# Patient Record
Sex: FEMALE | Race: BLACK OR AFRICAN AMERICAN | Employment: OTHER | URBAN - METROPOLITAN AREA
[De-identification: names, ages, dates, MRNs, and addresses within clinical notes are randomized per-mention and may not be internally consistent; named-entity substitution may affect disease eponyms.]

---

## 2017-01-19 ENCOUNTER — OFFICE VISIT (OUTPATIENT)
Dept: INTERNAL MEDICINE CLINIC | Age: 82
End: 2017-01-19

## 2017-01-19 VITALS
OXYGEN SATURATION: 96 % | HEIGHT: 63 IN | SYSTOLIC BLOOD PRESSURE: 136 MMHG | HEART RATE: 78 BPM | DIASTOLIC BLOOD PRESSURE: 75 MMHG | RESPIRATION RATE: 18 BRPM | WEIGHT: 173.8 LBS | TEMPERATURE: 97.8 F | BODY MASS INDEX: 30.79 KG/M2

## 2017-01-19 DIAGNOSIS — R68.81 EARLY SATIETY: ICD-10-CM

## 2017-01-19 DIAGNOSIS — M17.0 PRIMARY OSTEOARTHRITIS OF BOTH KNEES: ICD-10-CM

## 2017-01-19 DIAGNOSIS — I10 ESSENTIAL HYPERTENSION: ICD-10-CM

## 2017-01-19 DIAGNOSIS — J45.20 MILD INTERMITTENT ASTHMA WITHOUT COMPLICATION: ICD-10-CM

## 2017-01-19 DIAGNOSIS — E11.9 TYPE 2 DIABETES MELLITUS WITHOUT COMPLICATION, WITHOUT LONG-TERM CURRENT USE OF INSULIN (HCC): Primary | ICD-10-CM

## 2017-01-19 RX ORDER — MOMETASONE FUROATE 50 UG/1
2 SPRAY, METERED NASAL DAILY
Qty: 3 CONTAINER | Refills: 11 | Status: SHIPPED | OUTPATIENT
Start: 2017-01-19 | End: 2018-10-26

## 2017-01-19 RX ORDER — GUAIFENESIN 200 MG/1
200 TABLET ORAL
Qty: 300 TAB | Refills: 11 | Status: SHIPPED | OUTPATIENT
Start: 2017-01-19 | End: 2018-10-26

## 2017-01-19 RX ORDER — TRIAMCINOLONE ACETONIDE 1 MG/G
CREAM TOPICAL 2 TIMES DAILY
Qty: 80 G | Refills: 11 | Status: SHIPPED | OUTPATIENT
Start: 2017-01-19 | End: 2018-01-25 | Stop reason: SDUPTHER

## 2017-01-19 NOTE — MR AVS SNAPSHOT
Visit Information Date & Time Provider Department Dept. Phone Encounter #  
 1/19/2017 11:00 AM Angle Prado [facility] [place] [facility] [id number] Follow-up Instructions Return in about 4 months (around 5/19/2017). Follow-up and Disposition History Your Appointments 5/17/2017 11:30 AM  
Any with Blane Bond MD  
[address] [place] [address] and Primary Care Allison Willett) Appt Note: 4 month visit  
 Rachel Almendarez 1 [facility]  
  
   
 Rachel Almaguer [id number] Upcoming Health Maintenance Date Due Pneumococcal 65+ High/Highest Risk (1 of 2 - PCV13) 5/23/2002 FOOT EXAM Q1 4/9/2016 DTaP/Tdap/Td series (1 - Tdap) 6/7/2016 INFLUENZA AGE 9 TO ADULT 8/1/2016 LIPID PANEL Q1 10/27/2016 MEDICARE YEARLY EXAM 3/3/2017 HEMOGLOBIN A1C Q6M 3/7/2017 MICROALBUMIN Q1 6/6/2017 EYE EXAM RETINAL OR DILATED Q1 12/15/2017 GLAUCOMA SCREENING Q2Y 12/15/2018 Allergies as of 1/19/2017  Review Complete On: 1/19/2017 By: Blane Bond MD  
  
 Severity Noted Reaction Type Reactions Codeine  10/20/2010    Other (comments) Upset stomach Current Immunizations  Reviewed on 6/6/2016 Name Date Td, Adsorbed PF 6/6/2016 Not reviewed this visit You Were Diagnosed With   
  
 Codes Comments Type 2 diabetes mellitus without complication, without long-term current use of insulin (HCC)    -  Primary ICD-10-CM: E11.9 ICD-9-CM: 250.00 Mild intermittent asthma without complication     IHJ-97-KW: J45.20 ICD-9-CM: 493.90 Essential hypertension     ICD-10-CM: I10 
ICD-9-CM: 401.9 Primary osteoarthritis of both knees     ICD-10-CM: M17.0 ICD-9-CM: 715.16 Early satiety     ICD-10-CM: R68.81 ICD-9-CM: 780.94 Vitals BP Pulse Temp Resp Height(growth percentile) Weight(growth percentile) 136/75 (BP 1 Location: Left arm, BP Patient Position: Sitting) 78 97.8 °F (36.6 °C) (Oral) 18 5' 3\" (1.6 m) 173 lb 12.8 oz (78.8 kg) SpO2 BMI OB Status Smoking Status 96% 30.79 kg/m2 Unknown Never Smoker BMI and BSA Data Body Mass Index Body Surface Area 30.79 kg/m 2 1.87 m 2 Preferred Pharmacy Pharmacy Name Phone [facility]  [place] [phone] Your Updated Medication List  
  
   
This list is accurate as of: 1/19/17 12:54 PM.  Always use your most recent med list.  
  
  
  
  
 Jessee New 250-50 mcg/dose diskus inhaler Generic drug:  fluticasone-salmeterol USE 1 INHALATION TWO TIMES A DAY  
  
 aspirin delayed-release 81 mg tablet Take 81 mg by mouth daily. BONIVA 150 mg tablet Generic drug:  ibandronate TAKE 1 TABLET ONCE A MONTH  
  
 FREESTYLE LITE STRIPS strip Generic drug:  glucose blood VI test strips USE TO TEST BLOOD SUGAR ONCE DAILY  
  
 guaiFENesin 200 mg tablet Commonly known as:  Elif [place] Take 1 Tab by mouth every four (4) hours as needed for Congestion. hydroCHLOROthiazide 12.5 mg capsule Commonly known as:  Bernaetta Lot TAKE 1 CAPSULE ONCE ON MONDAY, WEDNESDAY, AND FRIDAY influenza vaccine 2015-16 (36mos+)(PF) Syrg injection Commonly known as:  FLUZONE/FLUARIX QUAD  
0.5 mL by IntraMUSCular route PRIOR TO DISCHARGE for 1 dose. KLOR-CON M20 20 mEq tablet Generic drug:  potassium chloride TAKE 1 TABLET DAILY  
  
 lamoTRIgine 25 mg tablet Commonly known as: LaMICtal  
TAKE 1 TABLET TWICE A DAY  
  
 metoprolol succinate 50 mg XL tablet Commonly known as:  TOPROL-XL  
TAKE 1 TABLET DAILY MICARDIS 80 mg tablet Generic drug:  telmisartan TAKE 1 TABLET DAILY  
  
 mometasone 50 mcg/actuation nasal spray Commonly known as:  NASONEX  
2 Sprays by Both Nostrils route daily. NIFEdipine ER 60 mg ER tablet Commonly known as:  PROCARDIA XL  
TAKE 1 TABLET TWICE A DAY  
  
 oxybutynin chloride XL 10 mg CR tablet Commonly known as:  DITROPAN XL  
TAKE 1 TABLET DAILY pravastatin 20 mg tablet Commonly known as:  PRAVACHOL  
TAKE 1 TABLET ONCE EACH NIGHT  
  
 PROAIR HFA 90 mcg/actuation inhaler Generic drug:  albuterol INHALE 2 PUFFS EVERY 4 HOURS AS NEEDED  
  
 triamcinolone acetonide 0.1 % topical cream  
Commonly known as:  KENALOG Apply  to affected area two (2) times a day. VITAMIN C PO Take  by mouth. Prescriptions Sent to Pharmacy Refills  
 guaiFENesin (ORGANIDIN) 200 mg tablet 11 Sig: Take 1 Tab by mouth every four (4) hours as needed for Congestion. Class: Normal  
 Pharmacy: [address], [address] Ph #: [phone] Route: Oral  
 mometasone (NASONEX) 50 mcg/actuation nasal spray 11 Si Sprays by Both Nostrils route daily. Class: Normal  
 Pharmacy: [address], [address] Ph #: [phone] Route: Both Nostrils  
 triamcinolone acetonide (KENALOG) 0.1 % topical cream 11 Sig: Apply  to affected area two (2) times a day. Class: Normal  
 Pharmacy: [facility] [address] Ph #: [phone] Route: Topical  
  
We Performed the Following CBC WITH AUTOMATED DIFF [31622 CPT(R)] HEMOGLOBIN A1C WITH EAG [29848 CPT(R)]  DIABETES FOOT EXAM [7 Custom] LIPID PANEL [83119 CPT(R)] METABOLIC PANEL, COMPREHENSIVE [91429 CPT(R)] GA COLLECTION VENOUS BLOOD,VENIPUNCTURE [id number] CPT(R)] TSH 3RD GENERATION [16884 CPT(R)] URINALYSIS W/ RFLX MICROSCOPIC [37894 CPT(R)] Follow-up Instructions Return in about 4 months (around 2017). To-Do List   
 2017 Imaging:  [facility] Introducing [facility]! Arie Serrato introduces [employer] patient portal. Now you can access parts of your medical record, email your doctor's office, and request medication refills online. 1. In your internet browser, go to [email or web address] 2. Click on the First Time User? Click Here link in the Sign In box. You will see the New Member Sign Up page. 3. Enter your In[employer]ek Access Code exactly as it appears below. You will not need to use this code after youve completed the sign-up process. If you do not sign up before the expiration date, you must request a new code. · In[employer] Access Code: [email or web address] Expires: 4/19/2017 12:54 PM 
 
4. Enter the last four digits of your Social Security Number (xxxx) and Date of Birth (mm/dd/yyyy) as indicated and click Submit. You will be taken to the next sign-up page. 5. Create a InvoTek ID. This will be your InvoTek login ID and cannot be changed, so think of one that is secure and easy to remember. 6. Create a InvoTek password. You can change your password at any time. 7. Enter your Password Reset Question and Answer. This can be used at a later time if you forget your password. 8. Enter your e-mail address. You will receive e-mail notification when new information is available in [address] 9. Click Sign Up. You can now view and download portions of your medical record. 10. Click the Download Summary menu link to download a portable copy of your medical information. If you have questions, please visit the Frequently Asked Questions section of the In[employer] website. Remember, In[employer] is NOT to be used for urgent needs. For medical emergencies, dial 911. Now available from your iPhone and Android! Please provide this summary of care documentation to your next provider. Your primary care clinician is listed as August Beer. If you have any questions after today's visit, please call [phone].

## 2017-01-19 NOTE — PROGRESS NOTES
1. Have you been to the ER, urgent care clinic since your last visit? Hospitalized since your last visit? No    2. Have you seen or consulted any other health care providers outside of the [address] [place] [address] since your last visit? Include any pap smears or colon screening.  No

## 2017-01-19 NOTE — PROGRESS NOTES
SPORTS MEDICINE AND PRIMARY CARE  Yoshi Garcia MD, Hillary [address] [address]  Phone:  [phone]  Fax: [phone]      Chief Complaint   Patient presents with    Hypertension         SUBECTIVE:    Lucius Gold is a 80 y.o. female Patient returns today ambulatory, alert and appropriate and has the capacity to give an accurate history. She has a known history of primary hypertension, diabetes, asthma, degenerative joint disease. Patient comes in today concerned about congestion that she has had for a while. She has a cough particularly in the evening or at night but it is nonproductive. Her Nasonex is out. She has tried Sudafed and she has also tried Afrin but that was too strong she states. She is also using a jet neb machine. She is using a vaporizer with solution. She is also using her ProAir and Advair Diskus. Patient continues to complain of feeling bloated. When she looks at herself in the mirror she looks like she is swollen she states. Dr. Giles Renee performed a colonoscopy and upper endoscopy which were unremarkable. He did a CT scan of the abdomen and pelvis and they were unremarkable. He did not do a gastric emptying study. She wonders if she could have that. She continues to complain of a rash in her legs that started when she was out in the weeds. She has tried Neosporin, Bacitracin without success and some Hydrocortisone. The patient notes swelling of her feet and legs, particularly as the day progresses. Her podiatrist recommended support hose. Patient states she is monitoring her cyst on her left kidney. She does not notice it present. Dr. Giles Renee told her she did not have to worry about it which we concur.           Current Outpatient Prescriptions   Medication Sig Dispense Refill    lamoTRIgine (LAMICTAL) 25 mg tablet TAKE 1 TABLET TWICE A  Tab 3    MICARDIS 80 mg tablet TAKE 1 TABLET DAILY 90 Tab 49    metoprolol succinate (TOPROL-XL) 50 mg XL tablet TAKE 1 TABLET DAILY 90 Tab 2    hydrochlorothiazide (MICROZIDE) 12.5 mg capsule TAKE 1 CAPSULE ONCE ON MONDAY, WEDNESDAY, AND FRIDAY 36 Cap 1    influenza vaccine 2015-16, 36mos+,,PF, (FLUZONE/FLUARIX QUAD) syrg injection 0.5 mL by IntraMUSCular route PRIOR TO DISCHARGE for 1 dose. 0.5 mL 0    BONIVA 150 mg tablet TAKE 1 TABLET ONCE A MONTH 3 Tab 1    NIFEdipine ER (PROCARDIA XL) 60 mg ER tablet TAKE 1 TABLET TWICE A  Tab 3    oxybutynin chloride XL (DITROPAN XL) 10 mg CR tablet TAKE 1 TABLET DAILY 90 Tab 2    PROAIR HFA 90 mcg/actuation inhaler INHALE 2 PUFFS EVERY 4 HOURS AS NEEDED 1 Inhaler 11    pravastatin (PRAVACHOL) 20 mg tablet TAKE 1 TABLET ONCE EACH NIGHT 90 Tab 10    FREESTYLE LITE STRIPS strip USE TO TEST BLOOD SUGAR ONCE DAILY 100 Strip 3    KLOR-CON M20 20 mEq tablet TAKE 1 TABLET DAILY 90 Tab 3    ADVAIR DISKUS 250-50 mcg/dose diskus inhaler USE 1 INHALATION TWO TIMES A DAY 3 Inhaler 11    mometasone (NASONEX) 50 mcg/actuation nasal spray 2 Sprays by Both Nostrils route daily. 1 Container 11    aspirin delayed-release 81 mg tablet Take 81 mg by mouth daily.  ASCORBATE CALCIUM (VITAMIN C PO) Take  by mouth.  guaiFENesin (ORGANIDIN) 200 mg tablet Take 200 mg by mouth every four (4) hours as needed for Congestion.        Past Medical History   Diagnosis Date    Anemia     Arrhythmia     Asthma     Bereavement 2-2-16      die 82 copd,chf,pul htn pn after 48 yr marriage    BPV (benign positional vertigo)     Breast cancer, right breast ([address] 1994     right breast, lumpectomy and radiation    Diabetes ([address]     DJD (degenerative joint disease) of knee     Early satiety     Hereditary spherocytosis ([address]     HTN (hypertension)     Radiation therapy complication     S/P colonoscopy 4-9-14     md Brian - family hx    S/P colonoscopy 07/20/2016     rt - md brian diverticulosis    Septic arthritis ([address] 3/11    Streptococcal sepsis ([facility] 3/11     Past Surgical History   Procedure Laterality Date    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 No chest pain or shortness of breath. Otherwise thirteen point review of systems is unremarkable. Sometimes she gets out of breath if she moves too fast.          Social History     Social History    Marital status:      Spouse name: N/A    Number of children: N/A    Years of education: N/A     Social History Main Topics    Smoking status: Never Smoker    Smokeless tobacco: None    Alcohol use No    Drug use: None    Sexual activity: Not Asked     Other Topics Concern    None     Social History Narrative   r  Family History   Problem Relation Age of Onset    Heart Disease Mother     Diabetes Mother     Other Father      'sphero'-blood disorder    Cancer Sister      breast    Breast Cancer Sister     Cancer Sister      colon       OBJECTIVE:  Visit Vitals    /75 (BP 1 Location: Left arm, BP Patient Position: Sitting)    Pulse 78    Temp 97.8 °F (36.6 °C) (Oral)    Resp 18    Ht 5' 3\" (1.6 m)    Wt 173 lb 12.8 oz (78.8 kg)    SpO2 96%    BMI 30.79 kg/m2     ENT: perrla,  eom intact  NECK: supple. Thyroid normal  CHEST: clear to ascultation and percussion   HEART: regular rate and rhythm  ABD: soft, bowel sounds active  EXTREMITIES: no edema, pulse 1+ Examination of the feet reveals puffiness just above the ankle. However she has lichenification bilaterally. Sensation is intact to fine filament. Pulses are diminished but intact. No visits with results within 3 Month(s) from this visit.   Latest known visit with results is:    Office Visit on 09/07/2016   Component Date Value Ref Range Status    Glucose 09/07/2016 95  65 - 99 mg/dL Final    BUN 09/07/2016 18  8 - 27 mg/dL Final    Creatinine 09/07/2016 0.95  0.57 - 1.00 mg/dL Final    GFR est non-AA 09/07/2016 57* >59 mL/min/1.73 Final    GFR est AA 09/07/2016 66  >59 mL/min/1.73 Final    BUN/Creatinine ratio 09/07/2016 19  11 - 26 Final    Sodium 09/07/2016 143  134 - 144 mmol/L Final    Potassium 09/07/2016 4.0  3.5 - 5.2 mmol/L Final    Chloride 09/07/2016 104  97 - 108 mmol/L Final    CO2 09/07/2016 22  18 - 29 mmol/L Final    Calcium 09/07/2016 9.7  8.7 - 10.3 mg/dL Final    Hemoglobin A1c 09/07/2016 5.4  4.8 - 5.6 % Final    Comment:          Pre-diabetes: 5.7 - 6.4           Diabetes: >6.4           Glycemic control for adults with diabetes: <7.0      Estimated average glucose 09/07/2016 108  mg/dL Final          ASSESSMENT:  No diagnosis found. Blood pressure control is excellent at 136/75. Her BMI reflects a five pound increase since we last saw her and we certainly encourage her to be physically active for 30 minutes five days a week and adhere to a heart healthy weight reducing diet. For the sinus congestion we will renew the Flonase. I think that is appropriate for her using the inhalers as well as the jet nebs for the cough. I think there is a bronchospastic component that would be helped by both of those. For the bloating we will ask for a gastric emptying study and report to her the results. For the lichenification of her ankles we will give her a Triamcinolone cream.    The puffiness of the legs at the end of the day is related to chronic venous insufficiency and support stockings or elevation during the day would be helpful. PLAN:  . No orders of the defined types were placed in this encounter.       Follow-up Disposition: Not on File      ATTENTION:   This medical record was transcribed using an electronic medical records system. Although proofread, it may and can contain electronic and spelling errors. Other human spelling and other errors may be present. Corrections may be executed at a later time. Please feel free to contact us for any clarifications as needed.

## 2017-01-20 LAB
ALBUMIN SERPL-MCNC: 4.3 G/DL (ref 3.5–4.8)
ALBUMIN/GLOB SERPL: 1.1 {RATIO} (ref 1.1–2.5)
ALP SERPL-CCNC: 91 IU/L (ref 39–117)
ALT SERPL-CCNC: 16 IU/L (ref 0–32)
APPEARANCE UR: CLEAR
AST SERPL-CCNC: 21 IU/L (ref 0–40)
BACTERIA #/AREA URNS HPF: NORMAL /[HPF]
BASOPHILS # BLD AUTO: 0.1 X10E3/UL (ref 0–0.2)
BASOPHILS NFR BLD AUTO: 1 %
BILIRUB SERPL-MCNC: 0.5 MG/DL (ref 0–1.2)
BILIRUB UR QL STRIP: NEGATIVE
BUN SERPL-MCNC: 19 MG/DL (ref 8–27)
BUN/CREAT SERPL: 16 (ref 11–26)
CALCIUM SERPL-MCNC: 9.8 MG/DL (ref 8.7–10.3)
CASTS URNS QL MICRO: NORMAL /LPF
CHLORIDE SERPL-SCNC: 102 MMOL/L (ref 96–106)
CHOLEST SERPL-MCNC: 140 MG/DL (ref 100–199)
CO2 SERPL-SCNC: 21 MMOL/L (ref 18–29)
COLOR UR: YELLOW
CREAT SERPL-MCNC: 1.21 MG/DL (ref 0.57–1)
EOSINOPHIL # BLD AUTO: 0.6 X10E3/UL (ref 0–0.4)
EOSINOPHIL NFR BLD AUTO: 6 %
EPI CELLS #/AREA URNS HPF: NORMAL /HPF
ERYTHROCYTE [DISTWIDTH] IN BLOOD BY AUTOMATED COUNT: 13.3 % (ref 12.3–15.4)
EST. AVERAGE GLUCOSE BLD GHB EST-MCNC: 103 MG/DL
GLOBULIN SER CALC-MCNC: 3.8 G/DL (ref 1.5–4.5)
GLUCOSE SERPL-MCNC: 61 MG/DL (ref 65–99)
GLUCOSE UR QL: NEGATIVE
HBA1C MFR BLD: 5.2 % (ref 4.8–5.6)
HCT VFR BLD AUTO: 38.3 % (ref 34–46.6)
HDLC SERPL-MCNC: 83 MG/DL
HGB BLD-MCNC: 13.3 G/DL (ref 11.1–15.9)
HGB UR QL STRIP: NEGATIVE
IMM GRANULOCYTES # BLD: 0 X10E3/UL (ref 0–0.1)
IMM GRANULOCYTES NFR BLD: 0 %
KETONES UR QL STRIP: NEGATIVE
LDLC SERPL CALC-MCNC: 48 MG/DL (ref 0–99)
LEUKOCYTE ESTERASE UR QL STRIP: NEGATIVE
LYMPHOCYTES # BLD AUTO: 1.8 X10E3/UL (ref 0.7–3.1)
LYMPHOCYTES NFR BLD AUTO: 18 %
MCH RBC QN AUTO: 27.1 PG (ref 26.6–33)
MCHC RBC AUTO-ENTMCNC: 34.7 G/DL (ref 31.5–35.7)
MCV RBC AUTO: 78 FL (ref 79–97)
MICRO URNS: ABNORMAL
MONOCYTES # BLD AUTO: 1.2 X10E3/UL (ref 0.1–0.9)
MONOCYTES NFR BLD AUTO: 12 %
MUCOUS THREADS URNS QL MICRO: PRESENT
NEUTROPHILS # BLD AUTO: 6.1 X10E3/UL (ref 1.4–7)
NEUTROPHILS NFR BLD AUTO: 63 %
NITRITE UR QL STRIP: NEGATIVE
PH UR STRIP: 6 [PH] (ref 5–7.5)
PLATELET # BLD AUTO: 493 X10E3/UL (ref 150–379)
POTASSIUM SERPL-SCNC: 4 MMOL/L (ref 3.5–5.2)
PROT SERPL-MCNC: 8.1 G/DL (ref 6–8.5)
PROT UR QL STRIP: ABNORMAL
RBC # BLD AUTO: 4.91 X10E6/UL (ref 3.77–5.28)
RBC #/AREA URNS HPF: NORMAL /HPF
SODIUM SERPL-SCNC: 144 MMOL/L (ref 134–144)
SP GR UR: 1.01 (ref 1–1.03)
TRIGL SERPL-MCNC: 46 MG/DL (ref 0–149)
TSH SERPL DL<=0.005 MIU/L-ACNC: 1.82 UIU/ML (ref 0.45–4.5)
UROBILINOGEN UR STRIP-MCNC: 0.2 MG/DL (ref 0.2–1)
VLDLC SERPL CALC-MCNC: 9 MG/DL (ref 5–40)
WBC # BLD AUTO: 9.7 X10E3/UL (ref 3.4–10.8)
WBC #/AREA URNS HPF: NORMAL /HPF

## 2017-01-25 ENCOUNTER — HOSPITAL ENCOUNTER (OUTPATIENT)
Dept: NUCLEAR MEDICINE | Age: 82
Discharge: HOME | End: 2017-01-25
Attending: INTERNAL MEDICINE
Payer: MEDICARE

## 2017-01-25 DIAGNOSIS — R68.81 EARLY SATIETY: ICD-10-CM

## 2017-01-25 DIAGNOSIS — E11.9 TYPE 2 DIABETES MELLITUS WITHOUT COMPLICATION, WITHOUT LONG-TERM CURRENT USE OF INSULIN (HCC): ICD-10-CM

## 2017-01-25 PROCEDURE — 78264 GASTRIC EMPTYING IMG STUDY: CPT

## 2017-01-30 RX ORDER — ALBUTEROL SULFATE 90 UG/1
AEROSOL, METERED RESPIRATORY (INHALATION)
Qty: 3 INHALER | Refills: 3 | Status: SHIPPED | OUTPATIENT
Start: 2017-01-30 | End: 2017-02-06 | Stop reason: SDUPTHER

## 2017-02-06 RX ORDER — ALBUTEROL SULFATE 90 UG/1
AEROSOL, METERED RESPIRATORY (INHALATION)
Qty: 3 INHALER | Refills: 3 | Status: SHIPPED | OUTPATIENT
Start: 2017-02-06 | End: 2018-07-09 | Stop reason: SDUPTHER

## 2017-02-13 RX ORDER — OXYBUTYNIN CHLORIDE 10 MG/1
TABLET, EXTENDED RELEASE ORAL
Qty: 90 TAB | Refills: 3 | Status: SHIPPED | OUTPATIENT
Start: 2017-02-13 | End: 2017-02-17 | Stop reason: SDUPTHER

## 2017-02-13 RX ORDER — HYDROCHLOROTHIAZIDE 12.5 MG/1
12.5 TABLET ORAL DAILY
Qty: 90 TAB | Refills: 3 | Status: SHIPPED | OUTPATIENT
Start: 2017-02-13 | End: 2017-02-17 | Stop reason: SDUPTHER

## 2017-02-17 RX ORDER — ALBUTEROL SULFATE 90 UG/1
1 AEROSOL, METERED RESPIRATORY (INHALATION)
Qty: 3 INHALER | Refills: 3 | Status: SHIPPED | OUTPATIENT
Start: 2017-02-17 | End: 2018-08-17 | Stop reason: SDUPTHER

## 2017-02-17 RX ORDER — OXYBUTYNIN CHLORIDE 10 MG/1
TABLET, EXTENDED RELEASE ORAL
Qty: 90 TAB | Refills: 3 | Status: SHIPPED | OUTPATIENT
Start: 2017-02-17 | End: 2018-04-28 | Stop reason: SDUPTHER

## 2017-02-17 RX ORDER — HYDROCHLOROTHIAZIDE 12.5 MG/1
12.5 TABLET ORAL DAILY
Qty: 90 TAB | Refills: 3 | Status: SHIPPED | OUTPATIENT
Start: 2017-02-17 | End: 2017-05-12 | Stop reason: SDUPTHER

## 2017-02-17 RX ORDER — HYDROCHLOROTHIAZIDE 12.5 MG/1
12.5 TABLET ORAL DAILY
Qty: 90 TAB | Refills: 3 | Status: CANCELLED | OUTPATIENT
Start: 2017-02-17

## 2017-02-20 RX ORDER — MECLIZINE HYDROCHLORIDE 25 MG/1
25 TABLET ORAL
Qty: 30 TAB | Refills: 3 | Status: SHIPPED | OUTPATIENT
Start: 2017-02-20 | End: 2017-03-02

## 2017-02-23 RX ORDER — BLOOD-GLUCOSE METER
KIT MISCELLANEOUS
Qty: 100 STRIP | Refills: 2 | Status: SHIPPED | OUTPATIENT
Start: 2017-02-23 | End: 2020-01-20

## 2017-02-23 RX ORDER — POTASSIUM CHLORIDE 1500 MG/1
TABLET, EXTENDED RELEASE ORAL
Qty: 90 TAB | Refills: 2 | Status: SHIPPED | OUTPATIENT
Start: 2017-02-23 | End: 2017-12-14 | Stop reason: SDUPTHER

## 2017-05-12 RX ORDER — HYDROCHLOROTHIAZIDE 12.5 MG/1
12.5 TABLET ORAL DAILY
Qty: 90 TAB | Refills: 3 | Status: SHIPPED | OUTPATIENT
Start: 2017-05-12 | End: 2017-06-01 | Stop reason: SDUPTHER

## 2017-05-17 ENCOUNTER — OFFICE VISIT (OUTPATIENT)
Dept: INTERNAL MEDICINE CLINIC | Age: 82
End: 2017-05-17

## 2017-05-17 VITALS
SYSTOLIC BLOOD PRESSURE: 161 MMHG | WEIGHT: 170.6 LBS | BODY MASS INDEX: 30.23 KG/M2 | OXYGEN SATURATION: 96 % | DIASTOLIC BLOOD PRESSURE: 75 MMHG | HEART RATE: 75 BPM | TEMPERATURE: 97.8 F | RESPIRATION RATE: 18 BRPM | HEIGHT: 63 IN

## 2017-05-17 DIAGNOSIS — Z00.00 ROUTINE GENERAL MEDICAL EXAMINATION AT A HEALTH CARE FACILITY: Primary | ICD-10-CM

## 2017-05-17 DIAGNOSIS — I10 ESSENTIAL HYPERTENSION: ICD-10-CM

## 2017-05-17 DIAGNOSIS — M17.0 PRIMARY OSTEOARTHRITIS OF BOTH KNEES: ICD-10-CM

## 2017-05-17 DIAGNOSIS — C50.911 MALIGNANT NEOPLASM OF RIGHT FEMALE BREAST, UNSPECIFIED SITE OF BREAST: ICD-10-CM

## 2017-05-17 DIAGNOSIS — H81.10 BPV (BENIGN POSITIONAL VERTIGO), UNSPECIFIED LATERALITY: ICD-10-CM

## 2017-05-17 DIAGNOSIS — E11.9 TYPE 2 DIABETES MELLITUS WITHOUT COMPLICATION, WITHOUT LONG-TERM CURRENT USE OF INSULIN (HCC): ICD-10-CM

## 2017-05-17 DIAGNOSIS — D58.0 HEREDITARY SPHEROCYTOSIS (HCC): ICD-10-CM

## 2017-05-17 DIAGNOSIS — Z13.39 SCREENING FOR ALCOHOLISM: ICD-10-CM

## 2017-05-17 DIAGNOSIS — D64.9 ANEMIA, UNSPECIFIED TYPE: ICD-10-CM

## 2017-05-17 NOTE — MR AVS SNAPSHOT
Visit Information Date & Time Provider Department Dept. Phone Encounter #  
 5/17/2017 11:30 AM Deandre Welshmitchel Merazdaniel  Sports Medicine and Primary Care [phone] [id number] Follow-up Instructions Return in about 4 months (around 9/17/2017). Follow-up and Disposition History Upcoming Health Maintenance Date Due Pneumococcal 65+ High/Highest Risk (1 of 2 - PCV13) 5/23/2002 DTaP/Tdap/Td series (1 - Tdap) 6/7/2016 MEDICARE YEARLY EXAM 3/3/2017 MICROALBUMIN Q1 6/6/2017 HEMOGLOBIN A1C Q6M 7/19/2017 INFLUENZA AGE 9 TO ADULT 8/1/2017 EYE EXAM RETINAL OR DILATED Q1 12/15/2017 FOOT EXAM Q1 1/19/2018 LIPID PANEL Q1 1/19/2018 GLAUCOMA SCREENING Q2Y 12/15/2018 Allergies as of 5/17/2017  Review Complete On: 1/19/2017 By: Thais Paiz MD  
  
 Severity Noted Reaction Type Reactions Codeine  10/20/2010    Other (comments) Upset stomach Current Immunizations  Reviewed on 6/6/2016 Name Date Td, Adsorbed PF 6/6/2016 Not reviewed this visit You Were Diagnosed With   
  
 Codes Comments Routine general medical examination at a health care facility    -  Primary ICD-10-CM: Z00.00 ICD-9-CM: V70.0 Screening for alcoholism     ICD-10-CM: Z13.89 ICD-9-CM: V79.1 Hereditary spherocytosis ([facility]     ICD-10-CM: D58.0 ICD-9-CM: 282.0 Malignant neoplasm of right female breast, unspecified site of breast ([facility]     ICD-10-CM: C50.911 ICD-9-CM: 174.9 Type 2 diabetes mellitus without complication, without long-term current use of insulin (HCC)     ICD-10-CM: E11.9 ICD-9-CM: 250.00   
 BPV (benign positional vertigo), unspecified laterality     ICD-10-CM: H81.10 ICD-9-CM: 386.11 Primary osteoarthritis of both knees     ICD-10-CM: M17.0 ICD-9-CM: 715.16 Essential hypertension     ICD-10-CM: I10 
ICD-9-CM: 401.9 Anemia, unspecified type     ICD-10-CM: D64.9 ICD-9-CM: 222. 9 Vitals BP Pulse Temp Resp Height(growth percentile) Weight(growth percentile) 161/75 (BP 1 Location: Left arm, BP Patient Position: Sitting) 75 97.8 °F (36.6 °C) (Oral) 18 5' 3\" (1.6 m) 170 lb 9.6 oz (77.4 kg) SpO2 BMI OB Status Smoking Status 96% 30.22 kg/m2 Postmenopausal Never Smoker BMI and BSA Data Body Mass Index Body Surface Area  
 30.22 kg/m 2 1.85 m 2 Preferred Pharmacy Pharmacy Name Phone [facility] [address] [address] [place], [place] [address]Rosaura Second  Narcissa Hanapepe [phone] Your Updated Medication List  
  
   
This list is accurate as of: 5/17/17 12:26 PM.  Always use your most recent med list.  
  
  
  
  
 Leafy Weiss 250-50 mcg/dose diskus inhaler Generic drug:  fluticasone-salmeterol USE 1 INHALATION TWO TIMES A DAY  
  
 * albuterol 90 mcg/actuation inhaler Commonly known as:  PROAIR HFA INHALE 2 PUFFS EVERY 4 HOURS AS NEEDED  
  
 * albuterol 90 mcg/actuation inhaler Commonly known as:  PROVENTIL HFA, VENTOLIN HFA, PROAIR HFA Take 1 Puff by inhalation every four (4) hours as needed for Wheezing. aspirin delayed-release 81 mg tablet Take 81 mg by mouth daily. FREESTYLE LITE STRIPS strip Generic drug:  glucose blood VI test strips USE TO TEST BLOOD SUGAR ONCE DAILY  
  
 guaiFENesin 200 mg tablet Commonly known as:  Ara Scot Take 1 Tab by mouth every four (4) hours as needed for Congestion. hydroCHLOROthiazide 12.5 mg tablet Commonly known as:  HYDRODIURIL Take 1 Tab by mouth daily. ibandronate 150 mg tablet Commonly known as:  [place] [employer] TAKE 1 TABLET ONCE A MONTH  
  
 KLOR-CON M20 20 mEq tablet Generic drug:  potassium chloride TAKE 1 TABLET DAILY  
  
 lamoTRIgine 25 mg tablet Commonly known as: LaMICtal  
TAKE 1 TABLET TWICE A DAY  
  
 metoprolol succinate 50 mg XL tablet Commonly known as:  TOPROL-XL  
TAKE 1 TABLET DAILY MICARDIS 80 mg tablet Generic drug:  telmisartan TAKE 1 TABLET DAILY  
  
 mometasone 50 mcg/actuation nasal spray Commonly known as:  NASONEX  
2 Sprays by Both Nostrils route daily. NIFEdipine ER 60 mg ER tablet Commonly known as:  PROCARDIA XL  
TAKE 1 TABLET TWICE A DAY  
  
 oxybutynin chloride XL 10 mg CR tablet Commonly known as:  DITROPAN XL  
TAKE 1 TABLET DAILY pravastatin 20 mg tablet Commonly known as:  PRAVACHOL  
TAKE 1 TABLET ONCE EACH NIGHT  
  
 triamcinolone acetonide 0.1 % topical cream  
Commonly known as:  KENALOG Apply  to affected area two (2) times a day. VITAMIN C PO Take  by mouth. * Notice: This list has 2 medication(s) that are the same as other medications prescribed for you. Read the directions carefully, and ask your doctor or other care provider to review them with you. We Performed the Following HEMOGLOBIN A1C WITH EAG [44444 CPT(R)] [place] COLLECTION VENOUS BLOOD,VENIPUNCTURE [id number] CPT(R)] RENAL FUNCTION PANEL [37210 CPT(R)] Follow-up Instructions Return in about 4 months (around 9/17/2017). Introducing [facility]! Vidal Ferrell introduces [employer] patient portal. Now you can access parts of your medical record, email your doctor's office, and request medication refills online. 1. In your internet browser, go to [email or web address] 2. Click on the First Time User? Click Here link in the Sign In box. You will see the New Member Sign Up page. 3. Enter your [employer] Access Code exactly as it appears below. You will not need to use this code after youve completed the sign-up process. If you do not sign up before the expiration date, you must request a new code. · [employer] Access Code: [email or web address] Expires: 8/15/2017 11:01 AM 
 
4. Enter the last four digits of your Social Security Number (xxxx) and Date of Birth (mm/dd/yyyy) as indicated and click Submit.  You will be taken to the next sign-up page. 5. Create a Xanodyne ID. This will be your [employer] login ID and cannot be changed, so think of one that is secure and easy to remember. 6. Create a X[employer] password. You can change your password at any time. 7. Enter your Password Reset Question and Answer. This can be used at a later time if you forget your password. 8. Enter your e-mail address. You will receive e-mail notification when new information is available in [address] 9. Click Sign Up. You can now view and download portions of your medical record. 10. Click the Download Summary menu link to download a portable copy of your medical information. If you have questions, please visit the Frequently Asked Questions section of the [employer] website. Remember, [employer] is NOT to be used for urgent needs. For medical emergencies, dial 911. Now available from your iPhone and Android! Please provide this summary of care documentation to your next provider. Your primary care clinician is listed as Marguerite Odonnell. If you have any questions after today's visit, please call [phone].

## 2017-05-17 NOTE — PROGRESS NOTES
This is a Subsequent Medicare Annual Wellness Visit providing Personalized Prevention Plan Services (PPPS) (Performed 12 months after initial AWV and PPPS )    I have reviewed the patient's medical history in detail and updated the computerized patient record. HistoryPatient returns today ambulatory, alert and appropriate and has the capacity to give an accurate history. She has a scenario which is spiral.  She went to her podiatrist who told her she had ankle swelling. He told her to go see her primary care doctor. Primary care doctor suggested she wear support stockings. She went to the cardiologist who told her the medications were causing some swelling and he elected not to change the medication as he felt the benefit outweighed the risk of side effect. She comes in today with the package insert indicating Micardis can cause swelling. Patient is wondering if we recommend the vinegar diet to lose her stomach. She states she has had tests from head to toe from front to back including a colonoscopy and cannot lose her stomach. She wonders what the doctor says. The doctor said no. Patient is now trying to cut back on her food because she is feeling bloated but it does not seem to make a change in her weight. Patient is under some duress which may account for blood pressure elevation. Her 's aunt called her last night and stated that she was passing and at 6:30 this morning she passed. Patient saw her eye specialist.  Her right eye is 20/20 which had the cataract removed. The left eye has a cataract but it is not time to have it removed. She will see her ophthalmologist again in six months. Patient has two prescriptions, both the Hydrochlorothiazide. One is daily and one is Monday, Wednesday and Friday. She wonders what to do. She does note if she stays in bed all day and keeps her feet up she has no swelling in her feet.   It is only when she is on her feet for a period of time that she notes the swelling. Past Medical History:   Diagnosis Date    Anemia     Arrhythmia     Asthma     Bereavement 2-2-16     die 82 copd,chf,pul htn pn after 48 yr marriage    BPV (benign positional vertigo)     Breast cancer, right breast ([address] 1994    right breast, lumpectomy and radiation    Diabetes ([address]     DJD (degenerative joint disease) of knee     Early satiety     Hereditary spherocytosis ([address]     HTN (hypertension)     Radiation therapy complication     S/P colonoscopy 4-9-14    md Brian - family hx    S/P colonoscopy 07/20/2016    rt - md brian diverticulosis    Septic arthritis ([address] 3/11    Streptococcal sepsis ([address] 3/11    Venous insufficiency of both lower extremities       Past Surgical History:   Procedure Laterality Date    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 90 Tab 3    ibandronate (BONIVA) 150 mg tablet TAKE 1 TABLET ONCE A MONTH 3 Tab 11    FREESTYLE LITE STRIPS strip USE TO TEST BLOOD SUGAR ONCE DAILY 100 Strip 2    KLOR-CON M20 20 mEq tablet TAKE 1 TABLET DAILY 90 Tab 2    albuterol (PROVENTIL HFA, VENTOLIN HFA, PROAIR HFA) 90 mcg/actuation inhaler Take 1 Puff by inhalation every four (4) hours as needed for Wheezing. 3 Inhaler 3    oxybutynin chloride XL (DITROPAN XL) 10 mg CR tablet TAKE 1 TABLET DAILY 90 Tab 3    albuterol (PROAIR HFA) 90 mcg/actuation inhaler INHALE 2 PUFFS EVERY 4 HOURS AS NEEDED 3 Inhaler 3    guaiFENesin (ORGANIDIN) 200 mg tablet Take 1 Tab by mouth every four (4) hours as needed for Congestion.  300 Tab 11    mometasone (NASONEX) 50 mcg/actuation nasal spray 2 Sprays by Both Nostrils route daily. 3 Container 11    triamcinolone acetonide (KENALOG) 0.1 % topical cream Apply  to affected area two (2) times a day. 80 g 11    lamoTRIgine (LAMICTAL) 25 mg tablet TAKE 1 TABLET TWICE A  Tab 3    MICARDIS 80 mg tablet TAKE 1 TABLET DAILY 90 Tab 49    metoprolol succinate (TOPROL-XL) 50 mg XL tablet TAKE 1 TABLET DAILY 90 Tab 2    influenza vaccine 2015-16, 36mos+,,PF, (FLUZONE/FLUARIX QUAD) syrg injection 0.5 mL by IntraMUSCular route PRIOR TO DISCHARGE for 1 dose. 0.5 mL 0    NIFEdipine ER (PROCARDIA XL) 60 mg ER tablet TAKE 1 TABLET TWICE A  Tab 3    pravastatin (PRAVACHOL) 20 mg tablet TAKE 1 TABLET ONCE EACH NIGHT 90 Tab 10    ADVAIR DISKUS 250-50 mcg/dose diskus inhaler USE 1 INHALATION TWO TIMES A DAY 3 Inhaler 11    aspirin delayed-release 81 mg tablet Take 81 mg by mouth daily.  ASCORBATE CALCIUM (VITAMIN C PO) Take  by mouth.        Allergies   Allergen Reactions    Codeine Other (comments)     Upset stomach     Family History   Problem Relation Age of Onset    Heart Disease Mother     Diabetes Mother     Other Father      'sphero'-blood disorder    Cancer Sister      breast    Breast Cancer Sister     Cancer Sister      colon     Social History   Substance Use Topics    Smoking status: Never Smoker    Smokeless tobacco: Never Used    Alcohol use No     Patient Active Problem List   Diagnosis Code    Anemia NEC     Asthma J45.909    Cancer ([facility] C80.1    Diabetes ([facility] E11.9    Hereditary spherocytosis ([facility] D58.0    HTN (hypertension) I10    Breast cancer, right breast ([facility] C50.911    Anemia D64.9    BPV (benign positional vertigo) H81.10    DJD (degenerative joint disease) of knee M17.10    S/P colonoscopy Z98.890    Early satiety R68.81    Venous insufficiency of both lower extremities I87.2       Depression Risk Factor Screening:     PHQ over the last two weeks 5/17/2017   Little interest or pleasure in doing things Not at all   Feeling down, depressed or hopeless Not at all   Total Score PHQ 2 0     Alcohol Risk Factor Screening: On any occasion during the past 3 months, have you had more than 3 drinks containing alcohol? No    Do you average more than 7 drinks per week? No      Functional Ability and Level of Safety:     Hearing Loss   normal-to-mild    Activities of Daily Living   Self-care. Requires assistance with: no ADLs    Fall Risk     Fall Risk Assessment, last 12 mths 5/17/2017   Able to walk? Yes   Fall in past 12 months? No     Abuse Screen   Patient is not abused    Review of Systems   A comprehensive review of systems was negative except for that written in the HPI. Physical Examination     Evaluation of Cognitive Function:  Mood/affect:  neutral  Appearance: age appropriate  Family member/caregiver input:     Visit Vitals    /75 (BP 1 Location: Left arm, BP Patient Position: Sitting)    Pulse 75    Temp 97.8 °F (36.6 °C) (Oral)    Resp 18    Ht 5' 3\" (1.6 m)    Wt 170 lb 9.6 oz (77.4 kg)    SpO2 96%    BMI 30.22 kg/m2     General:  Alert, cooperative, no distress, appears stated age. Head:  Normocephalic, without obvious abnormality, atraumatic. Eyes:  Conjunctivae/corneas clear. PERRL, EOMs intact. Fundi benign. Ears:  Normal TMs and external ear canals both ears. Nose: Nares normal. Septum midline. Mucosa normal. No drainage or sinus tenderness. Throat: Lips, mucosa, and tongue normal. Teeth and gums normal.   Neck: Supple, symmetrical, trachea midline, no adenopathy, thyroid: no enlargement/tenderness/nodules, no carotid bruit and no JVD. Back:   Symmetric, no curvature. ROM normal. No CVA tenderness. Lungs:   Clear to auscultation bilaterally. Chest wall:  No tenderness or deformity. Heart:  Regular rate and rhythm, S1, S2 normal, no murmur, click, rub or gallop. Breast Exam:  Na. Abdomen:   Soft, non-tender. Bowel sounds normal. No masses,  No organomegaly.    Genitalia:  na   Rectal: Extremities: Extremities normal, atraumatic, no cyanosis or edema. Pulses: 2+ and symmetric all extremities. Skin: Skin color, texture, turgor normal. No rashes or lesions. Lymph nodes: Cervical, supraclavicular, and axillary nodes normal.   Neurologic: CNII-XII intact. Normal strength, sensation and reflexes throughout. Patient Care Team:  Mary Jo Almanza MD as PCP - General (Internal Medicine)    Advice/Referrals/Counseling   Education and counseling provided:  Are appropriate based on today's review and evaluation      Assessment/Plan       ICD-10-CM ICD-9-CM    1. Routine general medical examination at a health care facility Z00.00 V70.0    2. Screening for alcoholism Z13.89 V79.1    3. Hereditary spherocytosis (HCC) D58.0 282.0    4. Malignant neoplasm of right female breast, unspecified site of breast ([facility] C50.911 174.9    5. Type 2 diabetes mellitus without complication, without long-term current use of insulin (HCC) E11.9 250.00 HI COLLECTION VENOUS BLOOD,VENIPUNCTURE      HEMOGLOBIN A1C WITH EAG      RENAL FUNCTION PANEL   6. BPV (benign positional vertigo), unspecified laterality H81.10 386.11    7. Primary osteoarthritis of both knees M17.0 715.16    8. Essential hypertension I10 401.9    9. Anemia, unspecified type D64.9 285.9    . Chief Complaint   Patient presents with   [facility] Annual Wellness Visit     1. Have you been to the ER, urgent care clinic since your last visit? Hospitalized since your last visit? No    2. Have you seen or consulted any other health care providers outside of the [address] [place] [address] since your last visit? Include any pap smears or colon screening. No  Patient's medical status is stable. We advise her of the diagnosis of chronic venous insufficiency and appropriate treatment. We will check her blood sugar control with a hemoglobin A1C which has been in a normal range. Blood pressure is up today.   I think it is related to the stressors of the loss of a loved one. She checks her blood pressure at home and it has been normotensive. She will let us know if the blood pressure is greater than 130/80. We will check her renal status. Creatinine bumped slightly on the last visit. We will be sure that it has returned to normal today. We encourage activity for 30 minutes five days a week. She will return to the office .

## 2017-05-18 LAB
ALBUMIN SERPL-MCNC: 4.2 G/DL (ref 3.5–4.8)
BUN SERPL-MCNC: 16 MG/DL (ref 8–27)
BUN/CREAT SERPL: 16 (ref 12–28)
CALCIUM SERPL-MCNC: 9.8 MG/DL (ref 8.7–10.3)
CHLORIDE SERPL-SCNC: 103 MMOL/L (ref 96–106)
CO2 SERPL-SCNC: 22 MMOL/L (ref 18–29)
CREAT SERPL-MCNC: 0.99 MG/DL (ref 0.57–1)
EST. AVERAGE GLUCOSE BLD GHB EST-MCNC: 114 MG/DL
GLUCOSE SERPL-MCNC: 88 MG/DL (ref 65–99)
HBA1C MFR BLD: 5.6 % (ref 4.8–5.6)
PHOSPHATE SERPL-MCNC: 3.2 MG/DL (ref 2.5–4.5)
POTASSIUM SERPL-SCNC: 4.1 MMOL/L (ref 3.5–5.2)
SODIUM SERPL-SCNC: 142 MMOL/L (ref 134–144)

## 2017-06-01 RX ORDER — HYDROCHLOROTHIAZIDE 12.5 MG/1
12.5 TABLET ORAL DAILY
Qty: 90 TAB | Refills: 3 | Status: SHIPPED | OUTPATIENT
Start: 2017-06-01 | End: 2018-09-05 | Stop reason: SDUPTHER

## 2017-06-15 RX ORDER — PRAVASTATIN SODIUM 20 MG/1
TABLET ORAL
Qty: 90 TAB | Refills: 9 | Status: SHIPPED | OUTPATIENT
Start: 2017-06-15 | End: 2018-07-16 | Stop reason: SDUPTHER

## 2017-08-30 RX ORDER — NIFEDIPINE 60 MG/1
TABLET, EXTENDED RELEASE ORAL
Qty: 180 TAB | Refills: 3 | Status: SHIPPED | OUTPATIENT
Start: 2017-08-30 | End: 2018-08-17 | Stop reason: SDUPTHER

## 2017-09-13 ENCOUNTER — OFFICE VISIT (OUTPATIENT)
Dept: INTERNAL MEDICINE CLINIC | Age: 82
End: 2017-09-13

## 2017-09-13 VITALS
HEIGHT: 63 IN | OXYGEN SATURATION: 94 % | HEART RATE: 69 BPM | RESPIRATION RATE: 16 BRPM | TEMPERATURE: 98 F | BODY MASS INDEX: 30.37 KG/M2 | WEIGHT: 171.4 LBS | DIASTOLIC BLOOD PRESSURE: 78 MMHG | SYSTOLIC BLOOD PRESSURE: 153 MMHG

## 2017-09-13 DIAGNOSIS — J45.20 MILD INTERMITTENT ASTHMA WITHOUT COMPLICATION: ICD-10-CM

## 2017-09-13 DIAGNOSIS — I10 ESSENTIAL HYPERTENSION: ICD-10-CM

## 2017-09-13 DIAGNOSIS — D58.0 HEREDITARY SPHEROCYTOSIS (HCC): ICD-10-CM

## 2017-09-13 DIAGNOSIS — C50.911 MALIGNANT NEOPLASM OF RIGHT FEMALE BREAST, UNSPECIFIED SITE OF BREAST: ICD-10-CM

## 2017-09-13 DIAGNOSIS — E11.9 TYPE 2 DIABETES MELLITUS WITHOUT COMPLICATION, WITHOUT LONG-TERM CURRENT USE OF INSULIN (HCC): Primary | ICD-10-CM

## 2017-09-13 DIAGNOSIS — Z23 ENCOUNTER FOR IMMUNIZATION: ICD-10-CM

## 2017-09-13 RX ORDER — METOPROLOL TARTRATE 50 MG/1
50 TABLET ORAL DAILY
COMMUNITY
End: 2018-05-15 | Stop reason: SDUPTHER

## 2017-09-13 NOTE — PROGRESS NOTES
Chief Complaint   Patient presents with    Hypertension     routine 4 month follow up      1. Have you been to the ER, urgent care clinic since your last visit? Hospitalized since your last visit? No    2. Have you seen or consulted any other health care providers outside of the [facility] since your last visit? Include any pap smears or colon screening.  No

## 2017-09-13 NOTE — MR AVS SNAPSHOT
Visit Information Date & Time Provider Department Dept. Phone Encounter #  
 9/13/2017 10:45 AM Luke Mcdowell MD [facility] [phone] Follow-up Instructions Return in about 4 months (around 1/13/2018). Your Appointments 1/16/2018 11:00 AM  
Any with Luke Mcdowell MD  
[address] [place] [address] and Primary Care [address] [facility]) Appt Note: follow up 4mnths  
 [address] [phone]  
  
   
 [address] [id number] Upcoming Health Maintenance Date Due MICROALBUMIN Q1 6/6/2017 INFLUENZA AGE 9 TO ADULT 8/1/2017 Pneumococcal 65+ High/Highest Risk (2 of 2 - PPSV23) 11/8/2017 HEMOGLOBIN A1C Q6M 11/17/2017 FOOT EXAM Q1 1/19/2018 LIPID PANEL Q1 1/19/2018 EYE EXAM RETINAL OR DILATED Q1 5/11/2018 MEDICARE YEARLY EXAM 5/18/2018 GLAUCOMA SCREENING Q2Y 5/11/2019 DTaP/Tdap/Td series (2 - Td) 9/13/2027 Allergies as of 9/13/2017  Review Complete On: 9/13/2017 By: Luke Mcdowell MD  
  
 Severity Noted Reaction Type Reactions Codeine  10/20/2010    Other (comments) Upset stomach Current Immunizations  Reviewed on 6/6/2016 Name Date Influenza High Dose Vaccine PF 9/13/2017 Td, Adsorbed PF 6/6/2016 Not reviewed this visit You Were Diagnosed With   
  
 Codes Comments Type 2 diabetes mellitus without complication, without long-term current use of insulin (HCC)    -  Primary ICD-10-CM: E11.9 ICD-9-CM: 250.00 Encounter for immunization     ICD-10-CM: B44 ICD-9-CM: V03.89 Hereditary spherocytosis ([facility]     ICD-10-CM: D58.0 ICD-9-CM: 282.0 Malignant neoplasm of right female breast, unspecified site of breast ([facility]     ICD-10-CM: C50.911 ICD-9-CM: 174.9 Mild intermittent asthma without complication     BPI-70-IL: J45.20 ICD-9-CM: 493.90  Essential hypertension     ICD-10-CM: I10 
 ICD-9-CM: 401.9 Vitals BP Pulse Temp Resp Height(growth percentile) Weight(growth percentile) 153/78 (BP 1 Location: Left arm, BP Patient Position: Sitting) 69 98 °F (36.7 °C) (Oral) 16 5' 3\" (1.6 m) 171 lb 6.4 oz (77.7 kg) SpO2 BMI OB Status Smoking Status 94% 30.36 kg/m2 Postmenopausal Never Smoker BMI and BSA Data Body Mass Index Body Surface Area  
 30.36 kg/m 2 1.86 m 2 Preferred Pharmacy Pharmacy Name [address] [facility] [phone] Your Updated Medication List  
  
   
This list is accurate as of: 9/13/17  1:31 PM.  Always use your most recent med list.  
  
  
  
  
 Joce Bairon 250-50 mcg/dose diskus inhaler Generic drug:  fluticasone-salmeterol USE 1 INHALATION TWO TIMES A DAY  
  
 * albuterol 90 mcg/actuation inhaler Commonly known as:  PROAIR HFA INHALE 2 PUFFS EVERY 4 HOURS AS NEEDED  
  
 * albuterol 90 mcg/actuation inhaler Commonly known as:  PROVENTIL HFA, VENTOLIN HFA, PROAIR HFA Take 1 Puff by inhalation every four (4) hours as needed for Wheezing. aspirin delayed-release 81 mg tablet Take 81 mg by mouth daily. FREESTYLE LITE STRIPS strip Generic drug:  glucose blood VI test strips USE TO TEST BLOOD SUGAR ONCE DAILY  
  
 guaiFENesin 200 mg tablet Commonly known as:  Graceann Spikes Take 1 Tab by mouth every four (4) hours as needed for Congestion. hydroCHLOROthiazide 12.5 mg tablet Commonly known as:  HYDRODIURIL Take 1 Tab by mouth daily. ibandronate 150 mg tablet Commonly known as:  Sharl Goring TAKE 1 TABLET ONCE A MONTH  
  
 KLOR-CON M20 20 mEq tablet Generic drug:  potassium chloride TAKE 1 TABLET DAILY  
  
 lamoTRIgine 25 mg tablet Commonly known as: LaMICtal  
TAKE 1 TABLET TWICE A DAY  
  
 metoprolol tartrate 50 mg tablet Commonly known as:  LOPRESSOR Take 50 mg by mouth daily. MICARDIS 80 mg tablet Generic drug:  telmisartan TAKE 1 TABLET DAILY  
  
 mometasone 50 mcg/actuation nasal spray Commonly known as:  NASONEX  
2 Sprays by Both Nostrils route daily. NIFEdipine ER 60 mg ER tablet Commonly known as:  PROCARDIA XL  
TAKE 1 TABLET TWICE A DAY  
  
 oxybutynin chloride XL 10 mg CR tablet Commonly known as:  DITROPAN XL  
TAKE 1 TABLET DAILY pravastatin 20 mg tablet Commonly known as:  PRAVACHOL  
TAKE 1 TABLET ONCE EACH NIGHT  
  
 TOPROL XL 50 mg XL tablet Generic drug:  metoprolol succinate TAKE 1 TABLET DAILY  
  
 triamcinolone acetonide 0.1 % topical cream  
Commonly known as:  KENALOG Apply  to affected area two (2) times a day. VITAMIN C PO Take  by mouth. * Notice: This list has 2 medication(s) that are the same as other medications prescribed for you. Read the directions carefully, and ask your doctor or other care provider to review them with you. We Performed the Following CBC WITH AUTOMATED DIFF [21566 CPT(R)] HEMOGLOBIN A1C WITH EAG [67595 CPT(R)] INFLUENZA VIRUS VACCINE, HIGH DOSE SEASONAL, PRESERVATIVE FREE [54863 CPT(R)] [place] COLLECTION VENOUS BLOOD,VENIPUNCTURE [id number] CPT(R)] AZ IMMUNIZ ADMIN,1 SINGLE/COMB VAC/TOXOID [id number] CPT(R)] Follow-up Instructions Return in about 4 months (around 1/13/2018). Patient Instructions Vaccine Information Statement Influenza (Flu) Vaccine (Inactivated or Recombinant): What you need to know Many Vaccine Information Statements are available in [place] and other languages. See www.immunize.org/vis Hojas de Información Sobre Vacunas están disponibles en Español y en muchos otros idiomas. Visite www.immunize.org/vis 1. Why get vaccinated? Influenza (flu) is a contagious disease that spreads around the [place] every year, usually between October and May.   
 
Flu is caused by influenza viruses, and is spread mainly by coughing, sneezing, and close contact. Anyone can get flu. Flu strikes suddenly and can last several days. Symptoms vary by age, but can include: 
 fever/chills  sore throat  muscle aches  fatigue  cough  headache  runny or stuffy nose Flu can also lead to pneumonia and blood infections, and cause diarrhea and seizures in children. If you have a medical condition, such as heart or lung disease, flu can make it worse. Flu is more dangerous for some people. Infants and young children, people 72years of age and older, pregnant women, and people with certain health conditions or a weakened immune system are at greatest risk. Each year thousands of people in the [facility] die from flu, and many more are hospitalized. Flu vaccine can: 
 keep you from getting flu, 
 make flu less severe if you do get it, and 
 keep you from spreading flu to your family and other people. 2. Inactivated and recombinant flu vaccines A dose of flu vaccine is recommended every flu season. Children 6 months through 6years of age may need two doses during the same flu season. Everyone else needs only one dose each flu season. Some inactivated flu vaccines contain a very small amount of a mercury-based preservative called thimerosal. Studies have not shown thimerosal in vaccines to be harmful, but flu vaccines that do not contain thimerosal are available. There is no live flu virus in flu shots. They cannot cause the flu. There are many flu viruses, and they are always changing. Each year a new flu vaccine is made to protect against three or four viruses that are likely to cause disease in the upcoming flu season. But even when the vaccine doesnt exactly match these viruses, it may still provide some protection Flu vaccine cannot prevent: 
 flu that is caused by a virus not covered by the vaccine, or 
 illnesses that look like flu but are not. It takes about 2 weeks for protection to develop after vaccination, and protection lasts through the flu season. 3. Some people should not get this vaccine Tell the person who is giving you the vaccine:  If you have any severe, life-threatening allergies. If you ever had a life-threatening allergic reaction after a dose of flu vaccine, or have a severe allergy to any part of this vaccine, you may be advised not to get vaccinated. Most, but not all, types of flu vaccine contain a small amount of egg protein.  If you ever had Guillain-Barré Syndrome (also called GBS). Some people with a history of GBS should not get this vaccine. This should be discussed with your doctor.  If you are not feeling well. It is usually okay to get flu vaccine when you have a mild illness, but you might be asked to come back when you feel better. 4. Risks of a vaccine reaction With any medicine, including vaccines, there is a chance of reactions. These are usually mild and go away on their own, but serious reactions are also possible. Most people who get a flu shot do not have any problems with it. Minor problems following a flu shot include:  
 soreness, redness, or swelling where the shot was given  hoarseness  sore, red or itchy eyes  cough  fever  aches  headache  itching  fatigue If these problems occur, they usually begin soon after the shot and last 1 or 2 days. More serious problems following a flu shot can include the following:  There may be a small increased risk of Guillain-Barré Syndrome (GBS) after inactivated flu vaccine. This risk has been estimated at 1 or 2 additional cases per million people vaccinated. This is much lower than the risk of severe complications from flu, which can be prevented by flu vaccine.    
 
 Young children who get the flu shot along with pneumococcal vaccine (PCV13) and/or DTaP vaccine at the same time might be slightly more likely to have a seizure caused by fever. Ask your doctor for more information. Tell your doctor if a child who is getting flu vaccine has ever had a seizure. Problems that could happen after any injected vaccine:  People sometimes faint after a medical procedure, including vaccination. Sitting or lying down for about 15 minutes can help prevent fainting, and injuries caused by a fall. Tell your doctor if you feel dizzy, or have vision changes or ringing in the ears.  Some people get severe pain in the shoulder and have difficulty moving the arm where a shot was given. This happens very rarely.  Any medication can cause a severe allergic reaction. Such reactions from a vaccine are very rare, estimated at about 1 in a million doses, and would happen within a few minutes to a few hours after the vaccination. As with any medicine, there is a very remote chance of a vaccine causing a serious injury or death. The safety of vaccines is always being monitored. For more information, visit: www.cdc.gov/vaccinesafety/ 
 
 
The [facility] Vaccine Injury Compensation Program (VICP) is a federal program that was created to compensate people who may have been injured by certain vaccines. Persons who believe they may have been injured by a vaccine can learn about the program and about filing a claim by calling 6-567.308.3006 or visiting the [employer] website at www.[facility].gov/vaccinecompensation. There is a time limit to file a claim for compensation. 7. How can I learn more?  Ask your healthcare provider. He or she can give you the vaccine package insert or suggest other sources of information.  Call your local or state health department.  Contact the Centers for Disease Control and Prevention (CDC): 
- Call 5-152.132.2045 (1-800-CDC-INFO) or 
- Visit CDCs website at www.cdc.gov/flu Vaccine Information Statement Inactivated Influenza Vaccine 8/7/2015 
[address] [id number] [facility] and [place] Centers for Disease Control and Prevention Office Use Only Introducing [facility]! Dear Rafael Beavers: Thank you for requesting a [employer] account. Our records indicate that you already have an active [employer] account. You can access your account anytime at [email or web address] Did you know that you can access your hospital and ER discharge instructions at any time in [employer]? You can also review all of your test results from your hospital stay or ER visit. Additional Information If you have questions, please visit the Frequently Asked Questions section of the [employer] website at [email or web address] Remember, [employer] is NOT to be used for urgent needs. For medical emergencies, dial 911. Now available from your iPhone and Android! Please provide this summary of care documentation to your next provider. Your primary care clinician is listed as Mariah Whitney. If you have any questions after today's visit, please call [phone].

## 2017-09-13 NOTE — PATIENT INSTRUCTIONS
Vaccine Information Statement    Influenza (Flu) Vaccine (Inactivated or Recombinant): What you need to know    Many Vaccine Information Statements are available in [place]ian and other languages. See www.immunize.org/vis  Hojas de Información Sobre Vacunas están disponibles en Español y en muchos otros idiomas. Visite www.immunize.org/vis    1. Why get vaccinated? Influenza (flu) is a contagious disease that spreads around the [place] every year, usually between October and May. Flu is caused by influenza viruses, and is spread mainly by coughing, sneezing, and close contact. Anyone can get flu. Flu strikes suddenly and can last several days. Symptoms vary by age, but can include:   fever/chills   sore throat   muscle aches   fatigue   cough   headache    runny or stuffy nose    Flu can also lead to pneumonia and blood infections, and cause diarrhea and seizures in children. If you have a medical condition, such as heart or lung disease, flu can make it worse. Flu is more dangerous for some people. Infants and young children, people 72years of age and older, pregnant women, and people with certain health conditions or a weakened immune system are at greatest risk. Each year thousands of people in the [facility] die from flu, and many more are hospitalized. Flu vaccine can:   keep you from getting flu,   make flu less severe if you do get it, and   keep you from spreading flu to your family and other people. 2. Inactivated and recombinant flu vaccines    A dose of flu vaccine is recommended every flu season. Children 6 months through 6years of age may need two doses during the same flu season. Everyone else needs only one dose each flu season.        Some inactivated flu vaccines contain a very small amount of a mercury-based preservative called thimerosal. Studies have not shown thimerosal in vaccines to be harmful, but flu vaccines that do not contain thimerosal are available. There is no live flu virus in flu shots. They cannot cause the flu. There are many flu viruses, and they are always changing. Each year a new flu vaccine is made to protect against three or four viruses that are likely to cause disease in the upcoming flu season. But even when the vaccine doesnt exactly match these viruses, it may still provide some protection    Flu vaccine cannot prevent:   flu that is caused by a virus not covered by the vaccine, or   illnesses that look like flu but are not. It takes about 2 weeks for protection to develop after vaccination, and protection lasts through the flu season. 3. Some people should not get this vaccine    Tell the person who is giving you the vaccine:     If you have any severe, life-threatening allergies. If you ever had a life-threatening allergic reaction after a dose of flu vaccine, or have a severe allergy to any part of this vaccine, you may be advised not to get vaccinated. Most, but not all, types of flu vaccine contain a small amount of egg protein.  If you ever had Guillain-Barré Syndrome (also called GBS). Some people with a history of GBS should not get this vaccine. This should be discussed with your doctor.  If you are not feeling well. It is usually okay to get flu vaccine when you have a mild illness, but you might be asked to come back when you feel better. 4. Risks of a vaccine reaction    With any medicine, including vaccines, there is a chance of reactions. These are usually mild and go away on their own, but serious reactions are also possible. Most people who get a flu shot do not have any problems with it.      Minor problems following a flu shot include:    soreness, redness, or swelling where the shot was given     hoarseness   sore, red or itchy eyes   cough   fever   aches   headache   itching   fatigue  If these problems occur, they usually begin soon after the shot and last 1 or 2 days. More serious problems following a flu shot can include the following:     There may be a small increased risk of Guillain-Barré Syndrome (GBS) after inactivated flu vaccine. This risk has been estimated at 1 or 2 additional cases per million people vaccinated. This is much lower than the risk of severe complications from flu, which can be prevented by flu vaccine.  Young children who get the flu shot along with pneumococcal vaccine (PCV13) and/or DTaP vaccine at the same time might be slightly more likely to have a seizure caused by fever. Ask your doctor for more information. Tell your doctor if a child who is getting flu vaccine has ever had a seizure. Problems that could happen after any injected vaccine:      People sometimes faint after a medical procedure, including vaccination. Sitting or lying down for about 15 minutes can help prevent fainting, and injuries caused by a fall. Tell your doctor if you feel dizzy, or have vision changes or ringing in the ears.  Some people get severe pain in the shoulder and have difficulty moving the arm where a shot was given. This happens very rarely.  Any medication can cause a severe allergic reaction. Such reactions from a vaccine are very rare, estimated at about 1 in a million doses, and would happen within a few minutes to a few hours after the vaccination. As with any medicine, there is a very remote chance of a vaccine causing a serious injury or death. The safety of vaccines is always being monitored. For more information, visit: www.cdc.gov/vaccinesafety/    5. What if there is a serious reaction? What should I look for?  Look for anything that concerns you, such as signs of a severe allergic reaction, very high fever, or unusual behavior.     Signs of a severe allergic reaction can include hives, swelling of the face and throat, difficulty breathing, a fast heartbeat, dizziness, and weakness - usually within a few minutes to a few hours after the vaccination. What should I do?  If you think it is a severe allergic reaction or other emergency that cant wait, call 9-1-1 and get the person to the nearest hospital. Otherwise, call your doctor.  Reactions should be reported to the Vaccine Adverse Event Reporting System (VAERS). Your doctor should file this report, or you can do it yourself through  the VAERS web site at www.vaers. [facility].gov, or by calling 9-292.729.6459. VAERS does not give medical advice. 6. The National Vaccine Injury Compensation Program    The [facility] Vaccine Injury Compensation Program (VICP) is a federal program that was created to compensate people who may have been injured by certain vaccines. Persons who believe they may have been injured by a vaccine can learn about the program and about filing a claim by calling 7-991.960.4494 or visiting the [employer] website at www.[facility].gov/vaccinecompensation. There is a time limit to file a claim for compensation. 7. How can I learn more?  Ask your healthcare provider. He or she can give you the vaccine package insert or suggest other sources of information.  Call your local or state health department.  Contact the Centers for Disease Control and Prevention (CDC):  - Call 6-258.834.4965 (1-800-CDC-INFO) or  - Visit CDCs website at www.cdc.gov/flu    Vaccine Information Statement   Inactivated Influenza Vaccine   8/7/2015  42 MEAGHAN Ro [id number]-82    Department of Health and Human Services  Centers for Disease Control and Prevention    Office Use Only

## 2017-09-13 NOTE — PROGRESS NOTES
SPORTS MEDICINE AND PRIMARY CARE  Sakina Childers MD, [address] [address]  Phone:  [phone]  Fax: [phone]      Chief Complaint   Patient presents with    Hypertension     routine 4 month follow up          [facility]:    Rebecca Colmenares is a [de-identified] y.o. female Patient returns today alert, appropriate, ambulatory, and has the capacity to give an accurate history. She has a known history of anemia, asthma, right breast cancer, diabetes, DJD, hereditary spherocytosis, and primary hypertension. Patient comes in today with several concerns. She wonders about forgetfulness and gives us several examples. At times she feels a sting in her leg  and simultaneously has a swish in her brain. Patient had an episode of dizziness when she took a sinus medication with her evening meds that subsequently subsided. At that time she took some Meclizine with resolution. Several strange things have happened. For example, she  and stepped on a  and at the same time she had a sharp pain in her ribs on the right side. Same thing happened on Monday in [address] [place] [address]. at the train station. At that time she was sitting down. Patient states her stomach sticks out and she doesn't like it. She states that if she eats too much and gets full it gives her the sensation that she is out of breath. She also notes shortness of breath if she walks too fast and is using her inhaler more frequently than previously noted. Last Tuesday she had a chill, got a sore throat, took her sprays with subsequent resolution. Sometimes she has a dry cough, hasn't had one today however. Patient is seen for evaluation. Current Outpatient Prescriptions   Medication Sig Dispense Refill    metoprolol tartrate (LOPRESSOR) 50 mg tablet Take 50 mg by mouth daily.       NIFEdipine ER (PROCARDIA XL) 60 mg ER tablet TAKE 1 TABLET TWICE A  Tab 3    TOPROL XL 50 mg XL tablet TAKE 1 TABLET DAILY 90 Tab 3  pravastatin (PRAVACHOL) 20 mg tablet TAKE 1 TABLET ONCE EACH NIGHT 90 Tab 9    hydroCHLOROthiazide (HYDRODIURIL) 12.5 mg tablet Take 1 Tab by mouth daily. 90 Tab 3    ibandronate (BONIVA) 150 mg tablet TAKE 1 TABLET ONCE A MONTH 3 Tab 11    FREESTYLE LITE STRIPS strip USE TO TEST BLOOD SUGAR ONCE DAILY 100 Strip 2    KLOR-CON M20 20 mEq tablet TAKE 1 TABLET DAILY 90 Tab 2    albuterol (PROVENTIL HFA, VENTOLIN HFA, PROAIR HFA) 90 mcg/actuation inhaler Take 1 Puff by inhalation every four (4) hours as needed for Wheezing. 3 Inhaler 3    oxybutynin chloride XL (DITROPAN XL) 10 mg CR tablet TAKE 1 TABLET DAILY 90 Tab 3    albuterol (PROAIR HFA) 90 mcg/actuation inhaler INHALE 2 PUFFS EVERY 4 HOURS AS NEEDED 3 Inhaler 3    guaiFENesin (ORGANIDIN) 200 mg tablet Take 1 Tab by mouth every four (4) hours as needed for Congestion. 300 Tab 11    mometasone (NASONEX) 50 mcg/actuation nasal spray 2 Sprays by Both Nostrils route daily. 3 Container 11    triamcinolone acetonide (KENALOG) 0.1 % topical cream Apply  to affected area two (2) times a day. 80 g 11    lamoTRIgine (LAMICTAL) 25 mg tablet TAKE 1 TABLET TWICE A  Tab 3    MICARDIS 80 mg tablet TAKE 1 TABLET DAILY 90 Tab 49    ADVAIR DISKUS 250-50 mcg/dose diskus inhaler USE 1 INHALATION TWO TIMES A DAY 3 Inhaler 11    aspirin delayed-release 81 mg tablet Take 81 mg by mouth daily.  ASCORBATE CALCIUM (VITAMIN C PO) Take  by mouth.        Past Medical History:   Diagnosis Date    Anemia     Arrhythmia     Asthma     Bereavement 2-2-16     die 82 copd,chf,pul htn pn after 48 yr marriage    BPV (benign positional vertigo)     Breast cancer, right breast ([address] 1994    right breast, lumpectomy and radiation    Diabetes ([address]     DJD (degenerative joint disease) of knee     Early satiety     Hereditary spherocytosis ([address]     HTN (hypertension)     Radiation therapy complication     S/P colonoscopy 4-9-14    md Brian - family hx  S/P colonoscopy 2016    rt - md dejan diverticulosis    Septic arthritis ([facility] 3/11    Streptococcal sepsis ([facility] 3/11    Venous insufficiency of both lower extremities      Past Surgical History:   Procedure Laterality Date    ABDOMEN SURGERY PROC UNLISTED      splenectomy    BREAST SURGERY PROCEDURE UNLISTED      biopsy-cancer    HX BREAST BIOPSY Right     positive    HX BREAST LUMPECTOMY Right     HX CHOLECYSTECTOMY  1985     Allergies   Allergen Reactions    Codeine Other (comments)     Upset stomach       REVIEW OF SYSTEMS:   In general patient has been feeling pretty well, particularly since the loss of her . Social History     Social History    Marital status:      Spouse name: N/A    Number of children: N/A    Years of education: N/A     Social History Main Topics    Smoking status: Never Smoker    Smokeless tobacco: Never Used    Alcohol use No    Drug use: No    Sexual activity: Not Asked     Other Topics Concern    None     Social History Narrative    Medical History: Streptococcus pneumoniae septicemia 11Septic polyarthritis 11HSV2 herpes simplex    cxckcvdvqqyzzoqsyhm21/02/11Seizure d/o 11Osteoporosis 2011primary HTN 1990Bronchial asthma 1985Obesity 2002Carcinoma of    breast 04Congential spherocytosis    Gyn History: Last mammogram date 2013. OB History: Total pregnancies 2. Live births 2. Surgical History: normal stress myocardial perfusion scan, EF 68% 08splenectomy 1972cholecystectomy 1985colonoscopy 2008internal hemorrhoids diverticulosis lumpectomy radiation therapy 2004colonoscopy diverticulosis Gaetana Gottron M.D. 2014    Hospitalization/Major Diagnostic Procedure: streptococcus pneumoniae septicemia ronchial asthma     Family History:  Mother:  80 yrs, DM,  Marking:  80 yrs, heart disease, aortic stenosisSister(s): , colon CA, polypsBrother(s): , at birthUncle: alive, colon CA, heart disease2 sister(s) [employer] . 1 son(s) , 1 daughter(s) . Social History: Alcohol Use Patient does not use alcohol. Smoking Status Patient is a never smoker. Marital Status:  2016, 46 yrs copd,chf. Children: Her son grandchild's mother is temporarily incarcerated and she is caring for the 10 yo. Occupation/W ork: retired teacher. Education/School: has college diploma-[employer]. Gnosticism: [employer].   r  Family History   Problem Relation Age of Onset    Heart Disease Mother     Diabetes Mother     Other Father      'sphero'-blood disorder    Cancer Sister      breast    Breast Cancer Sister     Cancer Sister      colon       OBJECTIVE:  Visit Vitals    /78 (BP 1 Location: Left arm, BP Patient Position: Sitting)    Pulse 69    Temp 98 °F (36.7 °C) (Oral)    Resp 16    Ht 5' 3\" (1.6 m)    Wt 171 lb 6.4 oz (77.7 kg)    SpO2 94%    BMI 30.36 kg/m2     ENT: perrla,  eom intact  NECK: supple. Thyroid normal  CHEST: clear to ascultation and percussion   HEART: regular rate and rhythm  ABD: soft, bowel sounds active  EXTREMITIES: no edema, pulse 1+     No visits with results within 3 Month(s) from this visit.   Latest known visit with results is:    Office Visit on 2017   Component Date Value Ref Range Status    Hemoglobin A1c 2017 5.6  4.8 - 5.6 % Final    Comment:          Pre-diabetes: 5.7 - 6.4           Diabetes: >6.4           Glycemic control for adults with diabetes: <7.0      Estimated average glucose 2017 114  mg/dL Final    Glucose 2017 88  65 - 99 mg/dL Final    BUN 2017 16  8 - 27 mg/dL Final    Creatinine 2017 0.99  0.57 - 1.00 mg/dL Final    GFR est non-AA 2017 54* >59 mL/min/1.73 Final    GFR est AA 2017 63  >59 mL/min/1.73 Final    BUN/Creatinine ratio 2017 16  12 - 28 Final    Sodium 2017 142  134 - 144 mmol/L Final    Potassium 05/17/2017 4.1  3.5 - 5.2 mmol/L Final    Chloride 05/17/2017 103  96 - 106 mmol/L Final    CO2 05/17/2017 22  18 - 29 mmol/L Final    Calcium 05/17/2017 9.8  8.7 - 10.3 mg/dL Final    Phosphorus 05/17/2017 3.2  2.5 - 4.5 mg/dL Final    Albumin 05/17/2017 4.2  3.5 - 4.8 g/dL Final          ASSESSMENT:  1. Type 2 diabetes mellitus without complication, without long-term current use of insulin ([facility]    2. Encounter for immunization    3. Hereditary spherocytosis ([facility]    4. Malignant neoplasm of right female breast, unspecified site [facility]    5. Mild intermittent asthma without complication    6. Essential hypertension      Patient doesn't particularly want the zoster vaccine because she doesn't feel she's ever had chicken pox. Blood pressure control is adequate, although I suspect it drops to normal at home. Her BMI reflects a 1 pound weight gain since we last saw her. RBS is 107. Her BS control has been excellent without medication. We reviewed her medications with her. She'll return to the office in about four months, sooner if she has any problems. We encouraged physical activity for 30 minutes five days a week and a heart healthy diabetic diet. PLAN:  .  Orders Placed This Encounter    Influenza virus vaccine (FLUZONE HIGH-DOSE) 65 years and older    CBC WITH AUTOMATED DIFF    HEMOGLOBIN A1C WITH EAG    metoprolol tartrate (LOPRESSOR) 50 mg tablet       Follow-up Disposition:  Return in about 4 months (around 1/13/2018). ATTENTION:   This medical record was transcribed using an electronic medical records system. Although proofread, it may and can contain electronic and spelling errors. Other human spelling and other errors may be present. Corrections may be executed at a later time. Please feel free to contact us for any clarifications as needed.

## 2017-09-14 LAB
BASOPHILS # BLD AUTO: 0.1 X10E3/UL (ref 0–0.2)
BASOPHILS NFR BLD AUTO: 2 %
EOSINOPHIL # BLD AUTO: 0.6 X10E3/UL (ref 0–0.4)
EOSINOPHIL NFR BLD AUTO: 6 %
ERYTHROCYTE [DISTWIDTH] IN BLOOD BY AUTOMATED COUNT: 13.4 % (ref 12.3–15.4)
EST. AVERAGE GLUCOSE BLD GHB EST-MCNC: 100 MG/DL
HBA1C MFR BLD: 5.1 % (ref 4.8–5.6)
HCT VFR BLD AUTO: 38.6 % (ref 34–46.6)
HGB BLD-MCNC: 12.9 G/DL (ref 11.1–15.9)
IMM GRANULOCYTES # BLD: 0.1 X10E3/UL (ref 0–0.1)
IMM GRANULOCYTES NFR BLD: 1 %
LYMPHOCYTES # BLD AUTO: 1.8 X10E3/UL (ref 0.7–3.1)
LYMPHOCYTES NFR BLD AUTO: 19 %
MCH RBC QN AUTO: 26.9 PG (ref 26.6–33)
MCHC RBC AUTO-ENTMCNC: 33.4 G/DL (ref 31.5–35.7)
MCV RBC AUTO: 80 FL (ref 79–97)
MONOCYTES # BLD AUTO: 0.9 X10E3/UL (ref 0.1–0.9)
MONOCYTES NFR BLD AUTO: 9 %
NEUTROPHILS # BLD AUTO: 6.2 X10E3/UL (ref 1.4–7)
NEUTROPHILS NFR BLD AUTO: 63 %
PLATELET # BLD AUTO: 484 X10E3/UL (ref 150–379)
RBC # BLD AUTO: 4.8 X10E6/UL (ref 3.77–5.28)
WBC # BLD AUTO: 9.7 X10E3/UL (ref 3.4–10.8)

## 2017-11-24 RX ORDER — TELMISARTAN 80 MG/1
TABLET ORAL
Qty: 90 TAB | Refills: 49 | Status: SHIPPED | OUTPATIENT
Start: 2017-11-24 | End: 2018-10-26

## 2017-12-07 DIAGNOSIS — C50.911 MALIGNANT NEOPLASM OF RIGHT FEMALE BREAST, UNSPECIFIED ESTROGEN RECEPTOR STATUS, UNSPECIFIED SITE OF BREAST (HCC): Primary | ICD-10-CM

## 2017-12-14 RX ORDER — POTASSIUM CHLORIDE 1500 MG/1
TABLET, EXTENDED RELEASE ORAL
Qty: 90 TAB | Refills: 2 | Status: SHIPPED | OUTPATIENT
Start: 2017-12-14 | End: 2018-08-17 | Stop reason: SDUPTHER

## 2017-12-21 ENCOUNTER — HOSPITAL ENCOUNTER (OUTPATIENT)
Dept: MAMMOGRAPHY | Age: 82
Discharge: HOME | End: 2017-12-21
Attending: INTERNAL MEDICINE
Payer: MEDICARE

## 2017-12-21 ENCOUNTER — HOSPITAL ENCOUNTER (OUTPATIENT)
Dept: ULTRASOUND IMAGING | Age: 82
Discharge: HOME | End: 2017-12-21
Attending: INTERNAL MEDICINE
Payer: MEDICARE

## 2017-12-21 DIAGNOSIS — C50.911 MALIGNANT NEOPLASM OF RIGHT FEMALE BREAST, UNSPECIFIED ESTROGEN RECEPTOR STATUS, UNSPECIFIED SITE OF BREAST (HCC): ICD-10-CM

## 2017-12-21 DIAGNOSIS — N63.10 LUMP OF RIGHT BREAST: ICD-10-CM

## 2017-12-21 PROCEDURE — 76642 ULTRASOUND BREAST LIMITED: CPT

## 2017-12-21 PROCEDURE — 77066 DX MAMMO INCL CAD BI: CPT

## 2017-12-25 RX ORDER — LAMOTRIGINE 25 MG/1
TABLET ORAL
Qty: 180 TAB | Refills: 3 | Status: SHIPPED | OUTPATIENT
Start: 2017-12-25 | End: 2019-03-11 | Stop reason: SDUPTHER

## 2018-01-16 ENCOUNTER — OFFICE VISIT (OUTPATIENT)
Dept: INTERNAL MEDICINE CLINIC | Age: 83
End: 2018-01-16

## 2018-01-16 VITALS
HEIGHT: 63 IN | BODY MASS INDEX: 28.63 KG/M2 | RESPIRATION RATE: 16 BRPM | OXYGEN SATURATION: 97 % | WEIGHT: 161.6 LBS | HEART RATE: 70 BPM | TEMPERATURE: 98.3 F | DIASTOLIC BLOOD PRESSURE: 70 MMHG | SYSTOLIC BLOOD PRESSURE: 138 MMHG

## 2018-01-16 DIAGNOSIS — C50.911 MALIGNANT NEOPLASM OF RIGHT FEMALE BREAST, UNSPECIFIED ESTROGEN RECEPTOR STATUS, UNSPECIFIED SITE OF BREAST (HCC): ICD-10-CM

## 2018-01-16 DIAGNOSIS — I10 ESSENTIAL HYPERTENSION: ICD-10-CM

## 2018-01-16 DIAGNOSIS — I87.2 VENOUS INSUFFICIENCY OF BOTH LOWER EXTREMITIES: ICD-10-CM

## 2018-01-16 DIAGNOSIS — E11.21 TYPE 2 DIABETES MELLITUS WITH NEPHROPATHY (HCC): ICD-10-CM

## 2018-01-16 DIAGNOSIS — D58.0 HEREDITARY SPHEROCYTOSIS (HCC): ICD-10-CM

## 2018-01-16 DIAGNOSIS — E11.9 TYPE 2 DIABETES MELLITUS WITHOUT COMPLICATION, WITHOUT LONG-TERM CURRENT USE OF INSULIN (HCC): Primary | ICD-10-CM

## 2018-01-16 NOTE — PROGRESS NOTES
1. Have you been to the ER, urgent care clinic since your last visit? Hospitalized since your last visit? No    2. Have you seen or consulted any other health care providers outside of the [address] [place] [address] since your last visit? Include any pap smears or colon screening.  No    Patient has a note pad

## 2018-01-16 NOTE — ASSESSMENT & PLAN NOTE
This condition is managed by Specialist.  Key Oncology Meds     Patient is on no Oncologic meds. Key Pain Meds     The patient is on no pain meds. Lab Results   Component Value Date/Time    WBC 9.7 09/13/2017 01:05 PM    ABS.  NEUTROPHILS 6.2 09/13/2017 01:05 PM    HGB 12.9 09/13/2017 01:05 PM    HCT 38.6 09/13/2017 01:05 PM    PLATELET 400 80/88/6841 01:05 PM    Creatinine 0.99 05/17/2017 12:08 PM    BUN 16 05/17/2017 12:08 PM    ALT (SGPT) 16 01/19/2017 12:38 PM    AST (SGOT) 21 01/19/2017 12:38 PM    Albumin 4.2 05/17/2017 12:08 PM

## 2018-01-16 NOTE — ASSESSMENT & PLAN NOTE
Stable, based on history, physical exam and review of pertinent labs, studies and medications; meds reconciled; continue current treatment plan. Key Antihyperglycemic Medications     Patient is on no antihyperglycemic meds.         Other Key Diabetic Medications             lamoTRIgine (LAMICTAL) 25 mg tablet TAKE 1 TABLET TWICE A DAY    MICARDIS 80 mg tablet TAKE 1 TABLET DAILY    pravastatin (PRAVACHOL) 20 mg tablet TAKE 1 TABLET ONCE EACH NIGHT        Lab Results   Component Value Date/Time    Hemoglobin A1c 5.1 09/13/2017 01:05 PM    Glucose 88 05/17/2017 12:08 PM    Creatinine 0.99 05/17/2017 12:08 PM    Cholesterol, total 140 01/19/2017 12:38 PM    HDL Cholesterol 83 01/19/2017 12:38 PM    LDL, calculated 48 01/19/2017 12:38 PM    Triglyceride 46 01/19/2017 12:38 PM     Diabetic Foot and Eye Exam HM Status   Topic Date Due    Diabetic Foot Care  01/19/2018    Eye Exam  11/16/2018

## 2018-01-16 NOTE — PROGRESS NOTES
Diagnoses and all orders for this visit:    1. Type 2 diabetes mellitus without complication, without long-term current use of insulin (HCC)  -     MICROALBUMIN, UR, RAND W/ MICROALBUMIN/CREA RATIO  -     URINALYSIS W/ RFLX MICROSCOPIC  -     CBC WITH AUTOMATED DIFF  -     METABOLIC PANEL, COMPREHENSIVE  -     LIPID PANEL  -     TSH 3RD GENERATION  -     CO COLLECTION VENOUS BLOOD,VENIPUNCTURE  -     HEMOGLOBIN A1C WITH EAG    2. Malignant neoplasm of right female breast, unspecified estrogen receptor status, unspecified site of breast [facility])  Assessment & Plan: This condition is managed by Specialist.  Key Oncology Meds     Patient is on no Oncologic meds. Key Pain Meds     The patient is on no pain meds. Lab Results   Component Value Date/Time    WBC 9.7 09/13/2017 01:05 PM    ABS. NEUTROPHILS 6.2 09/13/2017 01:05 PM    HGB 12.9 09/13/2017 01:05 PM    HCT 38.6 09/13/2017 01:05 PM    PLATELET 741 22/85/7617 01:05 PM    Creatinine 0.99 05/17/2017 12:08 PM    BUN 16 05/17/2017 12:08 PM    ALT (SGPT) 16 01/19/2017 12:38 PM    AST (SGOT) 21 01/19/2017 12:38 PM    Albumin 4.2 05/17/2017 12:08 PM         3. Essential hypertension    4. Venous insufficiency of both lower extremities    5. Hereditary spherocytosis ([facility] [address]  Assessment & Plan:  Stable, based on history, physical exam and review of pertinent labs, studies and medications; meds reconciled; continue current treatment plan. Lab Results   Component Value Date/Time    WBC 9.7 09/13/2017 01:05 PM    HGB 12.9 09/13/2017 01:05 PM    HCT 38.6 09/13/2017 01:05 PM    PLATELET 070 54/74/0616 01:05 PM    Creatinine 0.99 05/17/2017 12:08 PM    BUN 16 05/17/2017 12:08 PM    Potassium 4.1 05/17/2017 12:08 PM         6. Type 2 diabetes mellitus with nephropathy (HCC)  Assessment & Plan:  Stable, based on history, physical exam and review of pertinent labs, studies and medications; meds reconciled; continue current treatment plan.   Key Antihyperglycemic Medications     Patient is on no antihyperglycemic meds. Other Key Diabetic Medications             lamoTRIgine (LAMICTAL) 25 mg tablet TAKE 1 TABLET TWICE A DAY    MICARDIS 80 mg tablet TAKE 1 TABLET DAILY    pravastatin (PRAVACHOL) 20 mg tablet TAKE 1 TABLET ONCE EACH NIGHT        Lab Results   Component Value Date/Time    Hemoglobin A1c 5.1 09/13/2017 01:05 PM    Glucose 88 05/17/2017 12:08 PM    Creatinine 0.99 05/17/2017 12:08 PM    Cholesterol, total 140 01/19/2017 12:38 PM    HDL Cholesterol 83 01/19/2017 12:38 PM    LDL, calculated 48 01/19/2017 12:38 PM    Triglyceride 46 01/19/2017 12:38 PM     Diabetic Foot and Eye Exam HM Status   Topic Date Due    Diabetic Foot Care  01/19/2018    Eye Exam  11/16/2018         SPORTS MEDICINE AND PRIMARY CARE  Sanjiv Abrams MD, [address] [address]  Phone:  [phone]  Fax: [phone]       Chief Complaint   Patient presents with    Hypertension     f/u   . SUBJECTIVE:    Jean Marie Mercado is a [de-identified] y.o. female Patient returns today with known history of hereditary spherocytosis, primary hypertension, right breast cancer, status post lumpectomy and radiation therapy, diabetes mellitus type 2 and chronic venous insufficiency of lower extremities. Patient returns today with several concerns. She wonders about the puffiness in her eyes. She's lost 10 pounds and attributes it to increased water intake, as well as fiber. She was previously suffering from shortness of breath, but seems to be better, although it is still present. She still hears a swish in her head and she has a pinprick on her body that seems to go up to the head. She wonders about the pneumococcal shot, she wonders about urine for albumin. She saw the podiatrist on January 15th and she saw Dr. Reno Ott, who told her everything was okay.            Current Outpatient Prescriptions   Medication Sig Dispense Refill    lamoTRIgine (LAMICTAL) 25 mg tablet TAKE 1 TABLET TWICE A  Tab 3    KLOR-CON M20 20 mEq tablet TAKE 1 TABLET DAILY 90 Tab 2    MICARDIS 80 mg tablet TAKE 1 TABLET DAILY 90 Tab 49    metoprolol tartrate (LOPRESSOR) 50 mg tablet Take 50 mg by mouth daily.  NIFEdipine ER (PROCARDIA XL) 60 mg ER tablet TAKE 1 TABLET TWICE A  Tab 3    TOPROL XL 50 mg XL tablet TAKE 1 TABLET DAILY 90 Tab 3    pravastatin (PRAVACHOL) 20 mg tablet TAKE 1 TABLET ONCE EACH NIGHT 90 Tab 9    hydroCHLOROthiazide (HYDRODIURIL) 12.5 mg tablet Take 1 Tab by mouth daily. 90 Tab 3    ibandronate (BONIVA) 150 mg tablet TAKE 1 TABLET ONCE A MONTH 3 Tab 11    FREESTYLE LITE STRIPS strip USE TO TEST BLOOD SUGAR ONCE DAILY 100 Strip 2    albuterol (PROVENTIL HFA, VENTOLIN HFA, PROAIR HFA) 90 mcg/actuation inhaler Take 1 Puff by inhalation every four (4) hours as needed for Wheezing. 3 Inhaler 3    oxybutynin chloride XL (DITROPAN XL) 10 mg CR tablet TAKE 1 TABLET DAILY 90 Tab 3    albuterol (PROAIR HFA) 90 mcg/actuation inhaler INHALE 2 PUFFS EVERY 4 HOURS AS NEEDED 3 Inhaler 3    guaiFENesin (ORGANIDIN) 200 mg tablet Take 1 Tab by mouth every four (4) hours as needed for Congestion. 300 Tab 11    mometasone (NASONEX) 50 mcg/actuation nasal spray 2 Sprays by Both Nostrils route daily. 3 Container 11    triamcinolone acetonide (KENALOG) 0.1 % topical cream Apply  to affected area two (2) times a day. 80 g 11    ADVAIR DISKUS 250-50 mcg/dose diskus inhaler USE 1 INHALATION TWO TIMES A DAY 3 Inhaler 11    aspirin delayed-release 81 mg tablet Take 81 mg by mouth daily.  ASCORBATE CALCIUM (VITAMIN C PO) Take  by mouth.        Past Medical History:   Diagnosis Date    Anemia     Arrhythmia     Asthma     Bereavement 2-2-16     die 82 copd,chf,pul htn pn after 48 yr marriage    BPV (benign positional vertigo)     Breast cancer, right breast ([address] 1994    right breast, lumpectomy and radiation    Breast lump 2017    right breast     Diabetes ([address]  DJD (degenerative joint disease) of knee     Early satiety     Hereditary spherocytosis ([facility] [address]     HTN (hypertension)     Radiation therapy complication 8637    right breast     S/P colonoscopy 4-9-14    md Brian - family hx    S/P colonoscopy 07/20/2016    rt - md brian diverticulosis    Septic arthritis ([facility] [address] 3/11    Streptococcal sepsis ([facility] [address] 3/11    Venous insufficiency of both lower extremities      Past Surgical History:   Procedure Laterality Date    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    BREAST SURGERY PROCEDURE UNLISTED  2004    biopsy-cancer    HX BREAST BIOPSY Right 1994    positive    HX BREAST LUMPECTOMY Right 1994    HX CHOLECYSTECTOMY  1985     Allergies   Allergen Reactions    Codeine Other (comments)     Upset stomach         REVIEW OF SYSTEMS:  General: negative for - chills or fever  ENT: negative for - headaches, nasal congestion or tinnitus  Respiratory: negative for - cough, hemoptysis, shortness of breath or wheezing  Cardiovascular : negative for - chest pain, edema, palpitations or shortness of breath  Gastrointestinal: negative for - abdominal pain, blood in stools, heartburn or nausea/vomiting  Genito-Urinary: no dysuria, trouble voiding, or hematuria  Musculoskeletal: negative for - gait disturbance, joint pain, joint stiffness or joint swelling  Neurological: no TIA or stroke symptoms  Hematologic: no bruises, no bleeding, no swollen glands  Integument: no lumps, mole changes, nail changes or rash  Endocrine: no malaise/lethargy or unexpected weight changes      Social History     Social History    Marital status:      Spouse name: N/A    Number of children: N/A    Years of education: N/A     Social History Main Topics    Smoking status: Never Smoker    Smokeless tobacco: Never Used    Alcohol use No    Drug use: No    Sexual activity: Not Asked     Other Topics Concern    None     Social History Narrative    Medical History: Streptococcus pneumoniae septicemia 11Septic polyarthritis 11HSV2 herpes simplex    vkfnkpudezojdvrchdg83/02/11Seizure d/o 11Osteoporosis 2011primary HTN 1990Bronchial asthma 1985Obesity 2002Carcinoma of    breast 04Congential spherocytosis    Gyn History: Last mammogram date 2013. OB History: Total pregnancies 2. Live births 2. Surgical History: normal stress myocardial perfusion scan, EF 68% 08splenectomy 1972cholecystectomy 1985colonoscopy -    internal hemorrhoids diverticulosis lumpectomy radiation therapy colonoscopy diverticulosis Aminah Lamb M.D. 2014    Hospitalization/Major Diagnostic Procedure: streptococcus pneumoniae septicemia ronchial asthma     Family History: Mother:  80 yrs, DM, Tresea Cast:  80 yrs, heart disease, aortic stenosisSister(s): , colon CA,    polypsBrother(s): , at birthUncle: alive, colon CA, heart disease2 sister(s) [employer] . 1 son(s) , 1 daughter(s) . Social History: Alcohol Use Patient does not use alcohol. Smoking Status Patient is a never smoker. Marital Status:  2016, 46 yrs copd,chf. Children: Her son grandchild's mother is temporarily incarcerated and she is caring for the 10 yo. Occupation/W ork: retired teacher. Education/School: has college diploma-[employer].  Mu-ism: [employer].     Family History   Problem Relation Age of Onset    Heart Disease Mother     Diabetes Mother     Other Father      'sphero'-blood disorder    Cancer Sister      breast    Breast Cancer Sister     Cancer Sister      colon       OBJECTIVE:    Visit Vitals    /70    Pulse 70    Temp 98.3 °F (36.8 °C) (Oral)    Resp 16    Ht 5' 3\" (1.6 m)    Wt 161 lb 9.6 oz (73.3 kg)    SpO2 97%    BMI 28.63 kg/m2     CONSTITUTIONAL: well , well nourished, appears age appropriate  EYES: perrla, eom intact  ENMT:moist mucous membranes, pharynx clear  NECK: supple. Thyroid normal  RESPIRATORY: Chest: clear bilaterally   CARDIOVASCULAR: Heart: regular rate and rhythm  GASTROINTESTINAL: Abdomen: soft, bowel sounds active  HEMATOLOGIC: no pathological lymph nodes palpated  MUSCULOSKELETAL: Extremities: no edema, pulse 1+   INTEGUMENT: No unusual rashes or suspicious skin lesions noted. Nails appear normal.  NEUROLOGIC: non-focal exam   MENTAL STATUS: alert and oriented, appropriate affect           ASSESSMENT:  1. Type 2 diabetes mellitus without complication, without long-term current use of insulin ([address]    2. Malignant neoplasm of right female breast, unspecified estrogen receptor status, unspecified site of breast ([facility] [address]    3. Essential hypertension    4. Venous insufficiency of both lower extremities    5. Hereditary spherocytosis ([facility] [address]    6. Type 2 diabetes mellitus with nephropathy ([facility] [address]      Patient's medical status is stable. We are very pleased with her change in the BMI as it represents a 10 pound weight loss and we congratulate her. We encourage her to continue her heart healthy diet with physical activity for 30 minutes five days a week. Her blood pressure control is adequate. Repeat blood pressure today is 138/70, which is approaching goal.    She noted anemia on a list, which was probably present before her splenectomy as that also resolved her jaundice. She is up to date with her mammograms. Today will check metabolic status and report to her the results in the mail. She'll be back to see us in about four months, sooner if she has any problems. PLAN:  .  Orders Placed This Encounter    MICROALBUMIN, UR, RAND W/ MICROALBUMIN/CREA RATIO    URINALYSIS W/ RFLX MICROSCOPIC    CBC WITH AUTOMATED DIFF    METABOLIC PANEL, COMPREHENSIVE    LIPID PANEL    TSH 3RD GENERATION    HEMOGLOBIN A1C WITH EAG       Follow-up Disposition:  Return in about 4 months (around 5/16/2018).       ATTENTION:   This medical record was transcribed using an electronic medical records system. Although proofread, it may and can contain electronic and spelling errors. Other human spelling and other errors may be present. Corrections may be executed at a later time. Please feel free to contact us for any clarifications as needed.

## 2018-01-16 NOTE — ASSESSMENT & PLAN NOTE
Stable, based on history, physical exam and review of pertinent labs, studies and medications; meds reconciled; continue current treatment plan.   Lab Results   Component Value Date/Time    WBC 9.7 09/13/2017 01:05 PM    HGB 12.9 09/13/2017 01:05 PM    HCT 38.6 09/13/2017 01:05 PM    PLATELET 609 62/55/7044 01:05 PM    Creatinine 0.99 05/17/2017 12:08 PM    BUN 16 05/17/2017 12:08 PM    Potassium 4.1 05/17/2017 12:08 PM

## 2018-01-16 NOTE — MR AVS SNAPSHOT
[address] [place] [address] [address] [id number] 
[phone] Patient: Donna Fagan MRN: [id number] Visit Information Date & Time Provider Department Dept. Phone Encounter #  
 1/16/2018 11:00 AM [facility] Sports Medicine [facility] Primary Care [phone] [id number] Follow-up Instructions Return in about 4 months (around 5/16/2018). Follow-up and Disposition History Upcoming Health Maintenance Date Due MICROALBUMIN Q1 6/6/2017 FOOT EXAM Q1 1/19/2018 LIPID PANEL Q1 1/19/2018 HEMOGLOBIN A1C Q6M 3/13/2018 MEDICARE YEARLY EXAM 5/18/2018 EYE EXAM RETINAL OR DILATED Q1 11/16/2018 GLAUCOMA SCREENING Q2Y 11/16/2019 COLONOSCOPY 4/9/2024 DTaP/Tdap/Td series (2 - Td) 9/13/2027 Allergies as of 1/16/2018  Review Complete On: 1/16/2018 By: Sudeep Pastor MD  
  
 Severity Noted Reaction Type Reactions Codeine  10/20/2010    Other (comments) Upset stomach Current Immunizations  Reviewed on 6/6/2016 Name Date Influenza High Dose Vaccine PF 9/13/2017 Td, Adsorbed PF 6/6/2016 Not reviewed this visit You Were Diagnosed With   
  
 Codes Comments Type 2 diabetes mellitus without complication, without long-term current use of insulin (HCC)    -  Primary ICD-10-CM: E11.9 ICD-9-CM: 250.00 Malignant neoplasm of right female breast, unspecified estrogen receptor status, unspecified site of breast ([facility]     ICD-10-CM: C50.911 ICD-9-CM: 174.9 Essential hypertension     ICD-10-CM: I10 
ICD-9-CM: 401.9 Venous insufficiency of both lower extremities     ICD-10-CM: I87.2 ICD-9-CM: 459.81 Hereditary spherocytosis ([facility]     ICD-10-CM: D58.0 ICD-9-CM: 282.0 Type 2 diabetes mellitus with nephropathy (HCC)     ICD-10-CM: E11.21 
ICD-9-CM: 250.40, 583.81 Vitals BP Pulse Temp Resp Height(growth percentile) Weight(growth percentile) 153/70 70 98.3 °F (36.8 °C) (Oral) 16 5' 3\" (1.6 m) 161 lb 9.6 oz (73.3 kg) SpO2 BMI OB Status Smoking Status 97% 28.63 kg/m2 Postmenopausal Never Smoker BMI and BSA Data Body Mass Index Body Surface Area  
 28.63 kg/m 2 1.81 m 2 Preferred Pharmacy Pharmacy Name Phone [address] [facility] [phone] Your Updated Medication List  
  
   
This list is accurate as of: 1/16/18  1:13 PM.  Always use your most recent med list.  
  
  
  
  
 Bary Constable 250-50 mcg/dose diskus inhaler Generic drug:  fluticasone-salmeterol USE 1 INHALATION TWO TIMES A DAY  
  
 * albuterol 90 mcg/actuation inhaler Commonly known as:  PROAIR HFA INHALE 2 PUFFS EVERY 4 HOURS AS NEEDED  
  
 * albuterol 90 mcg/actuation inhaler Commonly known as:  PROVENTIL HFA, VENTOLIN HFA, PROAIR HFA Take 1 Puff by inhalation every four (4) hours as needed for Wheezing. aspirin delayed-release 81 mg tablet Take 81 mg by mouth daily. FREESTYLE LITE STRIPS strip Generic drug:  glucose blood VI test strips USE TO TEST BLOOD SUGAR ONCE DAILY  
  
 guaiFENesin 200 mg tablet Commonly known as:  Ronnald Barajas Take 1 Tab by mouth every four (4) hours as needed for Congestion. hydroCHLOROthiazide 12.5 mg tablet Commonly known as:  HYDRODIURIL Take 1 Tab by mouth daily. ibandronate 150 mg tablet Commonly known as:  Bhupendra Otis TAKE 1 TABLET ONCE A MONTH  
  
 KLOR-CON M20 20 mEq tablet Generic drug:  potassium chloride TAKE 1 TABLET DAILY  
  
 lamoTRIgine 25 mg tablet Commonly known as: LaMICtal  
TAKE 1 TABLET TWICE A DAY  
  
 metoprolol tartrate 50 mg tablet Commonly known as:  LOPRESSOR Take 50 mg by mouth daily. MICARDIS 80 mg tablet Generic drug:  telmisartan TAKE 1 TABLET DAILY  
  
 mometasone 50 mcg/actuation nasal spray Commonly known as:  NASONEX  
2 Sprays by Both Nostrils route daily. NIFEdipine ER 60 mg ER tablet Commonly known as:  PROCARDIA XL  
TAKE 1 TABLET TWICE A DAY  
  
 oxybutynin chloride XL 10 mg CR tablet Commonly known as:  DITROPAN XL  
TAKE 1 TABLET DAILY pravastatin 20 mg tablet Commonly known as:  PRAVACHOL  
TAKE 1 TABLET ONCE EACH NIGHT  
  
 TOPROL XL 50 mg XL tablet Generic drug:  metoprolol succinate TAKE 1 TABLET DAILY  
  
 triamcinolone acetonide 0.1 % topical cream  
Commonly known as:  KENALOG Apply  to affected area two (2) times a day. VITAMIN C PO Take  by mouth. * Notice: This list has 2 medication(s) that are the same as other medications prescribed for you. Read the directions carefully, and ask your doctor or other care provider to review them with you. We Performed the Following CBC WITH AUTOMATED DIFF [29073 CPT(R)] HEMOGLOBIN A1C WITH EAG [55319 CPT(R)] LIPID PANEL [67400 CPT(R)] METABOLIC PANEL, COMPREHENSIVE [73296 CPT(R)] MICROALBUMIN, UR, RAND W/ MICROALBUMIN/CREA RATIO [id number] CPT(R)] SC COLLECTION VENOUS BLOOD,VENIPUNCTURE [id number] CPT(R)] TSH 3RD GENERATION [99753 CPT(R)] URINALYSIS W/ RFLX MICROSCOPIC [47308 CPT(R)] Follow-up Instructions Return in about 4 months (around 5/16/2018). Introducing [facility]! Dear Eduardo Flores: Thank you for requesting a [employer] account. Our records indicate that you already have an active [employer] account. You can access your account anytime at [email or web address] Did you know that you can access your hospital and ER discharge instructions at any time in [employer]? You can also review all of your test results from your hospital stay or ER visit. Additional Information If you have questions, please visit the Frequently Asked Questions section of the [employer] website at [email or web address] Remember, [employer] is NOT to be used for urgent needs. For medical emergencies, dial 911. Now available from your iPhone and Android! Please provide this summary of care documentation to your next provider. Your primary care clinician is listed as Kvng Ramirez. If you have any questions after today's visit, please call [phone].

## 2018-01-18 LAB
ALBUMIN SERPL-MCNC: 4.2 G/DL (ref 3.5–4.7)
ALBUMIN/CREAT UR: 593.4 MG/G CREAT (ref 0–30)
ALBUMIN/GLOB SERPL: 1.2 {RATIO} (ref 1.2–2.2)
ALP SERPL-CCNC: 89 IU/L (ref 39–117)
ALT SERPL-CCNC: 14 IU/L (ref 0–32)
APPEARANCE UR: CLEAR
AST SERPL-CCNC: 18 IU/L (ref 0–40)
BASOPHILS # BLD AUTO: 0.1 X10E3/UL (ref 0–0.2)
BASOPHILS NFR BLD AUTO: 1 %
BILIRUB SERPL-MCNC: 0.3 MG/DL (ref 0–1.2)
BILIRUB UR QL STRIP: NEGATIVE
BUN SERPL-MCNC: 14 MG/DL (ref 8–27)
BUN/CREAT SERPL: 13 (ref 12–28)
CALCIUM SERPL-MCNC: 9.9 MG/DL (ref 8.7–10.3)
CHLORIDE SERPL-SCNC: 102 MMOL/L (ref 96–106)
CHOLEST SERPL-MCNC: 146 MG/DL (ref 100–199)
CO2 SERPL-SCNC: 25 MMOL/L (ref 18–29)
COLOR UR: YELLOW
CREAT SERPL-MCNC: 1.05 MG/DL (ref 0.57–1)
CREAT UR-MCNC: 81.8 MG/DL
EOSINOPHIL # BLD AUTO: 0.4 X10E3/UL (ref 0–0.4)
EOSINOPHIL NFR BLD AUTO: 5 %
ERYTHROCYTE [DISTWIDTH] IN BLOOD BY AUTOMATED COUNT: 13.6 % (ref 12.3–15.4)
EST. AVERAGE GLUCOSE BLD GHB EST-MCNC: 97 MG/DL
GLOBULIN SER CALC-MCNC: 3.6 G/DL (ref 1.5–4.5)
GLUCOSE SERPL-MCNC: 98 MG/DL (ref 65–99)
GLUCOSE UR QL: NEGATIVE
HBA1C MFR BLD: 5 % (ref 4.8–5.6)
HCT VFR BLD AUTO: 39.1 % (ref 34–46.6)
HDLC SERPL-MCNC: 78 MG/DL
HGB BLD-MCNC: 13.2 G/DL (ref 11.1–15.9)
HGB UR QL STRIP: NEGATIVE
IMM GRANULOCYTES # BLD: 0 X10E3/UL (ref 0–0.1)
IMM GRANULOCYTES NFR BLD: 0 %
KETONES UR QL STRIP: NEGATIVE
LDLC SERPL CALC-MCNC: 57 MG/DL (ref 0–99)
LEUKOCYTE ESTERASE UR QL STRIP: NEGATIVE
LYMPHOCYTES # BLD AUTO: 1.6 X10E3/UL (ref 0.7–3.1)
LYMPHOCYTES NFR BLD AUTO: 20 %
MCH RBC QN AUTO: 26.9 PG (ref 26.6–33)
MCHC RBC AUTO-ENTMCNC: 33.8 G/DL (ref 31.5–35.7)
MCV RBC AUTO: 80 FL (ref 79–97)
MICRO URNS: NORMAL
MICROALBUMIN UR-MCNC: 485.4 UG/ML
MONOCYTES # BLD AUTO: 0.8 X10E3/UL (ref 0.1–0.9)
MONOCYTES NFR BLD AUTO: 9 %
NEUTROPHILS # BLD AUTO: 5.4 X10E3/UL (ref 1.4–7)
NEUTROPHILS NFR BLD AUTO: 65 %
NITRITE UR QL STRIP: NEGATIVE
PH UR STRIP: 5.5 [PH] (ref 5–7.5)
PLATELET # BLD AUTO: 448 X10E3/UL (ref 150–379)
POTASSIUM SERPL-SCNC: 3.9 MMOL/L (ref 3.5–5.2)
PROT SERPL-MCNC: 7.8 G/DL (ref 6–8.5)
PROT UR QL STRIP: NEGATIVE
RBC # BLD AUTO: 4.91 X10E6/UL (ref 3.77–5.28)
SODIUM SERPL-SCNC: 144 MMOL/L (ref 134–144)
SP GR UR: 1.02 (ref 1–1.03)
TRIGL SERPL-MCNC: 56 MG/DL (ref 0–149)
TSH SERPL DL<=0.005 MIU/L-ACNC: 1.63 UIU/ML (ref 0.45–4.5)
UROBILINOGEN UR STRIP-MCNC: 0.2 MG/DL (ref 0.2–1)
VLDLC SERPL CALC-MCNC: 11 MG/DL (ref 5–40)
WBC # BLD AUTO: 8.3 X10E3/UL (ref 3.4–10.8)

## 2018-01-25 RX ORDER — TRIAMCINOLONE ACETONIDE 1 MG/G
CREAM TOPICAL
Qty: 80 G | Refills: 0 | Status: SHIPPED | OUTPATIENT
Start: 2018-01-25 | End: 2020-05-04 | Stop reason: ALTCHOICE

## 2018-04-28 RX ORDER — OXYBUTYNIN CHLORIDE 10 MG/1
TABLET, EXTENDED RELEASE ORAL
Qty: 90 TAB | Refills: 3 | Status: SHIPPED | OUTPATIENT
Start: 2018-04-28 | End: 2018-10-26

## 2018-05-15 ENCOUNTER — OFFICE VISIT (OUTPATIENT)
Dept: INTERNAL MEDICINE CLINIC | Age: 83
End: 2018-05-15

## 2018-05-15 VITALS
RESPIRATION RATE: 16 BRPM | OXYGEN SATURATION: 95 % | HEART RATE: 69 BPM | TEMPERATURE: 97.8 F | DIASTOLIC BLOOD PRESSURE: 70 MMHG | HEIGHT: 63 IN | WEIGHT: 161 LBS | BODY MASS INDEX: 28.53 KG/M2 | SYSTOLIC BLOOD PRESSURE: 163 MMHG

## 2018-05-15 DIAGNOSIS — M17.0 PRIMARY OSTEOARTHRITIS OF BOTH KNEES: ICD-10-CM

## 2018-05-15 DIAGNOSIS — N28.1 RENAL CYST: ICD-10-CM

## 2018-05-15 DIAGNOSIS — Z71.89 ACP (ADVANCE CARE PLANNING): ICD-10-CM

## 2018-05-15 DIAGNOSIS — I87.2 VENOUS INSUFFICIENCY OF BOTH LOWER EXTREMITIES: ICD-10-CM

## 2018-05-15 DIAGNOSIS — I10 ESSENTIAL HYPERTENSION: ICD-10-CM

## 2018-05-15 DIAGNOSIS — E11.21 TYPE 2 DIABETES MELLITUS WITH NEPHROPATHY (HCC): Primary | ICD-10-CM

## 2018-05-15 DIAGNOSIS — D58.0 HEREDITARY SPHEROCYTOSIS (HCC): ICD-10-CM

## 2018-05-15 NOTE — PROGRESS NOTES
SPORTS MEDICINE AND PRIMARY CARE  Radha Nascimento MD, Morgan Baker[address] [address]  Phone:  [phone]  Fax: [phone]       Chief Complaint   Patient presents with    Diabetes     f/u   . SUBJECTIVE:    Dread Franco is a [de-identified] y.o. female The patient returns today with a known history of diet-controlled diabetes, hereditary spherocytosis status post splenectomy, asthma, right breast cancer status post lumpectomy and radiation therapy, renal cyst by CT scan August, 2016, and is seen for evaluation. She voices no new complaints. Her blood pressure fluctuates. She checks it and it is generally in an appropriate range. Current Outpatient Prescriptions   Medication Sig Dispense Refill    oxybutynin chloride XL (DITROPAN XL) 10 mg CR tablet TAKE 1 TABLET DAILY 90 Tab 3    triamcinolone acetonide (KENALOG) 0.1 % topical cream APPLY EXTERNALLY TO THE AFFECTED AREA TWICE DAILY 80 g 0    lamoTRIgine (LAMICTAL) 25 mg tablet TAKE 1 TABLET TWICE A  Tab 3    KLOR-CON M20 20 mEq tablet TAKE 1 TABLET DAILY 90 Tab 2    NIFEdipine ER (PROCARDIA XL) 60 mg ER tablet TAKE 1 TABLET TWICE A  Tab 3    TOPROL XL 50 mg XL tablet TAKE 1 TABLET DAILY 90 Tab 3    pravastatin (PRAVACHOL) 20 mg tablet TAKE 1 TABLET ONCE EACH NIGHT 90 Tab 9    hydroCHLOROthiazide (HYDRODIURIL) 12.5 mg tablet Take 1 Tab by mouth daily. 90 Tab 3    ibandronate (BONIVA) 150 mg tablet TAKE 1 TABLET ONCE A MONTH 3 Tab 11    FREESTYLE LITE STRIPS strip USE TO TEST BLOOD SUGAR ONCE DAILY 100 Strip 2    albuterol (PROVENTIL HFA, VENTOLIN HFA, PROAIR HFA) 90 mcg/actuation inhaler Take 1 Puff by inhalation every four (4) hours as needed for Wheezing. 3 Inhaler 3    albuterol (PROAIR HFA) 90 mcg/actuation inhaler INHALE 2 PUFFS EVERY 4 HOURS AS NEEDED 3 Inhaler 3    guaiFENesin (ORGANIDIN) 200 mg tablet Take 1 Tab by mouth every four (4) hours as needed for Congestion.  300 Tab 11    mometasone (NASONEX) 50 mcg/actuation nasal spray 2 Sprays by Both Nostrils route daily. 3 Container 11    ADVAIR DISKUS 250-50 mcg/dose diskus inhaler USE 1 INHALATION TWO TIMES A DAY 3 Inhaler 11    aspirin delayed-release 81 mg tablet Take 81 mg by mouth daily.  ASCORBATE CALCIUM (VITAMIN C PO) Take  by mouth.       MICARDIS 80 mg tablet TAKE 1 TABLET DAILY 80 Tab 52     Past Medical History:   Diagnosis Date    Anemia     Arrhythmia     Asthma     Bereavement 2-2-16     die 82 copd,chf,pul htn pn after 48 yr marriage    BPV (benign positional vertigo)     Breast cancer, right breast ([address] 1994    right breast, lumpectomy and radiation    Breast lump 2017    right breast     Diabetes ([address]     DJD (degenerative joint disease) of knee     Early satiety     Hereditary spherocytosis ([address]     HTN (hypertension)     Radiation therapy complication 6308    right breast     Renal cyst 08/2016    ct rt    S/P colonoscopy 4-9-14    md Brian - family hx    S/P colonoscopy 07/20/2016    rt - md brian diverticulosis    Septic arthritis ([address] 3/11    Streptococcal sepsis ([address] 3/11    Venous insufficiency of both lower extremities      Past Surgical History:   Procedure Laterality Date    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    BREAST SURGERY PROCEDURE UNLISTED  2004    biopsy-cancer    HX BREAST BIOPSY Right 1994    positive    HX BREAST LUMPECTOMY Right 1994    HX CHOLECYSTECTOMY  1985     Allergies   Allergen Reactions    Codeine Other (comments)     Upset stomach         REVIEW OF SYSTEMS:  General: negative for - chills or fever  ENT: negative for - headaches, nasal congestion or tinnitus  Respiratory: negative for - cough, hemoptysis, shortness of breath or wheezing  Cardiovascular : negative for - chest pain, edema, palpitations or shortness of breath  Gastrointestinal: negative for - abdominal pain, blood in stools, heartburn or nausea/vomiting  Genito-Urinary: no dysuria, trouble voiding, or hematuria  Musculoskeletal: negative for - gait disturbance, joint pain, joint stiffness or joint swelling  Neurological: no TIA or stroke symptoms  Hematologic: no bruises, no bleeding, no swollen glands  Integument: no lumps, mole changes, nail changes or rash  Endocrine: no malaise/lethargy or unexpected weight changes      Social History     Social History    Marital status:      Spouse name: N/A    Number of children: N/A    Years of education: N/A     Social History Main Topics    Smoking status: Never Smoker    Smokeless tobacco: Never Used    Alcohol use No    Drug use: No    Sexual activity: Not Currently     Other Topics Concern    None     Social History Narrative    Medical History: Streptococcus pneumoniae septicemia 11Septic polyarthritis 11HSV2 herpes simplex    oketgfqswidjqtqtkwx59/02/11Seizure d/o 11Osteoporosis 2011primary HTN 1990Bronchial asthma 1985Obesity 2002Carcinoma of    breast 04Congential spherocytosis    Gyn History: Last mammogram date 2013. OB History: Total pregnancies 2. Live births 2. Surgical History: normal stress myocardial perfusion scan, EF 68% 08splenectomy 1972cholecystectomy 1985colonoscopy 2008internal hemorrhoids diverticulosis lumpectomy radiation therapy colonoscopy diverticulosis Claudia Ramirez M.D. 2014    Hospitalization/Major Diagnostic Procedure: streptococcus pneumoniae septicemia ronchial asthma     Family History: Mother:  80 yrs, DM, Delynn Finical:  80 yrs, heart disease, aortic stenosisSister(s): , colon CA,    polypsBrother(s): , at birthUncle: alive, colon CA, heart disease2 sister(s) [employer] . 1 son(s) , 1 daughter(s) . Social History: Alcohol Use Patient does not use alcohol. Smoking Status Patient is a never smoker. Marital Status:  2016, 46 yrs copd,chf.     Children: Her son grandchild's mother is temporarily incarcerated and she is caring for the 10 yo. Occupation/W ork: retired teacher. Education/School: has college diploma-[employer]. [employer]ism: [employer].     Family History   Problem Relation Age of Onset    Heart Disease Mother     Diabetes Mother     Other Father      'sphero'-blood disorder    Cancer Sister      breast    Breast Cancer Sister     Cancer Sister      colon       OBJECTIVE:    Visit Vitals    /70    Pulse 69    Temp 97.8 °F (36.6 °C) (Oral)    Resp 16    Ht 5' 3\" (1.6 m)    Wt 161 lb (73 kg)    SpO2 95%    BMI 28.52 kg/m2     CONSTITUTIONAL: well , well nourished, appears age appropriate  EYES: perrla, eom intact  ENMT:moist mucous membranes, pharynx clear  NECK: supple. Thyroid normal  RESPIRATORY: Chest: clear bilaterally   CARDIOVASCULAR: Heart: regular rate and rhythm  GASTROINTESTINAL: Abdomen: soft, bowel sounds active  HEMATOLOGIC: no pathological lymph nodes palpated  MUSCULOSKELETAL: Extremities: no edema, pulse 1+   INTEGUMENT: No unusual rashes or suspicious skin lesions noted. Nails appear normal.  NEUROLOGIC: non-focal exam   MENTAL STATUS: alert and oriented, appropriate affect           ASSESSMENT:  1. Type 2 diabetes mellitus with nephropathy ([address]    2. Essential hypertension    3. Hereditary spherocytosis ([facility]    4. Primary osteoarthritis of both knees    5. Venous insufficiency of both lower extremities    6. ACP (advance care planning)    7. Renal cyst      The patient's medical status is stable. She has typical white coat hypertension and we encouraged her to continue to monitor her blood pressure. I would like to see it at less than 140/80. If it stays over 140/80, she will contact us and we will make adjustments on her medications. We will assess blood sugars with a hemoglobin A1c which heretofore been normal so she did not have diabetes.       She wonders about the renal cyst that Dr. Aston Lucas noted when he ordered a CT scan two years ago. We advised her that no further follow-up is needed particularly because it is a cyst on the kidneys. We encouraged physical activity 30 minutes for five days a week, heart healthy diabetic diet and return to the office in four months. PLAN:  .  Orders Placed This Encounter    HEMOGLOBIN A1C WITH EAG    CBC WITH AUTOMATED DIFF       Follow-up Disposition:  Return in about 4 months (around 9/15/2018). ATTENTION:   This medical record was transcribed using an electronic medical records system. Although proofread, it may and can contain electronic and spelling errors. Other human spelling and other errors may be present. Corrections may be executed at a later time. Please feel free to contact us for any clarifications as needed.

## 2018-05-15 NOTE — PROGRESS NOTES
1. Have you been to the ER, urgent care clinic since your last visit? Hospitalized since your last visit? No    2. Have you seen or consulted any other health care providers outside of the [address] [place] [address] since your last visit? Include any pap smears or colon screening.  No     Rash on legs

## 2018-05-15 NOTE — MR AVS SNAPSHOT
[facility] 
 
 
 [address] [id number] 
[phone] Patient: Dread Franco MRN: [id number] Visit Information Date & Time Provider Department Dept. Phone Encounter #  
 5/15/2018 10:30 AM MD Mohsen Hidalgo [facility] Sports Medicine and [facility] [id number] Follow-up Instructions Return in about 4 months (around 9/15/2018). Follow-up and Disposition History Your Appointments 9/17/2018 10:30 AM  
Any with Monet De Los Santos MD  
[address] [place] [address] and Primary Care [facility]) Appt Note: 4 Month Follow Up  
 [address] [facility]  
  
   
 [phone] [id number] Upcoming Health Maintenance Date Due  
 FOOT EXAM Q1 1/19/2018 MEDICARE YEARLY EXAM 5/18/2018 HEMOGLOBIN A1C Q6M 7/16/2018 Influenza Age 5 to Adult 8/1/2018 EYE EXAM RETINAL OR DILATED Q1 11/16/2018 MICROALBUMIN Q1 1/16/2019 LIPID PANEL Q1 1/16/2019 GLAUCOMA SCREENING Q2Y 11/16/2019 COLONOSCOPY 4/9/2024 DTaP/Tdap/Td series (2 - Td) 9/13/2027 Allergies as of 5/15/2018  Review Complete On: 5/15/2018 By: Monet De Los Santos MD  
  
 Severity Noted Reaction Type Reactions Codeine  10/20/2010    Other (comments) Upset stomach Current Immunizations  Reviewed on 6/6/2016 Name Date Influenza High Dose Vaccine PF 9/13/2017 Td, Adsorbed PF 6/6/2016 Not reviewed this visit You Were Diagnosed With   
  
 Codes Comments Type 2 diabetes mellitus with nephropathy ([facility]    -  Primary ICD-10-CM: E11.21 
ICD-9-CM: 250.40, 583.81 Essential hypertension     ICD-10-CM: I10 
ICD-9-CM: 401.9 Hereditary spherocytosis ([facility]     ICD-10-CM: D58.0 ICD-9-CM: 282.0 Primary osteoarthritis of both knees     ICD-10-CM: M17.0 ICD-9-CM: 715.16 Venous insufficiency of both lower extremities     ICD-10-CM: I87.2 ICD-9-CM: 459.81   
 ACP (advance care planning)     ICD-10-CM: Z71.89 ICD-9-CM: V65.49 Renal cyst     ICD-10-CM: N28.1 ICD-9-CM: 753.10 Vitals BP Pulse Temp Resp Height(growth percentile) Weight(growth percentile) 163/70 69 97.8 °F (36.6 °C) (Oral) 16 5' 3\" (1.6 m) 161 lb (73 kg) SpO2 BMI OB Status Smoking Status 95% 28.52 kg/m2 Postmenopausal Never Smoker Vitals History BMI and BSA Data Body Mass Index Body Surface Area 28.52 kg/m 2 1.8 m 2 Preferred Pharmacy Pharmacy Name Phone Bill Ewing, [facility] [phone] Your Updated Medication List  
  
   
This list is accurate as of 5/15/18 12:33 PM.  Always use your most recent med list.  
  
  
  
  
 Mora Deaner 250-50 mcg/dose diskus inhaler Generic drug:  fluticasone-salmeterol USE 1 INHALATION TWO TIMES A DAY  
  
 * albuterol 90 mcg/actuation inhaler Commonly known as:  PROAIR HFA INHALE 2 PUFFS EVERY 4 HOURS AS NEEDED  
  
 * albuterol 90 mcg/actuation inhaler Commonly known as:  PROVENTIL HFA, VENTOLIN HFA, PROAIR HFA Take 1 Puff by inhalation every four (4) hours as needed for Wheezing. aspirin delayed-release 81 mg tablet Take 81 mg by mouth daily. FREESTYLE LITE STRIPS strip Generic drug:  glucose blood VI test strips USE TO TEST BLOOD SUGAR ONCE DAILY  
  
 guaiFENesin 200 mg tablet Commonly known as:  Bey Bald Take 1 Tab by mouth every four (4) hours as needed for Congestion. hydroCHLOROthiazide 12.5 mg tablet Commonly known as:  HYDRODIURIL Take 1 Tab by mouth daily. ibandronate 150 mg tablet Commonly known as:  [place] Decent TAKE 1 TABLET ONCE A MONTH  
  
 KLOR-CON M20 20 mEq tablet Generic drug:  potassium chloride TAKE 1 TABLET DAILY  
  
 lamoTRIgine 25 mg tablet Commonly known as: LaMICtal  
TAKE 1 TABLET TWICE A DAY  
  
 MICARDIS 80 mg tablet Generic drug:  telmisartan TAKE 1 TABLET DAILY  
  
 mometasone 50 mcg/actuation nasal spray Commonly known as:  NASONEX  
2 Sprays by Both Nostrils route daily. NIFEdipine ER 60 mg ER tablet Commonly known as:  PROCARDIA XL  
TAKE 1 TABLET TWICE A DAY  
  
 oxybutynin chloride XL 10 mg CR tablet Commonly known as:  DITROPAN XL  
TAKE 1 TABLET DAILY pravastatin 20 mg tablet Commonly known as:  PRAVACHOL  
TAKE 1 TABLET ONCE EACH NIGHT  
  
 TOPROL XL 50 mg XL tablet Generic drug:  metoprolol succinate TAKE 1 TABLET DAILY  
  
 triamcinolone acetonide 0.1 % topical cream  
Commonly known as:  KENALOG  
APPLY EXTERNALLY TO THE AFFECTED AREA TWICE DAILY  
  
 VITAMIN C PO Take  by mouth. * Notice: This list has 2 medication(s) that are the same as other medications prescribed for you. Read the directions carefully, and ask your doctor or other care provider to review them with you. We Performed the Following CBC WITH AUTOMATED DIFF [92655 CPT(R)] COLLECTION VENOUS BLOOD,VENIPUNCTURE [id number] CPT(R)] FULL CODE [COD2 Custom] HEMOGLOBIN A1C WITH EAG [53188 CPT(R)]  DIABETES FOOT EXAM [HM7 Custom] Follow-up Instructions Return in about 4 months (around 9/15/2018). Introducing [facility]! Dear Hiral Booker: Thank you for requesting a rankur account. Our records indicate that you already have an active rankur account. You can access your account anytime at [email or web address] IQ Did you know that you can access your hospital and ER discharge instructions at any time in rankur? You can also review all of your test results from your hospital stay or ER visit. Additional Information If you have questions, please visit the Frequently Asked Questions section of the rankur website at [email or web address] Remember, rankur is NOT to be used for urgent needs. For medical emergencies, dial 911. Now available from your iPhone and Android! Please provide this summary of care documentation to your next provider. Your primary care clinician is listed as Rigoberto Lynne. If you have any questions after today's visit, please call [phone].

## 2018-05-16 LAB
BASOPHILS # BLD AUTO: 0.1 X10E3/UL (ref 0–0.2)
BASOPHILS NFR BLD AUTO: 2 %
EOSINOPHIL # BLD AUTO: 0.4 X10E3/UL (ref 0–0.4)
EOSINOPHIL NFR BLD AUTO: 5 %
ERYTHROCYTE [DISTWIDTH] IN BLOOD BY AUTOMATED COUNT: 13.8 % (ref 12.3–15.4)
EST. AVERAGE GLUCOSE BLD GHB EST-MCNC: 100 MG/DL
HBA1C MFR BLD: 5.1 % (ref 4.8–5.6)
HCT VFR BLD AUTO: 37.3 % (ref 34–46.6)
HGB BLD-MCNC: 12 G/DL (ref 11.1–15.9)
IMM GRANULOCYTES # BLD: 0 X10E3/UL (ref 0–0.1)
IMM GRANULOCYTES NFR BLD: 0 %
LYMPHOCYTES # BLD AUTO: 1.6 X10E3/UL (ref 0.7–3.1)
LYMPHOCYTES NFR BLD AUTO: 22 %
MCH RBC QN AUTO: 25.8 PG (ref 26.6–33)
MCHC RBC AUTO-ENTMCNC: 32.2 G/DL (ref 31.5–35.7)
MCV RBC AUTO: 80 FL (ref 79–97)
MONOCYTES # BLD AUTO: 0.7 X10E3/UL (ref 0.1–0.9)
MONOCYTES NFR BLD AUTO: 9 %
NEUTROPHILS # BLD AUTO: 4.4 X10E3/UL (ref 1.4–7)
NEUTROPHILS NFR BLD AUTO: 62 %
PLATELET # BLD AUTO: 456 X10E3/UL (ref 150–379)
RBC # BLD AUTO: 4.66 X10E6/UL (ref 3.77–5.28)
WBC # BLD AUTO: 7.1 X10E3/UL (ref 3.4–10.8)

## 2018-07-09 RX ORDER — ALBUTEROL SULFATE 90 UG/1
AEROSOL, METERED RESPIRATORY (INHALATION)
Qty: 3 INHALER | Refills: 3 | Status: SHIPPED | OUTPATIENT
Start: 2018-07-09 | End: 2018-10-26

## 2018-07-09 RX ORDER — IBANDRONATE SODIUM 150 MG/1
TABLET, FILM COATED ORAL
Qty: 3 TAB | Refills: 11 | Status: SHIPPED | OUTPATIENT
Start: 2018-07-09 | End: 2019-09-29 | Stop reason: SDUPTHER

## 2018-07-16 RX ORDER — PRAVASTATIN SODIUM 20 MG/1
TABLET ORAL
Qty: 90 TAB | Refills: 9 | Status: SHIPPED | OUTPATIENT
Start: 2018-07-16 | End: 2019-08-03 | Stop reason: SDUPTHER

## 2018-08-08 ENCOUNTER — HOSPITAL ENCOUNTER (OUTPATIENT)
Dept: ULTRASOUND IMAGING | Age: 83
Discharge: HOME | End: 2018-08-08
Attending: INTERNAL MEDICINE
Payer: MEDICARE

## 2018-08-08 DIAGNOSIS — K76.89 HEPATOPTOSIS: ICD-10-CM

## 2018-08-08 PROCEDURE — 76700 US EXAM ABDOM COMPLETE: CPT

## 2018-08-13 DIAGNOSIS — N28.1 RENAL CYST: Primary | ICD-10-CM

## 2018-08-17 RX ORDER — ALBUTEROL SULFATE 90 UG/1
1 AEROSOL, METERED RESPIRATORY (INHALATION)
Qty: 3 INHALER | Refills: 3 | Status: SHIPPED | OUTPATIENT
Start: 2018-08-17 | End: 2021-05-27 | Stop reason: SDUPTHER

## 2018-08-17 RX ORDER — NIFEDIPINE 60 MG/1
TABLET, EXTENDED RELEASE ORAL
Qty: 180 TAB | Refills: 3 | Status: SHIPPED | OUTPATIENT
Start: 2018-08-17 | End: 2019-08-12 | Stop reason: SDUPTHER

## 2018-08-17 RX ORDER — FLUTICASONE PROPIONATE AND SALMETEROL 250; 50 UG/1; UG/1
POWDER RESPIRATORY (INHALATION)
Qty: 3 INHALER | Refills: 3 | Status: SHIPPED | OUTPATIENT
Start: 2018-08-17 | End: 2020-03-09 | Stop reason: SDUPTHER

## 2018-08-17 RX ORDER — POTASSIUM CHLORIDE 20 MEQ/1
TABLET, EXTENDED RELEASE ORAL
Qty: 90 TAB | Refills: 3 | Status: SHIPPED | OUTPATIENT
Start: 2018-08-17 | End: 2019-08-06 | Stop reason: SDUPTHER

## 2018-08-21 ENCOUNTER — HOSPITAL ENCOUNTER (OUTPATIENT)
Dept: CT IMAGING | Age: 83
Discharge: HOME | End: 2018-08-21
Attending: INTERNAL MEDICINE
Payer: MEDICARE

## 2018-08-21 DIAGNOSIS — N28.1 RENAL CYST: ICD-10-CM

## 2018-08-21 LAB — CREAT BLD-MCNC: 1.7 MG/DL (ref 0.6–1.3)

## 2018-08-21 PROCEDURE — 82565 ASSAY OF CREATININE: CPT

## 2018-08-21 RX ORDER — SODIUM CHLORIDE 0.9 % (FLUSH) 0.9 %
5-10 SYRINGE (ML) INJECTION
Status: DISPENSED | OUTPATIENT
Start: 2018-08-21 | End: 2018-08-21

## 2018-08-29 DIAGNOSIS — F41.8 TEST ANXIETY: ICD-10-CM

## 2018-08-29 DIAGNOSIS — N28.1 RENAL CYST: ICD-10-CM

## 2018-08-29 DIAGNOSIS — Z98.890 S/P COLONOSCOPY: Primary | ICD-10-CM

## 2018-08-29 DIAGNOSIS — Z12.11 SCREEN FOR COLON CANCER: ICD-10-CM

## 2018-08-29 RX ORDER — LORAZEPAM 0.5 MG/1
TABLET ORAL
Qty: 5 TAB | Refills: 0 | OUTPATIENT
Start: 2018-08-29

## 2018-08-29 RX ORDER — LORAZEPAM 0.5 MG/1
TABLET ORAL
Qty: 5 TAB | Refills: 0 | Status: SHIPPED | OUTPATIENT
Start: 2018-08-29 | End: 2019-06-06 | Stop reason: ALTCHOICE

## 2018-09-01 RX ORDER — METOPROLOL SUCCINATE 50 MG/1
TABLET, EXTENDED RELEASE ORAL
Qty: 90 TAB | Refills: 3 | Status: SHIPPED | OUTPATIENT
Start: 2018-09-01 | End: 2019-08-12 | Stop reason: SDUPTHER

## 2018-09-05 ENCOUNTER — HOSPITAL ENCOUNTER (OUTPATIENT)
Dept: MRI IMAGING | Age: 83
Discharge: HOME | End: 2018-09-05
Attending: INTERNAL MEDICINE
Payer: MEDICARE

## 2018-09-05 DIAGNOSIS — N28.1 RENAL CYST: ICD-10-CM

## 2018-09-05 DIAGNOSIS — K83.8 INTRAHEPATIC BILE DUCT DILATION: Primary | ICD-10-CM

## 2018-09-05 PROCEDURE — A9575 INJ GADOTERATE MEGLUMI 0.1ML: HCPCS | Performed by: INTERNAL MEDICINE

## 2018-09-05 PROCEDURE — 74183 MRI ABD W/O CNTR FLWD CNTR: CPT

## 2018-09-05 PROCEDURE — 74011250636 HC RX REV CODE- 250/636: Performed by: INTERNAL MEDICINE

## 2018-09-05 RX ORDER — SODIUM CHLORIDE 0.9 % (FLUSH) 0.9 %
10 SYRINGE (ML) INJECTION
Status: DISPENSED | OUTPATIENT
Start: 2018-09-05 | End: 2018-09-05

## 2018-09-05 RX ORDER — HYDROCHLOROTHIAZIDE 12.5 MG/1
TABLET ORAL
Qty: 90 TAB | Refills: 3 | Status: SHIPPED | OUTPATIENT
Start: 2018-09-05 | End: 2018-10-26

## 2018-09-05 RX ORDER — GADOTERATE MEGLUMINE 376.9 MG/ML
20 INJECTION INTRAVENOUS
Status: COMPLETED | OUTPATIENT
Start: 2018-09-05 | End: 2018-09-05

## 2018-09-05 RX ADMIN — GADOTERATE MEGLUMINE 20 ML: 376.9 INJECTION INTRAVENOUS at 10:56

## 2018-09-17 ENCOUNTER — OFFICE VISIT (OUTPATIENT)
Dept: INTERNAL MEDICINE CLINIC | Age: 83
End: 2018-09-17

## 2018-09-17 VITALS
DIASTOLIC BLOOD PRESSURE: 68 MMHG | WEIGHT: 163 LBS | RESPIRATION RATE: 16 BRPM | HEART RATE: 60 BPM | OXYGEN SATURATION: 96 % | BODY MASS INDEX: 28.88 KG/M2 | HEIGHT: 63 IN | TEMPERATURE: 98 F | SYSTOLIC BLOOD PRESSURE: 149 MMHG

## 2018-09-17 DIAGNOSIS — E11.21 TYPE 2 DIABETES MELLITUS WITH NEPHROPATHY (HCC): ICD-10-CM

## 2018-09-17 DIAGNOSIS — I87.2 VENOUS INSUFFICIENCY OF BOTH LOWER EXTREMITIES: ICD-10-CM

## 2018-09-17 DIAGNOSIS — D58.0 HEREDITARY SPHEROCYTOSIS (HCC): ICD-10-CM

## 2018-09-17 DIAGNOSIS — Z00.00 MEDICARE ANNUAL WELLNESS VISIT, SUBSEQUENT: Primary | ICD-10-CM

## 2018-09-17 DIAGNOSIS — N28.1 RENAL CYST: ICD-10-CM

## 2018-09-17 DIAGNOSIS — Z13.39 SCREENING FOR ALCOHOLISM: ICD-10-CM

## 2018-09-17 DIAGNOSIS — K83.8 INTRAHEPATIC BILE DUCT DILATION: ICD-10-CM

## 2018-09-17 DIAGNOSIS — Z13.31 SCREENING FOR DEPRESSION: ICD-10-CM

## 2018-09-17 DIAGNOSIS — C50.911 MALIGNANT NEOPLASM OF RIGHT FEMALE BREAST, UNSPECIFIED ESTROGEN RECEPTOR STATUS, UNSPECIFIED SITE OF BREAST (HCC): ICD-10-CM

## 2018-09-17 NOTE — PATIENT INSTRUCTIONS
Medicare Wellness Visit, Female     The best way to live healthy is to have a lifestyle where you eat a well-balanced diet, exercise regularly, limit alcohol use, and quit all forms of tobacco/nicotine, if applicable. Regular preventive services are another way to keep healthy. Preventive services (vaccines, screening tests, monitoring & exams) can help personalize your care plan, which helps you manage your own care. Screening tests can find health problems at the earliest stages, when they are easiest to treat. [facility] follows the current, evidence-based guidelines published by the [facility] Sundeep Mignon (RUSTSTF) when recommending preventive services for our patients. Because we follow these guidelines, sometimes recommendations change over time as research supports it. (For example, mammograms used to be recommended annually. Even though Medicare will still pay for an annual mammogram, the newer guidelines recommend a mammogram every two years for women of average risk.)  Of course, you and your doctor may decide to screen more often for some diseases, based on your risk and your health status. Preventive services for you include:  - Medicare offers their members a free annual wellness visit, which is time for you and your primary care provider to discuss and plan for your preventive service needs. Take advantage of this benefit every year!  -All adults over the age of 72 should receive the recommended pneumonia vaccines. Current USPSTF guidelines recommend a series of two vaccines for the best pneumonia protection.   -All adults should have a flu vaccine yearly and a tetanus vaccine every 10 years. All adults age 61 and older should receive a shingles vaccine once in their lifetime.    -A bone mass density test is recommended when a woman turns 65 to screen for osteoporosis. This test is only recommended one time, as a screening.  Some providers will use this same test as a disease monitoring tool if you already have osteoporosis. -All adults age 38-68 who are overweight should have a diabetes screening test once every three years.   -Other screening tests and preventive services for persons with diabetes include: an eye exam to screen for diabetic retinopathy, a kidney function test, a foot exam, and stricter control over your cholesterol.   -Cardiovascular screening for adults with routine risk involves an electrocardiogram (ECG) at intervals determined by your doctor.   -Colorectal cancer screenings should be done for adults age 54-65 with no increased risk factors for colorectal cancer. There are a number of acceptable methods of screening for this type of cancer. Each test has its own benefits and drawbacks. Discuss with your doctor what is most appropriate for you during your annual wellness visit. The different tests include: colonoscopy (considered the best screening method), a fecal occult blood test, a fecal DNA test, and sigmoidoscopy. -Breast cancer screenings are recommended every other year for women of normal risk, age 54-69.  -Cervical cancer screenings for women over age 72 are only recommended with certain risk factors.   -All adults born between [facility] should be screened once for Hepatitis C. Here is a list of your current Health Maintenance items (your personalized list of preventive services) with a due date: There are no preventive care reminders to display for this patient.

## 2018-09-17 NOTE — PROGRESS NOTES
1. Have you been to the ER, urgent care clinic since your last visit? Hospitalized since your last visit? No    2. Have you seen or consulted any other health care providers outside of the [facility] since your last visit? Include any pap smears or colon screening. No     Wants to discuss a procedure that she is having done      TPatient does total self carehis is the Subsequent Medicare Annual Wellness Exam, performed 12 months or more after the Initial AWV or the last Subsequent AWV    I have reviewed the patient's medical history in detail and updated the computerized patient record.      History     Past Medical History:   Diagnosis Date    Anemia     Arrhythmia     Asthma     Bereavement 2-2-16     die 82 copd,chf,pul htn pn after 48 yr marriage    BPV (benign positional vertigo)     Breast cancer, right breast ([address] 1994    right breast, lumpectomy and radiation    Breast lump 2017    right breast     Diabetes ([address]     DJD (degenerative joint disease) of knee     Early satiety     Hereditary spherocytosis ([address]     HTN (hypertension)     Intrahepatic bile duct dilation 09/05/2018    mri    Radiation therapy complication 0869    right breast     Renal cyst 08/2016    ct rt    S/P colonoscopy 4-9-14    md Brian - family hx    S/P colonoscopy 07/20/2016    rt - md brian diverticulosis    Septic arthritis ([address] 3/11    Streptococcal sepsis ([address] 3/11    Venous insufficiency of both lower extremities       Past Surgical History:   Procedure Laterality Date    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    BREAST SURGERY PROCEDURE UNLISTED  2004    biopsy-cancer    HX BREAST BIOPSY Right 1994    positive    HX BREAST LUMPECTOMY Right 1994    HX CHOLECYSTECTOMY  1985     Current Outpatient Prescriptions   Medication Sig Dispense Refill    hydroCHLOROthiazide (HYDRODIURIL) 12.5 mg tablet TAKE 1 TABLET DAILY 90 Tab 3    TOPROL XL 50 mg XL tablet TAKE 1 TABLET DAILY 90 Tab 3    LORazepam (ATIVAN) 0.5 mg tablet 1 tab 1 hour before procedure, then 1 tab upon arrival for procedure, may repeat x 2 5 Tab 0    fluticasone-salmeterol (ADVAIR DISKUS) 250-50 mcg/dose diskus inhaler USE 1 INHALATION TWO TIMES A DAY 3 Inhaler 3    NIFEdipine ER (PROCARDIA XL) 60 mg ER tablet TAKE 1 TABLET TWICE A  Tab 3    potassium chloride (KLOR-CON M20) 20 mEq tablet TAKE 1 TABLET DAILY 90 Tab 3    albuterol (PROVENTIL HFA, VENTOLIN HFA, PROAIR HFA) 90 mcg/actuation inhaler Take 1 Puff by inhalation every four (4) hours as needed for Wheezing. 3 Inhaler 3    pravastatin (PRAVACHOL) 20 mg tablet TAKE 1 TABLET ONCE EACH NIGHT 90 Tab 9    ibandronate (BONIVA) 150 mg tablet TAKE 1 TABLET ONCE A MONTH 3 Tab 11    albuterol (PROAIR HFA) 90 mcg/actuation inhaler INHALE 2 PUFFS EVERY 4 HOURS AS NEEDED 3 Inhaler 3    oxybutynin chloride XL (DITROPAN XL) 10 mg CR tablet TAKE 1 TABLET DAILY 90 Tab 3    triamcinolone acetonide (KENALOG) 0.1 % topical cream APPLY EXTERNALLY TO THE AFFECTED AREA TWICE DAILY 80 g 0    lamoTRIgine (LAMICTAL) 25 mg tablet TAKE 1 TABLET TWICE A  Tab 3    MICARDIS 80 mg tablet TAKE 1 TABLET DAILY 90 Tab 49    FREESTYLE LITE STRIPS strip USE TO TEST BLOOD SUGAR ONCE DAILY 100 Strip 2    guaiFENesin (ORGANIDIN) 200 mg tablet Take 1 Tab by mouth every four (4) hours as needed for Congestion. 300 Tab 11    mometasone (NASONEX) 50 mcg/actuation nasal spray 2 Sprays by Both Nostrils route daily. 3 Container 11    aspirin delayed-release 81 mg tablet Take 81 mg by mouth daily.  ASCORBATE CALCIUM (VITAMIN C PO) Take  by mouth.        Allergies   Allergen Reactions    Codeine Other (comments)     Upset stomach     Family History   Problem Relation Age of Onset    Heart Disease Mother     Diabetes Mother     Other Father      'sphero'-blood disorder    Cancer Sister      breast    Breast Cancer Sister     Cancer Sister      colon     Social History Substance Use Topics    Smoking status: Never Smoker    Smokeless tobacco: Never Used    Alcohol use No     Patient Active Problem List   Diagnosis Code    Anemia NEC     Asthma J45.909    Hereditary spherocytosis ([facility] D58.0    HTN (hypertension) I10    Breast cancer, right breast ([facility] C50.911    Anemia D64.9    BPV (benign positional vertigo) H81.10    DJD (degenerative joint disease) of knee M17.10    S/P colonoscopy Z98.890    Early satiety R68.81    Venous insufficiency of both lower extremities I87.2    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.21    Renal cyst N28.1    Intrahepatic bile duct dilation K83.8       Depression Risk Factor Screening:     PHQ over the last two weeks 9/17/2018   Little interest or pleasure in doing things Not at all   Feeling down, depressed, irritable, or hopeless Not at all   Total Score PHQ 2 0     Alcohol Risk Factor Screening: You do not drink alcohol or very rarely. Functional Ability and Level of Safety:   Hearing Loss  Hearing is good. Activities of Daily Living  The home contains: no safety equipment. Patient does total self care    Fall Risk  Fall Risk Assessment, last 12 mths 9/17/2018   Able to walk? Yes   Fall in past 12 months? No       Abuse Screen  Patient is not abused    Cognitive Screening   Evaluation of Cognitive Function:  Has your family/caregiver stated any concerns about your memory: no  Normal    Patient Care Team   Patient Care Team:  Neil Luu MD as PCP - General (Internal Medicine)    Assessment/Plan   Education and counseling provided:  Are appropriate based on today's review and evaluation    Diagnoses and all orders for this visit:    1. Medicare annual wellness visit, subsequent    2. Screening for alcoholism  -     Annual  Alcohol Screen 15 min ()    3. Screening for depression  -     Depression Screen Annual    4.  Type 2 diabetes mellitus with nephropathy (HCC)  -     COLLECTION VENOUS BLOOD,VENIPUNCTURE  -     HEMOGLOBIN A1C WITH EAG  -     METABOLIC PANEL, BASIC    5. Malignant neoplasm of right female breast, unspecified estrogen receptor status, unspecified site of breast ([address]    6. Hereditary spherocytosis ([facility]    7. Intrahepatic bile duct dilation    8. Venous insufficiency of both lower extremities    9. Renal cyst        There are no preventive care reminders to display for this patient. [facility]  German Arriaga MD, [address] [address]  Phone:  [phone]  Fax: [phone]       Chief Complaint   Patient presents with   [facility] Wellness Visit   . SUBJECTIVE:    Adriana Baig is a 82 y.o. female Known history of primary hypertension, bronchial asthma, hereditary spherocytosis, right breast cancer, S/P lumpectomy, primary hypertension, and is seen for evaluation. Since we last saw her she saw Dr. Jan Van regarding her kidneys and requested ultrasound of kidneys on 08/08, which revealed a cystic lesion on the lower right kidney, 3.3 cm, containing thick septation with color flow, cystic neoplasm is not excluded, and normal sized liver image. Because of CKD an MRI was requested with and without contrast, and the MRI revealed no enhancing or solid renal masses identified and hemorrhagic cysts, the largest hemorrhagic cyst was in the right lower pole measuring 4.1 x 3.3 cm. Given the size, follow up in six months was recommended to ensure stability. There is moderate extrahepatic and mild intrahepatic biliary dilatation, more than would be expected from a cholecystectomy. No obstructing mass or stone was identified. This may represent benign stricture at the ampulla. ERCP was considered/recommended and apparently Dr. Jan Van had sent her to [facility] to have an evaluation by GI and ERCP completed.   Patient comes in today voicing no new complaints, but is concerned about her white count, which has been slightly elevated. Current Outpatient Prescriptions   Medication Sig Dispense Refill    hydroCHLOROthiazide (HYDRODIURIL) 12.5 mg tablet TAKE 1 TABLET DAILY 90 Tab 3    TOPROL XL 50 mg XL tablet TAKE 1 TABLET DAILY 90 Tab 3    LORazepam (ATIVAN) 0.5 mg tablet 1 tab 1 hour before procedure, then 1 tab upon arrival for procedure, may repeat x 2 5 Tab 0    fluticasone-salmeterol (ADVAIR DISKUS) 250-50 mcg/dose diskus inhaler USE 1 INHALATION TWO TIMES A DAY 3 Inhaler 3    NIFEdipine ER (PROCARDIA XL) 60 mg ER tablet TAKE 1 TABLET TWICE A  Tab 3    potassium chloride (KLOR-CON M20) 20 mEq tablet TAKE 1 TABLET DAILY 90 Tab 3    albuterol (PROVENTIL HFA, VENTOLIN HFA, PROAIR HFA) 90 mcg/actuation inhaler Take 1 Puff by inhalation every four (4) hours as needed for Wheezing. 3 Inhaler 3    pravastatin (PRAVACHOL) 20 mg tablet TAKE 1 TABLET ONCE EACH NIGHT 90 Tab 9    ibandronate (BONIVA) 150 mg tablet TAKE 1 TABLET ONCE A MONTH 3 Tab 11    albuterol (PROAIR HFA) 90 mcg/actuation inhaler INHALE 2 PUFFS EVERY 4 HOURS AS NEEDED 3 Inhaler 3    oxybutynin chloride XL (DITROPAN XL) 10 mg CR tablet TAKE 1 TABLET DAILY 90 Tab 3    triamcinolone acetonide (KENALOG) 0.1 % topical cream APPLY EXTERNALLY TO THE AFFECTED AREA TWICE DAILY 80 g 0    lamoTRIgine (LAMICTAL) 25 mg tablet TAKE 1 TABLET TWICE A  Tab 3    MICARDIS 80 mg tablet TAKE 1 TABLET DAILY 90 Tab 49    FREESTYLE LITE STRIPS strip USE TO TEST BLOOD SUGAR ONCE DAILY 100 Strip 2    guaiFENesin (ORGANIDIN) 200 mg tablet Take 1 Tab by mouth every four (4) hours as needed for Congestion. 300 Tab 11    mometasone (NASONEX) 50 mcg/actuation nasal spray 2 Sprays by Both Nostrils route daily. 3 Container 11    aspirin delayed-release 81 mg tablet Take 81 mg by mouth daily.  ASCORBATE CALCIUM (VITAMIN C PO) Take  by mouth.        Past Medical History:   Diagnosis Date    Anemia     Arrhythmia     Asthma     Bereavement 2-2-16     die 82 copd,chf,pul htn pn after 48 yr marriage    BPV (benign positional vertigo)     Breast cancer, right breast ([address] 1994    right breast, lumpectomy and radiation    Breast lump 2017    right breast     Diabetes ([address]     DJD (degenerative joint disease) of knee     Early satiety     Hereditary spherocytosis ([address]     HTN (hypertension)     Intrahepatic bile duct dilation 09/05/2018    mri    Radiation therapy complication 2704    right breast     Renal cyst 08/2016    ct rt    S/P colonoscopy 4-9-14    md Brina - family hx    S/P colonoscopy 07/20/2016    rt - md brian diverticulosis    Septic arthritis ([address] 3/11    Streptococcal sepsis ([facility] 3/11    Venous insufficiency of both lower extremities      Past Surgical History:   Procedure Laterality Date    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    BREAST SURGERY PROCEDURE UNLISTED  2004    biopsy-cancer    HX BREAST BIOPSY Right 1994    positive    HX BREAST LUMPECTOMY Right 1994    HX CHOLECYSTECTOMY  1985     Allergies   Allergen Reactions    Codeine Other (comments)     Upset stomach         REVIEW OF SYSTEMS:  General: negative for - chills or fever  ENT: negative for - headaches, nasal congestion or tinnitus  Respiratory: negative for - cough, hemoptysis, shortness of breath or wheezing  Cardiovascular : negative for - chest pain, edema, palpitations or shortness of breath  Gastrointestinal: negative for - abdominal pain, blood in stools, heartburn or nausea/vomiting  Genito-Urinary: no dysuria, trouble voiding, or hematuria  Musculoskeletal: negative for - gait disturbance, joint pain, joint stiffness or joint swelling  Neurological: no TIA or stroke symptoms  Hematologic: no bruises, no bleeding, no swollen glands  Integument: no lumps, mole changes, nail changes or rash  Endocrine: no malaise/lethargy or unexpected weight changes      Social History     Social History    Marital status:      Spouse name: N/A    Number of children: N/A    Years of education: N/A     Social History Main Topics    Smoking status: Never Smoker    Smokeless tobacco: Never Used    Alcohol use No    Drug use: No    Sexual activity: Not Currently     Other Topics Concern    None     Social History Narrative    Medical History: Streptococcus pneumoniae septicemia 11Septic polyarthritis 11HSV2 herpes simplex    [id number]/02/11Seizure d/o 11Osteoporosis 2011primary HTN 1990Bronchial asthma 1985Obesity 2002Carcinoma of    breast 04Congential spherocytosis    Gyn History: Last mammogram date 2013. OB History: Total pregnancies 2. Live births 2. Surgical History: normal stress myocardial perfusion scan, EF 68% 08splenectomy 1972cholecystectomy 1985colonoscopy -    internal hemorrhoids diverticulosis lumpectomy radiation therapy colonoscopy diverticulosis Arthuro Cristobal ORELLANA 2014    Hospitalization/Major Diagnostic Procedure: streptococcus pneumoniae septicemia ronchial asthma     Family History: Mother:  80 yrs, DM, Aloma Case:  80 yrs, heart disease, aortic stenosisSister(s): , colon CA,    polypsBrother(s): , at birthUncle: alive, colon CA, heart disease2 sister(s) [employer] . 1 son(s) , 1 daughter(s) . Social History: Alcohol Use Patient does not use alcohol. Smoking Status Patient is a never smoker. Marital Status:  2016, 46 yrs copd,chf. Children: Her son grandchild's mother is temporarily incarcerated and she is caring for the 10 yo. Occupation/W ork: retired teacher. Education/School: has college diploma-[employer].  Anabaptism: [place] [employer].     Family History   Problem Relation Age of Onset    Heart Disease Mother     Diabetes Mother     Other Father      'sphero'-blood disorder    Cancer Sister      breast    Breast Cancer Sister     Cancer Sister      colon       OBJECTIVE:    Visit Vitals  /68    Pulse 60    Temp 98 °F (36.7 °C) (Oral)    Resp 16    Ht 5' 3\" (1.6 m)    Wt 163 lb (73.9 kg)    SpO2 96%    BMI 28.87 kg/m2     CONSTITUTIONAL: well , well nourished, appears age appropriate  EYES: perrla, eom intact  ENMT:moist mucous membranes, pharynx clear  NECK: supple. Thyroid normal  RESPIRATORY: Chest: clear bilaterally   CARDIOVASCULAR: Heart: regular rate and rhythm  GASTROINTESTINAL: Abdomen: soft, bowel sounds active  HEMATOLOGIC: no pathological lymph nodes palpated  MUSCULOSKELETAL: Extremities: no edema, pulse 1+   INTEGUMENT: No unusual rashes or suspicious skin lesions noted. Nails appear normal.  NEUROLOGIC: non-focal exam   MENTAL STATUS: alert and oriented, appropriate affect           ASSESSMENT:  1. Medicare annual wellness visit, subsequent    2. Screening for alcoholism    3. Screening for depression    4. Type 2 diabetes mellitus with nephropathy ([facility]    5. Malignant neoplasm of right female breast, unspecified estrogen receptor status, unspecified site of breast ([facility]    6. Hereditary spherocytosis ([facility]    7. Intrahepatic bile duct dilation    8. Venous insufficiency of both lower extremities    9. Renal cyst      Patient's reassured that her white count is completely normal and has been normal for years. She has an appointment to see Dr. Jalyn Vo on the 20th of this month so he can complete an ERCP to exclude significant pathology causing the ductal dilatation. She does complain of abdominal bloating and she hopes that is not related. BP is a little higher than we'd like to see it. As opposed to increasing her antihypertensive, she will increase her activity and decrease salt intake. In two weeks she'll check BP and if greater than 130/80 she'll stop by for us to confirm, at which point we will adjust her antihypertensives. She's agreeable with the plan. She'll return to see us formally in about four months.       I have discussed the diagnosis with the patient and the intended plan as seen in the  orders above. The patient understands and agees with the plan. The patient has   received an after visit summary and questions were answered concerning  future plans  Patient labs and/or xrays were reviewed  Past records were reviewed. PLAN:  .  Orders Placed This Encounter    Depression Screen Annual    HEMOGLOBIN A1C WITH EAG    METABOLIC PANEL, BASIC       Follow-up Disposition:  Return in about 4 months (around 1/17/2019). ATTENTION:   This medical record was transcribed using an electronic medical records system. Although proofread, it may and can contain electronic and spelling errors. Other human spelling and other errors may be present. Corrections may be executed at a later time. Please feel free to contact us for any clarifications as needed.

## 2018-09-17 NOTE — MR AVS SNAPSHOT
[address] [place] [address] 
[phone] Patient: Timmy Rubio MRN: [id number] MEO:[id number] Visit Information Date & Time Provider Department Dept. Phone Encounter #  
 9/17/2018 10:30 AM Johanna Perrin MD [place] [facility] [id number] Follow-up Instructions Return in about 4 months (around 1/17/2019). Follow-up and Disposition History Upcoming Health Maintenance Date Due Influenza Age 5 to Adult 10/17/2018* HEMOGLOBIN A1C Q6M 11/15/2018 EYE EXAM RETINAL OR DILATED Q1 11/16/2018 MICROALBUMIN Q1 1/16/2019 LIPID PANEL Q1 1/16/2019 FOOT EXAM Q1 5/15/2019 GLAUCOMA SCREENING Q2Y 11/16/2019 COLONOSCOPY 4/9/2024 DTaP/Tdap/Td series (2 - Td) 9/13/2027 *Topic was postponed. The date shown is not the original due date. Allergies as of 9/17/2018  Review Complete On: 9/17/2018 By: Johanna Perrin MD  
  
 Severity Noted Reaction Type Reactions Codeine  10/20/2010    Other (comments) Upset stomach Current Immunizations  Reviewed on 6/6/2016 Name Date Influenza High Dose Vaccine PF 9/13/2017 Td, Adsorbed PF 6/6/2016 Not reviewed this visit You Were Diagnosed With   
  
 Codes Comments Medicare annual wellness visit, subsequent    -  Primary ICD-10-CM: Z00.00 ICD-9-CM: V70.0 Screening for alcoholism     ICD-10-CM: Z13.89 ICD-9-CM: V79.1 Screening for depression     ICD-10-CM: Z13.89 ICD-9-CM: V79.0 Type 2 diabetes mellitus with nephropathy (HCC)     ICD-10-CM: E11.21 
ICD-9-CM: 250.40, 583.81 Malignant neoplasm of right female breast, unspecified estrogen receptor status, unspecified site of breast ([facility]     ICD-10-CM: C50.911 ICD-9-CM: 174.9 Hereditary spherocytosis ([facility]     ICD-10-CM: D58.0 ICD-9-CM: 282.0 Intrahepatic bile duct dilation     ICD-10-CM: K83.8 ICD-9-CM: 576.8 Venous insufficiency of both lower extremities     ICD-10-CM: I87.2 ICD-9-CM: 459.81 Renal cyst     ICD-10-CM: N28.1 ICD-9-CM: 753.10 Vitals BP Pulse Temp Resp Height(growth percentile) Weight(growth percentile) 149/68 60 98 °F (36.7 °C) (Oral) 16 5' 3\" (1.6 m) 163 lb (73.9 kg) SpO2 BMI OB Status Smoking Status 96% 28.87 kg/m2 Postmenopausal Never Smoker Vitals History BMI and BSA Data Body Mass Index Body Surface Area  
 28.87 kg/m 2 1.81 m 2 Preferred Pharmacy Pharmacy Name Phone [facility] [phone] Your Updated Medication List  
  
   
This list is accurate as of 9/17/18 12:16 PM.  Always use your most recent med list.  
  
  
  
  
 * albuterol 90 mcg/actuation inhaler Commonly known as:  PROAIR HFA INHALE 2 PUFFS EVERY 4 HOURS AS NEEDED  
  
 * albuterol 90 mcg/actuation inhaler Commonly known as:  PROVENTIL HFA, VENTOLIN HFA, PROAIR HFA Take 1 Puff by inhalation every four (4) hours as needed for Wheezing. aspirin delayed-release 81 mg tablet Take 81 mg by mouth daily. fluticasone-salmeterol 250-50 mcg/dose diskus inhaler Commonly known as:  ADVAIR DISKUS  
USE 1 INHALATION TWO TIMES A DAY  
  
 FREESTYLE LITE STRIPS strip Generic drug:  glucose blood VI test strips USE TO TEST BLOOD SUGAR ONCE DAILY  
  
 guaiFENesin 200 mg tablet Commonly known as:  Navid Breath Take 1 Tab by mouth every four (4) hours as needed for Congestion. hydroCHLOROthiazide 12.5 mg tablet Commonly known as:  HYDRODIURIL  
TAKE 1 TABLET DAILY  
  
 ibandronate 150 mg tablet Commonly known as:  Lemon Salt TAKE 1 TABLET ONCE A MONTH  
  
 lamoTRIgine 25 mg tablet Commonly known as: LaMICtal  
TAKE 1 TABLET TWICE A DAY LORazepam 0.5 mg tablet Commonly known as:  ATIVAN  
1 tab 1 hour before procedure, then 1 tab upon arrival for procedure, may repeat x 2  
  
 MICARDIS 80 mg tablet Generic drug:  telmisartan TAKE 1 TABLET DAILY  
  
 mometasone 50 mcg/actuation nasal spray Commonly known as:  NASONEX  
2 Sprays by Both Nostrils route daily. NIFEdipine ER 60 mg ER tablet Commonly known as:  PROCARDIA XL  
TAKE 1 TABLET TWICE A DAY  
  
 oxybutynin chloride XL 10 mg CR tablet Commonly known as:  DITROPAN XL  
TAKE 1 TABLET DAILY potassium chloride 20 mEq tablet Commonly known as:  KLOR-CON M20  
TAKE 1 TABLET DAILY pravastatin 20 mg tablet Commonly known as:  PRAVACHOL  
TAKE 1 TABLET ONCE EACH NIGHT  
  
 TOPROL XL 50 mg XL tablet Generic drug:  metoprolol succinate TAKE 1 TABLET DAILY  
  
 triamcinolone acetonide 0.1 % topical cream  
Commonly known as:  KENALOG  
APPLY EXTERNALLY TO THE AFFECTED AREA TWICE DAILY  
  
 VITAMIN C PO Take  by mouth. * Notice: This list has 2 medication(s) that are the same as other medications prescribed for you. Read the directions carefully, and ask your doctor or other care provider to review them with you. We Performed the Following COLLECTION VENOUS BLOOD,VENIPUNCTURE [id number] CPT(R)] [facility] [[id number] [facility]] HEMOGLOBIN A1C WITH EAG [71209 CPT(R)] METABOLIC PANEL, BASIC [92999 CPT(R)] MO ANNUAL ALCOHOL SCREEN 15 MIN [id number] [facility]] Follow-up Instructions Return in about 4 months (around 1/17/2019). Patient Instructions Medicare Wellness Visit, Female The best way to live healthy is to have a lifestyle where you eat a well-balanced diet, exercise regularly, limit alcohol use, and quit all forms of tobacco/nicotine, if applicable. Regular preventive services are another way to keep healthy. Preventive services (vaccines, screening tests, monitoring & exams) can help personalize your care plan, which helps you manage your own care.  Screening tests can find health problems at the earliest stages, when they are easiest to treat. [facility] follows the current, evidence-based guidelines published by the [facility] Sundeep Crow (USPSTF) when recommending preventive services for our patients. Because we follow these guidelines, sometimes recommendations change over time as research supports it. (For example, mammograms used to be recommended annually. Even though Medicare will still pay for an annual mammogram, the newer guidelines recommend a mammogram every two years for women of average risk.) Of course, you and your doctor may decide to screen more often for some diseases, based on your risk and your health status. Preventive services for you include: - Medicare offers their members a free annual wellness visit, which is time for you and your primary care provider to discuss and plan for your preventive service needs. Take advantage of this benefit every year! 
-All adults over the age of 72 should receive the recommended pneumonia vaccines. Current USPSTF guidelines recommend a series of two vaccines for the best pneumonia protection.  
-All adults should have a flu vaccine yearly and a tetanus vaccine every 10 years. All adults age 61 and older should receive a shingles vaccine once in their lifetime.   
-A bone mass density test is recommended when a woman turns 65 to screen for osteoporosis. This test is only recommended one time, as a screening. Some providers will use this same test as a disease monitoring tool if you already have osteoporosis.  
-All adults age 38-68 who are overweight should have a diabetes screening test once every three years.  
-Other screening tests and preventive services for persons with diabetes include: an eye exam to screen for diabetic retinopathy, a kidney function test, a foot exam, and stricter control over your cholesterol.  
-Cardiovascular screening for adults with routine risk involves an electrocardiogram (ECG) at intervals determined by your doctor.  
-Colorectal cancer screenings should be done for adults age 54-65 with no increased risk factors for colorectal cancer. There are a number of acceptable methods of screening for this type of cancer. Each test has its own benefits and drawbacks. Discuss with your doctor what is most appropriate for you during your annual wellness visit. The different tests include: colonoscopy (considered the best screening method), a fecal occult blood test, a fecal DNA test, and sigmoidoscopy. -Breast cancer screenings are recommended every other year for women of normal risk, age 54-69. 
-Cervical cancer screenings for women over age 72 are only recommended with certain risk factors.  
-All adults born between [facility] should be screened once for Hepatitis C. Here is a list of your current Health Maintenance items (your personalized list of preventive services) with a due date: There are no preventive care reminders to display for this patient. Introducing [facility]! Dear Jj Whitney: Thank you for requesting a [facility] account. Our records indicate that you already have an active [facility] account. You can access your account anytime at [email or web address] Did you know that you can access your hospital and ER discharge instructions at any time in [facility]? You can also review all of your test results from your hospital stay or ER visit. Additional Information If you have questions, please visit the Frequently Asked Questions section of the [facility] website at [email or web address] Remember, [facility] is NOT to be used for urgent needs. For medical emergencies, dial 911. Now available from your iPhone and Android! Please provide this summary of care documentation to your next provider. Your primary care clinician is listed as Jalyn Longoria.  If you have any questions after today's visit, please call [phone].

## 2018-09-18 LAB
BUN SERPL-MCNC: 17 MG/DL (ref 8–27)
BUN/CREAT SERPL: 16 (ref 12–28)
CALCIUM SERPL-MCNC: 9.8 MG/DL (ref 8.7–10.3)
CHLORIDE SERPL-SCNC: 104 MMOL/L (ref 96–106)
CO2 SERPL-SCNC: 22 MMOL/L (ref 20–29)
CREAT SERPL-MCNC: 1.05 MG/DL (ref 0.57–1)
EST. AVERAGE GLUCOSE BLD GHB EST-MCNC: 94 MG/DL
GLUCOSE SERPL-MCNC: 88 MG/DL (ref 65–99)
HBA1C MFR BLD: 4.9 % (ref 4.8–5.6)
POTASSIUM SERPL-SCNC: 4.2 MMOL/L (ref 3.5–5.2)
SODIUM SERPL-SCNC: 143 MMOL/L (ref 134–144)

## 2018-10-26 RX ORDER — TELMISARTAN 80 MG/1
80 TABLET ORAL
COMMUNITY
End: 2019-03-06 | Stop reason: SDUPTHER

## 2018-10-26 RX ORDER — OXYBUTYNIN CHLORIDE 10 MG/1
10 TABLET, EXTENDED RELEASE ORAL
COMMUNITY
End: 2019-05-11 | Stop reason: SDUPTHER

## 2018-10-26 RX ORDER — ASPIRIN 325 MG
325 TABLET ORAL AS NEEDED
COMMUNITY
End: 2020-01-29

## 2018-10-26 RX ORDER — GUAIFENESIN 400 MG/1
200 TABLET ORAL
COMMUNITY
End: 2020-05-04 | Stop reason: ALTCHOICE

## 2018-10-26 RX ORDER — FLUTICASONE PROPIONATE 50 MCG
2 SPRAY, SUSPENSION (ML) NASAL 2 TIMES DAILY
COMMUNITY
End: 2022-09-12 | Stop reason: SDUPTHER

## 2018-10-26 RX ORDER — MECLIZINE HYDROCHLORIDE 25 MG/1
25 TABLET ORAL AS NEEDED
COMMUNITY
End: 2020-01-20

## 2018-10-26 RX ORDER — HYDROCHLOROTHIAZIDE 12.5 MG/1
12.5 TABLET ORAL
COMMUNITY
End: 2021-11-05 | Stop reason: SDUPTHER

## 2018-10-26 RX ORDER — HYDROCHLOROTHIAZIDE 25 MG/1
25 TABLET ORAL DAILY
COMMUNITY
End: 2018-10-26

## 2018-10-29 ENCOUNTER — ANESTHESIA (OUTPATIENT)
Dept: ENDOSCOPY | Age: 83
End: 2018-10-29
Payer: MEDICARE

## 2018-10-29 ENCOUNTER — APPOINTMENT (OUTPATIENT)
Dept: ULTRASOUND IMAGING | Age: 83
End: 2018-10-29
Attending: INTERNAL MEDICINE
Payer: MEDICARE

## 2018-10-29 ENCOUNTER — HOSPITAL ENCOUNTER (OUTPATIENT)
Age: 83
Setting detail: OUTPATIENT SURGERY
Discharge: HOME | End: 2018-10-29
Attending: INTERNAL MEDICINE | Admitting: INTERNAL MEDICINE
Payer: MEDICARE

## 2018-10-29 ENCOUNTER — ANESTHESIA EVENT (OUTPATIENT)
Dept: ENDOSCOPY | Age: 83
End: 2018-10-29
Payer: MEDICARE

## 2018-10-29 VITALS
WEIGHT: 163 LBS | RESPIRATION RATE: 28 BRPM | TEMPERATURE: 97.9 F | HEART RATE: 70 BPM | SYSTOLIC BLOOD PRESSURE: 140 MMHG | HEIGHT: 63 IN | DIASTOLIC BLOOD PRESSURE: 90 MMHG | BODY MASS INDEX: 28.88 KG/M2 | OXYGEN SATURATION: 95 %

## 2018-10-29 PROCEDURE — 76040000007: Performed by: INTERNAL MEDICINE

## 2018-10-29 PROCEDURE — 74011250636 HC RX REV CODE- 250/636

## 2018-10-29 PROCEDURE — 74011000250 HC RX REV CODE- 250

## 2018-10-29 PROCEDURE — 76060000032 HC ANESTHESIA 0.5 TO 1 HR: Performed by: INTERNAL MEDICINE

## 2018-10-29 PROCEDURE — 74011000258 HC RX REV CODE- 258

## 2018-10-29 PROCEDURE — 74011250636 HC RX REV CODE- 250/636: Performed by: INTERNAL MEDICINE

## 2018-10-29 RX ORDER — FENTANYL CITRATE 50 UG/ML
200 INJECTION, SOLUTION INTRAMUSCULAR; INTRAVENOUS
Status: DISCONTINUED | OUTPATIENT
Start: 2018-10-29 | End: 2018-10-29 | Stop reason: HOSPADM

## 2018-10-29 RX ORDER — SODIUM CHLORIDE 0.9 % (FLUSH) 0.9 %
5-10 SYRINGE (ML) INJECTION AS NEEDED
Status: DISCONTINUED | OUTPATIENT
Start: 2018-10-29 | End: 2018-10-29 | Stop reason: HOSPADM

## 2018-10-29 RX ORDER — SODIUM CHLORIDE 9 MG/ML
100 INJECTION, SOLUTION INTRAVENOUS CONTINUOUS
Status: DISCONTINUED | OUTPATIENT
Start: 2018-10-29 | End: 2018-10-29 | Stop reason: HOSPADM

## 2018-10-29 RX ORDER — GLYCOPYRROLATE 0.2 MG/ML
INJECTION INTRAMUSCULAR; INTRAVENOUS AS NEEDED
Status: DISCONTINUED | OUTPATIENT
Start: 2018-10-29 | End: 2018-10-29 | Stop reason: HOSPADM

## 2018-10-29 RX ORDER — NALOXONE HYDROCHLORIDE 0.4 MG/ML
0.4 INJECTION, SOLUTION INTRAMUSCULAR; INTRAVENOUS; SUBCUTANEOUS
Status: DISCONTINUED | OUTPATIENT
Start: 2018-10-29 | End: 2018-10-29 | Stop reason: HOSPADM

## 2018-10-29 RX ORDER — ATROPINE SULFATE 0.1 MG/ML
0.5 INJECTION INTRAVENOUS
Status: DISCONTINUED | OUTPATIENT
Start: 2018-10-29 | End: 2018-10-29 | Stop reason: HOSPADM

## 2018-10-29 RX ORDER — FLUMAZENIL 0.1 MG/ML
0.2 INJECTION INTRAVENOUS
Status: DISCONTINUED | OUTPATIENT
Start: 2018-10-29 | End: 2018-10-29 | Stop reason: HOSPADM

## 2018-10-29 RX ORDER — LIDOCAINE HYDROCHLORIDE 20 MG/ML
INJECTION, SOLUTION INFILTRATION; PERINEURAL AS NEEDED
Status: DISCONTINUED | OUTPATIENT
Start: 2018-10-29 | End: 2018-10-29 | Stop reason: HOSPADM

## 2018-10-29 RX ORDER — DEXTROMETHORPHAN/PSEUDOEPHED 2.5-7.5/.8
1.2 DROPS ORAL
Status: DISCONTINUED | OUTPATIENT
Start: 2018-10-29 | End: 2018-10-29 | Stop reason: HOSPADM

## 2018-10-29 RX ORDER — MIDAZOLAM HYDROCHLORIDE 1 MG/ML
.25-1 INJECTION, SOLUTION INTRAMUSCULAR; INTRAVENOUS
Status: DISCONTINUED | OUTPATIENT
Start: 2018-10-29 | End: 2018-10-29 | Stop reason: HOSPADM

## 2018-10-29 RX ORDER — EPINEPHRINE 0.1 MG/ML
1 INJECTION INTRACARDIAC; INTRAVENOUS
Status: DISCONTINUED | OUTPATIENT
Start: 2018-10-29 | End: 2018-10-29 | Stop reason: HOSPADM

## 2018-10-29 RX ORDER — PROPOFOL 10 MG/ML
INJECTION, EMULSION INTRAVENOUS AS NEEDED
Status: DISCONTINUED | OUTPATIENT
Start: 2018-10-29 | End: 2018-10-29 | Stop reason: HOSPADM

## 2018-10-29 RX ORDER — SODIUM CHLORIDE 9 MG/ML
INJECTION, SOLUTION INTRAVENOUS
Status: DISCONTINUED | OUTPATIENT
Start: 2018-10-29 | End: 2018-10-29 | Stop reason: HOSPADM

## 2018-10-29 RX ORDER — SODIUM CHLORIDE 0.9 % (FLUSH) 0.9 %
5-10 SYRINGE (ML) INJECTION EVERY 8 HOURS
Status: DISCONTINUED | OUTPATIENT
Start: 2018-10-29 | End: 2018-10-29 | Stop reason: HOSPADM

## 2018-10-29 RX ADMIN — PROPOFOL 20 MG: 10 INJECTION, EMULSION INTRAVENOUS at 10:16

## 2018-10-29 RX ADMIN — PROPOFOL 20 MG: 10 INJECTION, EMULSION INTRAVENOUS at 10:06

## 2018-10-29 RX ADMIN — PROPOFOL 20 MG: 10 INJECTION, EMULSION INTRAVENOUS at 10:12

## 2018-10-29 RX ADMIN — GLYCOPYRROLATE 0.2 MG: 0.2 INJECTION INTRAMUSCULAR; INTRAVENOUS at 09:39

## 2018-10-29 RX ADMIN — PROPOFOL 20 MG: 10 INJECTION, EMULSION INTRAVENOUS at 09:56

## 2018-10-29 RX ADMIN — PROPOFOL 40 MG: 10 INJECTION, EMULSION INTRAVENOUS at 10:02

## 2018-10-29 RX ADMIN — PROPOFOL 40 MG: 10 INJECTION, EMULSION INTRAVENOUS at 10:09

## 2018-10-29 RX ADMIN — LIDOCAINE HYDROCHLORIDE 40 MG: 20 INJECTION, SOLUTION INFILTRATION; PERINEURAL at 09:54

## 2018-10-29 RX ADMIN — PROPOFOL 20 MG: 10 INJECTION, EMULSION INTRAVENOUS at 09:59

## 2018-10-29 RX ADMIN — PROPOFOL 40 MG: 10 INJECTION, EMULSION INTRAVENOUS at 09:54

## 2018-10-29 RX ADMIN — SODIUM CHLORIDE: 9 INJECTION, SOLUTION INTRAVENOUS at 09:52

## 2018-10-29 NOTE — ROUTINE PROCESS
Lucio Graves  1935  [id number]    Situation:  Verbal report received from: Sharlene  Procedure: Procedure(s):  ENDOSCOPIC ULTRASOUND (EUS)  ESOPHAGOGASTRODUODENOSCOPY (EGD)    Background:    Preoperative diagnosis: BILE DUCT OBSTRUCTION  Postoperative diagnosis: 1. Dialated Bile Duct    :  Dr. Cecelia Agosto  Assistant(s): Endoscopy Technician-1: Nataliia Enamorado  Endoscopy RN-1: Aspen Myrick    Specimens: * No specimens in log *  H. Pylori  no    Assessment:  Intra-procedure medications   Anesthesia gave intra-procedure sedation and medications, see anesthesia flow sheet yes    Intravenous fluids: NS@ KVO     Vital signs stable yes    Abdominal assessment: round and soft  Yes     Recommendation:  Discharge patient per MD order .   Return to floor na  Family or Friend fa  Permission to share finding with family or friend yes

## 2018-10-29 NOTE — PERIOP NOTES

## 2018-10-29 NOTE — ANESTHESIA POSTPROCEDURE EVALUATION
Post-Anesthesia Evaluation and Assessment    Patient: Garcia Gonzalez MRN: [id number]  SSN: [id number]    YOB: 1935  Age: 82 y.o. Sex: female      I have evaluated the patient and they are stable and ready for discharge from the PACU. Cardiovascular Function/Vital Signs  Visit Vitals  /73   Pulse 72   Temp 36.6 °C (97.9 °F)   Resp 10   Ht 5' 3\" (1.6 m)   Wt 73.9 kg (163 lb)   SpO2 96%   Breastfeeding? No   BMI 28.87 kg/m²       Patient is status post MAC anesthesia for Procedure(s):  ENDOSCOPIC ULTRASOUND (EUS)  ESOPHAGOGASTRODUODENOSCOPY (EGD). Nausea/Vomiting: None    Postoperative hydration reviewed and adequate. Pain:  Pain Scale 1: Numeric (0 - 10) (10/29/18 1029)  Pain Intensity 1: 0 (10/29/18 1029)   Managed    Neurological Status: At baseline    Mental Status, Level of Consciousness: Alert and  oriented to person, place, and time    Pulmonary Status:   O2 Device: Nasal cannula (10/29/18 1019)   Adequate oxygenation and airway patent    Complications related to anesthesia: None    Post-anesthesia assessment completed. No concerns    Signed By: Deloris Mathur MD     October 29, 2018              Procedure(s):  ENDOSCOPIC ULTRASOUND (EUS)  ESOPHAGOGASTRODUODENOSCOPY (EGD). <BSHSIANPOST>    Visit Vitals  /73   Pulse 72   Temp 36.6 °C (97.9 °F)   Resp 10   Ht 5' 3\" (1.6 m)   Wt 73.9 kg (163 lb)   SpO2 96%   Breastfeeding?  No   BMI 28.87 kg/m²

## 2018-10-29 NOTE — ANESTHESIA PREPROCEDURE EVALUATION
Anesthetic History               Review of Systems / Medical History  Patient summary reviewed, nursing notes reviewed and pertinent labs reviewed    Pulmonary            Asthma        Neuro/Psych     seizures         Cardiovascular    Hypertension        Dysrhythmias : PVC      Exercise tolerance: >4 METS  Comments: Normal stress echo   GI/Hepatic/Renal                Endo/Other    Diabetes    Arthritis     Other Findings            Physical Exam    Airway  Mallampati: I  TM Distance: > 6 cm  Neck ROM: normal range of motion   Mouth opening: Normal     Cardiovascular    Rhythm: regular  Rate: normal         Dental  No notable dental hx       Pulmonary  Breath sounds clear to auscultation               Abdominal         Other Findings            Anesthetic Plan    ASA: 3  Anesthesia type: MAC          Induction: Intravenous  Anesthetic plan and risks discussed with: Patient

## 2018-10-29 NOTE — DISCHARGE INSTRUCTIONS
[address], [address] [place] [address]    Endoscopic ultrasound DISCHARGE INSTRUCTIONS    Garcia Gonzalez  [id number]  1935    Discomfort:  Sore throat- throat lozenges or warm salt water gargle  redness at IV site- apply warm compress to area; if redness or soreness persist- contact your physician  Gaseous discomfort- walking, belching will help relieve any discomfort  You may not operate a vehicle for 12 hours  You may not engage in an occupation involving machinery or appliances for rest of today  You may not drink alcoholic beverages for at least 12 hours  Avoid making any critical decisions for at least 24 hour  DIET  You may eat and drink now and after you leave. You may resume your regular diet - however -  remember your colon is empty and a heavy meal will produce gas. Avoid these foods:  vegetables, fried / greasy foods, carbonated drinks    ACTIVITY  You may resume your normal daily activities   Spend the remainder of the day resting -  avoid any strenuous activity. CALL M.D. ANY SIGN OF   Increasing pain, nausea, vomiting  Abdominal distension (swelling)  New increased bleeding (oral or rectal)  Fever (chills)  Pain in chest area  Bloody discharge from nose or mouth  Shortness of breath    Follow-up Instructions:   Call Dr. Davi Faulkner for any questions or problems and follow up with him as needed  Telephone # [phone]    ENDOSCOPY FINDINGS:   Your endoscopy ultrasound exam was normal.            Signed By: Davi Faulkner MD     10/29/2018  10:27 AM         [facility] Activation    Thank you for requesting access to [address] [place] [address]. Please follow the instructions below to securely access and download your online medical record. [employer] allows you to send messages to your doctor, view your test results, renew your prescriptions, schedule appointments, and more. How Do I Sign Up? 1. In your internet browser, go to [email or web address]  2.  Click on the First Time User? Click Here link in the Sign In box. You will be redirect to the New Member Sign Up page. 3. Enter your [facility] Access Code exactly as it appears below. You will not need to use this code after youve completed the sign-up process. If you do not sign up before the expiration date, you must request a new code. MyChart Access Code: Activation code not generated  Current [facility] Status: Active (This is the date your [employer] access code will )    4. Enter the last four digits of your Social Security Number (xxxx) and Date of Birth (mm/dd/yyyy) as indicated and click Submit. You will be taken to the next sign-up page. 5. Create a [employer] ID. This will be your [facility] login ID and cannot be changed, so think of one that is secure and easy to remember. 6. Create a [facility] password. You can change your password at any time. 7. Enter your Password Reset Question and Answer. This can be used at a later time if you forget your password. 8. Enter your e-mail address. You will receive e-mail notification when new information is available in [address] 9. Click Sign Up. You can now view and download portions of your medical record. 10. Click the Download Summary menu link to download a portable copy of your medical information. Additional Information    If you have questions, please visit the Frequently Asked Questions section of the [facility] website at [email or web address] ([email or web address] Remember, [facility] is NOT to be used for urgent needs. For medical emergencies, dial 911.

## 2018-10-29 NOTE — PROCEDURES
[address]  [address]       Endoscopic Ultrasound    NAME:  Rajinder Aguilar   :   1935   MRN:   [id number]       Procedure Type: Endoscopic Ultrasound    Indications: Abnormal MRIscan showing dilated CBD    Pre-operative Diagnosis: see indication above    Post-operative Diagnosis:  See findings below    : Americo Mccord MD    Referring Provider: --Erica Tran MD    Procedure Details:      Anethesia/Sedation:  MAC anesthesia Propofol      Procedure Details   After infom consent was obtained for the procedure, with all risks and benefits of procedure explained the patient was taken to the endoscopy suite and placed in the left lateral decubitus position. Following sequential administration of sedation as per above, the EGD then radial echoendoscope was inserted into the mouth and advanced under direct vision to third portion of the duodenum. A careful inspection was made as the gastroscope was withdrawn, including a retroflexed view of the proximal stomach; findings and interventions are described below. Findings:     Endoscopic:   Esophagus:normal   Stomach: normal    Duodenum/jejunum: normal, normal ampulla      Ultrasound:   Esophagus: normal findings   Stomach: normal findings   Pancreas:     Areas examined: the entire gland    Parenchyma: -normal    Pancreatic Duct: normal findings   Liver:     Parenchyma: normal    Gallbladder: surgically absent    Bile Duct: the entire biliary tree, dilated CBD up to 12 mm in size, no stones, no stricture, no mass seen               Lymph Node: no adenopathy         Specimen Removed:  None    Complications: None. EBL:  None.     Interventions: none    Recommendations:   NO need for further biliary imaging  F/u as needed with me    Signed By: Americo Mccord MD     10/29/2018  10:22 AM

## 2018-10-29 NOTE — H&P
González Guerra  2018 11:03 AM  Location: [facility]  Patient #: [id number]  : 1935  Undefined / Language: [place] / Race: Undefined  Female      History of Present Illness (Shana Murcia MD; 2018 5:53 PM)  The patient is a 82year old female who presents with a complaint of Abnormal x-ray finding. The patient presents consultation at the request of Dr. Reyna Murphy). There has been no associated abdominal discomfort, abdominal pain, weight loss, gastrointestinal bleeding, nausea, vomiting or diarrhea. Previous diagnostic tests have included CT scan, LFT's and MRI of abdomen. Note for \"Abnormal x-ray finding\": SHE HAS H/O SPHEROCYTOSIS, breast cancer in remission, splenectomy and cholecystectomy, found to have dilated CBC on routine imaging, CBD= 18 mm on MRCP, up to date on colonoscopy by Dr. Kristin Metz. she denies any GI complaints      Problem List/Past Medical (Ameya Reynolds; 2018 11:03 AM)  Diabetes Mellitus, Type II    Essential Hypertension    Renal cyst (753.10  Q61.00)    Osteoarthritis    Venous insufficiency of leg (459.81  I87.2)   both  Hypercholesterolemia    Asthma    Degenerative Joint Disease      Past Surgical History (Ameya Reynolds; 2018 11:03 AM)  Cholecystectomy    Breast Biopsy   Right. Lumpectomy   Right. breast and radiation  Splenectomy      Allergies (Ameya Reynolds; 2018 11:03 AM)  Codeine/Codeine Derivatives      Medication History (Ameya Bynum; 2018 11:03 AM)  Oxybutynin Chloride ER  (10MG Tablet ER 24HR, Oral daily) Active. Triamcinolone Acetonide  (0.1% Cream, External two times daily) Active. LamoTRIgine  (25MG Tablet, Oral two times daily) Active. Klor-Con M20  [facility] Tablet ER, Oral daily) Active. NIFEdipine ER  (60MG Tablet ER 24HR, Oral two times daily) Active. Toprol XL  (50MG Tablet ER 24HR, Oral daily) Active. Pravastatin Sodium  (20MG Tablet, Oral daily) Active.   HydroCHLOROthiazide  (12.5MG Capsule, Oral daily) Active. Ibandronate Sodium  (150MG Tablet, Oral monthly) Active. Proventil HFA  (108 (90 Base)MCG/ACT Aerosol Soln, Inhalation every four hours, as needed) Active. ProAir HFA  (108 (90 Base)MCG/ACT Aerosol Soln, Inhalation every four hours, as needed) Active. GuaiFENesin  (200MG Tablet, Oral every four hours, as needed) Active. Nasonex  (50MCG/ACT Suspension, Nasal two times daily) Active. Advair Diskus  (100-50MCG/DOSE Aero Pow Br Act, Inhalation two times daily) Active. Aspirin  (81MG Tablet DR, Oral daily) Active. Micardis  (80MG Tablet, Oral daily) Active. Vitamin C  (500MG Tablet Chewable, Oral daily) Active. FreeStyle Lite Test  (In Vitro daily) Active. Medications Reconciled     Family History (Ameya Pearson; 9/20/2018 11:03 AM)  Heart Disease   Mother. Diabetes Mellitus   Mother. Breast Cancer   Sister. Colon Cancer   Sister. Social History (Ameya Pearson; 9/20/2018 11:03 AM)  Alcohol Use   Has never drank. Employment status   Retired. Marital status   . Tobacco Use   Never smoker. Diagnostic Studies History (Ameya Pearson; 9/20/2018 11:03 AM)  Colonoscopy  [06/20/2016]:    Health Maintenance History (Ameya Pearson; 9/20/2018 11:03 AM)  Flu Vaccine   unknown  Pneumovax   unknown        Review of Systems (Ameya Pearson; 9/20/2018 11:06 AM)  General Not Present- Chronic Fatigue, Poor Appetite, Weight Gain and Weight Loss. Skin Not Present- Itching, Rash and Skin Color Changes. HEENT Not Present- Hearing Loss and Vertigo. Respiratory Present- Asthma. Not Present- Difficulty Breathing and TB exposure. Cardiovascular Present- Hypertension. Not Present- Chest Pain, Use of Antibiotics before Dental Procedures and Use of Blood Thinners. Gastrointestinal Present- See HPI. Musculoskeletal Present- Degenerative joint disease. Not Present- Arthritis, Hip Replacement Surgery and Knee Replacement Surgery.   Neurological Not Present- Weakness. Psychiatric Not Present- Depression. Endocrine Not Present- Diabetes and Thyroid Problems. Hematology Not Present- Anemia. Vitals (Ameya Ramirez; 9/20/2018 11:09 AM)  9/20/2018 11:04 AM  Weight: 159 lb   Height: 63 in   Body Surface Area: 1.75 m²   Body Mass Index: 28.17 kg/m²    Pulse: 80 (Regular)    Resp.: 20 (Unlabored)     BP: 136/84 (Sitting, Left Arm, Standard)              Physical Exam Jayme Calderón MD; 9/20/2018 5:53 PM)  General  Mental Status - Alert. General Appearance - Cooperative, Pleasant, Not in acute distress. Orientation - Oriented X3. Build & Nutrition - Well nourished and Well developed. Integumentary  General Characteristics  Overall examination of the patient's skin reveals - no rashes, no bruises and no spider angiomas. Color - normal coloration of skin. Head and Neck  Neck  Global Assessment - full range of motion and supple, no bruit auscultated on the right, no bruit auscultated on the left, non-tender, no lymphadenopathy. Thyroid  Gland Characteristics - normal size and consistency. Eye  Eyeball - Left - No Exophthalmos. Eyeball - Right - No Exophthalmos. Sclera/Conjunctiva - Left - No Jaundice. Sclera/Conjunctiva - Right - No Jaundice. Chest and Lung Exam  Chest and lung exam reveals  - quiet, even and easy respiratory effort with no use of accessory muscles. Auscultation  Breath sounds - Normal. Adventitious sounds - No Adventitious sounds. Cardiovascular  Auscultation  Rhythm - Regular, No Tachycardia, No Bradycardia . Heart Sounds - Normal heart sounds , S1 WNL and S2 WNL, No S3, No Summation Gallop. Murmurs & Other Heart Sounds - Auscultation of the heart reveals - No Murmurs. Abdomen  Palpation/Percussion  Tenderness - Non-Tender. Rebound tenderness - No rebound. Rigidity (guarding) - No Rigidity. Dullness to percussion - No abnormal dullness to percussion. Liver - No hepatosplenomegaly. Abdominal Mass Palpable - No masses. Other Characteristics - No Ascites. Auscultation  Auscultation of the abdomen reveals - Bowel sounds normal, No Abdominal bruits and No Succussion splash. Rectal - Did not examine. Peripheral Vascular  Upper Extremity  Inspection - Left - Normal - No Clubbing, No Cyanosis, No Edema, Pulses Intact. Right - Normal - No Clubbing, No Cyanosis, No Edema, Pulses Intact. Palpation - Edema - Left - No edema. Right - No edema. Lower Extremity  Inspection - Left - Inspection Normal. Right - Inspection Normal. Palpation - Edema - Left - No edema. Right - No edema. Neurologic  Neurologic evaluation reveals  - Cranial nerves grossly intact, no focal neurologic deficits. Motor  Involuntary Movements - Asterixis - not present. Musculoskeletal  Global Assessment  Gait and Station - normal gait and station. Assessment & Plan Inderjit Menendez MD; 9/20/2018 5:54 PM)  Abnormal finding of biliary tract (793.3  R93.2)  Impression: SHE HAS H/O SPHEROCYTOSIS, breast cancer in remission, splenectomy and cholecystectomy, found to have dilated CBC on routine imaging, CBD= 18 mm on MRCP, up to date on colonoscopy by Dr. Flaquito Moura. she denies any GI complaints  EUS to r/o any ampullary mass, CBD stones, CBD stricture, or any pancreatic neoplasm, pt is agreeable  Current Plans  EUS (Endoscopic Ultrasound) (98810)  Pt Education - How to access health information online: discussed with patient and provided information. Date of Surgery Update:  Cally Baker was seen and examined. History and physical has been reviewed. The patient has been examined.  There have been no significant clinical changes since the completion of the originally dated History and Physical.    Signed By: Fauzia Rodgers MD     October 29, 2018 9:48 AM

## 2019-01-10 ENCOUNTER — HOSPITAL ENCOUNTER (OUTPATIENT)
Dept: MAMMOGRAPHY | Age: 84
Discharge: HOME | End: 2019-01-10
Attending: INTERNAL MEDICINE
Payer: MEDICARE

## 2019-01-10 DIAGNOSIS — Z12.39 SCREENING BREAST EXAMINATION: ICD-10-CM

## 2019-01-10 PROCEDURE — 77067 SCR MAMMO BI INCL CAD: CPT

## 2019-02-06 ENCOUNTER — OFFICE VISIT (OUTPATIENT)
Dept: INTERNAL MEDICINE CLINIC | Age: 84
End: 2019-02-06

## 2019-02-06 VITALS
DIASTOLIC BLOOD PRESSURE: 64 MMHG | HEIGHT: 63 IN | SYSTOLIC BLOOD PRESSURE: 141 MMHG | TEMPERATURE: 98 F | WEIGHT: 166.2 LBS | BODY MASS INDEX: 29.45 KG/M2 | RESPIRATION RATE: 18 BRPM | HEART RATE: 72 BPM

## 2019-02-06 DIAGNOSIS — M17.0 PRIMARY OSTEOARTHRITIS OF BOTH KNEES: ICD-10-CM

## 2019-02-06 DIAGNOSIS — E11.21 TYPE 2 DIABETES MELLITUS WITH NEPHROPATHY (HCC): Primary | ICD-10-CM

## 2019-02-06 DIAGNOSIS — I87.2 VENOUS INSUFFICIENCY OF BOTH LOWER EXTREMITIES: ICD-10-CM

## 2019-02-06 DIAGNOSIS — I10 ESSENTIAL HYPERTENSION: ICD-10-CM

## 2019-02-06 DIAGNOSIS — D58.0 HEREDITARY SPHEROCYTOSIS (HCC): ICD-10-CM

## 2019-02-06 RX ORDER — CLOTRIMAZOLE AND BETAMETHASONE DIPROPIONATE 10; .64 MG/G; MG/G
CREAM TOPICAL 2 TIMES DAILY
Qty: 45 G | Refills: 11 | Status: SHIPPED | OUTPATIENT
Start: 2019-02-06 | End: 2019-03-05 | Stop reason: SDUPTHER

## 2019-02-06 NOTE — PROGRESS NOTES
1. Have you been to the ER, urgent care clinic since your last visit? Hospitalized since your last visit? Yes When: 10-9-18 Reason for visit: finger slammed in door    2. Have you seen or consulted any other health care providers outside of the [address] [place] [address] since your last visit? Include any pap smears or colon screening.  Yes Where: [facility]

## 2019-02-06 NOTE — PROGRESS NOTES
[facility]  Daniella Aquino MD, [address] [address]  Phone:  [phone]  Fax: [phone]       Chief Complaint   Patient presents with    Hypertension   . SUBJECTIVE:    Viktoriya Aparicio is a 82 y.o. female Patient returns today with history of asthma, right breast cancer, chronic kidney disease, diabetes, hereditary spherocytosis, and since we last saw her she was seen by Dr. Lux العلي for an abnormal MRI that revealed a violated common bile duct. Dr. Lux العلي on 10/29/18 performed an endoscopic ultrasound, which was normal.  The entire biliary tree dilated _____ up to 12 mm in size with no stones, no stricture, no masses were seen, no adenopathy. He recommended no further biliary imaging and follow up as needed to see him again. She decided to then go home and slam her finger in a car door and got four stitches on October 9th. It is completely healed now. She had lost her nail, but that is coming back also. Patient comes in for evaluation. Current Outpatient Medications   Medication Sig Dispense Refill    aspirin (ASPIRIN) 325 mg tablet Take 325 mg by mouth as needed for Pain.  hydroCHLOROthiazide (HYDRODIURIL) 12.5 mg tablet Take 12.5 mg by mouth every Monday, Wednesday, Friday.  meclizine (ANTIVERT) 25 mg tablet Take 25 mg by mouth as needed.  guaiFENesin (MUCUS RELIEF) 400 mg tablet Take 200 mg by mouth every four (4) hours as needed for Congestion.  fluticasone (FLONASE) 50 mcg/actuation nasal spray 2 Sprays by Both Nostrils route two (2) times a day.  oxybutynin chloride XL (DITROPAN XL) 10 mg CR tablet Take 10 mg by mouth nightly.  calcium carbonate (TUMS PO) Take 500 mg by mouth as needed.  telmisartan (MICARDIS) 80 mg tablet Take 80 mg by mouth nightly.       TOPROL XL 50 mg XL tablet TAKE 1 TABLET DAILY 90 Tab 3    LORazepam (ATIVAN) 0.5 mg tablet 1 tab 1 hour before procedure, then 1 tab upon arrival for procedure, may repeat x 2 5 Tab 0    fluticasone-salmeterol (ADVAIR DISKUS) 250-50 mcg/dose diskus inhaler USE 1 INHALATION TWO TIMES A DAY 3 Inhaler 3    NIFEdipine ER (PROCARDIA XL) 60 mg ER tablet TAKE 1 TABLET TWICE A  Tab 3    potassium chloride (KLOR-CON M20) 20 mEq tablet TAKE 1 TABLET DAILY 90 Tab 3    albuterol (PROVENTIL HFA, VENTOLIN HFA, PROAIR HFA) 90 mcg/actuation inhaler Take 1 Puff by inhalation every four (4) hours as needed for Wheezing. 3 Inhaler 3    pravastatin (PRAVACHOL) 20 mg tablet TAKE 1 TABLET ONCE EACH NIGHT 90 Tab 9    ibandronate (BONIVA) 150 mg tablet TAKE 1 TABLET ONCE A MONTH 3 Tab 11    triamcinolone acetonide (KENALOG) 0.1 % topical cream APPLY EXTERNALLY TO THE AFFECTED AREA TWICE DAILY 80 g 0    lamoTRIgine (LAMICTAL) 25 mg tablet TAKE 1 TABLET TWICE A  Tab 3    FREESTYLE LITE STRIPS strip USE TO TEST BLOOD SUGAR ONCE DAILY 100 Strip 2    aspirin delayed-release 81 mg tablet Take 81 mg by mouth daily.  ASCORBATE CALCIUM (VITAMIN C PO) Take 500 mg by mouth daily.        Past Medical History:   Diagnosis Date    Anemia     Arrhythmia     irregular per pt d/t blood disorder    Asthma     Bereavement 2-2-16     die 82 copd,chf,pul htn pn after 48 yr marriage    BPV (benign positional vertigo)     Breast cancer, right breast ([address] 1994    right breast, lumpectomy and radiation    Breast lump 2017    right breast     Chronic kidney disease     kidney cyst - watching    Diabetes ([address]     controlled with diet    DJD (degenerative joint disease) of knee     Early satiety     Hereditary spherocytosis ([address]     HTN (hypertension)     Ill-defined condition     sickle cell trait    Intrahepatic bile duct dilation 09/05/2018    mri    Radiation therapy complication 7779    right breast     Renal cyst 08/2016    ct rt    S/P colonoscopy 4-9-14    md Brian - family hx    S/P colonoscopy 07/20/2016    rt - md brian diverticulosis    [facility]ures [facility]     Septic arthritis ([facility] 3/11    Strept[facility] sepsis [facility] 03/2011    Venous insufficiency of both lower extremities      Past Surgical History:   Procedure Laterality Date    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 Weak.  Other Plant, Animal, Environmental Other (comments)     Dust and mold allergy.          REVIEW OF SYSTEMS:  General: negative for - chills or fever  ENT: negative for - headaches, nasal congestion or tinnitus  Respiratory: negative for - cough, hemoptysis, shortness of breath or wheezing  Cardiovascular : negative for - chest pain, edema, palpitations or shortness of breath  Gastrointestinal: negative for - abdominal pain, blood in stools, heartburn or nausea/vomiting  Genito-Urinary: no dysuria, trouble voiding, or hematuria  Musculoskeletal: negative for - gait disturbance, joint pain, joint stiffness or joint swelling  Neurological: no TIA or stroke symptoms  Hematologic: no bruises, no bleeding, no swollen glands  Integument: no lumps, mole changes, nail changes or rash  Endocrine: no malaise/lethargy or unexpected weight changes      Social History     Socioeconomic History    Marital status:      Spouse name: Not on file    Number of children: Not on file    Years of education: Not on file    Highest education level: Not on file   Tobacco Use    Smoking status: Never Smoker    Smokeless tobacco: Never Used   Substance and Sexual Activity    Alcohol use: No    Drug use: No    Sexual activity: Not Currently   Social History Narrative    Medical History: Streptococcus pneumoniae septicemia 02/11/11Septic polyarthritis 02/11/11HSV2 herpes simplex    wxghjqsyywaycyiytss27/02/11Seizure d/o 02/02/11Osteoporosis primary HTN 1990Bronchial asthma 1985Obesity 2002Carcinoma of    breast 04Congential spherocytosis    Gyn History: Last mammogram date 2013. OB History: Total pregnancies 2. Live births 2. Surgical History: normal stress myocardial perfusion scan, EF 68% 08splenectomy 1972cholecystectomy 1985colonoscopy 2008internal hemorrhoids diverticulosis lumpectomy radiation therapy colonoscopy diverticulosis Marisabel Kaye M.D. 2014    Hospitalization/Major Diagnostic Procedure: streptococcus pneumoniae septicemia ronchial asthma     Family History: Mother:  80 yrs, DM, Caroline Poster:  80 yrs, heart disease, aortic stenosisSister(s): , colon CA,    polypsBrother(s): , at birthUncle: alive, colon CA, heart disease2 sister(s) [employer] . 1 son(s) , 1 daughter(s) . Social History: Alcohol Use Patient does not use alcohol. Smoking Status Patient is a never smoker. Marital Status:  2016, 46 yrs copd,chf. Children: Her son grandchild's mother is temporarily incarcerated and she is caring for the 10 yo. Occupation/W ork: retired teacher. Education/School: has college diploma-[employer]. Latter-day: [place] [employer].     Family History   Problem Relation Age of Onset    Diabetes Mother     Other Father         'sphero'-blood disorder    Cancer Sister         breast    Breast Cancer Sister 71    Other Sister         spleen removed    Colon Cancer Sister     Other Other         spleen removed    Gall Bladder Disease Other        OBJECTIVE:    Visit Vitals  /64   Pulse 72   Temp 98 °F (36.7 °C) (Oral)   Resp 18   Ht 5' 3\" (1.6 m)   Wt 166 lb 3.2 oz (75.4 kg)   BMI 29.44 kg/m²     CONSTITUTIONAL: well , well nourished, appears age appropriate  EYES: perrla, eom intact  ENMT:moist mucous membranes, pharynx clear  NECK: supple.  Thyroid normal  RESPIRATORY: Chest: clear bilaterally   CARDIOVASCULAR: Heart: regular rate and rhythm  GASTROINTESTINAL: Abdomen: soft, bowel sounds active  HEMATOLOGIC: no pathological lymph nodes palpated  MUSCULOSKELETAL: Extremities: no edema, pulse 1+   INTEGUMENT: No unusual rashes or suspicious skin lesions noted. Nails appear normal.  NEUROLOGIC: non-focal exam   MENTAL STATUS: alert and oriented, appropriate affect           ASSESSMENT:  1. Type 2 diabetes mellitus with nephropathy (HCC)    2. Venous insufficiency of both lower extremities    3. Primary osteoarthritis of both knees    4. Essential hypertension    5. Hereditary spherocytosis ([address]      Patient is relieved with the finding of the unremarkable EUS. The traumatized finger has healed up nicely. Will check blood sugar control, which is usually excellent, as well as her renal function. Her blood pressure is at the upper limits of normal, but acceptable. She will be back to see us in four to six months, sooner if she needs to. I have discussed the diagnosis with the patient and the intended plan as seen in the  orders above. The patient understands and agees with the plan. The patient has   received an after visit summary and questions were answered concerning  future plans  Patient labs and/or xrays were reviewed  Past records were reviewed. PLAN:  .  Orders Placed This Encounter    LIPID PANEL    MICROALBUMIN, UR, RAND W/ MICROALB/CREAT RATIO    RENAL FUNCTION PANEL    HEMOGLOBIN A1C WITH EAG       Follow-up Disposition:  Return in about 4 months (around 6/6/2019). ATTENTION:   This medical record was transcribed using an electronic medical records system. Although proofread, it may and can contain electronic and spelling errors. Other human spelling and other errors may be present. Corrections may be executed at a later time. Please feel free to contact us for any clarifications as needed.

## 2019-02-07 LAB
ALBUMIN SERPL-MCNC: 4.5 G/DL (ref 3.5–4.7)
ALBUMIN/CREAT UR: 360.8 MG/G CREAT (ref 0–30)
BUN SERPL-MCNC: 21 MG/DL (ref 8–27)
BUN/CREAT SERPL: 16 (ref 12–28)
CALCIUM SERPL-MCNC: 9.5 MG/DL (ref 8.7–10.3)
CHLORIDE SERPL-SCNC: 107 MMOL/L (ref 96–106)
CHOLEST SERPL-MCNC: 150 MG/DL (ref 100–199)
CO2 SERPL-SCNC: 22 MMOL/L (ref 20–29)
CREAT SERPL-MCNC: 1.3 MG/DL (ref 0.57–1)
CREAT UR-MCNC: 79.1 MG/DL
EST. AVERAGE GLUCOSE BLD GHB EST-MCNC: 100 MG/DL
GLUCOSE SERPL-MCNC: 90 MG/DL (ref 65–99)
HBA1C MFR BLD: 5.1 % (ref 4.8–5.6)
HDLC SERPL-MCNC: 82 MG/DL
LDLC SERPL CALC-MCNC: 53 MG/DL (ref 0–99)
MICROALBUMIN UR-MCNC: 285.4 UG/ML
PHOSPHATE SERPL-MCNC: 3.3 MG/DL (ref 2.5–4.5)
POTASSIUM SERPL-SCNC: 3.9 MMOL/L (ref 3.5–5.2)
SODIUM SERPL-SCNC: 142 MMOL/L (ref 134–144)
TRIGL SERPL-MCNC: 77 MG/DL (ref 0–149)
VLDLC SERPL CALC-MCNC: 15 MG/DL (ref 5–40)

## 2019-03-05 RX ORDER — CLOTRIMAZOLE AND BETAMETHASONE DIPROPIONATE 10; .64 MG/G; MG/G
CREAM TOPICAL 2 TIMES DAILY
Qty: 45 G | Refills: 3 | Status: SHIPPED | OUTPATIENT
Start: 2019-03-05 | End: 2020-03-11

## 2019-03-06 RX ORDER — TELMISARTAN 80 MG/1
TABLET ORAL
Qty: 90 TAB | Refills: 49 | Status: SHIPPED | OUTPATIENT
Start: 2019-03-06 | End: 2020-04-08

## 2019-03-11 RX ORDER — LAMOTRIGINE 25 MG/1
TABLET ORAL
Qty: 180 TAB | Refills: 3 | Status: SHIPPED | OUTPATIENT
Start: 2019-03-11 | End: 2020-02-18

## 2019-05-11 RX ORDER — OXYBUTYNIN CHLORIDE 10 MG/1
TABLET, EXTENDED RELEASE ORAL
Qty: 90 TAB | Refills: 3 | Status: SHIPPED | OUTPATIENT
Start: 2019-05-11 | End: 2020-05-05

## 2019-06-06 ENCOUNTER — OFFICE VISIT (OUTPATIENT)
Dept: INTERNAL MEDICINE CLINIC | Age: 84
End: 2019-06-06

## 2019-06-06 VITALS
DIASTOLIC BLOOD PRESSURE: 71 MMHG | RESPIRATION RATE: 16 BRPM | HEIGHT: 63 IN | BODY MASS INDEX: 28.76 KG/M2 | SYSTOLIC BLOOD PRESSURE: 146 MMHG | OXYGEN SATURATION: 96 % | HEART RATE: 63 BPM | WEIGHT: 162.3 LBS | TEMPERATURE: 97.3 F

## 2019-06-06 DIAGNOSIS — D64.9 ANEMIA, UNSPECIFIED TYPE: ICD-10-CM

## 2019-06-06 DIAGNOSIS — E11.21 TYPE 2 DIABETES MELLITUS WITH NEPHROPATHY (HCC): Primary | ICD-10-CM

## 2019-06-06 DIAGNOSIS — C50.911 MALIGNANT NEOPLASM OF RIGHT FEMALE BREAST, UNSPECIFIED ESTROGEN RECEPTOR STATUS, UNSPECIFIED SITE OF BREAST (HCC): ICD-10-CM

## 2019-06-06 DIAGNOSIS — M17.0 PRIMARY OSTEOARTHRITIS OF BOTH KNEES: ICD-10-CM

## 2019-06-06 DIAGNOSIS — I10 ESSENTIAL HYPERTENSION: ICD-10-CM

## 2019-06-06 DIAGNOSIS — D58.0 HEREDITARY SPHEROCYTOSIS (HCC): ICD-10-CM

## 2019-06-06 NOTE — PROGRESS NOTES
SPORTS MEDICINE AND PRIMARY CARE  Cadence Lynne MD, [address] [address]  Phone:  [phone]  Fax: [phone]       Chief Complaint   Patient presents with    Hypertension     f/u   . SUBJECTIVE:    Negrito Chew is a 82 y.o. female Patient returns today with known history of primary hypertension, DM type 2 with nephropathy, hereditary spherocytosis, DJD knees, right breast cancer, anemia, and is seen for evaluation. Since we last saw her Dr. Vinicius Almendarez performed an EGD on 10/29/18 that was unremarkable with no further need for imaging by ultrasound. Patient is seen for evaluation. _______________ were performed because of dilatation on MRI of the _______________. No further intervention is needed therefore. Patient returns today saying she had an insect bite about two weeks ago. It is still a little red and has left a knot. She got a good report from Dr. Grayson Marie. He said she was doing well for her age and would see her back in a year. Patient _____________ BP went up, but actually felt it was from the fact that she felt she was doing too much for her daughter. She reminds me that her daughter lost her job in [place], house, and came to live about a year ago with her mom. Now she has another job and does not think she is going to be staying that much longer. Since we last saw her she did a survey with [facility], doctor called her and sent her a test, she sent it in and doctor called her back and said she was slow risk for cancers. Patient states she is feeling pretty good and will not take any more surveys. Patient is also taking meds for allergies. She is a little raspy today and is seen for evaluation.              Current Outpatient Medications   Medication Sig Dispense Refill    oxybutynin chloride XL (DITROPAN XL) 10 mg CR tablet TAKE 1 TABLET DAILY 90 Tab 3    lamoTRIgine (LAMICTAL) 25 mg tablet TAKE 1 TABLET TWICE A  Tab 3    MICARDIS 80 mg tablet TAKE 1 TABLET DAILY 90 Tab 49    clotrimazole-betamethasone (LOTRISONE) topical cream Apply  to affected area two (2) times a day. 45 g 3    aspirin (ASPIRIN) 325 mg tablet Take 325 mg by mouth as needed for Pain.  hydroCHLOROthiazide (HYDRODIURIL) 12.5 mg tablet Take 12.5 mg by mouth every Monday, Wednesday, Friday.  meclizine (ANTIVERT) 25 mg tablet Take 25 mg by mouth as needed.  guaiFENesin (MUCUS RELIEF) 400 mg tablet Take 200 mg by mouth every four (4) hours as needed for Congestion.  fluticasone (FLONASE) 50 mcg/actuation nasal spray 2 Sprays by Both Nostrils route two (2) times a day.  calcium carbonate (TUMS PO) Take 500 mg by mouth as needed.  TOPROL XL 50 mg XL tablet TAKE 1 TABLET DAILY 90 Tab 3    fluticasone-salmeterol (ADVAIR DISKUS) 250-50 mcg/dose diskus inhaler USE 1 INHALATION TWO TIMES A DAY 3 Inhaler 3    NIFEdipine ER (PROCARDIA XL) 60 mg ER tablet TAKE 1 TABLET TWICE A  Tab 3    potassium chloride (KLOR-CON M20) 20 mEq tablet TAKE 1 TABLET DAILY 90 Tab 3    albuterol (PROVENTIL HFA, VENTOLIN HFA, PROAIR HFA) 90 mcg/actuation inhaler Take 1 Puff by inhalation every four (4) hours as needed for Wheezing. 3 Inhaler 3    pravastatin (PRAVACHOL) 20 mg tablet TAKE 1 TABLET ONCE EACH NIGHT 90 Tab 9    ibandronate (BONIVA) 150 mg tablet TAKE 1 TABLET ONCE A MONTH 3 Tab 11    triamcinolone acetonide (KENALOG) 0.1 % topical cream APPLY EXTERNALLY TO THE AFFECTED AREA TWICE DAILY 80 g 0    FREESTYLE LITE STRIPS strip USE TO TEST BLOOD SUGAR ONCE DAILY 100 Strip 2    aspirin delayed-release 81 mg tablet Take 81 mg by mouth daily.  ASCORBATE CALCIUM (VITAMIN C PO) Take 500 mg by mouth daily.        Past Medical History:   Diagnosis Date    Anemia     Arrhythmia     irregular per pt d/t blood disorder    Asthma     Bereavement 2-2-16     die 82 copd,chf,pul htn pn after 48 yr marriage    BPV (benign positional vertigo)     Breast cancer, right breast ([facility] 1994    right breast, lumpectomy and radiation    Breast lump 2017    right breast     Chronic kidney disease     kidney cyst - watching    Diabetes ([address]     controlled with diet    DJD (degenerative joint disease) of knee     Early satiety     Hereditary spherocytosis (HCC)     HTN (hypertension)     Ill-defined condition     sickle cell trait    Intrahepatic bile duct dilation 09/05/2018    mri    Radiation therapy complication 4495    right breast     Renal cyst 08/2016    ct rt    S/P colonoscopy 4-9-14    md Brian - family hx    S/P colonoscopy 07/20/2016    rt - md brian diverticulosis    Seizures ([facility]     Septic arthritis ([address] 3/11    Streptococcal sepsis ([facility] [address] [facility] 03/2011    Venous insufficiency of both lower extremities      Past Surgical History:   Procedure Laterality Date    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 Weak.  Other Plant, Animal, Environmental Other (comments)     Dust and mold allergy.          REVIEW OF SYSTEMS:  General: negative for - chills or fever  ENT: negative for - headaches, nasal congestion or tinnitus  Respiratory: negative for - cough, hemoptysis, shortness of breath or wheezing  Cardiovascular : negative for - chest pain, edema, palpitations or shortness of breath  Gastrointestinal: negative for - abdominal pain, blood in stools, heartburn or nausea/vomiting  Genito-Urinary: no dysuria, trouble voiding, or hematuria  Musculoskeletal: negative for - gait disturbance, joint pain, joint stiffness or joint swelling  Neurological: no TIA or stroke symptoms  Hematologic: no bruises, no bleeding, no swollen glands  Integument: no lumps, mole changes, nail changes or rash  Endocrine: no malaise/lethargy or unexpected weight changes      Social History     Socioeconomic History    Marital status:      Spouse name: Not on file    Number of children: Not on file    Years of education: Not on file    Highest education level: Not on file   Tobacco Use    Smoking status: Never Smoker    Smokeless tobacco: Never Used   Substance and Sexual Activity    Alcohol use: No    Drug use: No    Sexual activity: Not Currently   Social History Narrative    Medical History: Streptococcus pneumoniae septicemia 11Septic polyarthritis 11HSV2 herpes simplex    [id number]/11Seizure d/o 11Osteoporosis 2011primary HTN 1990Bronchial asthma 1985Obesity 2002Carcinoma of    breast 04Congential spherocytosis    Gyn History: Last mammogram date 2013. OB History: Total pregnancies 2. Live births 2. Surgical History: normal stress myocardial perfusion scan, EF 68% 08splenectomy 1972cholecystectomy 1985colonoscopy 2008internal hemorrhoids diverticulosis lumpectomy radiation therapy colonoscopy diverticulosis Zach Sheikh M.D. 2014    Hospitalization/Major Diagnostic Procedure: streptococcus pneumoniae septicemia ronchial asthma     Family History: Mother:  80 yrs, DM, Lauraine Husk:  80 yrs, heart disease, aortic stenosisSister(s): , colon CA,    polypsBrother(s): , at birthUncle: alive, colon CA, heart disease2 sister(s) [employer] . 1 son(s) , 1 daughter(s) . Social History: Alcohol Use Patient does not use alcohol. Smoking Status Patient is a never smoker. Marital Status:  2016, 46 yrs copd,chf. Children: Her son grandchild's mother is temporarily incarcerated and she is caring for the 10 yo. Occupation/W ork: retired teacher. Education/School: has college diploma-[employer].  Mu-ism: [employer].     Family History   Problem Relation Age of Onset    Diabetes Mother    Lisy. Other Father 'sphero'-blood disorder    Cancer Sister         breast    Breast Cancer Sister 71    Other Sister         spleen removed    Colon Cancer Sister     Other Other         spleen removed    Gall Bladder Disease Other        OBJECTIVE:    Visit Vitals  /71   Pulse 63   Temp 97.3 °F (36.3 °C) (Oral)   Resp 16   Ht 5' 3\" (1.6 m)   Wt 162 lb 4.8 oz (73.6 kg)   SpO2 96%   BMI 28.75 kg/m²     CONSTITUTIONAL: well , well nourished, appears age appropriate  EYES: perrla, eom intact  ENMT:moist mucous membranes, pharynx clear  NECK: supple. Thyroid normal  RESPIRATORY: Chest: clear bilaterally   CARDIOVASCULAR: Heart: regular rate and rhythm  GASTROINTESTINAL: Abdomen: soft, bowel sounds active  HEMATOLOGIC: no pathological lymph nodes palpated  MUSCULOSKELETAL: Extremities: no edema, pulse 1+ Patient's foot exam reveals no lesions, pulses are intact, sensation is intact, pulses are diminished. INTEGUMENT: No unusual rashes or suspicious skin lesions noted. Nails appear normal.  NEUROLOGIC: non-focal exam   MENTAL STATUS: alert and oriented, appropriate affect           ASSESSMENT:  1. Type 2 diabetes mellitus with nephropathy ([address]    2. Essential hypertension    3. Hereditary spherocytosis ([address]    4. Primary osteoarthritis of both knees    5. Malignant neoplasm of right female breast, unspecified estrogen receptor status, unspecified site of breast ([address]    6. Anemia, unspecified type      Patient's medical status at 82years old is stable. BP is a little higher than we would like to see it today. BP at home is 135 or less, therefore no adjustments will be made today in BP medication. I think the BP today represents a component of white coat HTN. Blood sugar control is completely controlled without medication with diet alone. She is approaching ideal body weight and no adjustment there. We encouraged physical activity 30 minutes five days a week, continue a heart healthy diet.   She will be back to see us in about four months, sooner if she has any problems. She is advised she can walk in to see us any time should she be unable to get an appointment. We also will send her the results of her lab studies in the mail. I have discussed the diagnosis with the patient and the intended plan as seen in the  orders above. The patient understands and agees with the plan. The patient has   received an after visit summary and questions were answered concerning  future plans  Patient labs and/or xrays were reviewed  Past records were reviewed. PLAN:  .  Orders Placed This Encounter    URINALYSIS W/ RFLX MICROSCOPIC    CBC WITH AUTOMATED DIFF    METABOLIC PANEL, COMPREHENSIVE    TSH 3RD GENERATION    HEMOGLOBIN A1C WITH EAG       Follow-up and Dispositions    · Return in about 4 months (around 10/6/2019). ATTENTION:   This medical record was transcribed using an electronic medical records system. Although proofread, it may and can contain electronic and spelling errors. Other human spelling and other errors may be present. Corrections may be executed at a later time. Please feel free to contact us for any clarifications as needed.

## 2019-06-06 NOTE — PROGRESS NOTES
1. Have you been to the ER, urgent care clinic since your last visit? Hospitalized since your last visit? No    2. Have you seen or consulted any other health care providers outside of the [address] [place] [address] since your last visit? Include any pap smears or colon screening.  No     Patient has a note for the doctor

## 2019-06-07 LAB
ALBUMIN SERPL-MCNC: 4.4 G/DL (ref 3.5–4.7)
ALBUMIN/GLOB SERPL: 1.2 {RATIO} (ref 1.2–2.2)
ALP SERPL-CCNC: 88 IU/L (ref 39–117)
ALT SERPL-CCNC: 11 IU/L (ref 0–32)
APPEARANCE UR: CLEAR
AST SERPL-CCNC: 16 IU/L (ref 0–40)
BACTERIA #/AREA URNS HPF: NORMAL /[HPF]
BASOPHILS # BLD AUTO: 0.1 X10E3/UL (ref 0–0.2)
BASOPHILS NFR BLD AUTO: 2 %
BILIRUB SERPL-MCNC: 0.4 MG/DL (ref 0–1.2)
BILIRUB UR QL STRIP: NEGATIVE
BUN SERPL-MCNC: 21 MG/DL (ref 8–27)
BUN/CREAT SERPL: 18 (ref 12–28)
CALCIUM SERPL-MCNC: 9.6 MG/DL (ref 8.7–10.3)
CASTS URNS QL MICRO: NORMAL /LPF
CHLORIDE SERPL-SCNC: 103 MMOL/L (ref 96–106)
CO2 SERPL-SCNC: 21 MMOL/L (ref 20–29)
COLOR UR: YELLOW
CREAT SERPL-MCNC: 1.2 MG/DL (ref 0.57–1)
EOSINOPHIL # BLD AUTO: 0.7 X10E3/UL (ref 0–0.4)
EOSINOPHIL NFR BLD AUTO: 9 %
EPI CELLS #/AREA URNS HPF: NORMAL /HPF (ref 0–10)
ERYTHROCYTE [DISTWIDTH] IN BLOOD BY AUTOMATED COUNT: 13.2 % (ref 12.3–15.4)
EST. AVERAGE GLUCOSE BLD GHB EST-MCNC: 100 MG/DL
GLOBULIN SER CALC-MCNC: 3.6 G/DL (ref 1.5–4.5)
GLUCOSE SERPL-MCNC: 85 MG/DL (ref 65–99)
GLUCOSE UR QL: NEGATIVE
HBA1C MFR BLD: 5.1 % (ref 4.8–5.6)
HCT VFR BLD AUTO: 40 % (ref 34–46.6)
HGB BLD-MCNC: 13.5 G/DL (ref 11.1–15.9)
HGB UR QL STRIP: NEGATIVE
IMM GRANULOCYTES # BLD AUTO: 0 X10E3/UL (ref 0–0.1)
IMM GRANULOCYTES NFR BLD AUTO: 0 %
KETONES UR QL STRIP: NEGATIVE
LEUKOCYTE ESTERASE UR QL STRIP: NEGATIVE
LYMPHOCYTES # BLD AUTO: 1.7 X10E3/UL (ref 0.7–3.1)
LYMPHOCYTES NFR BLD AUTO: 22 %
MCH RBC QN AUTO: 27.3 PG (ref 26.6–33)
MCHC RBC AUTO-ENTMCNC: 33.8 G/DL (ref 31.5–35.7)
MCV RBC AUTO: 81 FL (ref 79–97)
MICRO URNS: ABNORMAL
MONOCYTES # BLD AUTO: 0.6 X10E3/UL (ref 0.1–0.9)
MONOCYTES NFR BLD AUTO: 7 %
MUCOUS THREADS URNS QL MICRO: PRESENT
NEUTROPHILS # BLD AUTO: 4.7 X10E3/UL (ref 1.4–7)
NEUTROPHILS NFR BLD AUTO: 60 %
NITRITE UR QL STRIP: NEGATIVE
PH UR STRIP: 6 [PH] (ref 5–7.5)
PLATELET # BLD AUTO: 455 X10E3/UL (ref 150–450)
POTASSIUM SERPL-SCNC: 4.3 MMOL/L (ref 3.5–5.2)
PROT SERPL-MCNC: 8 G/DL (ref 6–8.5)
PROT UR QL STRIP: ABNORMAL
RBC # BLD AUTO: 4.94 X10E6/UL (ref 3.77–5.28)
RBC #/AREA URNS HPF: NORMAL /HPF (ref 0–2)
SODIUM SERPL-SCNC: 141 MMOL/L (ref 134–144)
SP GR UR: 1.01 (ref 1–1.03)
TSH SERPL DL<=0.005 MIU/L-ACNC: 1.6 UIU/ML (ref 0.45–4.5)
UROBILINOGEN UR STRIP-MCNC: 0.2 MG/DL (ref 0.2–1)
WBC # BLD AUTO: 7.8 X10E3/UL (ref 3.4–10.8)
WBC #/AREA URNS HPF: NORMAL /HPF (ref 0–5)

## 2019-07-18 NOTE — ACP (ADVANCE CARE PLANNING)

## 2019-08-03 RX ORDER — PRAVASTATIN SODIUM 20 MG/1
TABLET ORAL
Qty: 90 TAB | Refills: 9 | Status: SHIPPED | OUTPATIENT
Start: 2019-08-03 | End: 2020-11-30

## 2019-08-06 RX ORDER — POTASSIUM CHLORIDE 20 MEQ/1
TABLET, EXTENDED RELEASE ORAL
Qty: 90 TAB | Refills: 3 | Status: SHIPPED | OUTPATIENT
Start: 2019-08-06 | End: 2019-11-05 | Stop reason: SDUPTHER

## 2019-08-12 RX ORDER — NIFEDIPINE 60 MG/1
TABLET, EXTENDED RELEASE ORAL
Qty: 180 TAB | Refills: 3 | Status: SHIPPED | OUTPATIENT
Start: 2019-08-12 | End: 2020-10-03

## 2019-08-12 RX ORDER — METOPROLOL SUCCINATE 50 MG/1
TABLET, EXTENDED RELEASE ORAL
Qty: 90 TAB | Refills: 3 | Status: SHIPPED | OUTPATIENT
Start: 2019-08-12 | End: 2020-10-03

## 2019-09-29 RX ORDER — IBANDRONATE SODIUM 150 MG/1
TABLET, FILM COATED ORAL
Qty: 3 TAB | Refills: 4 | Status: SHIPPED | OUTPATIENT
Start: 2019-09-29 | End: 2021-01-04

## 2019-10-10 ENCOUNTER — OFFICE VISIT (OUTPATIENT)
Dept: INTERNAL MEDICINE CLINIC | Age: 84
End: 2019-10-10

## 2019-10-10 VITALS
BODY MASS INDEX: 28.63 KG/M2 | WEIGHT: 161.6 LBS | OXYGEN SATURATION: 97 % | SYSTOLIC BLOOD PRESSURE: 131 MMHG | HEIGHT: 63 IN | HEART RATE: 72 BPM | RESPIRATION RATE: 18 BRPM | DIASTOLIC BLOOD PRESSURE: 68 MMHG | TEMPERATURE: 98.1 F

## 2019-10-10 DIAGNOSIS — Z13.31 SCREENING FOR DEPRESSION: ICD-10-CM

## 2019-10-10 DIAGNOSIS — H91.92 HEARING DEFICIT, LEFT: ICD-10-CM

## 2019-10-10 DIAGNOSIS — I87.2 VENOUS INSUFFICIENCY OF BOTH LOWER EXTREMITIES: ICD-10-CM

## 2019-10-10 DIAGNOSIS — Z00.00 MEDICARE ANNUAL WELLNESS VISIT, SUBSEQUENT: Primary | ICD-10-CM

## 2019-10-10 DIAGNOSIS — I10 ESSENTIAL HYPERTENSION: ICD-10-CM

## 2019-10-10 DIAGNOSIS — D58.0 HEREDITARY SPHEROCYTOSIS (HCC): ICD-10-CM

## 2019-10-10 DIAGNOSIS — E11.21 TYPE 2 DIABETES MELLITUS WITH NEPHROPATHY (HCC): ICD-10-CM

## 2019-10-10 DIAGNOSIS — Z13.39 SCREENING FOR ALCOHOLISM: ICD-10-CM

## 2019-10-10 DIAGNOSIS — M17.0 PRIMARY OSTEOARTHRITIS OF BOTH KNEES: ICD-10-CM

## 2019-10-10 DIAGNOSIS — C50.911 MALIGNANT NEOPLASM OF RIGHT FEMALE BREAST, UNSPECIFIED ESTROGEN RECEPTOR STATUS, UNSPECIFIED SITE OF BREAST (HCC): ICD-10-CM

## 2019-10-10 NOTE — PROGRESS NOTES
SPORTS MEDICINE AND PRIMARY CARE  Aditya May MD, [address] [address]  Phone:  [phone]  Fax: [phone]      Chief Complaint   Patient presents with    Annual Wellness Visit         SUBECTIVE:    John Smyth is a 82 y.o. female Patient returns today with known history of type 2 diabetes with nephropathy, venous insufficiency, primary hypertension, hereditary spherocytosis, DJD of knee, right breast cancer, and is seen for evaluation. Patient returns today doing well. She had a flu shot. She has had her second shingles shot. She saw her podiatrist on September 10th and had ingrown toenails repaired. Her BP reading is 125-129 before medications. She has been selected to participate in [facility] trials and has an interview tomorrow. This has something to do with [facility] and Nutritional Examination Survey by the [facility], Mile Novoa is the health representative. She is also concerned about red hands. Patient is seen for evaluation. Current Outpatient Medications   Medication Sig Dispense Refill    ibandronate (BONIVA) 150 mg tablet TAKE 1 TABLET ONCE A MONTH 3 Tab 4    TOPROL XL 50 mg XL tablet TAKE 1 TABLET DAILY 90 Tab 3    NIFEdipine ER (PROCARDIA XL) 60 mg ER tablet TAKE 1 TABLET TWICE A  Tab 3    potassium chloride (KLOR-CON M20) 20 mEq tablet TAKE 1 TABLET DAILY 90 Tab 3    pravastatin (PRAVACHOL) 20 mg tablet TAKE 1 TABLET ONCE EACH NIGHT 90 Tab 9    oxybutynin chloride XL (DITROPAN XL) 10 mg CR tablet TAKE 1 TABLET DAILY 90 Tab 3    lamoTRIgine (LAMICTAL) 25 mg tablet TAKE 1 TABLET TWICE A  Tab 3    MICARDIS 80 mg tablet TAKE 1 TABLET DAILY 90 Tab 49    clotrimazole-betamethasone (LOTRISONE) topical cream Apply  to affected area two (2) times a day. 45 g 3    aspirin (ASPIRIN) 325 mg tablet Take 325 mg by mouth as needed for Pain.       hydroCHLOROthiazide (HYDRODIURIL) 12.5 mg tablet Take 12.5 mg by mouth every Monday, Wednesday, Friday.  meclizine (ANTIVERT) 25 mg tablet Take 25 mg by mouth as needed.  guaiFENesin (MUCUS RELIEF) 400 mg tablet Take 200 mg by mouth every four (4) hours as needed for Congestion.  fluticasone (FLONASE) 50 mcg/actuation nasal spray 2 Sprays by Both Nostrils route two (2) times a day.  calcium carbonate (TUMS PO) Take 500 mg by mouth as needed.  fluticasone-salmeterol (ADVAIR DISKUS) 250-50 mcg/dose diskus inhaler USE 1 INHALATION TWO TIMES A DAY 3 Inhaler 3    albuterol (PROVENTIL HFA, VENTOLIN HFA, PROAIR HFA) 90 mcg/actuation inhaler Take 1 Puff by inhalation every four (4) hours as needed for Wheezing. 3 Inhaler 3    triamcinolone acetonide (KENALOG) 0.1 % topical cream APPLY EXTERNALLY TO THE AFFECTED AREA TWICE DAILY 80 g 0    FREESTYLE LITE STRIPS strip USE TO TEST BLOOD SUGAR ONCE DAILY 100 Strip 2    aspirin delayed-release 81 mg tablet Take 81 mg by mouth daily.  ASCORBATE CALCIUM (VITAMIN C PO) Take 500 mg by mouth daily.        Past Medical History:   Diagnosis Date    Anemia     Arrhythmia     irregular per pt d/t blood disorder    Asthma     Bereavement 2-2-16     die 82 copd,chf,pul htn pn after 48 yr marriage    BPV (benign positional vertigo)     Breast cancer, right breast ([address] 1994    right breast, lumpectomy and radiation    Breast lump 2017    right breast     Chronic kidney disease     kidney cyst - watching    Diabetes ([address]     controlled with diet    DJD (degenerative joint disease) of knee     Early satiety     Hearing deficit, left     Hereditary spherocytosis (HCC)     HTN (hypertension)     Ill-defined condition     sickle cell trait    Intrahepatic bile duct dilation 09/05/2018    mri    Radiation therapy complication 3754    right breast     Renal cyst 08/2016    ct rt    S/P colonoscopy 4-9-14    md Brian - family hx    S/P colonoscopy 07/20/2016    rt - md brian diverticulosis    Seizures ([address]     Septic arthritis ([facility] 3/11    Streptococcal sepsis ([facility] 03/2011    Venous insufficiency of both lower extremities      Past Surgical History:   Procedure Laterality Date    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 Weak.  Other Plant, Animal, Environmental Other (comments)     Dust and mold allergy. REVIEW OF SYSTEMS:   She is concerned about her memory. Sometimes in talking she has word finding difficulties. Other memory issues are not of great concern. She was doing scriptures for memory practice. She is going to do that again. [email or web address]  Family History   Problem Relation Age of Onset    Diabetes Mother     Other Father         'sphero'-blood disorder    Cancer Sister         breast    Breast Cancer Sister 71    Other Sister         spleen removed    Colon Cancer Sister     Other Other         spleen removed    Gall Bladder Disease Other        OBJECTIVE:  Visit Vitals  /68   Pulse 72   Temp 98.1 °F (36.7 °C) (Oral)   Resp 18   Ht 5' 3\" (1.6 m)   Wt 161 lb 9.6 oz (73.3 kg)   SpO2 97%   BMI 28.63 kg/m²     ENT: perrla,  eom intact  NECK: supple. Thyroid normal  CHEST: clear to ascultation and percussion   HEART: regular rate and rhythm  ABD: soft, bowel sounds active  EXTREMITIES: no edema, pulse 1+     No visits with results within 3 Month(s) from this visit.    Latest known visit with results is:   Office Visit on 06/06/2019   Component Date Value Ref Range Status    Specific Gravity 06/06/2019 1.013  1.005 - 1.030 Final    pH (UA) 06/06/2019 6.0  5.0 - 7.5 Final    Color 06/06/2019 Yellow  Yellow Final    Appearance 06/06/2019 Clear  Clear Final    Leukocyte Esterase 06/06/2019 Negative  Negative Final    Protein 06/06/2019 2+* Negative/Trace Final  Glucose 06/06/2019 Negative  Negative Final    Ketone 06/06/2019 Negative  Negative Final    Blood 06/06/2019 Negative  Negative Final    Bilirubin 06/06/2019 Negative  Negative Final    Urobilinogen 06/06/2019 0.2  0.2 - 1.0 mg/dL Final    Nitrites 06/06/2019 Negative  Negative Final    Microscopic Examination 06/06/2019 See additional order   Final    Microscopic was indicated and was performed.  WBC 06/06/2019 7.8  3.4 - 10.8 x10E3/uL Final    RBC 06/06/2019 4.94  3.77 - 5.28 x10E6/uL Final    HGB 06/06/2019 13.5  11.1 - 15.9 g/dL Final    HCT 06/06/2019 40.0  34.0 - 46.6 % Final    MCV 06/06/2019 81  79 - 97 fL Final    MCH 06/06/2019 27.3  26.6 - 33.0 pg Final    MCHC 06/06/2019 33.8  31.5 - 35.7 g/dL Final    RDW 06/06/2019 13.2  12.3 - 15.4 % Final    PLATELET 73/09/3488 788* 150 - 450 x10E3/uL Final    NEUTROPHILS 06/06/2019 60  Not Estab. % Final    Lymphocytes 06/06/2019 22  Not Estab. % Final    MONOCYTES 06/06/2019 7  Not Estab. % Final    EOSINOPHILS 06/06/2019 9  Not Estab. % Final    BASOPHILS 06/06/2019 2  Not Estab. % Final    ABS. NEUTROPHILS 06/06/2019 4.7  1.4 - 7.0 x10E3/uL Final    Abs Lymphocytes 06/06/2019 1.7  0.7 - 3.1 x10E3/uL Final    ABS. MONOCYTES 06/06/2019 0.6  0.1 - 0.9 x10E3/uL Final    ABS. EOSINOPHILS 06/06/2019 0.7* 0.0 - 0.4 x10E3/uL Final    ABS. BASOPHILS 06/06/2019 0.1  0.0 - 0.2 x10E3/uL Final    IMMATURE GRANULOCYTES 06/06/2019 0  Not Estab. % Final    ABS. IMM.  GRANS. 06/06/2019 0.0  0.0 - 0.1 x10E3/uL Final    Glucose 06/06/2019 85  65 - 99 mg/dL Final    BUN 06/06/2019 21  8 - 27 mg/dL Final    Creatinine 06/06/2019 1.20* 0.57 - 1.00 mg/dL Final    GFR est non-AA 06/06/2019 42* >59 mL/min/1.73 Final    GFR est AA 06/06/2019 49* >59 mL/min/1.73 Final    BUN/Creatinine ratio 06/06/2019 18  12 - 28 Final    Sodium 06/06/2019 141  134 - 144 mmol/L Final    Potassium 06/06/2019 4.3  3.5 - 5.2 mmol/L Final    Chloride 06/06/2019 103 96 - 106 mmol/L Final    CO2 06/06/2019 21  20 - 29 mmol/L Final    Calcium 06/06/2019 9.6  8.7 - 10.3 mg/dL Final    Protein, total 06/06/2019 8.0  6.0 - 8.5 g/dL Final    Albumin 06/06/2019 4.4  3.5 - 4.7 g/dL Final    GLOBULIN, TOTAL 06/06/2019 3.6  1.5 - 4.5 g/dL Final    A-G Ratio 06/06/2019 1.2  1.2 - 2.2 Final    Bilirubin, total 06/06/2019 0.4  0.0 - 1.2 mg/dL Final    Alk. phosphatase 06/06/2019 88  39 - 117 IU/L Final    AST (SGOT) 06/06/2019 16  0 - 40 IU/L Final    ALT (SGPT) 06/06/2019 11  0 - 32 IU/L Final    TSH 06/06/2019 1.600  0.450 - 4.500 uIU/mL Final    Hemoglobin A1c 06/06/2019 5.1  4.8 - 5.6 % Final    Comment:          Prediabetes: 5.7 - 6.4           Diabetes: >6.4           Glycemic control for adults with diabetes: <7.0      Estimated average glucose 06/06/2019 100  mg/dL Final    WBC 06/06/2019 0-5  0 - 5 /hpf Final    RBC 06/06/2019 None seen  0 - 2 /hpf Final    Epithelial cells 06/06/2019 None seen  0 - 10 /hpf Final    Casts 06/06/2019 None seen  None seen /lpf Final    Mucus 06/06/2019 Present  Not Estab. Final    Bacteria 06/06/2019 None seen  None seen/Few Final          ASSESSMENT:  1. Medicare annual wellness visit, subsequent    2. Screening for alcoholism    3. Screening for depression    4. Type 2 diabetes mellitus with nephropathy ([address]    5. Venous insufficiency of both lower extremities    6. Essential hypertension    7. Hereditary spherocytosis ([address]    8. Primary osteoarthritis of both knees    9. Malignant neoplasm of right female breast, unspecified estrogen receptor status, unspecified site of breast ([address]    10. Hearing deficit, left      Patient's medical status is stable. The diagnosis of diabetes was made years ago and has not been confirmed since then. However we check the hemoglobin A1c periodically, as well as renal function. She is concerned about her liver because she states \"it has to work for the gallbladder and the spleen and itself\". Venous insufficiency is non problematic. BP control is at goal.    Hearing deficit is noted on the left, will refer to ENT. She will be back to see us in six months. I have discussed the diagnosis with the patient and the intended plan as seen in the  orders above. The patient understands and agees with the plan. The patient has   received an after visit summary and questions were answered concerning  future plans  Patient labs and/or xrays were reviewed  Past records were reviewed. PLAN:  .  Orders Placed This Encounter    Depression Screen Annual    METABOLIC PANEL, COMPREHENSIVE    HEMOGLOBIN A1C WITH EAG    REFERRAL TO ENT-OTOLARYNGOLOGY       Follow-up and Dispositions    · Return in about 6 months (around 4/10/2020). ATTENTION:   This medical record was transcribed using an electronic medical records system. Although proofread, it may and can contain electronic and spelling errors. Other human spelling and other errors may be present. Corrections may be executed at a later time. Please feel free to contact us for any clarifications as needed.

## 2019-10-10 NOTE — PATIENT INSTRUCTIONS
Medicare Wellness Visit, Female     The best way to live healthy is to have a lifestyle where you eat a well-balanced diet, exercise regularly, limit alcohol use, and quit all forms of tobacco/nicotine, if applicable. Regular preventive services are another way to keep healthy. Preventive services (vaccines, screening tests, monitoring & exams) can help personalize your care plan, which helps you manage your own care. Screening tests can find health problems at the earliest stages, when they are easiest to treat. [facility] follows the current, evidence-based guidelines published by the [facility] ([facility]) when recommending preventive services for our patients. Because we follow these guidelines, sometimes recommendations change over time as research supports it. (For example, mammograms used to be recommended annually. Even though Medicare will still pay for an annual mammogram, the newer guidelines recommend a mammogram every two years for women of average risk.)  Of course, you and your doctor may decide to screen more often for some diseases, based on your risk and your health status. Preventive services for you include:  - Medicare offers their members a free annual wellness visit, which is time for you and your primary care provider to discuss and plan for your preventive service needs. Take advantage of this benefit every year!  -All adults over the age of 72 should receive the recommended pneumonia vaccines. Current USPSTF guidelines recommend a series of two vaccines for the best pneumonia protection.   -All adults should have a flu vaccine yearly and a tetanus vaccine every 10 years. All adults age 61 and older should receive a shingles vaccine once in their lifetime.    -A bone mass density test is recommended when a woman turns 65 to screen for osteoporosis. This test is only recommended one time, as a screening.  Some providers will use this same test as a disease monitoring tool if you already have osteoporosis. -All adults age 38-68 who are overweight should have a diabetes screening test once every three years.   -Other screening tests and preventive services for persons with diabetes include: an eye exam to screen for diabetic retinopathy, a kidney function test, a foot exam, and stricter control over your cholesterol.   -Cardiovascular screening for adults with routine risk involves an electrocardiogram (ECG) at intervals determined by your doctor.   -Colorectal cancer screenings should be done for adults age 54-65 with no increased risk factors for colorectal cancer. There are a number of acceptable methods of screening for this type of cancer. Each test has its own benefits and drawbacks. Discuss with your doctor what is most appropriate for you during your annual wellness visit. The different tests include: colonoscopy (considered the best screening method), a fecal occult blood test, a fecal DNA test, and sigmoidoscopy. -Breast cancer screenings are recommended every other year for women of normal risk, age 54-69.  -Cervical cancer screenings for women over age 72 are only recommended with certain risk factors.   -All adults born between [facility] should be screened once for Hepatitis C.      Here is a list of your current Health Maintenance items (your personalized list of preventive services) with a due date:  Health Maintenance Due   Topic Date Due    Annual Well Visit  09/18/2019    Glaucoma Screening   11/16/2019

## 2019-10-10 NOTE — PROGRESS NOTES
1. Have you been to the ER, urgent care clinic since your last visit? Hospitalized since your last visit? No    2. Have you seen or consulted any other health care providers outside of the [address] [place] [address] since your last visit? Include any pap smears or colon screening. No  This is the Subsequent Medicare Annual Wellness Exam, performed 12 months or more after the Initial AWV or the last Subsequent AWV    I have reviewed the patient's medical history in detail and updated the computerized patient record.      History     Past Medical History:   Diagnosis Date    Anemia     Arrhythmia     irregular per pt d/t blood disorder    Asthma     Bereavement 2-2-16     die 82 copd,chf,pul htn pn after 48 yr marriage    BPV (benign positional vertigo)     Breast cancer, right breast ([address] 1994    right breast, lumpectomy and radiation    Breast lump 2017    right breast     Chronic kidney disease     kidney cyst - watching    Diabetes ([address]     controlled with diet    DJD (degenerative joint disease) of knee     Early satiety     Hereditary spherocytosis ([address]     HTN (hypertension)     Ill-defined condition     sickle cell trait    Intrahepatic bile duct dilation 09/05/2018    mri    Radiation therapy complication 1634    right breast     Renal cyst 08/2016    ct rt    S/P colonoscopy 4-9-14    md Brian - family hx    S/P colonoscopy 07/20/2016    rt - md brian diverticulosis    Se[facility]ures [facility]     Septic arthritis ([address] 3/11    Streptococcal sepsis ([facility] 03/2011    Venous insufficiency of both lower extremities       Past Surgical History:   Procedure Laterality Date    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    HX BREAST BIOPSY Right 1994    positive    HX BREAST LUMPECTOMY Right 1994    HX CHOLECYSTECTOMY  1985     Current Outpatient Medications   Medication Sig Dispense Refill    ibandronate (BONIVA) 150 mg tablet TAKE 1 TABLET ONCE A MONTH 3 Tab 4    TOPROL XL 50 mg XL tablet TAKE 1 TABLET DAILY 90 Tab 3    NIFEdipine ER (PROCARDIA XL) 60 mg ER tablet TAKE 1 TABLET TWICE A  Tab 3    potassium chloride (KLOR-CON M20) 20 mEq tablet TAKE 1 TABLET DAILY 90 Tab 3    pravastatin (PRAVACHOL) 20 mg tablet TAKE 1 TABLET ONCE EACH NIGHT 90 Tab 9    oxybutynin chloride XL (DITROPAN XL) 10 mg CR tablet TAKE 1 TABLET DAILY 90 Tab 3    lamoTRIgine (LAMICTAL) 25 mg tablet TAKE 1 TABLET TWICE A  Tab 3    MICARDIS 80 mg tablet TAKE 1 TABLET DAILY 90 Tab 49    clotrimazole-betamethasone (LOTRISONE) topical cream Apply  to affected area two (2) times a day. 45 g 3    aspirin (ASPIRIN) 325 mg tablet Take 325 mg by mouth as needed for Pain.  hydroCHLOROthiazide (HYDRODIURIL) 12.5 mg tablet Take 12.5 mg by mouth every Monday, Wednesday, Friday.  meclizine (ANTIVERT) 25 mg tablet Take 25 mg by mouth as needed.  guaiFENesin (MUCUS RELIEF) 400 mg tablet Take 200 mg by mouth every four (4) hours as needed for Congestion.  fluticasone (FLONASE) 50 mcg/actuation nasal spray 2 Sprays by Both Nostrils route two (2) times a day.  calcium carbonate (TUMS PO) Take 500 mg by mouth as needed.  fluticasone-salmeterol (ADVAIR DISKUS) 250-50 mcg/dose diskus inhaler USE 1 INHALATION TWO TIMES A DAY 3 Inhaler 3    albuterol (PROVENTIL HFA, VENTOLIN HFA, PROAIR HFA) 90 mcg/actuation inhaler Take 1 Puff by inhalation every four (4) hours as needed for Wheezing. 3 Inhaler 3    triamcinolone acetonide (KENALOG) 0.1 % topical cream APPLY EXTERNALLY TO THE AFFECTED AREA TWICE DAILY 80 g 0    FREESTYLE LITE STRIPS strip USE TO TEST BLOOD SUGAR ONCE DAILY 100 Strip 2    aspirin delayed-release 81 mg tablet Take 81 mg by mouth daily.  ASCORBATE CALCIUM (VITAMIN C PO) Take 500 mg by mouth daily. Allergies   Allergen Reactions    Codeine Nausea and Vomiting and Other (comments)     Upset stomach. Weak.     Other Plant, Animal, Environmental Other (comments)     Dust and mold allergy. Family History   Problem Relation Age of Onset    Diabetes Mother     Other Father         'sphero'-blood disorder    Cancer Sister         breast    Breast Cancer Sister 71    Other Sister         spleen removed    Colon Cancer Sister     Other Other         spleen removed    Gall Bladder Disease Other      Social History     Tobacco Use    Smoking status: Never Smoker    Smokeless tobacco: Never Used   Substance Use Topics    Alcohol use: No     Patient Active Problem List   Diagnosis Code    Anemia NEC     Asthma J45.909    Hereditary spherocytosis ([facility] D58.0    HTN (hypertension) I10    Breast cancer, right breast ([facility] C50.911    Anemia D64.9    BPV (benign positional vertigo) H81.10    DJD (degenerative joint disease) of knee M17.10    S/P colonoscopy Z98.890    Early satiety R68.81    Venous insufficiency of both lower extremities I87.2    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.21    Renal cyst N28.1    Intrahepatic bile duct dilation K83.8       Depression Risk Factor Screening:     3 most recent PHQ Screens 10/10/2019   Little interest or pleasure in doing things Not at all   Feeling down, depressed, irritable, or hopeless Not at all   Total Score PHQ 2 0     Alcohol Risk Factor Screening: You do not drink alcohol or very rarely. Functional Ability and Level of Safety:   Hearing Loss  Hearing is good. Activities of Daily Living  The home contains: no safety equipment. Patient does total self care    Fall Risk  Fall Risk Assessment, last 12 mths 10/10/2019   Able to walk? Yes   Fall in past 12 months?  No       Abuse Screen  Patient is not abused    Cognitive Screening   Evaluation of Cognitive Function:  Has your family/caregiver stated any concerns about your memory: no  Normal    Patient Care Team   Patient Care Team:  Laura Prince MD as PCP - General (Internal Medicine)    Assessment/Plan   Education and counseling provided:  Are appropriate based on today's review and evaluation        Health Maintenance Due   Topic Date Due    MEDICARE YEARLY EXAM  09/18/2019    GLAUCOMA SCREENING Q2Y  11/16/2019

## 2019-10-11 LAB
ALBUMIN SERPL-MCNC: 4.4 G/DL (ref 3.5–4.7)
ALBUMIN/GLOB SERPL: 1.3 {RATIO} (ref 1.2–2.2)
ALP SERPL-CCNC: 85 IU/L (ref 39–117)
ALT SERPL-CCNC: 15 IU/L (ref 0–32)
AST SERPL-CCNC: 19 IU/L (ref 0–40)
BILIRUB SERPL-MCNC: 0.3 MG/DL (ref 0–1.2)
BUN SERPL-MCNC: 22 MG/DL (ref 8–27)
BUN/CREAT SERPL: 17 (ref 12–28)
CALCIUM SERPL-MCNC: 9.4 MG/DL (ref 8.7–10.3)
CHLORIDE SERPL-SCNC: 107 MMOL/L (ref 96–106)
CO2 SERPL-SCNC: 21 MMOL/L (ref 20–29)
CREAT SERPL-MCNC: 1.33 MG/DL (ref 0.57–1)
EST. AVERAGE GLUCOSE BLD GHB EST-MCNC: 100 MG/DL
GLOBULIN SER CALC-MCNC: 3.3 G/DL (ref 1.5–4.5)
GLUCOSE SERPL-MCNC: 71 MG/DL (ref 65–99)
HBA1C MFR BLD: 5.1 % (ref 4.8–5.6)
POTASSIUM SERPL-SCNC: 4.2 MMOL/L (ref 3.5–5.2)
PROT SERPL-MCNC: 7.7 G/DL (ref 6–8.5)
SODIUM SERPL-SCNC: 147 MMOL/L (ref 134–144)

## 2019-11-05 RX ORDER — POTASSIUM CHLORIDE 20 MEQ/1
TABLET, EXTENDED RELEASE ORAL
Qty: 90 TAB | Refills: 3 | Status: SHIPPED | OUTPATIENT
Start: 2019-11-05 | End: 2020-12-03

## 2020-01-14 ENCOUNTER — HOSPITAL ENCOUNTER (OUTPATIENT)
Dept: MAMMOGRAPHY | Age: 85
Discharge: HOME | End: 2020-01-14
Attending: INTERNAL MEDICINE

## 2020-01-14 DIAGNOSIS — Z12.31 VISIT FOR SCREENING MAMMOGRAM: ICD-10-CM

## 2020-01-14 DIAGNOSIS — N63.0 BREAST LUMP: Primary | ICD-10-CM

## 2020-01-14 DIAGNOSIS — N64.89 OTHER SPECIFIED DISORDERS OF BREAST: ICD-10-CM

## 2020-01-20 ENCOUNTER — HOSPITAL ENCOUNTER (INPATIENT)
Age: 85
LOS: 9 days | Discharge: HOME HEALTH | DRG: 194 | End: 2020-01-29
Attending: EMERGENCY MEDICINE | Admitting: HOSPITALIST
Payer: MEDICARE

## 2020-01-20 ENCOUNTER — APPOINTMENT (OUTPATIENT)
Dept: GENERAL RADIOLOGY | Age: 85
DRG: 194 | End: 2020-01-20
Attending: EMERGENCY MEDICINE
Payer: MEDICARE

## 2020-01-20 DIAGNOSIS — J45.21 MILD INTERMITTENT ASTHMA WITH ACUTE EXACERBATION: ICD-10-CM

## 2020-01-20 DIAGNOSIS — R09.02 HYPOXIA: ICD-10-CM

## 2020-01-20 DIAGNOSIS — R78.81 BACTEREMIA: ICD-10-CM

## 2020-01-20 DIAGNOSIS — J10.1 INFLUENZA A: Primary | ICD-10-CM

## 2020-01-20 PROBLEM — J11.1 INFLUENZA: Status: ACTIVE | Noted: 2020-01-20

## 2020-01-20 LAB
ALBUMIN SERPL-MCNC: 3.5 G/DL (ref 3.5–5)
ALBUMIN/GLOB SERPL: 0.7 {RATIO} (ref 1.1–2.2)
ALP SERPL-CCNC: 88 U/L (ref 45–117)
ALT SERPL-CCNC: 33 U/L (ref 12–78)
ANION GAP SERPL CALC-SCNC: 6 MMOL/L (ref 5–15)
APPEARANCE UR: CLEAR
AST SERPL-CCNC: 55 U/L (ref 15–37)
ATRIAL RATE: 103 BPM
BACTERIA URNS QL MICRO: NEGATIVE /HPF
BASOPHILS # BLD: 0.2 K/UL (ref 0–0.1)
BASOPHILS NFR BLD: 1 % (ref 0–1)
BILIRUB SERPL-MCNC: 0.4 MG/DL (ref 0.2–1)
BILIRUB UR QL: NEGATIVE
BUN SERPL-MCNC: 29 MG/DL (ref 6–20)
BUN/CREAT SERPL: 16 (ref 12–20)
CALCIUM SERPL-MCNC: 8.6 MG/DL (ref 8.5–10.1)
CALCULATED P AXIS, ECG09: 73 DEGREES
CALCULATED R AXIS, ECG10: -18 DEGREES
CALCULATED T AXIS, ECG11: 100 DEGREES
CHLORIDE SERPL-SCNC: 107 MMOL/L (ref 97–108)
CO2 SERPL-SCNC: 26 MMOL/L (ref 21–32)
COLOR UR: ABNORMAL
CREAT SERPL-MCNC: 1.8 MG/DL (ref 0.55–1.02)
DIAGNOSIS, 93000: NORMAL
DIFFERENTIAL METHOD BLD: ABNORMAL
EOSINOPHIL # BLD: 0.2 K/UL (ref 0–0.4)
EOSINOPHIL NFR BLD: 1 % (ref 0–7)
EPITH CASTS URNS QL MICRO: ABNORMAL /LPF
ERYTHROCYTE [DISTWIDTH] IN BLOOD BY AUTOMATED COUNT: 13.2 % (ref 11.5–14.5)
FLUAV AG NPH QL IA: POSITIVE
FLUBV AG NOSE QL IA: NEGATIVE
GLOBULIN SER CALC-MCNC: 4.9 G/DL (ref 2–4)
GLUCOSE SERPL-MCNC: 114 MG/DL (ref 65–100)
GLUCOSE UR STRIP.AUTO-MCNC: NEGATIVE MG/DL
HCT VFR BLD AUTO: 37.5 % (ref 35–47)
HGB BLD-MCNC: 12.6 G/DL (ref 11.5–16)
HGB UR QL STRIP: ABNORMAL
HYALINE CASTS URNS QL MICRO: ABNORMAL /LPF (ref 0–5)
IMM GRANULOCYTES # BLD AUTO: 0.1 K/UL (ref 0–0.04)
IMM GRANULOCYTES NFR BLD AUTO: 1 % (ref 0–0.5)
KETONES UR QL STRIP.AUTO: NEGATIVE MG/DL
LACTATE SERPL-SCNC: 2.1 MMOL/L (ref 0.4–2)
LEUKOCYTE ESTERASE UR QL STRIP.AUTO: NEGATIVE
LYMPHOCYTES # BLD: 2.6 K/UL (ref 0.8–3.5)
LYMPHOCYTES NFR BLD: 12 % (ref 12–49)
MCH RBC QN AUTO: 27.1 PG (ref 26–34)
MCHC RBC AUTO-ENTMCNC: 33.6 G/DL (ref 30–36.5)
MCV RBC AUTO: 80.6 FL (ref 80–99)
MONOCYTES # BLD: 1.5 K/UL (ref 0–1)
MONOCYTES NFR BLD: 7 % (ref 5–13)
NEUTS SEG # BLD: 16.4 K/UL (ref 1.8–8)
NEUTS SEG NFR BLD: 78 % (ref 32–75)
NITRITE UR QL STRIP.AUTO: NEGATIVE
NRBC # BLD: 0 K/UL (ref 0–0.01)
NRBC BLD-RTO: 0 PER 100 WBC
P-R INTERVAL, ECG05: 158 MS
PH UR STRIP: 6.5 [PH] (ref 5–8)
PLATELET # BLD AUTO: 339 K/UL (ref 150–400)
PMV BLD AUTO: 9.8 FL (ref 8.9–12.9)
POTASSIUM SERPL-SCNC: 3.9 MMOL/L (ref 3.5–5.1)
PROT SERPL-MCNC: 8.4 G/DL (ref 6.4–8.2)
PROT UR STRIP-MCNC: 100 MG/DL
Q-T INTERVAL, ECG07: 312 MS
QRS DURATION, ECG06: 80 MS
QTC CALCULATION (BEZET), ECG08: 408 MS
RBC # BLD AUTO: 4.65 M/UL (ref 3.8–5.2)
RBC #/AREA URNS HPF: ABNORMAL /HPF (ref 0–5)
SODIUM SERPL-SCNC: 139 MMOL/L (ref 136–145)
SP GR UR REFRACTOMETRY: 1.01 (ref 1–1.03)
TROPONIN I SERPL-MCNC: 0.26 NG/ML
TROPONIN I SERPL-MCNC: 0.34 NG/ML
UA: UC IF INDICATED,UAUC: ABNORMAL
UROBILINOGEN UR QL STRIP.AUTO: 0.2 EU/DL (ref 0.2–1)
VENTRICULAR RATE, ECG03: 103 BPM
WBC # BLD AUTO: 21.1 K/UL (ref 3.6–11)
WBC URNS QL MICRO: ABNORMAL /HPF (ref 0–4)

## 2020-01-20 PROCEDURE — 83605 ASSAY OF LACTIC ACID: CPT

## 2020-01-20 PROCEDURE — 87186 SC STD MICRODIL/AGAR DIL: CPT

## 2020-01-20 PROCEDURE — 87040 BLOOD CULTURE FOR BACTERIA: CPT

## 2020-01-20 PROCEDURE — 94640 AIRWAY INHALATION TREATMENT: CPT

## 2020-01-20 PROCEDURE — 65660000001 HC RM ICU INTERMED STEPDOWN

## 2020-01-20 PROCEDURE — 74011000250 HC RX REV CODE- 250: Performed by: EMERGENCY MEDICINE

## 2020-01-20 PROCEDURE — 74011250637 HC RX REV CODE- 250/637: Performed by: EMERGENCY MEDICINE

## 2020-01-20 PROCEDURE — 74011250637 HC RX REV CODE- 250/637: Performed by: HOSPITALIST

## 2020-01-20 PROCEDURE — 99285 EMERGENCY DEPT VISIT HI MDM: CPT

## 2020-01-20 PROCEDURE — 80053 COMPREHEN METABOLIC PANEL: CPT

## 2020-01-20 PROCEDURE — 36415 COLL VENOUS BLD VENIPUNCTURE: CPT

## 2020-01-20 PROCEDURE — 74011000250 HC RX REV CODE- 250: Performed by: HOSPITALIST

## 2020-01-20 PROCEDURE — 74011250636 HC RX REV CODE- 250/636: Performed by: HOSPITALIST

## 2020-01-20 PROCEDURE — 84484 ASSAY OF TROPONIN QUANT: CPT

## 2020-01-20 PROCEDURE — 74011250636 HC RX REV CODE- 250/636: Performed by: EMERGENCY MEDICINE

## 2020-01-20 PROCEDURE — 71045 X-RAY EXAM CHEST 1 VIEW: CPT

## 2020-01-20 PROCEDURE — 81001 URINALYSIS AUTO W/SCOPE: CPT

## 2020-01-20 PROCEDURE — 87070 CULTURE OTHR SPECIMN AEROBIC: CPT

## 2020-01-20 PROCEDURE — 87804 INFLUENZA ASSAY W/OPTIC: CPT

## 2020-01-20 PROCEDURE — 85025 COMPLETE CBC W/AUTO DIFF WBC: CPT

## 2020-01-20 PROCEDURE — 93005 ELECTROCARDIOGRAM TRACING: CPT

## 2020-01-20 RX ORDER — LAMOTRIGINE 25 MG/1
25 TABLET ORAL 2 TIMES DAILY
Status: DISCONTINUED | OUTPATIENT
Start: 2020-01-20 | End: 2020-01-29 | Stop reason: HOSPADM

## 2020-01-20 RX ORDER — OSELTAMIVIR PHOSPHATE 30 MG/1
30 CAPSULE ORAL
Status: DISCONTINUED | OUTPATIENT
Start: 2020-01-20 | End: 2020-01-20

## 2020-01-20 RX ORDER — OSELTAMIVIR PHOSPHATE 30 MG/1
30 CAPSULE ORAL DAILY
Status: DISCONTINUED | OUTPATIENT
Start: 2020-01-20 | End: 2020-01-20

## 2020-01-20 RX ORDER — OSELTAMIVIR PHOSPHATE 75 MG/1
75 CAPSULE ORAL 2 TIMES DAILY
Status: DISCONTINUED | OUTPATIENT
Start: 2020-01-20 | End: 2020-01-20 | Stop reason: DRUGHIGH

## 2020-01-20 RX ORDER — LOSARTAN POTASSIUM 100 MG/1
100 TABLET ORAL DAILY
Status: DISCONTINUED | OUTPATIENT
Start: 2020-01-20 | End: 2020-01-29 | Stop reason: HOSPADM

## 2020-01-20 RX ORDER — PRAVASTATIN SODIUM 10 MG/1
20 TABLET ORAL
Status: DISCONTINUED | OUTPATIENT
Start: 2020-01-20 | End: 2020-01-29 | Stop reason: HOSPADM

## 2020-01-20 RX ORDER — ASPIRIN 325 MG
325 TABLET ORAL ONCE
Status: COMPLETED | OUTPATIENT
Start: 2020-01-20 | End: 2020-01-20

## 2020-01-20 RX ORDER — GUAIFENESIN 100 MG/5ML
200 SOLUTION ORAL
Status: DISCONTINUED | OUTPATIENT
Start: 2020-01-20 | End: 2020-01-22

## 2020-01-20 RX ORDER — POTASSIUM CHLORIDE 20 MEQ/1
20 TABLET, EXTENDED RELEASE ORAL DAILY
Status: DISCONTINUED | OUTPATIENT
Start: 2020-01-20 | End: 2020-01-21

## 2020-01-20 RX ORDER — FLUTICASONE PROPIONATE 50 MCG
2 SPRAY, SUSPENSION (ML) NASAL 2 TIMES DAILY
Status: DISCONTINUED | OUTPATIENT
Start: 2020-01-20 | End: 2020-01-29 | Stop reason: HOSPADM

## 2020-01-20 RX ORDER — OXYBUTYNIN CHLORIDE 5 MG/1
10 TABLET, EXTENDED RELEASE ORAL DAILY
Status: DISCONTINUED | OUTPATIENT
Start: 2020-01-20 | End: 2020-01-29 | Stop reason: HOSPADM

## 2020-01-20 RX ORDER — OSELTAMIVIR PHOSPHATE 75 MG/1
75 CAPSULE ORAL DAILY
Status: DISCONTINUED | OUTPATIENT
Start: 2020-01-21 | End: 2020-01-20 | Stop reason: DRUGHIGH

## 2020-01-20 RX ORDER — TELMISARTAN 40 MG/1
80 TABLET ORAL DAILY
Status: DISCONTINUED | OUTPATIENT
Start: 2020-01-20 | End: 2020-01-20 | Stop reason: CLARIF

## 2020-01-20 RX ORDER — OSELTAMIVIR PHOSPHATE 75 MG/1
75 CAPSULE ORAL DAILY
Status: DISCONTINUED | OUTPATIENT
Start: 2020-01-20 | End: 2020-01-20

## 2020-01-20 RX ORDER — METOPROLOL SUCCINATE 50 MG/1
50 TABLET, EXTENDED RELEASE ORAL DAILY
Status: DISCONTINUED | OUTPATIENT
Start: 2020-01-20 | End: 2020-01-26

## 2020-01-20 RX ORDER — OSELTAMIVIR PHOSPHATE 30 MG/1
30 CAPSULE ORAL DAILY
Status: COMPLETED | OUTPATIENT
Start: 2020-01-21 | End: 2020-01-24

## 2020-01-20 RX ORDER — ENOXAPARIN SODIUM 100 MG/ML
30 INJECTION SUBCUTANEOUS EVERY 24 HOURS
Status: DISCONTINUED | OUTPATIENT
Start: 2020-01-20 | End: 2020-01-20

## 2020-01-20 RX ORDER — HEPARIN SODIUM 5000 [USP'U]/ML
5000 INJECTION, SOLUTION INTRAVENOUS; SUBCUTANEOUS EVERY 8 HOURS
Status: DISCONTINUED | OUTPATIENT
Start: 2020-01-21 | End: 2020-01-29 | Stop reason: HOSPADM

## 2020-01-20 RX ORDER — HYDROCHLOROTHIAZIDE 25 MG/1
12.5 TABLET ORAL
Status: DISCONTINUED | OUTPATIENT
Start: 2020-01-20 | End: 2020-01-21

## 2020-01-20 RX ORDER — ACETAMINOPHEN 325 MG/1
650 TABLET ORAL
Status: DISCONTINUED | OUTPATIENT
Start: 2020-01-20 | End: 2020-01-29 | Stop reason: HOSPADM

## 2020-01-20 RX ORDER — NIFEDIPINE 30 MG/1
60 TABLET, EXTENDED RELEASE ORAL DAILY
Status: DISCONTINUED | OUTPATIENT
Start: 2020-01-20 | End: 2020-01-29 | Stop reason: HOSPADM

## 2020-01-20 RX ORDER — SODIUM CHLORIDE 0.9 % (FLUSH) 0.9 %
5-40 SYRINGE (ML) INJECTION AS NEEDED
Status: DISCONTINUED | OUTPATIENT
Start: 2020-01-20 | End: 2020-01-29 | Stop reason: HOSPADM

## 2020-01-20 RX ORDER — SODIUM CHLORIDE 0.9 % (FLUSH) 0.9 %
5-40 SYRINGE (ML) INJECTION EVERY 8 HOURS
Status: DISCONTINUED | OUTPATIENT
Start: 2020-01-20 | End: 2020-01-20 | Stop reason: SDUPTHER

## 2020-01-20 RX ORDER — SODIUM CHLORIDE 0.9 % (FLUSH) 0.9 %
5-40 SYRINGE (ML) INJECTION EVERY 8 HOURS
Status: DISCONTINUED | OUTPATIENT
Start: 2020-01-20 | End: 2020-01-29 | Stop reason: HOSPADM

## 2020-01-20 RX ORDER — ASPIRIN 325 MG
325 TABLET ORAL DAILY
Status: DISCONTINUED | OUTPATIENT
Start: 2020-01-21 | End: 2020-01-29 | Stop reason: HOSPADM

## 2020-01-20 RX ORDER — ASPIRIN 325 MG
325 TABLET ORAL DAILY
Status: DISCONTINUED | OUTPATIENT
Start: 2020-01-20 | End: 2020-01-20

## 2020-01-20 RX ORDER — SODIUM CHLORIDE 0.9 % (FLUSH) 0.9 %
5-40 SYRINGE (ML) INJECTION AS NEEDED
Status: DISCONTINUED | OUTPATIENT
Start: 2020-01-20 | End: 2020-01-20 | Stop reason: SDUPTHER

## 2020-01-20 RX ORDER — IPRATROPIUM BROMIDE AND ALBUTEROL SULFATE 2.5; .5 MG/3ML; MG/3ML
3 SOLUTION RESPIRATORY (INHALATION)
Status: COMPLETED | OUTPATIENT
Start: 2020-01-20 | End: 2020-01-20

## 2020-01-20 RX ORDER — IPRATROPIUM BROMIDE AND ALBUTEROL SULFATE 2.5; .5 MG/3ML; MG/3ML
3 SOLUTION RESPIRATORY (INHALATION)
Status: DISCONTINUED | OUTPATIENT
Start: 2020-01-20 | End: 2020-01-24

## 2020-01-20 RX ADMIN — IPRATROPIUM BROMIDE AND ALBUTEROL SULFATE 3 ML: .5; 3 SOLUTION RESPIRATORY (INHALATION) at 13:56

## 2020-01-20 RX ADMIN — Medication 10 ML: at 07:18

## 2020-01-20 RX ADMIN — OXYBUTYNIN CHLORIDE 10 MG: 5 TABLET, EXTENDED RELEASE ORAL at 11:25

## 2020-01-20 RX ADMIN — METOPROLOL SUCCINATE 50 MG: 50 TABLET, EXTENDED RELEASE ORAL at 10:13

## 2020-01-20 RX ADMIN — OSELTAMIVIR PHOSPHATE 30 MG: 30 CAPSULE ORAL at 08:57

## 2020-01-20 RX ADMIN — HYDROCHLOROTHIAZIDE 12.5 MG: 25 TABLET ORAL at 21:08

## 2020-01-20 RX ADMIN — METHYLPREDNISOLONE SODIUM SUCCINATE 40 MG: 40 INJECTION, POWDER, FOR SOLUTION INTRAMUSCULAR; INTRAVENOUS at 18:14

## 2020-01-20 RX ADMIN — LAMOTRIGINE 25 MG: 25 TABLET ORAL at 11:24

## 2020-01-20 RX ADMIN — ENOXAPARIN SODIUM 30 MG: 30 INJECTION SUBCUTANEOUS at 11:19

## 2020-01-20 RX ADMIN — AZITHROMYCIN MONOHYDRATE 500 MG: 500 INJECTION, POWDER, LYOPHILIZED, FOR SOLUTION INTRAVENOUS at 08:57

## 2020-01-20 RX ADMIN — NIFEDIPINE 60 MG: 30 TABLET, FILM COATED, EXTENDED RELEASE ORAL at 11:24

## 2020-01-20 RX ADMIN — LAMOTRIGINE 25 MG: 25 TABLET ORAL at 18:16

## 2020-01-20 RX ADMIN — FLUTICASONE PROPIONATE 2 SPRAY: 50 SPRAY, METERED NASAL at 11:26

## 2020-01-20 RX ADMIN — LOSARTAN POTASSIUM 100 MG: 100 TABLET, FILM COATED ORAL at 11:20

## 2020-01-20 RX ADMIN — IPRATROPIUM BROMIDE AND ALBUTEROL SULFATE 3 ML: .5; 3 SOLUTION RESPIRATORY (INHALATION) at 07:35

## 2020-01-20 RX ADMIN — Medication 10 ML: at 21:11

## 2020-01-20 RX ADMIN — POTASSIUM CHLORIDE 20 MEQ: 20 TABLET, EXTENDED RELEASE ORAL at 11:19

## 2020-01-20 RX ADMIN — Medication 10 ML: at 18:16

## 2020-01-20 RX ADMIN — METHYLPREDNISOLONE SODIUM SUCCINATE 40 MG: 40 INJECTION, POWDER, FOR SOLUTION INTRAMUSCULAR; INTRAVENOUS at 10:13

## 2020-01-20 RX ADMIN — IPRATROPIUM BROMIDE AND ALBUTEROL SULFATE 3 ML: .5; 3 SOLUTION RESPIRATORY (INHALATION) at 19:16

## 2020-01-20 RX ADMIN — PRAVASTATIN SODIUM 20 MG: 10 TABLET ORAL at 21:08

## 2020-01-20 RX ADMIN — Medication 10 ML: at 09:23

## 2020-01-20 RX ADMIN — ASPIRIN 325 MG: 325 TABLET, FILM COATED ORAL at 08:57

## 2020-01-20 NOTE — ED NOTES
TRANSFER - OUT REPORT:    Verbal report given to Shaw Patel RN(name) on [employer]  being transferred to [facility](unit) for routine progression of care       Report consisted of patients Situation, Background, Assessment and   Recommendations(SBAR). Information from the following report(s) SBAR, Kardex and ED Summary was reviewed with the receiving nurse. Lines:   Peripheral IV 01/20/20 Right Antecubital (Active)   Site Assessment Clean, dry, & intact 1/20/2020  8:06 AM   Phlebitis Assessment 0 1/20/2020  8:06 AM   Infiltration Assessment 0 1/20/2020  8:06 AM   Dressing Status Clean, dry, & intact 1/20/2020  8:06 AM   Hub Color/Line Status Green; Purple 1/20/2020  8:06 AM   Action Taken Blood drawn 1/20/2020  8:06 AM   Alcohol Cap Used Yes 1/20/2020  8:06 AM        Opportunity for questions and clarification was provided.       Patient transported with:   [email or web address]

## 2020-01-20 NOTE — ED TRIAGE NOTES
Pt arrives to the ed via ems for c/o sob. Per pt she had cold like symptoms starting on Friday. Pt states today she woke up with sob. Pt has a history of asthma. Per ems they adminstered  one duo-neb treatment and 10mg dex. On arrival to ed pt is 89% on room air. This rn placed pt on 2l o2 via nasal cannula.

## 2020-01-20 NOTE — PROGRESS NOTES
[facility] was called for a consult for elevated troponin.   Ms. Toy Councilman is seen by Dr. Lavonne Hayes, with [facility]

## 2020-01-20 NOTE — PROGRESS NOTES
TRANSFER - IN REPORT:    Verbal report received from estephanie(name) on Eliud Raza  being received from ED (unit) for routine progression of care      Report consisted of patients Situation, Background, Assessment and   Recommendations(SBAR). Information from the following report(s) SBAR, Kardex, ED Summary, Procedure Summary, Intake/Output, MAR and Recent Results was reviewed with the receiving nurse. Opportunity for questions and clarification was provided. Assessment completed upon patients arrival to unit and care assumed.

## 2020-01-20 NOTE — PROGRESS NOTES
Oseltamivir Renal Dosing    Estimated Creatinine Clearance: 23 mL/min (A) (based on SCr of 1.8 mg/dL (H)).   Estimated Creatinine Clearance (using IBW):19.1 mL/min    Influenza B Antigen NEGATIVE       Microbiology Results (Current encounter)   Date/Time Order Name Specimen Source Lab Status   1/20/20 0923 CULTURE, RESPIRATORY/SPUTUM/BRONCH W GRAM STAIN Sputum --   1/20/20 0906 CULTURE, BLOOD, PAIRED Blood In process   1/20/20 8031 INFLUENZA A & B AG (RAPID TEST) Nasal washing Final result         Indication:  Influ A positive    Dosing: Adults with renal impairment (Severe, CrCl greater than 10 up to 30 mL/min) for influenza treatment: 30 mg orally once daily for 5 days     Plan:  Adjust Oseltamivir treatment dose to 30mg Daily x 5 days due to patient's renal function    Thank you,  Jon Longo, [facility]

## 2020-01-20 NOTE — ED PROVIDER NOTES
EMERGENCY DEPARTMENT HISTORY AND PHYSICAL EXAM      Date: 1/20/2020  Patient Name: Kristel Avilez    History of Presenting Illness     Chief Complaint   Patient presents with    Respiratory Distress       History Provided By: Patient    HPI: Kristel Avilez, 82 y.o. female with PMHx significant for diabetes, CKD, hypertension, asthma, who presents with a chief complaint of shortness of breath. Patient states she developed some cold symptoms about 3 days ago, however this morning was progressively more short of breath. She states she used her home inhaler with only mild relief of her symptoms. She received a DuoNeb and 10 mg of Decadron from EMS. No fevers. No chest pain, abdominal pain, nausea, vomiting. PCP: Shaw Edwards MD    There are no other complaints, changes, or physical findings at this time.     Current Facility-Administered Medications   Medication Dose Route Frequency Provider Last Rate Last Dose    sodium chloride (NS) flush 5-40 mL  5-40 mL IntraVENous Q8H Mir Swenson MD        sodium chloride (NS) flush 5-40 mL  5-40 mL IntraVENous PRN Naveen Montgomery MD        oseltamivir (TAMIFLU) capsule 75 mg  75 mg Oral DAILY Mir Swenson MD        azithromycin (ZITHROMAX) 500 mg in 0.9% sodium chloride 250 mL IVPB  500 mg IntraVENous NOW Mir Swenson MD        acetaminophen (TYLENOL) tablet 650 mg  650 mg Oral Q6H PRN Mir Swenson MD         Current Outpatient Medications   Medication Sig Dispense Refill    potassium chloride (KLOR-CON M20) 20 mEq tablet TAKE 1 TABLET DAILY 90 Tab 3    ibandronate (BONIVA) 150 mg tablet TAKE 1 TABLET ONCE A MONTH 3 Tab 4    TOPROL XL 50 mg XL tablet TAKE 1 TABLET DAILY 90 Tab 3    NIFEdipine ER (PROCARDIA XL) 60 mg ER tablet TAKE 1 TABLET TWICE A  Tab 3    pravastatin (PRAVACHOL) 20 mg tablet TAKE 1 TABLET ONCE EACH NIGHT 90 Tab 9    oxybutynin chloride XL (DITROPAN XL) 10 mg CR tablet TAKE 1 TABLET DAILY 90 Tab 3    lamoTRIgine (LAMICTAL) 25 mg tablet TAKE 1 TABLET TWICE A  Tab 3    MICARDIS 80 mg tablet TAKE 1 TABLET DAILY 90 Tab 49    clotrimazole-betamethasone (LOTRISONE) topical cream Apply  to affected area two (2) times a day. 45 g 3    aspirin (ASPIRIN) 325 mg tablet Take 325 mg by mouth as needed for Pain.  hydroCHLOROthiazide (HYDRODIURIL) 12.5 mg tablet Take 12.5 mg by mouth every Monday, Wednesday, Friday.  meclizine (ANTIVERT) 25 mg tablet Take 25 mg by mouth as needed.  guaiFENesin (MUCUS RELIEF) 400 mg tablet Take 200 mg by mouth every four (4) hours as needed for Congestion.  fluticasone (FLONASE) 50 mcg/actuation nasal spray 2 Sprays by Both Nostrils route two (2) times a day.  calcium carbonate (TUMS PO) Take 500 mg by mouth as needed.  fluticasone-salmeterol (ADVAIR DISKUS) 250-50 mcg/dose diskus inhaler USE 1 INHALATION TWO TIMES A DAY 3 Inhaler 3    albuterol (PROVENTIL HFA, VENTOLIN HFA, PROAIR HFA) 90 mcg/actuation inhaler Take 1 Puff by inhalation every four (4) hours as needed for Wheezing. 3 Inhaler 3    triamcinolone acetonide (KENALOG) 0.1 % topical cream APPLY EXTERNALLY TO THE AFFECTED AREA TWICE DAILY 80 g 0    FREESTYLE LITE STRIPS strip USE TO TEST BLOOD SUGAR ONCE DAILY 100 Strip 2    aspirin delayed-release 81 mg tablet Take 81 mg by mouth daily.  ASCORBATE CALCIUM (VITAMIN C PO) Take 500 mg by mouth daily.        Past History     Past Medical History:  Past Medical History:   Diagnosis Date    Anemia     Arrhythmia     irregular per pt d/t blood disorder    Asthma     Bereavement 2-2-16     die 82 copd,chf,pul htn pn after 48 yr marriage    BPV (benign positional vertigo)     Breast cancer, right breast ([address] 1994    right breast, lumpectomy and radiation    Breast lump 2017    right breast     Chronic kidney disease     kidney cyst - watching    Diabetes ([facility] [address]     controlled with diet    DJD (degenerative joint disease) of knee     Early satiety     Hearing deficit, left     Hereditary spherocytosis (HCC)     HTN (hypertension)     Ill-defined condition     sickle cell trait    Intrahepatic bile duct dilation 09/05/2018    mri    Radiation therapy complication 0657    right breast     Renal cyst 08/2016    ct rt    S/P colonoscopy 4-9-14    md Brian - family hx    S/P colonoscopy 07/20/2016    rt - md brian diverticulosis    Se[facility] ([facility] [address]     Septic arthritis ([address] 3/11    Streptococcal sepsis ([address] 03/2011    Venous insufficiency of both lower extremities      Past Surgical History:  Past Surgical History:   Procedure Laterality Date    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    HX BREAST BIOPSY Right 1994    positive    HX BREAST LUMPECTOMY Right 1994    HX CHOLECYSTECTOMY  1985     Family History:  Family History   Problem Relation Age of Onset    Diabetes Mother     Other Father         'sphero'-blood disorder    Cancer Sister         breast    Breast Cancer Sister 71    Other Sister         spleen removed    Colon Cancer Sister     Other Other         spleen removed    Gall Bladder Disease Other      Social History:  Social History     Tobacco Use    Smoking status: Never Smoker    Smokeless tobacco: Never Used   Substance Use Topics    Alcohol use: No    Drug use: No     Allergies: Allergies   Allergen Reactions    Codeine Nausea and Vomiting and Other (comments)     Upset stomach. Weak.  Other Plant, Animal, Environmental Other (comments)     Dust and mold allergy. Review of Systems   Review of Systems   Constitutional: Negative for chills and fever. HENT: Positive for rhinorrhea. Negative for congestion and sore throat. Respiratory: Positive for shortness of breath. Negative for cough. Cardiovascular: Negative for chest pain. Gastrointestinal: Negative for abdominal pain, nausea and vomiting.    Genitourinary: Negative for dysuria and urgency. Skin: Negative for rash. Neurological: Negative for dizziness, light-headedness and headaches. All other systems reviewed and are negative. Physical Exam   Physical Exam  Vitals signs and nursing note reviewed. Constitutional:       General: She is not in acute distress. Appearance: She is well-developed. HENT:      Head: Normocephalic and atraumatic. Eyes:      Conjunctiva/sclera: Conjunctivae normal.      Pupils: Pupils are equal, round, and reactive to light. Neck:      Musculoskeletal: Normal range of motion. Cardiovascular:      Rate and Rhythm: Regular rhythm. Tachycardia present. Pulmonary:      Effort: Tachypnea present. Breath sounds: No stridor. Decreased breath sounds present. Comments: Diffusely decreased breath sounds  Increased WOB, patient able to speak in full sentences  Abdominal:      General: There is no distension. Palpations: Abdomen is soft. Tenderness: There is no tenderness. Musculoskeletal: Normal range of motion. Skin:     General: Skin is warm and dry. Neurological:      Mental Status: She is alert and oriented to person, place, and time.        Diagnostic Study Results   Labs -     Recent Results (from the past 12 hour(s))   EKG, 12 LEAD, INITIAL    Collection Time: 01/20/20  7:25 AM   Result Value Ref Range    Ventricular Rate 103 BPM    Atrial Rate 103 BPM    P-R Interval 158 ms    QRS Duration 80 ms    Q-T Interval 312 ms    QTC Calculation (Bezet) 408 ms    Calculated P Axis 73 degrees    Calculated R Axis -18 degrees    Calculated T Axis 100 degrees    Diagnosis       Sinus tachycardia with fusion complexes  Nonspecific T wave abnormality  No previous ECGs available     CBC WITH AUTOMATED DIFF    Collection Time: 01/20/20  7:42 AM   Result Value Ref Range    WBC 21.1 (H) 3.6 - 11.0 K/uL    RBC 4.65 3.80 - 5.20 M/uL    HGB 12.6 11.5 - 16.0 g/dL    HCT 37.5 35.0 - 47.0 %    MCV 80.6 80.0 - 99.0 FL    MCH 27.1 26.0 - 34.0 PG MCHC 33.6 30.0 - 36.5 g/dL    RDW 13.2 11.5 - 14.5 %    PLATELET 096 857 - 413 K/uL    MPV 9.8 8.9 - 12.9 FL    NRBC 0.0 0  WBC    ABSOLUTE NRBC 0.00 0.00 - 0.01 K/uL    NEUTROPHILS 78 (H) 32 - 75 %    LYMPHOCYTES 12 12 - 49 %    MONOCYTES 7 5 - 13 %    EOSINOPHILS 1 0 - 7 %    BASOPHILS 1 0 - 1 %    IMMATURE GRANULOCYTES 1 (H) 0.0 - 0.5 %    ABS. NEUTROPHILS 16.4 (H) 1.8 - 8.0 K/UL    ABS. LYMPHOCYTES 2.6 0.8 - 3.5 K/UL    ABS. MONOCYTES 1.5 (H) 0.0 - 1.0 K/UL    ABS. EOSINOPHILS 0.2 0.0 - 0.4 K/UL    ABS. BASOPHILS 0.2 (H) 0.0 - 0.1 K/UL    ABS. IMM. GRANS. 0.1 (H) 0.00 - 0.04 K/UL    DF AUTOMATED     INFLUENZA A & B AG (RAPID TEST)    Collection Time: 01/20/20  7:42 AM   Result Value Ref Range    Influenza A Antigen POSITIVE (A) NEG      Influenza B Antigen NEGATIVE  NEG         Radiologic Studies -   XR CHEST PORT   Final Result   IMPRESSION:   1. No acute process        Xr Chest Port    Result Date: 1/20/2020  IMPRESSION: 1. No acute process    Medical Decision Making   I am the first provider for this patient. I reviewed the vital signs, available nursing notes, past medical history, past surgical history, family history and social history. Vital Signs-Reviewed the patient's vital signs. Patient Vitals for the past 12 hrs:   Temp Pulse Resp BP SpO2   01/20/20 0735 -- -- -- -- 95 %   01/20/20 0712 98.7 °F (37.1 °C) (!) 119 22 166/58 (!) 89 %       Pulse Oximetry Analysis - 89% on RA    Cardiac Monitor:   Rate: 119 bpm  Rhythm: Sinus Tachycardia      ED EKG interpretation:  Rhythm: sinus tachycardia; and regular . Rate (approx.): 103; Axis: normal; P wave: normal; QRS interval: normal ; ST/T wave: non-specific changes; Other findings: borderline ekg. This EKG was interpreted by PRIMITIVO Dunne MD,ED Provider.     Records Reviewed: Nursing Notes and Old Medical Records    Provider Notes (Medical Decision Making):   Ddx: asthma exacerbation, PNA, CHF, influenza, URI    On exam, patient is tachycardic, able to speak in sentences but does have increased work of breathing with diffusely diminished lung sounds. Plan for basic labs, CXR, flu swab, nebs    ED Course:   Initial assessment performed. The patients presenting problems have been discussed, and they are in agreement with the care plan formulated and outlined with them. I have encouraged them to ask questions as they arise throughout their visit. Flu A positive  Lung sounds better on exam, however given hypoxia/flu, will admit to hospitalist  Trop elevated, suspect likely related to underlying illness, pt with no CP. Will give ASA. ED Course as of Jan 20 0830 Mon Jan 20, 2020   0137 Spoke with Dr. Den Mcneil, hospitalist, requests we give tamiflu and azithro, will see patient for admission    [SARAH]      ED Course User Index  Samara Nicole MD       Critical Care:  none    Disposition:    Admission Note:  Patient is being admitted to the hospital by Dr. Den Mcneil, Service: Hospitalist.  The results of their tests and reasons for their admission have been discussed with them and available family. They convey agreement and understanding for the need to be admitted and for their admission diagnosis. Diagnosis     Clinical Impression:   1. Influenza A    2. Mild intermittent asthma with acute exacerbation    3. Hypoxia        This note will not be viewable in [address] Please note that this dictation was completed with eMazeMe, the computer voice recognition software. Quite often unanticipated grammatical, syntax, homophones, and other interpretive errors are inadvertently transcribed by the computer software. Please disregard these errors.   Please excuse any errors that have escaped final proofreading

## 2020-01-20 NOTE — PROGRESS NOTES
Primary Nurse Ginny Puente RN and STEPHANIE acuna performed a dual skin assessment on this patient No impairment noted    Feet red/blanching    Pelon score is 19

## 2020-01-20 NOTE — PHYSICIAN ADVISORY
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 The patient tells me that she usually takes albuterol nebulizer twice daily. This morning, she was more short of breath. She denies having any fever or chills. The patient states she lives alone and nobody else is sick. She has received flu vaccine this year. In the emergency room, the patient was found to be wheezing and her influenza A test was positive. The patient was given 3 breathing treatments and steroid. The patient denies having any chest pain, any nausea, vomiting. STATUS DETERMINATION  On the basis of clinical data, available documentation, we believe that the current status of this patient as INPATIENT is Appropriate     On the basis of above clinical data, this patient's care in acute hospital care setting is expected to exceed two midnights.     Additional comments     Insurance  Payor: Gopal Ivy / Plan: [address] / Product Type: Medicare /    Insurance Information                [employer] Phone:     Subscriber: Kate Herrera Subscriber#: [id number]    Group#:  Precert#:         [employer] Phone:     Subscriber: Fidelia Monge Subscriber#: [id number]    Group#:  Precert#:                  Shavon Keene MD, [place] Physician Avis Boyle, [address]

## 2020-01-20 NOTE — PROGRESS NOTES
Pharmacy Clarification of the Prior to Admission Medication Regimen Retrospective to the Admission Medication Reconciliation    The patient was interviewed regarding clarification of the prior to admission medication regimen. Patient's grandchildren were present in room and obtained permission from patient to discuss drug regimen with visitor(s) present. Patient was questioned regarding use of any other inhalers, topical products, over the counter medications, herbal medications, vitamin products or ophthalmic/nasal/otic medication use. Information Obtained From: RX Query, Patient    Recommendations/Findings: The following amendments were made to the [facility]:     1) Additions: None    2) Removals:   Vitamin C  Meclizine    3) Changes: None    4) Pertinent Pharmacy Findings:  Updated patients preferred outpatient [facility] to[facility] [address] [facility] [address] [facility] [address]  [address]      PTA medication list was corrected to the following:     Prior to Admission Medications   Prescriptions Last Dose Informant Taking? MICARDIS 80 mg tablet 1/19/2020 at Unknown time Self Yes   Sig: TAKE 1 TABLET DAILY   NIFEdipine ER (PROCARDIA XL) 60 mg ER tablet 1/19/2020 at Unknown time Self Yes   Sig: TAKE 1 TABLET TWICE A DAY   TOPROL XL 50 mg XL tablet 1/19/2020 at Unknown time Self Yes   Sig: TAKE 1 TABLET DAILY   albuterol (PROVENTIL HFA, VENTOLIN HFA, PROAIR HFA) 90 mcg/actuation inhaler 1/20/2020 at Unknown time Self Yes   Sig: Take 1 Puff by inhalation every four (4) hours as needed for Wheezing. aspirin (ASPIRIN) 325 mg tablet 12/20/2019 at Unknown time Self Yes   Sig: Take 325 mg by mouth as needed for Pain. aspirin delayed-release 81 mg tablet 1/20/2020 at Unknown time Self Yes   Sig: Take 81 mg by mouth daily. calcium carbonate (TUMS PO) 1/13/2020 at Unknown time Self Yes   Sig: Take 500 mg by mouth as needed. clotrimazole-betamethasone (LOTRISONE) topical cream 2020 at Unknown time Self Yes   Sig: Apply  to affected area two (2) times a day. fluticasone (FLONASE) 50 mcg/actuation nasal spray 2020 at Unknown time Self Yes   Si Sprays by Both Nostrils route two (2) times a day. fluticasone-salmeterol (ADVAIR DISKUS) 250-50 mcg/dose diskus inhaler 2020 at Unknown time Self Yes   Sig: USE 1 INHALATION TWO TIMES A DAY   guaiFENesin (MUCUS RELIEF) 400 mg tablet 2020 at Unknown time Self Yes   Sig: Take 200 mg by mouth every four (4) hours as needed for Congestion. hydroCHLOROthiazide (HYDRODIURIL) 12.5 mg tablet 2020 at Unknown time Self Yes   Sig: Take 12.5 mg by mouth every Monday, Wednesday, Friday.    ibandronate (BONIVA) 150 mg tablet 2019 at Unknown time Self Yes   Sig: TAKE 1 TABLET ONCE A MONTH   lamoTRIgine (LAMICTAL) 25 mg tablet 2020 at Unknown time Self Yes   Sig: TAKE 1 TABLET TWICE A DAY   oxybutynin chloride XL (DITROPAN XL) 10 mg CR tablet 2020 at Unknown time Self Yes   Sig: TAKE 1 TABLET DAILY   potassium chloride (KLOR-CON M20) 20 mEq tablet 2020 at Unknown time Self Yes   Sig: TAKE 1 TABLET DAILY   pravastatin (PRAVACHOL) 20 mg tablet 2020 at Unknown time Self Yes   Sig: TAKE 1 TABLET ONCE EACH NIGHT   triamcinolone acetonide (KENALOG) 0.1 % topical cream 2020 at Unknown time Self Yes   Sig: APPLY EXTERNALLY TO THE AFFECTED AREA TWICE DAILY      Facility-Administered Medications: None          Thank you,  Alanis Nguyen  Medication History Pharmacy Technician

## 2020-01-20 NOTE — PROGRESS NOTES
Pharmacy - Enoxaparin (Lovenox®) Monitoring      Indication: VTE ppx     Current Dose: Enoxaparin 30 mg subcutaneously every 24 hours    Creatinine Clearance (mL/min): 19 mL/min    Current Weight: 76.2 Kg    Labs:  Recent Labs     01/20/20  0742   CREA 1.80*   HGB 12.6        Wt Readings from Last 1 Encounters:   01/20/20 76.2 kg (168 lb)     Ht Readings from Last 1 Encounters:   01/20/20 157.5 cm (62\")       Impression/Plan:   Will change to heparin 5000 units every 8 hours for CrCl < 20 mL/min per renal dosing protocol. Thanks,  Gregorio Hudson, PHARMD      http://[facility]/[place]/[facility]/Pharmacy/Clinical%20Companion/Lovenox%20Dose%20Adjustment%20protocol. pdf

## 2020-01-20 NOTE — PROGRESS NOTES
Bedside and Verbal shift change report GIVEN TO STEPHANIE jo. Report included the following information SBAR, Kardex, ED Summary, Procedure Summary, Intake/Output, MAR and Recent Results. Uneventful shift.

## 2020-01-20 NOTE — H&P
[address]  HISTORY AND PHYSICAL    Name:  Sarah Treviño  MR#:  [id number]  :  1935  ACCOUNT #:  [de-identified]  ADMIT DATE:  2020    PRIMARY CARE PHYSICIAN:      Dr. Tonia De Los Santos. PRESENTING COMPLAINT:      Cough, wheezing, shortness of breath. 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 The patient tells me that she usually takes albuterol nebulizer twice daily. This morning, she was more short of breath. She denies having any fever or chills. The patient states she lives alone and nobody else is sick. She has received flu vaccine this year. In the emergency room, the patient was found to be wheezing and her influenza A test was positive. The patient was given 3 breathing treatments and steroid. The patient denies having any chest pain, any nausea, vomiting. PAST MEDICAL HISTORY:      1. Hereditary spherocytosis. 2.  History of breast cancer. 3.  History of chronic kidney disease. 4.  History of diabetes. 5. DJD. 6.  History of renal cyst.  7.  History of seizures. PAST SURGICAL HISTORY:      Significant for splenectomy, breast lumpectomy and cholecystectomy. ALLERGIES:  INCLUDE CODEINE. CURRENT MEDICATIONS PRIOR TO ADMISSION:  Include albuterol, aspirin, Flonase, Advair, guaifenesin, HCTZ and Lamictal along with Micardis and nifedipine. SOCIOECONOMIC HISTORY:      The patient does not smoke or drink. She lives alone. She is single. REVIEW OF SYSTEMS:  Negative except as mentioned in history of presenting illness. All systems were reviewed. No other positive finding was noticed.     PHYSICAL EXAMINATION:  GENERAL:  The patient is an 82-year-old female not in any acute distress. VITAL SIGNS:  Reveal a temperature of 98.7, blood pressure 156/58, pulse is 108, and respiratory rate is 20 with saturation 95% on 2 L. HEENT:  Pupils are equally reactive to light and accommodation. NECK:  Supple. There is no lymphadenopathy or JVD. CHEST:  Examination with bilateral wheezing with good air entry. CARDIOVASCULAR SYSTEM:  S1 and S2, regular, slightly tachycardic. No murmur. No S3.  ABDOMEN:  Examination reveals no tenderness, no guarding, and no rigidity. Bowel sounds are active. EXTREMITIES:  No pedal edema. CNS:  Examination reveals that the patient is alert and oriented. Has normal strength and normal reflexes. Plantars are downgoing. Cranial nerves are normal.  PSYCHIATRY:  Examination is unremarkable. LABORATORY DATA:  Labs reveal a white count of 21, hemoglobin of 12.6, hematocrit 37.5, MCV is 80.6, platelet count is 779,138. Chemistry:  Sodium 139, potassium is 2.9, chloride is 107, bicarb is 26, gap of 6, glucose 114, BUN is 29, creatinine is 1.8, bilirubin 0.4, protein is 8.4, albumin is 3.5, globulin is 4.9, AST 55, ALT 33, alkaline phosphatase 88, troponin-I is 0.34. Influenza A is positive. Chest x-ray shows no acute process. EKG shows sinus tach with some fusion complex with nonspecific T-wave abnormality. No acute ST-T segment changes are seen. ASSESSMENT AND PLAN:      The patient is an 21-year-old female who has previous history significant for asthma status post splenectomy due to hereditary spherocytosis, seizure disorder, hypertension, hyperlipidemia is admitted with;    1. Asthma exacerbation due to influenza. The patient will be started on DuoNebs. 2.  Add Tamiflu. 3.  Since the patient has leukocytosis, we will also add Zithromax. Chest x-ray does not show any acute infiltrate. 4.  Slightly elevated troponin. The patient does not have any chest pain. EKG does not show any acute changes.   We will follow and ask Cardiology to follow up. The patient has received an aspirin in ER. 5.  History of hypertension and hyperlipidemia. Continue current regimen. 6.  History of chronic kidney disease. We will monitor. 7.  Deep venous thrombosis prophylaxis.       MD QUINTIN Dunn/[email or web address]_T/[email or web address]  D:  01/20/2020 8:47  T:  01/20/2020 12:24  JOB #:  [id number]

## 2020-01-21 ENCOUNTER — APPOINTMENT (OUTPATIENT)
Dept: CT IMAGING | Age: 85
DRG: 194 | End: 2020-01-21
Attending: INTERNAL MEDICINE
Payer: MEDICARE

## 2020-01-21 LAB
ANION GAP SERPL CALC-SCNC: 4 MMOL/L (ref 5–15)
BUN SERPL-MCNC: 28 MG/DL (ref 6–20)
BUN/CREAT SERPL: 18 (ref 12–20)
CALCIUM SERPL-MCNC: 8.5 MG/DL (ref 8.5–10.1)
CHLORIDE SERPL-SCNC: 108 MMOL/L (ref 97–108)
CO2 SERPL-SCNC: 26 MMOL/L (ref 21–32)
CREAT SERPL-MCNC: 1.57 MG/DL (ref 0.55–1.02)
ERYTHROCYTE [DISTWIDTH] IN BLOOD BY AUTOMATED COUNT: 13.3 % (ref 11.5–14.5)
GLUCOSE SERPL-MCNC: 156 MG/DL (ref 65–100)
HCT VFR BLD AUTO: 32.4 % (ref 35–47)
HGB BLD-MCNC: 10.8 G/DL (ref 11.5–16)
MCH RBC QN AUTO: 26.7 PG (ref 26–34)
MCHC RBC AUTO-ENTMCNC: 33.3 G/DL (ref 30–36.5)
MCV RBC AUTO: 80.2 FL (ref 80–99)
NRBC # BLD: 0 K/UL (ref 0–0.01)
NRBC BLD-RTO: 0 PER 100 WBC
PLATELET # BLD AUTO: 311 K/UL (ref 150–400)
PMV BLD AUTO: 10 FL (ref 8.9–12.9)
POTASSIUM SERPL-SCNC: 4 MMOL/L (ref 3.5–5.1)
RBC # BLD AUTO: 4.04 M/UL (ref 3.8–5.2)
SODIUM SERPL-SCNC: 138 MMOL/L (ref 136–145)
TROPONIN I SERPL-MCNC: 0.31 NG/ML
WBC # BLD AUTO: 24.8 K/UL (ref 3.6–11)

## 2020-01-21 PROCEDURE — 94640 AIRWAY INHALATION TREATMENT: CPT

## 2020-01-21 PROCEDURE — 74011250636 HC RX REV CODE- 250/636: Performed by: INTERNAL MEDICINE

## 2020-01-21 PROCEDURE — 85027 COMPLETE CBC AUTOMATED: CPT

## 2020-01-21 PROCEDURE — 84484 ASSAY OF TROPONIN QUANT: CPT

## 2020-01-21 PROCEDURE — 65660000001 HC RM ICU INTERMED STEPDOWN

## 2020-01-21 PROCEDURE — 74011250637 HC RX REV CODE- 250/637: Performed by: HOSPITALIST

## 2020-01-21 PROCEDURE — 71250 CT THORAX DX C-: CPT

## 2020-01-21 PROCEDURE — 77010033678 HC OXYGEN DAILY

## 2020-01-21 PROCEDURE — 94760 N-INVAS EAR/PLS OXIMETRY 1: CPT

## 2020-01-21 PROCEDURE — 74011000250 HC RX REV CODE- 250: Performed by: HOSPITALIST

## 2020-01-21 PROCEDURE — 74011250636 HC RX REV CODE- 250/636: Performed by: HOSPITALIST

## 2020-01-21 PROCEDURE — 80048 BASIC METABOLIC PNL TOTAL CA: CPT

## 2020-01-21 PROCEDURE — 36415 COLL VENOUS BLD VENIPUNCTURE: CPT

## 2020-01-21 RX ADMIN — GUAIFENESIN 200 MG: 200 SOLUTION ORAL at 00:25

## 2020-01-21 RX ADMIN — GUAIFENESIN 200 MG: 200 SOLUTION ORAL at 18:16

## 2020-01-21 RX ADMIN — LOSARTAN POTASSIUM 100 MG: 100 TABLET, FILM COATED ORAL at 08:55

## 2020-01-21 RX ADMIN — IPRATROPIUM BROMIDE AND ALBUTEROL SULFATE 3 ML: .5; 3 SOLUTION RESPIRATORY (INHALATION) at 02:39

## 2020-01-21 RX ADMIN — HEPARIN SODIUM 5000 UNITS: 5000 INJECTION INTRAVENOUS; SUBCUTANEOUS at 12:00

## 2020-01-21 RX ADMIN — FLUTICASONE PROPIONATE 2 SPRAY: 50 SPRAY, METERED NASAL at 18:17

## 2020-01-21 RX ADMIN — FLUTICASONE PROPIONATE 2 SPRAY: 50 SPRAY, METERED NASAL at 09:33

## 2020-01-21 RX ADMIN — METOPROLOL SUCCINATE 50 MG: 50 TABLET, EXTENDED RELEASE ORAL at 08:55

## 2020-01-21 RX ADMIN — METHYLPREDNISOLONE SODIUM SUCCINATE 40 MG: 40 INJECTION, POWDER, FOR SOLUTION INTRAMUSCULAR; INTRAVENOUS at 02:33

## 2020-01-21 RX ADMIN — Medication 10 ML: at 21:40

## 2020-01-21 RX ADMIN — Medication 10 ML: at 00:25

## 2020-01-21 RX ADMIN — PRAVASTATIN SODIUM 20 MG: 10 TABLET ORAL at 21:37

## 2020-01-21 RX ADMIN — NIFEDIPINE 60 MG: 30 TABLET, FILM COATED, EXTENDED RELEASE ORAL at 08:55

## 2020-01-21 RX ADMIN — METHYLPREDNISOLONE SODIUM SUCCINATE 40 MG: 40 INJECTION, POWDER, FOR SOLUTION INTRAMUSCULAR; INTRAVENOUS at 12:00

## 2020-01-21 RX ADMIN — AZITHROMYCIN MONOHYDRATE 500 MG: 500 INJECTION, POWDER, LYOPHILIZED, FOR SOLUTION INTRAVENOUS at 08:54

## 2020-01-21 RX ADMIN — IPRATROPIUM BROMIDE AND ALBUTEROL SULFATE 3 ML: .5; 3 SOLUTION RESPIRATORY (INHALATION) at 13:50

## 2020-01-21 RX ADMIN — IPRATROPIUM BROMIDE AND ALBUTEROL SULFATE 3 ML: .5; 3 SOLUTION RESPIRATORY (INHALATION) at 19:23

## 2020-01-21 RX ADMIN — OSELTAMIVIR PHOSPHATE 30 MG: 30 CAPSULE ORAL at 08:55

## 2020-01-21 RX ADMIN — VANCOMYCIN HYDROCHLORIDE 1500 MG: 10 INJECTION, POWDER, LYOPHILIZED, FOR SOLUTION INTRAVENOUS at 17:13

## 2020-01-21 RX ADMIN — HEPARIN SODIUM 5000 UNITS: 5000 INJECTION INTRAVENOUS; SUBCUTANEOUS at 21:36

## 2020-01-21 RX ADMIN — ASPIRIN 325 MG: 325 TABLET, FILM COATED ORAL at 08:55

## 2020-01-21 RX ADMIN — OXYBUTYNIN CHLORIDE 10 MG: 5 TABLET, EXTENDED RELEASE ORAL at 08:55

## 2020-01-21 RX ADMIN — IPRATROPIUM BROMIDE AND ALBUTEROL SULFATE 3 ML: .5; 3 SOLUTION RESPIRATORY (INHALATION) at 07:57

## 2020-01-21 RX ADMIN — METHYLPREDNISOLONE SODIUM SUCCINATE 40 MG: 40 INJECTION, POWDER, FOR SOLUTION INTRAMUSCULAR; INTRAVENOUS at 17:14

## 2020-01-21 RX ADMIN — LAMOTRIGINE 25 MG: 25 TABLET ORAL at 18:18

## 2020-01-21 RX ADMIN — Medication 10 ML: at 13:38

## 2020-01-21 RX ADMIN — LAMOTRIGINE 25 MG: 25 TABLET ORAL at 09:33

## 2020-01-21 NOTE — PROGRESS NOTES
Reason for Admission:   Influenza               RRAT Score:   30 High risk               Resources/supports as identified by patient/family:  Pt has supportive family to include her children                 Top Challenges facing patient (as identified by patient/family and CM): Finances/Medication cost?    No issues with finance/medication cost. Pt has [facility] Medicare A/B and                 Transportation? Pt does drive              Support system or lack thereof? Pt has a good support system to include her children. Living arrangements? Pt resides alone in an one level home with basement with one KIMANI. Self-care/ADLs/Cognition? Pt is independent with ADL's and IADL's. Current Advanced Directive/Advance Care Plan:  Pt is FULL code status. Pt does not have an ACP on file. CM addressed wit pt about AMD. Pt stated that she would like to address AMD.                           Plan for utilizing home health:    Pt has not had home health in the past. Pt is receptive to home health if needed at d/c. Transition of Care Plan:       Home  Follow-up Appointments  2nd IM Letter    CM met with pt at bedside to discuss d/c plan. Pt is alert and oriented. M verified pt's demographics, insurance and PCP. Pt is a 81 y/o [place] American female admitted to [id number] [facility] on 1/20/2020 for influenza. Pt's PCP is Dr. Effie Zelaya. Pt sees PCP every 3-4 months. Pt uses [address] for Rx. Pt resides alone in an one level home, with basement, with one KIMANI. Pt is independent with ADL's and IADL's. Pt does drive. Pt has a cane and walker. No HH, SNF or acute inpatient rehab in the past. Pt is FULL code status. Pt does not have an ACP on file. Pt's son Sandip Huffman, daughter Livia Braxton or grandson Terra Connelly will transport at d/c.      Care Management Interventions  PCP Verified by CM: Yes(Pt's PCP is  Romel Parents. Pt sees PCP every 3-4 months.)  Palliative Care Criteria Met (RRAT>21 & CHF Dx)?: Yes  Palliative Consult Recommended?: No  Mode of Transport at Discharge: Other (see comment)(Pt's son To Tate, daughter Livia Barba or grandson Vero Mora will transport at d/c. )  Transition of Care Consult (CM Consult): Discharge Planning  Discharge Durable Medical Equipment: No(Pt has a cane and walker.)  Physical Therapy Consult: No  Occupational Therapy Consult: No  Speech Therapy Consult: No  Current Support Network: Own Home, Lives Alone( Pt resides alone in an one level home with basement with one KIMANI.)  Confirm Follow Up Transport: Self(Pt does drive)  Discharge Location  Discharge Placement: (Home with Follow-up Appointments)    CM will continue to follow patient for discharge planning needs and arrange for services as deemed necessary.     Dutch Landers [address] [place] [address]  [phone]

## 2020-01-21 NOTE — PROGRESS NOTES
Problem: Risk for Spread of Infection  Goal: Prevent transmission of infectious organism to others  Description  Prevent the transmission of infectious organisms to other patients, staff members, and visitors. Outcome: Progressing Towards Goal     Problem: Falls - Risk of  Goal: *Absence of Falls  Description  Document Kennedy Glover Fall Risk and appropriate interventions in the flowsheet.   Outcome: Progressing Towards Goal  Note: Fall Risk Interventions:            Medication Interventions: Bed/chair exit alarm, Patient to call before getting OOB, Teach patient to arise slowly         History of Falls Interventions: Bed/chair exit alarm

## 2020-01-21 NOTE — PROGRESS NOTES
Bedside shift change report GIVEN TO Cindy Muñiz RN. Report included the following information SBAR and Kardex. SIGNIFICANT CHANGES DURING SHIFT:  None. CONCERNS TO ADDRESS WITH MD:  None. [id number] NURSING NOTE   Admission Date 1/20/2020   Admission Diagnosis Influenza [J11.1]   Consults IP CONSULT TO HOSPITALIST  IP CONSULT TO CARDIOLOGY      Cardiac Monitoring [x] Yes [] No      Purposeful Hourly Rounding [x] Yes    Danitza Score Total Score: 1   Danitza score 3 or > [x] Bed Alarm [] Avasys [] 1:1 sitter [] Patient refused (Signed refusal form in chart)   Pelon Score Pelon Score: 19   Pelon score 14 or < [] PMT consult [] Wound Care consult    []  Specialty bed  [] Nutrition consult      Influenza Vaccine Received Flu Vaccine for Current Season (usually Sept-March): Yes           Oxygen needs? [] Room air Oxygen @  []1L    [x]2L    []3L   []4L    []5L   []6L via  NC   Chronic home O2 use?  [] Yes [x] No  Perform O2 challenge test and document in progress note using smartphrase (.Homeoxygen)      Last bowel movement        Urinary Catheter             LDAs               Peripheral IV 01/20/20 Right Antecubital (Active)   Site Assessment Clean, dry, & intact 1/21/2020  3:00 AM   Phlebitis Assessment 0 1/21/2020  3:00 AM   Infiltration Assessment 0 1/21/2020  3:00 AM   Dressing Status Clean, dry, & intact 1/21/2020  3:00 AM   Dressing Type Transparent;Tape 1/21/2020  3:00 AM   Hub Color/Line Status Flushed;Blue 1/21/2020  3:00 AM   Action Taken Blood drawn 1/20/2020  8:06 AM   Alcohol Cap Used Yes 1/20/2020  8:06 AM       Peripheral IV 01/20/20 Left Antecubital (Active)   Site Assessment Clean, dry, & intact 1/21/2020  3:00 AM   Phlebitis Assessment 0 1/21/2020  3:00 AM   Infiltration Assessment 0 1/21/2020  3:00 AM   Dressing Status Clean, dry, & intact 1/21/2020  3:00 AM   Dressing Type Transparent;Tape 1/21/2020  3:00 AM   Hub Color/Line Status Flushed;Blue 1/21/2020  3:00 AM Readmission Risk Assessment Tool Score High Risk            30       Total Score        3 Has Seen PCP in Last 6 Months (Yes=3, No=0)    5 Pt. Coverage (Medicare=5 , Medicaid, or Self-Pay=4)    22 Charlson Comorbidity Score (Age + Comorbid Conditions)        Criteria that do not apply:    . Living with Significant Other. Assisted Living. LTAC. SNF.  or   Rehab    Patient Length of Stay (>5 days = 3)    IP Visits Last 12 Months (1-3=4, 4=9, >4=11)       Expected Length of Stay - - -   Actual Length of Stay 1

## 2020-01-21 NOTE — PROGRESS NOTES
General Daily Progress Note    Admit Date: 1/20/2020    Subjective:     Patient complains of coughing. Current Facility-Administered Medications   Medication Dose Route Frequency    sodium chloride (NS) flush 5-40 mL  5-40 mL IntraVENous PRN    acetaminophen (TYLENOL) tablet 650 mg  650 mg Oral Q6H PRN    sodium chloride (NS) flush 5-40 mL  5-40 mL IntraVENous Q8H    fluticasone propionate (FLONASE) 50 mcg/actuation nasal spray 2 Spray  2 Spray Both Nostrils BID    guaiFENesin (ROBITUSSIN) 100 mg/5 mL oral liquid 200 mg  200 mg Oral Q4H PRN    lamoTRIgine (LaMICtal) tablet 25 mg  25 mg Oral BID    NIFEdipine ER (PROCARDIA XL) tablet 60 mg  60 mg Oral DAILY    oxybutynin chloride XL (DITROPAN XL) tablet 10 mg  10 mg Oral DAILY    pravastatin (PRAVACHOL) tablet 20 mg  20 mg Oral QHS    metoprolol succinate (TOPROL-XL) XL tablet 50 mg  50 mg Oral DAILY    albuterol-ipratropium (DUO-NEB) 2.5 MG-0.5 MG/3 ML  3 mL Nebulization Q6H RT    azithromycin (ZITHROMAX) 500 mg in 0.9% sodium chloride (MBP/ADV) 250 mL  500 mg IntraVENous Q24H    methylPREDNISolone (PF) (SOLU-MEDROL) injection 40 mg  40 mg IntraVENous Q8H    aspirin tablet 325 mg  325 mg Oral DAILY    losartan (COZAAR) tablet 100 mg  100 mg Oral DAILY    oseltamivir (TAMIFLU) capsule 30 mg  30 mg Oral DAILY    heparin (porcine) injection 5,000 Units  5,000 Units SubCUTAneous Q8H        Review of Systems  A comprehensive review of systems was negative.     Objective:     Patient Vitals for the past 24 hrs:   BP Temp Pulse Resp SpO2 Height Weight   01/21/20 0757 -- -- -- -- 98 % -- --   01/21/20 0733 125/81 98.3 °F (36.8 °C) 76 18 97 % -- --   01/21/20 0335 162/61 98.7 °F (37.1 °C) 86 18 94 % -- --   01/21/20 0239 -- -- -- -- 98 % -- --   01/20/20 2212 121/51 99.2 °F (37.3 °C) 87 20 98 % -- --   01/20/20 1916 -- -- -- -- 96 % -- --   01/20/20 1833 142/53 98.5 °F (36.9 °C) 88 20 96 % -- --   01/20/20 1511 -- 99 °F (37.2 °C) -- -- -- -- --   01/20/20 1453 148/65 100.1 °F (37.8 °C) 96 20 99 % -- --   01/20/20 1356 -- -- -- -- 100 % -- --   01/20/20 1200 144/59 -- 95 24 96 % -- --   01/20/20 1145 158/65 -- 99 19 97 % -- --   01/20/20 1130 153/78 -- 98 13 96 % -- --   01/20/20 1124 148/73 -- 96 -- -- -- --   01/20/20 1120 148/73 -- 97 -- -- -- --   01/20/20 1115 148/73 -- (!) 104 21 98 % -- --   01/20/20 1100 150/80 -- (!) 105 23 96 % -- --   01/20/20 1056 -- -- -- -- -- 5' 2\" (1.575 m) 168 lb (76.2 kg)   01/20/20 1045 147/77 -- (!) 103 21 98 % -- --   01/20/20 1030 144/58 -- (!) 106 20 97 % -- --   01/20/20 1015 149/56 -- (!) 108 27 98 % -- --   01/20/20 1013 149/56 -- (!) 107 -- -- -- --   01/20/20 1000 (!) 124/99 -- (!) 103 29 98 % -- --   01/20/20 0945 150/66 -- (!) 105 27 97 % -- --   01/20/20 0930 151/63 -- (!) 107 19 98 % -- --   01/20/20 0915 151/66 -- (!) 108 30 96 % -- --   01/20/20 0900 167/71 -- (!) 111 29 96 % -- --     No intake/output data recorded. 01/19 1901 - 01/21 0700  In: 200 [P.O.:200]  Out: -     Physical Exam:   Visit Vitals  /81   Pulse 76   Temp 98.3 °F (36.8 °C)   Resp 18   Ht 5' 2\" (1.575 m)   Wt 168 lb (76.2 kg)   SpO2 98%   BMI 30.73 kg/m²     General appearance: alert, cooperative, no distress, appears stated age  Neck: supple, symmetrical, trachea midline, no adenopathy, thyroid: not enlarged, symmetric, no tenderness/mass/nodules, no carotid bruit and no JVD  Lungs: rhonchi R base, L base  Heart: regular rate and rhythm, S1, S2 normal, no murmur, click, rub or gallop  Abdomen: soft, non-tender.  Bowel sounds normal. No masses,  no organomegaly  Extremities: extremities normal, atraumatic, no cyanosis or edema        Data Review   Recent Results (from the past 24 hour(s))   URINALYSIS W/ REFLEX CULTURE    Collection Time: 01/20/20  9:06 AM   Result Value Ref Range    Color YELLOW/STRAW      Appearance CLEAR CLEAR      Specific gravity 1.009 1.003 - 1.030      pH (UA) 6.5 5.0 - 8.0      Protein 100 (A) NEG mg/dL    Glucose NEGATIVE  NEG mg/dL    Ketone NEGATIVE  NEG mg/dL    Bilirubin NEGATIVE  NEG      Blood SMALL (A) NEG      Urobilinogen 0.2 0.2 - 1.0 EU/dL    Nitrites NEGATIVE  NEG      Leukocyte Esterase NEGATIVE  NEG      WBC 0-4 0 - 4 /hpf    RBC 0-5 0 - 5 /hpf    Epithelial cells FEW FEW /lpf    Bacteria NEGATIVE  NEG /hpf    UA:UC IF INDICATED CULTURE NOT INDICATED BY UA RESULT CNI      Hyaline cast 0-2 0 - 5 /lpf   CULTURE, BLOOD, PAIRED    Collection Time: 01/20/20  9:06 AM   Result Value Ref Range    Special Requests: NO SPECIAL REQUESTS      Culture result:        ONE OF TWO BOTTLES HAS BEEN FLAGGED POSITIVE BY INSTRUMENT. BOTTLE HAS BEEN SENT TO [facility] TO ASSESS FOR POSSIBLE GROWTH.     Culture result: REMAINING BOTTLE(S) HAS/HAVE NO GROWTH SO FAR     TROPONIN I    Collection Time: 01/20/20  4:03 PM   Result Value Ref Range    Troponin-I, Qt. 0.26 (H) <0.05 ng/mL   TROPONIN I    Collection Time: 01/21/20 12:07 AM   Result Value Ref Range    Troponin-I, Qt. 0.31 (H) <2.29 ng/mL   METABOLIC PANEL, BASIC    Collection Time: 01/21/20 12:07 AM   Result Value Ref Range    Sodium 138 136 - 145 mmol/L    Potassium 4.0 3.5 - 5.1 mmol/L    Chloride 108 97 - 108 mmol/L    CO2 26 21 - 32 mmol/L    Anion gap 4 (L) 5 - 15 mmol/L    Glucose 156 (H) 65 - 100 mg/dL    BUN 28 (H) 6 - 20 MG/DL    Creatinine 1.57 (H) 0.55 - 1.02 MG/DL    BUN/Creatinine ratio 18 12 - 20      GFR est AA 38 (L) >60 ml/min/1.73m2    GFR est non-AA 32 (L) >60 ml/min/1.73m2    Calcium 8.5 8.5 - 10.1 MG/DL   CBC W/O DIFF    Collection Time: 01/21/20 12:07 AM   Result Value Ref Range    WBC 24.8 (H) 3.6 - 11.0 K/uL    RBC 4.04 3.80 - 5.20 M/uL    HGB 10.8 (L) 11.5 - 16.0 g/dL    HCT 32.4 (L) 35.0 - 47.0 %    MCV 80.2 80.0 - 99.0 FL    MCH 26.7 26.0 - 34.0 PG    MCHC 33.3 30.0 - 36.5 g/dL    RDW 13.3 11.5 - 14.5 %    PLATELET 249 035 - 949 K/uL    MPV 10.0 8.9 - 12.9 FL    NRBC 0.0 0  WBC    ABSOLUTE NRBC 0.00 0.00 - 0.01 K/uL           Assessment: Active Problems:    Influenza (1/20/2020)        Plan:     1. Patient has influenza. 2.  Bronchospasm has flared now being aggressively treated. Chest x-ray remains negative however white count is elevated which started before steroids were used. Will further clarify.

## 2020-01-21 NOTE — PROGRESS NOTES
ADULT PROTOCOL: JET AEROSOL ASSESSMENT    Patient  Robert Velasco     82 y.o.   female     1/21/2020  5:01 AM    Breath Sounds Pre Procedure: Right Breath Sounds: Wheezing                               Left Breath Sounds: Wheezing    Breath Sounds Post Procedure: Right Breath Sounds: Wheezing                                 Left Breath Sounds: Wheezing    Breathing pattern: Pre procedure Breathing Pattern: Regular          Post procedure Breathing Pattern: Regular    Heart Rate: Pre procedure Pulse: 92           Post procedure Pulse: 90    Resp Rate: Pre procedure Respirations: 16           Post procedure Respirations: 16    Oxygen: O2 Device: Nasal cannula   Flow rate (L/min) 2      Changed: NO    SpO2: Pre procedure SpO2: 98 %   with oxygen              Post procedure SpO2: 96 %  with oxygen    Nebulizer Therapy: Current medications Aerosolized Medications: DuoNeb      Changed: NO    Problem List:   Patient Active Problem List   Diagnosis Code    Anemia NEC     Asthma J45.909    Hereditary spherocytosis (HCC) D58.0    HTN (hypertension) I10    Breast cancer, right breast ([address] C50.911    Anemia D64.9    BPV (benign positional vertigo) H81.10    DJD (degenerative joint disease) of knee M17.10    S/P colonoscopy Z98.890    Early satiety R68.81    Venous insufficiency of both lower extremities I87.2    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.21    Renal cyst N28.1    Intrahepatic bile duct dilation K83.8    Hearing deficit, left H91.92    Influenza J11.1       Respiratory Therapist: Jorge Forde

## 2020-01-21 NOTE — PROGRESS NOTES
Progress Note      1/21/2020 10:00 AM  NAME: Basilio Jennings   MRN:  [id number]   Admit Diagnosis: Influenza [J11.1]     Assessment:     Acute respiratory infection with Influenze A . Positive testing   Incidental elevation of Troponin. CKD stage 3   Hypertension. Normal nuclear stress test 2017  No known heart diseae. Diabetes     1/21 no new cardiac issues.        Cardiologist : Dr Hodan Pineda. Plan:     Continue current regimen       []        High complexity decision making was performed    Subjective:     Basilio Jennings denies chest pain, dyspnea. Discussed with RN events overnight. 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  Most recent are:    Visit Vitals  /81 (BP Patient Position: At rest)   Pulse 76   Temp 98.3 °F (36.8 °C)   Resp 18   Ht 5' 2\" (1.575 m)   Wt 76.2 kg (168 lb)   SpO2 98%   BMI 30.73 kg/m²       Intake/Output Summary (Last 24 hours) at 1/21/2020 1000  Last data filed at 1/21/2020 0300  Gross per 24 hour   Intake 200 ml   Output --   Net 200 ml        General Appearance: Well developed, well nourished, alert & oriented x 3,    no acute distress. Ears/Nose/Mouth/Throat: Hearing grossly normal.  Neck: Supple. Chest: Lungs clear to auscultation bilaterally. Cardiovascular: Regular rate and rhythm, S1S2 normal, no murmur. Abdomen: Soft, non-tender, bowel sounds are active. Extremities: No edema bilaterally. Skin: Warm and dry. PMH/SH reviewed - no change compared to H&P    Data Review    Telemetry: normal sinus rhythm     Lab Data Personally Reviewed:    Recent Labs     01/21/20  0007 01/20/20  0742   WBC 24.8* 21.1*   HGB 10.8* 12.6   HCT 32.4* 37.5    339   LABRCNT(INR:3,PTP:3,APTT:3,)  Recent Labs     01/21/20  0007 01/20/20  0742    139   K 4.0 3.9    107   CO2 26 26   BUN 28* 29*   CREA 1.57* 1.80*   * 114*   CA 8.5 8.6   LABRCNT(CPK:3,CpKMB:3,ckndx:3,troiq:3)  Lab Results   Component Value Date/Time    Cholesterol, total 150 02/06/2019 01:45 PM    HDL Cholesterol 82 02/06/2019 01:45 PM    LDL, calculated 53 02/06/2019 01:45 PM    Triglyceride 77 02/06/2019 01:45 PM   LABRCNT(sgot:3,gpt:3,ap:3,tbiL:3,TP:3,ALB:3,GLOB:3,ggt:3,aml:3,amyp:3,lpse:3,hlpse:3)No results for input(s): PH, PCO2, PO2 in the last 72 hours. Lab Results   Component Value Date/Time    Cholesterol, total 150 02/06/2019 01:45 PM    HDL Cholesterol 82 02/06/2019 01:45 PM    LDL, calculated 53 02/06/2019 01:45 PM    Triglyceride 77 02/06/2019 01:45 PM   MEDKiah Pérez MD  No results for input(s): PH, PCO2, PO2 in the last 72 hours.     Medications Personally Reviewed:    Current Facility-Administered Medications   Medication Dose Route Frequency    sodium chloride (NS) flush 5-40 mL  5-40 mL IntraVENous PRN    acetaminophen (TYLENOL) tablet 650 mg  650 mg Oral Q6H PRN    sodium chloride (NS) flush 5-40 mL  5-40 mL IntraVENous Q8H    fluticasone propionate (FLONASE) 50 mcg/actuation nasal spray 2 Spray  2 Spray Both Nostrils BID    guaiFENesin (ROBITUSSIN) 100 mg/5 mL oral liquid 200 mg  200 mg Oral Q4H PRN    lamoTRIgine (LaMICtal) tablet 25 mg  25 mg Oral BID    NIFEdipine ER (PROCARDIA XL) tablet 60 mg  60 mg Oral DAILY    oxybutynin chloride XL (DITROPAN XL) tablet 10 mg  10 mg Oral DAILY    pravastatin (PRAVACHOL) tablet 20 mg  20 mg Oral QHS    metoprolol succinate (TOPROL-XL) XL tablet 50 mg  50 mg Oral DAILY    albuterol-ipratropium (DUO-NEB) 2.5 MG-0.5 MG/3 ML  3 mL Nebulization Q6H RT    azithromycin (ZITHROMAX) 500 mg in 0.9% sodium chloride (MBP/ADV) 250 mL  500 mg IntraVENous Q24H    methylPREDNISolone (PF) (SOLU-MEDROL) injection 40 mg  40 mg IntraVENous Q8H    aspirin tablet 325 mg  325 mg Oral DAILY    losartan (COZAAR) tablet 100 mg  100 mg Oral DAILY    oseltamivir (TAMIFLU) capsule 30 mg  30 mg Oral DAILY    heparin (porcine) injection 5,000 Units  5,000 Units SubCUTAneous Joselyn Wisdom MD

## 2020-01-21 NOTE — PROGRESS NOTES
Pharmacy Automatic Renal Dosing Protocol - Antimicrobials    Indication for Antimicrobials: Bacteremia;  CAP & Flu A    Current Regimen of Each Antimicrobial:  Vancomycin  (Start Date ; Day # 1)  Azithromycin 500mg IV q24h x5 days; start ; Day     Previous Antimicrobial Therapy:  Oseltamivir     Goal Level: VANCOMYCIN TROUGH GOAL RANGE    Vancomycin Trough: 15 - 20 mcg/mL  (AUC: 400 - 600 mg/hr/Liter/day)     Date Dose & Interval Measured (mcg/mL) Extrapolated (mcg/mL)                       Date & time of next level:  (not ordered yet)    Significant Cultures:   : Blood = GPC chains /2 (pending)  : Respiratory = (pending)    Radiology / Imaging results: (X-ray, CT scan or MRI):   : CT Chest  1. Minimal centrilobular nodular opacities are seen in the right middle lobe  compatible with infection which may be typical or atypical.  3. Bilateral atelectasis. Paralysis, amputations, malnutrition: none noted    Labs:  Recent Labs     20  0007 20  0742   CREA 1.57* 1.80*   BUN 28* 29*   WBC 24.8* 21.1*     Temp (24hrs), Av.8 °F (37.1 °C), Min:98.1 °F (36.7 °C), Max:100.1 °F (37.8 °C)    Creatinine Clearance (mL/min) or Dialysis: 22 ml/min    Impression/Plan:   Blood w/ GPC chains 1/2 (pending)  Vancomycin 1.5gm IV x1, then 500mg IV q16h for Trough Range 15-20 & -166  Vancomycin Trough on  (not ordered yet)  Continue azithromycin 500mg IV q24h  Oseltamivir complete  Daily BMP  Antimicrobial stop date Azithromycin = 5 days; Vancomycin = pending     Pharmacy will follow daily and adjust medications as appropriate for renal function and/or serum levels. Thank you,  Shabnam Marlow [facility]    Recommended duration of therapy  [email or web address]. docx    Renal Dosing  [email or web address]/Renal%[id number]. pdf

## 2020-01-21 NOTE — PROGRESS NOTES
0700: Bedside shift change report given to ELLYN Mejias RN (oncoming nurse) by [employer], RN (offgoing nurse). Report included the following information SBAR and Kardex. 1400: RN notified Dr. Dru Thompson about [facility] result, orders put in for vancomycin and pharmacy to dose.    1900:   Bedside shift change report GIVEN TO STEPHANIE Maravilla. Report included the following information SBAR and Kardex. SIGNIFICANT CHANGES DURING SHIFT:  Pt had a good day, sat in chair most of afternoon, weaned off O2      CONCERNS TO ADDRESS WITH MD:  D/c planning? [id number] NURSING NOTE   Admission Date 1/20/2020   Admission Diagnosis Influenza [J11.1]   Consults IP CONSULT TO HOSPITALIST  IP CONSULT TO CARDIOLOGY      Cardiac Monitoring [x] Yes [] No      Purposeful Hourly Rounding [x] Yes    Danitza Score Total Score: 2   Danitza score 3 or > [x] Bed Alarm [] Avasys [] 1:1 sitter [] Patient refused (Signed refusal form in chart)   Pelon Score Pelon Score: 19   Pelon score 14 or < [] PMT consult [] Wound Care consult    []  Specialty bed  [] Nutrition consult      Influenza Vaccine Received Flu Vaccine for Current Season (usually Sept-March): Yes           Oxygen needs? [x] Room air Oxygen @  []1L    []2L    []3L   []4L    []5L   []6L via  NC   Chronic home O2 use?  [] Yes [] No  Perform O2 challenge test and document in progress note using smartphrase (.Homeoxygen)      Last bowel movement        Urinary Catheter             LDAs               Peripheral IV 01/20/20 Right Antecubital (Active)   Site Assessment Clean, dry, & intact 1/21/2020  3:12 PM   Phlebitis Assessment 0 1/21/2020  3:12 PM   Infiltration Assessment 0 1/21/2020  3:12 PM   Dressing Status Clean, dry, & intact 1/21/2020  3:12 PM   Dressing Type Transparent;Tape 1/21/2020  3:12 PM   Hub Color/Line Status Blue 1/21/2020  3:12 PM   Action Taken Blood drawn 1/20/2020  8:06 AM   Alcohol Cap Used Yes 1/20/2020  8:06 AM       Peripheral IV 01/21/20 Left;Posterior Forearm (Active) Site Assessment Clean, dry, & intact 1/21/2020  6:28 PM   Phlebitis Assessment 0 1/21/2020  6:28 PM   Infiltration Assessment 0 1/21/2020  6:28 PM   Dressing Status Clean, dry, & intact 1/21/2020  6:28 PM   Dressing Type Transparent;Tape 1/21/2020  6:28 PM   Hub Color/Line Status Blue 1/21/2020  6:28 PM                         Readmission Risk Assessment Tool Score High Risk            30       Total Score        3 Has Seen PCP in Last 6 Months (Yes=3, No=0)    5 Pt. Coverage (Medicare=5 , Medicaid, or Self-Pay=4)    22 Charlson Comorbidity Score (Age + Comorbid Conditions)        Criteria that do not apply:    . Living with Significant Other. Assisted Living. LTAC. SNF.  or   Rehab    Patient Length of Stay (>5 days = 3)    IP Visits Last 12 Months (1-3=4, 4=9, >4=11)       Expected Length of Stay - - -   Actual Length of Stay 1

## 2020-01-21 NOTE — CONSULTS
Consult/Admission    NAME: Tadeo Gonzalez   :  1935   MRN:  [id number]     Date/Time:  2020 8:29 PM    Patient PCP: Nicole Durand MD  ________________________________________________________________________     Assessment:     Acute respiratory infection with Influenze A . Positive testing   Incidental elevation of Troponin. CKD stage 3   Hypertension. Normal nuclear stress test   No known heart diseae. Diabetes     Cardiologist : Dr Mariely Graves. Plan:     Continue with treatment of Pulmonary illness. No cardiac testing or intervention is needed at this time. [x]           High complexity decision making was performed        Subjective:   CHIEF COMPLAINT:  SOB     HISTORY OF PRESENT ILLNESS:     Edgard Cowart is a 82 y.o.  female who is admitted with acute respiratory illness due to Influenza A . Positive testing . No cardiac issues. incidental elevation of Troponin noted.         Prior cardiac history is notable for:  None       Past Medical History:   Diagnosis Date    Anemia     Arrhythmia     irregular per pt d/t blood disorder    Asthma     Bereavement 18     die 82 copd,chf,pul htn pn after 48 yr marriage    BPV (benign positional vertigo)     Breast cancer, [facility] [facility]     right breast, lumpectomy and radiation    Breast lump     right breast     Chronic kidney disease     kidney cyst - watching    Diabetes ([facility] [address]     controlled with diet    DJD (degenerative joint disease) of knee     Early satiety     Hearing deficit, left     Hereditary spherocytosis (HCC)     HTN (hypertension)     Ill-defined condition     sickle cell trait    Intrahepatic bile duct dilation 2018    mri    Radiation therapy complication 1168    right breast     Renal cyst 2016    ct rt    S/P colonoscopy 14    md Brian - family hx    S/P colonoscopy 2016    rt - md brian diverticulosis    Seizures ([facility] [address]     Septic arthritis ([facility] [address] 3/11    Streptococcal sepsis ([facility] [address] 03/2011    Venous insufficiency of both lower extremities       Past Surgical History:   Procedure Laterality Date    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 Weak.  Other Plant, Animal, Environmental Other (comments)     Dust and mold allergy.       Meds:  See below  Social History     Tobacco Use    Smoking status: Never Smoker    Smokeless tobacco: Never Used   Substance Use Topics    Alcohol use: No      Family History   Problem Relation Age of Onset    Diabetes Mother     Other Father         'sphero'-blood disorder    Cancer Sister         breast    Breast Cancer Sister 71    Other Sister         spleen removed    Colon Cancer Sister     Other Other         spleen removed    Gall Bladder Disease Other        REVIEW OF SYSTEMS:     []            Unable to obtain  ROS due to ---   [x]            Total of 12 systems reviewed as follows:    Constitutional: negative fever, negative chills, negative weight loss  Eyes:   negative visual changes  ENT:   negative sore throat, tongue or lip swelling  Respiratory:  negative cough, negative dyspnea  Cards:  negative for chest pain, palpitations, lower extremity edema  GI:   negative for nausea, vomiting, diarrhea, and abdominal pain  Genitourinary: negative for frequency, dysuria  Integument:  negative for rash   Hematologic:  negative for easy bruising and gum/nose bleeding  Musculoskel: negative for myalgias,  back pain  Neurological:  negative for headaches, dizziness, vertigo, weakness  Behavl/Psych: negative for feelings of anxiety, depression     Pertinent Positives include :    Objective:      Physical Exam:    Last 24hrs VS reviewed since prior progress note. Most recent are:    Visit Vitals  /53 (BP 1 Location: Right arm, BP Patient Position: Supine)   Pulse 88   Temp 98.5 °F (36.9 °C)   Resp 20   Ht 5' 2\" (1.575 m)   Wt 76.2 kg (168 lb)   SpO2 96%   BMI 30.73 kg/m²     No intake or output data in the 24 hours ending 01/20/20 2029     General Appearance: Well developed, well nourished, alert & oriented x 3,    no acute distress. Ears/Nose/Mouth/Throat: Pupils equal and round, Hearing grossly normal.  Neck: Supple. JVP within normal limits. Carotids good upstrokes, with no bruit. Chest: Lungs clear to auscultation bilaterally. Cardiovascular: Regular rate and rhythm, S1S2 normal, no murmur, rubs, gallops. Abdomen: Soft, non-tender, bowel sounds are active. No organomegaly. Extremities: No edema bilaterally. Femoral pulses +2, Distal Pulses +1. Skin: Warm and dry. Neuro: CN II-XII grossly intact, Strength and sensation grossly intact. Data:      Prior to Admission medications    Medication Sig Start Date End Date Taking? Authorizing Provider   potassium chloride (KLOR-CON M20) 20 mEq tablet TAKE 1 TABLET DAILY 11/5/19  Yes Abdullahi Whitney MD   ibandronate (BONIVA) 150 mg tablet TAKE 1 TABLET ONCE A MONTH 9/29/19  Yes Abdullahi Whitney MD   TOPROL XL 50 mg XL tablet TAKE 1 TABLET DAILY 8/12/19  Yes Abdullahi Whitney MD   NIFEdipine ER (PROCARDIA XL) 60 mg ER tablet TAKE 1 TABLET TWICE A DAY 8/12/19  Yes Abdullahi Whitney MD   pravastatin (PRAVACHOL) 20 mg tablet TAKE 1 TABLET ONCE EACH NIGHT 8/3/19  Yes Abdullahi Whitney MD   oxybutynin chloride XL (DITROPAN XL) 10 mg CR tablet TAKE 1 TABLET DAILY 5/11/19  Yes Abdullahi Whitney MD   lamoTRIgine (LAMICTAL) 25 mg tablet TAKE 1 TABLET TWICE A DAY 3/11/19  Yes Abdullahi Whitney MD   MICARDIS 80 mg tablet TAKE 1 TABLET DAILY 3/6/19  Yes Abdullahi Whitney MD   clotrimazole-betamethasone (LOTRISONE) topical cream Apply  to affected area two (2) times a day. 3/5/19  Yes Khai Escobedo MD   aspirin (ASPIRIN) 325 mg tablet Take 325 mg by mouth as needed for Pain. Yes Provider, Historical   hydroCHLOROthiazide (HYDRODIURIL) 12.5 mg tablet Take 12.5 mg by mouth every Monday, Wednesday, Friday. Yes Provider, Historical   guaiFENesin (MUCUS RELIEF) 400 mg tablet Take 200 mg by mouth every four (4) hours as needed for Congestion. Yes Provider, Historical   fluticasone (FLONASE) 50 mcg/actuation nasal spray 2 Sprays by Both Nostrils route two (2) times a day. Yes Provider, Historical   calcium carbonate (TUMS PO) Take 500 mg by mouth as needed. Yes Provider, Historical   fluticasone-salmeterol (ADVAIR DISKUS) 250-50 mcg/dose diskus inhaler USE 1 INHALATION TWO TIMES A DAY 8/17/18  Yes Khai Escobedo MD   albuterol (PROVENTIL HFA, VENTOLIN HFA, PROAIR HFA) 90 mcg/actuation inhaler Take 1 Puff by inhalation every four (4) hours as needed for Wheezing. 8/17/18  Yes Khai Escobedo MD   triamcinolone acetonide (KENALOG) 0.1 % topical cream APPLY EXTERNALLY TO THE AFFECTED AREA TWICE DAILY 1/25/18  Yes Khai Escobedo MD   aspirin delayed-release 81 mg tablet Take 81 mg by mouth daily.    Yes Provider, Historical       Recent Results (from the past 24 hour(s))   EKG, 12 LEAD, INITIAL    Collection Time: 01/20/20  7:25 AM   Result Value Ref Range    Ventricular Rate 103 BPM    Atrial Rate 103 BPM    P-R Interval 158 ms    QRS Duration 80 ms    Q-T Interval 312 ms    QTC Calculation (Bezet) 408 ms    Calculated P Axis 73 degrees    Calculated R Axis -18 degrees    Calculated T Axis 100 degrees    Diagnosis       Sinus tachycardia with fusion complexes  Nonspecific T wave abnormality  No previous ECGs available  Confirmed by Carla Fisher ([id number]) on 1/20/2020 11:31:05 AM     CBC WITH AUTOMATED DIFF    Collection Time: 01/20/20  7:42 AM   Result Value Ref Range    WBC 21.1 (H) 3.6 - 11.0 K/uL    RBC 4.65 3.80 - 5.20 M/uL    HGB 12.6 11.5 - 16.0 g/dL    HCT 37.5 35.0 - 47.0 %    MCV 80.6 80.0 - 99.0 FL    MCH 27.1 26.0 - 34.0 PG    MCHC 33.6 30.0 - 36.5 g/dL    RDW 13.2 11.5 - 14.5 %    PLATELET 601 570 - 425 K/uL    MPV 9.8 8.9 - 12.9 FL    NRBC 0.0 0  WBC    ABSOLUTE NRBC 0.00 0.00 - 0.01 K/uL    NEUTROPHILS 78 (H) 32 - 75 %    LYMPHOCYTES 12 12 - 49 %    MONOCYTES 7 5 - 13 %    EOSINOPHILS 1 0 - 7 %    BASOPHILS 1 0 - 1 %    IMMATURE GRANULOCYTES 1 (H) 0.0 - 0.5 %    ABS. NEUTROPHILS 16.4 (H) 1.8 - 8.0 K/UL    ABS. LYMPHOCYTES 2.6 0.8 - 3.5 K/UL    ABS. MONOCYTES 1.5 (H) 0.0 - 1.0 K/UL    ABS. EOSINOPHILS 0.2 0.0 - 0.4 K/UL    ABS. BASOPHILS 0.2 (H) 0.0 - 0.1 K/UL    ABS. IMM. GRANS. 0.1 (H) 0.00 - 0.04 K/UL    DF AUTOMATED     METABOLIC PANEL, COMPREHENSIVE    Collection Time: 01/20/20  7:42 AM   Result Value Ref Range    Sodium 139 136 - 145 mmol/L    Potassium 3.9 3.5 - 5.1 mmol/L    Chloride 107 97 - 108 mmol/L    CO2 26 21 - 32 mmol/L    Anion gap 6 5 - 15 mmol/L    Glucose 114 (H) 65 - 100 mg/dL    BUN 29 (H) 6 - 20 MG/DL    Creatinine 1.80 (H) 0.55 - 1.02 MG/DL    BUN/Creatinine ratio 16 12 - 20      GFR est AA 33 (L) >60 ml/min/1.73m2    GFR est non-AA 27 (L) >60 ml/min/1.73m2    Calcium 8.6 8.5 - 10.1 MG/DL    Bilirubin, total 0.4 0.2 - 1.0 MG/DL    ALT (SGPT) 33 12 - 78 U/L    AST (SGOT) 55 (H) 15 - 37 U/L    Alk.  phosphatase 88 45 - 117 U/L    Protein, total 8.4 (H) 6.4 - 8.2 g/dL    Albumin 3.5 3.5 - 5.0 g/dL    Globulin 4.9 (H) 2.0 - 4.0 g/dL    A-G Ratio 0.7 (L) 1.1 - 2.2     TROPONIN I    Collection Time: 01/20/20  7:42 AM   Result Value Ref Range    Troponin-I, Qt. 0.34 (H) <0.05 ng/mL   LACTIC ACID    Collection Time: 01/20/20  7:42 AM   Result Value Ref Range    Lactic acid 2.1 (HH) 0.4 - 2.0 MMOL/L   INFLUENZA A & B AG (RAPID TEST)    Collection Time: 01/20/20  7:42 AM   Result Value Ref Range    Influenza A Antigen POSITIVE (A) NEG      Influenza B Antigen NEGATIVE  NEG     URINALYSIS W/ REFLEX CULTURE    Collection Time: 01/20/20  9:06 AM   Result Value Ref Range    Color YELLOW/STRAW      Appearance CLEAR CLEAR      Specific gravity 1.009 1.003 - 1.030      pH (UA) 6.5 5.0 - 8.0      Protein 100 (A) NEG mg/dL    Glucose NEGATIVE  NEG mg/dL    Ketone NEGATIVE  NEG mg/dL    Bilirubin NEGATIVE  NEG      Blood SMALL (A) NEG      Urobilinogen 0.2 0.2 - 1.0 EU/dL    Nitrites NEGATIVE  NEG      Leukocyte Esterase NEGATIVE  NEG      WBC 0-4 0 - 4 /hpf    RBC 0-5 0 - 5 /hpf    Epithelial cells FEW FEW /lpf    Bacteria NEGATIVE  NEG /hpf    UA:UC IF INDICATED CULTURE NOT INDICATED BY UA RESULT CNI      Hyaline cast 0-2 0 - 5 /lpf   TROPONIN I    Collection Time: 01/20/20  4:03 PM   Result Value Ref Range    Troponin-I, Qt. 0.26 (H) <0.05 ng/mL

## 2020-01-21 NOTE — PROGRESS NOTES
Problem: Risk for Spread of Infection  Goal: Prevent transmission of infectious organism to others  Description  Prevent the transmission of infectious organisms to other patients, staff members, and visitors. Outcome: Progressing Towards Goal     Problem: Patient Education:  Go to Education Activity  Goal: Patient/Family Education  Outcome: Progressing Towards Goal     Problem: Patient Education:  Go to Education Activity  Goal: Patient/Family Education  Outcome: Progressing Towards Goal     Problem: Falls - Risk of  Goal: *Absence of Falls  Description  Document Madi Gonzalez Fall Risk and appropriate interventions in the flowsheet.   Outcome: Progressing Towards Goal  Note: Fall Risk Interventions:            Medication Interventions: Bed/chair exit alarm    Elimination Interventions: Call light in reach, Bed/chair exit alarm    History of Falls Interventions: Bed/chair exit alarm         Problem: Patient Education: Go to Patient Education Activity  Goal: Patient/Family Education  Outcome: Progressing Towards Goal

## 2020-01-22 LAB
ANION GAP SERPL CALC-SCNC: 7 MMOL/L (ref 5–15)
BASOPHILS # BLD: 0 K/UL (ref 0–0.1)
BASOPHILS NFR BLD: 0 % (ref 0–1)
BLASTS NFR BLD MANUAL: 0 %
BUN SERPL-MCNC: 30 MG/DL (ref 6–20)
BUN/CREAT SERPL: 20 (ref 12–20)
CALCIUM SERPL-MCNC: 8.2 MG/DL (ref 8.5–10.1)
CHLORIDE SERPL-SCNC: 108 MMOL/L (ref 97–108)
CO2 SERPL-SCNC: 24 MMOL/L (ref 21–32)
CREAT SERPL-MCNC: 1.47 MG/DL (ref 0.55–1.02)
DIFFERENTIAL METHOD BLD: ABNORMAL
EOSINOPHIL # BLD: 0 K/UL (ref 0–0.4)
EOSINOPHIL NFR BLD: 0 % (ref 0–7)
ERYTHROCYTE [DISTWIDTH] IN BLOOD BY AUTOMATED COUNT: 13.2 % (ref 11.5–14.5)
GLUCOSE SERPL-MCNC: 128 MG/DL (ref 65–100)
HCT VFR BLD AUTO: 33.6 % (ref 35–47)
HGB BLD-MCNC: 10.9 G/DL (ref 11.5–16)
IMM GRANULOCYTES # BLD AUTO: 0 K/UL
IMM GRANULOCYTES NFR BLD AUTO: 0 %
LYMPHOCYTES # BLD: 0.9 K/UL (ref 0.8–3.5)
LYMPHOCYTES NFR BLD: 4 % (ref 12–49)
MCH RBC QN AUTO: 26.8 PG (ref 26–34)
MCHC RBC AUTO-ENTMCNC: 32.4 G/DL (ref 30–36.5)
MCV RBC AUTO: 82.6 FL (ref 80–99)
METAMYELOCYTES NFR BLD MANUAL: 0 %
MONOCYTES # BLD: 2 K/UL (ref 0–1)
MONOCYTES NFR BLD: 9 % (ref 5–13)
MYELOCYTES NFR BLD MANUAL: 0 %
NEUTS BAND NFR BLD MANUAL: 6 % (ref 0–6)
NEUTS SEG # BLD: 18.8 K/UL (ref 1.8–8)
NEUTS SEG NFR BLD: 81 % (ref 32–75)
NRBC # BLD: 0 K/UL (ref 0–0.01)
NRBC BLD-RTO: 0 PER 100 WBC
OTHER CELLS NFR BLD MANUAL: 0 %
PLATELET # BLD AUTO: 351 K/UL (ref 150–400)
PMV BLD AUTO: 10.2 FL (ref 8.9–12.9)
POTASSIUM SERPL-SCNC: 3.8 MMOL/L (ref 3.5–5.1)
PROMYELOCYTES NFR BLD MANUAL: 0 %
RBC # BLD AUTO: 4.07 M/UL (ref 3.8–5.2)
RBC MORPH BLD: ABNORMAL
SODIUM SERPL-SCNC: 139 MMOL/L (ref 136–145)
WBC # BLD AUTO: 21.7 K/UL (ref 3.6–11)

## 2020-01-22 PROCEDURE — 85027 COMPLETE CBC AUTOMATED: CPT

## 2020-01-22 PROCEDURE — 65660000001 HC RM ICU INTERMED STEPDOWN

## 2020-01-22 PROCEDURE — 74011000250 HC RX REV CODE- 250: Performed by: HOSPITALIST

## 2020-01-22 PROCEDURE — 74011250636 HC RX REV CODE- 250/636: Performed by: HOSPITALIST

## 2020-01-22 PROCEDURE — 74011250636 HC RX REV CODE- 250/636: Performed by: INTERNAL MEDICINE

## 2020-01-22 PROCEDURE — 74011250637 HC RX REV CODE- 250/637: Performed by: HOSPITALIST

## 2020-01-22 PROCEDURE — 74011000258 HC RX REV CODE- 258: Performed by: INTERNAL MEDICINE

## 2020-01-22 PROCEDURE — 36415 COLL VENOUS BLD VENIPUNCTURE: CPT

## 2020-01-22 PROCEDURE — 94760 N-INVAS EAR/PLS OXIMETRY 1: CPT

## 2020-01-22 PROCEDURE — 74011250637 HC RX REV CODE- 250/637: Performed by: INTERNAL MEDICINE

## 2020-01-22 PROCEDURE — 77010033678 HC OXYGEN DAILY

## 2020-01-22 PROCEDURE — 77030027138 HC INCENT SPIROMETER -A

## 2020-01-22 PROCEDURE — 94640 AIRWAY INHALATION TREATMENT: CPT

## 2020-01-22 PROCEDURE — 80048 BASIC METABOLIC PNL TOTAL CA: CPT

## 2020-01-22 RX ORDER — AZITHROMYCIN 250 MG/1
500 TABLET, FILM COATED ORAL DAILY
Status: COMPLETED | OUTPATIENT
Start: 2020-01-22 | End: 2020-01-26

## 2020-01-22 RX ORDER — GUAIFENESIN/DEXTROMETHORPHAN 100-10MG/5
5 SYRUP ORAL
Status: DISCONTINUED | OUTPATIENT
Start: 2020-01-22 | End: 2020-01-29 | Stop reason: HOSPADM

## 2020-01-22 RX ADMIN — HEPARIN SODIUM 5000 UNITS: 5000 INJECTION INTRAVENOUS; SUBCUTANEOUS at 21:18

## 2020-01-22 RX ADMIN — NIFEDIPINE 60 MG: 30 TABLET, FILM COATED, EXTENDED RELEASE ORAL at 10:22

## 2020-01-22 RX ADMIN — VANCOMYCIN HYDROCHLORIDE 500 MG: 500 INJECTION, POWDER, LYOPHILIZED, FOR SOLUTION INTRAVENOUS at 11:41

## 2020-01-22 RX ADMIN — FLUTICASONE PROPIONATE 2 SPRAY: 50 SPRAY, METERED NASAL at 18:45

## 2020-01-22 RX ADMIN — METHYLPREDNISOLONE SODIUM SUCCINATE 40 MG: 40 INJECTION, POWDER, FOR SOLUTION INTRAMUSCULAR; INTRAVENOUS at 10:21

## 2020-01-22 RX ADMIN — OSELTAMIVIR PHOSPHATE 30 MG: 30 CAPSULE ORAL at 10:22

## 2020-01-22 RX ADMIN — IPRATROPIUM BROMIDE AND ALBUTEROL SULFATE 3 ML: .5; 3 SOLUTION RESPIRATORY (INHALATION) at 07:59

## 2020-01-22 RX ADMIN — IPRATROPIUM BROMIDE AND ALBUTEROL SULFATE 3 ML: .5; 3 SOLUTION RESPIRATORY (INHALATION) at 01:18

## 2020-01-22 RX ADMIN — Medication 10 ML: at 21:19

## 2020-01-22 RX ADMIN — IPRATROPIUM BROMIDE AND ALBUTEROL SULFATE 3 ML: .5; 3 SOLUTION RESPIRATORY (INHALATION) at 19:45

## 2020-01-22 RX ADMIN — METHYLPREDNISOLONE SODIUM SUCCINATE 40 MG: 40 INJECTION, POWDER, FOR SOLUTION INTRAMUSCULAR; INTRAVENOUS at 02:32

## 2020-01-22 RX ADMIN — LAMOTRIGINE 25 MG: 25 TABLET ORAL at 10:21

## 2020-01-22 RX ADMIN — FLUTICASONE PROPIONATE 2 SPRAY: 50 SPRAY, METERED NASAL at 10:24

## 2020-01-22 RX ADMIN — Medication 10 ML: at 18:46

## 2020-01-22 RX ADMIN — Medication 10 ML: at 02:34

## 2020-01-22 RX ADMIN — AZITHROMYCIN 500 MG: 250 TABLET, FILM COATED ORAL at 11:41

## 2020-01-22 RX ADMIN — HEPARIN SODIUM 5000 UNITS: 5000 INJECTION INTRAVENOUS; SUBCUTANEOUS at 04:55

## 2020-01-22 RX ADMIN — METHYLPREDNISOLONE SODIUM SUCCINATE 40 MG: 40 INJECTION, POWDER, FOR SOLUTION INTRAMUSCULAR; INTRAVENOUS at 18:45

## 2020-01-22 RX ADMIN — HEPARIN SODIUM 5000 UNITS: 5000 INJECTION INTRAVENOUS; SUBCUTANEOUS at 11:41

## 2020-01-22 RX ADMIN — IPRATROPIUM BROMIDE AND ALBUTEROL SULFATE 3 ML: .5; 3 SOLUTION RESPIRATORY (INHALATION) at 14:07

## 2020-01-22 RX ADMIN — GUAIFENESIN AND DEXTROMETHORPHAN 5 ML: 100; 10 SYRUP ORAL at 18:46

## 2020-01-22 RX ADMIN — PRAVASTATIN SODIUM 20 MG: 10 TABLET ORAL at 21:18

## 2020-01-22 RX ADMIN — GUAIFENESIN 200 MG: 200 SOLUTION ORAL at 02:32

## 2020-01-22 RX ADMIN — METOPROLOL SUCCINATE 50 MG: 50 TABLET, EXTENDED RELEASE ORAL at 10:21

## 2020-01-22 RX ADMIN — LAMOTRIGINE 25 MG: 25 TABLET ORAL at 18:53

## 2020-01-22 RX ADMIN — LOSARTAN POTASSIUM 100 MG: 100 TABLET, FILM COATED ORAL at 10:21

## 2020-01-22 NOTE — PROGRESS NOTES
Report received from Myrna , [address] [place] [address]. SBAR were discussed.     Lorena Contreras RN

## 2020-01-22 NOTE — PROGRESS NOTES
Bedside shift change report GIVEN TO STEPHANIE Gonzales. Report included the following information SBAR and Kardex. SIGNIFICANT CHANGES DURING SHIFT:  None. CONCERNS TO ADDRESS WITH MD:  None. [id number] NURSING NOTE   Admission Date 1/20/2020   Admission Diagnosis Influenza [J11.1]   Consults IP CONSULT TO HOSPITALIST  IP CONSULT TO CARDIOLOGY      Cardiac Monitoring [] Yes [] No      Purposeful Hourly Rounding [x] Yes    Danitza Score Total Score: 2   Danitza score 3 or > [x] Bed Alarm [] Avasys [] 1:1 sitter [] Patient refused (Signed refusal form in chart)   Pelon Score Pelon Score: 19   Pelon score 14 or < [] PMT consult [] Wound Care consult    []  Specialty bed  [] Nutrition consult      Influenza Vaccine Received Flu Vaccine for Current Season (usually Sept-March): Yes           Oxygen needs? [x] Room air Oxygen @  []1L    []2L    []3L   []4L    []5L   []6L via  NC   Chronic home O2 use?  [] Yes [x] No  Perform O2 challenge test and document in progress note using smartphrase (.Homeoxygen)      Last bowel movement        Urinary Catheter             LDAs               Peripheral IV 01/20/20 Right Antecubital (Active)   Site Assessment Clean, dry, & intact 1/22/2020  2:56 AM   Phlebitis Assessment 0 1/22/2020  2:56 AM   Infiltration Assessment 0 1/22/2020  2:56 AM   Dressing Status Clean, dry, & intact 1/22/2020  2:56 AM   Dressing Type Transparent;Tape 1/22/2020  2:56 AM   Hub Color/Line Status Flushed;Blue 1/22/2020  2:56 AM   Action Taken Blood drawn 1/20/2020  8:06 AM   Alcohol Cap Used Yes 1/20/2020  8:06 AM       Peripheral IV 01/21/20 Left;Posterior Forearm (Active)   Site Assessment Clean, dry, & intact 1/22/2020  2:56 AM   Phlebitis Assessment 0 1/22/2020  2:56 AM   Infiltration Assessment 0 1/22/2020  2:56 AM   Dressing Status Clean, dry, & intact 1/22/2020  2:56 AM   Dressing Type Transparent;Tape 1/22/2020  2:56 AM   Hub Color/Line Status Flushed;Blue 1/22/2020  2:56 AM Readmission Risk Assessment Tool Score High Risk            30       Total Score        3 Has Seen PCP in Last 6 Months (Yes=3, No=0)    5 Pt. Coverage (Medicare=5 , Medicaid, or Self-Pay=4)    22 Charlson Comorbidity Score (Age + Comorbid Conditions)        Criteria that do not apply:    . Living with Significant Other. Assisted Living. LTAC. SNF.  or   Rehab    Patient Length of Stay (>5 days = 3)    IP Visits Last 12 Months (1-3=4, 4=9, >4=11)       Expected Length of Stay - - -   Actual Length of Stay 2

## 2020-01-22 NOTE — CDMP QUERY
Dr Elijah Shirley:  
 
Pt admitted with influenza A & asthma exacerbation  and is noted to have hypoxia w/ tachypnea on presentation . Please clarify in the progress notes & DC summary if you are treating any of the following: ? Acute respiratory failure POA 
o With hypoxia 
o With hypercapnia ? Chronic respiratory failure 
o With hypoxia 
o With hypercapnia 
? Other, please specify ? Clinically unable to determine The medical record reflects the following: 
   Risk Factors: 84yoF w/ asthma exacerbation, + rhinorrhea Clinical Indicators: ED presentation w/ tachypnea RR 22- 29, 89% O2 sats on RA,  95% O2 sats on 2L NC, tachycardia  119. Dyspnea on exertion & @ rest.  
Noted  In ED  \"able to speak in sentences but does have increased work of breathing with diffusely diminished lung sounds. \" Treatment: IV solumedrol, duonebs, supplemental O2 & weaned to RA, inhalers, IV abx. .Thank you,  
Rubina Casarez, [facility], [facility], [address]  
[phone]

## 2020-01-22 NOTE — CDMP QUERY
Dr Darrel Perea: 
 
 Patient admitted with Influenza A, noted to have persistent leukocytosis & + blood cx. If possible, please document in progress notes and d/c summary if you are evaluating and/or treating any of the following: 
 
? Sepsis POA (WBC, tachypnea, tachycardia) ? No Sepsis ? SIRS d/t noninfectious process w/ organ failure   
? Other, please specify ? Unable to Determine The medical record reflects the following: 
  Risk Factors: advanced age, asthma exacerbation, DM, CKD Clinical Indicators: Leukocytosis WBC 21.1 neUT 78 -- 24.8 NO DIFF - 21.7 Neut 81 lymph 4 Bands 6% LAC ACID 2.1 Tachycardia 100s, tachypnea RR 22-29, hypoxia, + blood cx Alpha strep 1/2 bottles Treatment: IV Zithromax, IV Vanc, duoneb, blood cx, Thank you,  
Stevenson Lawrence, [facility], [address] [place] [address], [address]  
[phone]

## 2020-01-22 NOTE — PROGRESS NOTES
ADULT PROTOCOL: JET AEROSOL  REASSESSMENT    Patient  Francisco J Celis     82 y.o.   female     1/21/2020  10:14 PM    Breath Sounds Pre Procedure: Right Breath Sounds: Scattered wheezing                               Left Breath Sounds: Diminished    Breath Sounds Post Procedure: Right Breath Sounds: Scattered wheezing                                 Left Breath Sounds: Diminished    Breathing pattern: Pre procedure Breathing Pattern: Regular          Post procedure Breathing Pattern: Regular    Heart Rate: Pre procedure Pulse: 77           Post procedure Pulse: 79    Resp Rate: Pre procedure Respirations: 16           Post procedure Respirations: 18    Oxygen: O2 Device: Nasal cannula   Flow rate (L/min) 2     Changed: NO    SpO2: Pre procedure SpO2: 95 %   with oxygen              Post procedure SpO2: 95 %  with oxygen    Nebulizer Therapy: Current medications Aerosolized Medications: DuoNeb      Changed: NO    Smoking History: never    Problem List:   Patient Active Problem List   Diagnosis Code    Anemia NEC     Asthma J45.909    Hereditary spherocytosis (HCC) D58.0    HTN (hypertension) I10    Breast cancer, right breast ([address] C50.911    Anemia D64.9    BPV (benign positional vertigo) H81.10    DJD (degenerative joint disease) of knee M17.10    S/P colonoscopy Z98.890    Early satiety R68.81    Venous insufficiency of both lower extremities I87.2    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

## 2020-01-22 NOTE — PROGRESS NOTES
Problem: Risk for Spread of Infection  Goal: Prevent transmission of infectious organism to others  Description  Prevent the transmission of infectious organisms to other patients, staff members, and visitors. Outcome: Progressing Towards Goal     Problem: Patient Education:  Go to Education Activity  Goal: Patient/Family Education  Outcome: Progressing Towards Goal     Problem: Falls - Risk of  Goal: *Absence of Falls  Description  Document Don Mik Fall Risk and appropriate interventions in the flowsheet. Outcome: Progressing Towards Goal  Note: Fall Risk Interventions:            Medication Interventions: Bed/chair exit alarm, Patient to call before getting OOB, Teach patient to arise slowly    Elimination Interventions: Bed/chair exit alarm, Call light in reach    History of Falls Interventions: Bed/chair exit alarm         Problem: Pressure Injury - Risk of  Goal: *Prevention of pressure injury  Description  Document Pelon Scale and appropriate interventions in the flowsheet.   Outcome: Progressing Towards Goal  Note: Pressure Injury Interventions:  Sensory Interventions: Assess changes in LOC    Moisture Interventions: Absorbent underpads, Apply protective barrier, creams and emollients    Activity Interventions: Increase time out of bed, PT/OT evaluation    Mobility Interventions: Assess need for specialty bed, Chair cushion, PT/OT evaluation    Nutrition Interventions: Document food/fluid/supplement intake, Offer support with meals,snacks and hydration    Friction and Shear Interventions: Apply protective barrier, creams and emollients, Lift sheet

## 2020-01-22 NOTE — PROGRESS NOTES
Progress Note      1/22/2020 10:00 AM  NAME: Melissa Lyle   MRN:  [id number]   Admit Diagnosis: Influenza [J11.1]     Assessment:     Acute respiratory infection with Influenze A . Positive testing   Incidental elevation of Troponin. CKD stage 3   Hypertension. Normal nuclear stress test 2017  No known heart diseae. Diabetes     1/21 no new cardiac issues. 1/22 Cardiac status stable.        Cardiologist : Dr Jaime Scruggs. Plan:     Continue current regimen       []        High complexity decision making was performed    Subjective:     Melissa Lyle denies chest pain, dyspnea. Discussed with RN events overnight. 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  Most recent are:    Visit Vitals  /62 (BP 1 Location: Left arm, BP Patient Position: Sitting)   Pulse 87   Temp 98 °F (36.7 °C)   Resp 22   Ht 5' 2\" (1.575 m)   Wt 76.2 kg (168 lb)   SpO2 96%   BMI 30.73 kg/m²       Intake/Output Summary (Last 24 hours) at 1/22/2020 1438  Last data filed at 1/22/2020 0256  Gross per 24 hour   Intake 510 ml   Output --   Net 510 ml        General Appearance: Well developed, well nourished, alert & oriented x 3,    no acute distress. Ears/Nose/Mouth/Throat: Hearing grossly normal.  Neck: Supple. Chest: Lungs clear to auscultation bilaterally. Cardiovascular: Regular rate and rhythm, S1S2 normal, no murmur. Abdomen: Soft, non-tender, bowel sounds are active. Extremities: No edema bilaterally. Skin: Warm and dry. PMH/SH reviewed - no change compared to H&P    Data Review    Telemetry: normal sinus rhythm     Lab Data Personally Reviewed:    Recent Labs     01/22/20  0240 01/21/20  0007   WBC 21.7* 24.8*   HGB 10.9* 10.8*   HCT 33.6* 32.4*    311   LABRCNT(INR:3,PTP:3,APTT:3,)  Recent Labs     01/22/20  0240 01/21/20  0007 01/20/20  0742    138 139   K 3.8 4.0 3.9    108 107   CO2 24 26 26   BUN 30* 28* 29*   CREA 1.47* 1.57* 1.80*   * 156* 114*   CA 8.2* 8.5 8.6   LABRCNT(CPK:3,CpKMB:3,ckndx:3,troiq:3)  Lab Results   Component Value Date/Time    Cholesterol, total 150 02/06/2019 01:45 PM    HDL Cholesterol 82 02/06/2019 01:45 PM    LDL, calculated 53 02/06/2019 01:45 PM    Triglyceride 77 02/06/2019 01:45 PM   LABRCNT(sgot:3,gpt:3,ap:3,tbiL:3,TP:3,ALB:3,GLOB:3,ggt:3,aml:3,amyp:3,lpse:3,hlpse:3)No results for input(s): PH, PCO2, PO2 in the last 72 hours. Lab Results   Component Value Date/Time    Cholesterol, total 150 02/06/2019 01:45 PM    HDL Cholesterol 82 02/06/2019 01:45 PM    LDL, calculated 53 02/06/2019 01:45 PM    Triglyceride 77 02/06/2019 01:45 PM   MEDTABLECampos Guzman MD  No results for input(s): PH, PCO2, PO2 in the last 72 hours.     Medications Personally Reviewed:    Current Facility-Administered Medications   Medication Dose Route Frequency    azithromycin (ZITHROMAX) tablet 500 mg  500 mg Oral DAILY    guaiFENesin-dextromethorphan (ROBITUSSIN DM) 100-10 mg/5 mL syrup 5 mL  5 mL Oral Q6H PRN    vancomycin (VANCOCIN) 500 mg in 0.9% sodium chloride (MBP/ADV) 100 mL  500 mg IntraVENous Q16H    sodium chloride (NS) flush 5-40 mL  5-40 mL IntraVENous PRN    acetaminophen (TYLENOL) tablet 650 mg  650 mg Oral Q6H PRN    sodium chloride (NS) flush 5-40 mL  5-40 mL IntraVENous Q8H    fluticasone propionate (FLONASE) 50 mcg/actuation nasal spray 2 Spray  2 Spray Both Nostrils BID    lamoTRIgine (LaMICtal) tablet 25 mg  25 mg Oral BID    NIFEdipine ER (PROCARDIA XL) tablet 60 mg  60 mg Oral DAILY    oxybutynin chloride XL (DITROPAN XL) tablet 10 mg  10 mg Oral DAILY    pravastatin (PRAVACHOL) tablet 20 mg  20 mg Oral QHS    metoprolol succinate (TOPROL-XL) XL tablet 50 mg  50 mg Oral DAILY    albuterol-ipratropium (DUO-NEB) 2.5 MG-0.5 MG/3 ML  3 mL Nebulization Q6H RT    methylPREDNISolone (PF) (SOLU-MEDROL) injection 40 mg  40 mg IntraVENous Q8H    aspirin tablet 325 mg  325 mg Oral DAILY    losartan (COZAAR) tablet 100 mg  100 mg Oral DAILY    oseltamivir (TAMIFLU) capsule 30 mg  30 mg Oral DAILY    heparin (porcine) injection 5,000 Units  5,000 Units SubCUTAneous Jeremie Garvey MD

## 2020-01-23 ENCOUNTER — APPOINTMENT (OUTPATIENT)
Dept: NON INVASIVE DIAGNOSTICS | Age: 85
DRG: 194 | End: 2020-01-23
Attending: INTERNAL MEDICINE
Payer: MEDICARE

## 2020-01-23 ENCOUNTER — PATIENT OUTREACH (OUTPATIENT)
Dept: CASE MANAGEMENT | Age: 85
End: 2020-01-23

## 2020-01-23 LAB
ANION GAP SERPL CALC-SCNC: 7 MMOL/L (ref 5–15)
BACTERIA SPEC CULT: ABNORMAL
BACTERIA SPEC CULT: ABNORMAL
BASOPHILS # BLD: 0 K/UL (ref 0–0.1)
BASOPHILS NFR BLD: 0 % (ref 0–1)
BLASTS NFR BLD MANUAL: 0 %
BUN SERPL-MCNC: 33 MG/DL (ref 6–20)
BUN/CREAT SERPL: 20 (ref 12–20)
CALCIUM SERPL-MCNC: 8.3 MG/DL (ref 8.5–10.1)
CHLORIDE SERPL-SCNC: 108 MMOL/L (ref 97–108)
CO2 SERPL-SCNC: 24 MMOL/L (ref 21–32)
CREAT SERPL-MCNC: 1.62 MG/DL (ref 0.55–1.02)
DATE LAST DOSE: ABNORMAL
DIFFERENTIAL METHOD BLD: ABNORMAL
ECHO LA VOL BP: 52.24 ML (ref 22–52)
ECHO LA VOL/BSA BIPLANE: 29.43 ML/M2 (ref 16–28)
ECHO LV E' LATERAL VELOCITY: 6.97 CM/S
ECHO LV E' SEPTAL VELOCITY: 6.87 CM/S
ECHO RA AREA 4C: 14.47 CM2
EOSINOPHIL # BLD: 0 K/UL (ref 0–0.4)
EOSINOPHIL NFR BLD: 0 % (ref 0–7)
ERYTHROCYTE [DISTWIDTH] IN BLOOD BY AUTOMATED COUNT: 12.9 % (ref 11.5–14.5)
GLUCOSE SERPL-MCNC: 158 MG/DL (ref 65–100)
GRAM STN SPEC: ABNORMAL
HCT VFR BLD AUTO: 33.2 % (ref 35–47)
HGB BLD-MCNC: 11.1 G/DL (ref 11.5–16)
IMM GRANULOCYTES # BLD AUTO: 0 K/UL
IMM GRANULOCYTES NFR BLD AUTO: 0 %
LYMPHOCYTES # BLD: 0.3 K/UL (ref 0.8–3.5)
LYMPHOCYTES NFR BLD: 2 % (ref 12–49)
MCH RBC QN AUTO: 27.1 PG (ref 26–34)
MCHC RBC AUTO-ENTMCNC: 33.4 G/DL (ref 30–36.5)
MCV RBC AUTO: 81.2 FL (ref 80–99)
METAMYELOCYTES NFR BLD MANUAL: 0 %
MONOCYTES # BLD: 0.3 K/UL (ref 0–1)
MONOCYTES NFR BLD: 2 % (ref 5–13)
MYELOCYTES NFR BLD MANUAL: 0 %
NEUTS BAND NFR BLD MANUAL: 1 % (ref 0–6)
NEUTS SEG # BLD: 16.8 K/UL (ref 1.8–8)
NEUTS SEG NFR BLD: 95 % (ref 32–75)
NRBC # BLD: 0.02 K/UL (ref 0–0.01)
NRBC BLD-RTO: 0.1 PER 100 WBC
OTHER CELLS NFR BLD MANUAL: 0 %
PLATELET # BLD AUTO: 351 K/UL (ref 150–400)
PMV BLD AUTO: 9.8 FL (ref 8.9–12.9)
POTASSIUM SERPL-SCNC: 4 MMOL/L (ref 3.5–5.1)
PROMYELOCYTES NFR BLD MANUAL: 0 %
RBC # BLD AUTO: 4.09 M/UL (ref 3.8–5.2)
RBC MORPH BLD: ABNORMAL
RBC MORPH BLD: ABNORMAL
REPORTED DOSE,DOSE: ABNORMAL UNITS
REPORTED DOSE/TIME,TMG: ABNORMAL
SERVICE CMNT-IMP: ABNORMAL
SODIUM SERPL-SCNC: 139 MMOL/L (ref 136–145)
VANCOMYCIN TROUGH SERPL-MCNC: 14.6 UG/ML (ref 5–10)
WBC # BLD AUTO: 17.4 K/UL (ref 3.6–11)

## 2020-01-23 PROCEDURE — 80048 BASIC METABOLIC PNL TOTAL CA: CPT

## 2020-01-23 PROCEDURE — 65660000001 HC RM ICU INTERMED STEPDOWN

## 2020-01-23 PROCEDURE — 87040 BLOOD CULTURE FOR BACTERIA: CPT

## 2020-01-23 PROCEDURE — 74011250637 HC RX REV CODE- 250/637: Performed by: INTERNAL MEDICINE

## 2020-01-23 PROCEDURE — 74011250636 HC RX REV CODE- 250/636: Performed by: HOSPITALIST

## 2020-01-23 PROCEDURE — 74011250637 HC RX REV CODE- 250/637: Performed by: HOSPITALIST

## 2020-01-23 PROCEDURE — 93306 TTE W/DOPPLER COMPLETE: CPT

## 2020-01-23 PROCEDURE — 36415 COLL VENOUS BLD VENIPUNCTURE: CPT

## 2020-01-23 PROCEDURE — 94760 N-INVAS EAR/PLS OXIMETRY 1: CPT

## 2020-01-23 PROCEDURE — 74011250636 HC RX REV CODE- 250/636: Performed by: INTERNAL MEDICINE

## 2020-01-23 PROCEDURE — 74011000258 HC RX REV CODE- 258: Performed by: INTERNAL MEDICINE

## 2020-01-23 PROCEDURE — 97161 PT EVAL LOW COMPLEX 20 MIN: CPT | Performed by: PHYSICAL THERAPIST

## 2020-01-23 PROCEDURE — 94640 AIRWAY INHALATION TREATMENT: CPT

## 2020-01-23 PROCEDURE — 85027 COMPLETE CBC AUTOMATED: CPT

## 2020-01-23 PROCEDURE — 74011000250 HC RX REV CODE- 250: Performed by: HOSPITALIST

## 2020-01-23 PROCEDURE — 80202 ASSAY OF VANCOMYCIN: CPT

## 2020-01-23 RX ADMIN — AZITHROMYCIN 500 MG: 250 TABLET, FILM COATED ORAL at 08:40

## 2020-01-23 RX ADMIN — PRAVASTATIN SODIUM 20 MG: 10 TABLET ORAL at 21:08

## 2020-01-23 RX ADMIN — NIFEDIPINE 60 MG: 30 TABLET, FILM COATED, EXTENDED RELEASE ORAL at 08:40

## 2020-01-23 RX ADMIN — METHYLPREDNISOLONE SODIUM SUCCINATE 40 MG: 40 INJECTION, POWDER, FOR SOLUTION INTRAMUSCULAR; INTRAVENOUS at 17:33

## 2020-01-23 RX ADMIN — LOSARTAN POTASSIUM 100 MG: 100 TABLET, FILM COATED ORAL at 08:40

## 2020-01-23 RX ADMIN — METHYLPREDNISOLONE SODIUM SUCCINATE 40 MG: 40 INJECTION, POWDER, FOR SOLUTION INTRAMUSCULAR; INTRAVENOUS at 01:28

## 2020-01-23 RX ADMIN — HEPARIN SODIUM 5000 UNITS: 5000 INJECTION INTRAVENOUS; SUBCUTANEOUS at 21:11

## 2020-01-23 RX ADMIN — IPRATROPIUM BROMIDE AND ALBUTEROL SULFATE 3 ML: .5; 3 SOLUTION RESPIRATORY (INHALATION) at 08:39

## 2020-01-23 RX ADMIN — VANCOMYCIN HYDROCHLORIDE 500 MG: 500 INJECTION, POWDER, LYOPHILIZED, FOR SOLUTION INTRAVENOUS at 15:44

## 2020-01-23 RX ADMIN — OSELTAMIVIR PHOSPHATE 30 MG: 30 CAPSULE ORAL at 08:41

## 2020-01-23 RX ADMIN — Medication 10 ML: at 17:34

## 2020-01-23 RX ADMIN — IPRATROPIUM BROMIDE AND ALBUTEROL SULFATE 3 ML: .5; 3 SOLUTION RESPIRATORY (INHALATION) at 20:07

## 2020-01-23 RX ADMIN — Medication 10 ML: at 01:31

## 2020-01-23 RX ADMIN — METOPROLOL SUCCINATE 50 MG: 50 TABLET, EXTENDED RELEASE ORAL at 08:40

## 2020-01-23 RX ADMIN — Medication 10 ML: at 21:11

## 2020-01-23 RX ADMIN — LAMOTRIGINE 25 MG: 25 TABLET ORAL at 18:42

## 2020-01-23 RX ADMIN — ASPIRIN 325 MG: 325 TABLET, FILM COATED ORAL at 08:40

## 2020-01-23 RX ADMIN — HEPARIN SODIUM 5000 UNITS: 5000 INJECTION INTRAVENOUS; SUBCUTANEOUS at 11:39

## 2020-01-23 RX ADMIN — VANCOMYCIN HYDROCHLORIDE 500 MG: 500 INJECTION, POWDER, LYOPHILIZED, FOR SOLUTION INTRAVENOUS at 01:28

## 2020-01-23 RX ADMIN — IPRATROPIUM BROMIDE AND ALBUTEROL SULFATE 3 ML: .5; 3 SOLUTION RESPIRATORY (INHALATION) at 01:12

## 2020-01-23 RX ADMIN — METHYLPREDNISOLONE SODIUM SUCCINATE 40 MG: 40 INJECTION, POWDER, FOR SOLUTION INTRAMUSCULAR; INTRAVENOUS at 10:21

## 2020-01-23 RX ADMIN — OXYBUTYNIN CHLORIDE 10 MG: 5 TABLET, EXTENDED RELEASE ORAL at 21:08

## 2020-01-23 RX ADMIN — HEPARIN SODIUM 5000 UNITS: 5000 INJECTION INTRAVENOUS; SUBCUTANEOUS at 05:04

## 2020-01-23 RX ADMIN — IPRATROPIUM BROMIDE AND ALBUTEROL SULFATE 3 ML: .5; 3 SOLUTION RESPIRATORY (INHALATION) at 13:56

## 2020-01-23 RX ADMIN — FLUTICASONE PROPIONATE 2 SPRAY: 50 SPRAY, METERED NASAL at 08:41

## 2020-01-23 RX ADMIN — CEFTRIAXONE SODIUM 2 G: 2 INJECTION, POWDER, FOR SOLUTION INTRAMUSCULAR; INTRAVENOUS at 14:43

## 2020-01-23 RX ADMIN — FLUTICASONE PROPIONATE 2 SPRAY: 50 SPRAY, METERED NASAL at 18:42

## 2020-01-23 RX ADMIN — LAMOTRIGINE 25 MG: 25 TABLET ORAL at 08:41

## 2020-01-23 NOTE — PROGRESS NOTES
Bedside shift change report GIVEN TO Sara Heredia RN. Report included the following information SBAR and Kardex. SIGNIFICANT CHANGES DURING SHIFT:  None. CONCERNS TO ADDRESS WITH MD:  None. [facility] NURSING NOTE   Admission Date 1/20/2020   Admission Diagnosis Influenza [J11.1]   Consults IP CONSULT TO HOSPITALIST  IP CONSULT TO CARDIOLOGY      Cardiac Monitoring [x] Yes [] No      Purposeful Hourly Rounding [x] Yes    Danitza Score Total Score: 2   Danitza score 3 or > [x] Bed Alarm [] Avasys [] 1:1 sitter [] Patient refused (Signed refusal form in chart)   Pelon Score Pelon Score: 18   Pelon score 14 or < [] PMT consult [] Wound Care consult    []  Specialty bed  [] Nutrition consult      Influenza Vaccine Received Flu Vaccine for Current Season (usually Sept-March): Yes           Oxygen needs? [x] Room air Oxygen @  []1L    []2L    []3L   []4L    []5L   []6L via  NC   Chronic home O2 use? [] Yes [x] No  Perform O2 challenge test and document in progress note using smartphrase (.Homeoxygen)      Last bowel movement        Urinary Catheter             LDAs               Peripheral IV 01/20/20 Right Antecubital (Active)   Site Assessment Clean, dry, & intact 1/23/2020  3:00 AM   Phlebitis Assessment 0 1/23/2020  3:00 AM   Infiltration Assessment 0 1/23/2020  3:00 AM   Dressing Status Clean, dry, & intact 1/23/2020  3:00 AM   Dressing Type Transparent;Tape 1/23/2020  3:00 AM   Hub Color/Line Status Flushed;Blue 1/23/2020  3:00 AM   Action Taken Blood drawn 1/20/2020  8:06 AM   Alcohol Cap Used Yes 1/20/2020  8:06 AM                         Readmission Risk Assessment Tool Score High Risk            30       Total Score        3 Has Seen PCP in Last 6 Months (Yes=3, No=0)    5 Pt. Coverage (Medicare=5 , Medicaid, or Self-Pay=4)    22 Charlson Comorbidity Score (Age + Comorbid Conditions)        Criteria that do not apply:    . Living with Significant Other. Assisted Living. LTAC. SNF.  or   Rehab Patient Length of Stay (>5 days = 3)    IP Visits Last 12 Months (1-3=4, 4=9, >4=11)       Expected Length of Stay 3d 7h   Actual Length of Stay 3

## 2020-01-23 NOTE — PROGRESS NOTES
Pharmacy Automatic Renal Dosing Protocol - Antimicrobials    Indication for Antimicrobials: Bacteremia;  CAP & Flu A    Current Regimen of Each Antimicrobial:  Vancomycin 500 mg IV every 16 hours (Start Date ; Day # 3)  Azithromycin 500mg PO every 24 hours (Start Date ; Day 3/5)   Oseltamivir 30 mg PO daily (Start Date ; Day 3/5)     Previous Antimicrobial Therapy:  None    Goal Level: VANCOMYCIN TROUGH GOAL RANGE  Vancomycin Trough: 15 - 20 mcg/mL  (AUC: 400 - 600 mg/hr/Liter/day)     Date Dose & Interval Measured (mcg/mL) Extrapolated (mcg/mL)    500 mg Q16H 14.6 11.34                 Date & time of next level:  @ 1600    Significant Cultures:    Blood: Alpha strep in 2 bottles- pending   Respiratory: pending    Radiology / Imaging results: (X-ray, CT scan or MRI):   : CT Chest: Minimal centrilobular nodular opacities are seen in the right middle lobe compatible with infection which may be typical or atypical. Bilateral atelectasis. Paralysis, amputations, malnutrition: none noted    Labs:  Recent Labs     20  0320 20  0240 20  0007   CREA 1.62* 1.47* 1.57*   BUN 33* 30* 28*   WBC 17.4* 21.7* 24.8*     Temp (24hrs), Av °F (36.7 °C), Min:97.6 °F (36.4 °C), Max:98.5 °F (36.9 °C)    Creatinine Clearance (mL/min) or Dialysis: 21 mL/min    Impression/Plan:   Vancomycin trough 11.3 mcg/ml on 500 mg Q16, dose adjusted to 750 mg Q16H with anticipated troough of 17 mcg/ml. Antimicrobial stop date oseltamivir and azithromycin 5 days; Vancomycin pending     Pharmacy will follow daily and adjust medications as appropriate for renal function and/or serum levels. Thank you,  Julius Krueger, [facility]    Recommended duration of therapy  [email or web address] docx    Renal Dosing  [email or web address]. pdf

## 2020-01-23 NOTE — PROGRESS NOTES
Bedside and Verbal shift change report GIVEN TO STEPHANIE munoz. Report included the following information SBAR, Kardex, ED Summary, Procedure Summary, Intake/Output, MAR and Recent Results. Uneventful shift.

## 2020-01-23 NOTE — PROGRESS NOTES
ADULT PROTOCOL: JET AEROSOL  REASSESSMENT    Patient  Yoselyn Britton     82 y.o.   female     1/22/2020  10:22 PM    Breath Sounds Pre Procedure: Right Breath Sounds: Upper, Scattered wheezing                               Left Breath Sounds: Upper, Scattered wheezing    Breath Sounds Post Procedure: Right Breath Sounds: Upper, Scattered wheezing                                 Left Breath Sounds: Upper, Scattered wheezing    Breathing pattern: Pre procedure Breathing Pattern: Regular          Post procedure Breathing Pattern: Regular    Heart Rate: Pre procedure Pulse: 73           Post procedure Pulse: 79    Resp Rate: Pre procedure Respirations: 16           Post procedure Respirations: 16      Oxygen: O2 Device: Room air        Changed: NO    SpO2: Pre procedure SpO2: 97 %   without oxygen              Post procedure SpO2: 97 %  without oxygen    Nebulizer Therapy: Current medications Aerosolized Medications: DuoNeb      Changed: NO    Smoking History: never    Problem List:   Patient Active Problem List   Diagnosis Code    Anemia NEC     Asthma J45.909    Hereditary spherocytosis (HCC) D58.0    HTN (hypertension) I10    Breast cancer, right breast ([address] C50.911    Anemia D64.9    BPV (benign positional vertigo) H81.10    DJD (degenerative joint disease) of knee M17.10    S/P colonoscopy Z98.890    Early satiety R68.81    Venous insufficiency of both lower extremities I87.2    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.21    Renal cyst N28.1    Intrahepatic bile duct dilation K83.8    Hearing deficit, left H91.92    Influenza J11.1       Respiratory Therapist: Tal Suarez, RT

## 2020-01-23 NOTE — PROGRESS NOTES
General Daily Progress Note    Admit Date: 1/20/2020    Subjective:     Patient continues to complain of coughing. Current Facility-Administered Medications   Medication Dose Route Frequency    azithromycin (ZITHROMAX) tablet 500 mg  500 mg Oral DAILY    guaiFENesin-dextromethorphan (ROBITUSSIN DM) 100-10 mg/5 mL syrup 5 mL  5 mL Oral Q6H PRN    vancomycin (VANCOCIN) 500 mg in 0.9% sodium chloride (MBP/ADV) 100 mL  500 mg IntraVENous Q16H    sodium chloride (NS) flush 5-40 mL  5-40 mL IntraVENous PRN    acetaminophen (TYLENOL) tablet 650 mg  650 mg Oral Q6H PRN    sodium chloride (NS) flush 5-40 mL  5-40 mL IntraVENous Q8H    fluticasone propionate (FLONASE) 50 mcg/actuation nasal spray 2 Spray  2 Spray Both Nostrils BID    lamoTRIgine (LaMICtal) tablet 25 mg  25 mg Oral BID    NIFEdipine ER (PROCARDIA XL) tablet 60 mg  60 mg Oral DAILY    oxybutynin chloride XL (DITROPAN XL) tablet 10 mg  10 mg Oral DAILY    pravastatin (PRAVACHOL) tablet 20 mg  20 mg Oral QHS    metoprolol succinate (TOPROL-XL) XL tablet 50 mg  50 mg Oral DAILY    albuterol-ipratropium (DUO-NEB) 2.5 MG-0.5 MG/3 ML  3 mL Nebulization Q6H RT    methylPREDNISolone (PF) (SOLU-MEDROL) injection 40 mg  40 mg IntraVENous Q8H    aspirin tablet 325 mg  325 mg Oral DAILY    losartan (COZAAR) tablet 100 mg  100 mg Oral DAILY    oseltamivir (TAMIFLU) capsule 30 mg  30 mg Oral DAILY    heparin (porcine) injection 5,000 Units  5,000 Units SubCUTAneous Q8H        Review of Systems  A comprehensive review of systems was negative.     Objective:     Patient Vitals for the past 24 hrs:   BP Temp Pulse Resp SpO2   01/22/20 1936 149/69 97.9 °F (36.6 °C) 75 20 95 %   01/22/20 1535 118/43 97.9 °F (36.6 °C) 73 22 92 %   01/22/20 1407 -- -- -- -- 96 %   01/22/20 1214 151/62 98 °F (36.7 °C) 87 22 95 %   01/22/20 0817 136/59 97.7 °F (36.5 °C) 75 -- 93 %   01/22/20 0759 -- -- -- -- 95 %   01/22/20 0227 146/66 98.2 °F (36.8 °C) 88 22 94 %   01/21/20 9376 129/59 98.1 °F (36.7 °C) 83 18 93 %     No intake/output data recorded. 01/21 0701 - 01/22 1900  In: 1150 [P.O.:1150]  Out: -     Physical Exam:   Visit Vitals  /69 (BP 1 Location: Left arm, BP Patient Position: At rest)   Pulse 75   Temp 97.9 °F (36.6 °C)   Resp 20   Ht 5' 2\" (1.575 m)   Wt 168 lb (76.2 kg)   SpO2 95%   BMI 30.73 kg/m²     General appearance: alert, cooperative, no distress, appears stated age  Neck: supple, symmetrical, trachea midline, no adenopathy, thyroid: not enlarged, symmetric, no tenderness/mass/nodules, no carotid bruit and no JVD  Lungs: rhonchi R base, L base  Heart: regular rate and rhythm, S1, S2 normal, no murmur, click, rub or gallop  Abdomen: soft, non-tender. Bowel sounds normal. No masses,  no organomegaly  Extremities: extremities normal, atraumatic, no cyanosis or edema        Data Review   Recent Results (from the past 24 hour(s))   CBC WITH MANUAL DIFF    Collection Time: 01/22/20  2:40 AM   Result Value Ref Range    WBC 21.7 (H) 3.6 - 11.0 K/uL    RBC 4.07 3.80 - 5.20 M/uL    HGB 10.9 (L) 11.5 - 16.0 g/dL    HCT 33.6 (L) 35.0 - 47.0 %    MCV 82.6 80.0 - 99.0 FL    MCH 26.8 26.0 - 34.0 PG    MCHC 32.4 30.0 - 36.5 g/dL    RDW 13.2 11.5 - 14.5 %    PLATELET 866 667 - 611 K/uL    MPV 10.2 8.9 - 12.9 FL    NRBC 0.0 0  WBC    ABSOLUTE NRBC 0.00 0.00 - 0.01 K/uL    NEUTROPHILS 81 (H) 32 - 75 %    BAND NEUTROPHILS 6 0 - 6 %    LYMPHOCYTES 4 (L) 12 - 49 %    MONOCYTES 9 5 - 13 %    EOSINOPHILS 0 0 - 7 %    BASOPHILS 0 0 - 1 %    METAMYELOCYTES 0 0 %    MYELOCYTES 0 0 %    PROMYELOCYTES 0 0 %    BLASTS 0 0 %    OTHER CELL 0 0      IMMATURE GRANULOCYTES 0 %    ABS. NEUTROPHILS 18.8 (H) 1.8 - 8.0 K/UL    ABS. LYMPHOCYTES 0.9 0.8 - 3.5 K/UL    ABS. MONOCYTES 2.0 (H) 0.0 - 1.0 K/UL    ABS. EOSINOPHILS 0.0 0.0 - 0.4 K/UL    ABS. BASOPHILS 0.0 0.0 - 0.1 K/UL    ABS. IMM.  GRANS. 0.0 K/UL    DF MANUAL      RBC COMMENTS NORMOCYTIC, NORMOCHROMIC     METABOLIC PANEL, BASIC Collection Time: 01/22/20  2:40 AM   Result Value Ref Range    Sodium 139 136 - 145 mmol/L    Potassium 3.8 3.5 - 5.1 mmol/L    Chloride 108 97 - 108 mmol/L    CO2 24 21 - 32 mmol/L    Anion gap 7 5 - 15 mmol/L    Glucose 128 (H) 65 - 100 mg/dL    BUN 30 (H) 6 - 20 MG/DL    Creatinine 1.47 (H) 0.55 - 1.02 MG/DL    BUN/Creatinine ratio 20 12 - 20      GFR est AA 41 (L) >60 ml/min/1.73m2    GFR est non-AA 34 (L) >60 ml/min/1.73m2    Calcium 8.2 (L) 8.5 - 10.1 MG/DL           Assessment:     Active Problems:    Influenza (1/20/2020)        Plan:     1. Influenza improving slowly. No significant toxicity. 2.  Continue aggressive treatment of bronchospasm. 3.  Await ID of organism from positive blood culture. Antibiotics continued.

## 2020-01-23 NOTE — PROGRESS NOTES
Problem: Mobility Impaired (Adult and Pediatric)  Goal: *Acute Goals and Plan of Care (Insert Text)  Description  FUNCTIONAL STATUS PRIOR TO ADMISSION: Patient was independent and active without use of DME.    HOME SUPPORT PRIOR TO ADMISSION: The patient lived alone with no local support. Physical Therapy Goals  Initiated 1/23/2020   1. Patient will transfer from bed to chair and chair to bed with independence using the least restrictive device within 7 day(s). 2.  Patient will perform sit to stand with independence within 7 day(s). 3.  Patient will ambulate with supervision/set-up for 250 feet with the least restrictive device within 7 day(s). 4.  Patient will ascend/descend 2 stairs with no handrail(s) with supervision/set-up within 7 day(s). Note:   PHYSICAL THERAPY EVALUATION  Patient: Jam Chadwick (25 y.o. female)  Date: 1/23/2020  Primary Diagnosis: Influenza [J11.1]        Precautions: droplet         ASSESSMENT  Based on the objective data described below, the patient presents with decreased activity tolerance and slightly decreased functional mobility skills. Up in chair upon arrival.  Patient able to stand with supervision only then ambulate in room without assistive device,  o2 sats 95% after ambulating, HR 90. Patient remained up in chair at end of session. Will see for one more visit for further ambulation and stair practice but do not anticipate the need for further PT at discharge. Prior to admission patient was independent with all mobility and lives alone. Current Level of Function Impacting Discharge (mobility/balance): supervision    Functional Outcome Measure: The patient scored 90/100 on the Barthel Index outcome measure which is indicative of 10% decline in mobility. Other factors to consider for discharge: lives alone     Patient will benefit from skilled therapy intervention to address the above noted impairments.        PLAN :  Recommendations and Planned Interventions: transfer training and therapeutic activities      Frequency/Duration: Patient will be followed by physical therapy:  3 times a week to address goals. Recommendation for discharge: (in order for the patient to meet his/her long term goals)  No skilled physical therapy/ follow up rehabilitation needs identified at this time. This discharge recommendation:  Has not yet been discussed the attending provider and/or case management    IF patient discharges home will need the following DME: none         SUBJECTIVE:   Patient stated Watch what I can do.     OBJECTIVE DATA SUMMARY:   HISTORY:    Past Medical History:   Diagnosis Date    Anemia     Arrhythmia     irregular per pt d/t blood disorder    Asthma     Bereavement 2-2-16     die 82 copd,chf,pul htn pn after 48 yr marriage    BPV (benign positional vertigo)     Breast cancer, right breast ([address] 1994    right breast, lumpectomy and radiation    Breast lump 2017    right breast     Chronic kidney disease     kidney cyst - watching    Diabetes ([address]     controlled with diet    DJD (degenerative joint disease) of knee     Early satiety     Hearing deficit, left     Hereditary spherocytosis (HCC)     HTN (hypertension)     Ill-defined condition     sickle cell trait    Intrahepatic bile duct dilation 09/05/2018    mri    Radiation therapy complication 6432    right breast     Renal cyst 08/2016    ct rt    S/P colonoscopy 4-9-14    md Brian - family hx    S/P colonoscopy 07/20/2016    rt - md brian diverticulosis    Seizures ([address]     Septic arthritis ([address] 3/11    Streptococcal sepsis ([address] 03/2011    Venous insufficiency of both lower extremities      Past Surgical History:   Procedure Laterality Date    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    HX BREAST BIOPSY Right 1994    positive    HX BREAST LUMPECTOMY Right 1994    HX CHOLECYSTECTOMY  1985       Personal factors and/or comorbidities impacting plan of care: asthma    Home Situation  Home Environment: Private residence  # Steps to Enter: 2  Rails to Enter: No  One/Two Story Residence: (one story with basement)  Living Alone: Yes  Support Systems: Child(teresa), Family member(s)  Patient Expects to be Discharged to[de-identified] Private residence  Current DME Used/Available at Home: [employer], straight, Walker, rolling    EXAMINATION/PRESENTATION/DECISION MAKING:   Critical Behavior:  Neurologic State: Alert  Orientation Level: Oriented X4     Safety/Judgement: Awareness of environment, Insight into deficits  Hearing: Auditory  Auditory Impairment: None  Skin:  intact  Edema: none noted  Range Of Motion:  AROM: Within functional limits           PROM: Within functional limits           Strength:    Strength: Within functional limits                    Tone & Sensation:   Tone: Normal              Sensation: Intact               Coordination:  Coordination: Within functional limits  Vision:      Functional Mobility:  Bed Mobility:              Transfers:  Sit to Stand: Supervision  Stand to Sit: Supervision        Bed to Chair: Supervision              Balance:   Sitting: Intact  Standing: Intact  Ambulation/Gait Training:  Distance (ft): 48 Feet (ft)  Assistive Device: Gait belt  Ambulation - Level of Assistance: Stand-by assistance           Right Side Weight Bearing: Full  Left Side Weight Bearing: Full        Speed/Tila: Pace decreased (<100 feet/min)                                 Functional Measure:  Barthel Index:    Bathin  Bladder: 10  Bowels: 10  Groomin  Dressing: 10  Feeding: 10  Mobility: 15  Stairs: 0  Toilet Use: 10  Transfer (Bed to Chair and Back): 15  Total: 90/100       The Barthel ADL Index: Guidelines  1. The index should be used as a record of what a patient does, not as a record of what a patient could do. 2. The main aim is to establish degree of independence from any help, physical or verbal, however minor and for whatever reason.   3. The need for supervision renders the patient not independent. 4. A patient's performance should be established using the best available evidence. Asking the patient, friends/relatives and nurses are the usual sources, but direct observation and common sense are also important. However direct testing is not needed. 5. Usually the patient's performance over the preceding 24-48 hours is important, but occasionally longer periods will be relevant. 6. Middle categories imply that the patient supplies over 50 per cent of the effort. 7. Use of aids to be independent is allowed. Jessica Branham., Barthel, D.W. (7796). Functional evaluation: the Barthel Index. [facility] (14)2. Landy Kuhn gretel NAMRATA Bowers, David Cruz., Warren William., [place], [address] Measuring the change indisability after inpatient rehabilitation; comparison of the responsiveness of the Barthel Index and Functional [place] Measure. Journal of Neurology, Neurosurgery, and Psychiatry, 66(4), 340-426. Lesley Concepcion, N.J.A, ESVIN More, & Jose Ramon Enriquez M.A. (2004.) Assessment of post-stroke quality of life in cost-effectiveness studies: The usefulness of the Barthel Index and the EuroQoL-5D. Quality of Life Research, 15, 162-17            Physical Therapy Evaluation Charge Determination   History Examination Presentation Decision-Making   LOW Complexity : Zero comorbidities / personal factors that will impact the outcome / POC LOW Complexity : 1-2 Standardized tests and measures addressing body structure, function, activity limitation and / or participation in recreation  LOW Complexity : Stable, uncomplicated  LOW Complexity : FOTO score of       Based on the above components, the patient evaluation is determined to be of the following complexity level: LOW     Pain Ratin/10    Activity Tolerance:   Fair  Please refer to the flowsheet for vital signs taken during this treatment.     After treatment patient left in no apparent distress: Sitting in chair, Call bell within reach, and Bed / chair alarm activated    COMMUNICATION/EDUCATION:   The patients plan of care was discussed with: Registered Nurse. Fall prevention education was provided and the patient/caregiver indicated understanding. and Patient/family agree to work toward stated goals and plan of care. Thank you for this referral.  Leanora Bosworth, PT   Time Calculation: 13 mins           Problem: Mobility Impaired (Adult and Pediatric)  Goal: *Acute Goals and Plan of Care (Insert Text)  Description  FUNCTIONAL STATUS PRIOR TO ADMISSION: Patient was independent and active without use of DME.    HOME SUPPORT PRIOR TO ADMISSION: The patient lived alone with no local support. Physical Therapy Goals  Initiated 1/23/2020   1. Patient will transfer from bed to chair and chair to bed with independence using the least restrictive device within 7 day(s). 2.  Patient will perform sit to stand with independence within 7 day(s). 3.  Patient will ambulate with supervision/set-up for 250 feet with the least restrictive device within 7 day(s). 4.  Patient will ascend/descend 2 stairs with no handrail(s) with supervision/set-up within 7 day(s). Note:   PHYSICAL THERAPY EVALUATION  Patient: Laura Romero (70 y.o. female)  Date: 1/23/2020  Primary Diagnosis: Influenza [J11.1]        Precautions: droplet         ASSESSMENT  Based on the objective data described below, the patient presents with decreased activity tolerance and slightly decreased functional mobility skills. Up in chair upon arrival.  Patient able to stand with supervision only then ambulate in room without assistive device,  o2 sats 95% after ambulating, HR 90. Patient remained up in chair at end of session. Will see for one more visit for further ambulation and stair practice but do not anticipate the need for further PT at discharge.   Prior to admission patient was independent with all mobility and lives alone.    Current Level of Function Impacting Discharge (mobility/balance): supervision    Functional Outcome Measure: The patient scored 90/100 on the Barthel Index outcome measure which is indicative of 10% decline in mobility. Other factors to consider for discharge: lives alone     Patient will benefit from skilled therapy intervention to address the above noted impairments. PLAN :  Recommendations and Planned Interventions: transfer training and therapeutic activities      Frequency/Duration: Patient will be followed by physical therapy:  3 times a week to address goals. Recommendation for discharge: (in order for the patient to meet his/her long term goals)  No skilled physical therapy/ follow up rehabilitation needs identified at this time. This discharge recommendation:  Has not yet been discussed the attending provider and/or case management    IF patient discharges home will need the following DME: none         SUBJECTIVE:   Patient stated Watch what I can do.     OBJECTIVE DATA SUMMARY:   HISTORY:    Past Medical History:   Diagnosis Date    Anemia     Arrhythmia     irregular per pt d/t blood disorder    Asthma     Bereavement 2-2-16     die 82 copd,chf,pul htn pn after 48 yr marriage    BPV (benign positional vertigo)     Breast cancer, right breast ([address] 1994    right breast, lumpectomy and radiation    Breast lump 2017    right breast     Chronic kidney disease     kidney cyst - watching    Diabetes ([address]     controlled with diet    DJD (degenerative joint disease) of knee     Early satiety     Hearing deficit, left     Hereditary spherocytosis (HCC)     HTN (hypertension)     Ill-defined condition     sickle cell trait    Intrahepatic bile duct dilation 09/05/2018    mri    Radiation therapy complication 5575    right breast     Renal cyst 08/2016    ct rt    S/P colonoscopy 4-9-14    md Dejan - family hx    S/P colonoscopy 07/20/2016    rt - md dejan diverticulosis Seizures ([facility]     Septic arthritis ([facility] 3/11    Streptococcal sepsis ([facility] 2011    Venous insufficiency of both lower extremities      Past Surgical History:   Procedure Laterality Date    ABDOMEN SURGERY PROC UNLISTED      splenectomy    HX BREAST BIOPSY Right     positive    HX BREAST LUMPECTOMY Right     HX CHOLECYSTECTOMY  1985       Personal factors and/or comorbidities impacting plan of care: asthma    Home Situation  Home Environment: Private residence  # Steps to Enter: 2  Rails to Enter: No  One/Two Story Residence: (one story with basement)  Living Alone: Yes  Support Systems: Child(teresa), Family member(s)  Patient Expects to be Discharged to[de-identified] Private residence  Current DME Used/Available at Home: [address], [address], straight, Walker, rolling    EXAMINATION/PRESENTATION/DECISION MAKING:   Critical Behavior:  Neurologic State: Alert  Orientation Level: Oriented X4     Safety/Judgement: Awareness of environment, Insight into deficits  Hearing: Auditory  Auditory Impairment: None  Skin:  intact  Edema: none noted  Range Of Motion:  AROM: Within functional limits           PROM: Within functional limits           Strength:    Strength:  Within functional limits                    Tone & Sensation:   Tone: Normal              Sensation: Intact               Coordination:  Coordination: Within functional limits  Vision:      Functional Mobility:  Bed Mobility:              Transfers:  Sit to Stand: Supervision  Stand to Sit: Supervision        Bed to Chair: Supervision              Balance:   Sitting: Intact  Standing: Intact  Ambulation/Gait Training:  Distance (ft): 48 Feet (ft)  Assistive Device: Gait belt  Ambulation - Level of Assistance: Stand-by assistance           Right Side Weight Bearing: Full  Left Side Weight Bearing: Full        Speed/Tila: Pace decreased (<100 feet/min)                                 Functional Measure:  Barthel Index:    Bathin  Bladder: 10  Bowels: 10  Grooming: 5  Dressing: 10  Feeding: 10  Mobility: 15  Stairs: 0  Toilet Use: 10  Transfer (Bed to Chair and Back): 15  Total: 90/100       The Barthel ADL Index: Guidelines  1. The index should be used as a record of what a patient does, not as a record of what a patient could do. 2. The main aim is to establish degree of independence from any help, physical or verbal, however minor and for whatever reason. 3. The need for supervision renders the patient not independent. 4. A patient's performance should be established using the best available evidence. Asking the patient, friends/relatives and nurses are the usual sources, but direct observation and common sense are also important. However direct testing is not needed. 5. Usually the patient's performance over the preceding 24-48 hours is important, but occasionally longer periods will be relevant. 6. Middle categories imply that the patient supplies over 50 per cent of the effort. 7. Use of aids to be independent is allowed. Maite Borja., Barthel, DSarahW. (1533). Functional evaluation: the Barthel Index. [facility] (14)2. Ashley Flores gretel NAMRATA Bowers, Rufina ., [facility] [place], [address] [place] [address] (1999). Measuring the change indisability after inpatient rehabilitation; comparison of the responsiveness of the Barthel Index and Functional [place] Measure. Journal of Neurology, Neurosurgery, and Psychiatry, 66(4), 306-893. Matthew Song, N.J.A, ESVIN More, & Erica Zarco, MSarahA. (2004.) Assessment of post-stroke quality of life in cost-effectiveness studies: The usefulness of the Barthel Index and the EuroQoL-5D.  Quality of Life Research, 15, 563-83            Physical Therapy Evaluation Charge Determination   History Examination Presentation Decision-Making   LOW Complexity : Zero comorbidities / personal factors that will impact the outcome / POC LOW Complexity : 1-2 Standardized tests and measures addressing body structure, function, activity limitation and / or participation in recreation  LOW Complexity : Stable, uncomplicated  LOW Complexity : FOTO score of       Based on the above components, the patient evaluation is determined to be of the following complexity level: LOW     Pain Ratin/10    Activity Tolerance:   Fair  Please refer to the flowsheet for vital signs taken during this treatment. After treatment patient left in no apparent distress:   Sitting in chair, Call bell within reach, and Bed / chair alarm activated    COMMUNICATION/EDUCATION:   The patients plan of care was discussed with: Registered Nurse. Fall prevention education was provided and the patient/caregiver indicated understanding. and Patient/family agree to work toward stated goals and plan of care.     Thank you for this referral.  Torey Sanchez, PT   Time Calculation: 13 mins

## 2020-01-23 NOTE — PROGRESS NOTES
Bedside and Verbal shift change report given to [employer] (oncoming nurse) by Evon Altamirano (offgoing nurse). Report included the following information SBAR, Kardex, MAR and Recent Results.

## 2020-01-23 NOTE — PROGRESS NOTES
Transitional Care Team: Initial HUG Note    Date of Assessment: 01/23/20  Time of Assessment:  1:38 PM    Jeremiah Solis is a 82 y.o. female inpatient at  [facility]. Assessment & Plan   Pt admitted with flu symptoms despite having had the flu vaccination for this season. This has triggered an Asthma exacerbation. She continues on ABX and aerosols. OOB with steady gait using room air. Anticipates discharge to home, may benefit with home health visit. Primary Diagnosis: Pneumonia versus flu  Advance Directive:  none. See ACP note fromme. Current Code Status:  full    Referral to Hospice/ Palliative Care Appropriate: no.  Awareness of Medical Conditions: (Trajectory of illness and pts expectations). Anticipate return to baseline good quality of life    Discharge Needs: (to include safety issues) none  Barriers Identified: none    Patient is willing to go to SNF/Inpatient Rehab if recommended: does not seem will be needed    Medication Review:  Await AVS.    Can patient afford medications:  Current meds. Patient is Compliant with Medication regimen:yes    Who manages medications at home: self  Best Patient Contact Number:  [phone]    HUG (Healthy Understanding of Goals) program introduced to patient/family. The Transitional Care Team bridges the gaps in care and education surrounding discharge from the acute care facility. The objective is to empower the patient and family in taking a proactive role in preventing readmission within the first thirty days after discharge. The team is also involved in the efforts to reduce readmission to the acute care setting after stabilization and discharge from the acute care environment either to skilled nursing facilities or community. G RN/NP will return with [facility] Calendar/ follow up appointments/ Ambulatory Nurse Navigator name and contact information when the patient is ready for discharge.     Future Appointments   Date Time Provider Elizabeth Tran   1/24/2020  9:00 AM [facility]   4/16/2020  9:00 AM Claudia [place] MD [facility] MAIN RE[facility]       Non-[facility] follow up appointment(s): none yet    [facility]: call information given to on discharge calendar    Patient education focused on readmission zones as described as: The Red Zone: High risk for readmission, days 1-21  The Yellow Zone: Moderate  risk for readmission, days 22-29   The Green Zone: Lower risk for readmission, days                30 and after    The [facility] Team will attempt to follow the patient from a distance while inpatient as well as be available for further transition/disposition needs. The [facility] team will continue to offer support during the 30- 90 day discharge from acute care setting. Will notify Ambulatory {Blank Single Select Template:[id number][de-identified] \"MIN   RN.     Past Medical History:   Diagnosis Date    Anemia     Arrhythmia     irregular per pt d/t blood disorder    Asthma     Bereavement 2-2-16     die 82 copd,chf,pul htn pn after 48 yr marriage    BPV (benign positional vertigo)     Breast cancer, right breast ([address] 1994    right breast, lumpectomy and radiation    Breast lump 2017    right breast     Chronic kidney disease     kidney cyst - watching    Diabetes ([address]     controlled with diet    DJD (degenerative joint disease) of knee     Early satiety     Hearing deficit, left     Hereditary spherocytosis (HCC)     HTN (hypertension)     Ill-defined condition     sickle cell trait    Intrahepatic bile duct dilation 09/05/2018    mri    Radiation therapy complication 4679    right breast     Renal cyst 08/2016    ct rt    S/P colonoscopy 4-9-14    md Brian - family hx    S/P colonoscopy 07/20/2016    rt - md brian diverticulosis    Seizures ([address]     Septic arthritis ([address] 3/11    Streptococcal sepsis ([address] 03/2011    Venous insufficiency of both lower extremities

## 2020-01-23 NOTE — PROGRESS NOTES
Problem: Risk for Spread of Infection  Goal: Prevent transmission of infectious organism to others  Description  Prevent the transmission of infectious organisms to other patients, staff members, and visitors. Outcome: Progressing Towards Goal     Problem: Falls - Risk of  Goal: *Absence of Falls  Description  Document Hiromarilyn MontagueYvrose Fall Risk and appropriate interventions in the flowsheet. Outcome: Progressing Towards Goal  Note: Fall Risk Interventions:  Mobility Interventions: Bed/chair exit alarm, Patient to call before getting OOB         Medication Interventions: Bed/chair exit alarm, Patient to call before getting OOB, Teach patient to arise slowly    Elimination Interventions: Call light in reach, Bed/chair exit alarm    History of Falls Interventions: Bed/chair exit alarm         Problem: Pressure Injury - Risk of  Goal: *Prevention of pressure injury  Description  Document Pelon Scale and appropriate interventions in the flowsheet.   Outcome: Progressing Towards Goal  Note: Pressure Injury Interventions:  Sensory Interventions: Assess changes in LOC    Moisture Interventions: Absorbent underpads    Activity Interventions: PT/OT evaluation, Chair cushion    Mobility Interventions: PT/OT evaluation, Chair cushion    Nutrition Interventions: Document food/fluid/supplement intake, Offer support with meals,snacks and hydration    Friction and Shear Interventions: Apply protective barrier, creams and emollients, Lift sheet                Problem: Breathing Pattern - Ineffective  Goal: *Absence of hypoxia  Outcome: Progressing Towards Goal

## 2020-01-23 NOTE — PROGRESS NOTES
General Daily Progress Note    Admit Date: 1/20/2020    Subjective:     Patient has no complaint . Current Facility-Administered Medications   Medication Dose Route Frequency    azithromycin (ZITHROMAX) tablet 500 mg  500 mg Oral DAILY    guaiFENesin-dextromethorphan (ROBITUSSIN DM) 100-10 mg/5 mL syrup 5 mL  5 mL Oral Q6H PRN    vancomycin (VANCOCIN) 500 mg in 0.9% sodium chloride (MBP/ADV) 100 mL  500 mg IntraVENous Q16H    sodium chloride (NS) flush 5-40 mL  5-40 mL IntraVENous PRN    acetaminophen (TYLENOL) tablet 650 mg  650 mg Oral Q6H PRN    sodium chloride (NS) flush 5-40 mL  5-40 mL IntraVENous Q8H    fluticasone propionate (FLONASE) 50 mcg/actuation nasal spray 2 Spray  2 Spray Both Nostrils BID    lamoTRIgine (LaMICtal) tablet 25 mg  25 mg Oral BID    NIFEdipine ER (PROCARDIA XL) tablet 60 mg  60 mg Oral DAILY    oxybutynin chloride XL (DITROPAN XL) tablet 10 mg  10 mg Oral DAILY    pravastatin (PRAVACHOL) tablet 20 mg  20 mg Oral QHS    metoprolol succinate (TOPROL-XL) XL tablet 50 mg  50 mg Oral DAILY    albuterol-ipratropium (DUO-NEB) 2.5 MG-0.5 MG/3 ML  3 mL Nebulization Q6H RT    methylPREDNISolone (PF) (SOLU-MEDROL) injection 40 mg  40 mg IntraVENous Q8H    aspirin tablet 325 mg  325 mg Oral DAILY    losartan (COZAAR) tablet 100 mg  100 mg Oral DAILY    oseltamivir (TAMIFLU) capsule 30 mg  30 mg Oral DAILY    heparin (porcine) injection 5,000 Units  5,000 Units SubCUTAneous Q8H        Review of Systems  A comprehensive review of systems was negative.     Objective:     Patient Vitals for the past 24 hrs:   BP Temp Pulse Resp SpO2   01/23/20 0840 -- -- -- -- 96 %   01/23/20 0732 140/61 97.9 °F (36.6 °C) 79 20 97 %   01/23/20 0318 127/56 98 °F (36.7 °C) 72 18 94 %   01/22/20 2306 139/66 98.5 °F (36.9 °C) 77 18 95 %   01/22/20 1936 149/69 97.9 °F (36.6 °C) 75 20 95 %   01/22/20 1535 118/43 97.9 °F (36.6 °C) 73 22 92 %   01/22/20 1407 -- -- -- -- 96 %   01/22/20 1214 151/62 98 °F (36.7 °C) 87 22 95 %     01/23 0701 - 01/23 1900  In: 240 [P.O.:240]  Out: 1000 [Urine:1000]  01/21 1901 - 01/23 0700  In: 56 [P.O.:890]  Out: 400 [Urine:400]    Physical Exam:   Visit Vitals  /61   Pulse 79   Temp 97.9 °F (36.6 °C)   Resp 20   Ht 5' 2\" (1.575 m)   Wt 168 lb (76.2 kg)   SpO2 96%   BMI 30.73 kg/m²     General appearance: alert, cooperative, no distress, appears stated age  Neck: supple, symmetrical, trachea midline, no adenopathy, thyroid: not enlarged, symmetric, no tenderness/mass/nodules, no carotid bruit and no JVD  Lungs: rhonchi R base, L base  Heart: regular rate and rhythm, S1, S2 normal, no murmur, click, rub or gallop  Abdomen: soft, non-tender.  Bowel sounds normal. No masses,  no organomegaly  Extremities: extremities normal, atraumatic, no cyanosis or edema        Data Review   Recent Results (from the past 24 hour(s))   METABOLIC PANEL, BASIC    Collection Time: 01/23/20  3:20 AM   Result Value Ref Range    Sodium 139 136 - 145 mmol/L    Potassium 4.0 3.5 - 5.1 mmol/L    Chloride 108 97 - 108 mmol/L    CO2 24 21 - 32 mmol/L    Anion gap 7 5 - 15 mmol/L    Glucose 158 (H) 65 - 100 mg/dL    BUN 33 (H) 6 - 20 MG/DL    Creatinine 1.62 (H) 0.55 - 1.02 MG/DL    BUN/Creatinine ratio 20 12 - 20      GFR est AA 37 (L) >60 ml/min/1.73m2    GFR est non-AA 30 (L) >60 ml/min/1.73m2    Calcium 8.3 (L) 8.5 - 10.1 MG/DL   CBC WITH MANUAL DIFF    Collection Time: 01/23/20  3:20 AM   Result Value Ref Range    WBC 17.4 (H) 3.6 - 11.0 K/uL    RBC 4.09 3.80 - 5.20 M/uL    HGB 11.1 (L) 11.5 - 16.0 g/dL    HCT 33.2 (L) 35.0 - 47.0 %    MCV 81.2 80.0 - 99.0 FL    MCH 27.1 26.0 - 34.0 PG    MCHC 33.4 30.0 - 36.5 g/dL    RDW 12.9 11.5 - 14.5 %    PLATELET 275 858 - 652 K/uL    MPV 9.8 8.9 - 12.9 FL    NRBC 0.1 (H) 0  WBC    ABSOLUTE NRBC 0.02 (H) 0.00 - 0.01 K/uL    NEUTROPHILS 95 (H) 32 - 75 %    BAND NEUTROPHILS 1 0 - 6 %    LYMPHOCYTES 2 (L) 12 - 49 %    MONOCYTES 2 (L) 5 - 13 %    EOSINOPHILS 0 0 - 7 %    BASOPHILS 0 0 - 1 %    METAMYELOCYTES 0 0 %    MYELOCYTES 0 0 %    PROMYELOCYTES 0 0 %    BLASTS 0 0 %    OTHER CELL 0 0      IMMATURE GRANULOCYTES 0 %    ABS. NEUTROPHILS 16.8 (H) 1.8 - 8.0 K/UL    ABS. LYMPHOCYTES 0.3 (L) 0.8 - 3.5 K/UL    ABS. MONOCYTES 0.3 0.0 - 1.0 K/UL    ABS. EOSINOPHILS 0.0 0.0 - 0.4 K/UL    ABS. BASOPHILS 0.0 0.0 - 0.1 K/UL    ABS. IMM. GRANS. 0.0 K/UL    DF MANUAL      RBC COMMENTS MICROCYTOSIS  1+        RBC COMMENTS FILOMENA CELLS  1+               Assessment:     Active Problems:    Influenza (1/20/2020)        Plan:     1. Positive blood culture is alpha strep. 1 of 4 bottles positive. I am not sure if this is a pathogen or a contaminant therefore will await assistance from infectious disease. 2.  Influenza improving. No toxicity. 3.  Bronchospasm is gradually improving also. 4.  Will mobilize.

## 2020-01-23 NOTE — PROGRESS NOTES
Bedside shift change report GIVEN TO STEPHANIE santos. Report included the following information SBAR. SIGNIFICANT CHANGES DURING SHIFT:  Patient up with minimal assistance. She get somewhat sob with exertion and is congested tolerated diet. She is constipated and asked for prune juice. None on the unit      CONCERNS TO ADDRESS WITH MD:  On droplet precautions. She did receive cough medicine prn. [facility] NURSING NOTE   Admission Date 1/20/2020   Admission Diagnosis Influenza [J11.1]   Consults IP CONSULT TO HOSPITALIST  IP CONSULT TO CARDIOLOGY      Cardiac Monitoring [] Yes [] No      Purposeful Hourly Rounding [] Yes    Danitza Score Total Score: 2   Danitza score 3 or > [] Bed Alarm [] Avasys [] 1:1 sitter [] Patient refused (Signed refusal form in chart)   Pelon Score Pelon Score: 18   Pelon score 14 or < [] PMT consult [] Wound Care consult    []  Specialty bed  [] Nutrition consult      Influenza Vaccine Received Flu Vaccine for Current Season (usually Sept-March): Yes           Oxygen needs? [] Room air Oxygen @  []1L    []2L    []3L   []4L    []5L   []6L via  NC   Chronic home O2 use?  [] Yes [] No  Perform O2 challenge test and document in progress note using smartphrase (.Homeoxygen)      Last bowel movement        Urinary Catheter             LDAs               Peripheral IV 01/20/20 Right Antecubital (Active)   Site Assessment Clean, dry, & intact 1/22/2020  2:56 AM   Phlebitis Assessment 0 1/22/2020  2:56 AM   Infiltration Assessment 0 1/22/2020  2:56 AM   Dressing Status Clean, dry, & intact 1/22/2020  2:56 AM   Dressing Type Transparent;Tape 1/22/2020  2:56 AM   Hub Color/Line Status Flushed;Blue 1/22/2020  2:56 AM   Action Taken Blood drawn 1/20/2020  8:06 AM   Alcohol Cap Used Yes 1/20/2020  8:06 AM       Peripheral IV 01/21/20 Left;Posterior Forearm (Active)   Site Assessment Clean, dry, & intact 1/22/2020  2:56 AM   Phlebitis Assessment 0 1/22/2020  2:56 AM   Infiltration Assessment 0 1/22/2020  2:56 AM   Dressing Status Clean, dry, & intact 1/22/2020  2:56 AM   Dressing Type Transparent;Tape 1/22/2020  2:56 AM   Hub Color/Line Status Flushed;Blue 1/22/2020  2:56 AM                         Readmission Risk Assessment Tool Score High Risk            30       Total Score        3 Has Seen PCP in Last 6 Months (Yes=3, No=0)    5 Pt. Coverage (Medicare=5 , Medicaid, or Self-Pay=4)    22 Charlson Comorbidity Score (Age + Comorbid Conditions)        Criteria that do not apply:    . Living with Significant Other. Assisted Living. LTAC. SNF.  or   Rehab    Patient Length of Stay (>5 days = 3)    IP Visits Last 12 Months (1-3=4, 4=9, >4=11)       Expected Length of Stay 3d 7h   Actual Length of Stay 2

## 2020-01-23 NOTE — CONSULTS
nfectious Disease Consult  Sandoval Lozano MD FACP    Date of Consultation:  January 24, 2020    Referring Physician:  Dr Anyi Ko:     Patient is a 82 y.o. female who is being seen for - Is current positive blood culture contaminant or pathogen? IMPRESSION:       - Alpha Strep bacteremia  BC - 1/20 - + for Alpha Strep ( not Strep pneumoniae) 1/2  Repeat BC - not done  Difficult to comment if it is real or  Contaminant as only 1 set of BC done. Alpha Strep is found in humans in oral & upper respiratory tract  But pt has carious dentition x 4 , she presented with SOB & respiratory symptoms  - Acute Influenza A viral illness  - Acute exacerbation of asthma  - Possible early pneumonia  - CXR - 1/20 -no infiltrate  - CT scan-1/21- 1. Minimal centrilobular nodular opacities are seen in the right middle lobe  compatible with infection which may be typical or atypical.  3. Bilateral atelectasis. -Diabetes Type 2 A1c 5.1  - CKD3 Cr 1.64         PLAN:        - BCx 2   - Continue Vancomycin/ Azithromycin , add Ceftriaxone for CAP, also Alpha Strep .   I- ID,sensitivities on Alpha Strep requested , may DC Vancomycin if PCN sensitive   Duration of treatment - 14 days  - ECHO cardiogram - evaluate for vegetation  -Continue Tamiflu  - Repeat CXR in am  - Pt advised to see Dental   - Plan of care D/w pt & sister at bedside       Patient Active Problem List   Diagnosis Code    Anemia NEC     Asthma J45.909    Hereditary spherocytosis ([address] D58.0    HTN (hypertension) I10    Breast cancer, right breast ([address] C50.911    Anemia D64.9    BPV (benign positional vertigo) H81.10    DJD (degenerative joint disease) of knee M17.10    S/P colonoscopy Z98.890    Early satiety R68.81    Venous insufficiency of both lower extremities I87.2    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.21    Renal cyst N28.1    Intrahepatic bile duct dilation K83.8    Hearing deficit, left H91.92    Influenza J11.1 Past Medical History:   Diagnosis Date    Anemia     Arrhythmia     irregular per pt d/t blood disorder    Asthma     Bereavement 2-2-16     die 82 copd,chf,pul htn pn after 48 yr marriage    BPV (benign positional vertigo)     Breast cancer, right breast ([address] 1994    right breast, lumpectomy and radiation    Breast lump 2017    right breast     Chronic kidney disease     kidney cyst - watching    Diabetes ([address]     controlled with diet    DJD (degenerative joint disease) of knee     Early satiety     Hearing deficit, left     Hereditary spherocytosis (HCC)     HTN (hypertension)     Ill-defined condition     sickle cell trait    Intrahepatic bile duct dilation 09/05/2018    mri    Radiation therapy complication 0190    right breast     Renal cyst 08/2016    ct rt    S/P colonoscopy 4-9-14    md Brian - family hx    S/P colonoscopy 07/20/2016    rt - md brian diverticulosis    Seizures [facility]     Septic arthritis ([facility] 3/11    Streptococcal sepsis ([facility] 03/2011    Venous insufficiency of both lower extremities       Family History   Problem Relation Age of Onset    Diabetes Mother     Other Father         'sphero'-blood disorder    Cancer Sister         breast    Breast Cancer Sister 71    Other Sister         spleen removed    Colon Cancer Sister     Other Other         spleen removed    Gall Bladder Disease Other       Social History     Tobacco Use    Smoking status: Never Smoker    Smokeless tobacco: Never Used   Substance Use Topics    Alcohol use: No     Past Surgical History:   Procedure Laterality Date    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  Authorizing Provider   potassium chloride (KLOR-CON M20) 20 mEq tablet TAKE 1 TABLET DAILY 11/5/19  Yes Nicci Martin Trever Field MD   ibandronate (BONIVA) 150 mg tablet TAKE 1 TABLET ONCE A MONTH 9/29/19  Yes Chau Hinojosa MD   TOPROL XL 50 mg XL tablet TAKE 1 TABLET DAILY 8/12/19  Yes Chau Hinojosa MD   NIFEdipine ER (PROCARDIA XL) 60 mg ER tablet TAKE 1 TABLET TWICE A DAY 8/12/19  Yes Chau Hinojosa MD   pravastatin (PRAVACHOL) 20 mg tablet TAKE 1 TABLET ONCE EACH NIGHT 8/3/19  Yes Chau Hinojosa MD   oxybutynin chloride XL (DITROPAN XL) 10 mg CR tablet TAKE 1 TABLET DAILY 5/11/19  Yes Chau Hinojosa MD   lamoTRIgine (LAMICTAL) 25 mg tablet TAKE 1 TABLET TWICE A DAY 3/11/19  Yes Chau Hinojosa MD   MICARDIS 80 mg tablet TAKE 1 TABLET DAILY 3/6/19  Yes Chau Hinojosa MD   clotrimazole-betamethasone (LOTRISONE) topical cream Apply  to affected area two (2) times a day. 3/5/19  Yes Chau Hinojosa MD   aspirin (ASPIRIN) 325 mg tablet Take 325 mg by mouth as needed for Pain. Yes Provider, Historical   hydroCHLOROthiazide (HYDRODIURIL) 12.5 mg tablet Take 12.5 mg by mouth every Monday, Wednesday, Friday. Yes Provider, Historical   guaiFENesin (MUCUS RELIEF) 400 mg tablet Take 200 mg by mouth every four (4) hours as needed for Congestion. Yes Provider, Historical   fluticasone (FLONASE) 50 mcg/actuation nasal spray 2 Sprays by Both Nostrils route two (2) times a day. Yes Provider, Historical   calcium carbonate (TUMS PO) Take 500 mg by mouth as needed. Yes Provider, Historical   fluticasone-salmeterol (ADVAIR DISKUS) 250-50 mcg/dose diskus inhaler USE 1 INHALATION TWO TIMES A DAY 8/17/18  Yes Chau Hinojosa MD   albuterol (PROVENTIL HFA, VENTOLIN HFA, PROAIR HFA) 90 mcg/actuation inhaler Take 1 Puff by inhalation every four (4) hours as needed for Wheezing.  8/17/18  Yes Chau Hinojosa MD   triamcinolone acetonide (KENALOG) 0.1 % topical cream APPLY EXTERNALLY TO THE AFFECTED AREA TWICE DAILY 1/25/18  Yes Chau Hinojosa MD   aspirin delayed-release 81 mg tablet Take 81 mg by mouth daily. Yes Provider, Historical     Allergies   Allergen Reactions    Codeine Nausea and Vomiting and Other (comments)     Upset stomach. Weak.  Other Plant, Animal, Environmental Other (comments)     Dust and mold allergy. Review of Systems:  A comprehensive review of systems was negative except for that written in the History of Present Illness. 10 point review of systems obtained . All other systems negative    Objective:   Blood pressure 159/70, pulse 92, temperature 98.2 °F (36.8 °C), resp. rate 18, height 5' 2\" (1.575 m), weight 168 lb (76.2 kg), SpO2 100 %.   Temp (24hrs), Av.9 °F (36.6 °C), Min:97.6 °F (36.4 °C), Max:98.2 °F (36.8 °C)    Current Facility-Administered Medications   Medication Dose Route Frequency    methylPREDNISolone (PF) (SOLU-MEDROL) injection 40 mg  40 mg IntraVENous Q12H    cefTRIAXone (ROCEPHIN) 2 g in 0.9% sodium chloride (MBP/ADV) 50 mL  2 g IntraVENous Q24H    vancomycin (VANCOCIN) 750 mg in 0.9% sodium chloride (MBP/ADV) 250 mL  750 mg IntraVENous Q16H    azithromycin (ZITHROMAX) tablet 500 mg  500 mg Oral DAILY    guaiFENesin-dextromethorphan (ROBITUSSIN DM) 100-10 mg/5 mL syrup 5 mL  5 mL Oral Q6H PRN    sodium chloride (NS) flush 5-40 mL  5-40 mL IntraVENous PRN    acetaminophen (TYLENOL) tablet 650 mg  650 mg Oral Q6H PRN    sodium chloride (NS) flush 5-40 mL  5-40 mL IntraVENous Q8H    fluticasone propionate (FLONASE) 50 mcg/actuation nasal spray 2 Spray  2 Spray Both Nostrils BID    lamoTRIgine (LaMICtal) tablet 25 mg  25 mg Oral BID    NIFEdipine ER (PROCARDIA XL) tablet 60 mg  60 mg Oral DAILY    oxybutynin chloride XL (DITROPAN XL) tablet 10 mg  10 mg Oral DAILY    pravastatin (PRAVACHOL) tablet 20 mg  20 mg Oral QHS    metoprolol succinate (TOPROL-XL) XL tablet 50 mg  50 mg Oral DAILY    albuterol-ipratropium (DUO-NEB) 2.5 MG-0.5 MG/3 ML  3 mL Nebulization Q6H RT    aspirin tablet 325 mg  325 mg Oral DAILY    losartan (COZAAR) tablet 100 mg  100 mg Oral DAILY    oseltamivir (TAMIFLU) capsule 30 mg  30 mg Oral DAILY    heparin (porcine) injection 5,000 Units  5,000 Units SubCUTAneous Q8H        Exam:    General:  Alert, cooperative, well noursished, well developed, appears stated age   Eyes:  Sclera anicteric. Pupils equally round and reactive to light. Mouth/Throat: Mucous membranes normal, oral pharynx clear   Neck: Supple   Lungs:   Clear to auscultation bilaterally, good effort   CV:  Regular rate and rhythm,no murmur, click, rub or gallop   Abdomen:   Soft, non-tender. bowel sounds normal. non-distended   Extremities: No cyanosis or edema   Skin: Skin color, texture, turgor normal. no acute rash or lesions   Lymph nodes: Cervical and supraclavicular normal   Musculoskeletal: No swelling or deformity   Lines/Devices:  Intact, no erythema, drainage or tenderness   Psych: Alert and oriented, normal mood affect given the setting       Data Review:     Lab Results   Component Value Date/Time    Culture result: NO GROWTH AFTER 15 HOURS 01/23/2020 02:28 PM    Culture result: HEAVY NORMAL RESPIRATORY VIKA 01/20/2020 10:44 PM    Culture result: FEW YEAST, (APPARENT CANDIDA ALBICANS) (A) 01/20/2020 10:44 PM    Culture result: (A) 01/20/2020 09:06 AM     ALPHA STREPTOCOCCUS, NOT S. PNEUMONIAE GROWING IN 1 OF 2 BOTTLES DRAWN (SITE = R HAND)    Culture result: REMAINING BOTTLE(S) HAS/HAVE NO GROWTH SO FAR 01/20/2020 09:06 AM    Culture result:  [facility] HAS REQUESTED SENSITIVITIES 1/24/20 01/20/2020 09:06 AM          XR Results (most recent):  Results from [place] encounter on 01/20/20   XR CHEST PORT    Narrative EXAM: XR CHEST PORT    INDICATION: Shortness of breath, respiratory distress, history of asthma  COMPARISON: None. FINDINGS: A portable AP radiograph of the chest was obtained at 0725 hours. The  patient is on a cardiac monitor.  The heart size normal. Lungs well aerated and  clear. Surgical clips project over the right side of the chest medially in the  suprahilar region. Impression IMPRESSION:  1. No acute process             ICD-10-CM ICD-9-CM    1. Influenza A J10.1 487.1    2. Mild intermittent asthma with acute exacerbation J45.21 493.92    3. Hypoxia R09.02 799.02            Antibiotic History  Add Ceftriaxone  Vancomycin 500 mg IV every 16 hours (Start Date 1/21; Day # 3)  Azithromycin 500mg PO every 24 hours (Start Date 1/21; Day 3/5)   Oseltamivir 30 mg PO daily (Start Date 1/21; Day 3/5)        I have discussed the diagnosis with the patient and the intended plan as seen in the above orders. I have discussed medication side effects and warnings with the patient as well.     Reviewed test results at length with patient    Signed By: Chance Montenegro MD FACP

## 2020-01-24 ENCOUNTER — APPOINTMENT (OUTPATIENT)
Dept: GENERAL RADIOLOGY | Age: 85
DRG: 194 | End: 2020-01-24
Attending: INTERNAL MEDICINE
Payer: MEDICARE

## 2020-01-24 LAB
ANION GAP SERPL CALC-SCNC: 7 MMOL/L (ref 5–15)
BASOPHILS # BLD: 0 K/UL (ref 0–0.1)
BASOPHILS NFR BLD: 0 % (ref 0–1)
BLASTS NFR BLD MANUAL: 0 %
BUN SERPL-MCNC: 32 MG/DL (ref 6–20)
BUN/CREAT SERPL: 20 (ref 12–20)
CALCIUM SERPL-MCNC: 7.9 MG/DL (ref 8.5–10.1)
CHLORIDE SERPL-SCNC: 108 MMOL/L (ref 97–108)
CO2 SERPL-SCNC: 24 MMOL/L (ref 21–32)
CREAT SERPL-MCNC: 1.64 MG/DL (ref 0.55–1.02)
DIFFERENTIAL METHOD BLD: ABNORMAL
EOSINOPHIL # BLD: 0 K/UL (ref 0–0.4)
EOSINOPHIL NFR BLD: 0 % (ref 0–7)
ERYTHROCYTE [DISTWIDTH] IN BLOOD BY AUTOMATED COUNT: 12.7 % (ref 11.5–14.5)
GLUCOSE SERPL-MCNC: 197 MG/DL (ref 65–100)
HCT VFR BLD AUTO: 32.1 % (ref 35–47)
HGB BLD-MCNC: 10.6 G/DL (ref 11.5–16)
IMM GRANULOCYTES # BLD AUTO: 0 K/UL
IMM GRANULOCYTES NFR BLD AUTO: 0 %
LYMPHOCYTES # BLD: 0.8 K/UL (ref 0.8–3.5)
LYMPHOCYTES NFR BLD: 5 % (ref 12–49)
MCH RBC QN AUTO: 26.5 PG (ref 26–34)
MCHC RBC AUTO-ENTMCNC: 33 G/DL (ref 30–36.5)
MCV RBC AUTO: 80.3 FL (ref 80–99)
METAMYELOCYTES NFR BLD MANUAL: 0 %
MONOCYTES # BLD: 0.5 K/UL (ref 0–1)
MONOCYTES NFR BLD: 3 % (ref 5–13)
MYELOCYTES NFR BLD MANUAL: 0 %
NEUTS BAND NFR BLD MANUAL: 4 % (ref 0–6)
NEUTS SEG # BLD: 15.1 K/UL (ref 1.8–8)
NEUTS SEG NFR BLD: 88 % (ref 32–75)
NRBC # BLD: 0.03 K/UL (ref 0–0.01)
NRBC BLD-RTO: 0.2 PER 100 WBC
OTHER CELLS NFR BLD MANUAL: 0 %
PLATELET # BLD AUTO: 371 K/UL (ref 150–400)
PLATELET COMMENTS,PCOM: ABNORMAL
PMV BLD AUTO: 9.8 FL (ref 8.9–12.9)
POTASSIUM SERPL-SCNC: 4 MMOL/L (ref 3.5–5.1)
PROMYELOCYTES NFR BLD MANUAL: 0 %
RBC # BLD AUTO: 4 M/UL (ref 3.8–5.2)
RBC MORPH BLD: ABNORMAL
SODIUM SERPL-SCNC: 139 MMOL/L (ref 136–145)
WBC # BLD AUTO: 16.4 K/UL (ref 3.6–11)

## 2020-01-24 PROCEDURE — 74011250636 HC RX REV CODE- 250/636: Performed by: INTERNAL MEDICINE

## 2020-01-24 PROCEDURE — 36415 COLL VENOUS BLD VENIPUNCTURE: CPT

## 2020-01-24 PROCEDURE — 74011250637 HC RX REV CODE- 250/637: Performed by: EMERGENCY MEDICINE

## 2020-01-24 PROCEDURE — 74011000258 HC RX REV CODE- 258: Performed by: INTERNAL MEDICINE

## 2020-01-24 PROCEDURE — 74011250636 HC RX REV CODE- 250/636: Performed by: HOSPITALIST

## 2020-01-24 PROCEDURE — 65660000001 HC RM ICU INTERMED STEPDOWN

## 2020-01-24 PROCEDURE — 74011000250 HC RX REV CODE- 250: Performed by: HOSPITALIST

## 2020-01-24 PROCEDURE — 74011250637 HC RX REV CODE- 250/637: Performed by: HOSPITALIST

## 2020-01-24 PROCEDURE — 94640 AIRWAY INHALATION TREATMENT: CPT

## 2020-01-24 PROCEDURE — 74011250637 HC RX REV CODE- 250/637: Performed by: INTERNAL MEDICINE

## 2020-01-24 PROCEDURE — 85027 COMPLETE CBC AUTOMATED: CPT

## 2020-01-24 PROCEDURE — 74011000250 HC RX REV CODE- 250: Performed by: INTERNAL MEDICINE

## 2020-01-24 PROCEDURE — 71045 X-RAY EXAM CHEST 1 VIEW: CPT

## 2020-01-24 PROCEDURE — 80048 BASIC METABOLIC PNL TOTAL CA: CPT

## 2020-01-24 PROCEDURE — 94760 N-INVAS EAR/PLS OXIMETRY 1: CPT

## 2020-01-24 RX ORDER — IPRATROPIUM BROMIDE AND ALBUTEROL SULFATE 2.5; .5 MG/3ML; MG/3ML
3 SOLUTION RESPIRATORY (INHALATION)
Status: DISCONTINUED | OUTPATIENT
Start: 2020-01-24 | End: 2020-01-29 | Stop reason: HOSPADM

## 2020-01-24 RX ADMIN — ACETAMINOPHEN 650 MG: 325 TABLET ORAL at 20:05

## 2020-01-24 RX ADMIN — CEFTRIAXONE SODIUM 2 G: 2 INJECTION, POWDER, FOR SOLUTION INTRAMUSCULAR; INTRAVENOUS at 13:57

## 2020-01-24 RX ADMIN — NIFEDIPINE 60 MG: 30 TABLET, FILM COATED, EXTENDED RELEASE ORAL at 10:11

## 2020-01-24 RX ADMIN — OSELTAMIVIR PHOSPHATE 30 MG: 30 CAPSULE ORAL at 10:13

## 2020-01-24 RX ADMIN — METOPROLOL SUCCINATE 50 MG: 50 TABLET, EXTENDED RELEASE ORAL at 10:12

## 2020-01-24 RX ADMIN — HEPARIN SODIUM 5000 UNITS: 5000 INJECTION INTRAVENOUS; SUBCUTANEOUS at 11:58

## 2020-01-24 RX ADMIN — HEPARIN SODIUM 5000 UNITS: 5000 INJECTION INTRAVENOUS; SUBCUTANEOUS at 20:06

## 2020-01-24 RX ADMIN — IPRATROPIUM BROMIDE AND ALBUTEROL SULFATE 3 ML: .5; 3 SOLUTION RESPIRATORY (INHALATION) at 08:00

## 2020-01-24 RX ADMIN — FLUTICASONE PROPIONATE 2 SPRAY: 50 SPRAY, METERED NASAL at 17:35

## 2020-01-24 RX ADMIN — LOSARTAN POTASSIUM 100 MG: 100 TABLET, FILM COATED ORAL at 10:08

## 2020-01-24 RX ADMIN — METHYLPREDNISOLONE SODIUM SUCCINATE 40 MG: 40 INJECTION, POWDER, FOR SOLUTION INTRAMUSCULAR; INTRAVENOUS at 23:40

## 2020-01-24 RX ADMIN — VANCOMYCIN HYDROCHLORIDE 750 MG: 750 INJECTION, POWDER, LYOPHILIZED, FOR SOLUTION INTRAVENOUS at 20:06

## 2020-01-24 RX ADMIN — Medication 10 ML: at 13:57

## 2020-01-24 RX ADMIN — ASPIRIN 325 MG: 325 TABLET, FILM COATED ORAL at 10:05

## 2020-01-24 RX ADMIN — Medication 10 ML: at 06:00

## 2020-01-24 RX ADMIN — VANCOMYCIN HYDROCHLORIDE 750 MG: 750 INJECTION, POWDER, LYOPHILIZED, FOR SOLUTION INTRAVENOUS at 03:19

## 2020-01-24 RX ADMIN — AZITHROMYCIN 500 MG: 250 TABLET, FILM COATED ORAL at 10:09

## 2020-01-24 RX ADMIN — METHYLPREDNISOLONE SODIUM SUCCINATE 40 MG: 40 INJECTION, POWDER, FOR SOLUTION INTRAMUSCULAR; INTRAVENOUS at 03:19

## 2020-01-24 RX ADMIN — OXYBUTYNIN CHLORIDE 10 MG: 5 TABLET, EXTENDED RELEASE ORAL at 23:40

## 2020-01-24 RX ADMIN — IPRATROPIUM BROMIDE AND ALBUTEROL SULFATE 3 ML: .5; 3 SOLUTION RESPIRATORY (INHALATION) at 14:10

## 2020-01-24 RX ADMIN — LAMOTRIGINE 25 MG: 25 TABLET ORAL at 10:13

## 2020-01-24 RX ADMIN — Medication 10 ML: at 23:41

## 2020-01-24 RX ADMIN — HEPARIN SODIUM 5000 UNITS: 5000 INJECTION INTRAVENOUS; SUBCUTANEOUS at 03:19

## 2020-01-24 RX ADMIN — FLUTICASONE PROPIONATE 2 SPRAY: 50 SPRAY, METERED NASAL at 10:14

## 2020-01-24 RX ADMIN — IPRATROPIUM BROMIDE AND ALBUTEROL SULFATE 3 ML: .5; 3 SOLUTION RESPIRATORY (INHALATION) at 20:39

## 2020-01-24 RX ADMIN — LAMOTRIGINE 25 MG: 25 TABLET ORAL at 17:35

## 2020-01-24 RX ADMIN — IPRATROPIUM BROMIDE AND ALBUTEROL SULFATE 3 ML: .5; 3 SOLUTION RESPIRATORY (INHALATION) at 01:48

## 2020-01-24 RX ADMIN — PRAVASTATIN SODIUM 20 MG: 10 TABLET ORAL at 23:41

## 2020-01-24 NOTE — PROGRESS NOTES
Problem: Risk for Spread of Infection  Goal: Prevent transmission of infectious organism to others  Description  Prevent the transmission of infectious organisms to other patients, staff members, and visitors. Outcome: Progressing Towards Goal     Problem: Falls - Risk of  Goal: *Absence of Falls  Description  Document Jostin Reas Fall Risk and appropriate interventions in the flowsheet. Outcome: Progressing Towards Goal  Note: Fall Risk Interventions:  Mobility Interventions: Bed/chair exit alarm         Medication Interventions: Bed/chair exit alarm    Elimination Interventions: Bed/chair exit alarm    History of Falls Interventions: Bed/chair exit alarm         Problem: Patient Education: Go to Patient Education Activity  Goal: Patient/Family Education  Outcome: Progressing Towards Goal     Problem: Pressure Injury - Risk of  Goal: *Prevention of pressure injury  Description  Document Pelon Scale and appropriate interventions in the flowsheet.   Outcome: Progressing Towards Goal  Note: Pressure Injury Interventions:  Sensory Interventions: Assess changes in LOC, Maintain/enhance activity level    Moisture Interventions: Absorbent underpads    Activity Interventions: Increase time out of bed    Mobility Interventions: Assess need for specialty bed    Nutrition Interventions: Document food/fluid/supplement intake    Friction and Shear Interventions: Lift sheet                Problem: Patient Education: Go to Patient Education Activity  Goal: Patient/Family Education  Outcome: Progressing Towards Goal     Problem: Breathing Pattern - Ineffective  Goal: *Absence of hypoxia  Outcome: Progressing Towards Goal

## 2020-01-24 NOTE — PROGRESS NOTES
Problem: Falls - Risk of  Goal: *Absence of Falls  Description  Document Tia Manus Fall Risk and appropriate interventions in the flowsheet.   Outcome: Progressing Towards Goal  Note: Fall Risk Interventions:  Mobility Interventions: Bed/chair exit alarm, Patient to call before getting OOB         Medication Interventions: Bed/chair exit alarm, Patient to call before getting OOB    Elimination Interventions: Bed/chair exit alarm, Call light in reach    History of Falls Interventions: Bed/chair exit alarm, Door open when patient unattended         Problem: Patient Education: Go to Patient Education Activity  Goal: Patient/Family Education  Outcome: Progressing Towards Goal

## 2020-01-24 NOTE — PROGRESS NOTES
Bedside shift change report GIVEN TO Rhonda Covington RN. Report included the following information SBAR, Kardex and MAR. SIGNIFICANT CHANGES DURING SHIFT:        CONCERNS TO ADDRESS WITH MD:            [facility] NURSING NOTE   Admission Date 1/20/2020   Admission Diagnosis Influenza [J11.1]   Consults IP CONSULT TO HOSPITALIST  IP CONSULT TO CARDIOLOGY  IP CONSULT TO INFECTIOUS DISEASES      Cardiac Monitoring [x] Yes [] No      Purposeful Hourly Rounding [x] Yes    Danitza Score Total Score: 2   Danitza score 3 or > [] Bed Alarm [] Avasys [] 1:1 sitter [] Patient refused (Signed refusal form in chart)   Pelon Score Pelon Score: 18   Pelon score 14 or < [] PMT consult [] Wound Care consult    []  Specialty bed  [] Nutrition consult      Influenza Vaccine Received Flu Vaccine for Current Season (usually Sept-March): Yes           Oxygen needs? [x] Room air Oxygen @  []1L    []2L    []3L   []4L    []5L   []6L via  NC   Chronic home O2 use? [] Yes [] No  Perform O2 challenge test and document in progress note using [employer] (.Homeoxygen)      Last bowel movement        Urinary Catheter             LDAs               Peripheral IV 01/24/20 Anterior; Left Hand (Active)   Site Assessment Clean, dry, & intact 1/24/2020  4:58 AM   Phlebitis Assessment 0 1/24/2020  4:58 AM   Infiltration Assessment 0 1/24/2020  4:58 AM   Dressing Status Clean, dry, & intact 1/24/2020  4:58 AM   Dressing Type Transparent 1/24/2020  4:58 AM   Hub Color/Line Status Blue 1/24/2020  4:58 AM                         Readmission Risk Assessment Tool Score High Risk            30       Total Score        3 Has Seen PCP in Last 6 Months (Yes=3, No=0)    5 Pt. Coverage (Medicare=5 , Medicaid, or Self-Pay=4)    22 Charlson Comorbidity Score (Age + Comorbid Conditions)        Criteria that do not apply:    . Living with Significant Other. Assisted Living. LTAC. SNF.  or   Rehab    Patient Length of Stay (>5 days = 3)    IP Visits Last 12 Months (1-3=4, 4=9, >4=11)       Expected Length of Stay 3d 7h   Actual Length of Stay 4

## 2020-01-24 NOTE — PROGRESS NOTES
0700: Bedside shift change report given to Katelyn (oncoming nurse) by Cathy Jarquin (offgoing nurse). Report included the following information SBAR, Kardex, Intake/Output, MAR, Recent Results and Cardiac Rhythm NSR.     1900: Bedside shift change report given to Morris Patel (oncoming nurse) by Katelyn (offgoing nurse). Report included the following information SBAR, Kardex, Intake/Output, MAR, Recent Results and Cardiac Rhythm NSR.

## 2020-01-24 NOTE — PROGRESS NOTES
ADULT PROTOCOL: JET AEROSOL  REASSESSMENT    Patient  Melissa Lyle     82 y.o.   female     1/24/2020  12:13 AM    Breath Sounds Pre Procedure: Right Breath Sounds: Expiratory wheezing                               Left Breath Sounds: Expiratory wheezing    Breath Sounds Post Procedure: Right Breath Sounds: Diminished                                 Left Breath Sounds: Diminished    Breathing pattern: Pre procedure Breathing Pattern: Regular          Post procedure Breathing Pattern: Regular    Heart Rate: Pre procedure Pulse: 85           Post procedure Pulse: 79    Resp Rate: Pre procedure Respirations: 18           Post procedure Respirations: 20    Cough: Pre procedure Cough: Non-productive               Post procedure Cough: Non-productive  Oxygen: O2 Device: Room air       SpO2: Pre procedure SpO2: 98 %            Post procedure SpO2: 98 %      Nebulizer Therapy: Current medications Aerosolized Medications: DuoNeb      Problem List:   Patient Active Problem List   Diagnosis Code    Anemia NEC     Asthma J45.909    Hereditary spherocytosis (HCC) D58.0    HTN (hypertension) I10    Breast cancer, right breast ([address] C50.911    Anemia D64.9    BPV (benign positional vertigo) H81.10    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

## 2020-01-24 NOTE — PROGRESS NOTES
General Daily Progress Note    Admit Date: 1/20/2020    Subjective:     Patient complains of hoarseness. Current Facility-Administered Medications   Medication Dose Route Frequency    methylPREDNISolone (PF) (SOLU-MEDROL) injection 40 mg  40 mg IntraVENous Q12H    cefTRIAXone (ROCEPHIN) 2 g in 0.9% sodium chloride (MBP/ADV) 50 mL  2 g IntraVENous Q24H    vancomycin (VANCOCIN) 750 mg in 0.9% sodium chloride (MBP/ADV) 250 mL  750 mg IntraVENous Q16H    azithromycin (ZITHROMAX) tablet 500 mg  500 mg Oral DAILY    guaiFENesin-dextromethorphan (ROBITUSSIN DM) 100-10 mg/5 mL syrup 5 mL  5 mL Oral Q6H PRN    sodium chloride (NS) flush 5-40 mL  5-40 mL IntraVENous PRN    acetaminophen (TYLENOL) tablet 650 mg  650 mg Oral Q6H PRN    sodium chloride (NS) flush 5-40 mL  5-40 mL IntraVENous Q8H    fluticasone propionate (FLONASE) 50 mcg/actuation nasal spray 2 Spray  2 Spray Both Nostrils BID    lamoTRIgine (LaMICtal) tablet 25 mg  25 mg Oral BID    NIFEdipine ER (PROCARDIA XL) tablet 60 mg  60 mg Oral DAILY    oxybutynin chloride XL (DITROPAN XL) tablet 10 mg  10 mg Oral DAILY    pravastatin (PRAVACHOL) tablet 20 mg  20 mg Oral QHS    metoprolol succinate (TOPROL-XL) XL tablet 50 mg  50 mg Oral DAILY    albuterol-ipratropium (DUO-NEB) 2.5 MG-0.5 MG/3 ML  3 mL Nebulization Q6H RT    aspirin tablet 325 mg  325 mg Oral DAILY    losartan (COZAAR) tablet 100 mg  100 mg Oral DAILY    oseltamivir (TAMIFLU) capsule 30 mg  30 mg Oral DAILY    heparin (porcine) injection 5,000 Units  5,000 Units SubCUTAneous Q8H        Review of Systems  A comprehensive review of systems was negative.     Objective:     Patient Vitals for the past 24 hrs:   BP Temp Pulse Resp SpO2 Height Weight   01/24/20 0759 159/70 98.2 °F (36.8 °C) 92 18 100 % -- --   01/24/20 0550 148/64 97.9 °F (36.6 °C) 73 18 98 % -- --   01/24/20 0148 -- -- -- -- 98 % -- --   01/24/20 0040 121/62 98.1 °F (36.7 °C) 70 20 96 % -- --   01/23/20 2013 129/55 98 °F (36.7 °C) 73 18 94 % -- --   01/23/20 2009 -- -- -- -- 98 % -- --   01/23/20 1629 139/66 -- -- -- -- 5' 2\" (1.575 m) 168 lb (76.2 kg)   01/23/20 1540 136/66 97.6 °F (36.4 °C) 70 20 96 % -- --   01/23/20 1108 144/78 97.8 °F (36.6 °C) 77 20 97 % -- --     01/24 0701 - 01/24 1900  In: -   Out: 400 [Urine:400]  01/22 1901 - 01/24 0700  In: 80 [P.O.:580]  Out: 3300 [Urine:3300]    Physical Exam:   Visit Vitals  /70 (BP 1 Location: Right arm, BP Patient Position: Sitting)   Pulse 92   Temp 98.2 °F (36.8 °C)   Resp 18   Ht 5' 2\" (1.575 m)   Wt 168 lb (76.2 kg)   SpO2 100%   BMI 30.73 kg/m²     General appearance: alert, cooperative, no distress, appears stated age  Neck: supple, symmetrical, trachea midline, no adenopathy, thyroid: not enlarged, symmetric, no tenderness/mass/nodules, no carotid bruit and no JVD  Lungs: rhonchi R base, L base  Heart: regular rate and rhythm, S1, S2 normal, no murmur, click, rub or gallop  Abdomen: soft, non-tender.  Bowel sounds normal. No masses,  no organomegaly  Extremities: extremities normal, atraumatic, no cyanosis or edema        Data Review   Recent Results (from the past 24 hour(s))   CULTURE, BLOOD, PAIRED    Collection Time: 01/23/20  2:28 PM   Result Value Ref Range    Special Requests: NO SPECIAL REQUESTS      Culture result: NO GROWTH AFTER 15 HOURS     VANCOMYCIN, TROUGH    Collection Time: 01/23/20  2:29 PM   Result Value Ref Range    Vancomycin,trough 14.6 (H) 5.0 - 10.0 ug/mL    Reported dose date: NOT PROVIDED      Reported dose time: NOT PROVIDED      Reported dose: NOT PROVIDED UNITS   ECHO ADULT COMPLETE    Collection Time: 01/23/20  4:55 PM   Result Value Ref Range    LA Volume 52.24 22 - 52 mL    Right Atrial Area 4C 14.47 cm2    LV E' Lateral Velocity 6.97 cm/s    LV E' Septal Velocity 6.87 cm/s    LA Vol Index 29.43 16 - 28 ml/m2   METABOLIC PANEL, BASIC    Collection Time: 01/24/20 12:42 AM   Result Value Ref Range    Sodium 139 136 - 145 mmol/L    Potassium 4.0 3.5 - 5.1 mmol/L    Chloride 108 97 - 108 mmol/L    CO2 24 21 - 32 mmol/L    Anion gap 7 5 - 15 mmol/L    Glucose 197 (H) 65 - 100 mg/dL    BUN 32 (H) 6 - 20 MG/DL    Creatinine 1.64 (H) 0.55 - 1.02 MG/DL    BUN/Creatinine ratio 20 12 - 20      GFR est AA 36 (L) >60 ml/min/1.73m2    GFR est non-AA 30 (L) >60 ml/min/1.73m2    Calcium 7.9 (L) 8.5 - 10.1 MG/DL   CBC WITH MANUAL DIFF    Collection Time: 01/24/20 12:42 AM   Result Value Ref Range    WBC 16.4 (H) 3.6 - 11.0 K/uL    RBC 4.00 3.80 - 5.20 M/uL    HGB 10.6 (L) 11.5 - 16.0 g/dL    HCT 32.1 (L) 35.0 - 47.0 %    MCV 80.3 80.0 - 99.0 FL    MCH 26.5 26.0 - 34.0 PG    MCHC 33.0 30.0 - 36.5 g/dL    RDW 12.7 11.5 - 14.5 %    PLATELET 516 426 - 018 K/uL    MPV 9.8 8.9 - 12.9 FL    NRBC 0.2 (H) 0  WBC    ABSOLUTE NRBC 0.03 (H) 0.00 - 0.01 K/uL    NEUTROPHILS 88 (H) 32 - 75 %    BAND NEUTROPHILS 4 0 - 6 %    LYMPHOCYTES 5 (L) 12 - 49 %    MONOCYTES 3 (L) 5 - 13 %    EOSINOPHILS 0 0 - 7 %    BASOPHILS 0 0 - 1 %    METAMYELOCYTES 0 0 %    MYELOCYTES 0 0 %    PROMYELOCYTES 0 0 %    BLASTS 0 0 %    OTHER CELL 0 0      IMMATURE GRANULOCYTES 0 %    ABS. NEUTROPHILS 15.1 (H) 1.8 - 8.0 K/UL    ABS. LYMPHOCYTES 0.8 0.8 - 3.5 K/UL    ABS. MONOCYTES 0.5 0.0 - 1.0 K/UL    ABS. EOSINOPHILS 0.0 0.0 - 0.4 K/UL    ABS. BASOPHILS 0.0 0.0 - 0.1 K/UL    ABS. IMM. GRANS. 0.0 K/UL    DF MANUAL      PLATELET COMMENTS Large Platelets      RBC COMMENTS ANISOCYTOSIS  1+        RBC COMMENTS MACROCYTOSIS  1+        RBC COMMENTS MICROCYTOSIS  1+               Assessment:     Active Problems:    Influenza (1/20/2020)        Plan:     1. Note made of comments of infectious disease regarding alpha strep positive blood culture. Echocardiogram was negative. Await recommendations for duration of treatment. Assume to be pathogen. 2.  Bronchospasm continues to improve and will therefore reduce steroids. 3.  Appears to be doing quite well.

## 2020-01-24 NOTE — PROGRESS NOTES
ADULT PROTOCOL: JET AEROSOL  REASSESSMENT    Patient  Yoselny Hansen     82 y.o.   female     1/24/2020  1:32 PM    Breath Sounds Pre Procedure: Right Breath Sounds: Diminished, Expiratory wheezing                               Left Breath Sounds: Diminished, Expiratory wheezing    Breath Sounds Post Procedure: Right Breath Sounds: Diminished, Wheezing                                 Left Breath Sounds: Diminished, Wheezing    Breathing pattern: Pre procedure Breathing Pattern: Regular          Post procedure Breathing Pattern: Regular    Heart Rate: Pre procedure Pulse: 93           Post procedure Pulse: 93    Resp Rate: Pre procedure Respirations: 18           Post procedure Respirations: 18     Oxygen: O2 Device: Room air      SpO2: Pre procedure SpO2: 100 %                Post procedure SpO2: 100 %      Nebulizer Therapy: Current medications Aerosolized Medications: DuoNeb       Problem List:   Patient Active Problem List   Diagnosis Code    Anemia NEC     Asthma J45.909    Hereditary spherocytosis (HCC) D58.0    HTN (hypertension) I10    Breast cancer, right breast ([address] C50.911    Anemia D64.9    BPV (benign positional vertigo) H81.10    DJD (degenerative joint disease) of knee M17.10    S/P colonoscopy Z98.890    Early satiety R68.81    Venous insufficiency of both lower extremities I87.2    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.21    Renal cyst N28.1    Intrahepatic bile duct dilation K83.8    Hearing deficit, left H91.92    Influenza J11.1       Respiratory Therapist: Joleen Matthews

## 2020-01-24 NOTE — PROGRESS NOTES
MIN: Home with Follow-up Appointments     4:05pm- CM met with pt about bedside to discuss d/c plan. No new needs at this time. Medicare pt has received, reviewed, and signed 2nd  letter informing them of their right to appeal the discharge.  will continue to follow pt for d/c planning needs.      Mahesh Jaime [facility]  [phone]

## 2020-01-25 PROBLEM — J18.9 PNEUMONIA: Status: ACTIVE | Noted: 2020-01-25

## 2020-01-25 PROBLEM — R78.81 STREPTOCOCCAL BACTEREMIA: Status: ACTIVE | Noted: 2020-01-25

## 2020-01-25 PROBLEM — N18.9 CHRONIC KIDNEY DISEASE: Status: ACTIVE | Noted: 2019-02-06

## 2020-01-25 PROBLEM — B95.5 STREPTOCOCCAL BACTEREMIA: Status: ACTIVE | Noted: 2020-01-25

## 2020-01-25 LAB
ANION GAP SERPL CALC-SCNC: 6 MMOL/L (ref 5–15)
BASOPHILS # BLD: 0 K/UL (ref 0–0.1)
BASOPHILS NFR BLD: 0 % (ref 0–1)
BLASTS NFR BLD MANUAL: 0 %
BUN SERPL-MCNC: 31 MG/DL (ref 6–20)
BUN/CREAT SERPL: 19 (ref 12–20)
CALCIUM SERPL-MCNC: 8.1 MG/DL (ref 8.5–10.1)
CHLORIDE SERPL-SCNC: 110 MMOL/L (ref 97–108)
CO2 SERPL-SCNC: 24 MMOL/L (ref 21–32)
CREAT SERPL-MCNC: 1.64 MG/DL (ref 0.55–1.02)
DIFFERENTIAL METHOD BLD: ABNORMAL
EOSINOPHIL # BLD: 0 K/UL (ref 0–0.4)
EOSINOPHIL NFR BLD: 0 % (ref 0–7)
ERYTHROCYTE [DISTWIDTH] IN BLOOD BY AUTOMATED COUNT: 12.8 % (ref 11.5–14.5)
GLUCOSE SERPL-MCNC: 146 MG/DL (ref 65–100)
HCT VFR BLD AUTO: 36.3 % (ref 35–47)
HGB BLD-MCNC: 11.7 G/DL (ref 11.5–16)
IMM GRANULOCYTES # BLD AUTO: 0 K/UL
IMM GRANULOCYTES NFR BLD AUTO: 0 %
LYMPHOCYTES # BLD: 1.1 K/UL (ref 0.8–3.5)
LYMPHOCYTES NFR BLD: 5 % (ref 12–49)
MCH RBC QN AUTO: 26.5 PG (ref 26–34)
MCHC RBC AUTO-ENTMCNC: 32.2 G/DL (ref 30–36.5)
MCV RBC AUTO: 82.1 FL (ref 80–99)
METAMYELOCYTES NFR BLD MANUAL: 2 %
MONOCYTES # BLD: 1.4 K/UL (ref 0–1)
MONOCYTES NFR BLD: 6 % (ref 5–13)
MYELOCYTES NFR BLD MANUAL: 4 %
NEUTS BAND NFR BLD MANUAL: 1 % (ref 0–6)
NEUTS SEG # BLD: 18.9 K/UL (ref 1.8–8)
NEUTS SEG NFR BLD: 82 % (ref 32–75)
NRBC # BLD: 0.06 K/UL (ref 0–0.01)
NRBC BLD-RTO: 0.3 PER 100 WBC
OTHER CELLS NFR BLD MANUAL: 0 %
PLATELET # BLD AUTO: 457 K/UL (ref 150–400)
PMV BLD AUTO: 9.7 FL (ref 8.9–12.9)
POTASSIUM SERPL-SCNC: 4.3 MMOL/L (ref 3.5–5.1)
PROMYELOCYTES NFR BLD MANUAL: 0 %
RBC # BLD AUTO: 4.42 M/UL (ref 3.8–5.2)
RBC MORPH BLD: ABNORMAL
SODIUM SERPL-SCNC: 140 MMOL/L (ref 136–145)
WBC # BLD AUTO: 22.8 K/UL (ref 3.6–11)

## 2020-01-25 PROCEDURE — 94761 N-INVAS EAR/PLS OXIMETRY MLT: CPT

## 2020-01-25 PROCEDURE — 80048 BASIC METABOLIC PNL TOTAL CA: CPT

## 2020-01-25 PROCEDURE — 85027 COMPLETE CBC AUTOMATED: CPT

## 2020-01-25 PROCEDURE — 74011000258 HC RX REV CODE- 258: Performed by: INTERNAL MEDICINE

## 2020-01-25 PROCEDURE — 74011000250 HC RX REV CODE- 250: Performed by: INTERNAL MEDICINE

## 2020-01-25 PROCEDURE — 74011250637 HC RX REV CODE- 250/637: Performed by: EMERGENCY MEDICINE

## 2020-01-25 PROCEDURE — 74011250636 HC RX REV CODE- 250/636: Performed by: INTERNAL MEDICINE

## 2020-01-25 PROCEDURE — 94640 AIRWAY INHALATION TREATMENT: CPT

## 2020-01-25 PROCEDURE — 74011250637 HC RX REV CODE- 250/637: Performed by: HOSPITALIST

## 2020-01-25 PROCEDURE — 65660000001 HC RM ICU INTERMED STEPDOWN

## 2020-01-25 PROCEDURE — 74011250637 HC RX REV CODE- 250/637: Performed by: INTERNAL MEDICINE

## 2020-01-25 PROCEDURE — 94760 N-INVAS EAR/PLS OXIMETRY 1: CPT

## 2020-01-25 PROCEDURE — 36415 COLL VENOUS BLD VENIPUNCTURE: CPT

## 2020-01-25 RX ADMIN — LAMOTRIGINE 25 MG: 25 TABLET ORAL at 09:13

## 2020-01-25 RX ADMIN — CEFTRIAXONE SODIUM 2 G: 2 INJECTION, POWDER, FOR SOLUTION INTRAMUSCULAR; INTRAVENOUS at 17:29

## 2020-01-25 RX ADMIN — Medication 10 ML: at 05:34

## 2020-01-25 RX ADMIN — LOSARTAN POTASSIUM 100 MG: 100 TABLET, FILM COATED ORAL at 09:11

## 2020-01-25 RX ADMIN — ACETAMINOPHEN 650 MG: 325 TABLET ORAL at 23:50

## 2020-01-25 RX ADMIN — LAMOTRIGINE 25 MG: 25 TABLET ORAL at 18:34

## 2020-01-25 RX ADMIN — METHYLPREDNISOLONE SODIUM SUCCINATE 40 MG: 40 INJECTION, POWDER, FOR SOLUTION INTRAMUSCULAR; INTRAVENOUS at 09:11

## 2020-01-25 RX ADMIN — FLUTICASONE PROPIONATE 2 SPRAY: 50 SPRAY, METERED NASAL at 18:34

## 2020-01-25 RX ADMIN — HEPARIN SODIUM 5000 UNITS: 5000 INJECTION INTRAVENOUS; SUBCUTANEOUS at 21:00

## 2020-01-25 RX ADMIN — METOPROLOL SUCCINATE 50 MG: 50 TABLET, EXTENDED RELEASE ORAL at 09:11

## 2020-01-25 RX ADMIN — FLUTICASONE PROPIONATE 2 SPRAY: 50 SPRAY, METERED NASAL at 09:13

## 2020-01-25 RX ADMIN — IPRATROPIUM BROMIDE AND ALBUTEROL SULFATE 3 ML: .5; 3 SOLUTION RESPIRATORY (INHALATION) at 08:32

## 2020-01-25 RX ADMIN — AZITHROMYCIN 500 MG: 250 TABLET, FILM COATED ORAL at 09:11

## 2020-01-25 RX ADMIN — HEPARIN SODIUM 5000 UNITS: 5000 INJECTION INTRAVENOUS; SUBCUTANEOUS at 04:54

## 2020-01-25 RX ADMIN — VANCOMYCIN HYDROCHLORIDE 750 MG: 750 INJECTION, POWDER, LYOPHILIZED, FOR SOLUTION INTRAVENOUS at 13:19

## 2020-01-25 RX ADMIN — Medication 10 ML: at 21:01

## 2020-01-25 RX ADMIN — NIFEDIPINE 60 MG: 30 TABLET, FILM COATED, EXTENDED RELEASE ORAL at 09:11

## 2020-01-25 RX ADMIN — OXYBUTYNIN CHLORIDE 10 MG: 5 TABLET, EXTENDED RELEASE ORAL at 21:01

## 2020-01-25 RX ADMIN — ASPIRIN 325 MG: 325 TABLET, FILM COATED ORAL at 09:11

## 2020-01-25 RX ADMIN — PRAVASTATIN SODIUM 20 MG: 10 TABLET ORAL at 21:01

## 2020-01-25 RX ADMIN — IPRATROPIUM BROMIDE AND ALBUTEROL SULFATE 3 ML: .5; 3 SOLUTION RESPIRATORY (INHALATION) at 14:10

## 2020-01-25 RX ADMIN — HEPARIN SODIUM 5000 UNITS: 5000 INJECTION INTRAVENOUS; SUBCUTANEOUS at 13:20

## 2020-01-25 RX ADMIN — IPRATROPIUM BROMIDE AND ALBUTEROL SULFATE 3 ML: .5; 3 SOLUTION RESPIRATORY (INHALATION) at 20:29

## 2020-01-25 RX ADMIN — GUAIFENESIN AND DEXTROMETHORPHAN 5 ML: 100; 10 SYRUP ORAL at 23:52

## 2020-01-25 RX ADMIN — Medication 10 ML: at 14:14

## 2020-01-25 NOTE — PROGRESS NOTES
Hospital Progress Note    NAME:  Jere Patel   :   1935   MRN:  [id number]     Date/Time:  2020 11:07 AM    Plan:   1. Iv ab /id  2. Continue jet neb -   3. Us kidneys  Risk of Deterioration: Low  []           Moderate  [x]           High  []                 Assessment:   Principal Problem:    Influenza (2020)     Completed tamiflu    Active Problems:    Pneumonia (2020)    Iv ab      Streptococcal bacteremia (2020)     Id following      Asthma ()      Hereditary spherocytosis (HCC) ()      HTN (hypertension) ()      Type 2 diabetes mellitus with nephropathy ([facility] [address] (2018)      Renal cyst (2016)      Overview: ct rt      Chronic kidney disease (2019)      Overview: kidney cyst - watching         Admitting notes: 82-year-old female who has previous history significant for 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 The patient tells me that she usually takes albuterol nebulizer twice daily. This morning, she was more short of breath. She denies having any fever or chills. The patient states she lives alone and nobody else is sick. She has received flu vaccine this year. In the emergency room, the patient was found to be wheezing and her influenza A test was positive. The patient was given 3 breathing treatments and steroid.   The patient denies having any chest pain      Subjective:     Feeling better  11 Point Review of Systems:   Negative except no cp/sob    []            Unable to obtain ROS due to:       []            mental status change []            sedated []            intubated     Social History     Tobacco Use    Smoking status: Never Smoker    Smokeless tobacco: Never Used   Substance Use Topics    Alcohol use: No     Medications reviewed:  Current Facility-Administered Medications   Medication Dose Route Frequency    albuterol-ipratropium (DUO-NEB) 2.5 MG-0.5 MG/3 ML  3 mL Nebulization TID RT    cefTRIAXone (ROCEPHIN) 2 g in 0.9% sodium chloride (MBP/ADV) 50 mL  2 g IntraVENous Q24H    vancomycin (VANCOCIN) 750 mg in 0.9% sodium chloride (MBP/ADV) 250 mL  750 mg IntraVENous Q16H    azithromycin (ZITHROMAX) tablet 500 mg  500 mg Oral DAILY    guaiFENesin-dextromethorphan (ROBITUSSIN DM) 100-10 mg/5 mL syrup 5 mL  5 mL Oral Q6H PRN    sodium chloride (NS) flush 5-40 mL  5-40 mL IntraVENous PRN    acetaminophen (TYLENOL) tablet 650 mg  650 mg Oral Q6H PRN    sodium chloride (NS) flush 5-40 mL  5-40 mL IntraVENous Q8H    fluticasone propionate (FLONASE) 50 mcg/actuation nasal spray 2 Spray  2 Spray Both Nostrils BID    lamoTRIgine (LaMICtal) tablet 25 mg  25 mg Oral BID    NIFEdipine ER (PROCARDIA XL) tablet 60 mg  60 mg Oral DAILY    oxybutynin chloride XL (DITROPAN XL) tablet 10 mg  10 mg Oral DAILY    pravastatin (PRAVACHOL) tablet 20 mg  20 mg Oral QHS    metoprolol succinate (TOPROL-XL) XL tablet 50 mg  50 mg Oral DAILY    aspirin tablet 325 mg  325 mg Oral DAILY    losartan (COZAAR) tablet 100 mg  100 mg Oral DAILY    heparin (porcine) injection 5,000 Units  5,000 Units SubCUTAneous Q8H        Objective:   Vitals:  Visit Vitals  /45 (BP 1 Location: Left arm, BP Patient Position: At rest;Head of bed elevated (Comment degrees)) Comment (BP Patient Position): 45 degrees   Pulse 66   Temp 97.9 °F (36.6 °C)   Resp 16   Ht 5' 2\" (1.575 m)   Wt 168 lb (76.2 kg)   SpO2 99%   BMI 30.73 kg/m²     Temp (24hrs), Av °F (36.7 °C), Min:97.4 °F (36.3 °C), Max:98.4 °F (36.9 °C)    O2 Flow Rate (L/min): 2 l/min O2 Device: Room air    Last 24hr Input/Output:    Intake/Output Summary (Last 24 hours) at 2020 1107  Last data filed at 2020 1006  Gross per 24 hour   Intake 1420 ml   Output 2500 ml   Net -1080 ml        PHYSICAL EXAM:  General:    Alert, cooperative, no distress, appears stated age. Head:   Normocephalic, without obvious abnormality, atraumatic. Eyes:   Conjunctivae/corneas clear. PERRLA  Lungs:   Decreased bs with exp wheezes  Heart:   Regular rate and rhythm,  no murmur, rub or gallop. Abdomen:   Soft, non-tender. Not distended. Bowel sounds normal. No masses        Lab Data Reviewed:    Recent Labs     01/25/20 0205 01/24/20 0042 01/23/20  0320   WBC 22.8* 16.4* 17.4*   HGB 11.7 10.6* 11.1*   HCT 36.3 32.1* 33.2*   * 371 351     Recent Labs     01/25/20 0205 01/24/20 0042 01/23/20  0320    139 139   K 4.3 4.0 4.0   * 108 108   CO2 24 24 24   * 197* 158*   BUN 31* 32* 33*   CREA 1.64* 1.64* 1.62*   CA 8.1* 7.9* 8.3*     No results found for: GLUCPOC  No results for input(s): PH, PCO2, PO2, HCO3, FIO2 in the last 72 hours. No results for input(s): INR, INREXT in the last 72 hours.   ___________________________________________________  ___________________________________________________    Attending Physician: Angella Kan MD

## 2020-01-25 NOTE — PROGRESS NOTES
ADULT PROTOCOL: JET AEROSOL ASSESSMENT    Patient  Lynette Bain     82 y.o.   female     1/25/2020  2:08 AM    Breath Sounds Pre Procedure: Right Breath Sounds: Diminished                               Left Breath Sounds: Diminished    Breath Sounds Post Procedure: Right Breath Sounds: Diminished, Wheezing                                 Left Breath Sounds: Diminished, Wheezing    Breathing pattern: Pre procedure Breathing Pattern: Regular          Post procedure Breathing Pattern: Regular    Heart Rate: Pre procedure Pulse: 90           Post procedure Pulse: 90    Resp Rate: Pre procedure Respirations: 20           Post procedure Respirations: 18    Oxygen: O2 Device: Room air      Changed: NO    SpO2: Pre procedure SpO2: 96 %   with oxygen              Post procedure SpO2: 96 %  with oxygen    Nebulizer Therapy: Current medications Aerosolized Medications: DuoNeb TID      Changed: YES      Problem List:   Patient Active Problem List   Diagnosis Code    Anemia NEC     Asthma J45.909    Hereditary spherocytosis (HCC) D58.0    HTN (hypertension) I10    Breast cancer, right breast ([address] C50.911    Anemia D64.9    BPV (benign positional vertigo) H81.10    DJD (degenerative joint disease) of knee M17.10    S/P colonoscopy Z98.890    Early satiety R68.81    Venous insufficiency of both lower extremities I87.2    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.21    Renal cyst N28.1    Intrahepatic bile duct dilation K83.8    Hearing deficit, left H91.92    Influenza J11.1       Respiratory Therapist: Ayesha Mercado

## 2020-01-25 NOTE — PROGRESS NOTES
Bedside shift change report GIVEN TO Abbey Romero RN. Report included the following information SBAR and Kardex. SIGNIFICANT CHANGES DURING SHIFT:        CONCERNS TO ADDRESS WITH MD:            [facility] NURSING NOTE   Admission Date 1/20/2020   Admission Diagnosis Influenza [J11.1]   Consults IP CONSULT TO HOSPITALIST  IP CONSULT TO CARDIOLOGY  IP CONSULT TO INFECTIOUS DISEASES      Cardiac Monitoring [x] Yes [] No      Purposeful Hourly Rounding [x] Yes    Danitza Score Total Score: 2   Danitza score 3 or > [x] Bed Alarm [] Avasys [] 1:1 sitter [] Patient refused (Signed refusal form in chart)   Pelon Score Pelon Score: 19   Pelon score 14 or < [] PMT consult [] Wound Care consult    []  Specialty bed  [] Nutrition consult      Influenza Vaccine Received Flu Vaccine for Current Season (usually Sept-March): Yes           Oxygen needs? [x] Room air Oxygen @  []1L    []2L    []3L   []4L    []5L   []6L via  NC   Chronic home O2 use? [] Yes [x] No  Perform O2 challenge test and document in progress note using [employer])      Last bowel movement Last Bowel Movement Date: 01/23/20      Urinary Catheter             LDAs               Peripheral IV 01/24/20 Anterior; Left Hand (Active)   Site Assessment Clean, dry, & intact 1/25/2020  2:18 PM   Phlebitis Assessment 0 1/25/2020  2:18 PM   Infiltration Assessment 0 1/25/2020  2:18 PM   Dressing Status Clean, dry, & intact 1/25/2020  2:18 PM   Dressing Type Transparent;Tape 1/25/2020  2:18 PM   Hub Color/Line Status Flushed 1/25/2020  2:18 PM                         Readmission Risk Assessment Tool Score High Risk            39       Total Score        3 Has Seen PCP in Last 6 Months (Yes=3, No=0)    3 Patient Length of Stay (>5 days = 3)    5 Pt. Coverage (Medicare=5 , Medicaid, or Self-Pay=4)    28 Charlson Comorbidity Score (Age + Comorbid Conditions)        Criteria that do not apply:    . Living with Significant Other. Assisted Living. LTAC. SNF.  or Rehab    IP Visits Last 12 Months (1-3=4, 4=9, >4=11)       Expected Length of Stay 3d 7h   Actual Length of Stay 5

## 2020-01-25 NOTE — PROGRESS NOTES
Infectious Disease Progress          IMPRESSION:       - Alpha Strep bacteremia  BC - 1/20 - + for Alpha Strep ( not Strep pneumoniae) 1/2  Repeat BC - 1/23-no growth  Difficult to comment if it is real or contaminant as only 1 set of BC done. Alpha Strep is found in humans in oral & upper respiratory tract  But pt has carious dentition x 4 , she presented with SOB & respiratory symptoms  - Acute Influenza A viral illness  - Acute exacerbation of asthma  - Acute bronchitis  - CXR - 1/20 -no infiltrate  - CT scan-1/21- 1. Minimal centrilobular nodular opacities are seen in the right middle lobe  compatible with infection which may be typical or atypical.  3. Bilateral atelectasis. -Diabetes Type 2 A1c 5.1  - CKD3 Cr 1.64         PLAN:        - Await final on repeat BCx 2   - Continue Vancomycin/ Azithromycin , add Ceftriaxone for CAP, also covers  Alpha Strep . I- ID,sensitivities on Alpha Strep requested , may DC Vancomycin if Strep are  PCN sensitive. D/w [facility] today, ID ,sensitivites to be done    Duration of treatment - 14 days from negative culture  - ECHO cardiogram -  Negative for  vegetation  -Continue Tamiflu  - Repeat CXR - no infiltrate  - Pt advised to see Dental   - Plan of care D/w pt . Subjective:     Pt seen. Siting up in bed . Denies new complaints .    Cough still + , better    Patient Active Problem List   Diagnosis Code    Anemia NEC     Asthma J45.909    Hereditary spherocytosis ([facility] [address] D58.0    HTN (hypertension) I10    Breast cancer, right breast ([address] C50.911    Anemia D64.9    BPV (benign positional vertigo) H81.10    DJD (degenerative joint disease) of knee M17.10    S/P colonoscopy Z98.890    Early satiety R68.81    Venous insufficiency of both lower extremities I87.2    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.21    Renal cyst N28.1    Intrahepatic bile duct dilation K83.8    Hearing deficit, left H91.92    Influenza J11.1     Past Medical History:   Diagnosis Date    Anemia     Arrhythmia     irregular per pt d/t blood disorder    Asthma     Bereavement 2-2-16     die 82 copd,chf,pul htn pn after 48 yr marriage    BPV (benign positional vertigo)     Breast cancer, right breast ([address] 1994    right breast, lumpectomy and radiation    Breast lump 2017    right breast     Chronic kidney disease     kidney cyst - watching    Diabetes ([address]     controlled with diet    DJD (degenerative joint disease) of knee     Early satiety     Hearing deficit, left     Hereditary spherocytosis (HCC)     HTN (hypertension)     Ill-defined condition     sickle cell trait    Intrahepatic bile duct dilation 09/05/2018    mri    Radiation therapy complication 1497    right breast     Renal cyst 08/2016    ct rt    S/P colonoscopy 4-9-14    md Brian - family hx    S/P colonoscopy 07/20/2016    rt - md brian diverticulosis    Seizures ([facility] [address]     Septic arthritis ([facility] [address] 3/11    Streptococcal sepsis ([facility] [address] 03/2011    Venous insufficiency of both lower extremities       Family History   Problem Relation Age of Onset    Diabetes Mother     Other Father         'sphero'-blood disorder    Cancer Sister         breast    Breast Cancer Sister 71    Other Sister         spleen removed    Colon Cancer Sister     Other Other         spleen removed    Gall Bladder Disease Other       Social History     Tobacco Use    Smoking status: Never Smoker    Smokeless tobacco: Never Used   Substance Use Topics    Alcohol use: No     Past Surgical History:   Procedure Laterality Date    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  Authorizing Provider   potassium chloride (KLOR-CON M20) 20 mEq tablet TAKE 1 TABLET DAILY 11/5/19  Yes Ana Guzmán MD ibandronate (BONIVA) 150 mg tablet TAKE 1 TABLET ONCE A MONTH 9/29/19  Yes Rhonda Rooney MD   TOPROL XL 50 mg XL tablet TAKE 1 TABLET DAILY 8/12/19  Yes Rhonda Rooney MD   NIFEdipine ER (PROCARDIA XL) 60 mg ER tablet TAKE 1 TABLET TWICE A DAY 8/12/19  Yes Rhonda Rooney MD   pravastatin (PRAVACHOL) 20 mg tablet TAKE 1 TABLET ONCE EACH NIGHT 8/3/19  Yes Rhonda Rooney MD   oxybutynin chloride XL (DITROPAN XL) 10 mg CR tablet TAKE 1 TABLET DAILY 5/11/19  Yes Rhonda Rooney MD   lamoTRIgine (LAMICTAL) 25 mg tablet TAKE 1 TABLET TWICE A DAY 3/11/19  Yes Rhonda Rooney MD   MICARDIS 80 mg tablet TAKE 1 TABLET DAILY 3/6/19  Yes Rhonda Rooney MD   clotrimazole-betamethasone (LOTRISONE) topical cream Apply  to affected area two (2) times a day. 3/5/19  Yes Rhonda oRoney MD   aspirin (ASPIRIN) 325 mg tablet Take 325 mg by mouth as needed for Pain. Yes Provider, Historical   hydroCHLOROthiazide (HYDRODIURIL) 12.5 mg tablet Take 12.5 mg by mouth every Monday, Wednesday, Friday. Yes Provider, Historical   guaiFENesin (MUCUS RELIEF) 400 mg tablet Take 200 mg by mouth every four (4) hours as needed for Congestion. Yes Provider, Historical   fluticasone (FLONASE) 50 mcg/actuation nasal spray 2 Sprays by Both Nostrils route two (2) times a day. Yes Provider, Historical   calcium carbonate (TUMS PO) Take 500 mg by mouth as needed. Yes Provider, Historical   fluticasone-salmeterol (ADVAIR DISKUS) 250-50 mcg/dose diskus inhaler USE 1 INHALATION TWO TIMES A DAY 8/17/18  Yes Rhonda Rooney MD   albuterol (PROVENTIL HFA, VENTOLIN HFA, PROAIR HFA) 90 mcg/actuation inhaler Take 1 Puff by inhalation every four (4) hours as needed for Wheezing.  8/17/18  Yes Rhonda Rooney MD   triamcinolone acetonide (KENALOG) 0.1 % topical cream APPLY EXTERNALLY TO THE AFFECTED AREA TWICE DAILY 1/25/18  Yes Rhonda Rooney MD   aspirin delayed-release 81 mg tablet Take 81 mg by mouth daily. Yes Provider, Historical     Allergies   Allergen Reactions    Codeine Nausea and Vomiting and Other (comments)     Upset stomach. Weak.  Other Plant, Animal, Environmental Other (comments)     Dust and mold allergy. Review of Systems:  A comprehensive review of systems was negative except for that written in the History of Present Illness. 10 point review of systems obtained . All other systems negative    Objective:   Blood pressure 132/63, pulse 82, temperature 98.4 °F (36.9 °C), resp. rate 18, height 5' 2\" (1.575 m), weight 168 lb (76.2 kg), SpO2 96 %.   Temp (24hrs), Av.3 °F (36.8 °C), Min:97.9 °F (36.6 °C), Max:98.5 °F (36.9 °C)    Current Facility-Administered Medications   Medication Dose Route Frequency    methylPREDNISolone (PF) (SOLU-MEDROL) injection 40 mg  40 mg IntraVENous Q12H    albuterol-ipratropium (DUO-NEB) 2.5 MG-0.5 MG/3 ML  3 mL Nebulization TID RT    cefTRIAXone (ROCEPHIN) 2 g in 0.9% sodium chloride (MBP/ADV) 50 mL  2 g IntraVENous Q24H    vancomycin (VANCOCIN) 750 mg in 0.9% sodium chloride (MBP/ADV) 250 mL  750 mg IntraVENous Q16H    azithromycin (ZITHROMAX) tablet 500 mg  500 mg Oral DAILY    guaiFENesin-dextromethorphan (ROBITUSSIN DM) 100-10 mg/5 mL syrup 5 mL  5 mL Oral Q6H PRN    sodium chloride (NS) flush 5-40 mL  5-40 mL IntraVENous PRN    acetaminophen (TYLENOL) tablet 650 mg  650 mg Oral Q6H PRN    sodium chloride (NS) flush 5-40 mL  5-40 mL IntraVENous Q8H    fluticasone propionate (FLONASE) 50 mcg/actuation nasal spray 2 Spray  2 Spray Both Nostrils BID    lamoTRIgine (LaMICtal) tablet 25 mg  25 mg Oral BID    NIFEdipine ER (PROCARDIA XL) tablet 60 mg  60 mg Oral DAILY    oxybutynin chloride XL (DITROPAN XL) tablet 10 mg  10 mg Oral DAILY    pravastatin (PRAVACHOL) tablet 20 mg  20 mg Oral QHS    metoprolol succinate (TOPROL-XL) XL tablet 50 mg  50 mg Oral DAILY    aspirin tablet 325 mg  325 mg Oral DAILY    losartan (COZAAR) tablet 100 mg  100 mg Oral DAILY    heparin (porcine) injection 5,000 Units  5,000 Units SubCUTAneous Q8H        Exam:    General:  Alert, cooperative, well noursished, well developed, appears stated age   Eyes:  Sclera anicteric. Pupils equally round and reactive to light. Mouth/Throat: Mucous membranes normal, oral pharynx clear   Neck: Supple   Lungs:   Clear to auscultation bilaterally, good effort   CV:  Regular rate and rhythm,no murmur, click, rub or gallop   Abdomen:   Soft, non-tender. bowel sounds normal. non-distended   Extremities: No cyanosis or edema   Skin: Skin color, texture, turgor normal. no acute rash or lesions   Lymph nodes: Cervical and supraclavicular normal   Musculoskeletal: No swelling or deformity   Lines/Devices:  Intact, no erythema, drainage or tenderness   Psych: Alert and oriented, normal mood affect given the setting       Data Review:     Lab Results   Component Value Date/Time    Culture result: NO GROWTH AFTER 15 HOURS 01/23/2020 02:28 PM    Culture result: HEAVY NORMAL RESPIRATORY VIKA 01/20/2020 10:44 PM    Culture result: FEW YEAST, (APPARENT CANDIDA ALBICANS) (A) 01/20/2020 10:44 PM    Culture result: (A) 01/20/2020 09:06 AM     ALPHA STREPTOCOCCUS, NOT S. PNEUMONIAE GROWING IN 1 OF 2 BOTTLES DRAWN (SITE = R HAND)    Culture result: REMAINING BOTTLE(S) HAS/HAVE NO GROWTH SO FAR 01/20/2020 09:06 AM    Culture result:  [facility] HAS REQUESTED SENSITIVITIES 1/24/20 01/20/2020 09:06 AM          XR Results (most recent):  Results from [place] encounter on 01/20/20   XR CHEST PORT    Narrative EXAM: XR CHEST PORT    INDICATION: evaluate for pneumonia, wheezing . cough +    COMPARISON: 1/20/2020    FINDINGS: A portable AP radiograph of the chest was obtained at 1835 hours. The  patient is on a cardiac monitor. The lungs are hyperinflated but clear.  The  cardiac and mediastinal contours and pulmonary vascularity are normal.  The  bones and soft tissues are grossly within normal limits. Impression IMPRESSION: No acute process seen             ICD-10-CM ICD-9-CM    1. Influenza A J10.1 487.1    2. Mild intermittent asthma with acute exacerbation J45.21 493.92    3. Hypoxia R09.02 799.02            Antibiotic History  Add Ceftriaxone  Vancomycin 500 mg IV every 16 hours (Start Date 1/21; Day # 3)  Azithromycin 500mg PO every 24 hours (Start Date 1/21; Day 3/5)   Oseltamivir 30 mg PO daily (Start Date 1/21; Day 3/5)        I have discussed the diagnosis with the patient and the intended plan as seen in the above orders. I have discussed medication side effects and warnings with the patient as well.     Reviewed test results at length with patient    Signed By: Steve Barrientos MD FACP

## 2020-01-25 NOTE — PROGRESS NOTES
Problem: Falls - Risk of  Goal: *Absence of Falls  Description  Document Efren Fan Fall Risk and appropriate interventions in the flowsheet.   Note: Fall Risk Interventions:  Mobility Interventions: Bed/chair exit alarm         Medication Interventions: Bed/chair exit alarm    Elimination Interventions: Bed/chair exit alarm    History of Falls Interventions: Bed/chair exit alarm

## 2020-01-26 LAB
ANION GAP SERPL CALC-SCNC: 5 MMOL/L (ref 5–15)
BACTERIA SPEC CULT: ABNORMAL
BASOPHILS # BLD: 0 K/UL (ref 0–0.1)
BASOPHILS NFR BLD: 0 % (ref 0–1)
BLASTS NFR BLD MANUAL: 0 %
BUN SERPL-MCNC: 30 MG/DL (ref 6–20)
BUN/CREAT SERPL: 19 (ref 12–20)
CALCIUM SERPL-MCNC: 8 MG/DL (ref 8.5–10.1)
CHLORIDE SERPL-SCNC: 110 MMOL/L (ref 97–108)
CO2 SERPL-SCNC: 24 MMOL/L (ref 21–32)
CREAT SERPL-MCNC: 1.57 MG/DL (ref 0.55–1.02)
DATE LAST DOSE: ABNORMAL
DIFFERENTIAL METHOD BLD: ABNORMAL
EOSINOPHIL # BLD: 0.5 K/UL (ref 0–0.4)
EOSINOPHIL NFR BLD: 2 % (ref 0–7)
ERYTHROCYTE [DISTWIDTH] IN BLOOD BY AUTOMATED COUNT: 13 % (ref 11.5–14.5)
GLUCOSE SERPL-MCNC: 99 MG/DL (ref 65–100)
HCT VFR BLD AUTO: 32.7 % (ref 35–47)
HGB BLD-MCNC: 10.8 G/DL (ref 11.5–16)
IMM GRANULOCYTES # BLD AUTO: 0 K/UL
IMM GRANULOCYTES NFR BLD AUTO: 0 %
LYMPHOCYTES # BLD: 4.2 K/UL (ref 0.8–3.5)
LYMPHOCYTES NFR BLD: 17 % (ref 12–49)
MCH RBC QN AUTO: 27.1 PG (ref 26–34)
MCHC RBC AUTO-ENTMCNC: 33 G/DL (ref 30–36.5)
MCV RBC AUTO: 82 FL (ref 80–99)
METAMYELOCYTES NFR BLD MANUAL: 2 %
MONOCYTES # BLD: 1.5 K/UL (ref 0–1)
MONOCYTES NFR BLD: 6 % (ref 5–13)
MYELOCYTES NFR BLD MANUAL: 3 %
NEUTS BAND NFR BLD MANUAL: 1 % (ref 0–6)
NEUTS SEG # BLD: 17.2 K/UL (ref 1.8–8)
NEUTS SEG NFR BLD: 69 % (ref 32–75)
NRBC # BLD: 0.06 K/UL (ref 0–0.01)
NRBC BLD-RTO: 0.2 PER 100 WBC
OTHER CELLS NFR BLD MANUAL: 0 %
PLATELET # BLD AUTO: 475 K/UL (ref 150–400)
PMV BLD AUTO: 9.6 FL (ref 8.9–12.9)
POTASSIUM SERPL-SCNC: 4 MMOL/L (ref 3.5–5.1)
PROMYELOCYTES NFR BLD MANUAL: 0 %
RBC # BLD AUTO: 3.99 M/UL (ref 3.8–5.2)
RBC MORPH BLD: ABNORMAL
REPORTED DOSE,DOSE: ABNORMAL UNITS
REPORTED DOSE/TIME,TMG: ABNORMAL
SERVICE CMNT-IMP: ABNORMAL
SODIUM SERPL-SCNC: 139 MMOL/L (ref 136–145)
VANCOMYCIN TROUGH SERPL-MCNC: 20.7 UG/ML (ref 5–10)
WBC # BLD AUTO: 24.5 K/UL (ref 3.6–11)

## 2020-01-26 PROCEDURE — 74011250636 HC RX REV CODE- 250/636: Performed by: INTERNAL MEDICINE

## 2020-01-26 PROCEDURE — 74011000258 HC RX REV CODE- 258: Performed by: INTERNAL MEDICINE

## 2020-01-26 PROCEDURE — 74011250637 HC RX REV CODE- 250/637: Performed by: INTERNAL MEDICINE

## 2020-01-26 PROCEDURE — 80202 ASSAY OF VANCOMYCIN: CPT

## 2020-01-26 PROCEDURE — 36415 COLL VENOUS BLD VENIPUNCTURE: CPT

## 2020-01-26 PROCEDURE — 94761 N-INVAS EAR/PLS OXIMETRY MLT: CPT

## 2020-01-26 PROCEDURE — 80048 BASIC METABOLIC PNL TOTAL CA: CPT

## 2020-01-26 PROCEDURE — 74011250637 HC RX REV CODE- 250/637: Performed by: HOSPITALIST

## 2020-01-26 PROCEDURE — 74011000250 HC RX REV CODE- 250: Performed by: INTERNAL MEDICINE

## 2020-01-26 PROCEDURE — 65660000001 HC RM ICU INTERMED STEPDOWN

## 2020-01-26 PROCEDURE — 94640 AIRWAY INHALATION TREATMENT: CPT

## 2020-01-26 PROCEDURE — 85027 COMPLETE CBC AUTOMATED: CPT

## 2020-01-26 PROCEDURE — 94760 N-INVAS EAR/PLS OXIMETRY 1: CPT

## 2020-01-26 RX ORDER — BUDESONIDE 0.5 MG/2ML
1000 INHALANT ORAL
Status: DISCONTINUED | OUTPATIENT
Start: 2020-01-26 | End: 2020-01-29

## 2020-01-26 RX ORDER — ARFORMOTEROL TARTRATE 15 UG/2ML
15 SOLUTION RESPIRATORY (INHALATION)
Status: DISCONTINUED | OUTPATIENT
Start: 2020-01-26 | End: 2020-01-29

## 2020-01-26 RX ORDER — METOPROLOL SUCCINATE 50 MG/1
100 TABLET, EXTENDED RELEASE ORAL DAILY
Status: DISCONTINUED | OUTPATIENT
Start: 2020-01-26 | End: 2020-01-29 | Stop reason: HOSPADM

## 2020-01-26 RX ADMIN — AZITHROMYCIN 500 MG: 250 TABLET, FILM COATED ORAL at 08:34

## 2020-01-26 RX ADMIN — NIFEDIPINE 60 MG: 30 TABLET, FILM COATED, EXTENDED RELEASE ORAL at 08:34

## 2020-01-26 RX ADMIN — BUDESONIDE 1000 MCG: 0.5 INHALANT RESPIRATORY (INHALATION) at 20:04

## 2020-01-26 RX ADMIN — OXYBUTYNIN CHLORIDE 10 MG: 5 TABLET, EXTENDED RELEASE ORAL at 21:06

## 2020-01-26 RX ADMIN — LOSARTAN POTASSIUM 100 MG: 100 TABLET, FILM COATED ORAL at 08:34

## 2020-01-26 RX ADMIN — IPRATROPIUM BROMIDE AND ALBUTEROL SULFATE 3 ML: .5; 3 SOLUTION RESPIRATORY (INHALATION) at 20:04

## 2020-01-26 RX ADMIN — Medication 10 ML: at 21:07

## 2020-01-26 RX ADMIN — ASPIRIN 325 MG: 325 TABLET, FILM COATED ORAL at 08:34

## 2020-01-26 RX ADMIN — FLUTICASONE PROPIONATE 2 SPRAY: 50 SPRAY, METERED NASAL at 08:35

## 2020-01-26 RX ADMIN — HEPARIN SODIUM 5000 UNITS: 5000 INJECTION INTRAVENOUS; SUBCUTANEOUS at 12:13

## 2020-01-26 RX ADMIN — BUDESONIDE 1000 MCG: 0.5 INHALANT RESPIRATORY (INHALATION) at 08:12

## 2020-01-26 RX ADMIN — VANCOMYCIN HYDROCHLORIDE 750 MG: 750 INJECTION, POWDER, LYOPHILIZED, FOR SOLUTION INTRAVENOUS at 03:52

## 2020-01-26 RX ADMIN — METOPROLOL SUCCINATE 100 MG: 50 TABLET, EXTENDED RELEASE ORAL at 08:34

## 2020-01-26 RX ADMIN — ARFORMOTEROL TARTRATE 15 MCG: 15 SOLUTION RESPIRATORY (INHALATION) at 20:04

## 2020-01-26 RX ADMIN — Medication 10 ML: at 05:08

## 2020-01-26 RX ADMIN — Medication 10 ML: at 14:00

## 2020-01-26 RX ADMIN — VANCOMYCIN HYDROCHLORIDE 750 MG: 750 INJECTION, POWDER, LYOPHILIZED, FOR SOLUTION INTRAVENOUS at 21:06

## 2020-01-26 RX ADMIN — ARFORMOTEROL TARTRATE 15 MCG: 15 SOLUTION RESPIRATORY (INHALATION) at 08:12

## 2020-01-26 RX ADMIN — CEFTRIAXONE SODIUM 2 G: 2 INJECTION, POWDER, FOR SOLUTION INTRAMUSCULAR; INTRAVENOUS at 17:11

## 2020-01-26 RX ADMIN — LAMOTRIGINE 25 MG: 25 TABLET ORAL at 17:11

## 2020-01-26 RX ADMIN — LAMOTRIGINE 25 MG: 25 TABLET ORAL at 08:34

## 2020-01-26 RX ADMIN — FLUTICASONE PROPIONATE 2 SPRAY: 50 SPRAY, METERED NASAL at 17:13

## 2020-01-26 RX ADMIN — HEPARIN SODIUM 5000 UNITS: 5000 INJECTION INTRAVENOUS; SUBCUTANEOUS at 03:52

## 2020-01-26 RX ADMIN — HEPARIN SODIUM 5000 UNITS: 5000 INJECTION INTRAVENOUS; SUBCUTANEOUS at 21:06

## 2020-01-26 RX ADMIN — PRAVASTATIN SODIUM 20 MG: 10 TABLET ORAL at 21:06

## 2020-01-26 NOTE — PROGRESS NOTES
Pharmacy Automatic Renal Dosing Protocol - Antimicrobials    Indication for Antimicrobials: Bacteremia;  CAP & Flu A    Current Regimen of Each Antimicrobial:  Vancomycin 750 mg IV every 16 hours (Start Date ; Day # 6)  Ceftriaxone 2 g IV every 24 hours (Start Date ; Day 4/7)    Previous Antimicrobial Therapy:  Oseltamivir 30 mg PO daily (Start Date ; Day 55)   Azithromycin 500mg PO every 24 hours (Start Date ; Day 5)     Goal Level: VANCOMYCIN TROUGH GOAL RANGE  Vancomycin Trough: 15 - 20 mcg/mL  (AUC: 400 - 600 mg/hr/Liter/day)     Date Dose & Interval Measured (mcg/mL) Extrapolated (mcg/mL)    500 mg Q16H 14.6 11.34    750 mg q16h 20.7 20.6           Date & time of next level:     Significant Cultures:    Blood: Alpha strep (not strep pneumo) in 1/2 bottles- final (Intermediate to PCN, ceftriaxone, sensitive to vanc)   Respiratory: Heavy normal brandi, few yeast- final   Blood: NGTD, pending    Radiology / Imaging results: (X-ray, CT scan or MRI):   : CT Chest: Minimal centrilobular nodular opacities are seen in the right middle lobe compatible with infection which may be typical or atypical. Bilateral atelectasis. Paralysis, amputations, malnutrition: none noted    Labs:  Recent Labs     20  0352 20  0205 20  0042   CREA 1.57* 1.64* 1.64*   BUN 30* 31* 32*   WBC 24.5* 22.8* 16.4*     Temp (24hrs), Av °F (36.7 °C), Min:97.4 °F (36.3 °C), Max:98.2 °F (36.8 °C)    Creatinine Clearance (mL/min) or Dialysis: 21.9 mL/min (IBW)     Impression/Plan:   Scr stable; WBC rising, afebrile  Strep in blood is sensitive to vancomycin  Vancomycin trough slightly high today, will change vancomycin dose to 750 mg IV q18h  Will continue current ceftriaxone dose  as appropriate for indication and renal function.     Antimicrobial stop date ceftriaxone 7 days, Vancomycin pending     Pharmacy will follow daily and adjust medications as appropriate for renal function and/or serum levels.     Thank you,  RAMEZ Rangel

## 2020-01-26 NOTE — PROGRESS NOTES
Bedside shift change report GIVEN TO [facility] BURKE POLO RN. Report included the following information SBAR and Kardex. SIGNIFICANT CHANGES DURING SHIFT:        CONCERNS TO ADDRESS WITH MD:            [facility] NURSING NOTE   Admission Date 1/20/2020   Admission Diagnosis Influenza [J11.1]   Consults IP CONSULT TO HOSPITALIST  IP CONSULT TO CARDIOLOGY  IP CONSULT TO INFECTIOUS DISEASES      Cardiac Monitoring [x] Yes [] No      Purposeful Hourly Rounding [x] Yes    Danitza Score Total Score: 2   Danitaz score 3 or > [x] Bed Alarm [] Avasys [] 1:1 sitter [] Patient refused (Signed refusal form in chart)   Pelon Score Pelon Score: 19   Pelon score 14 or < [] PMT consult [] Wound Care consult    []  Specialty bed  [] Nutrition consult      Influenza Vaccine Received Flu Vaccine for Current Season (usually Sept-March): Yes           Oxygen needs? [x] Room air Oxygen @  []1L    []2L    []3L   []4L    []5L   []6L via  NC   Chronic home O2 use? [] Yes [x] No  Perform O2 challenge test and document in progress note using [employer])      Last bowel movement Last Bowel Movement Date: 01/23/20      Urinary Catheter             LDAs               Peripheral IV 01/24/20 Anterior; Left Hand (Active)   Site Assessment Clean, dry, & intact 1/26/2020  7:38 AM   Phlebitis Assessment 0 1/26/2020  7:38 AM   Infiltration Assessment 0 1/26/2020  7:38 AM   Dressing Status Clean, dry, & intact 1/26/2020  7:38 AM   Dressing Type Transparent;Tape 1/26/2020  7:38 AM   Hub Color/Line Status Flushed; Infusing 1/26/2020  7:38 AM                         Readmission Risk Assessment Tool Score High Risk            39       Total Score        3 Has Seen PCP in Last 6 Months (Yes=3, No=0)    3 Patient Length of Stay (>5 days = 3)    5 Pt. Coverage (Medicare=5 , Medicaid, or Self-Pay=4)    28 Charlson Comorbidity Score (Age + Comorbid Conditions)        Criteria that do not apply:    . Living with Significant Other. Assisted Living. LTAC. SNF. or   Rehab    IP Visits Last 12 Months (1-3=4, 4=9, >4=11)       Expected Length of Stay 3d 7h   Actual Length of Stay 6

## 2020-01-26 NOTE — PROGRESS NOTES
Hospital Progress Note    NAME:  Ruth Maxwell   :   1935   MRN:  [id number]     Date/Time:  2020 11:07 AM    Plan:   1. Iv ab /id  2. Continue jet neb -   3. Us kidneys  Risk of Deterioration: Low  []           Moderate  [x]           High  []                 Assessment:   Principal Problem:    Influenza (2020)     Completed tamiflu    Active Problems:    Pneumonia (2020)    Iv ab      Streptococcal bacteremia (2020)     Id following     Increasing leukocytosis       Asthma ()      Hereditary spherocytosis (HCC) ()      HTN (hypertension) ()      Type 2 diabetes mellitus with nephropathy ([facility] [address] (2018)      Renal cyst (2016)      Overview: ct rt      Chronic kidney disease (2019)      Overview: kidney cyst - watching         Admitting notes: 82-year-old female who has previous history significant for 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 The patient tells me that she usually takes albuterol nebulizer twice daily. This morning, she was more short of breath. She denies having any fever or chills. The patient states she lives alone and nobody else is sick. She has received flu vaccine this year. In the emergency room, the patient was found to be wheezing and her influenza A test was positive. The patient was given 3 breathing treatments and steroid.   The patient denies having any chest pain      Subjective:     Feeling better  11 Point Review of Systems:   Negative except no cp/sob    []            Unable to obtain ROS due to:       []            mental status change []            sedated []            intubated     Social History     Tobacco Use    Smoking status: Never Smoker    Smokeless tobacco: Never Used   Substance Use Topics    Alcohol use: No     Medications reviewed:  Current Facility-Administered Medications   Medication Dose Route Frequency    metoprolol succinate (TOPROL-XL) XL tablet 100 mg  100 mg Oral DAILY    albuterol-ipratropium (DUO-NEB) 2.5 MG-0.5 MG/3 ML  3 mL Nebulization TID RT    cefTRIAXone (ROCEPHIN) 2 g in 0.9% sodium chloride (MBP/ADV) 50 mL  2 g IntraVENous Q24H    vancomycin (VANCOCIN) 750 mg in 0.9% sodium chloride (MBP/ADV) 250 mL  750 mg IntraVENous Q16H    azithromycin (ZITHROMAX) tablet 500 mg  500 mg Oral DAILY    guaiFENesin-dextromethorphan (ROBITUSSIN DM) 100-10 mg/5 mL syrup 5 mL  5 mL Oral Q6H PRN    sodium chloride (NS) flush 5-40 mL  5-40 mL IntraVENous PRN    acetaminophen (TYLENOL) tablet 650 mg  650 mg Oral Q6H PRN    sodium chloride (NS) flush 5-40 mL  5-40 mL IntraVENous Q8H    fluticasone propionate (FLONASE) 50 mcg/actuation nasal spray 2 Spray  2 Spray Both Nostrils BID    lamoTRIgine (LaMICtal) tablet 25 mg  25 mg Oral BID    NIFEdipine ER (PROCARDIA XL) tablet 60 mg  60 mg Oral DAILY    oxybutynin chloride XL (DITROPAN XL) tablet 10 mg  10 mg Oral DAILY    pravastatin (PRAVACHOL) tablet 20 mg  20 mg Oral QHS    aspirin tablet 325 mg  325 mg Oral DAILY    losartan (COZAAR) tablet 100 mg  100 mg Oral DAILY    heparin (porcine) injection 5,000 Units  5,000 Units SubCUTAneous Q8H        Objective:   Vitals:  Visit Vitals  /61   Pulse 73   Temp 98.1 °F (36.7 °C)   Resp 18   Ht 5' 2\" (1.575 m)   Wt 168 lb (76.2 kg)   SpO2 97%   BMI 30.73 kg/m²     Temp (24hrs), Av.9 °F (36.6 °C), Min:97.4 °F (36.3 °C), Max:98.1 °F (36.7 °C)    O2 Flow Rate (L/min): 2 l/min O2 Device: Room air    Last 24hr Input/Output:    Intake/Output Summary (Last 24 hours) at 2020 0724  Last data filed at 2020 5870  Gross per 24 hour   Intake 400 ml   Output 2100 ml   Net -1700 ml        PHYSICAL EXAM:  General:    Alert, cooperative, no distress, appears stated age.      Head:   Normocephalic, without obvious abnormality, atraumatic. Eyes:   Conjunctivae/corneas clear. PERRLA  Lungs:   Decreased bs with exp wheezes  Heart:   Regular rate and rhythm,  no murmur, rub or gallop. Abdomen:   Soft, non-tender. Not distended. Bowel sounds normal. No masses        Lab Data Reviewed:    Recent Labs     01/26/20 0352 01/25/20 0205 01/24/20 0042   WBC 24.5* 22.8* 16.4*   HGB 10.8* 11.7 10.6*   HCT 32.7* 36.3 32.1*   * 457* 371     Recent Labs     01/26/20 0352 01/25/20 0205 01/24/20 0042    140 139   K 4.0 4.3 4.0   * 110* 108   CO2 24 24 24   GLU 99 146* 197*   BUN 30* 31* 32*   CREA 1.57* 1.64* 1.64*   CA 8.0* 8.1* 7.9*     No results found for: GLUCPOC  No results for input(s): PH, PCO2, PO2, HCO3, FIO2 in the last 72 hours. No results for input(s): INR, INREXT, INREXT in the last 72 hours.   ___________________________________________________  ___________________________________________________    Attending Physician: Angella Kan MD

## 2020-01-26 NOTE — PROGRESS NOTES
ADULT PROTOCOL: JET AEROSOL  REASSESSMENT    Patient  Sherman Patel     82 y.o.   female     1/26/2020  2:46 AM    Breath Sounds Pre Procedure: Right Breath Sounds: Diminished                               Left Breath Sounds: Diminished    Breath Sounds Post Procedure: Right Breath Sounds: Diminished                                 Left Breath Sounds: Diminished    Breathing pattern: Pre procedure Breathing Pattern: Regular          Post procedure Breathing Pattern: Regular    Heart Rate: Pre procedure Pulse: 75           Post procedure Pulse: 78    Resp Rate: Pre procedure Respirations: 21           Post procedure Respirations: 20    Peak Flow: Pre bronchodilator             Post bronchodilator       FVC/FEV1:      Incentive Spirometry:             Cough: Pre procedure Cough: Non-productive               Post procedure Cough: Non-productive    Suctioned: NO    Sputum: Pre procedure                   Post procedure      Oxygen: O2 Device: Room air        Changed: NO    SpO2: Pre procedure SpO2: 96 %   without oxygen              Post procedure SpO2: 96 %  without oxygen    Nebulizer Therapy: Current medications Aerosolized Medications: DuoNeb      Changed: NO    Smoking History: former smoker    Problem List:   Patient Active Problem List   Diagnosis Code    Anemia NEC     Asthma J45.909    Hereditary spherocytosis ([facility] [address] D58.0    HTN (hypertension) I10    Breast cancer, right breast ([facility] [address] C50.911    Anemia D64.9    BPV (benign positional vertigo) H81.10    DJD (degenerative joint disease) of knee M17.10    S/P colonoscopy Z98.890    Early satiety R68.81    Venous insufficiency of both lower extremities I87.2    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.21    Renal cyst N28.1    Intrahepatic bile duct dilation K83.8    Hearing deficit, left H91.92    Influenza J11.1    Chronic kidney disease N18.9    Pneumonia J18.9    Streptococcal bacteremia R78.81, B95.5       Respiratory Therapist: Felecia Gaston, RT

## 2020-01-26 NOTE — PROGRESS NOTES
Problem: Falls - Risk of  Goal: *Absence of Falls  Description  Document Suraj Miles Fall Risk and appropriate interventions in the flowsheet.   Outcome: Progressing Towards Goal  Note: Fall Risk Interventions:  Mobility Interventions: Bed/chair exit alarm         Medication Interventions: Bed/chair exit alarm    Elimination Interventions: Bed/chair exit alarm    History of Falls Interventions: Bed/chair exit alarm

## 2020-01-27 ENCOUNTER — HOSPITAL ENCOUNTER (OUTPATIENT)
Dept: ULTRASOUND IMAGING | Age: 85
Discharge: HOME | DRG: 194 | End: 2020-01-27
Attending: INTERNAL MEDICINE
Payer: MEDICARE

## 2020-01-27 LAB
ANION GAP SERPL CALC-SCNC: 4 MMOL/L (ref 5–15)
BASOPHILS # BLD: 0 K/UL (ref 0–0.1)
BASOPHILS NFR BLD: 0 % (ref 0–1)
BLASTS NFR BLD MANUAL: 0 %
BUN SERPL-MCNC: 30 MG/DL (ref 6–20)
BUN/CREAT SERPL: 20 (ref 12–20)
CALCIUM SERPL-MCNC: 8.1 MG/DL (ref 8.5–10.1)
CHLORIDE SERPL-SCNC: 112 MMOL/L (ref 97–108)
CO2 SERPL-SCNC: 24 MMOL/L (ref 21–32)
CREAT SERPL-MCNC: 1.47 MG/DL (ref 0.55–1.02)
DIFFERENTIAL METHOD BLD: ABNORMAL
EOSINOPHIL # BLD: 0.4 K/UL (ref 0–0.4)
EOSINOPHIL NFR BLD: 2 % (ref 0–7)
ERYTHROCYTE [DISTWIDTH] IN BLOOD BY AUTOMATED COUNT: 13.6 % (ref 11.5–14.5)
GLUCOSE SERPL-MCNC: 96 MG/DL (ref 65–100)
HCT VFR BLD AUTO: 33.5 % (ref 35–47)
HGB BLD-MCNC: 11 G/DL (ref 11.5–16)
IMM GRANULOCYTES # BLD AUTO: 0 K/UL
IMM GRANULOCYTES NFR BLD AUTO: 0 %
LYMPHOCYTES # BLD: 5 K/UL (ref 0.8–3.5)
LYMPHOCYTES NFR BLD: 26 % (ref 12–49)
MCH RBC QN AUTO: 26.6 PG (ref 26–34)
MCHC RBC AUTO-ENTMCNC: 32.8 G/DL (ref 30–36.5)
MCV RBC AUTO: 81.1 FL (ref 80–99)
METAMYELOCYTES NFR BLD MANUAL: 0 %
MONOCYTES # BLD: 0.8 K/UL (ref 0–1)
MONOCYTES NFR BLD: 4 % (ref 5–13)
MYELOCYTES NFR BLD MANUAL: 2 %
NEUTS BAND NFR BLD MANUAL: 2 % (ref 0–6)
NEUTS SEG # BLD: 12.8 K/UL (ref 1.8–8)
NEUTS SEG NFR BLD: 64 % (ref 32–75)
NRBC # BLD: 0.07 K/UL (ref 0–0.01)
NRBC BLD-RTO: 0.4 PER 100 WBC
OTHER CELLS NFR BLD MANUAL: 0 %
PLATELET # BLD AUTO: 492 K/UL (ref 150–400)
PMV BLD AUTO: 9.7 FL (ref 8.9–12.9)
POTASSIUM SERPL-SCNC: 4.3 MMOL/L (ref 3.5–5.1)
PROMYELOCYTES NFR BLD MANUAL: 0 %
RBC # BLD AUTO: 4.13 M/UL (ref 3.8–5.2)
RBC MORPH BLD: ABNORMAL
SODIUM SERPL-SCNC: 140 MMOL/L (ref 136–145)
WBC # BLD AUTO: 19.4 K/UL (ref 3.6–11)

## 2020-01-27 PROCEDURE — 74011250636 HC RX REV CODE- 250/636: Performed by: INTERNAL MEDICINE

## 2020-01-27 PROCEDURE — 85027 COMPLETE CBC AUTOMATED: CPT

## 2020-01-27 PROCEDURE — 80048 BASIC METABOLIC PNL TOTAL CA: CPT

## 2020-01-27 PROCEDURE — 74011000250 HC RX REV CODE- 250: Performed by: INTERNAL MEDICINE

## 2020-01-27 PROCEDURE — 65660000001 HC RM ICU INTERMED STEPDOWN

## 2020-01-27 PROCEDURE — 94640 AIRWAY INHALATION TREATMENT: CPT

## 2020-01-27 PROCEDURE — 74011250637 HC RX REV CODE- 250/637: Performed by: HOSPITALIST

## 2020-01-27 PROCEDURE — 74011250637 HC RX REV CODE- 250/637: Performed by: INTERNAL MEDICINE

## 2020-01-27 PROCEDURE — 36415 COLL VENOUS BLD VENIPUNCTURE: CPT

## 2020-01-27 PROCEDURE — 94760 N-INVAS EAR/PLS OXIMETRY 1: CPT

## 2020-01-27 PROCEDURE — 74011000258 HC RX REV CODE- 258: Performed by: INTERNAL MEDICINE

## 2020-01-27 PROCEDURE — 76770 US EXAM ABDO BACK WALL COMP: CPT

## 2020-01-27 PROCEDURE — 94761 N-INVAS EAR/PLS OXIMETRY MLT: CPT

## 2020-01-27 RX ADMIN — Medication 10 ML: at 21:02

## 2020-01-27 RX ADMIN — PRAVASTATIN SODIUM 20 MG: 10 TABLET ORAL at 21:01

## 2020-01-27 RX ADMIN — CEFTRIAXONE SODIUM 2 G: 2 INJECTION, POWDER, FOR SOLUTION INTRAMUSCULAR; INTRAVENOUS at 20:14

## 2020-01-27 RX ADMIN — IPRATROPIUM BROMIDE AND ALBUTEROL SULFATE 3 ML: .5; 3 SOLUTION RESPIRATORY (INHALATION) at 19:54

## 2020-01-27 RX ADMIN — HEPARIN SODIUM 5000 UNITS: 5000 INJECTION INTRAVENOUS; SUBCUTANEOUS at 05:07

## 2020-01-27 RX ADMIN — BUDESONIDE 1000 MCG: 0.5 INHALANT RESPIRATORY (INHALATION) at 19:54

## 2020-01-27 RX ADMIN — BUDESONIDE 1000 MCG: 0.5 INHALANT RESPIRATORY (INHALATION) at 07:28

## 2020-01-27 RX ADMIN — ARFORMOTEROL TARTRATE 15 MCG: 15 SOLUTION RESPIRATORY (INHALATION) at 19:54

## 2020-01-27 RX ADMIN — ARFORMOTEROL TARTRATE 15 MCG: 15 SOLUTION RESPIRATORY (INHALATION) at 07:28

## 2020-01-27 RX ADMIN — HEPARIN SODIUM 5000 UNITS: 5000 INJECTION INTRAVENOUS; SUBCUTANEOUS at 20:58

## 2020-01-27 RX ADMIN — FLUTICASONE PROPIONATE 2 SPRAY: 50 SPRAY, METERED NASAL at 09:16

## 2020-01-27 RX ADMIN — NIFEDIPINE 60 MG: 30 TABLET, FILM COATED, EXTENDED RELEASE ORAL at 09:14

## 2020-01-27 RX ADMIN — OXYBUTYNIN CHLORIDE 10 MG: 5 TABLET, EXTENDED RELEASE ORAL at 21:00

## 2020-01-27 RX ADMIN — LOSARTAN POTASSIUM 100 MG: 100 TABLET, FILM COATED ORAL at 09:14

## 2020-01-27 RX ADMIN — VANCOMYCIN HYDROCHLORIDE 750 MG: 750 INJECTION, POWDER, LYOPHILIZED, FOR SOLUTION INTRAVENOUS at 16:53

## 2020-01-27 RX ADMIN — FLUTICASONE PROPIONATE 2 SPRAY: 50 SPRAY, METERED NASAL at 19:01

## 2020-01-27 RX ADMIN — LAMOTRIGINE 25 MG: 25 TABLET ORAL at 09:14

## 2020-01-27 RX ADMIN — Medication 1 CAPSULE: at 10:50

## 2020-01-27 RX ADMIN — HEPARIN SODIUM 5000 UNITS: 5000 INJECTION INTRAVENOUS; SUBCUTANEOUS at 13:40

## 2020-01-27 RX ADMIN — IPRATROPIUM BROMIDE AND ALBUTEROL SULFATE 3 ML: .5; 3 SOLUTION RESPIRATORY (INHALATION) at 14:51

## 2020-01-27 RX ADMIN — ASPIRIN 325 MG: 325 TABLET, FILM COATED ORAL at 10:50

## 2020-01-27 RX ADMIN — METOPROLOL SUCCINATE 100 MG: 50 TABLET, EXTENDED RELEASE ORAL at 09:14

## 2020-01-27 RX ADMIN — Medication 10 ML: at 13:40

## 2020-01-27 RX ADMIN — LAMOTRIGINE 25 MG: 25 TABLET ORAL at 19:35

## 2020-01-27 RX ADMIN — Medication 10 ML: at 05:08

## 2020-01-27 NOTE — PROGRESS NOTES
Problem: Risk for Spread of Infection  Goal: Prevent transmission of infectious organism to others  Description  Prevent the transmission of infectious organisms to other patients, staff members, and visitors. Outcome: Progressing Towards Goal     Problem: Patient Education:  Go to Education Activity  Goal: Patient/Family Education  Outcome: Progressing Towards Goal     Problem: Patient Education:  Go to Education Activity  Goal: Patient/Family Education  Outcome: Progressing Towards Goal     Problem: Falls - Risk of  Goal: *Absence of Falls  Description  Document Negritofloromi Mathieu Fall Risk and appropriate interventions in the flowsheet. Outcome: Progressing Towards Goal  Note: Fall Risk Interventions:  Mobility Interventions: Patient to call before getting OOB         Medication Interventions: Patient to call before getting OOB    Elimination Interventions: Bed/chair exit alarm, Patient to call for help with toileting needs, Stay With Me (per policy), Toilet paper/wipes in reach, Toileting schedule/hourly rounds    History of Falls Interventions: Bed/chair exit alarm         Problem: Patient Education: Go to Patient Education Activity  Goal: Patient/Family Education  Outcome: Progressing Towards Goal     Problem: Pressure Injury - Risk of  Goal: *Prevention of pressure injury  Description  Document Pelon Scale and appropriate interventions in the flowsheet.   Outcome: Progressing Towards Goal  Note: Pressure Injury Interventions:  Sensory Interventions: Assess changes in LOC, Discuss PT/OT consult with provider, Keep linens dry and wrinkle-free    Moisture Interventions: Absorbent underpads    Activity Interventions: Increase time out of bed, PT/OT evaluation    Mobility Interventions: Chair cushion, PT/OT evaluation    Nutrition Interventions: Document food/fluid/supplement intake    Friction and Shear Interventions: Lift sheet                Problem: Patient Education: Go to Patient Education Activity  Goal: Patient/Family Education  Outcome: Progressing Towards Goal     Problem: Breathing Pattern - Ineffective  Goal: *Absence of hypoxia  Outcome: Progressing Towards Goal  Goal: *Use of effective breathing techniques  Outcome: Progressing Towards Goal     Problem: Patient Education: Go to Patient Education Activity  Goal: Patient/Family Education  Outcome: Progressing Towards Goal

## 2020-01-27 NOTE — PROGRESS NOTES
ADULT PROTOCOL: JET AEROSOL ASSESSMENT    Patient  Lucius Gold     82 y.o.   female     1/27/2020  3:15 AM    Breath Sounds Pre Procedure: Right Breath Sounds: Diminished                               Left Breath Sounds: Diminished    Breath Sounds Post Procedure: Right Breath Sounds: Diminished                                 Left Breath Sounds: Diminished    Breathing pattern: Pre procedure Breathing Pattern: Dyspnea with exertion          Post procedure Breathing Pattern: Regular    Heart Rate: Pre procedure Pulse: 74           Post procedure Pulse: 77    Resp Rate: Pre procedure Respirations: 21           Post procedure Respirations: 20    Peak Flow: Pre bronchodilator             Post bronchodilator       FVC/FEV1:      Incentive Spirometry:             Cough: Pre procedure Cough: Non-productive               Post procedure Cough: Non-productive    Suctioned: NO    Sputum: Pre procedure                   Post procedure      Oxygen: O2 Device: Room air        Changed: NO    SpO2: Pre procedure SpO2: 97 %   without oxygen              Post procedure SpO2: 97 %  without oxygen    Nebulizer Therapy: Current medications Aerosolized Medications: Brovana, DuoNeb, Pulmicort      Changed: NO    Smoking History: former smoker    Problem List:   Patient Active Problem List   Diagnosis Code    Anemia NEC     Asthma J45.909    Hereditary spherocytosis (HCC) D58.0    HTN (hypertension) I10    Breast cancer, right breast ([facility] [address] C50.911    Anemia D64.9    BPV (benign positional vertigo) H81.10    DJD (degenerative joint disease) of knee M17.10    S/P colonoscopy Z98.890    Early satiety R68.81    Venous insufficiency of both lower extremities I87.2    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.21    Renal cyst N28.1    Intrahepatic bile duct dilation K83.8    Hearing deficit, left H91.92    Influenza J11.1    Chronic kidney disease N18.9    Pneumonia J18.9    Streptococcal bacteremia R78.81, B95.5       Respiratory Therapist: Muriel Cárdenas, RT

## 2020-01-27 NOTE — PROGRESS NOTES
MIN: Home with Follow-up Appointments    3:00pm- [employer] accepted referral. AVS updated. 2:15pm- CM met with pt at bedside to discuss with pt about discharge plan. CM informed pt that she will need IV infusion at d/c and that CM sent referral to [facility] for teaching and to provide antibiotics. CM discussed with pt about HH to provide nursing. The Plan for Transition of Care is related to the following treatment goals: Home Health    The Patient was provided with a choice of provider and agrees   with the discharge plan. [x] Yes [] No    Freedom of choice list was provided with basic dialogue that supports the patient's individualized plan of care/goals, treatment preferences and shares the quality data associated with the providers. [x] Yes [] No    Pt selected [facility]. [facility] document signed pt and placed in pt's bedside chart. Referral sent to [facility] via [facility].     2:00pm- Kisha Gonzalez from [address] [place] [address] ([phone]) called CM via phone. Kisha Gonzalez called to inform CM that they received referral and that they are running pt's insurance benefits. Kisha Gonzalez informed CM to call when pt is ready for teaching. 12:30pm- CM sent referral to [facility] via [employer] for IV infusion. 9:50am- CM met with pt at bedside to discuss d/c plan. No new needs at this time. CM will continue to follow pt for d/c planning needs.      Dutch Mendez [address]  [phone]

## 2020-01-27 NOTE — PROGRESS NOTES
General Daily Progress Note    Admit Date: 1/20/2020    Subjective:     Patient has no complaint . Current Facility-Administered Medications   Medication Dose Route Frequency    metoprolol succinate (TOPROL-XL) XL tablet 100 mg  100 mg Oral DAILY    arformoteroL (BROVANA) neb solution 15 mcg  15 mcg Nebulization BID RT    budesonide (PULMICORT) 500 mcg/2 ml nebulizer suspension  1,000 mcg Nebulization BID RT    vancomycin (VANCOCIN) 750 mg in 0.9% sodium chloride (MBP/ADV) 250 mL  750 mg IntraVENous Q18H    albuterol-ipratropium (DUO-NEB) 2.5 MG-0.5 MG/3 ML  3 mL Nebulization TID RT    cefTRIAXone (ROCEPHIN) 2 g in 0.9% sodium chloride (MBP/ADV) 50 mL  2 g IntraVENous Q24H    guaiFENesin-dextromethorphan (ROBITUSSIN DM) 100-10 mg/5 mL syrup 5 mL  5 mL Oral Q6H PRN    sodium chloride (NS) flush 5-40 mL  5-40 mL IntraVENous PRN    acetaminophen (TYLENOL) tablet 650 mg  650 mg Oral Q6H PRN    sodium chloride (NS) flush 5-40 mL  5-40 mL IntraVENous Q8H    fluticasone propionate (FLONASE) 50 mcg/actuation nasal spray 2 Spray  2 Spray Both Nostrils BID    lamoTRIgine (LaMICtal) tablet 25 mg  25 mg Oral BID    NIFEdipine ER (PROCARDIA XL) tablet 60 mg  60 mg Oral DAILY    oxybutynin chloride XL (DITROPAN XL) tablet 10 mg  10 mg Oral DAILY    pravastatin (PRAVACHOL) tablet 20 mg  20 mg Oral QHS    aspirin tablet 325 mg  325 mg Oral DAILY    losartan (COZAAR) tablet 100 mg  100 mg Oral DAILY    heparin (porcine) injection 5,000 Units  5,000 Units SubCUTAneous Q8H        Review of Systems  A comprehensive review of systems was negative.     Objective:     Patient Vitals for the past 24 hrs:   BP Temp Pulse Resp SpO2   01/27/20 0755 126/57 97.6 °F (36.4 °C) 72 -- 95 %   01/27/20 0729 -- -- -- -- 96 %   01/27/20 0334 119/58 98 °F (36.7 °C) 70 18 96 %   01/26/20 2322 142/71 98.7 °F (37.1 °C) 72 18 96 %   01/26/20 2004 -- -- -- -- 97 %   01/26/20 1926 -- -- -- -- 97 %   01/26/20 1850 146/62 98.2 °F (36.8 °C) 71 18 98 %   01/26/20 1449 140/60 97.8 °F (36.6 °C) 70 18 97 %   01/26/20 1114 143/63 98.1 °F (36.7 °C) 71 18 97 %     No intake/output data recorded. 01/25 1901 - 01/27 0700  In: 1780 [P.O.:1780]  Out: 4500 [Urine:4500]    Physical Exam:   Visit Vitals  /57 (BP 1 Location: Left arm, BP Patient Position: At rest)   Pulse 72   Temp 97.6 °F (36.4 °C)   Resp 18   Ht 5' 2\" (1.575 m)   Wt 168 lb (76.2 kg)   SpO2 95%   BMI 30.73 kg/m²     General appearance: alert, cooperative, no distress, appears stated age  Neck: supple, symmetrical, trachea midline, no adenopathy, thyroid: not enlarged, symmetric, no tenderness/mass/nodules, no carotid bruit and no JVD  Lungs: clear to auscultation bilaterally  Heart: regular rate and rhythm, S1, S2 normal, no murmur, click, rub or gallop  Abdomen: soft, non-tender.  Bowel sounds normal. No masses,  no organomegaly  Extremities: extremities normal, atraumatic, no cyanosis or edema        Data Review   Recent Results (from the past 24 hour(s))   METABOLIC PANEL, BASIC    Collection Time: 01/27/20  2:05 AM   Result Value Ref Range    Sodium 140 136 - 145 mmol/L    Potassium 4.3 3.5 - 5.1 mmol/L    Chloride 112 (H) 97 - 108 mmol/L    CO2 24 21 - 32 mmol/L    Anion gap 4 (L) 5 - 15 mmol/L    Glucose 96 65 - 100 mg/dL    BUN 30 (H) 6 - 20 MG/DL    Creatinine 1.47 (H) 0.55 - 1.02 MG/DL    BUN/Creatinine ratio 20 12 - 20      GFR est AA 41 (L) >60 ml/min/1.73m2    GFR est non-AA 34 (L) >60 ml/min/1.73m2    Calcium 8.1 (L) 8.5 - 10.1 MG/DL   CBC WITH MANUAL DIFF    Collection Time: 01/27/20  2:05 AM   Result Value Ref Range    WBC 19.4 (H) 3.6 - 11.0 K/uL    RBC 4.13 3.80 - 5.20 M/uL    HGB 11.0 (L) 11.5 - 16.0 g/dL    HCT 33.5 (L) 35.0 - 47.0 %    MCV 81.1 80.0 - 99.0 FL    MCH 26.6 26.0 - 34.0 PG    MCHC 32.8 30.0 - 36.5 g/dL    RDW 13.6 11.5 - 14.5 %    PLATELET 468 (H) 570 - 400 K/uL    MPV 9.7 8.9 - 12.9 FL    NRBC 0.4 (H) 0  WBC    ABSOLUTE NRBC 0.07 (H) 0.00 - 0.01 K/uL NEUTROPHILS 64 32 - 75 %    BAND NEUTROPHILS 2 0 - 6 %    LYMPHOCYTES 26 12 - 49 %    MONOCYTES 4 (L) 5 - 13 %    EOSINOPHILS 2 0 - 7 %    BASOPHILS 0 0 - 1 %    METAMYELOCYTES 0 0 %    MYELOCYTES 2 (H) 0 %    PROMYELOCYTES 0 0 %    BLASTS 0 0 %    OTHER CELL 0 0      IMMATURE GRANULOCYTES 0 %    ABS. NEUTROPHILS 12.8 (H) 1.8 - 8.0 K/UL    ABS. LYMPHOCYTES 5.0 (H) 0.8 - 3.5 K/UL    ABS. MONOCYTES 0.8 0.0 - 1.0 K/UL    ABS. EOSINOPHILS 0.4 0.0 - 0.4 K/UL    ABS. BASOPHILS 0.0 0.0 - 0.1 K/UL    ABS. IMM. GRANS. 0.0 K/UL    DF MANUAL      RBC COMMENTS NORMOCYTIC, NORMOCHROMIC             Assessment:     Principal Problem:    Influenza (1/20/2020)    Active Problems:    Pneumonia (1/25/2020)      Streptococcal bacteremia (1/25/2020)      Asthma ()      Hereditary spherocytosis (HCC) ()      HTN (hypertension) ()      Type 2 diabetes mellitus with nephropathy ([facility] [address] (1/16/2018)      Renal cyst (8/1/2016)      Overview: ct rt      Chronic kidney disease (2/6/2019)      Overview: kidney cyst - watching        Plan:     1. Pulmonary aspect continues to improve. 2.  Await antibiotic of choice for continued treatment of alpha strep. Assuming this will be parenteral  will be consulted for outpatient coverage. 3.  Hydration adequate.

## 2020-01-27 NOTE — PROGRESS NOTES
Initial Nutrition Assessment:    INTERVENTIONS/RECOMMENDATIONS:   · Continue current diet     ASSESSMENT:   Chart reviewed, medically noted for influenza, PNA, and PMH shown below. Assessing pt due to LOS. Pt reports good appetite and consuming the majority of meals. Weight history shows no significant weigh loss PTA. Past Medical History:   Diagnosis Date    Anemia     Arrhythmia     irregular per pt d/t blood disorder    Asthma     Bereavement 2-2-16     die 82 copd,chf,pul htn pn after 48 yr marriage    BPV (benign positional vertigo)     Breast cancer, right breast ([address] 1994    right breast, lumpectomy and radiation    Breast lump 2017    right breast     Chronic kidney disease 02/06/2019    kidney cyst - watching    Diabetes ([address]     controlled with diet    DJD (degenerative joint disease) of knee     Early satiety     Hearing deficit, left     Hereditary spherocytosis (HCC)     HTN (hypertension)     Ill-defined condition     sickle cell trait    Intrahepatic bile duct dilation 09/05/2018    mri    Radiation therapy complication 9875    right breast     Renal cyst 08/2016    ct rt - MRI 9/5/18 a hemorrhagic cyst in the right lower pole a layering hemorrhagic component.  There are no solid renal masses     S/P colonoscopy 4-9-14    md Brian - family hx    S/P colonoscopy 07/20/2016    rt - md brian diverticulosis    Seizures ([address]     Septic arthritis ([address] 3/11    Streptococcal sepsis ([address] 03/2011    Venous insufficiency of both lower extremities        Diet Order: Regular  % Eaten:    Patient Vitals for the past 72 hrs:   % Diet Eaten   01/24/20 1819 100 %   01/24/20 1407 100 %     Pertinent Medications: [x]Reviewed: lactobac,   Pertinent Labs: [x]Reviewed:   Food Allergies: [x]NKFA  []Other   Last BM: 1/23  Edema:        []RUE   []LUE   []RLE   []LLE      Pressure Injury:      [] Stage I   [] Stage II   [] Stage III   [] Stage IV      Wt Readings from Last 30 Encounters:   01/23/20 76.2 kg (168 lb)   10/10/19 73.3 kg (161 lb 9.6 oz)   06/06/19 73.6 kg (162 lb 4.8 oz)   02/06/19 75.4 kg (166 lb 3.2 oz)   10/29/18 73.9 kg (163 lb)   09/17/18 73.9 kg (163 lb)   05/15/18 73 kg (161 lb)   01/16/18 73.3 kg (161 lb 9.6 oz)   09/13/17 77.7 kg (171 lb 6.4 oz)   05/17/17 77.4 kg (170 lb 9.6 oz)   01/19/17 78.8 kg (173 lb 12.8 oz)   09/07/16 76.3 kg (168 lb 3.2 oz)   06/06/16 76.2 kg (167 lb 14.4 oz)   03/02/16 76.2 kg (168 lb)   10/27/15 73.5 kg (162 lb)   07/23/15 76.2 kg (168 lb)   04/09/15 75.8 kg (167 lb)   02/23/15 73 kg (161 lb)   10/27/14 69.9 kg (154 lb)       Anthropometrics:   Height: 5' 2\" (157.5 cm) Weight: 76.2 kg (168 lb)   IBW (%IBW):   ( ) UBW (%UBW):   (  %)   Last Weight Metrics:  Weight Loss Metrics 1/23/2020 10/10/2019 6/6/2019 2/6/2019 10/29/2018 9/17/2018 5/15/2018   Today's Wt 168 lb 161 lb 9.6 oz 162 lb 4.8 oz 166 lb 3.2 oz 163 lb 163 lb 161 lb   BMI 30.73 kg/m2 28.63 kg/m2 28.75 kg/m2 29.44 kg/m2 28.87 kg/m2 28.87 kg/m2 28.52 kg/m2       BMI: Body mass index is 30.73 kg/m². This BMI is indicative of:   []Underweight    []Normal    []Overweight    [x] Obesity   [] Extreme Obesity (BMI>40)     Estimated Nutrition Needs (Based on):   1400 Kcals/day(BMR: 1175 x 1.2) , 75 g(0.8 g/kg) Protein  Carbohydrate:  At Least 130 g/day  Fluids: 1400 mL/day (1ml/kcal) or per primary team    NUTRITION DIAGNOSES:   Problem:  No nutritional diagnosis at this time      Etiology: related to       Signs/Symptoms: as evidenced by        NUTRITION INTERVENTIONS:  Meals/Snacks: General/healthful diet                  GOAL:   consume >75% of meals in 5-7 days    LEARNING NEEDS (Diet, Food/Nutrient-Drug Interaction):    [x] None Identified   [] Identified and Education Provided/Documented   [] Identified and Pt declined/was not appropriate     Cultureal, Sikhism, OR Ethnic Dietary Needs:    [x] None Identified   [] Identified and Addressed     [x] Interdisciplinary Care Plan Reviewed/Documented    [x] Discharge Planning: General healthy diet       MONITORING /EVALUATION:      Food/Nutrient Intake Outcomes:  Total energy intake  Physical Signs/Symptoms Outcomes: Weight/weight change, Electrolyte and renal profile, GI    NUTRITION RISK:    [] High              [] Moderate           []  Low  [x]  Minimal/Uncompromised    PT SEEN FOR:    []  MD Consult: []Calorie Count      []Diabetic Diet Education        []Diet Education     []Electrolyte Management     []General Nutrition Management and Supplements     []Management of Tube Feeding     []TPN Recommendations    []  RN Referral:  []MST score >=2     []Enteral/Parenteral Nutrition PTA     []Pregnant: Gestational DM or Multigestation     []Pressure Ulcer/Wound Care needs        []  Low BMI  [x]  LOS Referral       Indio Villarreal RDN  Pager [phone]  Weekend Pager [phone]

## 2020-01-27 NOTE — PROGRESS NOTES
Bedside shift change report GIVEN TO Petra Whitehead RN. Report included the following information SBAR. SIGNIFICANT CHANGES DURING SHIFT:      1750  IV infiltrated, Vanco stopped    1850  New IV established by PICC team, Vanco restarted. Pharmacy notified, told to hang Estella when Vanco completed. CONCERNS TO ADDRESS WITH MD:            [facility] NURSING NOTE   Admission Date 1/20/2020   Admission Diagnosis Influenza [J11.1]   Consults IP CONSULT TO HOSPITALIST  IP CONSULT TO CARDIOLOGY  IP CONSULT TO INFECTIOUS DISEASES      Cardiac Monitoring [x] Yes [] No      Purposeful Hourly Rounding [x] Yes    Danitza Score Total Score: 2   Danitza score 3 or > [x] Bed Alarm [] Avasys [] 1:1 sitter [] Patient refused (Signed refusal form in chart)   Pelon Score Pelon Score: 19   Pelon score 14 or < [] PMT consult [] Wound Care consult    []  Specialty bed  [] Nutrition consult      Influenza Vaccine Received Flu Vaccine for Current Season (usually Sept-March): Yes           Oxygen needs? [x] Room air Oxygen @  []1L    []2L    []3L   []4L    []5L   []6L via  NC   Chronic home O2 use? [] Yes [] No  Perform O2 challenge test and document in progress note using [employer])      Last bowel movement Last Bowel Movement Date: 01/23/20      Urinary Catheter             LDAs               Peripheral IV 01/24/20 Anterior; Left Hand (Active)   Site Assessment Clean, dry, & intact 1/27/2020  2:00 PM   Phlebitis Assessment 0 1/27/2020  2:00 PM   Infiltration Assessment 0 1/27/2020  2:00 PM   Dressing Status Clean, dry, & intact 1/27/2020  2:00 PM   Dressing Type Transparent 1/27/2020  2:00 PM   Hub Color/Line Status Blue;Flushed 1/27/2020  2:00 PM                         Readmission Risk Assessment Tool Score High Risk            39       Total Score        3 Has Seen PCP in Last 6 Months (Yes=3, No=0)    3 Patient Length of Stay (>5 days = 3)    5 Pt.  Coverage (Medicare=5 , Medicaid, or Self-Pay=4)    28 Charlson Comorbidity Score (Age + Comorbid Conditions)        Criteria that do not apply:    . Living with Significant Other. Assisted Living. LTAC. SNF.  or   Rehab    IP Visits Last 12 Months (1-3=4, 4=9, >4=11)       Expected Length of Stay 3d 7h   Actual Length of Stay 7

## 2020-01-27 NOTE — PROGRESS NOTES
Infectious Disease Progress          IMPRESSION:       - Alpha Strep bacteremia  BC - 1/20 - + for Alpha Strep ( not Strep pneumoniae) 1/2  Repeat BC - 1/23-no growth  ECHO cardiogram -  Negative for  vegetation  Difficult to comment if it is real or contaminant as only 1 set of BC done. Alpha Strep is found in humans in oral & upper respiratory tract  But pt has carious dentition x 4 , she presented with SOB & respiratory symptoms  - Acute Influenza A viral illness  - Acute exacerbation of asthma  - Acute bronchitis  - CXR - 1/20 -no infiltrate, repeat - 1/24- no infiltrate  - CT scan-1/21- 1. Minimal centrilobular nodular opacities are seen in the right middle lobe  compatible with infection which may be typical or atypical.  3. Bilateral atelectasis. -Diabetes Type 2 A1c 5.1  - CKD3 Cr 1.64         PLAN:          - DC on  Vancomycin 1g IV q24  end date 2/5/20. Remove line at end of therapy     Intermediate sensitivity to PCN, Ceftriaxone  - [facility] 21 to adjust dose for Vanc trough of 15  -Weekly CBC, CMP , Vanc trough fax results to [phone], call with abnormal results at [phone], call with antibiotic related adverse events. Encourage probiotic, yogurt intake. - Out pt F/u with Dr Jose J Bucio, ID F/u not  Required. Call if fever occurs   -Complete  Tamiflu regimen  - Pt advised to see Dental   - Plan of care D/w pt . Subjective:     Pt seen. Siting up in bed . Denies new complaints .    Cough still + , better    Patient Active Problem List   Diagnosis Code    Anemia NEC     Asthma J45.909    Hereditary spherocytosis ([address] D58.0    HTN (hypertension) I10    Breast cancer, right breast ([address] C50.911    Anemia D64.9    BPV (benign positional vertigo) H81.10    DJD (degenerative joint disease) of knee M17.10    S/P colonoscopy Z98.890    Early satiety R68.81    Venous insufficiency of both lower extremities I87.2    Type 2 diabetes mellitus with nephropathy ([address] E11.21    Renal cyst N28.1    Intrahepatic bile duct dilation K83.8    Hearing deficit, left H91.92    Influenza J11.1    Chronic kidney disease N18.9    Pneumonia J18.9    Streptococcal bacteremia R78.81, B95.5     Past Medical History:   Diagnosis Date    Anemia     Arrhythmia     irregular per pt d/t blood disorder    Asthma     Bereavement 2-2-16     die 82 copd,chf,pul htn pn after 48 yr marriage    BPV (benign positional vertigo)     Breast cancer, right breast ([address] 1994    right breast, lumpectomy and radiation    Breast lump 2017    right breast     Chronic kidney disease 02/06/2019    kidney cyst - watching    Diabetes ([address]     controlled with diet    DJD (degenerative joint disease) of knee     Early satiety     Hearing deficit, left     Hereditary spherocytosis (HCC)     HTN (hypertension)     Ill-defined condition     sickle cell trait    Intrahepatic bile duct dilation 09/05/2018    mri    Radiation therapy complication 7369    right breast     Renal cyst 08/2016    ct rt - MRI 9/5/18 a hemorrhagic cyst in the right lower pole a layering hemorrhagic component.  There are no solid renal masses     S/P colonoscopy 4-9-14    md Brian - family hx    S/P colonoscopy 07/20/2016    rt - md brian diverticulosis    Seizures ([address]     Septic arthritis ([address] 3/11    Streptococcal sepsis ([address] 03/2011    Venous insufficiency of both lower extremities       Family History   Problem Relation Age of Onset    Diabetes Mother     Other Father         'sphero'-blood disorder    Cancer Sister         breast    Breast Cancer Sister 71    Other Sister         spleen removed    Colon Cancer Sister     Other Other         spleen removed    Gall Bladder Disease Other       Social History     Tobacco Use    Smoking status: Never Smoker    Smokeless tobacco: Never Used   Substance Use Topics    Alcohol use: No     Past Surgical History:   Procedure Laterality Date   [facility] splenectomy    HX BREAST BIOPSY Right 1994    positive    HX BREAST LUMPECTOMY Right 1994    HX CHOLECYSTECTOMY  1985      Prior to Admission medications    Medication Sig Start Date End Date Taking? Authorizing Provider   potassium chloride (KLOR-CON M20) 20 mEq tablet TAKE 1 TABLET DAILY 11/5/19  Yes George Mo MD   ibandronate (BONIVA) 150 mg tablet TAKE 1 TABLET ONCE A MONTH 9/29/19  Yes George Mo MD   TOPROL XL 50 mg XL tablet TAKE 1 TABLET DAILY 8/12/19  Yes George Mo MD   NIFEdipine ER (PROCARDIA XL) 60 mg ER tablet TAKE 1 TABLET TWICE A DAY 8/12/19  Yes George Mo MD   pravastatin (PRAVACHOL) 20 mg tablet TAKE 1 TABLET ONCE EACH NIGHT 8/3/19  Yes George Mo MD   oxybutynin chloride XL (DITROPAN XL) 10 mg CR tablet TAKE 1 TABLET DAILY 5/11/19  Yes George Mo MD   lamoTRIgine (LAMICTAL) 25 mg tablet TAKE 1 TABLET TWICE A DAY 3/11/19  Yes George Mo MD   MICARDIS 80 mg tablet TAKE 1 TABLET DAILY 3/6/19  Yes George Mo MD   clotrimazole-betamethasone (LOTRISONE) topical cream Apply  to affected area two (2) times a day. 3/5/19  Yes George Mo MD   aspirin (ASPIRIN) 325 mg tablet Take 325 mg by mouth as needed for Pain. Yes Provider, Historical   hydroCHLOROthiazide (HYDRODIURIL) 12.5 mg tablet Take 12.5 mg by mouth every Monday, Wednesday, Friday. Yes Provider, Historical   guaiFENesin (MUCUS RELIEF) 400 mg tablet Take 200 mg by mouth every four (4) hours as needed for Congestion. Yes Provider, Historical   fluticasone (FLONASE) 50 mcg/actuation nasal spray 2 Sprays by Both Nostrils route two (2) times a day. Yes Provider, Historical   calcium carbonate (TUMS PO) Take 500 mg by mouth as needed.    Yes Provider, Historical   fluticasone-salmeterol (ADVAIR DISKUS) 250-50 mcg/dose diskus inhaler USE 1 INHALATION TWO TIMES A DAY 8/17/18  Yes George Mo MD   albuterol (PROVENTIL HFA, VENTOLIN HFA, PROAIR HFA) 90 mcg/actuation inhaler Take 1 Puff by inhalation every four (4) hours as needed for Wheezing. 18  Yes Melyssa Yun MD   triamcinolone acetonide (KENALOG) 0.1 % topical cream APPLY EXTERNALLY TO THE AFFECTED AREA TWICE DAILY 18  Yes Melyssa Yun MD   aspirin delayed-release 81 mg tablet Take 81 mg by mouth daily. Yes Provider, Historical     Allergies   Allergen Reactions    Codeine Nausea and Vomiting and Other (comments)     Upset stomach. Weak.  Other Plant, Animal, Environmental Other (comments)     Dust and mold allergy. Review of Systems:  A comprehensive review of systems was negative except for that written in the History of Present Illness. 10 point review of systems obtained . All other systems negative    Objective:   Blood pressure 139/56, pulse 64, temperature 98 °F (36.7 °C), resp. rate 18, height 5' 2\" (1.575 m), weight 168 lb (76.2 kg), SpO2 96 %.   Temp (24hrs), Av.1 °F (36.7 °C), Min:97.6 °F (36.4 °C), Max:98.7 °F (37.1 °C)    Current Facility-Administered Medications   Medication Dose Route Frequency    lactobac ac& pc-s.therm-b.anim (VIKA Q/RISAQUAD)  1 Cap Oral DAILY    metoprolol succinate (TOPROL-XL) XL tablet 100 mg  100 mg Oral DAILY    arformoteroL (BROVANA) neb solution 15 mcg  15 mcg Nebulization BID RT    budesonide (PULMICORT) 500 mcg/2 ml nebulizer suspension  1,000 mcg Nebulization BID RT    vancomycin (VANCOCIN) 750 mg in 0.9% sodium chloride (MBP/ADV) 250 mL  750 mg IntraVENous Q18H    albuterol-ipratropium (DUO-NEB) 2.5 MG-0.5 MG/3 ML  3 mL Nebulization TID RT    cefTRIAXone (ROCEPHIN) 2 g in 0.9% sodium chloride (MBP/ADV) 50 mL  2 g IntraVENous Q24H    guaiFENesin-dextromethorphan (ROBITUSSIN DM) 100-10 mg/5 mL syrup 5 mL  5 mL Oral Q6H PRN    sodium chloride (NS) flush 5-40 mL  5-40 mL IntraVENous PRN    acetaminophen (TYLENOL) tablet 650 mg  650 mg Oral Q6H PRN    sodium chloride (NS) flush 5-40 mL  5-40 mL IntraVENous Q8H    fluticasone propionate (FLONASE) 50 mcg/actuation nasal spray 2 Spray  2 Spray Both Nostrils BID    lamoTRIgine (LaMICtal) tablet 25 mg  25 mg Oral BID    NIFEdipine ER (PROCARDIA XL) tablet 60 mg  60 mg Oral DAILY    oxybutynin chloride XL (DITROPAN XL) tablet 10 mg  10 mg Oral DAILY    pravastatin (PRAVACHOL) tablet 20 mg  20 mg Oral QHS    aspirin tablet 325 mg  325 mg Oral DAILY    losartan (COZAAR) tablet 100 mg  100 mg Oral DAILY    heparin (porcine) injection 5,000 Units  5,000 Units SubCUTAneous Q8H        Exam:    General:  Alert, cooperative,    Eyes:  Sclera anicteric. Pupils equally round and reactive to light. Mouth/Throat: Mucous membranes normal, oral pharynx clear   Neck: Supple   Lungs:   Clear to auscultation bilaterally, good effort   CV:  Regular rate and rhythm,no murmur, click, rub or gallop   Abdomen:   Soft, non-tender. bowel sounds normal. non-distended   Extremities: No cyanosis or edema   Skin: Skin color, texture, turgor normal. no acute rash or lesions   Lymph nodes: Cervical and supraclavicular normal   Musculoskeletal: No swelling or deformity   Lines/Devices:  Intact, no erythema, drainage or tenderness   Psych: Alert and oriented, normal mood affect given the setting       Data Review:     Lab Results   Component Value Date/Time    Culture result: NO GROWTH 4 DAYS 01/23/2020 02:28 PM    Culture result: HEAVY NORMAL RESPIRATORY VIKA 01/20/2020 10:44 PM    Culture result: FEW YEAST, (APPARENT CANDIDA ALBICANS) (A) 01/20/2020 10:44 PM    Culture result: (A) 01/20/2020 09:06 AM     ALPHA STREPTOCOCCUS, NOT S.  PNEUMONIAE GROWING IN 1 OF 2 BOTTLES DRAWN (SITE = R HAND) (SENSITIVITIES PERFORMED BY E-TEST)    Culture result: REMAINING BOTTLE(S) HAS/HAVE NO GROWTH IN 5 DAYS 01/20/2020 09:06 AM    Culture result: DR BLUE [facility] HAS REQUESTED SENSITIVITIES 1/24/20 01/20/2020 09:06 AM          XR Results (most recent):  Results from [facility] encounter on 01/20/20   XR CHEST PORT    Narrative EXAM: XR CHEST PORT    INDICATION: evaluate for pneumonia, wheezing . cough +    COMPARISON: 1/20/2020    FINDINGS: A portable AP radiograph of the chest was obtained at 1835 hours. The  patient is on a cardiac monitor. The lungs are hyperinflated but clear. The  cardiac and mediastinal contours and pulmonary vascularity are normal.  The  bones and soft tissues are grossly within normal limits. Impression IMPRESSION: No acute process seen             ICD-10-CM ICD-9-CM    1. Influenza A J10.1 487.1    2. Mild intermittent asthma with acute exacerbation J45.21 493.92    3. Hypoxia R09.02 799.02            Antibiotic History  Add Ceftriaxone  Vancomycin 500 mg IV every 16 hours (Start Date 1/21; Day # 3)  Azithromycin 500mg PO every 24 hours (Start Date 1/21; Day 3/5)   Oseltamivir 30 mg PO daily (Start Date 1/21; Day 3/5)        I have discussed the diagnosis with the patient and the intended plan as seen in the above orders. I have discussed medication side effects and warnings with the patient as well.     Reviewed test results at length with patient    Signed By: Melia Torres MD FACP

## 2020-01-28 ENCOUNTER — HOME HEALTH ADMISSION (OUTPATIENT)
Dept: HOME HEALTH SERVICES | Facility: HOME HEALTH | Age: 85
End: 2020-01-28
Payer: MEDICARE

## 2020-01-28 LAB
ANION GAP SERPL CALC-SCNC: 5 MMOL/L (ref 5–15)
BACTERIA SPEC CULT: NORMAL
BASOPHILS # BLD: 0 K/UL (ref 0–0.1)
BASOPHILS NFR BLD: 0 % (ref 0–1)
BLASTS NFR BLD MANUAL: 0 %
BUN SERPL-MCNC: 24 MG/DL (ref 6–20)
BUN/CREAT SERPL: 18 (ref 12–20)
CALCIUM SERPL-MCNC: 8.7 MG/DL (ref 8.5–10.1)
CHLORIDE SERPL-SCNC: 111 MMOL/L (ref 97–108)
CO2 SERPL-SCNC: 24 MMOL/L (ref 21–32)
CREAT SERPL-MCNC: 1.31 MG/DL (ref 0.55–1.02)
DIFFERENTIAL METHOD BLD: ABNORMAL
EOSINOPHIL # BLD: 0.9 K/UL (ref 0–0.4)
EOSINOPHIL NFR BLD: 6 % (ref 0–7)
ERYTHROCYTE [DISTWIDTH] IN BLOOD BY AUTOMATED COUNT: 13.5 % (ref 11.5–14.5)
GLUCOSE SERPL-MCNC: 106 MG/DL (ref 65–100)
HCT VFR BLD AUTO: 33.6 % (ref 35–47)
HGB BLD-MCNC: 10.8 G/DL (ref 11.5–16)
IMM GRANULOCYTES # BLD AUTO: 0 K/UL
IMM GRANULOCYTES NFR BLD AUTO: 0 %
LYMPHOCYTES # BLD: 2.2 K/UL (ref 0.8–3.5)
LYMPHOCYTES NFR BLD: 14 % (ref 12–49)
MCH RBC QN AUTO: 26.6 PG (ref 26–34)
MCHC RBC AUTO-ENTMCNC: 32.1 G/DL (ref 30–36.5)
MCV RBC AUTO: 82.8 FL (ref 80–99)
METAMYELOCYTES NFR BLD MANUAL: 2 %
MONOCYTES # BLD: 2 K/UL (ref 0–1)
MONOCYTES NFR BLD: 13 % (ref 5–13)
MYELOCYTES NFR BLD MANUAL: 2 %
NEUTS BAND NFR BLD MANUAL: 2 % (ref 0–6)
NEUTS SEG # BLD: 9.7 K/UL (ref 1.8–8)
NEUTS SEG NFR BLD: 61 % (ref 32–75)
NRBC # BLD: 0.05 K/UL (ref 0–0.01)
NRBC BLD-RTO: 0.3 PER 100 WBC
OTHER CELLS NFR BLD MANUAL: 0 %
PLATELET # BLD AUTO: 523 K/UL (ref 150–400)
PMV BLD AUTO: 9.4 FL (ref 8.9–12.9)
POTASSIUM SERPL-SCNC: 4.2 MMOL/L (ref 3.5–5.1)
PROMYELOCYTES NFR BLD MANUAL: 0 %
RBC # BLD AUTO: 4.06 M/UL (ref 3.8–5.2)
RBC MORPH BLD: ABNORMAL
SERVICE CMNT-IMP: NORMAL
SODIUM SERPL-SCNC: 140 MMOL/L (ref 136–145)
WBC # BLD AUTO: 15.4 K/UL (ref 3.6–11)

## 2020-01-28 PROCEDURE — 80048 BASIC METABOLIC PNL TOTAL CA: CPT

## 2020-01-28 PROCEDURE — 36415 COLL VENOUS BLD VENIPUNCTURE: CPT

## 2020-01-28 PROCEDURE — 76937 US GUIDE VASCULAR ACCESS: CPT

## 2020-01-28 PROCEDURE — 65660000001 HC RM ICU INTERMED STEPDOWN

## 2020-01-28 PROCEDURE — 94640 AIRWAY INHALATION TREATMENT: CPT

## 2020-01-28 PROCEDURE — 85027 COMPLETE CBC AUTOMATED: CPT

## 2020-01-28 PROCEDURE — 74011250637 HC RX REV CODE- 250/637: Performed by: INTERNAL MEDICINE

## 2020-01-28 PROCEDURE — C1751 CATH, INF, PER/CENT/MIDLINE: HCPCS

## 2020-01-28 PROCEDURE — 97116 GAIT TRAINING THERAPY: CPT

## 2020-01-28 PROCEDURE — 74011000250 HC RX REV CODE- 250: Performed by: INTERNAL MEDICINE

## 2020-01-28 PROCEDURE — 74011250637 HC RX REV CODE- 250/637: Performed by: HOSPITALIST

## 2020-01-28 PROCEDURE — 77030018786 HC NDL GD F/USND BARD -B

## 2020-01-28 PROCEDURE — 74011250636 HC RX REV CODE- 250/636: Performed by: INTERNAL MEDICINE

## 2020-01-28 PROCEDURE — 02HV33Z INSERTION OF INFUSION DEVICE INTO SUPERIOR VENA CAVA, PERCUTANEOUS APPROACH: ICD-10-PCS | Performed by: INTERNAL MEDICINE

## 2020-01-28 PROCEDURE — 36573 INSJ PICC RS&I 5 YR+: CPT | Performed by: INTERNAL MEDICINE

## 2020-01-28 RX ORDER — BACITRACIN 500 UNIT/G
1 PACKET (EA) TOPICAL AS NEEDED
Status: DISCONTINUED | OUTPATIENT
Start: 2020-01-28 | End: 2020-01-29 | Stop reason: HOSPADM

## 2020-01-28 RX ORDER — HEPARIN 100 UNIT/ML
300 SYRINGE INTRAVENOUS AS NEEDED
Status: DISCONTINUED | OUTPATIENT
Start: 2020-01-28 | End: 2020-01-29 | Stop reason: HOSPADM

## 2020-01-28 RX ADMIN — BUDESONIDE 1000 MCG: 0.5 INHALANT RESPIRATORY (INHALATION) at 07:51

## 2020-01-28 RX ADMIN — LAMOTRIGINE 25 MG: 25 TABLET ORAL at 17:29

## 2020-01-28 RX ADMIN — LAMOTRIGINE 25 MG: 25 TABLET ORAL at 09:15

## 2020-01-28 RX ADMIN — FLUTICASONE PROPIONATE 2 SPRAY: 50 SPRAY, METERED NASAL at 09:17

## 2020-01-28 RX ADMIN — NIFEDIPINE 60 MG: 30 TABLET, FILM COATED, EXTENDED RELEASE ORAL at 09:15

## 2020-01-28 RX ADMIN — HEPARIN SODIUM 5000 UNITS: 5000 INJECTION INTRAVENOUS; SUBCUTANEOUS at 04:34

## 2020-01-28 RX ADMIN — LOSARTAN POTASSIUM 100 MG: 100 TABLET, FILM COATED ORAL at 09:15

## 2020-01-28 RX ADMIN — Medication 10 ML: at 21:18

## 2020-01-28 RX ADMIN — IPRATROPIUM BROMIDE AND ALBUTEROL SULFATE 3 ML: .5; 3 SOLUTION RESPIRATORY (INHALATION) at 07:51

## 2020-01-28 RX ADMIN — IPRATROPIUM BROMIDE AND ALBUTEROL SULFATE 3 ML: .5; 3 SOLUTION RESPIRATORY (INHALATION) at 13:18

## 2020-01-28 RX ADMIN — BUDESONIDE 1000 MCG: 0.5 INHALANT RESPIRATORY (INHALATION) at 20:54

## 2020-01-28 RX ADMIN — Medication 1 CAPSULE: at 09:15

## 2020-01-28 RX ADMIN — METOPROLOL SUCCINATE 100 MG: 50 TABLET, EXTENDED RELEASE ORAL at 09:15

## 2020-01-28 RX ADMIN — HEPARIN SODIUM 5000 UNITS: 5000 INJECTION INTRAVENOUS; SUBCUTANEOUS at 11:43

## 2020-01-28 RX ADMIN — VANCOMYCIN HYDROCHLORIDE 750 MG: 750 INJECTION, POWDER, LYOPHILIZED, FOR SOLUTION INTRAVENOUS at 09:16

## 2020-01-28 RX ADMIN — ARFORMOTEROL TARTRATE 15 MCG: 15 SOLUTION RESPIRATORY (INHALATION) at 07:51

## 2020-01-28 RX ADMIN — Medication 10 ML: at 14:13

## 2020-01-28 RX ADMIN — FLUTICASONE PROPIONATE 2 SPRAY: 50 SPRAY, METERED NASAL at 17:29

## 2020-01-28 RX ADMIN — OXYBUTYNIN CHLORIDE 10 MG: 5 TABLET, EXTENDED RELEASE ORAL at 21:17

## 2020-01-28 RX ADMIN — PRAVASTATIN SODIUM 20 MG: 10 TABLET ORAL at 21:26

## 2020-01-28 RX ADMIN — HEPARIN SODIUM 5000 UNITS: 5000 INJECTION INTRAVENOUS; SUBCUTANEOUS at 21:17

## 2020-01-28 RX ADMIN — IPRATROPIUM BROMIDE AND ALBUTEROL SULFATE 3 ML: .5; 3 SOLUTION RESPIRATORY (INHALATION) at 20:54

## 2020-01-28 RX ADMIN — ASPIRIN 325 MG: 325 TABLET, FILM COATED ORAL at 09:15

## 2020-01-28 RX ADMIN — Medication 10 ML: at 04:35

## 2020-01-28 NOTE — PROGRESS NOTES
PICC Education: Explained reason and rationale for PICC placement along with providing education in order to make an informed consent including nature, risks, benefits, potential complications, care and maintenance of PICC line. The opportunity for questions or concerns was given. A 'Patient PICC Handbook was also provided. Patient gave written consent for PICC procedure to be done at the bedside. Patient verbalizes understanding at this time. Procedure:  Time out completed. Pre procedure assessment done. Maximum sterile barrier precautions observed throughout procedure. Lidocaine 1% 3ml sc given prior to cannulation  Cannulated left brachial vein using ultrasound guidance and modified seldinger technique. Inserted single lumen 4 fr PICC in  left arm using, Xanodyne Tip Location System and 3CG   Patient has sinus rhythm. Tip Positioning System indicating tall P wave and no negative deflection before P wave which would indicate the PICC tip is properly placed in the distal SVC or the CAJ. PICC tip was confirmed by 2 PICC nurses and 3CG printout was placed on patients chart. Blood return verified and flushed with 20ml NS in each port. Sterile dressing applied with Biopatch, Stat loc, occlusive dressing per protocol. Curios caps applied to each port. Reason for access (long-term IV antibiotics). Complications related to insertion (none). Patient tolerated procedure well with minimal blood loss. PICC procedure performed by: STEPHANIE No-BC / Vascular Access Nurse  Assisted by: MARY ANN Cope, RN [facility] / Vascular Access Nurse  Left arm circumference: 32 cm. Catheter internal length:  41 cm  Catheter external length: 0 cm  PICC catheter occupies 8% of vein  Brand of catheter BARD SOLO POWER PICC,  Lot #[id number]   REF# [id number]    EXP 2020-12-31. Primary nurse Corina Gutiérrez RN, notified PICC line may be used and to hang new infusion tubing prior to use.       STEPHANIE No-BC Vascular Access Team

## 2020-01-28 NOTE — PROGRESS NOTES
Problem: Risk for Spread of Infection  Goal: Prevent transmission of infectious organism to others  Description  Prevent the transmission of infectious organisms to other patients, staff members, and visitors. Outcome: Progressing Towards Goal     Problem: Patient Education:  Go to Education Activity  Goal: Patient/Family Education  Outcome: Progressing Towards Goal     Problem: Falls - Risk of  Goal: *Absence of Falls  Description  Document Calin Carrillo Fall Risk and appropriate interventions in the flowsheet. Outcome: Progressing Towards Goal  Note: Fall Risk Interventions:  Mobility Interventions: Bed/chair exit alarm, Patient to call before getting OOB         Medication Interventions: Bed/chair exit alarm, Patient to call before getting OOB, Teach patient to arise slowly    Elimination Interventions: Bed/chair exit alarm, Call light in reach    History of Falls Interventions: Bed/chair exit alarm         Problem: Pressure Injury - Risk of  Goal: *Prevention of pressure injury  Description  Document Pelon Scale and appropriate interventions in the flowsheet.   Outcome: Progressing Towards Goal  Note: Pressure Injury Interventions:  Sensory Interventions: Assess changes in LOC    Moisture Interventions: Absorbent underpads, Apply protective barrier, creams and emollients    Activity Interventions: Increase time out of bed, Chair cushion    Mobility Interventions: Float heels, PT/OT evaluation    Nutrition Interventions: Document food/fluid/supplement intake, Offer support with meals,snacks and hydration    Friction and Shear Interventions: Apply protective barrier, creams and emollients, Feet elevated on foot rest, Lift sheet                Problem: Breathing Pattern - Ineffective  Goal: *Absence of hypoxia  Outcome: Progressing Towards Goal  Goal: *Use of effective breathing techniques  Outcome: Progressing Towards Goal

## 2020-01-28 NOTE — PROGRESS NOTES
MIN: Home with [address] Infusion and Follow-up Appointments. 11:30am- CM met with pt at bedside to discuss d/c plan. CM informed pt that [employer] accepted referral for [facility]. CM informed pt that teaching will be conducted for the IV antibiotics when her PICC line is placed. CM informed pt about her co-pay of antibiotics($70.68) and that [employer] are able to set up a payment plan for her. 10:00am-Ria from [employer] ([phone]) called CM via phone. CM informed Hermelindo Galicia that CM will keep her posted as to when PICC line is placed. Hermelindo Galicia stated that if CM sent order for the antibiotic, they can determine the co-pay. CM sent [employer] the order via [employer]. CM will continue to follow patient for discharge planning needs and arrange for services as deemed necessary.     Dutch Gibbs [address] [place] [address]  [phone]

## 2020-01-28 NOTE — PROGRESS NOTES
0700: Bedside shift change report given to ELLYN Mejias RN (oncoming nurse) by STEPHANIE Arredondo (offgoing nurse). Report included the following information SBAR and Kardex. 1900:   Bedside shift change report GIVEN TO Alfredo Caldera RN. Report included the following information SBAR and Kardex. SIGNIFICANT CHANGES DURING SHIFT:  Pt had a PICC line placed, pt worked with PT, pt feeling ok and in good spirits. CONCERNS TO ADDRESS WITH MD:  D/c planning? [facility] NURSING NOTE   Admission Date 1/20/2020   Admission Diagnosis Influenza [J11.1]   Consults IP CONSULT TO HOSPITALIST  IP CONSULT TO CARDIOLOGY  IP CONSULT TO INFECTIOUS DISEASES      Cardiac Monitoring [x] Yes [] No      Purposeful Hourly Rounding [x] Yes    Danitza Score Total Score: 2   Danitza score 3 or > [x] Bed Alarm [] Avasys [] 1:1 sitter [] Patient refused (Signed refusal form in chart)   Pelon Score Pelon Score: 19   Pelon score 14 or < [] PMT consult [] Wound Care consult    []  Specialty bed  [] Nutrition consult      Influenza Vaccine Received Flu Vaccine for Current Season (usually Sept-March): Yes           Oxygen needs? [x] Room air Oxygen @  []1L    []2L    []3L   []4L    []5L   []6L via  NC   Chronic home O2 use?  [] Yes [] No  Perform O2 challenge test and document in progress note using smartphrase (.[employer])      Last bowel movement Last Bowel Movement Date: 01/23/20      Urinary Catheter             LDAs         PICC Single Lumen 66/70/99 Left;Basilic (Active)   Central Line Being Utilized No 1/28/2020  4:56 PM   Criteria for Appropriate Use Long term IV/antibiotic administration 1/28/2020  4:56 PM   Site Assessment Dry;Clean 1/28/2020  4:56 PM   Phlebitis Assessment 0 1/28/2020  4:56 PM   Infiltration Assessment 0 1/28/2020  4:56 PM   Arm Circumference (cm) 32 cm 1/28/2020  4:56 PM   Date of Last Dressing Change 01/28/20 1/28/2020  4:56 PM   Dressing Status New;Clean, dry, & intact 1/28/2020  4:56 PM   External Catheter Length (cm) 0 centimeters 1/28/2020  4:56 PM   Dressing Type Disk with Chlorhexadine gluconate (CHG); Stabilization/securement device;Transparent 1/28/2020  4:56 PM   Hub Color/Line Status Purple;Flushed;Patent;Capped 1/28/2020  4:56 PM   Positive Blood Return (Site #1) Yes 1/28/2020  4:56 PM   Alcohol Cap Used Yes 1/28/2020  4:56 PM          Peripheral IV 01/24/20 Anterior; Left Hand (Active)   Site Assessment Clean, dry, & intact 1/28/2020  2:28 PM   Phlebitis Assessment 0 1/28/2020  2:28 PM   Infiltration Assessment 0 1/28/2020  2:28 PM   Dressing Status Clean, dry, & intact 1/28/2020  2:28 PM   Dressing Type Transparent;Tape 1/28/2020  2:28 PM   Hub Color/Line Status Blue 1/28/2020  2:28 PM                         Readmission Risk Assessment Tool Score High Risk            39       Total Score        3 Has Seen PCP in Last 6 Months (Yes=3, No=0)    3 Patient Length of Stay (>5 days = 3)    5 Pt. Coverage (Medicare=5 , Medicaid, or Self-Pay=4)    28 Charlson Comorbidity Score (Age + Comorbid Conditions)        Criteria that do not apply:    . Living with Significant Other. Assisted Living. LTAC. SNF.  or   Rehab    IP Visits Last 12 Months (1-3=4, 4=9, >4=11)       Expected Length of Stay 3d 7h   Actual Length of Stay 8

## 2020-01-28 NOTE — PROGRESS NOTES
Problem: Mobility Impaired (Adult and Pediatric)  Goal: *Acute Goals and Plan of Care (Insert Text)  Description  FUNCTIONAL STATUS PRIOR TO ADMISSION: Patient was independent and active without use of DME.    HOME SUPPORT PRIOR TO ADMISSION: The patient lived alone with no local support. Physical Therapy Goals goals met 1/28/2020   Initiated 1/23/2020   1. Patient will transfer from bed to chair and chair to bed with independence using the least restrictive device within 7 day(s). 2.  Patient will perform sit to stand with independence within 7 day(s). 3.  Patient will ambulate with supervision/set-up for 250 feet with the least restrictive device within 7 day(s). 4.  Patient will ascend/descend 2 stairs with no handrail(s) with supervision/set-up within 7 day(s). Outcome: Resolved/Met   PHYSICAL THERAPY TREATMENT/DISCHARGE  Patient: Jeremiah Solis (21 y.o. female)  Date: 1/28/2020  Diagnosis: Influenza [J11.1]   Influenza       Precautions: Contact(droplet )  Chart, physical therapy assessment, plan of care and goals were reviewed. ASSESSMENT  Patient continues with skilled PT services and has progressed towards goals. Patient has been up ad daiana in the room and demonstrates safe and steady gait. She demonstrates good gait speed and has no mobility concerns once she goes home. She reports having increased support set up with her DIL. VSS on RA. Other factors to consider for discharge: lives alone         PLAN :  Patient will be discharged from acute skilled physical therapy at this time.     Rationale for discharge:  Goals achieved    Recommendation for discharge: (in order for the patient to meet his/her long term goals)  Physical therapy at least 2 days/week in the home     This discharge recommendation:  Has not yet been discussed the attending provider and/or case management    IF patient discharges home will need the following DME: patient owns DME required for discharge       SUBJECTIVE: Patient stated I keep moving in the room because I know I will have to do it when I get home.     OBJECTIVE DATA SUMMARY:   Critical Behavior:  Neurologic State: Alert  Orientation Level: Oriented X4     Safety/Judgement: Awareness of environment, Insight into deficits  Functional Mobility Training:  Bed Mobility:                    Transfers:  Sit to Stand: Independent  Stand to Sit: Independent        Bed to Chair: Independent                    Balance:  Sitting: Intact  Standing: Intact  Ambulation/Gait Training:  Distance (ft): 95 Feet (ft)  Assistive Device: Gait belt  Ambulation - Level of Assistance: Independent                       Speed/Tila: Pace decreased (<100 feet/min)       Activity Tolerance:   Good and SpO2 stable on RA  Please refer to the flowsheet for vital signs taken during this treatment.     After treatment patient left in no apparent distress:   Sitting in chair and Call bell within reach    COMMUNICATION/COLLABORATION:   The patients plan of care was discussed with: Registered Nurse and     Yudith Aguirre, PT, DPT   Time Calculation: 13 mins

## 2020-01-28 NOTE — PROGRESS NOTES
ADULT PROTOCOL: JET AEROSOL  REASSESSMENT    Patient  Jeremiah Solis     82 y.o.   female     1/28/2020  2:28 AM    Breath Sounds Pre Procedure: Right Breath Sounds: Diminished                               Left Breath Sounds: Diminished    Breath Sounds Post Procedure: Right Breath Sounds: Diminished                                 Left Breath Sounds: Diminished    Breathing pattern: Pre procedure Breathing Pattern: Regular          Post procedure Breathing Pattern: Regular    Heart Rate: Pre procedure Pulse: 71           Post procedure Pulse: 72    Resp Rate: Pre procedure Respirations: 20           Post procedure Respirations: 20    Peak Flow: Pre bronchodilator             Post bronchodilator       FVC/FEV1:      Incentive Spirometry:             Cough: Pre procedure Cough: Non-productive               Post procedure Cough: Non-productive    Suctioned: NO    Sputum: Pre procedure                   Post procedure      Oxygen: O2 Device: Room air      Changed: NO    SpO2: Pre procedure SpO2: 95 %   without oxygen              Post procedure SpO2: 95 %  without oxygen    Nebulizer Therapy: Current medications Aerosolized Medications: DuoNeb, Brovana, Pulmicort      Changed: NO    Smoking History:     Problem List:   Patient Active Problem List   Diagnosis Code    Anemia NEC     Asthma J45.909    Hereditary spherocytosis ([facility] [address] D58.0    HTN (hypertension) I10    Breast cancer, right breast ([facility] [address] C50.911    Anemia D64.9    BPV (benign positional vertigo) H81.10    DJD (degenerative joint disease) of knee M17.10    S/P colonoscopy Z98.890    Early satiety R68.81    Venous insufficiency of both lower extremities I87.2    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.21    Renal cyst N28.1    Intrahepatic bile duct dilation K83.8    Hearing deficit, left H91.92    Influenza J11.1    Chronic kidney disease N18.9    Pneumonia J18.9    Streptococcal bacteremia R78.81, B95.5       Respiratory Therapist: Xiomara Honeycutt, RT

## 2020-01-28 NOTE — PROGRESS NOTES
General Daily Progress Note    Admit Date: 1/20/2020    Subjective:     Patient has no complaint. Current Facility-Administered Medications   Medication Dose Route Frequency    lactobac ac& pc-s.therm-b.anim (VIKA Q/RISAQUAD)  1 Cap Oral DAILY    metoprolol succinate (TOPROL-XL) XL tablet 100 mg  100 mg Oral DAILY    arformoteroL (BROVANA) neb solution 15 mcg  15 mcg Nebulization BID RT    budesonide (PULMICORT) 500 mcg/2 ml nebulizer suspension  1,000 mcg Nebulization BID RT    vancomycin (VANCOCIN) 750 mg in 0.9% sodium chloride (MBP/ADV) 250 mL  750 mg IntraVENous Q18H    albuterol-ipratropium (DUO-NEB) 2.5 MG-0.5 MG/3 ML  3 mL Nebulization TID RT    guaiFENesin-dextromethorphan (ROBITUSSIN DM) 100-10 mg/5 mL syrup 5 mL  5 mL Oral Q6H PRN    sodium chloride (NS) flush 5-40 mL  5-40 mL IntraVENous PRN    acetaminophen (TYLENOL) tablet 650 mg  650 mg Oral Q6H PRN    sodium chloride (NS) flush 5-40 mL  5-40 mL IntraVENous Q8H    fluticasone propionate (FLONASE) 50 mcg/actuation nasal spray 2 Spray  2 Spray Both Nostrils BID    lamoTRIgine (LaMICtal) tablet 25 mg  25 mg Oral BID    NIFEdipine ER (PROCARDIA XL) tablet 60 mg  60 mg Oral DAILY    oxybutynin chloride XL (DITROPAN XL) tablet 10 mg  10 mg Oral DAILY    pravastatin (PRAVACHOL) tablet 20 mg  20 mg Oral QHS    aspirin tablet 325 mg  325 mg Oral DAILY    losartan (COZAAR) tablet 100 mg  100 mg Oral DAILY    heparin (porcine) injection 5,000 Units  5,000 Units SubCUTAneous Q8H        Review of Systems  A comprehensive review of systems was negative.     Objective:     Patient Vitals for the past 24 hrs:   BP Temp Pulse Resp SpO2 Weight   01/28/20 0815 133/61 97.3 °F (36.3 °C) 69 18 97 % --   01/28/20 0751 -- -- -- -- 96 % --   01/28/20 0449 -- -- -- -- -- 166 lb 7.2 oz (75.5 kg)   01/28/20 0323 111/53 97.6 °F (36.4 °C) 75 18 96 % --   01/27/20 2320 120/63 98.3 °F (36.8 °C) 68 18 93 % --   01/27/20 1954 -- -- -- -- 95 % --   01/27/20 1846 139/72 98.4 °F (36.9 °C) 76 -- 95 % --   01/27/20 1512 128/54 98.6 °F (37 °C) 71 -- 95 % --   01/27/20 1452 -- -- -- -- 95 % --   01/27/20 1115 139/56 98 °F (36.7 °C) 64 -- 96 % --     01/28 0701 - 01/28 1900  In: -   Out: 600 [Urine:600]  01/26 1901 - 01/28 0700  In: 2060 [P.O.:2060]  Out: 4500 [Urine:4500]    Physical Exam:   Visit Vitals  /61 (BP 1 Location: Left arm, BP Patient Position: At rest)   Pulse 69   Temp 97.3 °F (36.3 °C)   Resp 18   Ht 5' 2\" (1.575 m)   Wt 166 lb 7.2 oz (75.5 kg)   SpO2 97%   BMI 30.44 kg/m²     General appearance: alert, cooperative, no distress, appears stated age  Neck: supple, symmetrical, trachea midline, no adenopathy, thyroid: not enlarged, symmetric, no tenderness/mass/nodules, no carotid bruit and no JVD  Lungs: clear to auscultation bilaterally  Heart: regular rate and rhythm, S1, S2 normal, no murmur, click, rub or gallop  Abdomen: soft, non-tender.  Bowel sounds normal. No masses,  no organomegaly  Extremities: extremities normal, atraumatic, no cyanosis or edema        Data Review   Recent Results (from the past 24 hour(s))   METABOLIC PANEL, BASIC    Collection Time: 01/28/20  1:19 AM   Result Value Ref Range    Sodium 140 136 - 145 mmol/L    Potassium 4.2 3.5 - 5.1 mmol/L    Chloride 111 (H) 97 - 108 mmol/L    CO2 24 21 - 32 mmol/L    Anion gap 5 5 - 15 mmol/L    Glucose 106 (H) 65 - 100 mg/dL    BUN 24 (H) 6 - 20 MG/DL    Creatinine 1.31 (H) 0.55 - 1.02 MG/DL    BUN/Creatinine ratio 18 12 - 20      GFR est AA 47 (L) >60 ml/min/1.73m2    GFR est non-AA 39 (L) >60 ml/min/1.73m2    Calcium 8.7 8.5 - 10.1 MG/DL   CBC WITH MANUAL DIFF    Collection Time: 01/28/20  1:19 AM   Result Value Ref Range    WBC 15.4 (H) 3.6 - 11.0 K/uL    RBC 4.06 3.80 - 5.20 M/uL    HGB 10.8 (L) 11.5 - 16.0 g/dL    HCT 33.6 (L) 35.0 - 47.0 %    MCV 82.8 80.0 - 99.0 FL    MCH 26.6 26.0 - 34.0 PG    MCHC 32.1 30.0 - 36.5 g/dL    RDW 13.5 11.5 - 14.5 %    PLATELET 716 (H) 490 - 400 K/uL    MPV 9.4 8.9 - 12.9 FL    NRBC 0.3 (H) 0  WBC    ABSOLUTE NRBC 0.05 (H) 0.00 - 0.01 K/uL    NEUTROPHILS 61 32 - 75 %    BAND NEUTROPHILS 2 0 - 6 %    LYMPHOCYTES 14 12 - 49 %    MONOCYTES 13 5 - 13 %    EOSINOPHILS 6 0 - 7 %    BASOPHILS 0 0 - 1 %    METAMYELOCYTES 2 (H) 0 %    MYELOCYTES 2 (H) 0 %    PROMYELOCYTES 0 0 %    BLASTS 0 0 %    OTHER CELL 0 0      IMMATURE GRANULOCYTES 0 %    ABS. NEUTROPHILS 9.7 (H) 1.8 - 8.0 K/UL    ABS. LYMPHOCYTES 2.2 0.8 - 3.5 K/UL    ABS. MONOCYTES 2.0 (H) 0.0 - 1.0 K/UL    ABS. EOSINOPHILS 0.9 (H) 0.0 - 0.4 K/UL    ABS. BASOPHILS 0.0 0.0 - 0.1 K/UL    ABS. IMM. GRANS. 0.0 K/UL    DF MANUAL      RBC COMMENTS NORMOCYTIC, NORMOCHROMIC             Assessment:     Principal Problem:    Influenza (1/20/2020)    Active Problems:    Pneumonia (1/25/2020)      Streptococcal bacteremia (1/25/2020)      Asthma ()      Hereditary spherocytosis (HCC) ()      HTN (hypertension) ()      Type 2 diabetes mellitus with nephropathy ([facility] [address] (1/16/2018)      Renal cyst (8/1/2016)      Overview: ct rt      Chronic kidney disease (2/6/2019)      Overview: kidney cyst - watching        Plan:     1. Patient will now take vancomycin 1 g daily. This is the antibiotic of choice from infectious disease. Currently all other antibiotics discontinued. Awaiting confirmation that parenteral antibiotic covered by her insurance company. If all goes well discharge tomorrow. 2.  Respiratory status has improved significantly and patient is now back to baseline. 3.  No sequelae from influenza.

## 2020-01-28 NOTE — PROGRESS NOTES
Problem: Risk for Spread of Infection  Goal: Prevent transmission of infectious organism to others  Description  Prevent the transmission of infectious organisms to other patients, staff members, and visitors. Outcome: Progressing Towards Goal     Problem: Patient Education:  Go to Education Activity  Goal: Patient/Family Education  Outcome: Progressing Towards Goal     Problem: Patient Education:  Go to Education Activity  Goal: Patient/Family Education  Outcome: Progressing Towards Goal     Problem: Falls - Risk of  Goal: *Absence of Falls  Description  Document Bishop Diaz Fall Risk and appropriate interventions in the flowsheet. Outcome: Progressing Towards Goal  Note: Fall Risk Interventions:  Mobility Interventions: Bed/chair exit alarm         Medication Interventions: Bed/chair exit alarm    Elimination Interventions: Bed/chair exit alarm, Call light in reach    History of Falls Interventions: Bed/chair exit alarm         Problem: Patient Education: Go to Patient Education Activity  Goal: Patient/Family Education  Outcome: Progressing Towards Goal     Problem: Pressure Injury - Risk of  Goal: *Prevention of pressure injury  Description  Document Pelon Scale and appropriate interventions in the flowsheet.   Outcome: Progressing Towards Goal  Note: Pressure Injury Interventions:  Sensory Interventions: Assess changes in LOC    Moisture Interventions: Absorbent underpads    Activity Interventions: Increase time out of bed    Mobility Interventions: Chair cushion    Nutrition Interventions: Document food/fluid/supplement intake    Friction and Shear Interventions: Minimize layers                Problem: Patient Education: Go to Patient Education Activity  Goal: Patient/Family Education  Outcome: Progressing Towards Goal     Problem: Breathing Pattern - Ineffective  Goal: *Absence of hypoxia  Outcome: Progressing Towards Goal  Goal: *Use of effective breathing techniques  Outcome: Progressing Towards Goal Problem: Patient Education: Go to Patient Education Activity  Goal: Patient/Family Education  Outcome: Progressing Towards Goal

## 2020-01-29 ENCOUNTER — PATIENT OUTREACH (OUTPATIENT)
Dept: CASE MANAGEMENT | Age: 85
End: 2020-01-29

## 2020-01-29 VITALS
DIASTOLIC BLOOD PRESSURE: 71 MMHG | SYSTOLIC BLOOD PRESSURE: 166 MMHG | WEIGHT: 166.45 LBS | HEIGHT: 62 IN | HEART RATE: 78 BPM | RESPIRATION RATE: 18 BRPM | BODY MASS INDEX: 30.63 KG/M2 | TEMPERATURE: 98.2 F | OXYGEN SATURATION: 97 %

## 2020-01-29 LAB
ANION GAP SERPL CALC-SCNC: 5 MMOL/L (ref 5–15)
BUN SERPL-MCNC: 19 MG/DL (ref 6–20)
BUN/CREAT SERPL: 15 (ref 12–20)
CALCIUM SERPL-MCNC: 8.5 MG/DL (ref 8.5–10.1)
CHLORIDE SERPL-SCNC: 111 MMOL/L (ref 97–108)
CO2 SERPL-SCNC: 26 MMOL/L (ref 21–32)
CREAT SERPL-MCNC: 1.26 MG/DL (ref 0.55–1.02)
DATE LAST DOSE: ABNORMAL
GLUCOSE SERPL-MCNC: 106 MG/DL (ref 65–100)
POTASSIUM SERPL-SCNC: 4.3 MMOL/L (ref 3.5–5.1)
REPORTED DOSE,DOSE: ABNORMAL UNITS
REPORTED DOSE/TIME,TMG: ABNORMAL
SODIUM SERPL-SCNC: 142 MMOL/L (ref 136–145)
VANCOMYCIN TROUGH SERPL-MCNC: 18.7 UG/ML (ref 5–10)

## 2020-01-29 PROCEDURE — 74011250636 HC RX REV CODE- 250/636: Performed by: INTERNAL MEDICINE

## 2020-01-29 PROCEDURE — 36415 COLL VENOUS BLD VENIPUNCTURE: CPT

## 2020-01-29 PROCEDURE — 74011250637 HC RX REV CODE- 250/637: Performed by: INTERNAL MEDICINE

## 2020-01-29 PROCEDURE — 74011250637 HC RX REV CODE- 250/637: Performed by: HOSPITALIST

## 2020-01-29 PROCEDURE — 80202 ASSAY OF VANCOMYCIN: CPT

## 2020-01-29 PROCEDURE — 74011000250 HC RX REV CODE- 250: Performed by: INTERNAL MEDICINE

## 2020-01-29 PROCEDURE — 94640 AIRWAY INHALATION TREATMENT: CPT

## 2020-01-29 PROCEDURE — 80048 BASIC METABOLIC PNL TOTAL CA: CPT

## 2020-01-29 PROCEDURE — 94760 N-INVAS EAR/PLS OXIMETRY 1: CPT

## 2020-01-29 PROCEDURE — 77010033678 HC OXYGEN DAILY

## 2020-01-29 RX ORDER — VANCOMYCIN HYDROCHLORIDE 10 G/100ML
1000 INJECTION, POWDER, LYOPHILIZED, FOR SOLUTION INTRAVENOUS DAILY
Qty: 1 VIAL | Refills: 0 | Status: SHIPPED
Start: 2020-01-29 | End: 2020-02-06

## 2020-01-29 RX ORDER — ARFORMOTEROL TARTRATE 15 UG/2ML
15 SOLUTION RESPIRATORY (INHALATION) EVERY 12 HOURS
Qty: 60 VIAL | Refills: 11 | Status: SHIPPED | OUTPATIENT
Start: 2020-01-29 | End: 2020-03-09 | Stop reason: SDUPTHER

## 2020-01-29 RX ORDER — ALBUTEROL SULFATE 0.83 MG/ML
2.5 SOLUTION RESPIRATORY (INHALATION)
Qty: 100 NEBULE | Refills: 11 | Status: SHIPPED | OUTPATIENT
Start: 2020-01-29 | End: 2020-03-09 | Stop reason: SDUPTHER

## 2020-01-29 RX ORDER — BUDESONIDE 0.5 MG/2ML
500 INHALANT ORAL 2 TIMES DAILY
Qty: 60 EACH | Refills: 11 | Status: SHIPPED | OUTPATIENT
Start: 2020-01-29 | End: 2020-03-09 | Stop reason: SDUPTHER

## 2020-01-29 RX ADMIN — FLUTICASONE PROPIONATE 2 SPRAY: 50 SPRAY, METERED NASAL at 08:50

## 2020-01-29 RX ADMIN — NIFEDIPINE 60 MG: 30 TABLET, FILM COATED, EXTENDED RELEASE ORAL at 08:49

## 2020-01-29 RX ADMIN — ASPIRIN 325 MG: 325 TABLET, FILM COATED ORAL at 08:49

## 2020-01-29 RX ADMIN — HEPARIN SODIUM 5000 UNITS: 5000 INJECTION INTRAVENOUS; SUBCUTANEOUS at 04:44

## 2020-01-29 RX ADMIN — IPRATROPIUM BROMIDE AND ALBUTEROL SULFATE 3 ML: .5; 3 SOLUTION RESPIRATORY (INHALATION) at 07:23

## 2020-01-29 RX ADMIN — METOPROLOL SUCCINATE 100 MG: 50 TABLET, EXTENDED RELEASE ORAL at 08:49

## 2020-01-29 RX ADMIN — LAMOTRIGINE 25 MG: 25 TABLET ORAL at 08:50

## 2020-01-29 RX ADMIN — Medication 1 CAPSULE: at 08:49

## 2020-01-29 RX ADMIN — Medication 10 ML: at 03:09

## 2020-01-29 RX ADMIN — LOSARTAN POTASSIUM 100 MG: 100 TABLET, FILM COATED ORAL at 08:49

## 2020-01-29 RX ADMIN — VANCOMYCIN HYDROCHLORIDE 750 MG: 750 INJECTION, POWDER, LYOPHILIZED, FOR SOLUTION INTRAVENOUS at 03:01

## 2020-01-29 RX ADMIN — BUDESONIDE 1000 MCG: 0.5 INHALANT RESPIRATORY (INHALATION) at 07:23

## 2020-01-29 NOTE — PROGRESS NOTES
ADULT PROTOCOL: JET AEROSOL  REASSESSMENT    Patient  Wilaim Jacobson     82 y.o.   female     1/28/2020  10:02 PM    Breath Sounds Pre Procedure: Right Breath Sounds: Diminished                               Left Breath Sounds: Diminished    Breath Sounds Post Procedure: Right Breath Sounds: Diminished                                 Left Breath Sounds: Diminished    Breathing pattern: Pre procedure Breathing Pattern: Regular          Post procedure Breathing Pattern: Regular    Heart Rate: Pre procedure Pulse: 81           Post procedure Pulse: 78    Resp Rate: Pre procedure Respirations: 18           Post procedure Respirations: 18      Cough: Pre procedure Cough: Non-productive               Post procedure Cough: Non-productive    Oxygen: O2 Device: Room air        Changed: NO    SpO2: Pre procedure SpO2: 96 %   without oxygen              Post procedure SpO2: 97 %  without oxygen    Nebulizer Therapy: Current medications Aerosolized Medications: DuoNeb, Pulmicort      Changed: NO    Smoking History: never    Problem List:   Patient Active Problem List   Diagnosis Code    Anemia NEC     Asthma J45.909    Hereditary spherocytosis (HCC) D58.0    HTN (hypertension) I10    Breast cancer, right breast ([address] C50.911    Anemia D64.9    BPV (benign positional vertigo) H81.10    DJD (degenerative joint disease) of knee M17.10    S/P colonoscopy Z98.890    Early satiety R68.81    Venous insufficiency of both lower extremities I87.2    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.21    Renal cyst N28.1    Intrahepatic bile duct dilation K83.8    Hearing deficit, left H91.92    Influenza J11.1    Chronic kidney disease N18.9    Pneumonia J18.9    Streptococcal bacteremia R78.81, B95.5       Respiratory Therapist: Radha Suarez, RT

## 2020-01-29 NOTE — DISCHARGE INSTRUCTIONS
General Discharge Instructions    Patient ID:  Robert Velasco  [id number]  82 y.o.  1935    Patient Instructions   - DC on  Vancomycin 1g IV q24  end date 2/5/20. Remove line at end of therapy     Intermediate sensitivity to PCN, Ceftriaxone  - H[address] 21 to adjust dose for Vanc trough of 15  -Weekly CBC, CMP , Vanc trough fax results to [phone], call with abnormal results at [phone], call with antibiotic related adverse events. Encourage probiotic, yogurt intake. - Out pt F/u with Dr Cecelia Villegas, ID F/u not  Required. Call if fever occurs   -Complete  Tamiflu regimen  - Pt advised to see Dental   - Plan of care D/w pt .                The following personal items were collected during your admission and were returned to you. Take Home Medications             What to do at Home    Recommended diet: Diabetic Diet    Recommended activity: Activity as tolerated    Follow-up with  in 3 days        Information obtained by :  I understand that if any problems occur once I am at home I am to contact my physician. I understand and acknowledge receipt of the instructions indicated above.                                                                                                                                            Physician's or R.N.'s Signature                                                                  Date/Time                                                                                                                                              Patient or Representative Signature                                                          Date/Time

## 2020-01-29 NOTE — ROUTINE PROCESS
The following appointments have been successfully scheduled:    Date/time Monday, February 03, 2020 01:15 PM  Patient  Eual Mantle 1935 (54TR F) #[id number]  Department [facility]-MAIN OFFICE-PCP  Appointment type Transitional Care  Provider [facility]

## 2020-01-29 NOTE — PROGRESS NOTES
Pharmacy Automatic Renal Dosing Protocol - Antimicrobials    Indication for Antimicrobials: Bacteremia    Current Regimen of Each Antimicrobial:  Vancomycin 750 mg IV every 18 hours (Start Date ; Day # 9)    Previous Antimicrobial Therapy:  Ceftriaxone 2 g IV every 24 hours (Start Date ; Day )  Oseltamivir 30 mg PO daily (Start Date ; Day )   Azithromycin 500mg PO every 24 hours (Start Date ; Day )     Goal Level: VANCOMYCIN TROUGH GOAL RANGE  Vancomycin Trough: 15 - 20 mcg/mL  (AUC: 400 - 600 mg/hr/Liter/day)     Date Dose & Interval Measured (mcg/mL) Extrapolated (mcg/mL)    500 mg Q16H 14.6 11.34    750 mg q16h 20.7 20.6    @ 0256 750 mg every 18 hours 18.7 18.5     Date & time of next level:  @ 0400    Significant Cultures:    Blood: Alpha strep (not strep pneumo) in 1/2 bottles- final (Intermediate to PCN, ceftriaxone, sensitive to vanc)   Respiratory: Heavy normal brandi, few yeast- final   Blood: NGTD x 4 days- pending    Radiology / Imaging results: (X-ray, CT scan or MRI):   : CT Chest: Minimal centrilobular nodular opacities are seen in the right middle lobe compatible with infection which may be typical or atypical. Bilateral atelectasis. Paralysis, amputations, malnutrition: none noted    Labs:  Recent Labs     20  0256 20  0119 20  0205   CREA 1.26* 1.31* 1.47*   BUN 19 24* 30*   WBC  --  15.4* 19.4*     Temp (24hrs), Av.8 °F (36.6 °C), Min:97.3 °F (36.3 °C), Max:98.3 °F (36.8 °C)    Creatinine Clearance (mL/min) or Dialysis: 27 mL/min     Impression/Plan:   Vancomycin trough resulted as therapeutic at 18.5 mcg/mL so will continue current regimen. Antimicrobial stop date pending     Pharmacy will follow daily and adjust medications as appropriate for renal function and/or serum levels.     Thank you,  Wandy Thakkar, PHARMD

## 2020-01-29 NOTE — PROGRESS NOTES
Problem: Risk for Spread of Infection  Goal: Prevent transmission of infectious organism to others  Description  Prevent the transmission of infectious organisms to other patients, staff members, and visitors. Outcome: Progressing Towards Goal     Problem: Patient Education:  Go to Education Activity  Goal: Patient/Family Education  Outcome: Progressing Towards Goal     Problem: Falls - Risk of  Goal: *Absence of Falls  Description  Document Gabriel Barnhart Fall Risk and appropriate interventions in the flowsheet. Outcome: Progressing Towards Goal  Note: Fall Risk Interventions:  Mobility Interventions: Patient to call before getting OOB         Medication Interventions: Patient to call before getting OOB, Teach patient to arise slowly    Elimination Interventions: Call light in reach, Patient to call for help with toileting needs    History of Falls Interventions: Bed/chair exit alarm         Problem: Pressure Injury - Risk of  Goal: *Prevention of pressure injury  Description  Document Pelon Scale and appropriate interventions in the flowsheet.   Outcome: Progressing Towards Goal  Note: Pressure Injury Interventions:  Sensory Interventions: Assess changes in LOC, Keep linens dry and wrinkle-free, Minimize linen layers, Maintain/enhance activity level    Moisture Interventions: Absorbent underpads, Maintain skin hydration (lotion/cream), Minimize layers    Activity Interventions: Increase time out of bed    Mobility Interventions: HOB 30 degrees or less    Nutrition Interventions: Document food/fluid/supplement intake    Friction and Shear Interventions: Minimize layers

## 2020-01-29 NOTE — PROGRESS NOTES
0700: Bedside shift change report given to ELLYN Mejias RN (oncoming nurse) by Dayana Martins RN (offgoing nurse). Report included the following information SBAR and Kardex. 1200: Rn at bedside for d/c instructions, no questions were asked.

## 2020-01-29 NOTE — PROGRESS NOTES
Transitional Care Team: [facility] Discharge Note    Date of Assessment: 01/29/20  Time of Assessment:  11:04 AM      aBsilio Jennings is a 82 y.o. female inpatient at [facility] with pneumonia on  1/20. Assessment & Plan   Pt A+O sitting in bedside chair on room air. Denies dyspnea, but still with occasional harsh cough. Pt discharging to home , has accepted [facility] services including IV antibiotics. Current Code Status:  full    Medication Reconciliation: await AVS     Can patient afford medications:  yes    Who manages medications at home self  Emergency Contact  Connor Reyes - [phone]    Chart reviewed by me and HUG (Healthy Understanding of Goals) program introduced to patient/family. The Transitional Care Team bridges the gaps in care and education surrounding discharge from the acute care facility. The objective is to empower the patient and family in taking a proactive role in the task of preventing readmission within the first thirty days after discharge from the acute care setting, The team is also involved in the efforts to reduce readmission to the acute care setting after stabilization and discharge from the acute care environment either to skilled nursing facilities or community. Discharge With The Following Arrangements in place:    Future Appointments   Date Time Provider Elizabeth Tran   2/3/2020  1:15 PM Evelio Arnold MD [address] [place] [address]   4/16/2020  9:00 AM Collins Townsend  [facility] call information given to pt    Patient / Family was instructed on specific signs/symptoms to look for with regards to worsening of their medical conditions. Learning was assessed using teach back. The patient / family have added upcoming appointment dates to their [facility] Calendar prior to discharge. Patient education focused on readmission zones as described as:     The Red Zone: High risk for readmission, days 1-21   The Yellow Zone: Moderate  risk for readmission, days 22-29   The Green Zone: Lower risk for readmission, days 30 and after    The [facility] Team will follow patient from a distance while inpatient as well as be available for further transition disposition as needed. The MIN TEAM will continue to offer support during the 30- 90 day discharge from acute care setting. Notified Ambulatory {Blank Single Select Template:[id number][de-identified] ,MIN RN.       Signed By: Sarah Beth De Jesus RN     January 29, 2020

## 2020-01-29 NOTE — PROGRESS NOTES
1900:Bedside shift change report given to Elmer Patrick (oncoming nurse) by Marea Halsted (offgoing nurse). Report included the following information SBAR and Kardex. 0700:  Bedside shift change report GIVEN TO Marea Halsted , RN. Report included the following information SBAR and Kardex. SIGNIFICANT CHANGES DURING SHIFT:        CONCERNS TO ADDRESS WITH MD:            [facility] NURSING NOTE   Admission Date 1/20/2020   Admission Diagnosis Influenza [J11.1]   Consults IP CONSULT TO HOSPITALIST  IP CONSULT TO CARDIOLOGY  IP CONSULT TO INFECTIOUS DISEASES      Cardiac Monitoring [x] Yes [] No      Purposeful Hourly Rounding [x] Yes    Danitza Score Total Score: 2   Danitza score 3 or > [] Bed Alarm [] Avasys [] 1:1 sitter [] Patient refused (Signed refusal form in chart)   Pelon Score Pelon Score: 19   Pelon score 14 or < [] PMT consult [] Wound Care consult    []  Specialty bed  [] Nutrition consult      Influenza Vaccine Received Flu Vaccine for Current Season (usually Sept-March): Yes           Oxygen needs? [x] Room air Oxygen @  []1L    []2L    []3L   []4L    []5L   []6L via  NC   Chronic home O2 use?  [] Yes [] No  Perform O2 challenge test and document in progress note using smartphrase (.Homeoxygen)      Last bowel movement Last Bowel Movement Date: 01/23/20      Urinary Catheter             LDAs         PICC Single Lumen 57/49/31 Left;Basilic (Active)   Central Line Being Utilized No 1/28/2020  7:19 PM   Criteria for Appropriate Use Long term IV/antibiotic administration 1/28/2020  7:19 PM   Site Assessment Clean, dry, & intact 1/28/2020  7:19 PM   Phlebitis Assessment 0 1/28/2020  7:19 PM   Infiltration Assessment 0 1/28/2020  7:19 PM   Arm Circumference (cm) 32 cm 1/28/2020  4:56 PM   Date of Last Dressing Change 01/28/20 1/28/2020  7:19 PM   Dressing Status Clean, dry, & intact 1/28/2020  7:19 PM   External Catheter Length (cm) 0 centimeters 1/28/2020  4:56 PM   Dressing Type Disk with Chlorhexadine gluconate (CHG);Tape;Transparent 1/28/2020  7:19 PM   Hub Color/Line Status Purple;Flushed;Capped 1/28/2020  7:19 PM   Positive Blood Return (Site #1) Yes 1/28/2020  7:19 PM   Alcohol Cap Used Yes 1/28/2020  7:19 PM          Peripheral IV 01/24/20 Anterior; Left Hand (Active)   Site Assessment Clean, dry, & intact 1/28/2020  7:19 PM   Phlebitis Assessment 0 1/28/2020  7:19 PM   Infiltration Assessment 0 1/28/2020  7:19 PM   Dressing Status Clean, dry, & intact 1/28/2020  7:19 PM   Dressing Type Tape;Transparent 1/28/2020  7:19 PM   Hub Color/Line Status Blue;Flushed;Capped 1/28/2020  7:19 PM                         Readmission Risk Assessment Tool Score High Risk            39       Total Score        3 Has Seen PCP in Last 6 Months (Yes=3, No=0)    3 Patient Length of Stay (>5 days = 3)    5 Pt. Coverage (Medicare=5 , Medicaid, or Self-Pay=4)    28 Charlson Comorbidity Score (Age + Comorbid Conditions)        Criteria that do not apply:    . Living with Significant Other. Assisted Living. LTAC. SNF.  or   Rehab    IP Visits Last 12 Months (1-3=4, 4=9, >4=11)       Expected Length of Stay 3d 7h   Actual Length of Stay 8

## 2020-01-29 NOTE — PROGRESS NOTES
MIN: Home with [address] Infusion and Follow-up Appointments. 10:45am-No further CM needs identified. CM notified pt's nurse of d/c.    10:15am- CM called Gautam Rodriguez with [facility] via phone. Gautam Rodriguez stated that she will call pt to arrange teaching for home IV antibiotics. 10:00am- CM met with pt at bedside to discuss d/c plan. CM provided pt with nebulizer provided by [facility]. Pt signed documentation for nebulizer. Pt stated that her son will transport at d/c. Medicare pt has received, reviewed, and signed 2nd IM letter informing them of their right to appeal the discharge. Signed copy has been placed on pt bedside chart. AVS updated. 9:30am- Gautam Rodriguez from [employer] [facility] called CM via phone. Gautam Rodriguez stated that she will be on her way to come do the teaching for the IV antibiotics. 9:06am- CM called Gautam Rodriguez from [employer] ([phone]) via phone. No answer. CM left a voicemail message. 8:45am- CM sent referral to [facility] for nebulizer. Care Management Interventions  PCP Verified by CM: Yes  Palliative Care Criteria Met (RRAT>21 & CHF Dx)?: No  Palliative Consult Recommended?: No  Mode of Transport at Discharge:  Other (see comment)(Pt's son will transport at d/c. )  Transition of Care Consult (CM Consult): Home Health([facility])  [address]: Yes  Discharge Durable Medical Equipment: Yes(Nebulizer)  Physical Therapy Consult: No  Occupational Therapy Consult: No  Speech Therapy Consult: No  Current Support Network: Own Home, Lives Alone  Confirm Follow Up Transport: Self  The Patient and/or Patient Representative was Provided with a Choice of Provider and Agrees with the Discharge Plan?: Yes  Name of the Patient Representative Who was Provided with a Choice of Provider and Agrees with the Discharge Plan: Self  [facility] of [facility] List was Provided with Basic Dialogue that Supports the Patient's Individualized Plan of Care/Goals, Treatment Preferences and Shares the Quality Data Associated with the Providers?: Yes   Resource Information Provided?: No  Discharge Location  Discharge Placement: Home with home health(Home with [facility] and IV infusion. )    Aubry Cockayne, [place] [address]  [phone]

## 2020-01-30 ENCOUNTER — PATIENT OUTREACH (OUTPATIENT)
Dept: FAMILY MEDICINE CLINIC | Age: 85
End: 2020-01-30

## 2020-01-30 ENCOUNTER — HOME CARE VISIT (OUTPATIENT)
Dept: SCHEDULING | Facility: HOME HEALTH | Age: 85
End: 2020-01-30
Payer: MEDICARE

## 2020-01-30 VITALS
HEART RATE: 81 BPM | TEMPERATURE: 98.4 F | OXYGEN SATURATION: 96 % | DIASTOLIC BLOOD PRESSURE: 60 MMHG | RESPIRATION RATE: 20 BRPM | SYSTOLIC BLOOD PRESSURE: 136 MMHG

## 2020-01-30 PROCEDURE — G0299 HHS/HOSPICE OF RN EA 15 MIN: HCPCS

## 2020-01-30 PROCEDURE — 3331090002 HH PPS REVENUE DEBIT

## 2020-01-30 PROCEDURE — 3331090001 HH PPS REVENUE CREDIT

## 2020-01-30 PROCEDURE — 400013 HH SOC

## 2020-01-30 NOTE — DISCHARGE SUMMARY
[address] SUMMARY    Name:  Yanick Erickson  MR#:  [id number]  :  1935  ACCOUNT #:  [de-identified]  ADMIT DATE:  2020  DISCHARGE DATE:  2020      HISTORY OF PRESENT ILLNESS:  The patient is an 51-year-old lady who was presented to the emergency room complaining of shortness of breath and a persistent hacking cough. This was accompanied by mild shortness of breath. She was evaluated and treated aggressively in the emergency room, but was subsequently admitted because of recalcitrant bronchospasm and the fact that she was influenza A positive. Interestingly, she did indeed receive a flu vaccine in the fall of this year. Past medical history, social history, review of systems, family history, physical examination is as in the admitting H and P.    LABORATORY VALUES:  Initial hemoglobin was 12.6 with white count 21.1, platelet count 010,716 with the following differential; 78 segs, 12 lymphocytes, 7 monocytes, 1 eosinophils, 1 basophil, and 1 immature granulocytes. White count reached a high of 24.8 on 2020 and on 2020, 15.4 with a concomitant hemoglobin of 10.8. Abnormalities on the comprehensive profile on admission, the BUN and creatinine 29 and 1.80, AST of 55 and her troponin level that was slightly elevated at 0.34, repeat 0.26 and thereafter 0.31. At the time of discharge, BUN and creatinine were 19 and 1.26. On 2020, blood cultures were done and one of four bottles was positive for alpha strep. RADIOGRAPHS:  Her initial chest x-ray was negative. On 2020, chest x-ray was negative. CT scan done of the chest revealed minimal central lobular nodular densities in the right middle lobe compatible with a possible infection. There was bilateral atelectasis. Renal ultrasound reveals a right renal cyst only. HOSPITAL COURSE:  The patient was admitted and placed on parenteral antibiotics along with bronchodilators.   Her wheezing was moderate to severe. Systemic steroids were added. With this, her wheezing gradually improved to the point where over the last several days prior to discharge, she had improved significantly. Systemic steroids were discontinued and she was maintained on aerosolized preparations. The infective component above and beyond possible pneumonitis based on the CT scan was her influenza A. This was however quite attenuated presumably related to the previous immunization that she received. Renal status was stable. Interestingly, her white count was quite high when she was admitted and it gradually decreased although never completely normalizing. One of the critical features was the development of 1 positive blood culture of 4. This with alpha strep. Because of this, consultation with Infectious Disease was obtained with specifically, Dr. Cameron Lockett. A workup was done with no suggestive evidence of seeding and she was felt that although this probably was a contaminant, she was relegated to systemic therapy, specifically parenteral therapy for at least 2 weeks after the last negative blood culture was obtained, which was on 01/23/2020. She will therefore obtain vancomycin 1 g IV q. 24 hours until 02/05/2020. Upon discharge, the patient is back to baseline. FINAL DIAGNOSES:  1. Probable pneumonia. 2.  Asthmatic flare. 3.  Influenza A.  4.  Positive blood culture with alpha strep. 5.  Diabetes mellitus. 6.  Primary hypertension. 7.  Mild renal insufficiency. DISPOSITION:  1. The patient will be discharged home ambulatory on an ADA diet. 2.  The patient remains a full code. 3.  She will be given vancomycin 1 g IV q. 24 hours until 02/05/2020. Peak and trough vanc levels as well as the CBC and CMP would be obtained periodically.   4.  Discharge medications include the following:  Albuterol MDI 2 puffs q. 4 hours p.r.n., Advair 50/250 mcg 1 puff b.i.d., hydrochlorothiazide 12.5 mg daily, Boniva 150 mg monthly, Micardis 80 mg daily, Kcl 20 mEq daily, fluticasone nasal spray 2 sprays in each nostril daily, Lamictal 25 mg daily, nifedipine extended release 60 mg daily, oxybutynin 10 mg daily, pravastatin 20 mg at bedtime, Toprol XL 50 mg daily, vancomycin 1 g IV for the next 8 days, Joanna-Q daily for the next 2 weeks, budesonide inhalation 500 mcg q. 12 hours, Brovana q. 12 hours, and albuterol q. 4 hours via nebulizer p.r.n.  5.  The patient will return to see her primary care physician in the next 4 days. ADDENDUM:  The patient will have weekly CBC, CMP, and a vancomycin peak and trough level.       MD LINDA Call/[email or web address]  D:  01/29/2020 9:33  T:  01/30/2020 5:24  JOB #:  [id number]

## 2020-01-30 NOTE — PROGRESS NOTES
Hospital Discharge Follow-Up      Date/Time:  2020 12:43 PM  Kishor Page4 Ambulatory Care Coordination Team  Phone [phone]    Patient was admitted to [facility] on  and discharged on  for PNA/flu/Hypoxia/asthma. The physician discharge summary was available at the time of outreach. Patient was contacted within 1 business days of discharge. Top Challenges reviewed with the provider   [facility] - PNA,FLU A, streptoccus bacteremia. ID following-discharged home on IV Vancomycin. Needs labs weekly: cbc,cmp and vancomycin peak and trough  Advance Care Planning:   Does patient have an Advance Directive:  patient declined education        Method of communication with provider :phone    Was this a readmission? no   Patient stated reason for the readmission:     Care Transition Nurse (CTN) contacted the patient by telephone to perform post hospital discharge assessment. Verified name and  with patient as identifiers. Provided introduction to self, and explanation of the CTN role. States she is doing fine, Her IV was supposed to start at 9am today, but [facility] nurse not there to start. CTN contacted [facility] and LM for them to have [facility] nurse see her as soon as possible. 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 Lives alone but son and grandson check on her frequently. Denies any sob today, much less coughing noted. Eating and drinking fluids. Patient received hospital discharge instructions. CTN reviewed discharge instructions and red flags with patient who verbalized understanding. Patient given an opportunity to ask questions and does not have any further questions or concerns at this time. The patient agrees to contact the PCP office for questions related to their healthcare. CTN provided contact information for future reference.     Disease Specific:   PNA    Patients top risk factors for readmission: Home Health orders at discharge: SN/PT  [address]: [facility]  Date of initial visit: 1/30/20    Durable Medical Equipment ordered at discharge: IV antibiotic and supplies  [address]: [facility] for Maddison Moctezuma [id number] received: 1/29/20    Medication(s):   New Medications at Discharge: Brovana neb solution, Pulmicort, Joanna Q, Vancomycin IV q 24 hours. Changed Medications at Discharge: Proventil HFA, Proventil Ventolin solution for neb, and ASA 81mg  Discontinued Medications at Discharge: none    Medication reconciliation was performed with patient, who verbalizes understanding of administration of home medications. There were no barriers to obtaining medications identified at this time. Pharmacy did not have the Pulmicort in stock, expected today. Referral to Pharm D needed: no     Current Outpatient Medications   Medication Sig    L. acidoph & paracasei- S therm- Bifido (JOANNA-Q/RISAQUAD) 8 billion cell cap cap Take 1 Cap by mouth daily.  vancomycin (VANCOCIN) 10 gram solr 1,000 mg by IntraVENous route daily for 8 doses.  arformoteroL (BROVANA) 15 mcg/2 mL nebu neb solution 2 mL by Nebulization route every twelve (12) hours.  budesonide (PULMICORT) 0.5 mg/2 mL nbsp 2 mL by Nebulization route two (2) times a day.  albuterol (PROVENTIL VENTOLIN) 2.5 mg /3 mL (0.083 %) nebu 3 mL by Nebulization route every four (4) hours as needed for Wheezing.     potassium chloride (KLOR-CON M20) 20 mEq tablet TAKE 1 TABLET DAILY    ibandronate (BONIVA) 150 mg tablet TAKE 1 TABLET ONCE A MONTH    TOPROL XL 50 mg XL tablet TAKE 1 TABLET DAILY    NIFEdipine ER (PROCARDIA XL) 60 mg ER tablet TAKE 1 TABLET TWICE A DAY    pravastatin (PRAVACHOL) 20 mg tablet TAKE 1 TABLET ONCE EACH NIGHT    oxybutynin chloride XL (DITROPAN XL) 10 mg CR tablet TAKE 1 TABLET DAILY    lamoTRIgine (LAMICTAL) 25 mg tablet TAKE 1 TABLET TWICE A DAY    MICARDIS 80 mg tablet TAKE 1 TABLET DAILY    clotrimazole-betamethasone (LOTRISONE) topical cream Apply  to affected area two (2) times a day.  hydroCHLOROthiazide (HYDRODIURIL) 12.5 mg tablet Take 12.5 mg by mouth every Monday, Wednesday, Friday.  guaiFENesin (MUCUS RELIEF) 400 mg tablet Take 200 mg by mouth every four (4) hours as needed for Congestion.  fluticasone (FLONASE) 50 mcg/actuation nasal spray 2 Sprays by Both Nostrils route two (2) times a day.  calcium carbonate (TUMS PO) Take 500 mg by mouth as needed.  fluticasone-salmeterol (ADVAIR DISKUS) 250-50 mcg/dose diskus inhaler USE 1 INHALATION TWO TIMES A DAY    albuterol (PROVENTIL HFA, VENTOLIN HFA, PROAIR HFA) 90 mcg/actuation inhaler Take 1 Puff by inhalation every four (4) hours as needed for Wheezing.  triamcinolone acetonide (KENALOG) 0.1 % topical cream APPLY EXTERNALLY TO THE AFFECTED AREA TWICE DAILY    aspirin delayed-release 81 mg tablet Take 81 mg by mouth daily. No current facility-administered medications for this visit. There are no discontinued medications. [facility] follow up appointment(s):   Future Appointments   Date Time Provider Elizabeth Chelsea   2/3/2020  1:15 PM Chery Ljoa MD [facility]   4/16/2020  9:00 AM Megan Townsend MD [facility]      Non-[facility] follow up appointment(s):   [facility]:  information provided as a resource       Goals      Prevent complications post hospitalization. 1/30/20 [facility] 1/20-1/29 PNA/Flu A/Hypoxia  · Reviewed discharge instructions with patient  · Reviewed meds with patient- education using teachback on new meds  · Reviewed red flags: fever,nausea,vomiting,sob,chest pain. · MIN with pcp, 2/3 at 1:15pm   · Given info on [facility] as a resource. · Given CTN contact info if any questions/concerns.   · CTN to check back in 7-10 days for update,sooner [email or web address]

## 2020-01-31 ENCOUNTER — HOME CARE VISIT (OUTPATIENT)
Dept: SCHEDULING | Facility: HOME HEALTH | Age: 85
End: 2020-01-31
Payer: MEDICARE

## 2020-01-31 PROCEDURE — 3331090001 HH PPS REVENUE CREDIT

## 2020-01-31 PROCEDURE — G0299 HHS/HOSPICE OF RN EA 15 MIN: HCPCS

## 2020-01-31 PROCEDURE — 3331090002 HH PPS REVENUE DEBIT

## 2020-01-31 RX ADMIN — VANCOMYCIN HYDROCHLORIDE 1000 MG: 10 INJECTION, POWDER, LYOPHILIZED, FOR SOLUTION INTRAVENOUS at 09:37

## 2020-02-01 VITALS
DIASTOLIC BLOOD PRESSURE: 58 MMHG | TEMPERATURE: 98.2 F | HEART RATE: 76 BPM | OXYGEN SATURATION: 95 % | RESPIRATION RATE: 20 BRPM | SYSTOLIC BLOOD PRESSURE: 130 MMHG

## 2020-02-01 PROCEDURE — 3331090001 HH PPS REVENUE CREDIT

## 2020-02-01 PROCEDURE — 3331090002 HH PPS REVENUE DEBIT

## 2020-02-02 PROCEDURE — 3331090002 HH PPS REVENUE DEBIT

## 2020-02-02 PROCEDURE — 3331090001 HH PPS REVENUE CREDIT

## 2020-02-03 ENCOUNTER — OFFICE VISIT (OUTPATIENT)
Dept: INTERNAL MEDICINE CLINIC | Age: 85
End: 2020-02-03

## 2020-02-03 ENCOUNTER — HOME CARE VISIT (OUTPATIENT)
Dept: SCHEDULING | Facility: HOME HEALTH | Age: 85
End: 2020-02-03
Payer: MEDICARE

## 2020-02-03 VITALS
HEART RATE: 70 BPM | DIASTOLIC BLOOD PRESSURE: 62 MMHG | BODY MASS INDEX: 29.45 KG/M2 | WEIGHT: 161 LBS | OXYGEN SATURATION: 96 % | TEMPERATURE: 98.6 F | SYSTOLIC BLOOD PRESSURE: 140 MMHG | RESPIRATION RATE: 18 BRPM

## 2020-02-03 VITALS
SYSTOLIC BLOOD PRESSURE: 144 MMHG | RESPIRATION RATE: 18 BRPM | TEMPERATURE: 98.3 F | DIASTOLIC BLOOD PRESSURE: 79 MMHG | WEIGHT: 163.8 LBS | BODY MASS INDEX: 30.14 KG/M2 | HEIGHT: 62 IN | HEART RATE: 74 BPM | OXYGEN SATURATION: 96 %

## 2020-02-03 DIAGNOSIS — J45.909 UNCOMPLICATED ASTHMA, UNSPECIFIED ASTHMA SEVERITY, UNSPECIFIED WHETHER PERSISTENT: ICD-10-CM

## 2020-02-03 DIAGNOSIS — D58.0 HEREDITARY SPHEROCYTOSIS (HCC): ICD-10-CM

## 2020-02-03 DIAGNOSIS — I10 ESSENTIAL HYPERTENSION: Primary | ICD-10-CM

## 2020-02-03 DIAGNOSIS — M17.0 PRIMARY OSTEOARTHRITIS OF BOTH KNEES: ICD-10-CM

## 2020-02-03 DIAGNOSIS — H91.92 HEARING DEFICIT, LEFT: ICD-10-CM

## 2020-02-03 DIAGNOSIS — C50.911 MALIGNANT NEOPLASM OF RIGHT FEMALE BREAST, UNSPECIFIED ESTROGEN RECEPTOR STATUS, UNSPECIFIED SITE OF BREAST (HCC): ICD-10-CM

## 2020-02-03 DIAGNOSIS — E11.21 TYPE 2 DIABETES MELLITUS WITH NEPHROPATHY (HCC): ICD-10-CM

## 2020-02-03 DIAGNOSIS — D64.9 ANEMIA, UNSPECIFIED TYPE: ICD-10-CM

## 2020-02-03 DIAGNOSIS — N18.30 STAGE 3 CHRONIC KIDNEY DISEASE (HCC): ICD-10-CM

## 2020-02-03 PROCEDURE — G0299 HHS/HOSPICE OF RN EA 15 MIN: HCPCS

## 2020-02-03 PROCEDURE — 3331090001 HH PPS REVENUE CREDIT

## 2020-02-03 PROCEDURE — 3331090002 HH PPS REVENUE DEBIT

## 2020-02-03 RX ORDER — POLYETHYLENE GLYCOL 3350 17 G/17G
17 POWDER, FOR SOLUTION ORAL 2 TIMES DAILY
Qty: 60 PACKET | Refills: 11 | Status: SHIPPED | OUTPATIENT
Start: 2020-02-03 | End: 2021-02-17

## 2020-02-03 NOTE — PROGRESS NOTES
SPORTS MEDICINE AND PRIMARY CARE  German Arriaga MD, [address] [place] [address],[address] [address]  Phone:  [phone]  Fax: [phone]       Chief Complaint   Patient presents with   [facility] Of [facility]   . SUBJECTIVE:    Adriana Baig is a 82 y.o. female (poor audio quality)    Patient returns today for a transition of care services following admission to [facility] on 01/20 and discharged on 01/29 by Colby Trevizo. Her diagnosis was probable pneumonia, asthmatic flare, influenza A, positive blood culture with alpha strep, diabetes, hypertension, mild renal failure. She developed one positive _______________ cultures for alpha strep. She was seen in consultation by Fernando Krishnan MD, infectious disease, with a workup that revealed no evidence of seeding. She felt well and was felt probably to be a contaminant. She was _____________ for at least two weeks after her last negative culture was obtained. The last negative culture was on 01/23/20, so therefore began Vancomycin 1 gram q.24 hours until 02/05/20. She has a PICC line in and has two more days of IV antibiotics. At that time will ask that her nurse remove the PICC line or she will have to go to the ER to have the PICC line removed. Patient herself denies new complaints and is seen for evaluation. The nurse navigator performed outreach to the patient on 01/30/20 to complete medication reconciliation and telephonic assessment of her condition. Current Outpatient Medications   Medication Sig Dispense Refill    sodium chloride (MONOJECT PREFILL SALINE) 5-10 mL by IntraVENous route as needed for Pain (IV aministration ).  heparin, porcine, in ns 10 unit/mL kit 3 mL by IntraVENous route as needed for Other (IV abx administration).  L. acidoph & paracasei- S therm- Bifido (VIKA-Q/RISAQUAD) 8 billion cell cap cap Take 1 Cap by mouth daily.  20 Cap 0    vancomycin (VANCOCIN) 10 gram solr 1,000 mg by IntraVENous route daily for 8 doses. 1 Vial 0    arformoteroL (BROVANA) 15 mcg/2 mL nebu neb solution 2 mL by Nebulization route every twelve (12) hours. 60 Vial 11    budesonide (PULMICORT) 0.5 mg/2 mL nbsp 2 mL by Nebulization route two (2) times a day. 60 Each 11    albuterol (PROVENTIL VENTOLIN) 2.5 mg /3 mL (0.083 %) nebu 3 mL by Nebulization route every four (4) hours as needed for Wheezing. 100 Nebule 11    potassium chloride (KLOR-CON M20) 20 mEq tablet TAKE 1 TABLET DAILY 90 Tab 3    ibandronate (BONIVA) 150 mg tablet TAKE 1 TABLET ONCE A MONTH 3 Tab 4    TOPROL XL 50 mg XL tablet TAKE 1 TABLET DAILY 90 Tab 3    NIFEdipine ER (PROCARDIA XL) 60 mg ER tablet TAKE 1 TABLET TWICE A  Tab 3    pravastatin (PRAVACHOL) 20 mg tablet TAKE 1 TABLET ONCE EACH NIGHT 90 Tab 9    oxybutynin chloride XL (DITROPAN XL) 10 mg CR tablet TAKE 1 TABLET DAILY 90 Tab 3    lamoTRIgine (LAMICTAL) 25 mg tablet TAKE 1 TABLET TWICE A  Tab 3    MICARDIS 80 mg tablet TAKE 1 TABLET DAILY 90 Tab 49    clotrimazole-betamethasone (LOTRISONE) topical cream Apply  to affected area two (2) times a day. 45 g 3    hydroCHLOROthiazide (HYDRODIURIL) 12.5 mg tablet Take 12.5 mg by mouth every Monday, Wednesday, Friday.  guaiFENesin (MUCUS RELIEF) 400 mg tablet Take 200 mg by mouth every four (4) hours as needed for Congestion.  fluticasone (FLONASE) 50 mcg/actuation nasal spray 2 Sprays by Both Nostrils route two (2) times a day.  calcium carbonate (TUMS PO) Take 500 mg by mouth as needed.  fluticasone-salmeterol (ADVAIR DISKUS) 250-50 mcg/dose diskus inhaler USE 1 INHALATION TWO TIMES A DAY 3 Inhaler 3    albuterol (PROVENTIL HFA, VENTOLIN HFA, PROAIR HFA) 90 mcg/actuation inhaler Take 1 Puff by inhalation every four (4) hours as needed for Wheezing.  3 Inhaler 3    triamcinolone acetonide (KENALOG) 0.1 % topical cream APPLY EXTERNALLY TO THE AFFECTED AREA TWICE DAILY 80 g 0    aspirin delayed-release 81 mg tablet Take 81 mg by mouth daily. Past Medical History:   Diagnosis Date    Anemia     Arrhythmia     irregular per pt d/t blood disorder    Asthma     Bereavement 2-2-16     die 82 copd,chf,pul htn pn after 48 yr marriage    BPV (benign positional vertigo)     Breast cancer, right breast ([address] 1994    right breast, lumpectomy and radiation    Breast lump 2017    right breast     Chronic kidney disease 02/06/2019    kidney cyst - watching    Diabetes ([address]     controlled with diet    DJD (degenerative joint disease) of knee     Early satiety     Hearing deficit, left     Hereditary spherocytosis (HCC)     HTN (hypertension)     Ill-defined condition     sickle cell trait    Intrahepatic bile duct dilation 09/05/2018    mri    Radiation therapy complication 3215    right breast     Renal cyst 08/2016    ct rt - MRI 9/5/18 a hemorrhagic cyst in the right lower pole a layering hemorrhagic component. There are no solid renal masses     S/P colonoscopy 4-9-14    md Brian - family hx    S/P colonoscopy 07/20/2016    rt - md brian diverticulosis    Seizures ([address]     Septic arthritis ([address] 3/11    Streptococcal sepsis ([address] 03/2011    Venous insufficiency of both lower extremities      Past Surgical History:   Procedure Laterality Date    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 Weak.  Other Plant, Animal, Environmental Other (comments)     Dust and mold allergy.          REVIEW OF SYSTEMS:  General: negative for - chills or fever  ENT: negative for - headaches, nasal congestion or tinnitus  Respiratory: negative for - cough, hemoptysis, shortness of breath or wheezing  Cardiovascular : negative for - chest pain, edema, palpitations or shortness of breath  Gastrointestinal: negative for - abdominal pain, blood in stools, heartburn or nausea/vomiting  Genito-Urinary: no dysuria, trouble voiding, or hematuria  Musculoskeletal: negative for - gait disturbance, joint pain, joint stiffness or joint swelling  Neurological: no TIA or stroke symptoms  Hematologic: no bruises, no bleeding, no swollen glands  Integument: no lumps, mole changes, nail changes or rash  Endocrine: no malaise/lethargy or unexpected weight changes      Social History     Socioeconomic History    Marital status:      Spouse name: Not on file    Number of children: Not on file    Years of education: Not on file    Highest education level: Not on file   Tobacco Use    Smoking status: Never Smoker    Smokeless tobacco: Never Used   Substance and Sexual Activity    Alcohol use: No    Drug use: No    Sexual activity: Not Currently   Social History Narrative    Medical History: Streptococcus pneumoniae septicemia 11Septic polyarthritis 11HSV2 herpes simplex    muchokrymiuryrjmfhv46/02/11Seizure d/o 11Osteoporosis 2011primary HTN 1990Bronchial asthma 1985Obesity 2002Carcinoma of    breast 04Congential spherocytosis    Gyn History: Last mammogram date 2013. OB History: Total pregnancies 2. Live births 2. Surgical History: normal stress myocardial perfusion scan, EF 68% 08splenectomy 1972cholecystectomy 1985colonoscopy -    internal hemorrhoids diverticulosis lumpectomy radiation therapy 2004colonoscopy diverticulosis Maci Betancur M.D. 2014    Hospitalization/Major Diagnostic Procedure: streptococcus pneumoniae septicemia ronchial asthma     Family History: Mother:  80 yrs, DM, Kar Odonnell:  80 yrs, heart disease, aortic stenosisSister(s): , colon CA,    polypsBrother(s): , at birthUncle: alive, colon CA, heart disease2 sister(s) [employer] . 1 son(s) , 1 daughter(s) . Social History: Alcohol Use Patient does not use alcohol. Smoking Status Patient is a never smoker. Marital Status:  2/2016, 46 yrs copd,chf. Children: Her son grandchild's mother is temporarily incarcerated and she is caring for the 10 yo. Occupation/W ork: retired teacher. Education/School: has college diploma-[employer]. [facility].     Family History   Problem Relation Age of Onset    Diabetes Mother     Other Father         'sphero'-blood disorder    Cancer Sister         breast    Breast Cancer Sister 71    Other Sister         spleen removed    Colon Cancer Sister     Other Other         spleen removed    Gall Bladder Disease Other        OBJECTIVE:    Visit Vitals  /79   Pulse 74   Temp 98.3 °F (36.8 °C) (Oral)   Resp 18   Ht 5' 2\" (1.575 m)   Wt 163 lb 12.8 oz (74.3 kg)   SpO2 96%   BMI 29.96 kg/m²     CONSTITUTIONAL: well , well nourished, appears age appropriate  EYES: perrla, eom intact  ENMT:moist mucous membranes, pharynx clear  NECK: supple. Thyroid normal  RESPIRATORY: Chest: clear bilaterally   CARDIOVASCULAR: Heart: regular rate and rhythm  GASTROINTESTINAL: Abdomen: soft, bowel sounds active  HEMATOLOGIC: no pathological lymph nodes palpated  MUSCULOSKELETAL: Extremities: no edema, pulse 1+   INTEGUMENT: No unusual rashes or suspicious skin lesions noted. Nails appear normal.  NEUROLOGIC: non-focal exam   MENTAL STATUS: alert and oriented, appropriate affect           ASSESSMENT:  1. Essential hypertension    2. Hereditary spherocytosis (HCC)    3. Hearing deficit, left    4. Primary osteoarthritis of both knees    5. Type 2 diabetes mellitus with nephropathy (HCC)    6. Stage 3 chronic kidney disease ([address]    7. Malignant neoplasm of right female breast, unspecified estrogen receptor status, unspecified site of breast ([address]    8. Uncomplicated asthma, unspecified asthma severity, unspecified whether persistent    9.  Anemia, unspecified type      Patient is making progress post hospital stay. Blood pressure is up slightly. I suspect it will drop down to normal when she finishes the antibiotic course. She has a left hearing deficit, which has changed little. Blood sugar control is adequate. Will be checked on the next visit, as well as her CKD. They are drawing blood when they come out to check on her Vancomycin trough and peak. Bronchial asthma is quiescent. She will complete her course on the 5th. We strongly encourage her to get her teeth care completed. She will be back to see us in three to four months, sooner if needed. I have discussed the diagnosis with the patient and the intended plan as seen in the  orders above. The patient understands and agees with the plan. The patient has   received an after visit summary and questions were answered concerning  future plans  Patient labs and/or xrays were reviewed  Past records were reviewed. PLAN:  . No orders of the defined types were placed in this encounter. Follow-up and Dispositions    · Return in about 4 months (around 6/3/2020). ATTENTION:   This medical record was transcribed using an electronic medical records system. Although proofread, it may and can contain electronic and spelling errors. Other human spelling and other errors may be present. Corrections may be executed at a later time. Please feel free to contact us for any clarifications as needed.

## 2020-02-03 NOTE — PROGRESS NOTES
1. Have you been to the ER, urgent care clinic since your last visit? Hospitalized since your last visit? Yes When: 1- Where: [facility] Reason for visit: flu    2. Have you seen or consulted any other health care providers outside of the [address] [place] [address] since your last visit? Include any pap smears or colon screening.  No      MIN  constipation

## 2020-02-03 NOTE — ASSESSMENT & PLAN NOTE
This condition is managed by Specialist.  Key Oncology Meds     Patient is on no Oncologic meds. Key Pain Meds     The patient is on no pain meds. Lab Results   Component Value Date/Time    WBC 15.4 01/28/2020 01:19 AM    ABS.  NEUTROPHILS 9.7 01/28/2020 01:19 AM    HGB 10.8 01/28/2020 01:19 AM    HCT 33.6 01/28/2020 01:19 AM    PLATELET 181 11/96/6342 01:19 AM    Creatinine 1.26 01/29/2020 02:56 AM    Creatinine (POC) 1.7 08/21/2018 11:58 AM    BUN 19 01/29/2020 02:56 AM    ALT (SGPT) 33 01/20/2020 07:42 AM    AST (SGOT) 55 01/20/2020 07:42 AM    Albumin 3.5 01/20/2020 07:42 AM

## 2020-02-03 NOTE — ASSESSMENT & PLAN NOTE
Stable, based on history, physical exam and review of pertinent labs, studies and medications; meds reconciled; continue current treatment plan.   Lab Results   Component Value Date/Time    WBC 15.4 01/28/2020 01:19 AM    HGB 10.8 01/28/2020 01:19 AM    HCT 33.6 01/28/2020 01:19 AM    PLATELET 315 78/39/7106 01:19 AM    Creatinine 1.26 01/29/2020 02:56 AM    Creatinine (POC) 1.7 08/21/2018 11:58 AM    BUN 19 01/29/2020 02:56 AM    Potassium 4.3 01/29/2020 02:56 AM

## 2020-02-03 NOTE — ASSESSMENT & PLAN NOTE
Stable, based on history, physical exam and review of pertinent labs, studies and medications; meds reconciled; continue current treatment plan. Key Antihyperglycemic Medications     Patient is on no antihyperglycemic meds.         Other Key Diabetic Medications             pravastatin (PRAVACHOL) 20 mg tablet (Taking) TAKE 1 TABLET ONCE EACH NIGHT    lamoTRIgine (LAMICTAL) 25 mg tablet (Taking) TAKE 1 TABLET TWICE A DAY    MICARDIS 80 mg tablet (Taking) TAKE 1 TABLET DAILY        Lab Results   Component Value Date/Time    Hemoglobin A1c 5.1 10/10/2019 12:29 PM    Glucose 106 01/29/2020 02:56 AM    Creatinine 1.26 01/29/2020 02:56 AM    Creatinine (POC) 1.7 08/21/2018 11:58 AM    Microalb/Creat ratio (ug/mg creat.) 360.8 02/06/2019 01:45 PM    Cholesterol, total 150 02/06/2019 01:45 PM    HDL Cholesterol 82 02/06/2019 01:45 PM    LDL, calculated 53 02/06/2019 01:45 PM    Triglyceride 77 02/06/2019 01:45 PM     Diabetic Foot and Eye Exam HM Status   Topic Date Due    Eye Exam  11/16/2019    Diabetic Foot Care  06/06/2020

## 2020-02-04 PROCEDURE — 3331090001 HH PPS REVENUE CREDIT

## 2020-02-04 PROCEDURE — 3331090002 HH PPS REVENUE DEBIT

## 2020-02-05 LAB
ALBUMIN SERPL-MCNC: 3.8 G/DL (ref 3.6–4.6)
ALBUMIN/GLOB SERPL: 1.3 {RATIO} (ref 1.2–2.2)
ALP SERPL-CCNC: 67 IU/L (ref 39–117)
ALT SERPL-CCNC: 35 IU/L (ref 0–32)
AST SERPL-CCNC: 21 IU/L (ref 0–40)
BASOPHILS # BLD AUTO: 0.1 X10E3/UL (ref 0–0.2)
BASOPHILS NFR BLD AUTO: 1 %
BILIRUB SERPL-MCNC: 0.4 MG/DL (ref 0–1.2)
BUN SERPL-MCNC: 17 MG/DL (ref 8–27)
BUN/CREAT SERPL: 13 (ref 12–28)
CALCIUM SERPL-MCNC: 8.9 MG/DL (ref 8.7–10.3)
CHLORIDE SERPL-SCNC: 106 MMOL/L (ref 96–106)
CO2 SERPL-SCNC: 21 MMOL/L (ref 20–29)
CREAT SERPL-MCNC: 1.28 MG/DL (ref 0.57–1)
EOSINOPHIL # BLD AUTO: 0.4 X10E3/UL (ref 0–0.4)
EOSINOPHIL NFR BLD AUTO: 3 %
ERYTHROCYTE [DISTWIDTH] IN BLOOD BY AUTOMATED COUNT: 13.9 % (ref 11.7–15.4)
GLOBULIN SER CALC-MCNC: 2.9 G/DL (ref 1.5–4.5)
GLUCOSE SERPL-MCNC: 75 MG/DL (ref 65–99)
HCT VFR BLD AUTO: 33.1 % (ref 34–46.6)
HGB BLD-MCNC: 10.9 G/DL (ref 11.1–15.9)
IMM GRANULOCYTES # BLD AUTO: 0.1 X10E3/UL (ref 0–0.1)
IMM GRANULOCYTES NFR BLD AUTO: 1 %
LYMPHOCYTES # BLD AUTO: 1.7 X10E3/UL (ref 0.7–3.1)
LYMPHOCYTES NFR BLD AUTO: 14 %
MCH RBC QN AUTO: 27.2 PG (ref 26.6–33)
MCHC RBC AUTO-ENTMCNC: 32.9 G/DL (ref 31.5–35.7)
MCV RBC AUTO: 83 FL (ref 79–97)
MONOCYTES # BLD AUTO: 1.2 X10E3/UL (ref 0.1–0.9)
MONOCYTES NFR BLD AUTO: 10 %
NEUTROPHILS # BLD AUTO: 8.7 X10E3/UL (ref 1.4–7)
NEUTROPHILS NFR BLD AUTO: 71 %
PLATELET # BLD AUTO: 499 X10E3/UL (ref 150–450)
POTASSIUM SERPL-SCNC: 4.6 MMOL/L (ref 3.5–5.2)
PROT SERPL-MCNC: 6.7 G/DL (ref 6–8.5)
RBC # BLD AUTO: 4.01 X10E6/UL (ref 3.77–5.28)
SODIUM SERPL-SCNC: 141 MMOL/L (ref 134–144)
VANCOMYCIN TROUGH SERPL-MCNC: 13.7 UG/ML (ref 10–15)
WBC # BLD AUTO: 12.2 X10E3/UL (ref 3.4–10.8)

## 2020-02-05 PROCEDURE — 3331090001 HH PPS REVENUE CREDIT

## 2020-02-05 PROCEDURE — 3331090002 HH PPS REVENUE DEBIT

## 2020-02-06 ENCOUNTER — TELEPHONE (OUTPATIENT)
Dept: FAMILY MEDICINE CLINIC | Age: 85
End: 2020-02-06

## 2020-02-06 ENCOUNTER — HOME CARE VISIT (OUTPATIENT)
Dept: SCHEDULING | Facility: HOME HEALTH | Age: 85
End: 2020-02-06
Payer: MEDICARE

## 2020-02-06 PROCEDURE — 3331090002 HH PPS REVENUE DEBIT

## 2020-02-06 PROCEDURE — 3331090001 HH PPS REVENUE CREDIT

## 2020-02-06 PROCEDURE — G0299 HHS/HOSPICE OF RN EA 15 MIN: HCPCS

## 2020-02-06 NOTE — TELEPHONE ENCOUNTER
Pls call Rhea with [facility]     Checking to see if [facility] should be pulled today when she goes out       Best number to reach her is [phone]

## 2020-02-06 NOTE — TELEPHONE ENCOUNTER
This nurse confirmed with Dr. Eli Aguilar to remove the picc line. Per Dr. Vickie Bishop notes the picc line was to be removed on 2/5/2020. This was confirmed with Rhea of [facility] @ [phone].   picc line to be removed today

## 2020-02-07 VITALS
TEMPERATURE: 98.6 F | OXYGEN SATURATION: 96 % | RESPIRATION RATE: 18 BRPM | BODY MASS INDEX: 29.81 KG/M2 | WEIGHT: 163 LBS | DIASTOLIC BLOOD PRESSURE: 70 MMHG | SYSTOLIC BLOOD PRESSURE: 120 MMHG | HEART RATE: 73 BPM

## 2020-02-07 PROCEDURE — 3331090002 HH PPS REVENUE DEBIT

## 2020-02-07 PROCEDURE — 3331090001 HH PPS REVENUE CREDIT

## 2020-02-08 PROCEDURE — 3331090001 HH PPS REVENUE CREDIT

## 2020-02-08 PROCEDURE — 3331090002 HH PPS REVENUE DEBIT

## 2020-02-09 PROCEDURE — 3331090002 HH PPS REVENUE DEBIT

## 2020-02-09 PROCEDURE — 3331090001 HH PPS REVENUE CREDIT

## 2020-02-10 ENCOUNTER — HOME CARE VISIT (OUTPATIENT)
Dept: HOME HEALTH SERVICES | Facility: HOME HEALTH | Age: 85
End: 2020-02-10
Payer: MEDICARE

## 2020-02-18 RX ORDER — LAMOTRIGINE 25 MG/1
TABLET ORAL
Qty: 180 TAB | Refills: 4 | Status: SHIPPED | OUTPATIENT
Start: 2020-02-18 | End: 2021-02-24

## 2020-03-09 RX ORDER — ALBUTEROL SULFATE 0.83 MG/ML
2.5 SOLUTION RESPIRATORY (INHALATION)
Qty: 300 NEBULE | Refills: 3 | Status: SHIPPED | OUTPATIENT
Start: 2020-03-09 | End: 2020-11-13 | Stop reason: SDUPTHER

## 2020-03-09 RX ORDER — BUDESONIDE 0.5 MG/2ML
500 INHALANT ORAL 2 TIMES DAILY
Qty: 180 EACH | Refills: 3 | Status: SHIPPED | OUTPATIENT
Start: 2020-03-09 | End: 2021-05-27 | Stop reason: SDUPTHER

## 2020-03-09 RX ORDER — ARFORMOTEROL TARTRATE 15 UG/2ML
15 SOLUTION RESPIRATORY (INHALATION) EVERY 12 HOURS
Qty: 180 VIAL | Refills: 3 | Status: SHIPPED | OUTPATIENT
Start: 2020-03-09 | End: 2021-05-27 | Stop reason: SDUPTHER

## 2020-03-11 RX ORDER — CLOTRIMAZOLE AND BETAMETHASONE DIPROPIONATE 10; .64 MG/G; MG/G
CREAM TOPICAL 2 TIMES DAILY
Qty: 45 G | Refills: 14 | Status: SHIPPED | OUTPATIENT
Start: 2020-03-11 | End: 2020-08-20 | Stop reason: SDUPTHER

## 2020-03-19 ENCOUNTER — HOSPITAL ENCOUNTER (OUTPATIENT)
Dept: MAMMOGRAPHY | Age: 85
Discharge: HOME | End: 2020-03-19
Attending: INTERNAL MEDICINE
Payer: MEDICARE

## 2020-03-19 DIAGNOSIS — N63.0 BREAST LUMP: ICD-10-CM

## 2020-03-19 DIAGNOSIS — N64.89 OTHER SPECIFIED DISORDERS OF BREAST: ICD-10-CM

## 2020-03-19 PROCEDURE — 77062 BREAST TOMOSYNTHESIS BI: CPT

## 2020-03-19 PROCEDURE — 76882 US LMTD JT/FCL EVL NVASC XTR: CPT

## 2020-04-08 RX ORDER — TELMISARTAN 80 MG/1
TABLET ORAL
Qty: 90 TAB | Refills: 3 | Status: SHIPPED | OUTPATIENT
Start: 2020-04-08 | End: 2020-12-01 | Stop reason: SDUPTHER

## 2020-05-04 ENCOUNTER — VIRTUAL VISIT (OUTPATIENT)
Dept: INTERNAL MEDICINE CLINIC | Age: 85
End: 2020-05-04

## 2020-05-04 DIAGNOSIS — J45.909 UNCOMPLICATED ASTHMA, UNSPECIFIED ASTHMA SEVERITY, UNSPECIFIED WHETHER PERSISTENT: ICD-10-CM

## 2020-05-04 DIAGNOSIS — M17.0 PRIMARY OSTEOARTHRITIS OF BOTH KNEES: ICD-10-CM

## 2020-05-04 DIAGNOSIS — I10 ESSENTIAL HYPERTENSION: ICD-10-CM

## 2020-05-04 DIAGNOSIS — D58.0 HEREDITARY SPHEROCYTOSIS (HCC): ICD-10-CM

## 2020-05-04 DIAGNOSIS — C50.911 MALIGNANT NEOPLASM OF RIGHT FEMALE BREAST, UNSPECIFIED ESTROGEN RECEPTOR STATUS, UNSPECIFIED SITE OF BREAST (HCC): ICD-10-CM

## 2020-05-04 DIAGNOSIS — E11.21 TYPE 2 DIABETES MELLITUS WITH NEPHROPATHY (HCC): Primary | ICD-10-CM

## 2020-05-04 DIAGNOSIS — N18.30 STAGE 3 CHRONIC KIDNEY DISEASE (HCC): ICD-10-CM

## 2020-05-04 NOTE — PROGRESS NOTES
1. Have you been to the ER, urgent care clinic since your last visit? Hospitalized since your last visit? No    2. Have you seen or consulted any other health care providers outside of the [address] [place] [address] since your last visit? Include any pap smears or colon screening.  No

## 2020-05-04 NOTE — PROGRESS NOTES
Johnathan Carter is a 82 y.o. female who was seen by synchronous (real-time) audio-video technology on 5/4/2020. Consent: Johnathan Carter, who was seen by synchronous (real-time) audio-video technology, and/or her healthcare decision maker, is aware that this patient-initiated, Telehealth encounter on 5/4/2020 is a billable service, with coverage as determined by her insurance carrier. She is aware that she may receive a bill and has provided verbal consent to proceed: Yes. Assessment & Plan:   Diagnoses and all orders for this visit:    1. Type 2 diabetes mellitus with nephropathy (HCC) blood sugar control with diet alone. 2. Hereditary spherocytosis ([facility] [address] patient is status post splenectomy and therefore this is no longer a concern. 3. Malignant neoplasm of right female breast, unspecified estrogen receptor status, unspecified site of breast [facility]) patient had a lumpectomy and radiation treatment followed by oncology. She is been in remission to our knowledge since 89    4. Essential hypertension blood pressure control with a goal on nifedipine, hydrochlorothiazide, metoprolol, and Micardis. No adjustment is needed. 5. Stage 3 chronic kidney disease ([facility] [address] her chronic kidney disease is stable with a creatinine of 1.28 resulting in a GFR of 45 on February 3, 2020. When we see her again we will progress the chronic kidney disease with a renal panel. 6. Uncomplicated asthma, unspecified asthma severity, unspecified whether persistent she is presently on Brovana, Pulmicort, and albuterol are via jet nebs for which she uses on a regular basis and apparently controls her asthma. 7. Primary osteoarthritis of both knees she does complain of arthritis currently. We will see her again in about 3 months. We encourage her to follow appropriate precautions for the coronavirus.           Subjective:   Johnathan Carter is a 82 y.o. female who was seen for Hypertension  Patient is seen today for virtual, video, telehealth visit. She has a known history of spherocytosis, diabetes mellitus type 2, primary hypertension, chronic kidney disease stage III, asthma, degenerative joint disease of the knee. Since we last saw her she called the rescue squad told him that she did not want to go the hospital they checked her over for asthma took a treatment and there presents with resolution of the episode of shortness she was to take her medications through the nebulizer on a regular basis at that time she is now taking them regularly. Other new complaints  Patient seen for evaluation. Prior to Admission medications    Medication Sig Start Date End Date Taking? Authorizing Provider   Micardis 80 mg tablet TAKE 1 TABLET DAILY 4/8/20  Yes Edu Angeles MD   clotrimazole-betamethasone (LOTRISONE) topical cream Apply  to affected area two (2) times a day. apply thin layer topically 2 times daily 3/11/20  Yes Edu Angeles MD   arformoteroL (BROVANA) 15 mcg/2 mL nebu neb solution 2 mL by Nebulization route every twelve (12) hours. 3/9/20  Yes Edu Angeles MD   budesonide (PULMICORT) 0.5 mg/2 mL nbsp 2 mL by Nebulization route two (2) times a day. 3/9/20  Yes Edu Angeles MD   albuterol (PROVENTIL VENTOLIN) 2.5 mg /3 mL (0.083 %) nebu 3 mL by Nebulization route every four (4) hours as needed for Wheezing. 3/9/20  Yes Edu Angeles MD   lamoTRIgine (LAMICTAL) 25 mg tablet TAKE 1 TABLET TWICE A DAY 2/18/20  Yes Edu Angeles MD   polyethylene glycol (MIRALAX) 17 gram packet Take 1 Packet by mouth two (2) times a day. 2/3/20  Yes Edu Angeles MD   potassium chloride (KLOR-CON M20) 20 mEq tablet TAKE 1 TABLET DAILY  Patient taking differently: Take  by mouth.  TAKE 1 TABLET DAILY 11/5/19  Yes Edu Angeles MD   ibandronate (BONIVA) 150 mg tablet TAKE 1 TABLET ONCE A MONTH  Patient taking differently: TAKE 1 TABLET orally ONCE A MONTH 9/29/19  Yes Teresa Lentz Hudson Lowery MD   TOPROL XL 50 mg XL tablet TAKE 1 TABLET DAILY  Patient taking differently: TAKE 1 TABLET DAILY BY MOUTH 8/12/19  Yes Yadira Prasad MD   NIFEdipine ER (PROCARDIA XL) 60 mg ER tablet TAKE 1 TABLET TWICE A DAY  Patient taking differently: TAKE 1 TABLET TWICE A DAY BY MOUTH 8/12/19  Yes Yadira Prasad MD   pravastatin (PRAVACHOL) 20 mg tablet TAKE 1 TABLET ONCE EACH NIGHT  Patient taking differently: TAKE 1 TABLET ONCE EACH NIGHT BY MOUTH 8/3/19  Yes Yadira Prasad MD   oxybutynin chloride XL (DITROPAN XL) 10 mg CR tablet TAKE 1 TABLET DAILY  Patient taking differently: TAKE 1 TABLET DAILY BY MOUTH 5/11/19  Yes Yadira Prasad MD   hydroCHLOROthiazide (HYDRODIURIL) 12.5 mg tablet Take 12.5 mg by mouth every Monday, Wednesday, Friday. Yes Provider, Historical   fluticasone (FLONASE) 50 mcg/actuation nasal spray 2 Sprays by Both Nostrils route two (2) times a day. by inhalation    Yes Provider, Historical   calcium carbonate (TUMS PO) Take 500 mg by mouth as needed. Yes Provider, Historical   albuterol (PROVENTIL HFA, VENTOLIN HFA, PROAIR HFA) 90 mcg/actuation inhaler Take 1 Puff by inhalation every four (4) hours as needed for Wheezing. 8/17/18  Yes Yadira Prasad MD   aspirin delayed-release 81 mg tablet Take 81 mg by mouth daily. Yes Provider, Historical     Allergies   Allergen Reactions    Codeine Nausea and Vomiting and Other (comments)     Upset stomach. Weak.  Other Plant, Animal, Environmental Other (comments)     Dust and mold allergy.        REVIEW OF SYSTEMS as noted below except that noted in subjective:  General: negative for - chills or fever  ENT: negative for - headaches, nasal congestion or tinnitus  Respiratory: negative for - cough, hemoptysis, shortness of breath or wheezing  Cardiovascular : negative for - chest pain, edema, palpitations or shortness of breath  Gastrointestinal: negative for - abdominal pain, blood in stools, heartburn or nausea/vomiting  Genito-Urinary: no dysuria, trouble voiding, or hematuria  Musculoskeletal: negative for - gait disturbance, joint pain, joint stiffness or joint swelling  Neurological: no TIA or stroke symptoms  Hematologic: no bruises, no bleeding, no swollen glands  Integument: no lumps, mole changes, nail changes or rash  Endocrine:no malaise/lethargy or unexpected weight changes      Patient Active Problem List   Diagnosis Code    Anemia NEC     Asthma J45.909    Hereditary spherocytosis ([facility] [address] D58.0    HTN (hypertension) I10    Breast cancer, right breast ([facility] [address] C50.911    Anemia D64.9    BPV (benign positional vertigo) H81.10    DJD (degenerative joint disease) of knee M17.10    S/P colonoscopy Z98.890    Early satiety R68.81    Venous insufficiency of both lower extremities I87.2    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.21    Renal cyst N28.1    Intrahepatic bile duct dilation K83.8    Hearing deficit, left H91.92    Influenza J11.1    Chronic kidney disease N18.9    Pneumonia J18.9    Streptococcal bacteremia R78.81, B95.5     Patient Active Problem List    Diagnosis Date Noted    Pneumonia 01/25/2020     Priority: 3 - Three    Streptococcal bacteremia 01/25/2020     Priority: 3 - Three    Influenza 01/20/2020    Hearing deficit, left     Chronic kidney disease 02/06/2019    Intrahepatic bile duct dilation 09/05/2018    Type 2 diabetes mellitus with nephropathy ([facility] [address] 01/16/2018    Venous insufficiency of both lower extremities     Renal cyst 08/01/2016    Early satiety     BPV (benign positional vertigo)     DJD (degenerative joint disease) of knee     Anemia     Hereditary spherocytosis (HCC)     HTN (hypertension)     Breast cancer, right breast (HCC)     S/P colonoscopy 04/09/2014    Anemia NEC     Asthma      Current Outpatient Medications   Medication Sig Dispense Refill    Micardis 80 mg tablet TAKE 1 TABLET DAILY 90 Tab 3    clotrimazole-betamethasone (LOTRISONE) topical cream Apply  to affected area two (2) times a day. apply thin layer topically 2 times daily 45 g 14    arformoteroL (BROVANA) 15 mcg/2 mL nebu neb solution 2 mL by Nebulization route every twelve (12) hours. 180 Vial 3    budesonide (PULMICORT) 0.5 mg/2 mL nbsp 2 mL by Nebulization route two (2) times a day. 180 Each 3    albuterol (PROVENTIL VENTOLIN) 2.5 mg /3 mL (0.083 %) nebu 3 mL by Nebulization route every four (4) hours as needed for Wheezing. 300 Nebule 3    lamoTRIgine (LAMICTAL) 25 mg tablet TAKE 1 TABLET TWICE A  Tab 4    polyethylene glycol (MIRALAX) 17 gram packet Take 1 Packet by mouth two (2) times a day. 60 Packet 11    potassium chloride (KLOR-CON M20) 20 mEq tablet TAKE 1 TABLET DAILY (Patient taking differently: Take  by mouth. TAKE 1 TABLET DAILY) 90 Tab 3    ibandronate (BONIVA) 150 mg tablet TAKE 1 TABLET ONCE A MONTH (Patient taking differently: TAKE 1 TABLET orally ONCE A MONTH) 3 Tab 4    TOPROL XL 50 mg XL tablet TAKE 1 TABLET DAILY (Patient taking differently: TAKE 1 TABLET DAILY BY MOUTH) 90 Tab 3    NIFEdipine ER (PROCARDIA XL) 60 mg ER tablet TAKE 1 TABLET TWICE A DAY (Patient taking differently: TAKE 1 TABLET TWICE A DAY BY MOUTH) 180 Tab 3    pravastatin (PRAVACHOL) 20 mg tablet TAKE 1 TABLET ONCE EACH NIGHT (Patient taking differently: TAKE 1 TABLET ONCE EACH NIGHT BY MOUTH) 90 Tab 9    oxybutynin chloride XL (DITROPAN XL) 10 mg CR tablet TAKE 1 TABLET DAILY (Patient taking differently: TAKE 1 TABLET DAILY BY MOUTH) 90 Tab 3    hydroCHLOROthiazide (HYDRODIURIL) 12.5 mg tablet Take 12.5 mg by mouth every Monday, Wednesday, Friday.  fluticasone (FLONASE) 50 mcg/actuation nasal spray 2 Sprays by Both Nostrils route two (2) times a day. by inhalation       calcium carbonate (TUMS PO) Take 500 mg by mouth as needed.       albuterol (PROVENTIL HFA, VENTOLIN HFA, PROAIR HFA) 90 mcg/actuation inhaler Take 1 Puff by inhalation every four (4) hours as needed for Wheezing. 3 Inhaler 3    aspirin delayed-release 81 mg tablet Take 81 mg by mouth daily. Allergies   Allergen Reactions    Codeine Nausea and Vomiting and Other (comments)     Upset stomach. Weak.  Other Plant, Animal, Environmental Other (comments)     Dust and mold allergy. Past Medical History:   Diagnosis Date    Anemia     Arrhythmia     irregular per pt d/t blood disorder    Asthma     Bereavement 2-2-16     die 82 copd,chf,pul htn pn after 48 yr marriage    BPV (benign positional vertigo)     Breast cancer, right breast ([address] 1994    right breast, lumpectomy and radiation    Breast lump 2017    right breast     Chronic kidney disease 02/06/2019    kidney cyst - watching    Diabetes ([address]     controlled with diet    DJD (degenerative joint disease) of knee     Early satiety     Hearing deficit, left     Hereditary spherocytosis (HCC)     HTN (hypertension)     Ill-defined condition     sickle cell trait    Intrahepatic bile duct dilation 09/05/2018    mri    Radiation therapy complication 3390    right breast     Renal cyst 08/2016    ct rt - MRI 9/5/18 a hemorrhagic cyst in the right lower pole a layering hemorrhagic component.  There are no solid renal masses     S/P colonoscopy 4-9-14    md Brian - family hx    S/P colonoscopy 07/20/2016    rt - md brian diverticulosis    Seizures ([address]     Septic arthritis ([address] 3/11    Streptococcal sepsis ([address] 03/2011    Venous insufficiency of both lower extremities      Past Surgical History:   Procedure Laterality Date    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    HX BREAST BIOPSY Right 1994    positive    HX BREAST LUMPECTOMY Right 1994    HX CHOLECYSTECTOMY  1985     Family History   Problem Relation Age of Onset    Diabetes Mother     Other Father         'sphero'-blood disorder    Cancer Sister         breast    Breast Cancer Sister 71    Other Sister         spleen removed    Colon Cancer Sister     Other Other         spleen removed    Gall Bladder Disease Other      Social History     Tobacco Use    Smoking status: Never Smoker    Smokeless tobacco: Never Used   Substance Use Topics    Alcohol use: No       ROS    Objective:   Vital Signs: (As obtained by patient/caregiver at home)  There were no vitals taken for this visit. [INSTRUCTIONS:  \"[x]\" Indicates a positive item  \"[]\" Indicates a negative item  -- DELETE ALL ITEMS NOT EXAMINED]    Constitutional: [x] Appears well-developed and well-nourished [x] No apparent distress      [] Abnormal -     Mental status: [x] Alert and awake  [x] Oriented to person/place/time [x] Able to follow commands    [] Abnormal -     Eyes:   EOM    [x]  Normal    [] Abnormal -   Sclera  [x]  Normal    [] Abnormal -          Discharge [x]  None visible   [] Abnormal -     HENT: [x] Normocephalic, atraumatic  [] Abnormal -   [x] Mouth/Throat: Mucous membranes are moist    External Ears [x] Normal  [] Abnormal -    Neck: [x] No visualized mass [] Abnormal -     Pulmonary/Chest: [x] Respiratory effort normal   [x] No visualized signs of difficulty breathing or respiratory distress        [] Abnormal -      Musculoskeletal:   [x] Normal gait with no signs of ataxia         [x] Normal range of motion of neck        [] Abnormal -     Neurological:        [x] No Facial Asymmetry (Cranial nerve 7 motor function) (limited exam due to video visit)          [x] No gaze palsy        [] Abnormal -          Skin:        [x] No significant exanthematous lesions or discoloration noted on facial skin         [] Abnormal -            Psychiatric:       [x] Normal Affect [] Abnormal -        [x] No Hallucinations    Other pertinent observable physical exam findings:-        We discussed the expected course, resolution and complications of the diagnosis(es) in detail.   Medication risks, benefits, costs, interactions, and alternatives were discussed as indicated. I advised her to contact the office if her condition worsens, changes or fails to improve as anticipated. She expressed understanding with the diagnosis(es) and plan. Jag Vargas is a 82 y.o. female who was evaluated by a video visit encounter for concerns as above. Patient identification was verified prior to start of the visit. A caregiver was present when appropriate. Due to this being a TeleHealth encounter (During [facility] emergency), evaluation of the following organ systems was limited: Vitals/Constitutional/EENT/Resp/CV/GI//MS/Neuro/Skin/Heme-Lymph-Imm. Pursuant to the emergency declaration under the [facility], [facility] waiver authority and the [employer] and [employer] Act, this Virtual  Visit was conducted, with patient's (and/or legal guardian's) consent, to reduce the patient's risk of exposure to COVID-19 and provide necessary medical care. Services were provided through a video synchronous discussion virtually to substitute for in-person clinic visit. Patient and provider were located at their individual homes. Lisbet Jefferson MD    Please note that portions of this dictation may have been recorded with voice recognition software. Some unanticipated grammatical, syntax, homophones, and other interpretive errors are inadvertently transcribed by the computer software. An attempt at proof reading has been made to minimize errors and omissions. Please disregard these errors. Thank you.

## 2020-05-05 RX ORDER — OXYBUTYNIN CHLORIDE 10 MG/1
TABLET, EXTENDED RELEASE ORAL
Qty: 90 TAB | Refills: 3 | Status: SHIPPED | OUTPATIENT
Start: 2020-05-05 | End: 2021-06-30

## 2020-08-20 ENCOUNTER — OFFICE VISIT (OUTPATIENT)
Dept: INTERNAL MEDICINE CLINIC | Age: 85
End: 2020-08-20
Payer: MEDICARE

## 2020-08-20 VITALS
HEIGHT: 62 IN | DIASTOLIC BLOOD PRESSURE: 64 MMHG | BODY MASS INDEX: 30 KG/M2 | SYSTOLIC BLOOD PRESSURE: 153 MMHG | RESPIRATION RATE: 20 BRPM | OXYGEN SATURATION: 96 % | TEMPERATURE: 98.2 F | WEIGHT: 163 LBS | HEART RATE: 70 BPM

## 2020-08-20 DIAGNOSIS — E11.21 TYPE 2 DIABETES MELLITUS WITH NEPHROPATHY (HCC): ICD-10-CM

## 2020-08-20 DIAGNOSIS — N18.30 STAGE 3 CHRONIC KIDNEY DISEASE (HCC): ICD-10-CM

## 2020-08-20 DIAGNOSIS — N28.1 RENAL CYST: ICD-10-CM

## 2020-08-20 DIAGNOSIS — J45.909 UNCOMPLICATED ASTHMA, UNSPECIFIED ASTHMA SEVERITY, UNSPECIFIED WHETHER PERSISTENT: ICD-10-CM

## 2020-08-20 DIAGNOSIS — I10 ESSENTIAL HYPERTENSION: Primary | ICD-10-CM

## 2020-08-20 DIAGNOSIS — I87.2 VENOUS INSUFFICIENCY OF BOTH LOWER EXTREMITIES: ICD-10-CM

## 2020-08-20 DIAGNOSIS — H81.10 BENIGN PAROXYSMAL POSITIONAL VERTIGO, UNSPECIFIED LATERALITY: ICD-10-CM

## 2020-08-20 DIAGNOSIS — B35.3 TINEA PEDIS OF BOTH FEET: ICD-10-CM

## 2020-08-20 DIAGNOSIS — M17.0 PRIMARY OSTEOARTHRITIS OF BOTH KNEES: ICD-10-CM

## 2020-08-20 DIAGNOSIS — D58.0 HEREDITARY SPHEROCYTOSIS (HCC): ICD-10-CM

## 2020-08-20 PROCEDURE — 90000 NO LOS: CPT | Performed by: INTERNAL MEDICINE

## 2020-08-20 PROCEDURE — 1090F PRES/ABSN URINE INCON ASSESS: CPT | Performed by: INTERNAL MEDICINE

## 2020-08-20 PROCEDURE — 99214 OFFICE O/P EST MOD 30 MIN: CPT | Performed by: INTERNAL MEDICINE

## 2020-08-20 PROCEDURE — 36415 COLL VENOUS BLD VENIPUNCTURE: CPT | Performed by: INTERNAL MEDICINE

## 2020-08-20 PROCEDURE — 1101F PT FALLS ASSESS-DOCD LE1/YR: CPT | Performed by: INTERNAL MEDICINE

## 2020-08-20 PROCEDURE — G8419 CALC BMI OUT NRM PARAM NOF/U: HCPCS | Performed by: INTERNAL MEDICINE

## 2020-08-20 PROCEDURE — G8754 DIAS BP LESS 90: HCPCS | Performed by: INTERNAL MEDICINE

## 2020-08-20 PROCEDURE — G8510 SCR DEP NEG, NO PLAN REQD: HCPCS | Performed by: INTERNAL MEDICINE

## 2020-08-20 PROCEDURE — G8399 PT W/DXA RESULTS DOCUMENT: HCPCS | Performed by: INTERNAL MEDICINE

## 2020-08-20 PROCEDURE — G8427 DOCREV CUR MEDS BY ELIG CLIN: HCPCS | Performed by: INTERNAL MEDICINE

## 2020-08-20 PROCEDURE — G8536 NO DOC ELDER MAL SCRN: HCPCS | Performed by: INTERNAL MEDICINE

## 2020-08-20 PROCEDURE — G8753 SYS BP > OR = 140: HCPCS | Performed by: INTERNAL MEDICINE

## 2020-08-20 RX ORDER — CLOTRIMAZOLE AND BETAMETHASONE DIPROPIONATE 10; .64 MG/G; MG/G
CREAM TOPICAL 2 TIMES DAILY
Qty: 45 G | Refills: 14 | Status: SHIPPED | OUTPATIENT
Start: 2020-08-20 | End: 2021-08-25 | Stop reason: SDUPTHER

## 2020-08-20 NOTE — PATIENT INSTRUCTIONS
Benign Paroxysmal Positional Vertigo (BPPV): Care Instructions  Your Care Instructions     Benign paroxysmal positional vertigo, also called BPPV, is an inner ear problem. It causes a spinning or whirling sensation when you move your head. This sensation is called vertigo. The vertigo usually lasts for less than a minute. People often have vertigo spells for a few days or weeks. Then the vertigo goes away. But it may come back again. The vertigo may be mild, or it may be bad enough to cause unsteadiness, nausea, and vomiting. When you move, your inner ear sends messages to the brain. This helps you keep your balance. Vertigo can happen when debris builds up in the inner ear. The buildup can cause the inner ear to send the wrong message to the brain. Your doctor may move you in different positions to help your vertigo get better faster. This is called the Epley maneuver. Your doctor may also prescribe medicines or exercises to help with your symptoms. Follow-up care is a key part of your treatment and safety. Be sure to make and go to all appointments, and call your doctor if you are having problems. It's also a good idea to know your test results and keep a list of the medicines you take. How can you care for yourself at home? · If your doctor suggests that you do Pisano-Daroff exercises:  ? Sit on the edge of a bed or sofa. Quickly lie down on the side that causes the worst vertigo. Lie on your side with your ear down. ? Stay in this position for at least 30 seconds or until the vertigo goes away. ? Sit up. If this causes vertigo, wait for it to stop. ? Repeat the procedure on the other side. ? Repeat this 10 times. Do these exercises 2 times a day until the vertigo is gone. When should you call for help? [id number] anytime you think you may need emergency care. For example, call if:  · You have symptoms of a stroke.  These may include:  ? Sudden numbness, tingling, weakness, or loss of movement in your face, arm, or leg, especially on only one side of your body. ? Sudden vision changes. ? Sudden trouble speaking. ? Sudden confusion or trouble understanding simple statements. ? Sudden problems with walking or balance. ? A sudden, severe headache that is different from past headaches. Call your doctor now or seek immediate medical care if:  · You have new or worse nausea and vomiting. · You have new symptoms such as hearing loss or roaring in your ears. Watch closely for changes in your health, and be sure to contact your doctor if:  · You are not getting better as expected. · Your vertigo gets worse. Where can you learn more? Go to http://federico-rafael.info/  Enter P372 in the search box to learn more about \"Benign Paroxysmal Positional Vertigo (BPPV): Care Instructions. \"  Current as of: July 29, 2019               Content Version: 12.5  © 0307-3004 [employer]. Care instructions adapted under license by [employer] (which disclaims liability or warranty for this information). If you have questions about a medical condition or this instruction, always ask your healthcare professional. [place] [address] any warranty or liability for your use of this information. Epley Maneuver for Vertigo: Exercises  Your Care Instructions  The Epley Maneuver is a series of movements your doctor may use to treat your vertigo. Here are the steps for the exercises. Your doctor or physical therapist will guide you through the movements. A single 10- to 15-minute session often is all that's needed. Crystal debris (canaliths) cause the vertigo. When your head is moved into different positions, the debris moves freely. This may cause your symptoms to stop. How to do the exercises  Step 1   1. You will sit on the doctor's exam table. Your legs will be out in front of you.  The doctor or physical therapist will turn your head so that it is detention between looking straight ahead and looking to the side that causes the worst vertigo. 2. Without changing your head position, he or she will guide you back quickly. Your shoulders will be on the table. Your head will hang over the edge of the table. At this point, the side of your head that is causing the worst vertigo will face the floor. You'll stay in this position for 30 seconds or until your symptoms stop. Step 2   1. Then, the doctor or physical therapist will turn your head to the other side. You don't need to lift your head. The other side of your head will face the floor. You will stay in this position for 30 seconds or until your symptoms stop. Step 3   1. The doctor or physical therapist will help you roll your body in the same direction that your head is facing. You will lie on your side. (For example, if you are looking to your right, you will roll onto your right side.) The side that causes the worst symptoms should be facing up. You'll stay in this position for another 30 seconds or until your symptoms stop. Step 4   1. The doctor or physical therapist will then help you to sit back up. Your legs will hang off the table on the same side that you were facing. Follow-up care is a key part of your treatment and safety. Be sure to make and go to all appointments, and call your doctor if you are having problems. It's also a good idea to know your test results and keep a list of the medicines you take. Where can you learn more? Go to http://federico-rafael.info/  Enter A855 in the search box to learn more about \"Epley Maneuver for Vertigo: Exercises. \"  Current as of: July 29, 2019               Content Version: 12.5  © 9187-1648 [employer]. Care instructions adapted under license by [employer] (which disclaims liability or warranty for this information).  If you have questions about a medical condition or this instruction, always ask your healthcare professional. [address] any warranty or liability for your use of this information. Cawthorne Exercises for Vertigo: Care Instructions  Your Care Instructions  Simple exercises can help you regain your balance when you have vertigo. If you have Ménière's disease, benign paroxysmal positional vertigo (BPPV), or another inner ear problem, you may have vertigo off and on. Do these exercises first thing in the morning and before you go to bed. You might get dizzy when you first start them. If this happens, try to do them for at least 5 minutes. Do a group of exercises at a time, starting at the top of the list. It may take several weeks before you can do all the exercises without feeling dizzy. Follow-up care is a key part of your treatment and safety. Be sure to make and go to all appointments, and call your doctor if you are having problems. It's also a good idea to know your test results and keep a list of the medicines you take. How can you care for yourself at home? Exercise 1  While sitting on the side of the bed and holding your head still:  · Look up as far as you can. · Look down as far as you can. · Look from side to side as far as you can. · Stretch your arm straight out in front of you. Focus on your index finger. Continue to focus on your finger while you bring it to your nose. Exercise 2  While sitting on the side of the bed:  · Bring your head as far back as you can. · Bring your head forward to touch your chin to your chest.  · Turn your head from side to side. · Do these exercises first with your eyes open. Then try with your eyes closed. Exercise 3  While sitting on the side of the bed:  · Shrug your shoulders straight upward, then relax them. · Bend over and try to touch the ground with your fingers. Then go back to a sitting position. · Toss a small ball from one hand to the other. Throw the ball higher than your eyes so you have to look up.   Exercise 4  While standing (with someone close by if you feel uncomfortable):  · Repeat Exercise 1.  · Repeat Exercise 2.  · Pass a ball between your legs and above your head. · Sit down and then stand up. Repeat. Turn around in a [place] a different way each time you stand. · With someone close by to help you, try the above exercises with your eyes closed. Exercise 5  In a room that is cleared of obstacles:  · Walk to a corner of the room, turn to your right, and walk back to the starting point. Now, repeat and turn left. · Walk up and down a slope. Now try stairs. · While holding on to someone's arm, try these exercises with your eyes closed. When should you call for help? Watch closely for changes in your health, and be sure to contact your doctor if:  · You do not get better as expected. Where can you learn more? Go to http://www.gray.com/  Enter A743 in the search box to learn more about \"Cawthorne Exercises for Vertigo: Care Instructions. \"  Current as of: July 29, 2019               Content Version: 12.5  © 5555-3337 [employer]. Care instructions adapted under license by [employer] (which disclaims liability or warranty for this information). If you have questions about a medical condition or this instruction, always ask your healthcare professional. [address] any warranty or liability for your use of this information. Athlete's Foot: Care Instructions  Your Care Instructions     Athlete's foot is an itchy rash on the foot caused by an infection with a fungus. You can get it by going barefoot in wet public areas, such as swimming pools or locker rooms. Many times there is no clear reason why you get athlete's foot. You can easily treat athlete's foot by putting medicine on your feet for 1 to 6 weeks. In some cases, a doctor may prescribe pills to kill the fungus. Follow-up care is a key part of your treatment and safety.  Be sure to make and go to all appointments, and call your doctor if you are having problems. It's also a good idea to know your test results and keep a list of the medicines you take. How can you care for yourself at home? · Your doctor may suggest an over-the counter lotion or spray or may prescribe a medicine. Take your medicines exactly as prescribed. Call your doctor if you think you are having a problem with your medicine. · Keep your feet clean and dry. · When you get dressed, put your socks on before your underwear. This can prevent the fungus from spreading from your feet to your groin. To prevent athlete's foot  · Wear flip-flops or other shower sandals in public locker rooms and showers and by the pool. · Dry between your toes after swimming or bathing. · Wear leather shoes or sandals, which let air get to your feet. · Change your socks as needed so your feet stay as dry as possible. · Use antifungal powder on your feet. When should you call for help? Watch closely for changes in your health, and be sure to contact your doctor if:  · You do not get better as expected. Where can you learn more? Go to http://www.gray.com/  Enter M498 in the search box to learn more about \"Athlete's Foot: Care Instructions. \"  Current as of: October 31, 2019               Content Version: 12.5  © 6433-8501 [employer]wise, Incorporated. Care instructions adapted under license by [employer] (which disclaims liability or warranty for this information). If you have questions about a medical condition or this instruction, always ask your healthcare professional. [place] [address] any warranty or liability for your use of this information.

## 2020-08-20 NOTE — PROGRESS NOTES
Chief Complaint   Patient presents with    Diabetes    Asthma     1. Have you been to the ER, urgent care clinic since your last visit? Hospitalized since your last visit? Yes When: a couple months ago Where: [facility] Reason for visit: asthma    2. Have you seen or consulted any other health care providers outside of the [address] [place] [address] since your last visit? Include any pap smears or colon screening.  No

## 2020-08-20 NOTE — PROGRESS NOTES
SPORTS MEDICINE AND PRIMARY CARE  Milly Howell MD, [address] [address]  Phone:  [phone]  Fax: [phone]       Chief Complaint   Patient presents with    Diabetes    Asthma    Memory Loss   . SUBJECTIVE:    Zully Casarez is a 82 y.o. female Patient returns today with several concerns. Her left hip bone was sore, which has now resolved. She felt congested one morning and that also improved. She had a minor asthma attack, rescue squad was called and they stayed with her till it resolved. She did not want to go to the emergency room. If she walks fast, she gets short of breath now, and she did not do that when she was younger. Of course, her sister tells her she is just getting old. However, we investigated her cardiac status with an echocardiogram from January 23, 2020, which revealed normal systolic function and wall thickness, EF was 60-65%, there were no regional wall motion abnormalities and there was trace mitral regurgitation. With her change in walking, there has been no associated chest pain or shortness of breath, and to that end also, about three years ago she had a nuclear medicine stress test that was likewise unremarkable on 03/23/17, where she had a normal myocardial prefusion exam with no evidence of ischemia or infarction, EF at that time 69%. Patient is seen for evaluation. Current Outpatient Medications   Medication Sig Dispense Refill    clotrimazole-betamethasone (LOTRISONE) topical cream Apply  to affected area two (2) times a day. apply thin layer topically 2 times daily 45 g 14    oxybutynin chloride XL (DITROPAN XL) 10 mg CR tablet TAKE 1 TABLET DAILY 90 Tab 3    Micardis 80 mg tablet TAKE 1 TABLET DAILY 90 Tab 3    arformoteroL (BROVANA) 15 mcg/2 mL nebu neb solution 2 mL by Nebulization route every twelve (12) hours. 180 Vial 3    budesonide (PULMICORT) 0.5 mg/2 mL nbsp 2 mL by Nebulization route two (2) times a day. 180 Each 3    albuterol (PROVENTIL VENTOLIN) 2.5 mg /3 mL (0.083 %) nebu 3 mL by Nebulization route every four (4) hours as needed for Wheezing. 300 Nebule 3    lamoTRIgine (LAMICTAL) 25 mg tablet TAKE 1 TABLET TWICE A  Tab 4    polyethylene glycol (MIRALAX) 17 gram packet Take 1 Packet by mouth two (2) times a day. 60 Packet 11    potassium chloride (KLOR-CON M20) 20 mEq tablet TAKE 1 TABLET DAILY (Patient taking differently: Take  by mouth. TAKE 1 TABLET DAILY) 90 Tab 3    ibandronate (BONIVA) 150 mg tablet TAKE 1 TABLET ONCE A MONTH (Patient taking differently: TAKE 1 TABLET orally ONCE A MONTH) 3 Tab 4    TOPROL XL 50 mg XL tablet TAKE 1 TABLET DAILY (Patient taking differently: TAKE 1 TABLET DAILY BY MOUTH) 90 Tab 3    NIFEdipine ER (PROCARDIA XL) 60 mg ER tablet TAKE 1 TABLET TWICE A DAY (Patient taking differently: TAKE 1 TABLET TWICE A DAY BY MOUTH) 180 Tab 3    pravastatin (PRAVACHOL) 20 mg tablet TAKE 1 TABLET ONCE EACH NIGHT (Patient taking differently: TAKE 1 TABLET ONCE EACH NIGHT BY MOUTH) 90 Tab 9    hydroCHLOROthiazide (HYDRODIURIL) 12.5 mg tablet Take 12.5 mg by mouth every Monday, Wednesday, Friday.  fluticasone (FLONASE) 50 mcg/actuation nasal spray 2 Sprays by Both Nostrils route two (2) times a day. by inhalation       albuterol (PROVENTIL HFA, VENTOLIN HFA, PROAIR HFA) 90 mcg/actuation inhaler Take 1 Puff by inhalation every four (4) hours as needed for Wheezing. 3 Inhaler 3    aspirin delayed-release 81 mg tablet Take 81 mg by mouth daily.  calcium carbonate (TUMS PO) Take 500 mg by mouth as needed.        Past Medical History:   Diagnosis Date    Anemia     Arrhythmia     irregular per pt d/t blood disorder    Asthma     Bereavement 2-2-16     die 82 copd,chf,pul htn pn after 48 yr marriage    BPV (benign positional vertigo)     BPV (benign positional vertigo) 08/20/2020    Breast cancer, right breast ([address] 1994    right breast, lumpectomy and radiation    Breast lump 2017    right breast     Chronic kidney disease 02/06/2019    kidney cyst - watching    Diabetes ([address]     controlled with diet    DJD (degenerative joint disease) of knee     Early satiety     Hearing deficit, left     Hereditary spherocytosis (HCC)     HTN (hypertension)     Ill-defined condition     sickle cell trait    Intrahepatic bile duct dilation 09/05/2018    mri    Radiation therapy complication 7782    right breast     Renal cyst 08/2016    ct rt - MRI 9/5/18 a hemorrhagic cyst in the right lower pole a layering hemorrhagic component. There are no solid renal masses     S/P colonoscopy 4-9-14    md Brian - family hx    S/P colonoscopy 07/20/2016    rt - md brian diverticulosis    Seizures ([address]     Septic arthritis ([address] 3/11    Streptococcal sepsis ([address] 03/2011    Venous insufficiency of both lower extremities      Past Surgical History:   Procedure Laterality Date    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 Weak.  Other Plant, Animal, Environmental Other (comments)     Dust and mold allergy.          REVIEW OF SYSTEMS:  General: negative for - chills or fever  ENT: negative for - headaches, nasal congestion or tinnitus  Respiratory: negative for - cough, hemoptysis, shortness of breath or wheezing  Cardiovascular : negative for - chest pain, edema, palpitations or shortness of breath  Gastrointestinal: negative for - abdominal pain, blood in stools, heartburn or nausea/vomiting  Genito-Urinary: no dysuria, trouble voiding, or hematuria  Musculoskeletal: negative for - gait disturbance, joint pain, joint stiffness or joint swelling  Neurological: no TIA or stroke symptoms  Hematologic: no bruises, no bleeding, no swollen glands  Integument: no lumps, mole changes, nail changes or rash  Endocrine: no malaise/lethargy or unexpected weight changes      Social History     Socioeconomic History    Marital status:      Spouse name: Not on file    Number of children: Not on file    Years of education: Not on file    Highest education level: Not on file   Tobacco Use    Smoking status: Never Smoker    Smokeless tobacco: Never Used   Substance and Sexual Activity    Alcohol use: No    Drug use: No    Sexual activity: Not Currently   Social History Narrative    Medical History: Streptococcus pneumoniae septicemia 11Septic polyarthritis 11HSV2 herpes simplex    tpywgoqtqrgpiicsfyd30/02/11Seizure d/o 11Osteoporosis 2011primary HTN 1990Bronchial asthma 1985Obesity 2002Carcinoma of    breast 04Congential spherocytosis    Gyn History: Last mammogram date 2013. OB History: Total pregnancies 2. Live births 2. Surgical History: normal stress myocardial perfusion scan, EF 68% 08splenectomy 1972cholecystectomy 1985colonoscopy 2008internal hemorrhoids diverticulosis lumpectomy radiation therapy colonoscopy diverticulosis Vaishali Booth M.D. 2014        Hospitalization/Major Diagnostic Procedure: streptococcus pneumoniae septicemia ronchial asthma         Family History: Mother:  80 yrs, DM, [place]:  80 yrs, heart disease, aortic stenosisSister(s): , colon CA,polypsBrother(s): , at birthUncle: alive, colon CA, heart disease2 sister(s) [employer] . 1 son(s) , 1 daughter(s) . Social History: Alcohol Use Patient does not use alcohol. Smoking Status Patient is a never smoker. Marital Status:  2016, 46 yrs copd,chf. Children: Her son grandchild's mother is temporarily incarcerated and she is caring for the 10 yo. Occupation/W ork: retired teacher. Education/School: has college diploma-[employer]. Nondenominational: [employer].     Family History   Problem Relation Age of Onset    Diabetes Mother     Other Father         'sphero'-blood disorder    Cancer Sister         breast    Breast Cancer Sister 71    Other Sister         spleen removed    Colon Cancer Sister     Other Other         spleen removed    Gall Bladder Disease Other        OBJECTIVE:    Visit Vitals  /64   Pulse 70   Temp 98.2 °F (36.8 °C) (Oral)   Resp 20   Ht 5' 2\" (1.575 m)   Wt 163 lb (73.9 kg)   SpO2 96%   BMI 29.81 kg/m²     CONSTITUTIONAL: well , well nourished, appears age appropriate  EYES: perrla, eom intact  ENMT:moist mucous membranes, pharynx clear  NECK: supple. Thyroid normal  RESPIRATORY: Chest: clear bilaterally   CARDIOVASCULAR: Heart: regular rate and rhythm  GASTROINTESTINAL: Abdomen: soft, bowel sounds active  HEMATOLOGIC: no pathological lymph nodes palpated  MUSCULOSKELETAL: Extremities: no edema, pulse 1+ Foot exam reveals no lesions. There is white plaque in the fourth/fifth webspace. Pulses are diminished. Fine filament sensation is intact. INTEGUMENT: No unusual rashes or suspicious skin lesions noted. Nails appear normal.  NEUROLOGIC: non-focal exam   MENTAL STATUS: alert and oriented, appropriate affect           ASSESSMENT:  1. Essential hypertension    2. Stage 3 chronic kidney disease ([address]    3. Uncomplicated asthma, unspecified asthma severity, unspecified whether persistent    4. Type 2 diabetes mellitus with nephropathy ([address]    5. Hereditary spherocytosis ([address]    6. Renal cyst    7. Venous insufficiency of both lower extremities    8. Primary osteoarthritis of both knees    9. Benign paroxysmal positional vertigo, unspecified laterality    10. Tinea pedis of both feet      Blood pressure control is excellent at home. This morning it was 127/66. She has a component of white coat hypertension.   We will check her metabolic status today, which will allow us to know how her kidneys are doing. We continue to encourage her to use Albuterol and nebulizer for her asthma. We will assess her blood sugar control with a hemoglobin A1c, as well as her lipid status. She is concerned about the renal cyst.  We have been watching it for several years now. She had an MRI that showed it to be a hemorrhagic cyst, which should make her feel more comfortable that she is not developing a kidney cancer. She has chronic venous insufficiency, which is currently asymptomatic. She has symptoms suggestive of benign positional vertigo, if she turns her head to the right, she gets dizzy or has actual vertigo. We will give her the exercises to do. She will be back to four to six months, sooner if needed. I have discussed the diagnosis with the patient and the intended plan as seen in the  orders above. The patient understands and agees with the plan. The patient has   received an after visit summary and questions were answered concerning  future plans  Patient labs and/or xrays were reviewed  Past records were reviewed. PLAN:  .  Orders Placed This Encounter    URINALYSIS W/ RFLX MICROSCOPIC    CBC WITH AUTOMATED DIFF    METABOLIC PANEL, COMPREHENSIVE    LIPID PANEL    TSH 3RD GENERATION    HEMOGLOBIN A1C WITH EAG    clotrimazole-betamethasone (LOTRISONE) topical cream       Follow-up and Dispositions    · Return in about 4 months (around 12/20/2020). ATTENTION:   This medical record was transcribed using an electronic medical records system. Although proofread, it may and can contain electronic and spelling errors. Other human spelling and other errors may be present. Corrections may be executed at a later time. Please feel free to contact us for any clarifications as needed.

## 2020-08-21 LAB
ALBUMIN SERPL-MCNC: 4.1 G/DL (ref 3.6–4.6)
ALBUMIN/GLOB SERPL: 1.1 {RATIO} (ref 1.2–2.2)
ALP SERPL-CCNC: 85 IU/L (ref 39–117)
ALT SERPL-CCNC: 15 IU/L (ref 0–32)
APPEARANCE UR: CLEAR
AST SERPL-CCNC: 23 IU/L (ref 0–40)
BACTERIA #/AREA URNS HPF: NORMAL /[HPF]
BASOPHILS # BLD AUTO: 0.1 X10E3/UL (ref 0–0.2)
BASOPHILS NFR BLD AUTO: 1 %
BILIRUB SERPL-MCNC: 0.3 MG/DL (ref 0–1.2)
BILIRUB UR QL STRIP: NEGATIVE
BUN SERPL-MCNC: 22 MG/DL (ref 8–27)
BUN/CREAT SERPL: 17 (ref 12–28)
CALCIUM SERPL-MCNC: 9.1 MG/DL (ref 8.7–10.3)
CASTS URNS QL MICRO: NORMAL /LPF
CHLORIDE SERPL-SCNC: 105 MMOL/L (ref 96–106)
CHOLEST SERPL-MCNC: 148 MG/DL (ref 100–199)
CO2 SERPL-SCNC: 21 MMOL/L (ref 20–29)
COLOR UR: YELLOW
CREAT SERPL-MCNC: 1.27 MG/DL (ref 0.57–1)
EOSINOPHIL # BLD AUTO: 0.7 X10E3/UL (ref 0–0.4)
EOSINOPHIL NFR BLD AUTO: 7 %
EPI CELLS #/AREA URNS HPF: NORMAL /HPF (ref 0–10)
ERYTHROCYTE [DISTWIDTH] IN BLOOD BY AUTOMATED COUNT: 13.9 % (ref 11.7–15.4)
EST. AVERAGE GLUCOSE BLD GHB EST-MCNC: 100 MG/DL
GLOBULIN SER CALC-MCNC: 3.7 G/DL (ref 1.5–4.5)
GLUCOSE SERPL-MCNC: 96 MG/DL (ref 65–99)
GLUCOSE UR QL: NEGATIVE
HBA1C MFR BLD: 5.1 % (ref 4.8–5.6)
HCT VFR BLD AUTO: 35.3 % (ref 34–46.6)
HDLC SERPL-MCNC: 78 MG/DL
HGB BLD-MCNC: 12 G/DL (ref 11.1–15.9)
HGB UR QL STRIP: NEGATIVE
IMM GRANULOCYTES # BLD AUTO: 0 X10E3/UL (ref 0–0.1)
IMM GRANULOCYTES NFR BLD AUTO: 0 %
KETONES UR QL STRIP: NEGATIVE
LDLC SERPL CALC-MCNC: 54 MG/DL (ref 0–99)
LEUKOCYTE ESTERASE UR QL STRIP: NEGATIVE
LYMPHOCYTES # BLD AUTO: 1.5 X10E3/UL (ref 0.7–3.1)
LYMPHOCYTES NFR BLD AUTO: 16 %
MCH RBC QN AUTO: 27.1 PG (ref 26.6–33)
MCHC RBC AUTO-ENTMCNC: 34 G/DL (ref 31.5–35.7)
MCV RBC AUTO: 80 FL (ref 79–97)
MICRO URNS: ABNORMAL
MONOCYTES # BLD AUTO: 0.8 X10E3/UL (ref 0.1–0.9)
MONOCYTES NFR BLD AUTO: 9 %
NEUTROPHILS # BLD AUTO: 6 X10E3/UL (ref 1.4–7)
NEUTROPHILS NFR BLD AUTO: 67 %
NITRITE UR QL STRIP: NEGATIVE
PH UR STRIP: 6 [PH] (ref 5–7.5)
PLATELET # BLD AUTO: 348 X10E3/UL (ref 150–450)
POTASSIUM SERPL-SCNC: 3.7 MMOL/L (ref 3.5–5.2)
PROT SERPL-MCNC: 7.8 G/DL (ref 6–8.5)
PROT UR QL STRIP: ABNORMAL
RBC # BLD AUTO: 4.43 X10E6/UL (ref 3.77–5.28)
RBC #/AREA URNS HPF: NORMAL /HPF (ref 0–2)
SODIUM SERPL-SCNC: 141 MMOL/L (ref 134–144)
SP GR UR: 1.01 (ref 1–1.03)
TRIGL SERPL-MCNC: 78 MG/DL (ref 0–149)
TSH SERPL DL<=0.005 MIU/L-ACNC: 1.76 UIU/ML (ref 0.45–4.5)
UROBILINOGEN UR STRIP-MCNC: 0.2 MG/DL (ref 0.2–1)
VLDLC SERPL CALC-MCNC: 16 MG/DL (ref 5–40)
WBC # BLD AUTO: 9.1 X10E3/UL (ref 3.4–10.8)
WBC #/AREA URNS HPF: NORMAL /HPF (ref 0–5)

## 2020-09-15 RX ORDER — FLUTICASONE PROPIONATE AND SALMETEROL 250; 50 UG/1; UG/1
1 POWDER RESPIRATORY (INHALATION) EVERY 12 HOURS
Qty: 3 INHALER | Refills: 3 | Status: SHIPPED | OUTPATIENT
Start: 2020-09-15 | End: 2020-09-16 | Stop reason: SDUPTHER

## 2020-10-03 RX ORDER — NIFEDIPINE 60 MG/1
TABLET, EXTENDED RELEASE ORAL
Qty: 180 TAB | Refills: 3 | Status: SHIPPED | OUTPATIENT
Start: 2020-10-03 | End: 2020-12-01 | Stop reason: SDUPTHER

## 2020-10-03 RX ORDER — METOPROLOL SUCCINATE 50 MG/1
50 TABLET, EXTENDED RELEASE ORAL
Qty: 90 TAB | Refills: 3 | Status: SHIPPED | OUTPATIENT
Start: 2020-10-03 | End: 2021-08-25 | Stop reason: SDUPTHER

## 2020-11-13 RX ORDER — ALBUTEROL SULFATE 0.83 MG/ML
2.5 SOLUTION RESPIRATORY (INHALATION)
Qty: 300 NEBULE | Refills: 3 | Status: SHIPPED | OUTPATIENT
Start: 2020-11-13 | End: 2021-05-27 | Stop reason: SDUPTHER

## 2020-11-30 RX ORDER — PRAVASTATIN SODIUM 20 MG/1
TABLET ORAL
Qty: 90 TAB | Refills: 3 | Status: SHIPPED | OUTPATIENT
Start: 2020-11-30 | End: 2020-11-30 | Stop reason: SDUPTHER

## 2020-12-01 RX ORDER — NIFEDIPINE 60 MG/1
TABLET, EXTENDED RELEASE ORAL
Qty: 180 TAB | Refills: 3 | Status: SHIPPED | OUTPATIENT
Start: 2020-12-01 | End: 2020-12-11 | Stop reason: SDUPTHER

## 2020-12-01 RX ORDER — TELMISARTAN 80 MG/1
TABLET ORAL
Qty: 90 TAB | Refills: 3 | Status: SHIPPED | OUTPATIENT
Start: 2020-12-01 | End: 2021-02-17

## 2020-12-01 RX ORDER — PRAVASTATIN SODIUM 20 MG/1
TABLET ORAL
Qty: 90 TAB | Refills: 3 | Status: SHIPPED | OUTPATIENT
Start: 2020-12-01 | End: 2022-01-10

## 2020-12-03 RX ORDER — POTASSIUM CHLORIDE 20 MEQ/1
20 TABLET, EXTENDED RELEASE ORAL DAILY
Qty: 90 TAB | Refills: 3 | Status: SHIPPED | OUTPATIENT
Start: 2020-12-03 | End: 2021-08-25 | Stop reason: SDUPTHER

## 2020-12-11 RX ORDER — NIFEDIPINE 60 MG/1
TABLET, EXTENDED RELEASE ORAL
Qty: 180 TAB | Refills: 3 | Status: SHIPPED | OUTPATIENT
Start: 2020-12-11 | End: 2022-01-28

## 2020-12-18 ENCOUNTER — OFFICE VISIT (OUTPATIENT)
Dept: INTERNAL MEDICINE CLINIC | Age: 85
End: 2020-12-18
Payer: MEDICARE

## 2020-12-18 DIAGNOSIS — C50.911 MALIGNANT NEOPLASM OF RIGHT FEMALE BREAST, UNSPECIFIED ESTROGEN RECEPTOR STATUS, UNSPECIFIED SITE OF BREAST (HCC): ICD-10-CM

## 2020-12-18 DIAGNOSIS — M17.0 PRIMARY OSTEOARTHRITIS OF BOTH KNEES: ICD-10-CM

## 2020-12-18 DIAGNOSIS — E11.21 TYPE 2 DIABETES MELLITUS WITH NEPHROPATHY (HCC): ICD-10-CM

## 2020-12-18 DIAGNOSIS — I10 ESSENTIAL HYPERTENSION: Primary | ICD-10-CM

## 2020-12-18 DIAGNOSIS — D64.9 ANEMIA, UNSPECIFIED TYPE: ICD-10-CM

## 2020-12-18 DIAGNOSIS — R06.02 SOB (SHORTNESS OF BREATH): ICD-10-CM

## 2020-12-18 DIAGNOSIS — Z23 NEEDS FLU SHOT: ICD-10-CM

## 2020-12-18 DIAGNOSIS — H91.92 HEARING DEFICIT, LEFT: ICD-10-CM

## 2020-12-18 DIAGNOSIS — N18.31 STAGE 3A CHRONIC KIDNEY DISEASE (HCC): ICD-10-CM

## 2020-12-18 DIAGNOSIS — D58.0 HEREDITARY SPHEROCYTOSIS (HCC): ICD-10-CM

## 2020-12-18 DIAGNOSIS — I87.2 VENOUS INSUFFICIENCY OF BOTH LOWER EXTREMITIES: ICD-10-CM

## 2020-12-18 PROCEDURE — 1090F PRES/ABSN URINE INCON ASSESS: CPT | Performed by: INTERNAL MEDICINE

## 2020-12-18 PROCEDURE — G8536 NO DOC ELDER MAL SCRN: HCPCS | Performed by: INTERNAL MEDICINE

## 2020-12-18 PROCEDURE — G8419 CALC BMI OUT NRM PARAM NOF/U: HCPCS | Performed by: INTERNAL MEDICINE

## 2020-12-18 PROCEDURE — G8427 DOCREV CUR MEDS BY ELIG CLIN: HCPCS | Performed by: INTERNAL MEDICINE

## 2020-12-18 PROCEDURE — 99215 OFFICE O/P EST HI 40 MIN: CPT | Performed by: INTERNAL MEDICINE

## 2020-12-18 PROCEDURE — 1101F PT FALLS ASSESS-DOCD LE1/YR: CPT | Performed by: INTERNAL MEDICINE

## 2020-12-18 PROCEDURE — G8756 NO BP MEASURE DOC: HCPCS | Performed by: INTERNAL MEDICINE

## 2020-12-18 PROCEDURE — 36415 COLL VENOUS BLD VENIPUNCTURE: CPT | Performed by: INTERNAL MEDICINE

## 2020-12-18 PROCEDURE — G8399 PT W/DXA RESULTS DOCUMENT: HCPCS | Performed by: INTERNAL MEDICINE

## 2020-12-18 PROCEDURE — G8432 DEP SCR NOT DOC, RNG: HCPCS | Performed by: INTERNAL MEDICINE

## 2020-12-18 NOTE — PROGRESS NOTES
1. Have you been to the ER, urgent care clinic since your last visit? Hospitalized since your last visit? No    2. Have you seen or consulted any other health care providers outside of the [address] [place] [address] since your last visit? Include any pap smears or colon screening.  No

## 2020-12-18 NOTE — PROGRESS NOTES
SPORTS MEDICINE AND PRIMARY CARE  Anders Guthrie MD, [address] [address]  Phone:  [phone]  Fax: [phone]       Chief Complaint   Patient presents with    Hypertension   . SUBJECTIVE:    Gabriel Washburn is a 82 y.o. female Patient returns today with known history of primary hypertension, CKD, hearing deficit, venous insufficiency, DJD, anemia, diabetes with nephropathy, right breast cancer, hereditary spherocytosis, and is seen for evaluation. Patient returns today with several concerns. She has a knot under her right arm, the ipsilateral breast where she had breast surgery. She still has a lump in her back that Dr. Betty Trinidad and myself said was a lipoma. She has pain in her foot, but dropped a baking dish on it the other day. She wants a flu shot. She is having seven treatments a day with her nebulizer and wonders how long she is going to have to do that. We have been treating her for bronchial asthma. She had an echocardiogram in January as we were looking for other causes of the shortness of breath, and she had a normal ejection fraction of 60-65%, no regional wall motion abnormalities and trial mitral regurgitation with no evidence of cardiac decompensation. She has had stress test and echo stress test, which were also negative. Patient is seen for evaluation. Current Outpatient Medications   Medication Sig Dispense Refill    NIFEdipine ER (PROCARDIA XL) 60 mg ER tablet TAKE 1 TAB TWICE A  Tab 3    potassium chloride (K-DUR, KLOR-CON) 20 mEq tablet Take 1 Tab by mouth daily. TAKE 1 TABLET DAILY 90 Tab 3    telmisartan (Micardis) 80 mg tablet TAKE 1 TABLET DAILY 90 Tab 3    pravastatin (PRAVACHOL) 20 mg tablet TAKE 1 TABLET ONCE EACH NIGHT 90 Tab 3    albuterol (PROVENTIL VENTOLIN) 2.5 mg /3 mL (0.083 %) nebu 3 mL by Nebulization route every four (4) hours as needed for Wheezing.  300 Nebule 3    metoprolol succinate (Toprol XL) 50 mg XL tablet Take 1 Tab by mouth nightly. 90 Tab 3    fluticasone propion-salmeteroL (ADVAIR/WIXELA) 250-50 mcg/dose diskus inhaler Take 1 Puff by inhalation every twelve (12) hours. 1 Inhaler 11    clotrimazole-betamethasone (LOTRISONE) topical cream Apply  to affected area two (2) times a day. apply thin layer topically 2 times daily 45 g 14    oxybutynin chloride XL (DITROPAN XL) 10 mg CR tablet TAKE 1 TABLET DAILY 90 Tab 3    arformoteroL (BROVANA) 15 mcg/2 mL nebu neb solution 2 mL by Nebulization route every twelve (12) hours. 180 Vial 3    budesonide (PULMICORT) 0.5 mg/2 mL nbsp 2 mL by Nebulization route two (2) times a day. 180 Each 3    lamoTRIgine (LAMICTAL) 25 mg tablet TAKE 1 TABLET TWICE A  Tab 4    polyethylene glycol (MIRALAX) 17 gram packet Take 1 Packet by mouth two (2) times a day. 60 Packet 11    ibandronate (BONIVA) 150 mg tablet TAKE 1 TABLET ONCE A MONTH (Patient taking differently: TAKE 1 TABLET orally ONCE A MONTH) 3 Tab 4    hydroCHLOROthiazide (HYDRODIURIL) 12.5 mg tablet Take 12.5 mg by mouth every Monday, Wednesday, Friday.  fluticasone (FLONASE) 50 mcg/actuation nasal spray 2 Sprays by Both Nostrils route two (2) times a day. by inhalation       calcium carbonate (TUMS PO) Take 500 mg by mouth as needed.  albuterol (PROVENTIL HFA, VENTOLIN HFA, PROAIR HFA) 90 mcg/actuation inhaler Take 1 Puff by inhalation every four (4) hours as needed for Wheezing. 3 Inhaler 3    aspirin delayed-release 81 mg tablet Take 81 mg by mouth daily.        Past Medical History:   Diagnosis Date    Anemia     Arrhythmia     irregular per pt d/t blood disorder    Asthma     Bereavement 2-2-16     die 82 copd,chf,pul htn pn after 48 yr marriage    BPV (benign positional vertigo)     BPV (benign positional vertigo) 08/20/2020    Breast cancer, right breast ([address] 1994    right breast, lumpectomy and radiation    Breast lump 2017    right breast     Chronic kidney disease 02/06/2019    kidney cyst - watching    Diabetes [facility] [address]     controlled with diet    DJD (degenerative joint disease) of knee     Early satiety     Hearing deficit, left     Hereditary spherocytosis (HCC)     HTN (hypertension)     Ill-defined condition     sickle cell trait    Intrahepatic bile duct dilation 09/05/2018    mri    Radiation therapy complication 0767    right breast     Renal cyst 08/2016    ct rt - MRI 9/5/18 a hemorrhagic cyst in the right lower pole a layering hemorrhagic component. There are no solid renal masses     S/P colonoscopy 4-9-14    md Brian - family hx    S/P colonoscopy 07/20/2016    rt - md brian diverticulosis    Seizures ([facility] [address] [facility]     Septic arthritis ([address] 3/11    Streptococcal sepsis ([facility] [address] 03/2011    Venous insufficiency of both lower extremities      Past Surgical History:   Procedure Laterality Date    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 Weak.  Other Plant, Animal, Environmental Other (comments)     Dust and mold allergy.          REVIEW OF SYSTEMS:  General: negative for - chills or fever  ENT: negative for - headaches, nasal congestion or tinnitus  Respiratory: negative for - cough, hemoptysis, shortness of breath or wheezing  Cardiovascular : negative for - chest pain, edema, palpitations or shortness of breath  Gastrointestinal: negative for - abdominal pain, blood in stools, heartburn or nausea/vomiting  Genito-Urinary: no dysuria, trouble voiding, or hematuria  Musculoskeletal: negative for - gait disturbance, joint pain, joint stiffness or joint swelling  Neurological: no TIA or stroke symptoms  Hematologic: no bruises, no bleeding, no swollen glands  Integument: no lumps, mole changes, nail changes or rash  Endocrine: no malaise/lethargy or unexpected weight changes      Social History     Socioeconomic History    Marital status:      Spouse name: Not on file    Number of children: Not on file    Years of education: Not on file    Highest education level: Not on file   Tobacco Use    Smoking status: Never Smoker    Smokeless tobacco: Never Used   Substance and Sexual Activity    Alcohol use: No    Drug use: No    Sexual activity: Not Currently   Social History Narrative    Medical History: Streptococcus pneumoniae septicemia 11Septic polyarthritis 11HSV2 herpes simplex    akkcttifiwiioaitmbn75/02/11Seizure d/o 11Osteoporosis 2011primary HTN 1990Bronchial asthma 1985Obesity 2002Carcinoma of    breast 04Congential spherocytosis    Gyn History: Last mammogram date 2013. OB History: Total pregnancies 2. Live births 2. Surgical History: normal stress myocardial perfusion scan, EF 68% 08splenectomy 1972cholecystectomy 1985colonoscopy 2008internal hemorrhoids diverticulosis lumpectomy radiation therapy colonoscopy diverticulosis Tania Santacruz M.D. 2014        Hospitalization/Major Diagnostic Procedure: streptococcus pneumoniae septicemia ronchial asthma         Family History: Mother:  80 yrs, DM, Daun Mars:  80 yrs, heart disease, aortic stenosisSister(s): , colon CA,polypsBrother(s): , at birthUncle: alive, colon CA, heart disease2 sister(s) [employer] . 1 son(s) , 1 daughter(s) . Social History: Alcohol Use Patient does not use alcohol. Smoking Status Patient is a never smoker. Marital Status:  2016, 46 yrs copd,chf. Children: Her son grandchild's mother is temporarily incarcerated and she is caring for the 10 yo. Occupation/W ork: retired teacher. Education/School: has college diploma-[employer].  Mandaen: [place] [employer].     Family History   Problem Relation Age of Onset    Diabetes Mother     Other Father         'sphero'-blood disorder    Cancer Sister         breast    Breast Cancer Sister 71    Other Sister         spleen removed    Colon Cancer Sister     Other Other         spleen removed    Gall Bladder Disease Other        OBJECTIVE:    There were no vitals taken for this visit. CONSTITUTIONAL: well , well nourished, appears age appropriate  EYES: perrla, eom intact  ENMT:moist mucous membranes, pharynx clear  NECK: supple. Thyroid normal  RESPIRATORY: Chest: clear bilaterally   CARDIOVASCULAR: Heart: regular rate and rhythm  GASTROINTESTINAL: Abdomen: soft, bowel sounds active  HEMATOLOGIC: no pathological lymph nodes palpated  MUSCULOSKELETAL: Extremities: no edema, pulse 1+   INTEGUMENT: No unusual rashes or suspicious skin lesions noted. Nails appear normal.  NEUROLOGIC: non-focal exam   MENTAL STATUS: alert and oriented, appropriate affect           ASSESSMENT:  1. Essential hypertension    2. Hearing deficit, left    3. Venous insufficiency of both lower extremities    4. Primary osteoarthritis of both knees    5. Anemia, unspecified type    6. Type 2 diabetes mellitus with nephropathy ([address]    7. Malignant neoplasm of right female breast, unspecified estrogen receptor status, unspecified site of breast ([address]    8. Hereditary spherocytosis (HCC)    9. Stage 3a chronic kidney disease    10. SOB (shortness of breath)      Blood pressure control at home is much lower than it is here, it is 138/70 at home, we note. She still has a left hearing deficit. No symptoms of venous insufficiency. For the arthritis of the knees, we recommend Tylenol. She has a history of anemia, but her last blood count was 12 and it was completely normal.    Type 2 diabetes is questionable. Hemoglobin A1c's have been normal. The other day her blood sugar was 127.      I am concerned about a lymph node in the right axillary tail and therefore we will refer her to an oncological surgeon, as well as set her up with a new oncologist.  I think the lymph node needs to be removed. This is something that she did not have last year and she had it when she had the mammograms and it was not seen on the mammograms. Hereditary spherocytosis remains stable. She is stage 3A CKD. We will check a BMP because of the SOB that is bothersome to me, although on exam she has no wheezing. She will be back to see me within a month. Patient has a lymph node in the right axilla and for the that reason we asked that she see a surgeon for possible excision. Also asked to see oncology for their opinion. I have discussed the diagnosis with the patient and the intended plan as seen in the  Orders. The patient understands and agees with the plan. The patient has   received an after visit summary and questions were answered concerning  future plans  Patient labs and/or xrays were reviewed  Past records were reviewed. PLAN:  .  Orders Placed This Encounter    CBC WITH AUTOMATED DIFF    HEMOGLOBIN A1C WITH EAG    RENAL FUNCTION PANEL    NT-PRO BNP    REFERRAL TO ONCOLOGY   [facility]       Follow-up and Dispositions    · Return in about 4 weeks (around 1/15/2021). ATTENTION:   This medical record was transcribed using an electronic medical records system. Although proofread, it may and can contain electronic and spelling errors. Other human spelling and other errors may be present. Corrections may be executed at a later time. Please feel free to contact us for any clarifications as needed.

## 2020-12-22 LAB
ALBUMIN SERPL-MCNC: 4 G/DL (ref 3.5–5)
ANION GAP SERPL CALC-SCNC: 6 MMOL/L (ref 5–15)
BASOPHILS # BLD: 0.1 K/UL (ref 0–0.1)
BASOPHILS NFR BLD: 1 % (ref 0–1)
BNP SERPL-MCNC: 175 PG/ML
BUN SERPL-MCNC: 25 MG/DL (ref 6–20)
BUN/CREAT SERPL: 15 (ref 12–20)
CALCIUM SERPL-MCNC: 9.7 MG/DL (ref 8.5–10.1)
CHLORIDE SERPL-SCNC: 104 MMOL/L (ref 97–108)
CO2 SERPL-SCNC: 26 MMOL/L (ref 21–32)
CREAT SERPL-MCNC: 1.63 MG/DL (ref 0.55–1.02)
DIFFERENTIAL METHOD BLD: ABNORMAL
EOSINOPHIL # BLD: 1.1 K/UL (ref 0–0.4)
EOSINOPHIL NFR BLD: 11 % (ref 0–7)
ERYTHROCYTE [DISTWIDTH] IN BLOOD BY AUTOMATED COUNT: 13.2 % (ref 11.5–14.5)
EST. AVERAGE GLUCOSE BLD GHB EST-MCNC: 94 MG/DL
GLUCOSE SERPL-MCNC: 85 MG/DL (ref 65–100)
HBA1C MFR BLD: 4.9 % (ref 4–5.6)
HCT VFR BLD AUTO: 36.5 % (ref 35–47)
HGB BLD-MCNC: 11.6 G/DL (ref 11.5–16)
IMM GRANULOCYTES # BLD AUTO: 0 K/UL (ref 0–0.04)
IMM GRANULOCYTES NFR BLD AUTO: 0 % (ref 0–0.5)
LYMPHOCYTES # BLD: 1.6 K/UL (ref 0.8–3.5)
LYMPHOCYTES NFR BLD: 17 % (ref 12–49)
MCH RBC QN AUTO: 27.3 PG (ref 26–34)
MCHC RBC AUTO-ENTMCNC: 31.8 G/DL (ref 30–36.5)
MCV RBC AUTO: 85.9 FL (ref 80–99)
MONOCYTES # BLD: 0.9 K/UL (ref 0–1)
MONOCYTES NFR BLD: 9 % (ref 5–13)
NEUTS SEG # BLD: 5.9 K/UL (ref 1.8–8)
NEUTS SEG NFR BLD: 62 % (ref 32–75)
NRBC # BLD: 0 K/UL (ref 0–0.01)
NRBC BLD-RTO: 0 PER 100 WBC
PHOSPHATE SERPL-MCNC: 3.8 MG/DL (ref 2.6–4.7)
PLATELET # BLD AUTO: 461 K/UL (ref 150–400)
PMV BLD AUTO: 10.6 FL (ref 8.9–12.9)
POTASSIUM SERPL-SCNC: 4.1 MMOL/L (ref 3.5–5.1)
RBC # BLD AUTO: 4.25 M/UL (ref 3.8–5.2)
RBC MORPH BLD: ABNORMAL
SODIUM SERPL-SCNC: 136 MMOL/L (ref 136–145)
WBC # BLD AUTO: 9.6 K/UL (ref 3.6–11)

## 2020-12-22 PROCEDURE — 90694 VACC AIIV4 NO PRSRV 0.5ML IM: CPT | Performed by: INTERNAL MEDICINE

## 2020-12-22 PROCEDURE — G0008 ADMIN INFLUENZA VIRUS VAC: HCPCS | Performed by: INTERNAL MEDICINE

## 2021-01-04 ENCOUNTER — OFFICE VISIT (OUTPATIENT)
Dept: SURGERY | Age: 86
End: 2021-01-04
Payer: MEDICARE

## 2021-01-04 VITALS
SYSTOLIC BLOOD PRESSURE: 140 MMHG | TEMPERATURE: 99 F | HEART RATE: 79 BPM | DIASTOLIC BLOOD PRESSURE: 64 MMHG | OXYGEN SATURATION: 97 % | RESPIRATION RATE: 16 BRPM | WEIGHT: 158.2 LBS | BODY MASS INDEX: 28.94 KG/M2

## 2021-01-04 DIAGNOSIS — R59.0 AXILLARY ADENOPATHY: Primary | ICD-10-CM

## 2021-01-04 PROCEDURE — G8419 CALC BMI OUT NRM PARAM NOF/U: HCPCS | Performed by: SURGERY

## 2021-01-04 PROCEDURE — G8427 DOCREV CUR MEDS BY ELIG CLIN: HCPCS | Performed by: SURGERY

## 2021-01-04 PROCEDURE — G8399 PT W/DXA RESULTS DOCUMENT: HCPCS | Performed by: SURGERY

## 2021-01-04 PROCEDURE — 1090F PRES/ABSN URINE INCON ASSESS: CPT | Performed by: SURGERY

## 2021-01-04 PROCEDURE — G8536 NO DOC ELDER MAL SCRN: HCPCS | Performed by: SURGERY

## 2021-01-04 PROCEDURE — 99203 OFFICE O/P NEW LOW 30 MIN: CPT | Performed by: SURGERY

## 2021-01-04 PROCEDURE — G8753 SYS BP > OR = 140: HCPCS | Performed by: SURGERY

## 2021-01-04 PROCEDURE — 1101F PT FALLS ASSESS-DOCD LE1/YR: CPT | Performed by: SURGERY

## 2021-01-04 PROCEDURE — G8510 SCR DEP NEG, NO PLAN REQD: HCPCS | Performed by: SURGERY

## 2021-01-04 PROCEDURE — G8754 DIAS BP LESS 90: HCPCS | Performed by: SURGERY

## 2021-01-04 RX ORDER — IBANDRONATE SODIUM 150 MG/1
TABLET, FILM COATED ORAL
Qty: 3 TAB | Refills: 3 | Status: SHIPPED | OUTPATIENT
Start: 2021-01-04 | End: 2022-01-17

## 2021-01-04 NOTE — PROGRESS NOTES
Subjective:      Char Mcdaniel  is a 82 y.o. female referred by Dr. Tyree Lackey for evaluation of RIGHT axillary LN. Pt with history of RIGHT breats cancer diagnosed in 1994 and was treated with RIGHT breast lumpectomy and XRT therapy. Pt was initially seen in this office in 2010 for evaluation of palpable denisties that were determined to be fibrocystic changes. During her last visit with her PCP, pt notes \"knot\" in the RIGHT axilla, which she is concerned about due to her personal history of breast cancer. Pt most recent mammogram on 03/19/20 showed palpable abnormality marker on the UOQ of the RIGHT breast/axillary tail with no visble underlying mammographic abnormality. Targets US showed normal appearing breast parenchyma without cystic or solid masses. Today, pt reports she \"has itchy bites all over her\" after getting a new mattress from her son. She reports she did not have as many bites as she currently does when she met with Dr. Tyree Lackey, or would have had them evaluated sooner. Pt notes plans to have her house fumigated in the near future. Pt reports she has some cardiac issues along with asthma, and is planning on having stress test done in the near future.       Past Medical History:   Diagnosis Date    Anemia     Arrhythmia     irregular per pt d/t blood disorder    Asthma     Axillary adenopathy 1/4/2021    Bereavement 2-2-16     die 82 copd,chf,pul htn pn after 48 yr marriage    BPV (benign positional vertigo)     BPV (benign positional vertigo) 08/20/2020    Breast cancer, right breast ([address] 1994    right breast, lumpectomy and radiation    Breast lump 2017    right breast     Chronic kidney disease 02/06/2019    kidney cyst - watching    Diabetes ([address]     controlled with diet    DJD (degenerative joint disease) of knee     Early satiety     Hearing deficit, left     Hereditary spherocytosis (HCC)     HTN (hypertension)     Ill-defined condition sickle cell trait    Intrahepatic bile duct dilation 09/05/2018    mri    Radiation therapy complication 1994    right breast     Renal cyst 08/2016    ct rt - MRI 9/5/18 a hemorrhagic cyst in the right lower pole a layering hemorrhagic component. There are no solid renal masses     S/P colonoscopy 4-9-14    md Brian - family hx    S/P colonoscopy 07/20/2016    rt - md brian diverticulosis    Seizures ([address]     Septic arthritis ([address] 3/11    Streptococcal sepsis ([address] 03/2011    Venous insufficiency of both lower extremities        Past Surgical History:   Procedure Laterality Date    HX BREAST BIOPSY Right 1994    positive    HX BREAST LUMPECTOMY Right 1994    HX CHOLECYSTECTOMY  1985    [place] 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       Social History     Tobacco Use    Smoking status: Never Smoker    Smokeless tobacco: Never Used   Substance Use Topics    Alcohol use: No       Family History   Problem Relation Age of Onset    Diabetes Mother     Other Father         'sphero'-blood disorder    Cancer Sister         breast    Breast Cancer Sister 71    Other Sister         spleen removed    Colon Cancer Sister     Other Other         spleen removed    Gall Bladder Disease Other        Current Outpatient Medications on File Prior to Visit   Medication Sig Dispense Refill    NIFEdipine ER (PROCARDIA XL) 60 mg ER tablet TAKE 1 TAB TWICE A  Tab 3    potassium chloride (K-DUR, KLOR-CON) 20 mEq tablet Take 1 Tab by mouth daily. TAKE 1 TABLET DAILY 90 Tab 3    telmisartan (Micardis) 80 mg tablet TAKE 1 TABLET DAILY 90 Tab 3    pravastatin (PRAVACHOL) 20 mg tablet TAKE 1 TABLET ONCE EACH NIGHT 90 Tab 3    albuterol (PROVENTIL VENTOLIN) 2.5 mg /3 mL (0.083 %) nebu 3 mL by Nebulization route every four (4) hours as needed for Wheezing. 300 Nebule 3    metoprolol succinate (Toprol XL) 50 mg XL tablet Take 1 Tab by mouth nightly.  90 Tab 3    fluticasone propion-salmeteroL (ADVAIR/WIXELA) 250-50 mcg/dose diskus inhaler Take 1 Puff by inhalation every twelve (12) hours. 1 Inhaler 11    clotrimazole-betamethasone (LOTRISONE) topical cream Apply  to affected area two (2) times a day. apply thin layer topically 2 times daily 45 g 14    oxybutynin chloride XL (DITROPAN XL) 10 mg CR tablet TAKE 1 TABLET DAILY 90 Tab 3    arformoteroL (BROVANA) 15 mcg/2 mL nebu neb solution 2 mL by Nebulization route every twelve (12) hours. 180 Vial 3    budesonide (PULMICORT) 0.5 mg/2 mL nbsp 2 mL by Nebulization route two (2) times a day. 180 Each 3    lamoTRIgine (LAMICTAL) 25 mg tablet TAKE 1 TABLET TWICE A  Tab 4    ibandronate (BONIVA) 150 mg tablet TAKE 1 TABLET ONCE A MONTH (Patient taking differently: TAKE 1 TABLET orally ONCE A MONTH) 3 Tab 4    hydroCHLOROthiazide (HYDRODIURIL) 12.5 mg tablet Take 12.5 mg by mouth every Monday, Wednesday, Friday.  fluticasone (FLONASE) 50 mcg/actuation nasal spray 2 Sprays by Both Nostrils route two (2) times a day. by inhalation       calcium carbonate (TUMS PO) Take 500 mg by mouth as needed.  albuterol (PROVENTIL HFA, VENTOLIN HFA, PROAIR HFA) 90 mcg/actuation inhaler Take 1 Puff by inhalation every four (4) hours as needed for Wheezing. 3 Inhaler 3    aspirin delayed-release 81 mg tablet Take 81 mg by mouth daily.  polyethylene glycol (MIRALAX) 17 gram packet Take 1 Packet by mouth two (2) times a day. 60 Packet 11     No current facility-administered medications on file prior to visit. Allergies   Allergen Reactions    Codeine Nausea and Vomiting and Other (comments)     Upset stomach. Weak.  Other Plant, Animal, Environmental Other (comments)     Dust and mold allergy. Review of Systems:    A comprehensive review of systems was negative except for that written in the History of Present Illness.     Objective:     Visit Vitals  BP (!) 140/64 (BP 1 Location: Right arm, BP Patient Position: Sitting)   Pulse 79 Temp 99 °F (37.2 °C) (Oral)   Resp 16   Wt 158 lb 3.2 oz (71.8 kg)   SpO2 97%   BMI 28.94 kg/m²        Physical Exam:  LUNG: clear to auscultation bilaterally  HEART: regular rate and rhythm, S1, S2 normal, no murmur, click, rub or gallop  SKIN: Multiple rash like lesions throughout LEONEL arms and chest.   BREAST: RIGHT breast has well healed incision s/p lumpectomy with no evidence of recurrence. LEFT breast is normal to inspection and palpation. LYMPHATIC: 3 cm soft LN in the RIGHT axilla. Labs: No results found for this or any previous visit (from the past 24 hour(s)). Data Review:      [facility] 3D screening mammogram 03/19/20:   FINDINGS:  There is a palpable abnormality marker on the upper, outer quadrant of the right  breast/axillary tail. There is no visible underlying mammographic abnormality. No visualized mass, suspicious microcalcification or architectural distortion. Targeted right breast ultrasound: Normal appearing breast parenchyma without  cystic or solid masses      Assessment and Plan:       ICD-10-CM ICD-9-CM    1. Axillary adenopathy  R59.0 785. 6        Will plan for RIGHT axillary LN excision, after pt's skin issue has resolve and after pt has her stress test. Reviewed the details of this procedure and what pt should expect for recovery. This will be an outpatient procedure and pt will need a  to take her home following surgery. Pt will call to schedule. All questions were answered and pt is in agreement with this plan. The patient was counseled at length about the risks of cecille Covid-19 during their perioperative period and any recovery window from their procedure. The patient was made aware that cecille Covid-19  may worsen their prognosis for recovering from their procedure and lend to a higher morbidity and/or mortality risk. All material risks, benefits, and reasonable alternatives including postponing the procedure were discussed.  The patient does  wish to proceed with the procedure at this time. Total face to face time with patient: 20 minutes. Greater than 50% of the time was spent in counseling. This document was scribed by Janessa Naik as dictated by Dr. Marianne Tolbert.      Signed By: Rigoberto House MD     01/04/21

## 2021-01-04 NOTE — LETTER
1/4/2021 Patient: Samson Jennings YOB: 1935 Date of Visit: 1/4/2021 Yanelis Saez MD 
[address] [address] [place] [address] [address] [employer] Dear Yanelis Saez MD, Thank you for referring Ms. Jossy Booker to [address] [facility] [place] [address] for evaluation. My notes for this consultation are attached. If you have questions, please do not hesitate to call me. I look forward to following your patient along with you. Sincerely, Lux Johnson MD

## 2021-01-04 NOTE — PROGRESS NOTES
1. Have you been to the ER, urgent care clinic since your last visit? Hospitalized since your last visit? No    2. Have you seen or consulted any other health care providers outside of the [address] [place] [address] since your last visit? Include any pap smears or colon screening.   No

## 2021-01-05 ENCOUNTER — TELEPHONE (OUTPATIENT)
Dept: SURGERY | Age: 86
End: 2021-01-05

## 2021-01-05 ENCOUNTER — TELEPHONE (OUTPATIENT)
Dept: ONCOLOGY | Age: 86
End: 2021-01-05

## 2021-01-05 NOTE — TELEPHONE ENCOUNTER
Dr. Charlette Bui office called and stated that he did tell her to cancer her appointment at this time. Nurse stated that they are going to do a biopsy and they will call at a later time to reschedule her if needed. Appointment canceled.
Patient left a voicemail that she saw Dr. Ion Willingham yesterday and was told that today's appointment with Dr. Kandice Brown was not needed and she needs to cancel it. Called Dr. Yuridia Mccurdy office to verify. Waiting for a call back from the nurse.
Well-developed, well nourished

## 2021-01-05 NOTE — TELEPHONE ENCOUNTER
Sheryle Dus from Dr Skyler Faye office calling to discuss patient. Stated that patient called to cancel appointment with office because Dr Edis Marrero told her she could and Sheryle Dus just wanted to confirm that needed to be done.

## 2021-01-05 NOTE — TELEPHONE ENCOUNTER
Returned call to Jordon Davey informed her per Dr. Barillas Spotted he asked patient to cancel until after she has biopsy and is cleared with possible bed bug situation. Jordon Davey expressed understanding will return call if needed.

## 2021-01-09 NOTE — PROGRESS NOTES
After obtaining consent, and per orders of Dr. Heena Sheets, injection of Chaim given by Steve Dolan LPN. Patient instructed to remain in clinic for 20 minutes afterwards, and to report any adverse reaction to me immediately.

## 2021-01-18 ENCOUNTER — OFFICE VISIT (OUTPATIENT)
Dept: INTERNAL MEDICINE CLINIC | Age: 86
End: 2021-01-18
Payer: MEDICARE

## 2021-01-18 VITALS
DIASTOLIC BLOOD PRESSURE: 72 MMHG | SYSTOLIC BLOOD PRESSURE: 146 MMHG | WEIGHT: 159.8 LBS | TEMPERATURE: 97.9 F | RESPIRATION RATE: 18 BRPM | HEART RATE: 76 BPM | OXYGEN SATURATION: 96 % | BODY MASS INDEX: 29.4 KG/M2 | HEIGHT: 62 IN

## 2021-01-18 DIAGNOSIS — E11.21 TYPE 2 DIABETES MELLITUS WITH NEPHROPATHY (HCC): ICD-10-CM

## 2021-01-18 DIAGNOSIS — D58.0 HEREDITARY SPHEROCYTOSIS (HCC): ICD-10-CM

## 2021-01-18 DIAGNOSIS — I10 ESSENTIAL HYPERTENSION: ICD-10-CM

## 2021-01-18 DIAGNOSIS — C50.911 MALIGNANT NEOPLASM OF RIGHT FEMALE BREAST, UNSPECIFIED ESTROGEN RECEPTOR STATUS, UNSPECIFIED SITE OF BREAST (HCC): ICD-10-CM

## 2021-01-18 DIAGNOSIS — J45.909 UNCOMPLICATED ASTHMA, UNSPECIFIED ASTHMA SEVERITY, UNSPECIFIED WHETHER PERSISTENT: ICD-10-CM
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
## 2021-01-18 DIAGNOSIS — Z00.00 MEDICARE ANNUAL WELLNESS VISIT, SUBSEQUENT: Primary | ICD-10-CM

## 2021-01-18 DIAGNOSIS — Z13.31 SCREENING FOR DEPRESSION: ICD-10-CM

## 2021-01-18 DIAGNOSIS — R59.0 AXILLARY ADENOPATHY: ICD-10-CM
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
## 2021-01-18 PROCEDURE — G8753 SYS BP > OR = 140: HCPCS | Performed by: INTERNAL MEDICINE

## 2021-01-18 PROCEDURE — G8536 NO DOC ELDER MAL SCRN: HCPCS | Performed by: INTERNAL MEDICINE

## 2021-01-18 PROCEDURE — 36415 COLL VENOUS BLD VENIPUNCTURE: CPT | Performed by: INTERNAL MEDICINE

## 2021-01-18 PROCEDURE — 1101F PT FALLS ASSESS-DOCD LE1/YR: CPT | Performed by: INTERNAL MEDICINE

## 2021-01-18 PROCEDURE — G8432 DEP SCR NOT DOC, RNG: HCPCS | Performed by: INTERNAL MEDICINE

## 2021-01-18 PROCEDURE — G8399 PT W/DXA RESULTS DOCUMENT: HCPCS | Performed by: INTERNAL MEDICINE

## 2021-01-18 PROCEDURE — 99213 OFFICE O/P EST LOW 20 MIN: CPT | Performed by: INTERNAL MEDICINE

## 2021-01-18 PROCEDURE — 1090F PRES/ABSN URINE INCON ASSESS: CPT | Performed by: INTERNAL MEDICINE

## 2021-01-18 PROCEDURE — G8419 CALC BMI OUT NRM PARAM NOF/U: HCPCS | Performed by: INTERNAL MEDICINE

## 2021-01-18 PROCEDURE — G0439 PPPS, SUBSEQ VISIT: HCPCS | Performed by: INTERNAL MEDICINE

## 2021-01-18 PROCEDURE — G8427 DOCREV CUR MEDS BY ELIG CLIN: HCPCS | Performed by: INTERNAL MEDICINE

## 2021-01-18 PROCEDURE — G8754 DIAS BP LESS 90: HCPCS | Performed by: INTERNAL MEDICINE

## 2021-01-18 NOTE — PROGRESS NOTES
SPORTS MEDICINE AND PRIMARY CARE  Raúl Knight MD, [address] [address]  Phone:  [phone]  Fax: [phone]      Chief Complaint   Patient presents with    Annual Wellness Visit         SUBECTIVE:    Stone Albarran is a 82 y.o. female Patient returns today after a visit with Fidel Benavides MD for the lymph node evaluation. He plans to do an excisional biopsy after the bed bug infestation has resolved. She has a known history of hereditary spherocytosis, right breast cancer, type 2 diabetes with nephropathy and bronchial asthma and is seen for evaluation. Patient returns today saying she has now used [employer], alcohol, peroxide, lavender for washing in an effort to get rid of these bed bugs and get her skin to come back to normal.  She exterminated her house and she thinks she is completely free of the infestation. She is waiting for her biopsy. She saw Dr. Kurtis Coates, who is going to do a stress test on Friday and assuming that is negative and skin is cleared up, she will reschedule the appointment for her biopsy. She also canceled the appointment with Dr. Sunshine Alexander because obviously biopsy has not been done. She was concerned about her white count being elevated, which it was last year, but not this year. Patient is seen for evaluation. Current Outpatient Medications   Medication Sig Dispense Refill    ibandronate (BONIVA) 150 mg tablet TAKE 1 TABLET ONCE A MONTH 3 Tab 3    NIFEdipine ER (PROCARDIA XL) 60 mg ER tablet TAKE 1 TAB TWICE A  Tab 3    potassium chloride (K-DUR, KLOR-CON) 20 mEq tablet Take 1 Tab by mouth daily. TAKE 1 TABLET DAILY 90 Tab 3    telmisartan (Micardis) 80 mg tablet TAKE 1 TABLET DAILY 90 Tab 3    pravastatin (PRAVACHOL) 20 mg tablet TAKE 1 TABLET ONCE EACH NIGHT 90 Tab 3    albuterol (PROVENTIL VENTOLIN) 2.5 mg /3 mL (0.083 %) nebu 3 mL by Nebulization route every four (4) hours as needed for Wheezing.  300 Nebule 3    metoprolol succinate (Toprol XL) 50 mg XL tablet Take 1 Tab by mouth nightly. 90 Tab 3    fluticasone propion-salmeteroL (ADVAIR/WIXELA) 250-50 mcg/dose diskus inhaler Take 1 Puff by inhalation every twelve (12) hours. 1 Inhaler 11    clotrimazole-betamethasone (LOTRISONE) topical cream Apply  to affected area two (2) times a day. apply thin layer topically 2 times daily 45 g 14    oxybutynin chloride XL (DITROPAN XL) 10 mg CR tablet TAKE 1 TABLET DAILY 90 Tab 3    arformoteroL (BROVANA) 15 mcg/2 mL nebu neb solution 2 mL by Nebulization route every twelve (12) hours. 180 Vial 3    budesonide (PULMICORT) 0.5 mg/2 mL nbsp 2 mL by Nebulization route two (2) times a day. 180 Each 3    lamoTRIgine (LAMICTAL) 25 mg tablet TAKE 1 TABLET TWICE A  Tab 4    polyethylene glycol (MIRALAX) 17 gram packet Take 1 Packet by mouth two (2) times a day. 60 Packet 11    hydroCHLOROthiazide (HYDRODIURIL) 12.5 mg tablet Take 12.5 mg by mouth every Monday, Wednesday, Friday.  fluticasone (FLONASE) 50 mcg/actuation nasal spray 2 Sprays by Both Nostrils route two (2) times a day. by inhalation       calcium carbonate (TUMS PO) Take 500 mg by mouth as needed.  albuterol (PROVENTIL HFA, VENTOLIN HFA, PROAIR HFA) 90 mcg/actuation inhaler Take 1 Puff by inhalation every four (4) hours as needed for Wheezing. 3 Inhaler 3    aspirin delayed-release 81 mg tablet Take 81 mg by mouth daily.        Past Medical History:   Diagnosis Date    Anemia     Arrhythmia     irregular per pt d/t blood disorder    Asthma     Axillary adenopathy 1/4/2021    Bed bug bite 01/18/2021    Bereavement 2-2-16     die 82 copd,chf,pul htn pn after 48 yr marriage    BPV (benign positional vertigo)     BPV (benign positional vertigo) 08/20/2020    Breast cancer, right breast ([facility] 1994    right breast, lumpectomy and radiation    Breast lump 2017    right breast     Chronic kidney disease 02/06/2019    kidney cyst - watching    Diabetes ([facility] [facility]     controlled with diet    DJD (degenerative joint disease) of knee     Early satiety     Hearing deficit, left     Hereditary spherocytosis (HCC)     HTN (hypertension)     Ill-defined condition     sickle cell trait    Intrahepatic bile duct dilation 09/05/2018    mri    Radiation therapy complication 7711    right breast     Renal cyst 08/2016    ct rt - MRI 9/5/18 a hemorrhagic cyst in the right lower pole a layering hemorrhagic component. There are no solid renal masses     S/P colonoscopy 4-9-14    md Brian - family hx    S/P colonoscopy 07/20/2016    rt - md brian diverticulosis    Seizures ([address]     Septic arthritis ([address] 3/11    Streptococcal sepsis ([address] 03/2011    Venous insufficiency of both lower extremities      Past Surgical History:   Procedure Laterality Date    HX BREAST BIOPSY Right 1994    positive    HX BREAST LUMPECTOMY Right 1994    HX CHOLECYSTECTOMY  1985    WA 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     Allergies   Allergen Reactions    Codeine Nausea and Vomiting and Other (comments)     Upset stomach. Weak.  Other Plant, Animal, Environmental Other (comments)     Dust and mold allergy. REVIEW OF SYSTEMS:   No chest pain or shortness of breath.         Social History     Socioeconomic History    Marital status:      Spouse name: Not on file    Number of children: Not on file    Years of education: Not on file    Highest education level: Not on file   Tobacco Use    Smoking status: Never Smoker    Smokeless tobacco: Never Used   Substance and Sexual Activity    Alcohol use: No    Drug use: No    Sexual activity: Not Currently   Social History Narrative    Medical History: Streptococcus pneumoniae septicemia 02/11/11Septic polyarthritis 02/11/11HSV2 herpes simplex    aerbzrsonqflpmugyzz67/02/11Seizure d/o 02/02/11Osteoporosis 2011primary HTN 1990Bronchial asthma 1985Obesity 2002Carcinoma of    breast 12/20/04Congential spherocytosis    Gyn History: Last mammogram date 2013. OB History: Total pregnancies 2. Live births 2. Surgical History: normal stress myocardial perfusion scan, EF 68% 08splenectomy 1972cholecystectomy 1985colonoscopy -    internal hemorrhoids diverticulosis lumpectomy radiation therapy colonoscopy diverticulosis Kartik Motley M.D. 2014        Hospitalization/Major Diagnostic Procedure: streptococcus pneumoniae septicemia ronchial asthma         Family History: Mother:  80 yrs, DM, [place] :  80 yrs, heart disease, aortic stenosisSister(s): , colon CA,polypsBrother(s): , at birthUncle: alive, colon CA, heart disease2 sister(s) [employer] . 1 son(s) , 1 daughter(s) . Social History: Alcohol Use Patient does not use alcohol. Smoking Status Patient is a never smoker. Marital Status:  2016, 46 yrs copd,chf. Children: Her son grandchild's mother is temporarily incarcerated and she is caring for the 10 yo. Occupation/W ork: retired teacher. Education/School: has college diploma-[employer]. Gnosticism: [place] Confucianist.   r  Family History   Problem Relation Age of Onset    Diabetes Mother     Other Father         'sphero'-blood disorder    Cancer Sister         breast    Breast Cancer Sister 71    Other Sister         spleen removed    Colon Cancer Sister     Other Other         spleen removed    Gall Bladder Disease Other        OBJECTIVE:  Visit Vitals  BP (!) 146/72   Pulse 76   Temp 97.9 °F (36.6 °C) (Oral)   Resp 18   Ht 5' 2\" (1.575 m)   Wt 159 lb 12.8 oz (72.5 kg)   SpO2 96%   BMI 29.23 kg/m²     ENT: perrla,  eom intact  NECK: supple. Thyroid normal  CHEST: clear to ascultation and percussion   HEART: regular rate and rhythm  ABD: soft, bowel sounds active  EXTREMITIES: no edema, pulse 1+          ASSESSMENT:  1. Medicare annual wellness visit, subsequent    2.  Screening for depression 3. Axillary adenopathy    4. Bedbug bite, subsequent encounter    5. Hereditary spherocytosis ([facility]    6. Malignant neoplasm of right female breast, unspecified estrogen receptor status, unspecified site of breast ([facility]    7. Type 2 diabetes mellitus with nephropathy ([facility]    8. Uncomplicated asthma, unspecified asthma severity, unspecified whether persistent    9. Essential hypertension      The axillary adenopathy will be evaluated after stress test is completed and also after skin clears up a little bit. It seems like what she is doing is fine. She has multiple bites on her forearms and she has exterminated it herself. She may have to get a professional  because she still sees one or two around the house. I was going to give her a steroid cream, but her skin is very thin and she seems to be responding to the remedies she is using at home and therefore I will hold off on that unless it gets worse. The blood sugar control will be assessed with hemoglobin A1c, as well as renal status. She has shortness of breath with activity, which she thinks may be related to asthma. Dr. Gold Riggins obviously ruled out a cardiac origin. Her blood pressure is up just a little bit today. Since she is infested with these bites she had from the bed bugs, I am not going to make a change today in her blood pressure. She will check at home periodically and if it stays greater than 140, she will contact us via [phone] or give us a call and we will titrate her antihypertensive. Otherwise we will see her in three months to see how she is doing. I have discussed the diagnosis with the patient and the intended plan as seen in the  orders above. The patient understands and agees with the plan. The patient has   received an after visit summary and questions were answered concerning  future plans  Patient labs and/or xrays were reviewed  Past records were reviewed.     PLAN:  .  Orders Placed This Encounter  ANNUAL DEPRESSION SCREEN 8-15 MIN       Follow-up and Dispositions    · Return in about 3 months (around 4/18/2021). ATTENTION:   This medical record was transcribed using an electronic medical records system. Although proofread, it may and can contain electronic and spelling errors. Other human spelling and other errors may be present. Corrections may be executed at a later time. Please feel free to contact us for any clarifications as needed.

## 2021-01-18 NOTE — PROGRESS NOTES
1. Have you been to the ER, urgent care clinic since your last visit? Hospitalized since your last visit? No    2. Have you seen or consulted any other health care providers outside of the [address] [place] [address] since your last visit? Include any pap smears or colon screening. No     Wants to discuss last labs  This is the Subsequent Medicare Annual Wellness Exam, performed 12 months or more after the Initial AWV or the last Subsequent AWV    I have reviewed the patient's medical history in detail and updated the computerized patient record. Depression Risk Factor Screening:     3 most recent PHQ Screens 1/18/2021   Little interest or pleasure in doing things Not at all   Feeling down, depressed, irritable, or hopeless Not at all   Total Score PHQ 2 0       Alcohol Risk Screen    Do you average more than 1 drink per night or more than 7 drinks a week:  No    On any one occasion in the past three months have you have had more than 3 drinks containing alcohol:  No        Functional Ability and Level of Safety:    Hearing: Hearing is good. Activities of Daily Living: The home contains: no safety equipment. Patient does total self care      Ambulation: with no difficulty     Fall Risk:  Fall Risk Assessment, last 12 mths 1/18/2021   Able to walk? Yes   Fall in past 12 months? 0   Do you feel unsteady? 0   Are you worried about falling 0   Number of falls in past 12 months -   Fall with injury?  -      Abuse Screen:  Patient is not abused       Cognitive Screening    Has your family/caregiver stated any concerns about your memory: no     Cognitive Screening: not necessary     Assessment/Plan   Education and counseling provided:  Are appropriate based on today's review and evaluation        Health Maintenance Due     Health Maintenance Due   Topic Date Due    Pneumococcal 65+ years (1 of 1 - PPSV23) 05/23/2002    GLAUCOMA SCREENING Q2Y  11/16/2019    Eye Exam Retinal or Dilated  11/16/2019    MICROALBUMIN Q1  02/06/2020    Foot Exam Q1  06/06/2020    Medicare Yearly Exam  10/10/2020       Patient Care Team   Patient Care Team:  Zechariah Wei MD as PCP - General (Internal Medicine)  Zechariah Wei MD as PCP - [facility] Provider    History     Patient Active Problem List   Diagnosis Code    Anemia NEC     Asthma J45.909    Hereditary spherocytosis ([address] D58.0    HTN (hypertension) I10    Breast cancer, right breast ([address] C50.911    Anemia D64.9    BPV (benign positional vertigo) H81.10    DJD (degenerative joint disease) of knee M17.10    S/P colonoscopy Z98.890    Early satiety R68.81    Venous insufficiency of both lower extremities I87.2    Type 2 diabetes mellitus with nephropathy ([address] E11.21    Renal cyst N28.1    Intrahepatic bile duct dilation K83.8    Hearing deficit, left H91.92    Influenza J11.1    Chronic kidney disease N18.9    Pneumonia J18.9    Streptococcal bacteremia R78.81, B95.5    Tinea pedis of both feet B35.3    Axillary adenopathy R59.0     Past Medical History:   Diagnosis Date    Anemia     Arrhythmia     irregular per pt d/t blood disorder    Asthma     Axillary adenopathy 1/4/2021    Bereavement 2-2-16     die 82 copd,chf,pul htn pn after 48 yr marriage    BPV (benign positional vertigo)     BPV (benign positional vertigo) 08/20/2020    Breast cancer, right breast ([address] 1994    right breast, lumpectomy and radiation    Breast lump 2017    right breast     Chronic kidney disease 02/06/2019    kidney cyst - watching    Diabetes ([address]     controlled with diet    DJD (degenerative joint disease) of knee     Early satiety     Hearing deficit, left     Hereditary spherocytosis ([address]     HTN (hypertension)     Ill-defined condition     sickle cell trait    Intrahepatic bile duct dilation 09/05/2018    mri    Radiation therapy complication 7723    right breast     Renal cyst 08/2016    ct rt - MRI 9/5/18 a hemorrhagic cyst in the right lower pole a layering hemorrhagic component. There are no solid renal masses     S/P colonoscopy 4-9-14    md Brian - family hx    S/P colonoscopy 07/20/2016    rt - md brian diverticulosis    Se[facility] ([facility]     Septic arthritis ([facility] [facility] 3/11    Streptococcal sepsis ([facility] [facility] 03/2011    Venous insufficiency of both lower extremities       Past Surgical History:   Procedure Laterality Date    HX BREAST BIOPSY Right 1994    positive    HX BREAST LUMPECTOMY Right 1994    HX CHOLECYSTECTOMY  1985    [place] 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     Current Outpatient Medications   Medication Sig Dispense Refill    ibandronate (BONIVA) 150 mg tablet TAKE 1 TABLET ONCE A MONTH 3 Tab 3    NIFEdipine ER (PROCARDIA XL) 60 mg ER tablet TAKE 1 TAB TWICE A  Tab 3    potassium chloride (K-DUR, KLOR-CON) 20 mEq tablet Take 1 Tab by mouth daily. TAKE 1 TABLET DAILY 90 Tab 3    telmisartan (Micardis) 80 mg tablet TAKE 1 TABLET DAILY 90 Tab 3    pravastatin (PRAVACHOL) 20 mg tablet TAKE 1 TABLET ONCE EACH NIGHT 90 Tab 3    albuterol (PROVENTIL VENTOLIN) 2.5 mg /3 mL (0.083 %) nebu 3 mL by Nebulization route every four (4) hours as needed for Wheezing. 300 Nebule 3    metoprolol succinate (Toprol XL) 50 mg XL tablet Take 1 Tab by mouth nightly. 90 Tab 3    fluticasone propion-salmeteroL (ADVAIR/WIXELA) 250-50 mcg/dose diskus inhaler Take 1 Puff by inhalation every twelve (12) hours. 1 Inhaler 11    clotrimazole-betamethasone (LOTRISONE) topical cream Apply  to affected area two (2) times a day. apply thin layer topically 2 times daily 45 g 14    oxybutynin chloride XL (DITROPAN XL) 10 mg CR tablet TAKE 1 TABLET DAILY 90 Tab 3    arformoteroL (BROVANA) 15 mcg/2 mL nebu neb solution 2 mL by Nebulization route every twelve (12) hours. 180 Vial 3    budesonide (PULMICORT) 0.5 mg/2 mL nbsp 2 mL by Nebulization route two (2) times a day.  180 Each 3    lamoTRIgine (LAMICTAL) 25 mg tablet TAKE 1 TABLET TWICE A  Tab 4    polyethylene glycol (MIRALAX) 17 gram packet Take 1 Packet by mouth two (2) times a day. 60 Packet 11    hydroCHLOROthiazide (HYDRODIURIL) 12.5 mg tablet Take 12.5 mg by mouth every Monday, Wednesday, Friday.  fluticasone (FLONASE) 50 mcg/actuation nasal spray 2 Sprays by Both Nostrils route two (2) times a day. by inhalation       calcium carbonate (TUMS PO) Take 500 mg by mouth as needed.  albuterol (PROVENTIL HFA, VENTOLIN HFA, PROAIR HFA) 90 mcg/actuation inhaler Take 1 Puff by inhalation every four (4) hours as needed for Wheezing. 3 Inhaler 3    aspirin delayed-release 81 mg tablet Take 81 mg by mouth daily. Allergies   Allergen Reactions    Codeine Nausea and Vomiting and Other (comments)     Upset stomach. Weak.  Other Plant, Animal, Environmental Other (comments)     Dust and mold allergy. Family History   Problem Relation Age of Onset    Diabetes Mother     Other Father         'sphero'-blood disorder    Cancer Sister         breast    Breast Cancer Sister 71    Other Sister         spleen removed    Colon Cancer Sister     Other Other         spleen removed    Gall Bladder Disease Other      Social History     Tobacco Use    Smoking status: Never Smoker    Smokeless tobacco: Never Used   Substance Use Topics    Alcohol use:  No

## 2021-01-18 NOTE — PATIENT INSTRUCTIONS
Medicare Wellness Visit, Female     The best way to live healthy is to have a lifestyle where you eat a well-balanced diet, exercise regularly, limit alcohol use, and quit all forms of tobacco/nicotine, if applicable. Regular preventive services are another way to keep healthy. Preventive services (vaccines, screening tests, monitoring & exams) can help personalize your care plan, which helps you manage your own care. Screening tests can find health problems at the earliest stages, when they are easiest to treat. [facility] follows the current, evidence-based guidelines published by the [facility] Sundeep Crow ([facility]) when recommending preventive services for our patients. Because we follow these guidelines, sometimes recommendations change over time as research supports it. (For example, mammograms used to be recommended annually. Even though Medicare will still pay for an annual mammogram, the newer guidelines recommend a mammogram every two years for women of average risk). Of course, you and your doctor may decide to screen more often for some diseases, based on your risk and your co-morbidities (chronic disease you are already diagnosed with). Preventive services for you include:  - Medicare offers their members a free annual wellness visit, which is time for you and your primary care provider to discuss and plan for your preventive service needs. Take advantage of this benefit every year!  -All adults over the age of 72 should receive the recommended pneumonia vaccines. Current USPSTF guidelines recommend a series of two vaccines for the best pneumonia protection.   -All adults should have a flu vaccine yearly and a tetanus vaccine every 10 years.   -All adults age 48 and older should receive the shingles vaccines (series of two vaccines).       -All adults age 38-68 who are overweight should have a diabetes screening test once every three years.   -All adults born between [birth year] and 1965 should be screened once for Hepatitis C.  -Other screening tests and preventive services for persons with diabetes include: an eye exam to screen for diabetic retinopathy, a kidney function test, a foot exam, and stricter control over your cholesterol.   -Cardiovascular screening for adults with routine risk involves an electrocardiogram (ECG) at intervals determined by your doctor.   -Colorectal cancer screenings should be done for adults age 54-65 with no increased risk factors for colorectal cancer. There are a number of acceptable methods of screening for this type of cancer. Each test has its own benefits and drawbacks. Discuss with your doctor what is most appropriate for you during your annual wellness visit. The different tests include: colonoscopy (considered the best screening method), a fecal occult blood test, a fecal DNA test, and sigmoidoscopy.    -A bone mass density test is recommended when a woman turns 65 to screen for osteoporosis. This test is only recommended one time, as a screening. Some providers will use this same test as a disease monitoring tool if you already have osteoporosis. -Breast cancer screenings are recommended every other year for women of normal risk, age 54-69.  -Cervical cancer screenings for women over age 72 are only recommended with certain risk factors.      Here is a list of your current Health Maintenance items (your personalized list of preventive services) with a due date:  Health Maintenance Due   Topic Date Due    Pneumococcal Vaccine (1 of 1 - PPSV23) 05/23/2002    Glaucoma Screening   11/16/2019    Eye Exam  11/16/2019    Albumin Urine Test  02/06/2020    Diabetic Foot Care  06/06/2020

## 2021-01-19 ENCOUNTER — IMMUNIZATION (OUTPATIENT)
Dept: INTERNAL MEDICINE CLINIC | Age: 86
End: 2021-01-19
Payer: MEDICARE

## 2021-01-19 DIAGNOSIS — Z23 ENCOUNTER FOR IMMUNIZATION: Primary | ICD-10-CM

## 2021-01-19 LAB
ALBUMIN SERPL-MCNC: 4.1 G/DL (ref 3.5–5)
ANION GAP SERPL CALC-SCNC: 6 MMOL/L (ref 5–15)
BASOPHILS # BLD: 0.1 K/UL (ref 0–0.1)
BASOPHILS NFR BLD: 1 % (ref 0–1)
BUN SERPL-MCNC: 30 MG/DL (ref 6–20)
BUN/CREAT SERPL: 18 (ref 12–20)
CALCIUM SERPL-MCNC: 10 MG/DL (ref 8.5–10.1)
CHLORIDE SERPL-SCNC: 110 MMOL/L (ref 97–108)
CO2 SERPL-SCNC: 24 MMOL/L (ref 21–32)
CREAT SERPL-MCNC: 1.7 MG/DL (ref 0.55–1.02)
DIFFERENTIAL METHOD BLD: ABNORMAL
EOSINOPHIL # BLD: 1.1 K/UL (ref 0–0.4)
EOSINOPHIL NFR BLD: 10 % (ref 0–7)
ERYTHROCYTE [DISTWIDTH] IN BLOOD BY AUTOMATED COUNT: 13.5 % (ref 11.5–14.5)
GLUCOSE SERPL-MCNC: 87 MG/DL (ref 65–100)
HCT VFR BLD AUTO: 39.1 % (ref 35–47)
HGB BLD-MCNC: 11.9 G/DL (ref 11.5–16)
IMM GRANULOCYTES # BLD AUTO: 0 K/UL (ref 0–0.04)
IMM GRANULOCYTES NFR BLD AUTO: 0 % (ref 0–0.5)
LYMPHOCYTES # BLD: 1.6 K/UL (ref 0.8–3.5)
LYMPHOCYTES NFR BLD: 15 % (ref 12–49)
MCH RBC QN AUTO: 27 PG (ref 26–34)
MCHC RBC AUTO-ENTMCNC: 30.4 G/DL (ref 30–36.5)
MCV RBC AUTO: 88.9 FL (ref 80–99)
MONOCYTES # BLD: 0.8 K/UL (ref 0–1)
MONOCYTES NFR BLD: 7 % (ref 5–13)
NEUTS SEG # BLD: 7.1 K/UL (ref 1.8–8)
NEUTS SEG NFR BLD: 67 % (ref 32–75)
NRBC # BLD: 0 K/UL (ref 0–0.01)
NRBC BLD-RTO: 0 PER 100 WBC
PHOSPHATE SERPL-MCNC: 4.1 MG/DL (ref 2.6–4.7)
PLATELET # BLD AUTO: 505 K/UL (ref 150–400)
PMV BLD AUTO: 10.5 FL (ref 8.9–12.9)
POTASSIUM SERPL-SCNC: 3.8 MMOL/L (ref 3.5–5.1)
RBC # BLD AUTO: 4.4 M/UL (ref 3.8–5.2)
RBC MORPH BLD: ABNORMAL
SODIUM SERPL-SCNC: 140 MMOL/L (ref 136–145)
WBC # BLD AUTO: 10.7 K/UL (ref 3.6–11)

## 2021-01-19 PROCEDURE — 0011A COVID-19, MRNA, LNP-S, PF, 100MCG/0.5ML DOSE(MODERNA): CPT | Performed by: FAMILY MEDICINE

## 2021-01-19 PROCEDURE — 91301 COVID-19, MRNA, LNP-S, PF, 100MCG/0.5ML DOSE(MODERNA): CPT | Performed by: FAMILY MEDICINE

## 2021-01-25 PROBLEM — Z92.89 HISTORY OF NUCLEAR STRESS TEST: Status: ACTIVE | Noted: 2021-01-22

## 2021-02-05 ENCOUNTER — DOCUMENTATION ONLY (OUTPATIENT)
Dept: SURGERY | Age: 86
End: 2021-02-05

## 2021-02-05 NOTE — PROGRESS NOTES
Patient schedule 2-26. Wants you to know that she forgot to show you another lump she's has a long time/under her bra at edge of shoulder blade. I told her I'd let Dr. Bae Awe know.

## 2021-02-16 ENCOUNTER — IMMUNIZATION (OUTPATIENT)
Dept: INTERNAL MEDICINE CLINIC | Age: 86
End: 2021-02-16
Payer: MEDICARE

## 2021-02-16 DIAGNOSIS — Z23 ENCOUNTER FOR IMMUNIZATION: Primary | ICD-10-CM

## 2021-02-16 PROCEDURE — 0012A COVID-19, MRNA, LNP-S, PF, 100MCG/0.5ML DOSE(MODERNA): CPT | Performed by: FAMILY MEDICINE

## 2021-02-16 PROCEDURE — 91301 COVID-19, MRNA, LNP-S, PF, 100MCG/0.5ML DOSE(MODERNA): CPT | Performed by: FAMILY MEDICINE

## 2021-02-17 ENCOUNTER — HOSPITAL ENCOUNTER (OUTPATIENT)
Dept: PREADMISSION TESTING | Age: 86
Discharge: HOME | End: 2021-02-17
Payer: MEDICARE

## 2021-02-17 VITALS
HEART RATE: 78 BPM | HEIGHT: 63 IN | SYSTOLIC BLOOD PRESSURE: 164 MMHG | WEIGHT: 158.73 LBS | BODY MASS INDEX: 28.12 KG/M2 | DIASTOLIC BLOOD PRESSURE: 64 MMHG | TEMPERATURE: 98.3 F

## 2021-02-17 LAB
ANION GAP SERPL CALC-SCNC: 7 MMOL/L (ref 5–15)
ATRIAL RATE: 70 BPM
BASOPHILS # BLD: 0.1 K/UL (ref 0–0.1)
BASOPHILS NFR BLD: 1 % (ref 0–1)
BUN SERPL-MCNC: 28 MG/DL (ref 6–20)
BUN/CREAT SERPL: 19 (ref 12–20)
CALCIUM SERPL-MCNC: 9.4 MG/DL (ref 8.5–10.1)
CALCULATED P AXIS, ECG09: 72 DEGREES
CALCULATED R AXIS, ECG10: -16 DEGREES
CALCULATED T AXIS, ECG11: 70 DEGREES
CHLORIDE SERPL-SCNC: 109 MMOL/L (ref 97–108)
CO2 SERPL-SCNC: 24 MMOL/L (ref 21–32)
CREAT SERPL-MCNC: 1.5 MG/DL (ref 0.55–1.02)
DIAGNOSIS, 93000: NORMAL
DIFFERENTIAL METHOD BLD: ABNORMAL
EOSINOPHIL # BLD: 1.2 K/UL (ref 0–0.4)
EOSINOPHIL NFR BLD: 10 % (ref 0–7)
ERYTHROCYTE [DISTWIDTH] IN BLOOD BY AUTOMATED COUNT: 13.3 % (ref 11.5–14.5)
GLUCOSE SERPL-MCNC: 86 MG/DL (ref 65–100)
HCT VFR BLD AUTO: 34.3 % (ref 35–47)
HGB BLD-MCNC: 11.1 G/DL (ref 11.5–16)
IMM GRANULOCYTES # BLD AUTO: 0.1 K/UL (ref 0–0.04)
IMM GRANULOCYTES NFR BLD AUTO: 1 % (ref 0–0.5)
LYMPHOCYTES # BLD: 1.6 K/UL (ref 0.8–3.5)
LYMPHOCYTES NFR BLD: 13 % (ref 12–49)
MCH RBC QN AUTO: 26.7 PG (ref 26–34)
MCHC RBC AUTO-ENTMCNC: 32.4 G/DL (ref 30–36.5)
MCV RBC AUTO: 82.7 FL (ref 80–99)
MONOCYTES # BLD: 1.3 K/UL (ref 0–1)
MONOCYTES NFR BLD: 11 % (ref 5–13)
NEUTS SEG # BLD: 7.9 K/UL (ref 1.8–8)
NEUTS SEG NFR BLD: 64 % (ref 32–75)
NRBC # BLD: 0 K/UL (ref 0–0.01)
NRBC BLD-RTO: 0 PER 100 WBC
P-R INTERVAL, ECG05: 184 MS
PLATELET # BLD AUTO: 462 K/UL (ref 150–400)
PMV BLD AUTO: 10.1 FL (ref 8.9–12.9)
POTASSIUM SERPL-SCNC: 4 MMOL/L (ref 3.5–5.1)
Q-T INTERVAL, ECG07: 426 MS
QRS DURATION, ECG06: 84 MS
QTC CALCULATION (BEZET), ECG08: 460 MS
RBC # BLD AUTO: 4.15 M/UL (ref 3.8–5.2)
RBC MORPH BLD: ABNORMAL
SODIUM SERPL-SCNC: 140 MMOL/L (ref 136–145)
VENTRICULAR RATE, ECG03: 70 BPM
WBC # BLD AUTO: 12.2 K/UL (ref 3.6–11)

## 2021-02-17 PROCEDURE — 36415 COLL VENOUS BLD VENIPUNCTURE: CPT

## 2021-02-17 PROCEDURE — 85025 COMPLETE CBC W/AUTO DIFF WBC: CPT

## 2021-02-17 PROCEDURE — 93005 ELECTROCARDIOGRAM TRACING: CPT

## 2021-02-17 PROCEDURE — 80048 BASIC METABOLIC PNL TOTAL CA: CPT

## 2021-02-17 RX ORDER — GUAIFENESIN AND DEXTROMETHORPHAN HBR 20; 400 MG/1; MG/1
TABLET ORAL AS NEEDED
COMMUNITY
End: 2022-07-19

## 2021-02-17 RX ORDER — ASPIRIN 325 MG
325 TABLET ORAL AS NEEDED
COMMUNITY

## 2021-02-17 RX ORDER — MECLIZINE HYDROCHLORIDE 25 MG/1
25 TABLET ORAL AS NEEDED
COMMUNITY

## 2021-02-17 RX ORDER — ASCORBIC ACID 500 MG
500 TABLET ORAL
COMMUNITY

## 2021-02-17 RX ORDER — ASCORBIC ACID/MULTIVIT-MIN 1000 MG
EFFERVESCENT POWDER IN PACKET ORAL
COMMUNITY

## 2021-02-17 RX ORDER — LORATADINE 10 MG/1
10 TABLET ORAL
COMMUNITY

## 2021-02-17 RX ORDER — MELATONIN
1000 DAILY
COMMUNITY
End: 2022-03-16 | Stop reason: DRUGHIGH

## 2021-02-19 ENCOUNTER — DOCUMENTATION ONLY (OUTPATIENT)
Dept: SURGERY | Age: 86
End: 2021-02-19

## 2021-02-19 NOTE — PROGRESS NOTES
MD Madison Barfield LPN   Phone Number: [phone]             They're OK. Thanks    Previous Messages    ----- Message -----   From: Madison Baldwin LPN   Sent: 1/05/2394  11:45 AM EST   To: Lui Day MD   Subject: FW: SAFE. Virgen Block P.A. T.                         ----- Message -----   From: Michelle Smith RN   Sent: 2/18/2021  12:29 PM EST   To: Fabio Lemons LPN   Subject: SAFE. Virgen Block P.A. T.                           GOOD AFTERNOON ARNULFO! JUST FYI SOME ABNORMAL LABS   PLATELETS=462   CENXLEQS=8.04   WBC=12.2   OTHER MINOR ABNORMALAS. ALL IN CC   HAVE A GOOD DAY! Shaun BROWN/STEPHANIE   P.A. T.

## 2021-02-22 ENCOUNTER — TRANSCRIBE ORDER (OUTPATIENT)
Dept: REGISTRATION | Age: 86
End: 2021-02-22

## 2021-02-22 ENCOUNTER — HOSPITAL ENCOUNTER (OUTPATIENT)
Dept: PREADMISSION TESTING | Age: 86
Discharge: HOME | End: 2021-02-22
Payer: MEDICARE

## 2021-02-22 DIAGNOSIS — Z01.812 PRE-PROCEDURE LAB EXAM: Primary | ICD-10-CM

## 2021-02-22 DIAGNOSIS — Z01.812 PRE-PROCEDURE LAB EXAM: ICD-10-CM

## 2021-02-22 PROCEDURE — U0003 INFECTIOUS AGENT DETECTION BY NUCLEIC ACID (DNA OR RNA); SEVERE ACUTE RESPIRATORY SYNDROME CORONAVIRUS 2 (SARS-COV-2) (CORONAVIRUS DISEASE [COVID-19]), AMPLIFIED PROBE TECHNIQUE, MAKING USE OF HIGH THROUGHPUT TECHNOLOGIES AS DESCRIBED BY CMS-2020-01-R: HCPCS

## 2021-02-23 LAB — SARS-COV-2, COV2NT: NOT DETECTED

## 2021-02-24 RX ORDER — LAMOTRIGINE 25 MG/1
TABLET ORAL
Qty: 180 TAB | Refills: 3 | Status: SHIPPED | OUTPATIENT
Start: 2021-02-24 | End: 2022-04-28

## 2021-02-25 ENCOUNTER — ANESTHESIA EVENT (OUTPATIENT)
Dept: SURGERY | Age: 86
End: 2021-02-25
Payer: MEDICARE

## 2021-02-26 ENCOUNTER — HOSPITAL ENCOUNTER (OUTPATIENT)
Age: 86
Setting detail: OUTPATIENT SURGERY
Discharge: HOME | End: 2021-02-26
Attending: SURGERY | Admitting: SURGERY
Payer: MEDICARE

## 2021-02-26 ENCOUNTER — ANESTHESIA (OUTPATIENT)
Dept: SURGERY | Age: 86
End: 2021-02-26
Payer: MEDICARE

## 2021-02-26 VITALS
SYSTOLIC BLOOD PRESSURE: 155 MMHG | OXYGEN SATURATION: 95 % | HEART RATE: 80 BPM | TEMPERATURE: 97.8 F | DIASTOLIC BLOOD PRESSURE: 67 MMHG | RESPIRATION RATE: 22 BRPM

## 2021-02-26 DIAGNOSIS — G89.18 POST-OP PAIN: Primary | ICD-10-CM

## 2021-02-26 DIAGNOSIS — R59.0 AXILLARY ADENOPATHY: ICD-10-CM

## 2021-02-26 LAB
GLUCOSE BLD STRIP.AUTO-MCNC: 111 MG/DL (ref 65–100)
SERVICE CMNT-IMP: ABNORMAL

## 2021-02-26 PROCEDURE — 74011000250 HC RX REV CODE- 250: Performed by: NURSE ANESTHETIST, CERTIFIED REGISTERED

## 2021-02-26 PROCEDURE — 77030026438 HC STYL ET INTUB CARD -A: Performed by: ANESTHESIOLOGY

## 2021-02-26 PROCEDURE — 88307 TISSUE EXAM BY PATHOLOGIST: CPT

## 2021-02-26 PROCEDURE — 74011250636 HC RX REV CODE- 250/636: Performed by: SURGERY

## 2021-02-26 PROCEDURE — 88341 IMHCHEM/IMCYTCHM EA ADD ANTB: CPT

## 2021-02-26 PROCEDURE — 76010000149 HC OR TIME 1 TO 1.5 HR: Performed by: SURGERY

## 2021-02-26 PROCEDURE — 74011000250 HC RX REV CODE- 250: Performed by: SURGERY

## 2021-02-26 PROCEDURE — 77030002933 HC SUT MCRYL J&J -A: Performed by: SURGERY

## 2021-02-26 PROCEDURE — 76060000033 HC ANESTHESIA 1 TO 1.5 HR: Performed by: SURGERY

## 2021-02-26 PROCEDURE — 76210000021 HC REC RM PH II 0.5 TO 1 HR: Performed by: SURGERY

## 2021-02-26 PROCEDURE — 77030042556 HC PNCL CAUT -B: Performed by: SURGERY

## 2021-02-26 PROCEDURE — 77030008684 HC TU ET CUF COVD -B: Performed by: ANESTHESIOLOGY

## 2021-02-26 PROCEDURE — 74011250636 HC RX REV CODE- 250/636: Performed by: ANESTHESIOLOGY

## 2021-02-26 PROCEDURE — 88184 FLOWCYTOMETRY/ TC 1 MARKER: CPT

## 2021-02-26 PROCEDURE — 88360 TUMOR IMMUNOHISTOCHEM/MANUAL: CPT

## 2021-02-26 PROCEDURE — 88342 IMHCHEM/IMCYTCHM 1ST ANTB: CPT

## 2021-02-26 PROCEDURE — 2709999900 HC NON-CHARGEABLE SUPPLY: Performed by: SURGERY

## 2021-02-26 PROCEDURE — 38500 BIOPSY/REMOVAL LYMPH NODES: CPT | Performed by: SURGERY

## 2021-02-26 PROCEDURE — 82962 GLUCOSE BLOOD TEST: CPT

## 2021-02-26 PROCEDURE — 77030040361 HC SLV COMPR DVT MDII -B: Performed by: SURGERY

## 2021-02-26 PROCEDURE — 77030031753 HC SHR ENDO COAG HARM J&J -E: Performed by: SURGERY

## 2021-02-26 PROCEDURE — 74011250636 HC RX REV CODE- 250/636: Performed by: NURSE ANESTHETIST, CERTIFIED REGISTERED

## 2021-02-26 PROCEDURE — 88185 FLOWCYTOMETRY/TC ADD-ON: CPT

## 2021-02-26 PROCEDURE — 77030010507 HC ADH SKN DERMBND J&J -B: Performed by: SURGERY

## 2021-02-26 PROCEDURE — 74011250637 HC RX REV CODE- 250/637: Performed by: ANESTHESIOLOGY

## 2021-02-26 PROCEDURE — 77030031139 HC SUT VCRL2 J&J -A: Performed by: SURGERY

## 2021-02-26 PROCEDURE — 76210000006 HC OR PH I REC 0.5 TO 1 HR: Performed by: SURGERY

## 2021-02-26 RX ORDER — ACETAMINOPHEN 325 MG/1
650 TABLET ORAL ONCE
Status: COMPLETED | OUTPATIENT
Start: 2021-02-26 | End: 2021-02-26

## 2021-02-26 RX ORDER — HYDROCODONE BITARTRATE AND ACETAMINOPHEN 5; 325 MG/1; MG/1
1 TABLET ORAL
Qty: 12 TAB | Refills: 0 | Status: SHIPPED | OUTPATIENT
Start: 2021-02-26 | End: 2021-03-01

## 2021-02-26 RX ORDER — SODIUM CHLORIDE 0.9 % (FLUSH) 0.9 %
5-40 SYRINGE (ML) INJECTION AS NEEDED
Status: DISCONTINUED | OUTPATIENT
Start: 2021-02-26 | End: 2021-02-26 | Stop reason: HOSPADM

## 2021-02-26 RX ORDER — DEXAMETHASONE SODIUM PHOSPHATE 4 MG/ML
INJECTION, SOLUTION INTRA-ARTICULAR; INTRALESIONAL; INTRAMUSCULAR; INTRAVENOUS; SOFT TISSUE AS NEEDED
Status: DISCONTINUED | OUTPATIENT
Start: 2021-02-26 | End: 2021-02-26 | Stop reason: HOSPADM

## 2021-02-26 RX ORDER — MIDAZOLAM HYDROCHLORIDE 1 MG/ML
1 INJECTION, SOLUTION INTRAMUSCULAR; INTRAVENOUS AS NEEDED
Status: DISCONTINUED | OUTPATIENT
Start: 2021-02-26 | End: 2021-02-26 | Stop reason: HOSPADM

## 2021-02-26 RX ORDER — KETAMINE HYDROCHLORIDE 10 MG/ML
INJECTION, SOLUTION INTRAMUSCULAR; INTRAVENOUS AS NEEDED
Status: DISCONTINUED | OUTPATIENT
Start: 2021-02-26 | End: 2021-02-26 | Stop reason: HOSPADM

## 2021-02-26 RX ORDER — SODIUM CHLORIDE 0.9 % (FLUSH) 0.9 %
5-40 SYRINGE (ML) INJECTION EVERY 8 HOURS
Status: DISCONTINUED | OUTPATIENT
Start: 2021-02-26 | End: 2021-02-26 | Stop reason: HOSPADM

## 2021-02-26 RX ORDER — SUCCINYLCHOLINE CHLORIDE 20 MG/ML
INJECTION INTRAMUSCULAR; INTRAVENOUS AS NEEDED
Status: DISCONTINUED | OUTPATIENT
Start: 2021-02-26 | End: 2021-02-26 | Stop reason: HOSPADM

## 2021-02-26 RX ORDER — PROPOFOL 10 MG/ML
INJECTION, EMULSION INTRAVENOUS AS NEEDED
Status: DISCONTINUED | OUTPATIENT
Start: 2021-02-26 | End: 2021-02-26 | Stop reason: HOSPADM

## 2021-02-26 RX ORDER — ONDANSETRON 2 MG/ML
4 INJECTION INTRAMUSCULAR; INTRAVENOUS AS NEEDED
Status: DISCONTINUED | OUTPATIENT
Start: 2021-02-26 | End: 2021-02-26 | Stop reason: HOSPADM

## 2021-02-26 RX ORDER — MIDAZOLAM HYDROCHLORIDE 1 MG/ML
0.5 INJECTION, SOLUTION INTRAMUSCULAR; INTRAVENOUS
Status: DISCONTINUED | OUTPATIENT
Start: 2021-02-26 | End: 2021-02-26 | Stop reason: HOSPADM

## 2021-02-26 RX ORDER — LIDOCAINE HYDROCHLORIDE 20 MG/ML
INJECTION, SOLUTION EPIDURAL; INFILTRATION; INTRACAUDAL; PERINEURAL AS NEEDED
Status: DISCONTINUED | OUTPATIENT
Start: 2021-02-26 | End: 2021-02-26 | Stop reason: HOSPADM

## 2021-02-26 RX ORDER — BUPIVACAINE HYDROCHLORIDE AND EPINEPHRINE 5; 5 MG/ML; UG/ML
30 INJECTION, SOLUTION EPIDURAL; INTRACAUDAL; PERINEURAL ONCE
Status: COMPLETED | OUTPATIENT
Start: 2021-02-26 | End: 2021-02-26

## 2021-02-26 RX ORDER — HYDROMORPHONE HYDROCHLORIDE 1 MG/ML
0.2 INJECTION, SOLUTION INTRAMUSCULAR; INTRAVENOUS; SUBCUTANEOUS
Status: DISCONTINUED | OUTPATIENT
Start: 2021-02-26 | End: 2021-02-26 | Stop reason: HOSPADM

## 2021-02-26 RX ORDER — OXYCODONE HYDROCHLORIDE 5 MG/1
5 TABLET ORAL
Status: DISCONTINUED | OUTPATIENT
Start: 2021-02-26 | End: 2021-02-26 | Stop reason: HOSPADM

## 2021-02-26 RX ORDER — FENTANYL CITRATE 50 UG/ML
INJECTION, SOLUTION INTRAMUSCULAR; INTRAVENOUS AS NEEDED
Status: DISCONTINUED | OUTPATIENT
Start: 2021-02-26 | End: 2021-02-26 | Stop reason: HOSPADM

## 2021-02-26 RX ORDER — FENTANYL CITRATE 50 UG/ML
50 INJECTION, SOLUTION INTRAMUSCULAR; INTRAVENOUS AS NEEDED
Status: DISCONTINUED | OUTPATIENT
Start: 2021-02-26 | End: 2021-02-26 | Stop reason: HOSPADM

## 2021-02-26 RX ORDER — LIDOCAINE HYDROCHLORIDE 10 MG/ML
0.1 INJECTION, SOLUTION EPIDURAL; INFILTRATION; INTRACAUDAL; PERINEURAL AS NEEDED
Status: DISCONTINUED | OUTPATIENT
Start: 2021-02-26 | End: 2021-02-26 | Stop reason: HOSPADM

## 2021-02-26 RX ORDER — SODIUM CHLORIDE, SODIUM LACTATE, POTASSIUM CHLORIDE, CALCIUM CHLORIDE 600; 310; 30; 20 MG/100ML; MG/100ML; MG/100ML; MG/100ML
50 INJECTION, SOLUTION INTRAVENOUS CONTINUOUS
Status: DISCONTINUED | OUTPATIENT
Start: 2021-02-26 | End: 2021-02-26 | Stop reason: HOSPADM

## 2021-02-26 RX ORDER — ROCURONIUM BROMIDE 10 MG/ML
INJECTION, SOLUTION INTRAVENOUS AS NEEDED
Status: DISCONTINUED | OUTPATIENT
Start: 2021-02-26 | End: 2021-02-26 | Stop reason: HOSPADM

## 2021-02-26 RX ORDER — FENTANYL CITRATE 50 UG/ML
25 INJECTION, SOLUTION INTRAMUSCULAR; INTRAVENOUS
Status: DISCONTINUED | OUTPATIENT
Start: 2021-02-26 | End: 2021-02-26 | Stop reason: HOSPADM

## 2021-02-26 RX ORDER — ONDANSETRON 2 MG/ML
INJECTION INTRAMUSCULAR; INTRAVENOUS AS NEEDED
Status: DISCONTINUED | OUTPATIENT
Start: 2021-02-26 | End: 2021-02-26 | Stop reason: HOSPADM

## 2021-02-26 RX ADMIN — KETAMINE HYDROCHLORIDE 10 MG: 10 INJECTION, SOLUTION INTRAMUSCULAR; INTRAVENOUS at 11:54

## 2021-02-26 RX ADMIN — PROPOFOL 40 MG: 10 INJECTION, EMULSION INTRAVENOUS at 12:00

## 2021-02-26 RX ADMIN — ACETAMINOPHEN 650 MG: 325 TABLET ORAL at 10:49

## 2021-02-26 RX ADMIN — SUCCINYLCHOLINE CHLORIDE 120 MG: 20 INJECTION, SOLUTION INTRAMUSCULAR; INTRAVENOUS at 11:54

## 2021-02-26 RX ADMIN — ROCURONIUM BROMIDE 20 MG: 10 SOLUTION INTRAVENOUS at 11:56

## 2021-02-26 RX ADMIN — DEXAMETHASONE SODIUM PHOSPHATE 4 MG: 4 INJECTION, SOLUTION INTRAMUSCULAR; INTRAVENOUS at 12:06

## 2021-02-26 RX ADMIN — ONDANSETRON HYDROCHLORIDE 4 MG: 2 INJECTION, SOLUTION INTRAMUSCULAR; INTRAVENOUS at 12:06

## 2021-02-26 RX ADMIN — FENTANYL CITRATE 20 MCG: 50 INJECTION, SOLUTION INTRAMUSCULAR; INTRAVENOUS at 11:54

## 2021-02-26 RX ADMIN — LIDOCAINE HYDROCHLORIDE 50 MG: 20 INJECTION, SOLUTION EPIDURAL; INFILTRATION; INTRACAUDAL; PERINEURAL at 11:54

## 2021-02-26 RX ADMIN — WATER 2 G: 1 INJECTION INTRAMUSCULAR; INTRAVENOUS; SUBCUTANEOUS at 11:58

## 2021-02-26 RX ADMIN — SUGAMMADEX 200 MG: 100 INJECTION, SOLUTION INTRAVENOUS at 12:27

## 2021-02-26 RX ADMIN — SODIUM CHLORIDE, POTASSIUM CHLORIDE, SODIUM LACTATE AND CALCIUM CHLORIDE 50 ML/HR: 600; 310; 30; 20 INJECTION, SOLUTION INTRAVENOUS at 11:17

## 2021-02-26 RX ADMIN — PROPOFOL 80 MG: 10 INJECTION, EMULSION INTRAVENOUS at 11:54

## 2021-02-26 NOTE — H&P
Subjective:      Jag Vargas  is a 82 y.o. female referred by Dr. Chase Lau for evaluation of RIGHT axillary LN. Pt with history of RIGHT breats cancer diagnosed in 1994 and was treated with RIGHT breast lumpectomy and XRT therapy. Pt was initially seen in this office in 2010 for evaluation of palpable denisties that were determined to be fibrocystic changes. During her last visit with her PCP, pt notes \"knot\" in the RIGHT axilla, which she is concerned about due to her personal history of breast cancer. Pt most recent mammogram on 03/19/20 showed palpable abnormality marker on the UOQ of the RIGHT breast/axillary tail with no visble underlying mammographic abnormality. Targets US showed normal appearing breast parenchyma without cystic or solid masses. Today, pt reports she \"has itchy bites all over her\" after getting a new mattress from her son. She reports she did not have as many bites as she currently does when she met with Dr. Chase Lau, or would have had them evaluated sooner. Pt notes plans to have her house fumigated in the near future. Pt reports she has some cardiac issues along with asthma, and is planning on having stress test done in the near future.             Past Medical History:   Diagnosis Date    Anemia      Arrhythmia       irregular per pt d/t blood disorder    Asthma      Axillary adenopathy 1/4/2021    Bereavement 2-2-16      die 82 copd,chf,pul htn pn after 48 yr marriage    BPV (benign positional vertigo)      BPV (benign positional vertigo) 08/20/2020    Breast cancer, right breast ([address] 1994     right breast, lumpectomy and radiation    Breast lump 2017     right breast     Chronic kidney disease 02/06/2019     kidney cyst - watching    Diabetes ([address]       controlled with diet    DJD (degenerative joint disease) of knee      Early satiety      Hearing deficit, left      [facility]      HTN (hypertension)      Ill-defined condition       sickle cell trait    Intrahepatic bile duct dilation 09/05/2018     mri    Radiation therapy complication 9735     right breast     Renal cyst 08/2016     ct rt - MRI 9/5/18 a hemorrhagic cyst in the right lower pole a layering hemorrhagic component. There are no solid renal masses     S/P colonoscopy 4-9-14     md Brian - family hx    S/P colonoscopy 07/20/2016     rt - md brian diverticulosis    Seizures [facility]      Septic arthritis ([facility] 3/11    Streptococcal sepsis ([facility] 03/2011    Venous insufficiency of both lower extremities                 Past Surgical History:   Procedure Laterality Date    HX BREAST BIOPSY Right 1994     positive    HX BREAST LUMPECTOMY Right 1994    HX CHOLECYSTECTOMY   1985    OR ABDOMEN SURGERY PROC UNLISTED   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         Social History           Tobacco Use    Smoking status: Never Smoker    Smokeless tobacco: Never Used   Substance Use Topics    Alcohol use: No               Family History   Problem Relation Age of Onset    Diabetes Mother      Other Father           'sphero'-blood disorder    Cancer Sister           breast    Breast Cancer Sister 71    Other Sister           spleen removed    Colon Cancer Sister      Other Other           spleen removed    Gall Bladder Disease Other                  Current Outpatient Medications on File Prior to Visit   Medication Sig Dispense Refill    NIFEdipine ER (PROCARDIA XL) 60 mg ER tablet TAKE 1 TAB TWICE A  Tab 3    potassium chloride (K-DUR, KLOR-CON) 20 mEq tablet Take 1 Tab by mouth daily. TAKE 1 TABLET DAILY 90 Tab 3    telmisartan (Micardis) 80 mg tablet TAKE 1 TABLET DAILY 90 Tab 3    pravastatin (PRAVACHOL) 20 mg tablet TAKE 1 TABLET ONCE EACH NIGHT 90 Tab 3    albuterol (PROVENTIL VENTOLIN) 2.5 mg /3 mL (0.083 %) nebu 3 mL by Nebulization route every four (4) hours as needed for Wheezing.  300 Nebule 3    metoprolol succinate (Toprol XL) 50 mg XL tablet Take 1 Tab by mouth nightly. 90 Tab 3    fluticasone propion-salmeteroL (ADVAIR/WIXELA) 250-50 mcg/dose diskus inhaler Take 1 Puff by inhalation every twelve (12) hours. 1 Inhaler 11    clotrimazole-betamethasone (LOTRISONE) topical cream Apply  to affected area two (2) times a day. apply thin layer topically 2 times daily 45 g 14    oxybutynin chloride XL (DITROPAN XL) 10 mg CR tablet TAKE 1 TABLET DAILY 90 Tab 3    arformoteroL (BROVANA) 15 mcg/2 mL nebu neb solution 2 mL by Nebulization route every twelve (12) hours. 180 Vial 3    budesonide (PULMICORT) 0.5 mg/2 mL nbsp 2 mL by Nebulization route two (2) times a day. 180 Each 3    lamoTRIgine (LAMICTAL) 25 mg tablet TAKE 1 TABLET TWICE A  Tab 4    ibandronate (BONIVA) 150 mg tablet TAKE 1 TABLET ONCE A MONTH (Patient taking differently: TAKE 1 TABLET orally ONCE A MONTH) 3 Tab 4    hydroCHLOROthiazide (HYDRODIURIL) 12.5 mg tablet Take 12.5 mg by mouth every Monday, Wednesday, Friday.  fluticasone (FLONASE) 50 mcg/actuation nasal spray 2 Sprays by Both Nostrils route two (2) times a day. by inhalation         calcium carbonate (TUMS PO) Take 500 mg by mouth as needed.  albuterol (PROVENTIL HFA, VENTOLIN HFA, PROAIR HFA) 90 mcg/actuation inhaler Take 1 Puff by inhalation every four (4) hours as needed for Wheezing. 3 Inhaler 3    aspirin delayed-release 81 mg tablet Take 81 mg by mouth daily.  polyethylene glycol (MIRALAX) 17 gram packet Take 1 Packet by mouth two (2) times a day. 60 Packet 11      No current facility-administered medications on file prior to visit. Allergies   Allergen Reactions    Codeine Nausea and Vomiting and Other (comments)       Upset stomach. Weak.  Other Plant, Animal, Environmental Other (comments)       Dust and mold allergy.          Review of Systems:    A comprehensive review of systems was negative except for that written in the History of Present Illness. Objective:      Visit Vitals  BP (!) 140/64 (BP 1 Location: Right arm, BP Patient Position: Sitting)   Pulse 79   Temp 99 °F (37.2 °C) (Oral)   Resp 16   Wt 158 lb 3.2 oz (71.8 kg)   SpO2 97%   BMI 28.94 kg/m²         Physical Exam:  LUNG: clear to auscultation bilaterally  HEART: regular rate and rhythm, S1, S2 normal, no murmur, click, rub or gallop  SKIN: Multiple rash like lesions throughout LEONEL arms and chest.   BREAST: RIGHT breast has well healed incision s/p lumpectomy with no evidence of recurrence. LEFT breast is normal to inspection and palpation. LYMPHATIC: 3 cm soft LN in the RIGHT axilla. Labs: No results found for this or any previous visit (from the past 24 hour(s)). Data Review:       [facility] 3D screening mammogram 03/19/20:   FINDINGS:  There is a palpable abnormality marker on the upper, outer quadrant of the right  breast/axillary tail. There is no visible underlying mammographic abnormality. No visualized mass, suspicious microcalcification or architectural distortion. Targeted right breast ultrasound: Normal appearing breast parenchyma without  cystic or solid masses        Assessment and Plan:          ICD-10-CM ICD-9-CM     1. Axillary adenopathy  R59.0 785. 6           Will plan for RIGHT axillary LN excision, after pt's skin issue has resolve. Reviewed the details of this procedure and what pt should expect for recovery. This will be an outpatient procedure and pt will need a  to take her home following surgery. Pt will call to schedule. All questions were answered and pt is in agreement with this plan. The patient was counseled at length about the risks of cecille Covid-19 during their perioperative period and any recovery window from their procedure. The patient was made aware that cecille Covid-19  may worsen their prognosis for recovering from their procedure and lend to a higher morbidity and/or mortality risk.   All material risks, benefits, and reasonable alternatives including postponing the procedure were discussed. The patient does  wish to proceed with the procedure at this time.

## 2021-02-26 NOTE — ANESTHESIA POSTPROCEDURE EVALUATION
Post-Anesthesia Evaluation and Assessment    Patient: Kenton Chow MRN: [id number]  SSN: [id number]    YOB: 1935  Age: 82 y.o. Sex: female      I have evaluated the patient and they are stable and ready for discharge from the PACU. Cardiovascular Function/Vital Signs  Visit Vitals  BP (!) 151/68   Pulse 77   Temp 36.6 °C (97.8 °F)   Resp 17   SpO2 94%       Patient is status post General anesthesia for Procedure(s):  RIGHT AXILLARY LYMPH NODE BIOPSY. Nausea/Vomiting: None    Postoperative hydration reviewed and adequate. Pain:  Pain Scale 1: Numeric (0 - 10) (02/26/21 1300)  Pain Intensity 1: 0 (02/26/21 1300)   Managed    Neurological Status:   Neuro (WDL): Within Defined Limits (02/26/21 1300)   At baseline    Mental Status, Level of Consciousness: Alert and  oriented to person, place, and time    Pulmonary Status:   O2 Device: Room air (02/26/21 1300)   Adequate oxygenation and airway patent    Complications related to anesthesia: None    Post-anesthesia assessment completed. No concerns    Signed By: Deborah Bustillo MD     February 26, 2021              Procedure(s):  RIGHT AXILLARY LYMPH NODE BIOPSY. general    <BSHSIANPOST>    INITIAL Post-op Vital signs:   Vitals Value Taken Time   /65 02/26/21 1315   Temp 36.6 °C (97.8 °F) 02/26/21 1250   Pulse 80 02/26/21 1323   Resp 19 02/26/21 1323   SpO2 93 % 02/26/21 1323   Vitals shown include unvalidated device data.

## 2021-02-26 NOTE — BRIEF OP NOTE
Brief Postoperative Note    Patient: Estelita Wolff  YOB: 1935  MRN: [id number]    Date of Procedure: 2/26/2021     Pre-Op Diagnosis: RIGHT AXILLARY ADENOPATHY    Post-Op Diagnosis: Same as preoperative diagnosis.       Procedure(s):  RIGHT AXILLARY LYMPH NODE BIOPSY    Surgeon(s):  Grant Villa MD    Surgical Assistant: Surg Asst-1: Coleman Ford    Anesthesia: General     Estimated Blood Loss (mL): Minimal    Complications: None    Specimens:   ID Type Source Tests Collected by Time Destination   1 : RIGHT AXILLA MASS Fresh Axilla  Grant Villa MD 2/26/2021 1226 Pathology        Implants: * No implants in log *    Drains: * No LDAs found *    Findings: several nodes in fat    Electronically Signed by Miguelito Nunez MD on 2/26/2021 at 12:44 PM  [id number]

## 2021-02-26 NOTE — PERIOP NOTES
Patient: Kerline Rubio MRN: [id number]  SSN: [id number]   YOB: 1935  Age: 82 y.o. Sex: female     Patient is status post Procedure(s):  RIGHT AXILLARY LYMPH NODE BIOPSY.     Surgeon(s) and Role:     Olivier Abarca MD - Primary    Local/Dose/Irrigation:  0.5% BUPIVACAINE W EPI                  Peripheral IV 02/26/21 Anterior;Left;Proximal Forearm (Active)                           Dressing/Packing:  Incision 02/26/21 Axilla Right-Dressing/Treatment: Surgical glue (02/26/21 1100)    Splint/Cast:  ]    Other:

## 2021-02-26 NOTE — PERIOP NOTES
Discharge I  Instructions given to patient and daughter. Both given the opportunity to ask questions.

## 2021-02-26 NOTE — DISCHARGE INSTRUCTIONS
Patient Education        Acute Pain After Surgery: Care Instructions  Your Care Instructions     It's common to have some pain after surgery. Pain doesn't mean that something is wrong or that the surgery didn't go well. But when the pain is severe, it's important to work with your doctor to manage it. It's also important to be aware of a few facts about pain and pain medicine. · You are the only person who knows what your pain feels like. So be sure to tell your doctor when you are in pain or when the pain changes. Then he or she will know how to adjust your medicines. · Pain is often easier to control right after it starts. So it may be better to take regular doses of pain medicine and not wait until the pain gets bad. · Medicine can help control pain. But this doesn't mean you'll have no pain. Medicine works to keep the pain at a level you can live with. With time, you will feel better. Follow-up care is a key part of your treatment and safety. Be sure to make and go to all appointments, and call your doctor if you are having problems. It's also a good idea to know your test results and keep a list of the medicines you take. How can you care for yourself at home? · Be safe with medicines. Read and follow all instructions on the label. ? If the doctor gave you a prescription medicine for pain, take it as prescribed. ? If you are not taking a prescription pain medicine, ask your doctor if you can take an over-the-counter medicine. · If you take an over-the-counter pain medicine, such as acetaminophen (Tylenol), ibuprofen (Advil, Motrin), or naproxen (Aleve), read and follow all instructions on the label. · Do not take two or more pain medicines at the same time unless the doctor told you to. · Do not drink alcohol while you are taking pain medicines. · Try to walk each day if your doctor recommends it. Start by walking a little more than you did the day before. Bit by bit, increase the amount you walk. Walking increases blood flow. It also helps prevent pneumonia and constipation. · To prevent constipation from opioid pain medicines:  ? Talk to your doctor about a laxative. ? Include fruits, vegetables, beans, and whole grains in your diet each day. These foods are high in fiber. ? Drink plenty of fluids, enough so that your urine is light yellow or clear like water. Drink water, fruit juice, or other drinks that do not contain caffeine or alcohol. If you have kidney, heart, or liver disease and have to limit fluids, talk with your doctor before you increase the amount of fluids you drink. ? Take a fiber supplement, such as Citrucel or Metamucil, every day if needed. Read and follow all instructions on the label. If you take pain medicine for more than a few days, talk to your doctor before you take fiber. When should you call for help? Call your doctor now or seek immediate medical care if:    · Your pain gets worse.     · Your pain is not controlled by medicine. Watch closely for changes in your health, and be sure to contact your doctor if you have any problems. Where can you learn more? Go to http://www.uriarte.com/  Enter H549 in the search box to learn more about \"Acute Pain After Surgery: Care Instructions. \"  Current as of: 2019               Content Version: 12.6  © 0244-7305 [employer]. Care instructions adapted under license by [employer] (which disclaims liability or warranty for this information). If you have questions about a medical condition or this instruction, always ask your healthcare professional. [place] [address] any warranty or liability for your use of this information.            Patient Discharge Instructions    Sobeida Rick / [id number] : 1935    Admitted 2021 Discharged: 2021     Take Home Medications            · It is important that you take the medication exactly as they are prescribed. · Keep your medication in the bottles provided by the pharmacist and keep a list of the medication names, dosages, and times to be taken in your wallet. · Do not take other medications without consulting your doctor. What to do at Home    Recommended diet: Regular Diet,     Recommended activity: Activity as tolerated, may shower whenever you wish          Follow-up Appointments   Procedures    FOLLOW UP VISIT Appointment in: Two Weeks     Standing Status:   Standing     Number of Occurrences:   1     Order Specific Question:   Appointment in     Answer: Two Weeks           Information obtained by :  I understand that if any problems occur once I am at home I am to contact my physician. I understand and acknowledge receipt of the instructions indicated above. Physician's or R.N.'s Signature                                                                  Date/Time                                                                                                                                              Patient or Representative Signature                                                          Date/Time    ______________________________________________________________________    Anesthesia Discharge Instructions    After general anesthesia or intervenous sedation, for 24 hours or while taking prescription Narcotics:  · Limit your activities  · Do not drive or operate hazardous machinery  · If you have not urinated within 8 hours after discharge, please contact your surgeon on call.   · Do not make important personal or business decisions  · Do not drink alcoholic beverages    Report the following to your surgeon:  · Excessive pain, swelling, redness or odor of or around the surgical area  · Temperature over 100.5 degrees  · Nausea and vomiting lasting longer than 4 hours or if unable to take medication  · Any signs of decreased circulation or nerve impairment to extremity:  Change in color, persistent numbness, tingling, coldness or increased pain. · Any questions    Patient Education        Learning About Being High-Risk for COVID-19  Who is at high risk? COVID-19 causes a mild illness in many people who have it. But certain things may increase your risk for more serious illness. These include:  · Age. The risk increases with age. Older adults are at highest risk. · Smoking. · Obesity. · Living in a long-term care facility. · Having ongoing serious health issues. Some examples are:  ? Chronic lung disease or asthma. ? Heart problems. ? A weakened immune system. ? Cancer treatment. ? Diabetes. ? Chronic kidney disease and having dialysis. ? Sickle cell disease. This is not a complete list. If you have a chronic health problem, ask your doctor if you should take extra precautions during the outbreak. If you are pregnant, it's safest to consider yourself at higher risk. You and your baby may be at risk for pregnancy problems, such as  birth. And you may be at higher risk for serious illness if you get COVID-19. How do you stay safe? · Stay home. ? Stay home as much as you can. This may be the easiest way to avoid exposure, as long as no one else in your household has the virus. ? If there are a lot of COVID-19 cases in your community, do not leave your home except to seek medical care. ? Limit visitors right now. It's especially important to avoid contact with anyone who is sick or who might have been exposed. Remember that people may have been exposed without knowing it or having any symptoms. ? Have enough food, medicines, and other supplies on hand so that you don't have to go out. Try some of these options if you don't have what you need. ? Use delivery and takeout services for groceries and meals. ?  Have a healthy family member, friend, or neighbor shop for you. ? Ask your doctor for extra prescription medicine. ? Routinely clean and disinfect high-touch surfaces. These include countertops, faucets, door handles, doorknobs, and phones. ? Do not travel. · Wash your hands often and well. ? Wash your hands often, especially after you cough or sneeze. Use soap and water, and scrub for at least 20 seconds. If soap and water aren't available, use an alcohol-based hand . · Be extra careful if you have to go out. ? Avoid crowds and crowded places. Try to keep 6 feet of space between yourself and others. ? Don't use public transportation, ride-shares, or taxis unless you have no choice. ? Try not to touch things that many other people have touched. Door handles, elevator buttons, shopping cart handles, and handrails on escalators get a lot of touches. ? Carry tissues or paper towels with you. If you must touch something, you'll be able to protect your hands. ? Don't shake hands with anyone. Try a friendly wave instead. ? Don't touch your face, and wash your hands often. ? Wash your hands again as soon as you get home. · Know when to call your doctor. ? Call a doctor if you have symptoms of COVID-19 (fever, cough, shortness of breath). If you are told to get testing or care and must go out, wear a cloth face cover. Where can you learn more? Go to http://www.gray.com/  Enter A131 in the search box to learn more about \"Learning About Being High-Risk for COVID-19. \"  Current as of: December 18, 2020               Content Version: 12.7  © 7863-9365 [employer]. Care instructions adapted under license by [employer] (which disclaims liability or warranty for this information).  If you have questions about a medical condition or this instruction, always ask your healthcare professional. [address]  any warranty or liability for your use of this information.

## 2021-02-27 NOTE — OP NOTES
[address]   OPERATIVE REPORT    Name:  Gigi Kellogg  MR#:  [id number]  :  1935  ACCOUNT #:  [de-identified]  DATE OF SERVICE:  2021    PREOPERATIVE DIAGNOSIS:  Right axillary adenopathy. POSTOPERATIVE DIAGNOSIS:  Right axillary adenopathy. PROCEDURE PERFORMED:  Right axillary lymph node biopsy. SURGEON:  Eugene Johns MD    ASSISTANT:  Bettie Gunter SA    ANESTHESIA:  General supplemented with 0.5% Marcaine with epinephrine. COMPLICATIONS:  None. SPECIMENS REMOVED:  Several lymph nodes embedded in the fat. IMPLANTS:  None    ESTIMATED BLOOD LOSS:  Minimal.    DRAINS:  None. CONDITION:  Good to the PACU. FINDINGS:  Several nodes embedded in the fat. PROCEDURE:  Prior to the surgery, informed consent was obtained. With the patient supine,  satisfactory general anesthesia was induced. The right axilla was prepared with ChloraPrep and draped as a field. Previous biopsy site there was reinfiltrated with Marcaine and opened, and using blunt dissection, the areas of lymph nodes were encountered and several areas of what appeared to be lymph nodes embedded in fat were grasped with Allis clamps and excised using the focus harmonic scalpel. The specimen was sent to the pathology for permanent sections. The wound was inspected and hemostasis was excellent. The subcutaneous tissues were reapproximated with Vicryl and the skin was closed with subcuticular Monocryl followed by Dermabond and then the patient was brought to the PACU in satisfactory condition.         MD CHAYA [email or web address]  D:  2021 12:48  T:  2021 21:24  JOB #:  [id number]  CC:  Annette Mccann MD

## 2021-03-02 ENCOUNTER — TELEPHONE (OUTPATIENT)
Dept: SURGERY | Age: 86
End: 2021-03-02

## 2021-03-03 NOTE — TELEPHONE ENCOUNTER
I called the patient back and I let her know that her biopsy was benign per Dr Karishma Kim, she then said that Dr Roger Abebe had put in a referral for her to see Dr Jerline Peabody with Oncology and she asked if she still needed to go. I asked Dr Karishma Kim and he said he will discuss with Dr Roger Abebe and they will get back with her but Dr Karishma Kim did not think she needed to go. Pt in agreement.

## 2021-03-05 ENCOUNTER — TELEPHONE (OUTPATIENT)
Dept: SURGERY | Age: 86
End: 2021-03-05

## 2021-03-05 NOTE — TELEPHONE ENCOUNTER
Arm is swollen and tender to touch after she had her surgery. Wasn't sure if she needed to be concerned.

## 2021-03-05 NOTE — TELEPHONE ENCOUNTER
I returned patients call and verified patient with 2 patient identifiers. She is 1 week (2/26/2021) S/P Right Axillary lymph node biopsy and has C/O right arm swelling above and below the elbow and that it is tender to the touch. She states the swelling does not go into her hand and she has no numbness, and no redness. She states the biopsy site is dry. She is  concerned about the swelling in her arm because her sister had breast surgery and now has to wear a sleeve for her arm swelling. She has a post op appt. On 3/15/2021 with NIDA Monahan. She states Dr. Nikhil Salcedo only spoke with her sister after her surgery and not her. Per Dr. Nikhil Salcedo, patient was told it is not uncommon to have a little swelling after that type of surgery and to reschedule her appt. For next week with him so he can answer all her questions about the surgery and check the surgery site and arm swelling. Call was transferred up front for scheduling appt.

## 2021-03-10 ENCOUNTER — OFFICE VISIT (OUTPATIENT)
Dept: SURGERY | Age: 86
End: 2021-03-10
Payer: MEDICARE

## 2021-03-10 VITALS
SYSTOLIC BLOOD PRESSURE: 148 MMHG | HEIGHT: 63 IN | HEART RATE: 80 BPM | TEMPERATURE: 99.5 F | BODY MASS INDEX: 28.53 KG/M2 | WEIGHT: 161 LBS | RESPIRATION RATE: 16 BRPM | DIASTOLIC BLOOD PRESSURE: 74 MMHG | OXYGEN SATURATION: 95 %

## 2021-03-10 DIAGNOSIS — Z09 POSTOPERATIVE EXAMINATION: Primary | ICD-10-CM

## 2021-03-10 PROCEDURE — 99212 OFFICE O/P EST SF 10 MIN: CPT | Performed by: SURGERY

## 2021-03-10 NOTE — LETTER
3/10/2021 Patient: Ofe Brenner YOB: 1935 Date of Visit: 3/10/2021 Lesli Hirsch MD 
[facility] [address] [place] [address] [address] [employer] Dear Lesli Hirsch MD, Thank you for referring Ms. Esperanza Nascimento to [address] [facility] AT [facility], operated by [facility] for evaluation. My notes for this consultation are attached. If you have questions, please do not hesitate to call me. I look forward to following your patient along with you. Sincerely, Pina Chauhan MD

## 2021-03-10 NOTE — PROGRESS NOTES
Subjective:      Char Mcdaniel  is a 82 y.o. female presents for f/u s/p RIGHT axillary LN biopsy on 02/26/21. Final surgical PATH: benign reactive LN with follicular hyperplasia. Pt concerned with RIGHT arm and forearm swelling and pain following surgery. Pain is in the elbow region and is made worse with raising her arm. Pt denies any fevers or chills. HISTORY:     Pt with history of RIGHT breats cancer diagnosed in 1994 and was treated with RIGHT breast lumpectomy and XRT therapy.      Pt was initially seen in this office in 2010 for evaluation of palpable denisties that were determined to be fibrocystic changes.      During her last visit with her PCP, pt notes \"knot\" in the RIGHT axilla, which she is concerned about due to her personal history of breast cancer.      Pt most recent mammogram on 03/19/20 showed palpable abnormality marker on the UOQ of the RIGHT breast/axillary tail with no visble underlying mammographic abnormality. Targets US showed normal appearing breast parenchyma without cystic or solid masses. Prior to surgery, pt noted \"has itchy bites all over her\" after getting a new mattress from her son. She reports she did not have as many bites as she currently does when she met with Dr. Tyree Lackey, or would have had them evaluated sooner. Pt notes plans to have her house fumigated in the near future.     Past Medical History:   Diagnosis Date    Anemia     Arrhythmia     irregular per pt d/t blood disorder    Asthma     Axillary adenopathy 01/04/2021    3/1/21 md Kenny - Benign, reactive lymph nodes with follicular hyperplasia     Bed bug bite 01/18/2021    Bereavement 2-2-16     die 82 copd,chf,pul htn pn after 48 yr marriage    BPV (benign positional vertigo)     BPV (benign positional vertigo) 08/20/2020    Breast cancer, [facility] 1994    right breast, lumpectomy and radiation    Breast lump 2017    right breast     Chronic kidney disease 02/06/2019    kidney cyst - watching    Coagulation disorder ([address]     SPHEROCYTOSIS  SICKLE CELL TRAIT    Diabetes ([address]     controlled with diet    DJD (degenerative joint disease) of knee     Early satiety     Hearing deficit, left     Hereditary spherocytosis ([address]     History of nuclear stress test 01/22/2021    Normal myocardial perfusion scan without evidence of ischemia or prior infarct ejection fraction 68%    HTN (hypertension)     Ill-defined condition     sickle cell trait    Intrahepatic bile duct dilation 09/05/2018    mri    Radiation therapy complication 3422    right breast     Renal cyst 08/2016    ct rt - MRI 9/5/18 a hemorrhagic cyst in the right lower pole a layering hemorrhagic component.  There are no solid renal masses     S/P colonoscopy 4-9-14    md Brian - family hx    S/P colonoscopy 07/20/2016    rt - md brian diverticulosis    Seizures ([address]     Septic arthritis ([address] 3/11    Streptococcal sepsis ([address] 03/2011    Venous insufficiency of both lower extremities        Past Surgical History:   Procedure Laterality Date    HX BREAST BIOPSY Right 1994    positive    HX BREAST LUMPECTOMY Right 1994    HX CATARACT REMOVAL Right 2019    HX CHOLECYSTECTOMY  1985    HX GI      COLONOSCOPY    HX ORTHOPAEDIC      CALICIUM DEPOSIT R THUMB REMOVED    HX OTHER SURGICAL  02/26/2021    Right axillary lymph node [id number]. Ronnell Bending    ID 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       Social History     Tobacco Use    Smoking status: Never Smoker    Smokeless tobacco: Never Used   Substance Use Topics    Alcohol use: No       Family History   Problem Relation Age of Onset    Diabetes Mother     Other Father         'sphero'-blood disorder    Cancer Sister         breast    Breast Cancer Sister 71    Sleep Apnea Sister     Other Sister         spleen removed    Alzheimer Sister     Colon Cancer Sister     Other Other         spleen removed   Stanley Lias Bladder Disease Other        Current Outpatient Medications on File Prior to Visit   Medication Sig Dispense Refill    lamoTRIgine (LaMICtal) 25 mg tablet TAKE 1 TABLET TWICE A  Tab 3    aspirin (ASPIRIN) 325 mg tablet Take 325 mg by mouth as needed for Pain.  telmisartan (MICARDIS PO) Take 50 mg by mouth nightly.  ibandronate (BONIVA) 150 mg tablet TAKE 1 TABLET ONCE A MONTH 3 Tab 3    NIFEdipine ER (PROCARDIA XL) 60 mg ER tablet TAKE 1 TAB TWICE A  Tab 3    pravastatin (PRAVACHOL) 20 mg tablet TAKE 1 TABLET ONCE EACH NIGHT 90 Tab 3    albuterol (PROVENTIL VENTOLIN) 2.5 mg /3 mL (0.083 %) nebu 3 mL by Nebulization route every four (4) hours as needed for Wheezing. 300 Nebule 3    metoprolol succinate (Toprol XL) 50 mg XL tablet Take 1 Tab by mouth nightly. (Patient taking differently: Take 50 mg by mouth every morning.) 90 Tab 3    fluticasone propion-salmeteroL (ADVAIR/WIXELA) 250-50 mcg/dose diskus inhaler Take 1 Puff by inhalation every twelve (12) hours. 1 Inhaler 11    clotrimazole-betamethasone (LOTRISONE) topical cream Apply  to affected area two (2) times a day. apply thin layer topically 2 times daily 45 g 14    oxybutynin chloride XL (DITROPAN XL) 10 mg CR tablet TAKE 1 TABLET DAILY (Patient taking differently: nightly.) 90 Tab 3    arformoteroL (BROVANA) 15 mcg/2 mL nebu neb solution 2 mL by Nebulization route every twelve (12) hours. 180 Vial 3    budesonide (PULMICORT) 0.5 mg/2 mL nbsp 2 mL by Nebulization route two (2) times a day. 180 Each 3    hydroCHLOROthiazide (HYDRODIURIL) 12.5 mg tablet Take 12.5 mg by mouth every Monday, Wednesday, Friday.  calcium carbonate (TUMS PO) Take 500 mg by mouth as needed.  albuterol (PROVENTIL HFA, VENTOLIN HFA, PROAIR HFA) 90 mcg/actuation inhaler Take 1 Puff by inhalation every four (4) hours as needed for Wheezing. 3 Inhaler 3    aspirin delayed-release 81 mg tablet Take 81 mg by mouth daily.       loratadine (CLARITIN) 10 mg tablet Take 10 mg by mouth.  meclizine (ANTIVERT) 25 mg tablet Take 25 mg by mouth as needed for Dizziness.  guaiFENesin-dextromethorphan (Mucus Relief DM)  mg tab tablet Take  by mouth as needed. 1/2 TAB      ascorbic acid, vitamin C, (Vitamin C) 500 mg tablet Take 500 mg by mouth.  cholecalciferol (Vitamin D3) (1000 Units /25 mcg) tablet Take 1,000 Units by mouth daily.  Ascorbic Acid-Multivits-Min (Emergen-C) 1,000 mg pwep Take  by mouth. 1 PACKET DAILY      potassium chloride (K-DUR, KLOR-CON) 20 mEq tablet Take 1 Tab by mouth daily. TAKE 1 TABLET DAILY 90 Tab 3    fluticasone (FLONASE) 50 mcg/actuation nasal spray 2 Sprays by Both Nostrils route two (2) times a day. by inhalation        No current facility-administered medications on file prior to visit. Allergies   Allergen Reactions    Codeine Nausea and Vomiting and Other (comments)     Upset stomach. Weak.  Other Plant, Animal, Environmental Other (comments)     Dust and mold allergy.          Review of Systems:  Constitutional: No fever or chills  Neurologic: No headache  Eyes: No scleral icterus or irritated eyes  Nose: No nasal pain or drainage  Mouth: No oral lesions or sore throat  Cardiac: No palpations or chest pain  Pulmonary: No cough or shortness or breath  Gastrointestinal: No nausea, emesis, diarrhea, or constipation  Genitourinary: No dysuria  Musculoskeletal: No muscle or joint tenderness  Skin: No rashes or lesions  Psychiatric: No anxiety or depressed mood    Objective:     Visit Vitals  BP (!) 148/74 (BP 1 Location: Left upper arm, BP Patient Position: Sitting, BP Cuff Size: Large adult)   Pulse 80   Temp 99.5 °F (37.5 °C) (Oral)   Resp 16   Ht 5' 3\" (1.6 m)   Wt 161 lb (73 kg)   SpO2 95%   BMI 28.52 kg/m²        Physical Exam:  General: No acute distress, conversant  Eyes: PERRLA, no scleral icterus  HENT: Normocephalic without oral lesions  Neck: Trachea midline without LAD  Cardiac: Normal pulse rate and rhythm  Pulmonary: Symmetric chest rise with normal effort  GI: Soft, NT, ND, no hernia, no splenomegaly  Skin: Warm without rash   RIGHT axillary incision healing well  Extremities: No edema or joint stiffness  Psych: Appropriate mood and affect    Labs: No results found for this or any previous visit (from the past 24 hour(s)). Data Review: All previous imaging, testing and lab work was reviewed and interpreted. FINAL PATHOLOGIC DIAGNOSIS 02/26/21:  Lymph nodes, right axillary mass, excision:   Benign, reactive lymph nodes with follicular hyperplasia   See comment and microscopic description    Assessment and Plan:       ICD-10-CM ICD-9-CM    1. Postoperative examination  Z09 V67.00        Reviewed final surgical PATH to be benign. Pt's pain is to be expected and will likely resolve over time. F/U PRN. All questions were answered and pt is in agreement with this plan. Total face to face time with patient: 10 minutes. Greater than 50% of the time was spent in counseling. This document was scribed by Yevgeniy Henry as dictated by Dr. Gaye Blake.      Signed By: Rupinder Schuler MD     03/10/21

## 2021-03-10 NOTE — PROGRESS NOTES
1. Have you been to the ER, urgent care clinic since your last visit? Hospitalized since your last visit? No    2. Have you seen or consulted any other health care providers outside of the [address] [place] [address] since your last visit? Include any pap smears or colon screening.  No

## 2021-03-23 ENCOUNTER — OFFICE VISIT (OUTPATIENT)
Dept: INTERNAL MEDICINE CLINIC | Age: 86
End: 2021-03-23
Payer: MEDICARE

## 2021-03-23 DIAGNOSIS — D64.9 ANEMIA, UNSPECIFIED TYPE: ICD-10-CM

## 2021-03-23 DIAGNOSIS — Z85.3 HISTORY OF RIGHT BREAST CANCER: ICD-10-CM

## 2021-03-23 DIAGNOSIS — D58.0 HEREDITARY SPHEROCYTOSIS (HCC): ICD-10-CM

## 2021-03-23 DIAGNOSIS — R59.0 AXILLARY ADENOPATHY: Primary | ICD-10-CM

## 2021-03-23 DIAGNOSIS — E11.21 TYPE 2 DIABETES MELLITUS WITH NEPHROPATHY (HCC): ICD-10-CM

## 2021-03-23 DIAGNOSIS — I10 ESSENTIAL HYPERTENSION: ICD-10-CM

## 2021-03-23 PROCEDURE — G8536 NO DOC ELDER MAL SCRN: HCPCS | Performed by: INTERNAL MEDICINE

## 2021-03-23 PROCEDURE — G8419 CALC BMI OUT NRM PARAM NOF/U: HCPCS | Performed by: INTERNAL MEDICINE

## 2021-03-23 PROCEDURE — 1090F PRES/ABSN URINE INCON ASSESS: CPT | Performed by: INTERNAL MEDICINE

## 2021-03-23 PROCEDURE — G8399 PT W/DXA RESULTS DOCUMENT: HCPCS | Performed by: INTERNAL MEDICINE

## 2021-03-23 PROCEDURE — G8756 NO BP MEASURE DOC: HCPCS | Performed by: INTERNAL MEDICINE

## 2021-03-23 PROCEDURE — G8427 DOCREV CUR MEDS BY ELIG CLIN: HCPCS | Performed by: INTERNAL MEDICINE

## 2021-03-23 PROCEDURE — 99214 OFFICE O/P EST MOD 30 MIN: CPT | Performed by: INTERNAL MEDICINE

## 2021-03-23 PROCEDURE — G8432 DEP SCR NOT DOC, RNG: HCPCS | Performed by: INTERNAL MEDICINE

## 2021-03-23 PROCEDURE — 1101F PT FALLS ASSESS-DOCD LE1/YR: CPT | Performed by: INTERNAL MEDICINE

## 2021-03-23 NOTE — PROGRESS NOTES
Patient complains of an itchy rash on her forearms, particularly, and is doing well otherwise. [facility]  Shawn Mejia MD, [address] [address]  Phone:  [phone]  Fax: [phone]       Chief Complaint   Patient presents with    Rash   . SUBJECTIVE:    Estelita Wolff is a 82 y.o. female Patient returns today following a visit with Irving Barrera on 02/26/21, where she underwent a right axillary lymph node excisional biopsy with a pathological finding fortunately of benign reactive lymph node with follicular hyperplasia. She comes in today for follow up. She has a known history of right breast cancer, hereditary spherocytosis, type 2 diabetes with nephropathy, primary hypertension, chronic kidney disease, and is seen for evaluation. Current Outpatient Medications   Medication Sig Dispense Refill    lamoTRIgine (LaMICtal) 25 mg tablet TAKE 1 TABLET TWICE A  Tab 3    loratadine (CLARITIN) 10 mg tablet Take 10 mg by mouth.  aspirin (ASPIRIN) 325 mg tablet Take 325 mg by mouth as needed for Pain.  meclizine (ANTIVERT) 25 mg tablet Take 25 mg by mouth as needed for Dizziness.  telmisartan (MICARDIS PO) Take 50 mg by mouth nightly.  guaiFENesin-dextromethorphan (Mucus Relief DM)  mg tab tablet Take  by mouth as needed. 1/2 TAB      ascorbic acid, vitamin C, (Vitamin C) 500 mg tablet Take 500 mg by mouth.  cholecalciferol (Vitamin D3) (1000 Units /25 mcg) tablet Take 1,000 Units by mouth daily.  Ascorbic Acid-Multivits-Min (Emergen-C) 1,000 mg pwep Take  by mouth. 1 PACKET DAILY      ibandronate (BONIVA) 150 mg tablet TAKE 1 TABLET ONCE A MONTH 3 Tab 3    NIFEdipine ER (PROCARDIA XL) 60 mg ER tablet TAKE 1 TAB TWICE A  Tab 3    potassium chloride (K-DUR, KLOR-CON) 20 mEq tablet Take 1 Tab by mouth daily.  TAKE 1 TABLET DAILY 90 Tab 3    pravastatin (PRAVACHOL) 20 mg tablet TAKE 1 TABLET ONCE EACH NIGHT 90 Tab 3    albuterol (PROVENTIL VENTOLIN) 2.5 mg /3 mL (0.083 %) nebu 3 mL by Nebulization route every four (4) hours as needed for Wheezing. 300 Nebule 3    metoprolol succinate (Toprol XL) 50 mg XL tablet Take 1 Tab by mouth nightly. (Patient taking differently: Take 50 mg by mouth every morning.) 90 Tab 3    fluticasone propion-salmeteroL (ADVAIR/WIXELA) 250-50 mcg/dose diskus inhaler Take 1 Puff by inhalation every twelve (12) hours. 1 Inhaler 11    clotrimazole-betamethasone (LOTRISONE) topical cream Apply  to affected area two (2) times a day. apply thin layer topically 2 times daily 45 g 14    oxybutynin chloride XL (DITROPAN XL) 10 mg CR tablet TAKE 1 TABLET DAILY (Patient taking differently: nightly.) 90 Tab 3    arformoteroL (BROVANA) 15 mcg/2 mL nebu neb solution 2 mL by Nebulization route every twelve (12) hours. 180 Vial 3    budesonide (PULMICORT) 0.5 mg/2 mL nbsp 2 mL by Nebulization route two (2) times a day. 180 Each 3    hydroCHLOROthiazide (HYDRODIURIL) 12.5 mg tablet Take 12.5 mg by mouth every Monday, Wednesday, Friday.  fluticasone (FLONASE) 50 mcg/actuation nasal spray 2 Sprays by Both Nostrils route two (2) times a day. by inhalation       calcium carbonate (TUMS PO) Take 500 mg by mouth as needed.  albuterol (PROVENTIL HFA, VENTOLIN HFA, PROAIR HFA) 90 mcg/actuation inhaler Take 1 Puff by inhalation every four (4) hours as needed for Wheezing. 3 Inhaler 3    aspirin delayed-release 81 mg tablet Take 81 mg by mouth daily.        Past Medical History:   Diagnosis Date    Anemia     Arrhythmia     irregular per pt d/t blood disorder    Asthma     Axillary adenopathy 01/04/2021    3/1/21 md Kenny - Benign, reactive lymph nodes with follicular hyperplasia     Bed bug bite 01/18/2021    Bereavement 2-2-16     die 82 copd,chf,pul htn pn after 48 yr marriage    BPV (benign positional vertigo)     BPV (benign positional vertigo) 08/20/2020    Breast cancer, right breast ([address] 1994    right breast, lumpectomy and radiation    Breast lump 2017    right breast     Chronic kidney disease 02/06/2019    kidney cyst - watching    Coagulation disorder ([address]     SPHEROCYTOSIS  SICKLE CELL TRAIT    Diabetes ([address]     controlled with diet    DJD (degenerative joint disease) of knee     Early satiety     Hearing deficit, left     Hereditary spherocytosis ([address]     History of nuclear stress test 01/22/2021    Normal myocardial perfusion scan without evidence of ischemia or prior infarct ejection fraction 68%    HTN (hypertension)     Ill-defined condition     sickle cell trait    Intrahepatic bile duct dilation 09/05/2018    mri    Radiation therapy complication 3297    right breast     Renal cyst 08/2016    ct rt - MRI 9/5/18 a hemorrhagic cyst in the right lower pole a layering hemorrhagic component. There are no solid renal masses     S/P colonoscopy 4-9-14    md Brian - family hx    S/P colonoscopy 07/20/2016    rt - md brian diverticulosis    Seizures ([facility]     Septic arthritis ([address] 3/11    Streptococcal sepsis ([address] 03/2011    Venous insufficiency of both lower extremities      Past Surgical History:   Procedure Laterality Date    HX BREAST BIOPSY Right 1994    positive    HX BREAST LUMPECTOMY Right 1994    HX CATARACT REMOVAL Right 2019    HX CHOLECYSTECTOMY  1985    HX GI      COLONOSCOPY    HX ORTHOPAEDIC      CALICIUM DEPOSIT R THUMB REMOVED    HX OTHER SURGICAL  02/26/2021    Right axillary lymph node [id number]. Rosalita Beverage    MO 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     Allergies   Allergen Reactions    Codeine Nausea and Vomiting and Other (comments)     Upset stomach. Weak.  Other Plant, Animal, Environmental Other (comments)     Dust and mold allergy.          REVIEW OF SYSTEMS:  General: negative for - chills or fever  ENT: negative for - headaches, nasal congestion or tinnitus  Respiratory: negative for - cough, hemoptysis, shortness of breath or wheezing  Cardiovascular : negative for - chest pain, edema, palpitations or shortness of breath  Gastrointestinal: negative for - abdominal pain, blood in stools, heartburn or nausea/vomiting  Genito-Urinary: no dysuria, trouble voiding, or hematuria  Musculoskeletal: negative for - gait disturbance, joint pain, joint stiffness or joint swelling  Neurological: no TIA or stroke symptoms  Hematologic: no bruises, no bleeding, no swollen glands  Integument: no lumps, mole changes, nail changes or rash  Endocrine: no malaise/lethargy or unexpected weight changes      Social History     Socioeconomic History    Marital status:      Spouse name: Not on file    Number of children: Not on file    Years of education: Not on file    Highest education level: Not on file   Tobacco Use    Smoking status: Never Smoker    Smokeless tobacco: Never Used   Substance and Sexual Activity    Alcohol use: No    Drug use: No    Sexual activity: Not Currently   Social History Narrative    Medical History: Streptococcus pneumoniae septicemia 11Septic polyarthritis 11HSV2 herpes simplex    [id number]/02/11Seizure d/o 11Osteoporosis 2011primary HTN 1990Bronchial asthma 1985Obesity 2002Carcinoma of    breast 04Congential spherocytosis    Gyn History: Last mammogram date 2013. OB History: Total pregnancies 2. Live births 2. Surgical History: normal stress myocardial perfusion scan, EF 68% 08splenectomy 1972cholecystectomy 1985colonoscopy 2008internal hemorrhoids diverticulosis lumpectomy radiation therapy colonoscopy diverticulosis Cassia Body M.D. 2014        Hospitalization/Major Diagnostic Procedure: streptococcus pneumoniae septicemia ronchial asthma         Family History:  Mother:  80 yrs, DM, Chasity Miller:  80 yrs, heart disease, aortic stenosisSister(s): , colon CA,polypsBrother(s): , at birthUncle: alive, colon CA, heart disease2 sister(s) [employer] . 1 son(s) , 1 daughter(s) . Social History: Alcohol Use Patient does not use alcohol. Smoking Status Patient is a never smoker. Marital Status:  2016, 46 yrs copd,chf. Children: Her son grandchild's mother is temporarily incarcerated and she is caring for the 10 yo. Occupation/W ork: retired teacher. Education/School: has college diploma-[employer]. Yazdanism: [place] [employer].     Family History   Problem Relation Age of Onset    Diabetes Mother     Other Father         'sphero'-blood disorder    Cancer Sister         breast    Breast Cancer Sister 71    Sleep Apnea Sister     Other Sister         spleen removed    Alzheimer Sister     Colon Cancer Sister     Other Other         spleen removed    Gall Bladder Disease Other        OBJECTIVE:    There were no vitals taken for this visit. CONSTITUTIONAL: well , well nourished, appears age appropriate  EYES: perrla, eom intact  ENMT:moist mucous membranes, pharynx clear  NECK: supple. Thyroid normal  RESPIRATORY: Chest: clear bilaterally   CARDIOVASCULAR: Heart: regular rate and rhythm  GASTROINTESTINAL: Abdomen: soft, bowel sounds active  HEMATOLOGIC: no pathological lymph nodes palpated  MUSCULOSKELETAL: Extremities: no edema, pulse 1+   INTEGUMENT: No unusual rashes or suspicious skin lesions noted. Nails appear normal.  NEUROLOGIC: non-focal exam   MENTAL STATUS: alert and oriented, appropriate affect           ASSESSMENT:  1. Axillary adenopathy    2. History of right breast cancer    3. [facility]reditary spherocytosis ([facility]    4. Type 2 diabetes mellitus with nephropathy ([facility] [facility]    5. Essential hypertension    6.  Anemia, unspecified type      For the blood pressure we note that her readings are variable at 85/60 on the 1st, 145/62 on the 4th, 135/62 on the 12th and today 152/62, which is not surprising since she is coming to see me. Axillary adenopathy is no longer an issue and fortunately was benign. History of breast cancer, which is followed by oncology. Hereditary spherocytosis remains stable. Type 2 diabetes. Last several hemoglobin A1c's were completely normal.    Blood pressure is as noted. Her anemia is stable. She has a peripheral edema. She is going to try taking Hydrochlorothiazide on a daily basis and see how she does. We are concerned about her kidneys with that. She will be back to see us in three months. We encourage her to use the triamcinolone cream regularly for the rash. If it does not clear up, then I think she should see a dermatologist.  She does not want to see one right now, however, she states. I have discussed the diagnosis with the patient and the intended plan as seen in the  Orders. The patient understands and agees with the plan. The patient has   received an after visit summary and questions were answered concerning  future plans  Patient labs and/or xrays were reviewed  Past records were reviewed. PLAN:  . No orders of the defined types were placed in this encounter. Follow-up and Dispositions    · Return in about 3 months (around 6/23/2021). ATTENTION:   This medical record was transcribed using an electronic medical records system. Although proofread, it may and can contain electronic and spelling errors. Other human spelling and other errors may be present. Corrections may be executed at a later time. Please feel free to contact us for any clarifications as needed.

## 2021-03-27 ENCOUNTER — HOSPITAL ENCOUNTER (EMERGENCY)
Age: 86
Discharge: HOME | End: 2021-03-27
Attending: EMERGENCY MEDICINE
Payer: MEDICARE

## 2021-03-27 VITALS
TEMPERATURE: 98.1 F | BODY MASS INDEX: 28.35 KG/M2 | HEART RATE: 81 BPM | OXYGEN SATURATION: 96 % | RESPIRATION RATE: 18 BRPM | WEIGHT: 160 LBS | DIASTOLIC BLOOD PRESSURE: 71 MMHG | SYSTOLIC BLOOD PRESSURE: 171 MMHG | HEIGHT: 63 IN
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
## 2021-03-27 PROCEDURE — 99283 EMERGENCY DEPT VISIT LOW MDM: CPT

## 2021-03-27 RX ORDER — TIZANIDINE HYDROCHLORIDE 2 MG/1
2 CAPSULE, GELATIN COATED ORAL
Qty: 12 CAP | Refills: 0 | Status: SHIPPED | OUTPATIENT
Start: 2021-03-27
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
## 2021-04-02 ENCOUNTER — VIRTUAL VISIT (OUTPATIENT)
Dept: INTERNAL MEDICINE CLINIC | Age: 86
End: 2021-04-02
Payer: MEDICARE

## 2021-04-02 DIAGNOSIS — Z85.3 HISTORY OF RIGHT BREAST CANCER: ICD-10-CM

## 2021-04-02 DIAGNOSIS — N18.31 STAGE 3A CHRONIC KIDNEY DISEASE (HCC): ICD-10-CM

## 2021-04-02 DIAGNOSIS — E11.21 TYPE 2 DIABETES MELLITUS WITH NEPHROPATHY (HCC): ICD-10-CM
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
## 2021-04-02 DIAGNOSIS — M17.0 PRIMARY OSTEOARTHRITIS OF BOTH KNEES: ICD-10-CM

## 2021-04-02 DIAGNOSIS — I10 ESSENTIAL HYPERTENSION: ICD-10-CM

## 2021-04-02 PROCEDURE — G8756 NO BP MEASURE DOC: HCPCS | Performed by: INTERNAL MEDICINE

## 2021-04-02 PROCEDURE — G8432 DEP SCR NOT DOC, RNG: HCPCS | Performed by: INTERNAL MEDICINE

## 2021-04-02 PROCEDURE — 99214 OFFICE O/P EST MOD 30 MIN: CPT | Performed by: INTERNAL MEDICINE

## 2021-04-02 PROCEDURE — G8399 PT W/DXA RESULTS DOCUMENT: HCPCS | Performed by: INTERNAL MEDICINE

## 2021-04-02 PROCEDURE — 1090F PRES/ABSN URINE INCON ASSESS: CPT | Performed by: INTERNAL MEDICINE

## 2021-04-02 PROCEDURE — 1101F PT FALLS ASSESS-DOCD LE1/YR: CPT | Performed by: INTERNAL MEDICINE

## 2021-04-02 PROCEDURE — G8428 CUR MEDS NOT DOCUMENT: HCPCS | Performed by: INTERNAL MEDICINE
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
## 2021-04-30 ENCOUNTER — TRANSCRIBE ORDER (OUTPATIENT)
Dept: SCHEDULING | Age: 86
End: 2021-04-30

## 2021-04-30 DIAGNOSIS — Z12.31 VISIT FOR SCREENING MAMMOGRAM: Primary | ICD-10-CM

## 2021-05-17 ENCOUNTER — HOSPITAL ENCOUNTER (OUTPATIENT)
Dept: MAMMOGRAPHY | Age: 86
Discharge: HOME | End: 2021-05-17
Attending: INTERNAL MEDICINE
Payer: MEDICARE

## 2021-05-17 DIAGNOSIS — Z12.31 VISIT FOR SCREENING MAMMOGRAM: ICD-10-CM

## 2021-05-17 PROCEDURE — 77067 SCR MAMMO BI INCL CAD: CPT

## 2021-05-17 PROCEDURE — 77063 BREAST TOMOSYNTHESIS BI: CPT

## 2021-05-28 RX ORDER — BUDESONIDE 0.5 MG/2ML
500 INHALANT ORAL 2 TIMES DAILY
Qty: 180 EACH | Refills: 3 | Status: SHIPPED | OUTPATIENT
Start: 2021-05-28 | End: 2021-10-12

## 2021-05-28 RX ORDER — ARFORMOTEROL TARTRATE 15 UG/2ML
15 SOLUTION RESPIRATORY (INHALATION) EVERY 12 HOURS
Qty: 180 VIAL | Refills: 3 | Status: SHIPPED | OUTPATIENT
Start: 2021-05-28 | End: 2021-10-12

## 2021-05-28 RX ORDER — ALBUTEROL SULFATE 0.83 MG/ML
2.5 SOLUTION RESPIRATORY (INHALATION)
Qty: 300 NEBULE | Refills: 3 | Status: SHIPPED | OUTPATIENT
Start: 2021-05-28

## 2021-05-28 RX ORDER — ALBUTEROL SULFATE 90 UG/1
1 AEROSOL, METERED RESPIRATORY (INHALATION)
Qty: 3 INHALER | Refills: 3 | Status: SHIPPED | OUTPATIENT
Start: 2021-05-28 | End: 2021-09-03 | Stop reason: SDUPTHER

## 2021-06-23 ENCOUNTER — OFFICE VISIT (OUTPATIENT)
Dept: INTERNAL MEDICINE CLINIC | Age: 86
End: 2021-06-23
Payer: MEDICARE

## 2021-06-23 VITALS
OXYGEN SATURATION: 98 % | DIASTOLIC BLOOD PRESSURE: 67 MMHG | RESPIRATION RATE: 18 BRPM | SYSTOLIC BLOOD PRESSURE: 125 MMHG | BODY MASS INDEX: 27.96 KG/M2 | HEART RATE: 69 BPM | TEMPERATURE: 97.6 F | WEIGHT: 157.8 LBS | HEIGHT: 63 IN

## 2021-06-23 DIAGNOSIS — H91.92 HEARING DEFICIT, LEFT: ICD-10-CM

## 2021-06-23 DIAGNOSIS — N18.31 STAGE 3A CHRONIC KIDNEY DISEASE (HCC): ICD-10-CM

## 2021-06-23 DIAGNOSIS — D58.0 HEREDITARY SPHEROCYTOSIS (HCC): ICD-10-CM
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
## 2021-06-23 DIAGNOSIS — E11.21 TYPE 2 DIABETES MELLITUS WITH NEPHROPATHY (HCC): ICD-10-CM

## 2021-06-23 DIAGNOSIS — I10 ESSENTIAL HYPERTENSION: ICD-10-CM

## 2021-06-23 DIAGNOSIS — I87.2 VENOUS INSUFFICIENCY OF BOTH LOWER EXTREMITIES: ICD-10-CM

## 2021-06-23 PROCEDURE — G8419 CALC BMI OUT NRM PARAM NOF/U: HCPCS | Performed by: INTERNAL MEDICINE

## 2021-06-23 PROCEDURE — G8754 DIAS BP LESS 90: HCPCS | Performed by: INTERNAL MEDICINE

## 2021-06-23 PROCEDURE — 1101F PT FALLS ASSESS-DOCD LE1/YR: CPT | Performed by: INTERNAL MEDICINE

## 2021-06-23 PROCEDURE — G8510 SCR DEP NEG, NO PLAN REQD: HCPCS | Performed by: INTERNAL MEDICINE

## 2021-06-23 PROCEDURE — G8536 NO DOC ELDER MAL SCRN: HCPCS | Performed by: INTERNAL MEDICINE

## 2021-06-23 PROCEDURE — 99213 OFFICE O/P EST LOW 20 MIN: CPT | Performed by: INTERNAL MEDICINE

## 2021-06-23 PROCEDURE — G8427 DOCREV CUR MEDS BY ELIG CLIN: HCPCS | Performed by: INTERNAL MEDICINE

## 2021-06-23 PROCEDURE — G8752 SYS BP LESS 140: HCPCS | Performed by: INTERNAL MEDICINE

## 2021-06-23 PROCEDURE — G8399 PT W/DXA RESULTS DOCUMENT: HCPCS | Performed by: INTERNAL MEDICINE

## 2021-06-23 PROCEDURE — 1090F PRES/ABSN URINE INCON ASSESS: CPT | Performed by: INTERNAL MEDICINE
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
## 2021-06-23 NOTE — PROGRESS NOTES
1. Have you been to the ER, urgent care clinic since your last visit? Hospitalized since your last visit? No    2. Have you seen or consulted any other health care providers outside of the [address] [place] [address] since your last visit? Include any pap smears or colon screening.  No

## 2021-06-24 LAB
ALBUMIN SERPL-MCNC: 3.4 G/DL (ref 3.5–5)
ANION GAP SERPL CALC-SCNC: 7 MMOL/L (ref 5–15)
BASOPHILS # BLD: 0.1 K/UL (ref 0–0.1)
BASOPHILS NFR BLD: 1 % (ref 0–1)
BUN SERPL-MCNC: 32 MG/DL (ref 6–20)
BUN/CREAT SERPL: 17 (ref 12–20)
CALCIUM SERPL-MCNC: 9.1 MG/DL (ref 8.5–10.1)
CHLORIDE SERPL-SCNC: 108 MMOL/L (ref 97–108)
CO2 SERPL-SCNC: 25 MMOL/L (ref 21–32)
CREAT SERPL-MCNC: 1.85 MG/DL (ref 0.55–1.02)
DIFFERENTIAL METHOD BLD: ABNORMAL
EOSINOPHIL # BLD: 2.6 K/UL (ref 0–0.4)
EOSINOPHIL NFR BLD: 21 % (ref 0–7)
ERYTHROCYTE [DISTWIDTH] IN BLOOD BY AUTOMATED COUNT: 13.2 % (ref 11.5–14.5)
EST. AVERAGE GLUCOSE BLD GHB EST-MCNC: 100 MG/DL
GLUCOSE SERPL-MCNC: 96 MG/DL (ref 65–100)
HBA1C MFR BLD: 5.1 % (ref 4–5.6)
HCT VFR BLD AUTO: 38.1 % (ref 35–47)
HGB BLD-MCNC: 11.3 G/DL (ref 11.5–16)
IMM GRANULOCYTES # BLD AUTO: 0 K/UL (ref 0–0.04)
IMM GRANULOCYTES NFR BLD AUTO: 0 % (ref 0–0.5)
LYMPHOCYTES # BLD: 1.1 K/UL (ref 0.8–3.5)
LYMPHOCYTES NFR BLD: 9 % (ref 12–49)
MCH RBC QN AUTO: 26.5 PG (ref 26–34)
MCHC RBC AUTO-ENTMCNC: 29.7 G/DL (ref 30–36.5)
MCV RBC AUTO: 89.2 FL (ref 80–99)
MONOCYTES # BLD: 0.9 K/UL (ref 0–1)
MONOCYTES NFR BLD: 7 % (ref 5–13)
NEUTS SEG # BLD: 7.6 K/UL (ref 1.8–8)
NEUTS SEG NFR BLD: 62 % (ref 32–75)
NRBC # BLD: 0 K/UL (ref 0–0.01)
NRBC BLD-RTO: 0 PER 100 WBC
PHOSPHATE SERPL-MCNC: 4.7 MG/DL (ref 2.6–4.7)
PLATELET # BLD AUTO: 509 K/UL (ref 150–400)
PMV BLD AUTO: 10.5 FL (ref 8.9–12.9)
POTASSIUM SERPL-SCNC: 3.9 MMOL/L (ref 3.5–5.1)
RBC # BLD AUTO: 4.27 M/UL (ref 3.8–5.2)
RBC MORPH BLD: ABNORMAL
SODIUM SERPL-SCNC: 140 MMOL/L (ref 136–145)
WBC # BLD AUTO: 12.3 K/UL (ref 3.6–11)

## 2021-06-30 RX ORDER — OXYBUTYNIN CHLORIDE 10 MG/1
10 TABLET, EXTENDED RELEASE ORAL
Qty: 90 TABLET | Refills: 3 | Status: SHIPPED | OUTPATIENT
Start: 2021-06-30 | End: 2022-06-11

## 2021-08-25 RX ORDER — CLOTRIMAZOLE AND BETAMETHASONE DIPROPIONATE 10; .64 MG/G; MG/G
CREAM TOPICAL 2 TIMES DAILY
Qty: 45 G | Refills: 3 | Status: SHIPPED | OUTPATIENT
Start: 2021-08-25

## 2021-08-25 RX ORDER — METOPROLOL SUCCINATE 50 MG/1
50 TABLET, EXTENDED RELEASE ORAL
Qty: 90 TABLET | Refills: 3 | Status: SHIPPED | OUTPATIENT
Start: 2021-08-25 | End: 2022-08-24

## 2021-08-25 RX ORDER — POTASSIUM CHLORIDE 20 MEQ/1
20 TABLET, EXTENDED RELEASE ORAL DAILY
Qty: 90 TABLET | Refills: 3 | Status: SHIPPED | OUTPATIENT
Start: 2021-08-25 | End: 2022-07-19

## 2021-09-03 RX ORDER — ALBUTEROL SULFATE 90 UG/1
1 AEROSOL, METERED RESPIRATORY (INHALATION)
Qty: 3 EACH | Refills: 3 | Status: SHIPPED | OUTPATIENT
Start: 2021-09-03

## 2021-10-12 RX ORDER — BUDESONIDE 0.5 MG/2ML
INHALANT ORAL
Qty: 360 ML | Refills: 3 | Status: SHIPPED | OUTPATIENT
Start: 2021-10-12

## 2021-10-12 RX ORDER — ARFORMOTEROL TARTRATE 15 UG/2ML
SOLUTION RESPIRATORY (INHALATION)
Qty: 360 ML | Refills: 3 | Status: SHIPPED | OUTPATIENT
Start: 2021-10-12

## 2021-10-20 ENCOUNTER — OFFICE VISIT (OUTPATIENT)
Dept: INTERNAL MEDICINE CLINIC | Age: 86
End: 2021-10-20
Payer: MEDICARE

## 2021-10-20 VITALS
HEART RATE: 72 BPM | DIASTOLIC BLOOD PRESSURE: 72 MMHG | HEIGHT: 63 IN | OXYGEN SATURATION: 98 % | TEMPERATURE: 97.9 F | BODY MASS INDEX: 26.58 KG/M2 | WEIGHT: 150 LBS | SYSTOLIC BLOOD PRESSURE: 145 MMHG | RESPIRATION RATE: 18 BRPM

## 2021-10-20 DIAGNOSIS — Z85.3 HISTORY OF RIGHT BREAST CANCER: ICD-10-CM

## 2021-10-20 DIAGNOSIS — K83.8 INTRAHEPATIC BILE DUCT DILATION: ICD-10-CM

## 2021-10-20 DIAGNOSIS — M17.0 PRIMARY OSTEOARTHRITIS OF BOTH KNEES: ICD-10-CM

## 2021-10-20 DIAGNOSIS — I87.2 VENOUS INSUFFICIENCY OF BOTH LOWER EXTREMITIES: ICD-10-CM

## 2021-10-20 DIAGNOSIS — D58.0 HEREDITARY SPHEROCYTOSIS (HCC): ICD-10-CM

## 2021-10-20 DIAGNOSIS — H91.92 HEARING DEFICIT, LEFT: ICD-10-CM

## 2021-10-20 DIAGNOSIS — D64.9 ANEMIA, UNSPECIFIED TYPE: ICD-10-CM

## 2021-10-20 DIAGNOSIS — I10 PRIMARY HYPERTENSION: ICD-10-CM

## 2021-10-20 DIAGNOSIS — L98.9 SYMPTOMATIC LESION OF SKIN: ICD-10-CM

## 2021-10-20 DIAGNOSIS — E11.21 TYPE 2 DIABETES MELLITUS WITH NEPHROPATHY (HCC): Primary | ICD-10-CM

## 2021-10-20 PROCEDURE — G8753 SYS BP > OR = 140: HCPCS | Performed by: INTERNAL MEDICINE

## 2021-10-20 PROCEDURE — 1090F PRES/ABSN URINE INCON ASSESS: CPT | Performed by: INTERNAL MEDICINE

## 2021-10-20 PROCEDURE — G8510 SCR DEP NEG, NO PLAN REQD: HCPCS | Performed by: INTERNAL MEDICINE

## 2021-10-20 PROCEDURE — 1101F PT FALLS ASSESS-DOCD LE1/YR: CPT | Performed by: INTERNAL MEDICINE

## 2021-10-20 PROCEDURE — G8754 DIAS BP LESS 90: HCPCS | Performed by: INTERNAL MEDICINE

## 2021-10-20 PROCEDURE — G8399 PT W/DXA RESULTS DOCUMENT: HCPCS | Performed by: INTERNAL MEDICINE

## 2021-10-20 PROCEDURE — G8419 CALC BMI OUT NRM PARAM NOF/U: HCPCS | Performed by: INTERNAL MEDICINE

## 2021-10-20 PROCEDURE — G8427 DOCREV CUR MEDS BY ELIG CLIN: HCPCS | Performed by: INTERNAL MEDICINE

## 2021-10-20 PROCEDURE — 99214 OFFICE O/P EST MOD 30 MIN: CPT | Performed by: INTERNAL MEDICINE

## 2021-10-20 PROCEDURE — G8536 NO DOC ELDER MAL SCRN: HCPCS | Performed by: INTERNAL MEDICINE

## 2021-10-20 NOTE — PROGRESS NOTES
Chief Complaint   Patient presents with    Follow-up     4 [facility] follow up     1. Have you been to the ER, urgent care clinic since your last visit? Hospitalized since your last visit? No    2. Have you seen or consulted any other health care providers outside of the [address] [place] [address] since your last visit? Include any pap smears or colon screening.  No
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
## 2021-10-21 DIAGNOSIS — N18.4 CKD (CHRONIC KIDNEY DISEASE), STAGE IV (HCC): Primary | ICD-10-CM

## 2021-10-21 LAB
ALBUMIN SERPL-MCNC: 3.4 G/DL (ref 3.5–5)
ALBUMIN/GLOB SERPL: 0.5 {RATIO} (ref 1.1–2.2)
ALP SERPL-CCNC: 97 U/L (ref 45–117)
ALT SERPL-CCNC: 17 U/L (ref 12–78)
ANION GAP SERPL CALC-SCNC: 7 MMOL/L (ref 5–15)
APPEARANCE UR: CLEAR
AST SERPL-CCNC: 19 U/L (ref 15–37)
BACTERIA URNS QL MICRO: NEGATIVE /HPF
BASOPHILS # BLD: 0.1 K/UL (ref 0–0.1)
BASOPHILS NFR BLD: 1 % (ref 0–1)
BILIRUB SERPL-MCNC: 0.3 MG/DL (ref 0.2–1)
BILIRUB UR QL: NEGATIVE
BUN SERPL-MCNC: 37 MG/DL (ref 6–20)
BUN/CREAT SERPL: 17 (ref 12–20)
CALCIUM SERPL-MCNC: 9.7 MG/DL (ref 8.5–10.1)
CHLORIDE SERPL-SCNC: 109 MMOL/L (ref 97–108)
CHOLEST SERPL-MCNC: 129 MG/DL
CO2 SERPL-SCNC: 22 MMOL/L (ref 21–32)
COLOR UR: ABNORMAL
CREAT SERPL-MCNC: 2.15 MG/DL (ref 0.55–1.02)
CREAT UR-MCNC: 64.7 MG/DL
DIFFERENTIAL METHOD BLD: ABNORMAL
EOSINOPHIL # BLD: 2.9 K/UL (ref 0–0.4)
EOSINOPHIL NFR BLD: 20 % (ref 0–7)
EPITH CASTS URNS QL MICRO: ABNORMAL /LPF
ERYTHROCYTE [DISTWIDTH] IN BLOOD BY AUTOMATED COUNT: 13.2 % (ref 11.5–14.5)
EST. AVERAGE GLUCOSE BLD GHB EST-MCNC: 105 MG/DL
GLOBULIN SER CALC-MCNC: 6.4 G/DL (ref 2–4)
GLUCOSE SERPL-MCNC: 92 MG/DL (ref 65–100)
GLUCOSE UR STRIP.AUTO-MCNC: NEGATIVE MG/DL
HBA1C MFR BLD: 5.3 % (ref 4–5.6)
HCT VFR BLD AUTO: 34.1 % (ref 35–47)
HDLC SERPL-MCNC: 73 MG/DL
HDLC SERPL: 1.8 {RATIO} (ref 0–5)
HGB BLD-MCNC: 10.3 G/DL (ref 11.5–16)
HGB UR QL STRIP: NEGATIVE
HYALINE CASTS URNS QL MICRO: ABNORMAL /LPF (ref 0–5)
IMM GRANULOCYTES # BLD AUTO: 0.1 K/UL (ref 0–0.04)
IMM GRANULOCYTES NFR BLD AUTO: 1 % (ref 0–0.5)
KETONES UR QL STRIP.AUTO: NEGATIVE MG/DL
LDLC SERPL CALC-MCNC: 44.6 MG/DL (ref 0–100)
LEUKOCYTE ESTERASE UR QL STRIP.AUTO: NEGATIVE
LYMPHOCYTES # BLD: 1.6 K/UL (ref 0.8–3.5)
LYMPHOCYTES NFR BLD: 11 % (ref 12–49)
MCH RBC QN AUTO: 26.2 PG (ref 26–34)
MCHC RBC AUTO-ENTMCNC: 30.2 G/DL (ref 30–36.5)
MCV RBC AUTO: 86.8 FL (ref 80–99)
MICROALBUMIN UR-MCNC: 93.4 MG/DL
MICROALBUMIN/CREAT UR-RTO: 1444 MG/G (ref 0–30)
MONOCYTES # BLD: 0.9 K/UL (ref 0–1)
MONOCYTES NFR BLD: 6 % (ref 5–13)
NEUTS SEG # BLD: 8.7 K/UL (ref 1.8–8)
NEUTS SEG NFR BLD: 61 % (ref 32–75)
NITRITE UR QL STRIP.AUTO: NEGATIVE
NRBC # BLD: 0 K/UL (ref 0–0.01)
NRBC BLD-RTO: 0 PER 100 WBC
PH UR STRIP: 5.5 [PH] (ref 5–8)
PLATELET # BLD AUTO: 531 K/UL (ref 150–400)
PMV BLD AUTO: 10.3 FL (ref 8.9–12.9)
POTASSIUM SERPL-SCNC: 3.5 MMOL/L (ref 3.5–5.1)
PROT SERPL-MCNC: 9.8 G/DL (ref 6.4–8.2)
PROT UR STRIP-MCNC: 100 MG/DL
RBC # BLD AUTO: 3.93 M/UL (ref 3.8–5.2)
RBC #/AREA URNS HPF: ABNORMAL /HPF (ref 0–5)
RBC MORPH BLD: ABNORMAL
SODIUM SERPL-SCNC: 138 MMOL/L (ref 136–145)
SP GR UR REFRACTOMETRY: 1.01 (ref 1–1.03)
TRIGL SERPL-MCNC: 57 MG/DL (ref ?–150)
TSH SERPL DL<=0.05 MIU/L-ACNC: 1.28 UIU/ML (ref 0.36–3.74)
UROBILINOGEN UR QL STRIP.AUTO: 0.2 EU/DL (ref 0.2–1)
VLDLC SERPL CALC-MCNC: 11.4 MG/DL
WBC # BLD AUTO: 14.3 K/UL (ref 3.6–11)
WBC URNS QL MICRO: ABNORMAL /HPF (ref 0–4)

## 2021-10-21 NOTE — PROGRESS NOTES
Reviewed your laboratory studies with you on the phone and suggest that we see a nephrologist for evaluation of chronic kidney disease. You agreed. You should hear from our  with an appointment. If you do not hear from the  within a reasonable period time please call the office and we will schedule the appointment.   A reminder that the kidney studies are reflected in the BUN, creatinine, and GFR est AA  You agreed to come by the office on Monday for a nurse visit so that we can obtain the following laboratory studies.:  CBC reticulocyte count iron studies gammopathy profile urine for protein creatinine ratio

## 2021-10-26 ENCOUNTER — CLINICAL SUPPORT (OUTPATIENT)
Dept: INTERNAL MEDICINE CLINIC | Age: 86
End: 2021-10-26

## 2021-10-26 DIAGNOSIS — D64.9 ANEMIA, UNSPECIFIED TYPE: Primary | ICD-10-CM

## 2021-10-26 DIAGNOSIS — I10 PRIMARY HYPERTENSION: ICD-10-CM

## 2021-10-27 LAB — CREAT UR-MCNC: 85.4 MG/DL

## 2021-11-05 RX ORDER — HYDROCHLOROTHIAZIDE 12.5 MG/1
12.5 TABLET ORAL
Qty: 90 TABLET | Refills: 3 | Status: SHIPPED | OUTPATIENT
Start: 2021-11-05 | End: 2021-12-06 | Stop reason: SDUPTHER

## 2021-11-11 RX ORDER — TELMISARTAN 80 MG/1
TABLET ORAL
Qty: 90 TABLET | Refills: 3 | Status: SHIPPED | OUTPATIENT
Start: 2021-11-11 | End: 2022-07-19

## 2021-12-02 RX ORDER — FLUTICASONE PROPIONATE AND SALMETEROL 250; 50 UG/1; UG/1
1 POWDER RESPIRATORY (INHALATION) EVERY 12 HOURS
Qty: 3 EACH | Refills: 3 | Status: SHIPPED | OUTPATIENT
Start: 2021-12-02

## 2021-12-04 ENCOUNTER — APPOINTMENT (OUTPATIENT)
Dept: GENERAL RADIOLOGY | Age: 86
End: 2021-12-04
Attending: EMERGENCY MEDICINE
Payer: MEDICARE

## 2021-12-04 ENCOUNTER — HOSPITAL ENCOUNTER (EMERGENCY)
Age: 86
Discharge: HOME | End: 2021-12-05
Attending: EMERGENCY MEDICINE
Payer: MEDICARE

## 2021-12-04 VITALS
HEART RATE: 91 BPM | HEIGHT: 63 IN | RESPIRATION RATE: 15 BRPM | DIASTOLIC BLOOD PRESSURE: 55 MMHG | OXYGEN SATURATION: 97 % | TEMPERATURE: 97.6 F | WEIGHT: 151.9 LBS | BODY MASS INDEX: 26.91 KG/M2 | SYSTOLIC BLOOD PRESSURE: 149 MMHG

## 2021-12-04 DIAGNOSIS — J45.41 MODERATE PERSISTENT ASTHMA WITH ACUTE EXACERBATION: Primary | ICD-10-CM

## 2021-12-04 DIAGNOSIS — J18.9 PNEUMONIA DUE TO INFECTIOUS ORGANISM, UNSPECIFIED LATERALITY, UNSPECIFIED PART OF LUNG: ICD-10-CM

## 2021-12-04 DIAGNOSIS — N18.9 CHRONIC KIDNEY DISEASE, UNSPECIFIED CKD STAGE: ICD-10-CM

## 2021-12-04 DIAGNOSIS — I10 ACCELERATED HYPERTENSION: ICD-10-CM

## 2021-12-04 LAB
ALBUMIN SERPL-MCNC: 2.8 G/DL (ref 3.5–5)
ALBUMIN/GLOB SERPL: 0.4 {RATIO} (ref 1.1–2.2)
ALP SERPL-CCNC: 94 U/L (ref 45–117)
ALT SERPL-CCNC: 34 U/L (ref 12–78)
ANION GAP SERPL CALC-SCNC: 16 MMOL/L (ref 5–15)
AST SERPL-CCNC: 33 U/L (ref 15–37)
BASOPHILS # BLD: 0 K/UL (ref 0–0.1)
BASOPHILS NFR BLD: 0 % (ref 0–1)
BILIRUB SERPL-MCNC: 0.3 MG/DL (ref 0.2–1)
BNP SERPL-MCNC: 543 PG/ML (ref 0–450)
BUN SERPL-MCNC: 33 MG/DL (ref 6–20)
BUN/CREAT SERPL: 15 (ref 12–20)
CALCIUM SERPL-MCNC: 8.2 MG/DL (ref 8.5–10.1)
CHLORIDE SERPL-SCNC: 101 MMOL/L (ref 97–108)
CO2 SERPL-SCNC: 23 MMOL/L (ref 21–32)
CREAT SERPL-MCNC: 2.18 MG/DL (ref 0.55–1.02)
DIFFERENTIAL METHOD BLD: ABNORMAL
EOSINOPHIL # BLD: 2.6 K/UL (ref 0–0.4)
EOSINOPHIL NFR BLD: 15 % (ref 0–7)
ERYTHROCYTE [DISTWIDTH] IN BLOOD BY AUTOMATED COUNT: 14.2 % (ref 11.5–14.5)
GLOBULIN SER CALC-MCNC: 6.9 G/DL (ref 2–4)
GLUCOSE SERPL-MCNC: 118 MG/DL (ref 65–100)
HCT VFR BLD AUTO: 28.3 % (ref 35–47)
HGB BLD-MCNC: 8.9 G/DL (ref 11.5–16)
IMM GRANULOCYTES # BLD AUTO: 0 K/UL
IMM GRANULOCYTES NFR BLD AUTO: 0 %
LYMPHOCYTES # BLD: 1.9 K/UL (ref 0.8–3.5)
LYMPHOCYTES NFR BLD: 11 % (ref 12–49)
MAGNESIUM SERPL-MCNC: 2.7 MG/DL (ref 1.6–2.4)
MCH RBC QN AUTO: 26.2 PG (ref 26–34)
MCHC RBC AUTO-ENTMCNC: 31.4 G/DL (ref 30–36.5)
MCV RBC AUTO: 83.2 FL (ref 80–99)
MONOCYTES # BLD: 1.9 K/UL (ref 0–1)
MONOCYTES NFR BLD: 11 % (ref 5–13)
NEUTS SEG # BLD: 10.8 K/UL (ref 1.8–8)
NEUTS SEG NFR BLD: 63 % (ref 32–75)
NRBC # BLD: 0 K/UL (ref 0–0.01)
NRBC BLD-RTO: 0 PER 100 WBC
PLATELET # BLD AUTO: 525 K/UL (ref 150–400)
PMV BLD AUTO: 9.2 FL (ref 8.9–12.9)
POTASSIUM SERPL-SCNC: 3.4 MMOL/L (ref 3.5–5.1)
PROT SERPL-MCNC: 9.7 G/DL (ref 6.4–8.2)
RBC # BLD AUTO: 3.4 M/UL (ref 3.8–5.2)
RBC MORPH BLD: ABNORMAL
SODIUM SERPL-SCNC: 140 MMOL/L (ref 136–145)
TROPONIN-HIGH SENSITIVITY: 238 NG/L (ref 0–51)
TROPONIN-HIGH SENSITIVITY: 265 NG/L (ref 0–51)
WBC # BLD AUTO: 17.2 K/UL (ref 3.6–11)
WBC MORPH BLD: ABNORMAL

## 2021-12-04 PROCEDURE — 83735 ASSAY OF MAGNESIUM: CPT

## 2021-12-04 PROCEDURE — 83880 ASSAY OF NATRIURETIC PEPTIDE: CPT

## 2021-12-04 PROCEDURE — 74011000250 HC RX REV CODE- 250: Performed by: EMERGENCY MEDICINE

## 2021-12-04 PROCEDURE — 85025 COMPLETE CBC W/AUTO DIFF WBC: CPT

## 2021-12-04 PROCEDURE — 93005 ELECTROCARDIOGRAM TRACING: CPT

## 2021-12-04 PROCEDURE — 96375 TX/PRO/DX INJ NEW DRUG ADDON: CPT

## 2021-12-04 PROCEDURE — 94640 AIRWAY INHALATION TREATMENT: CPT

## 2021-12-04 PROCEDURE — 80053 COMPREHEN METABOLIC PANEL: CPT

## 2021-12-04 PROCEDURE — 71045 X-RAY EXAM CHEST 1 VIEW: CPT

## 2021-12-04 PROCEDURE — 74011250636 HC RX REV CODE- 250/636: Performed by: EMERGENCY MEDICINE

## 2021-12-04 PROCEDURE — 36415 COLL VENOUS BLD VENIPUNCTURE: CPT

## 2021-12-04 PROCEDURE — 74011250637 HC RX REV CODE- 250/637: Performed by: EMERGENCY MEDICINE

## 2021-12-04 PROCEDURE — 96374 THER/PROPH/DIAG INJ IV PUSH: CPT

## 2021-12-04 PROCEDURE — 99283 EMERGENCY DEPT VISIT LOW MDM: CPT

## 2021-12-04 PROCEDURE — 99284 EMERGENCY DEPT VISIT MOD MDM: CPT

## 2021-12-04 PROCEDURE — 84484 ASSAY OF TROPONIN QUANT: CPT

## 2021-12-04 RX ORDER — IPRATROPIUM BROMIDE AND ALBUTEROL SULFATE 2.5; .5 MG/3ML; MG/3ML
6 SOLUTION RESPIRATORY (INHALATION)
Status: COMPLETED | OUTPATIENT
Start: 2021-12-04 | End: 2021-12-04

## 2021-12-04 RX ORDER — DIPHENHYDRAMINE HYDROCHLORIDE 50 MG/ML
25 INJECTION, SOLUTION INTRAMUSCULAR; INTRAVENOUS
Status: COMPLETED | OUTPATIENT
Start: 2021-12-04 | End: 2021-12-04

## 2021-12-04 RX ORDER — PREDNISONE 50 MG/1
50 TABLET ORAL DAILY
Qty: 5 TABLET | Refills: 0 | Status: SHIPPED | OUTPATIENT
Start: 2021-12-04 | End: 2021-12-09

## 2021-12-04 RX ORDER — ALBUTEROL SULFATE 0.83 MG/ML
2.5 SOLUTION RESPIRATORY (INHALATION)
Qty: 30 NEBULE | Refills: 0 | Status: SHIPPED | OUTPATIENT
Start: 2021-12-04

## 2021-12-04 RX ORDER — AZITHROMYCIN 500 MG/1
500 TABLET, FILM COATED ORAL
Status: COMPLETED | OUTPATIENT
Start: 2021-12-04 | End: 2021-12-04

## 2021-12-04 RX ORDER — AZITHROMYCIN 250 MG/1
TABLET, FILM COATED ORAL
Qty: 6 TABLET | Refills: 0 | Status: SHIPPED | OUTPATIENT
Start: 2021-12-04 | End: 2021-12-09

## 2021-12-04 RX ORDER — DIPHENHYDRAMINE HCL 25 MG
25 CAPSULE ORAL
Qty: 20 CAPSULE | Refills: 0 | Status: SHIPPED | OUTPATIENT
Start: 2021-12-04 | End: 2021-12-14

## 2021-12-04 RX ADMIN — METHYLPREDNISOLONE SODIUM SUCCINATE 125 MG: 125 INJECTION, POWDER, FOR SOLUTION INTRAMUSCULAR; INTRAVENOUS at 20:56

## 2021-12-04 RX ADMIN — DIPHENHYDRAMINE HYDROCHLORIDE 25 MG: 50 INJECTION, SOLUTION INTRAMUSCULAR; INTRAVENOUS at 20:58

## 2021-12-04 RX ADMIN — IPRATROPIUM BROMIDE AND ALBUTEROL SULFATE 6 ML: .5; 3 SOLUTION RESPIRATORY (INHALATION) at 20:35

## 2021-12-04 RX ADMIN — AZITHROMYCIN MONOHYDRATE 500 MG: 500 TABLET ORAL at 21:45

## 2021-12-05 NOTE — ED NOTES
Emergency Department Nursing Plan of Care       The Nursing Plan of Care is developed from the Nursing assessment and Emergency Department Attending provider initial evaluation. The plan of care may be reviewed in the ED Provider note.     The Plan of Care was developed with the following considerations:   Patient / Family readiness to learn indicated by:verbalized understanding  Persons(s) to be included in education: patient  Barriers to Learning/Limitations:No    Signed     Franko Lucas    12/4/2021   9:50 PM

## 2021-12-05 NOTE — DISCHARGE INSTRUCTIONS
Thank You! It was a pleasure taking care of you in our Emergency Department today. We know that when you come to [facility], you are entrusting us with your health, comfort, and safety. Our physicians and nurses honor that trust, and truly appreciate the opportunity to care for you and your loved ones. We also value your feedback. If you receive a survey about your Emergency Department experience today, please fill it out. We care about our patients' feedback, and we listen to what you have to say. Thank you. Dr. Bretta Sicard, M.D.      ____________________________________________________________________  I have included a copy of your lab results and/or radiologic studies from today's visit so you can have them easily available at your follow-up visit. We hope you feel better and please do not hesitate to contact the ED if you have any questions at all! Recent Results (from the past 12 hour(s))   CBC WITH AUTOMATED DIFF    Collection Time: 12/04/21  8:48 PM   Result Value Ref Range    WBC 17.2 (H) 3.6 - 11.0 K/uL    RBC 3.40 (L) 3.80 - 5.20 M/uL    HGB 8.9 (L) 11.5 - 16.0 g/dL    HCT 28.3 (L) 35.0 - 47.0 %    MCV 83.2 80.0 - 99.0 FL    MCH 26.2 26.0 - 34.0 PG    MCHC 31.4 30.0 - 36.5 g/dL    RDW 14.2 11.5 - 14.5 %    PLATELET 631 (H) 336 - 400 K/uL    MPV 9.2 8.9 - 12.9 FL    NRBC 0.0 0  WBC    ABSOLUTE NRBC 0.00 0.00 - 0.01 K/uL    NEUTROPHILS 63 32 - 75 %    LYMPHOCYTES 11 (L) 12 - 49 %    MONOCYTES 11 5 - 13 %    EOSINOPHILS 15 (H) 0 - 7 %    BASOPHILS 0 0 - 1 %    IMMATURE GRANULOCYTES 0 %    ABS. NEUTROPHILS 10.8 (H) 1.8 - 8.0 K/UL    ABS. LYMPHOCYTES 1.9 0.8 - 3.5 K/UL    ABS. MONOCYTES 1.9 (H) 0.0 - 1.0 K/UL    ABS. EOSINOPHILS 2.6 (H) 0.0 - 0.4 K/UL    ABS. BASOPHILS 0.0 0.0 - 0.1 K/UL    ABS. IMM.  GRANS. 0.0 K/UL    DF MANUAL      RBC COMMENTS ROULEAUX  1+        WBC COMMENTS REACTIVE LYMPHS     METABOLIC PANEL, COMPREHENSIVE    Collection Time: 12/04/21 8:48 PM   Result Value Ref Range    Sodium 140 136 - 145 mmol/L    Potassium 3.4 (L) 3.5 - 5.1 mmol/L    Chloride 101 97 - 108 mmol/L    CO2 23 21 - 32 mmol/L    Anion gap 16 (H) 5 - 15 mmol/L    Glucose 118 (H) 65 - 100 mg/dL    BUN 33 (H) 6 - 20 MG/DL    Creatinine 2.18 (H) 0.55 - 1.02 MG/DL    BUN/Creatinine ratio 15 12 - 20      GFR est AA 26 (L) >60 ml/min/1.73m2    GFR est non-AA 21 (L) >60 ml/min/1.73m2    Calcium 8.2 (L) 8.5 - 10.1 MG/DL    Bilirubin, total 0.3 0.2 - 1.0 MG/DL    ALT (SGPT) 34 12 - 78 U/L    AST (SGOT) 33 15 - 37 U/L    Alk. phosphatase 94 45 - 117 U/L    Protein, total 9.7 (H) 6.4 - 8.2 g/dL    Albumin 2.8 (L) 3.5 - 5.0 g/dL    Globulin 6.9 (H) 2.0 - 4.0 g/dL    A-G Ratio 0.4 (L) 1.1 - 2.2     NT-PRO BNP    Collection Time: 12/04/21  8:48 PM   Result Value Ref Range    NT pro- (H) 0 - 450 PG/ML   MAGNESIUM    Collection Time: 12/04/21  8:48 PM   Result Value Ref Range    Magnesium 2.7 (H) 1.6 - 2.4 mg/dL   TROPONIN-HIGH SENSITIVITY    Collection Time: 12/04/21  8:48 PM   Result Value Ref Range    Troponin-High Sensitivity 265 (HH) 0 - 51 ng/L   EKG, 12 LEAD, INITIAL    Collection Time: 12/04/21  8:51 PM   Result Value Ref Range    Ventricular Rate 85 BPM    Atrial Rate 85 BPM    P-R Interval 190 ms    QRS Duration 84 ms    Q-T Interval 408 ms    QTC Calculation (Bezet) 485 ms    Calculated P Axis 78 degrees    Calculated R Axis -9 degrees    Calculated T Axis 77 degrees    Diagnosis       Normal sinus rhythm  Voltage criteria for left ventricular hypertrophy  Abnormal ECG  When compared with ECG of 17-FEB-2021 12:42,  No significant change was found         XR CHEST PORT   Final Result   New right basilar airspace disease may represent atelectasis or   pneumonia; radiographic follow-up is recommended to assure complete resolution.            CT Results  (Last 48 hours)      None          The exam and treatment you received in the Emergency Department were for an urgent problem and are not intended as complete care. It is important that you follow up with a doctor, nurse practitioner, or physician assistant for ongoing care. If your symptoms become worse or you do not improve as expected and you are unable to reach your usual health care provider, you should return to the Emergency Department. We are available 24 hours a day. Please take your discharge instructions with you when you go to your follow-up appointment. If a prescription has been provided, please have it filled as soon as possible to prevent a delay in treatment. Read the entire medication instruction sheet provided to you by the pharmacy. If you have any questions or reservations about taking the medication due to side effects or interactions with other medications, please call your primary care physician or contact the ER to speak with the charge nurse. Please make an appointment with your family doctor or the physician you were referred to for follow-up of this visit as instructed on your discharge paperwork. Return to the ER if you are unable to be seen or if you are unable to be seen in a timely manner. If you have any problem arranging the follow-up visit, contact the Emergency Department immediately.

## 2021-12-05 NOTE — ED PROVIDER NOTES
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 mg ER tablet TAKE 1 TAB TWICE A DAY, Normal, Disp-180 Tab, R-3      pravastatin (PRAVACHOL) 20 mg tablet TAKE 1 TABLET ONCE EACH NIGHT, Normal, Disp-90 Tab,R-3      fluticasone (FLONASE) 50 mcg/actuation nasal spray 2 Sprays by Both Nostrils route two (2) times a day. by inhalation , Historical Med      calcium carbonate (TUMS PO) Take 500 mg by mouth as needed., Historical Med      aspirin delayed-release 81 mg tablet Take 81 mg by mouth daily. , Historical Med       !! - Potential duplicate medications found. Please discuss with provider. 3.   Follow-up Information     Follow up With Specialties Details Why [address] [facility] EMERGENCY DEPT Emergency Medicine  As needed, If symptoms worsen [address] Whitkaylin Guallpa MD Internal Medicine  As needed, If symptoms worsen [facility]  [facility]  [phone]          Instructed to return to ED if worse  Diagnosis   Clinical Impression:  1. Moderate persistent asthma with acute exacerbation    2. Pneumonia due to infectious organism, unspecified laterality, unspecified part of lung    3. Chronic kidney disease, unspecified CKD stage    4. Accelerated hypertension      Attestation:  Yoselyn Veras MD, am the attending of record for this patient. I personally performed the services described in this documentation on this date, 12/4/2021 for patient, Samson Jennings. I have reviewed the chart and verified that the record is accurate and complete.

## 2021-12-06 LAB
ATRIAL RATE: 85 BPM
CALCULATED P AXIS, ECG09: 78 DEGREES
CALCULATED R AXIS, ECG10: -9 DEGREES
CALCULATED T AXIS, ECG11: 77 DEGREES
DIAGNOSIS, 93000: NORMAL
P-R INTERVAL, ECG05: 190 MS
Q-T INTERVAL, ECG07: 408 MS
QRS DURATION, ECG06: 84 MS
QTC CALCULATION (BEZET), ECG08: 485 MS
VENTRICULAR RATE, ECG03: 85 BPM

## 2021-12-06 RX ORDER — HYDROCHLOROTHIAZIDE 12.5 MG/1
12.5 TABLET ORAL DAILY
Qty: 90 TABLET | Refills: 3 | Status: SHIPPED | OUTPATIENT
Start: 2021-12-06 | End: 2022-05-10 | Stop reason: SDUPTHER

## 2021-12-14 ENCOUNTER — OFFICE VISIT (OUTPATIENT)
Dept: INTERNAL MEDICINE CLINIC | Age: 86
End: 2021-12-14
Payer: MEDICARE

## 2021-12-14 VITALS
HEIGHT: 63 IN | SYSTOLIC BLOOD PRESSURE: 146 MMHG | OXYGEN SATURATION: 97 % | DIASTOLIC BLOOD PRESSURE: 69 MMHG | HEART RATE: 79 BPM | WEIGHT: 153 LBS | BODY MASS INDEX: 27.11 KG/M2 | RESPIRATION RATE: 18 BRPM | TEMPERATURE: 98.5 F

## 2021-12-14 DIAGNOSIS — J18.9 COMMUNITY ACQUIRED PNEUMONIA OF RIGHT LOWER LOBE OF LUNG: Primary | ICD-10-CM

## 2021-12-14 DIAGNOSIS — Z85.3 HISTORY OF RIGHT BREAST CANCER: ICD-10-CM

## 2021-12-14 DIAGNOSIS — I10 PRIMARY HYPERTENSION: ICD-10-CM

## 2021-12-14 DIAGNOSIS — E11.21 TYPE 2 DIABETES MELLITUS WITH NEPHROPATHY (HCC): ICD-10-CM

## 2021-12-14 DIAGNOSIS — D58.0 HEREDITARY SPHEROCYTOSIS (HCC): ICD-10-CM

## 2021-12-14 DIAGNOSIS — N18.4 CKD (CHRONIC KIDNEY DISEASE), STAGE IV (HCC): ICD-10-CM

## 2021-12-14 PROCEDURE — G8427 DOCREV CUR MEDS BY ELIG CLIN: HCPCS | Performed by: INTERNAL MEDICINE

## 2021-12-14 PROCEDURE — 1101F PT FALLS ASSESS-DOCD LE1/YR: CPT | Performed by: INTERNAL MEDICINE

## 2021-12-14 PROCEDURE — 99215 OFFICE O/P EST HI 40 MIN: CPT | Performed by: INTERNAL MEDICINE

## 2021-12-14 PROCEDURE — G8510 SCR DEP NEG, NO PLAN REQD: HCPCS | Performed by: INTERNAL MEDICINE

## 2021-12-14 PROCEDURE — G8536 NO DOC ELDER MAL SCRN: HCPCS | Performed by: INTERNAL MEDICINE

## 2021-12-14 PROCEDURE — 1090F PRES/ABSN URINE INCON ASSESS: CPT | Performed by: INTERNAL MEDICINE

## 2021-12-14 PROCEDURE — G8753 SYS BP > OR = 140: HCPCS | Performed by: INTERNAL MEDICINE

## 2021-12-14 PROCEDURE — G8754 DIAS BP LESS 90: HCPCS | Performed by: INTERNAL MEDICINE

## 2021-12-14 PROCEDURE — G8419 CALC BMI OUT NRM PARAM NOF/U: HCPCS | Performed by: INTERNAL MEDICINE

## 2021-12-14 PROCEDURE — G8399 PT W/DXA RESULTS DOCUMENT: HCPCS | Performed by: INTERNAL MEDICINE

## 2021-12-14 NOTE — PROGRESS NOTES
1. Have you been to the ER, urgent care clinic since your last visit? Hospitalized since your last visit? Yes When: 12-4-21 Where: [facility] Reason for visit: SOB    2. Have you seen or consulted any other health care providers outside of the [address] [place] [address] since your last visit? Include any pap smears or colon screening.  No    ED follow up
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
## 2022-01-10 RX ORDER — ALBUTEROL SULFATE 0.83 MG/ML
SOLUTION RESPIRATORY (INHALATION)
Qty: 900 ML | Refills: 6 | Status: SHIPPED | OUTPATIENT
Start: 2022-01-10

## 2022-01-10 RX ORDER — PRAVASTATIN SODIUM 20 MG/1
TABLET ORAL
Qty: 90 TABLET | Refills: 3 | Status: SHIPPED | OUTPATIENT
Start: 2022-01-10 | End: 2022-04-11 | Stop reason: SDUPTHER

## 2022-01-17 RX ORDER — IBANDRONATE SODIUM 150 MG/1
TABLET, FILM COATED ORAL
Qty: 3 TABLET | Refills: 3 | Status: SHIPPED | OUTPATIENT
Start: 2022-01-17

## 2022-01-28 RX ORDER — NIFEDIPINE 60 MG/1
TABLET, EXTENDED RELEASE ORAL
Qty: 180 TABLET | Refills: 3 | Status: SHIPPED | OUTPATIENT
Start: 2022-01-28

## 2022-03-14 ENCOUNTER — OFFICE VISIT (OUTPATIENT)
Dept: INTERNAL MEDICINE CLINIC | Age: 87
End: 2022-03-14
Payer: MEDICARE

## 2022-03-14 VITALS
HEIGHT: 63 IN | WEIGHT: 151.4 LBS | BODY MASS INDEX: 26.82 KG/M2 | DIASTOLIC BLOOD PRESSURE: 69 MMHG | RESPIRATION RATE: 18 BRPM | SYSTOLIC BLOOD PRESSURE: 130 MMHG | HEART RATE: 77 BPM | TEMPERATURE: 98.3 F | OXYGEN SATURATION: 95 %

## 2022-03-14 DIAGNOSIS — E55.9 VITAMIN D DEFICIENCY, UNSPECIFIED: ICD-10-CM

## 2022-03-14 DIAGNOSIS — Z13.31 SCREENING FOR DEPRESSION: ICD-10-CM

## 2022-03-14 DIAGNOSIS — D64.9 ANEMIA, UNSPECIFIED TYPE: ICD-10-CM

## 2022-03-14 DIAGNOSIS — Z85.3 HISTORY OF RIGHT BREAST CANCER: ICD-10-CM

## 2022-03-14 DIAGNOSIS — D58.0 HEREDITARY SPHEROCYTOSIS (HCC): ICD-10-CM

## 2022-03-14 DIAGNOSIS — M17.0 PRIMARY OSTEOARTHRITIS OF BOTH KNEES: ICD-10-CM

## 2022-03-14 DIAGNOSIS — E11.21 TYPE 2 DIABETES MELLITUS WITH NEPHROPATHY (HCC): ICD-10-CM

## 2022-03-14 DIAGNOSIS — I10 PRIMARY HYPERTENSION: ICD-10-CM

## 2022-03-14 DIAGNOSIS — Z00.00 MEDICARE ANNUAL WELLNESS VISIT, SUBSEQUENT: Primary | ICD-10-CM

## 2022-03-14 PROCEDURE — G8419 CALC BMI OUT NRM PARAM NOF/U: HCPCS | Performed by: INTERNAL MEDICINE

## 2022-03-14 PROCEDURE — G8427 DOCREV CUR MEDS BY ELIG CLIN: HCPCS | Performed by: INTERNAL MEDICINE

## 2022-03-14 PROCEDURE — 1090F PRES/ABSN URINE INCON ASSESS: CPT | Performed by: INTERNAL MEDICINE

## 2022-03-14 PROCEDURE — 1101F PT FALLS ASSESS-DOCD LE1/YR: CPT | Performed by: INTERNAL MEDICINE

## 2022-03-14 PROCEDURE — G8752 SYS BP LESS 140: HCPCS | Performed by: INTERNAL MEDICINE

## 2022-03-14 PROCEDURE — G8399 PT W/DXA RESULTS DOCUMENT: HCPCS | Performed by: INTERNAL MEDICINE

## 2022-03-14 PROCEDURE — G8754 DIAS BP LESS 90: HCPCS | Performed by: INTERNAL MEDICINE

## 2022-03-14 PROCEDURE — G0439 PPPS, SUBSEQ VISIT: HCPCS | Performed by: INTERNAL MEDICINE

## 2022-03-14 PROCEDURE — G8432 DEP SCR NOT DOC, RNG: HCPCS | Performed by: INTERNAL MEDICINE

## 2022-03-14 PROCEDURE — G8536 NO DOC ELDER MAL SCRN: HCPCS | Performed by: INTERNAL MEDICINE

## 2022-03-14 PROCEDURE — 99213 OFFICE O/P EST LOW 20 MIN: CPT | Performed by: INTERNAL MEDICINE
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
## 2022-03-14 NOTE — PATIENT INSTRUCTIONS
Medicare Wellness Visit, Female     The best way to live healthy is to have a lifestyle where you eat a well-balanced diet, exercise regularly, limit alcohol use, and quit all forms of tobacco/nicotine, if applicable. Regular preventive services are another way to keep healthy. Preventive services (vaccines, screening tests, monitoring & exams) can help personalize your care plan, which helps you manage your own care. Screening tests can find health problems at the earliest stages, when they are easiest to treat. [facility] follows the current, evidence-based guidelines published by the [facility] Crow ([facility]) when recommending preventive services for our patients. Because we follow these guidelines, sometimes recommendations change over time as research supports it. (For example, mammograms used to be recommended annually. Even though Medicare will still pay for an annual mammogram, the newer guidelines recommend a mammogram every two years for women of average risk). Of course, you and your doctor may decide to screen more often for some diseases, based on your risk and your co-morbidities (chronic disease you are already diagnosed with). Preventive services for you include:  - Medicare offers their members a free annual wellness visit, which is time for you and your primary care provider to discuss and plan for your preventive service needs. Take advantage of this benefit every year!  -All adults over the age of 72 should receive the recommended pneumonia vaccines. Current USPSTF guidelines recommend a series of two vaccines for the best pneumonia protection.   -All adults should have a flu vaccine yearly and a tetanus vaccine every 10 years.   -All adults age 48 and older should receive the shingles vaccines (series of two vaccines).       -All adults age 38-68 who are overweight should have a diabetes screening test once every three years.   -All adults born between [birth year] and 1965 should be screened once for Hepatitis C.  -Other screening tests and preventive services for persons with diabetes include: an eye exam to screen for diabetic retinopathy, a kidney function test, a foot exam, and stricter control over your cholesterol.   -Cardiovascular screening for adults with routine risk involves an electrocardiogram (ECG) at intervals determined by your doctor.   -Colorectal cancer screenings should be done for adults age 54-65 with no increased risk factors for colorectal cancer. There are a number of acceptable methods of screening for this type of cancer. Each test has its own benefits and drawbacks. Discuss with your doctor what is most appropriate for you during your annual wellness visit. The different tests include: colonoscopy (considered the best screening method), a fecal occult blood test, a fecal DNA test, and sigmoidoscopy.    -A bone mass density test is recommended when a woman turns 65 to screen for osteoporosis. This test is only recommended one time, as a screening. Some providers will use this same test as a disease monitoring tool if you already have osteoporosis. -Breast cancer screenings are recommended every other year for women of normal risk, age 54-69.  -Cervical cancer screenings for women over age 72 are only recommended with certain risk factors.      Here is a list of your current Health Maintenance items (your personalized list of preventive services) with a due date:  Health Maintenance Due   Topic Date Due    Pneumococcal Vaccine 65+ years at Risk (2 of 2 - PCV13) 08/25/2007    Eye Exam  11/16/2019

## 2022-03-16 LAB
25(OH)D3 SERPL-MCNC: 12.3 NG/ML (ref 30–100)
ALBUMIN SERPL-MCNC: 3.2 G/DL (ref 3.5–5)
ANION GAP SERPL CALC-SCNC: 6 MMOL/L (ref 5–15)
BASOPHILS # BLD: 0.1 K/UL (ref 0–0.1)
BASOPHILS NFR BLD: 1 % (ref 0–1)
BUN SERPL-MCNC: 33 MG/DL (ref 6–20)
BUN/CREAT SERPL: 13 (ref 12–20)
CALCIUM SERPL-MCNC: 8.8 MG/DL (ref 8.5–10.1)
CHLORIDE SERPL-SCNC: 111 MMOL/L (ref 97–108)
CO2 SERPL-SCNC: 21 MMOL/L (ref 21–32)
CREAT SERPL-MCNC: 2.53 MG/DL (ref 0.55–1.02)
DIFFERENTIAL METHOD BLD: ABNORMAL
EOSINOPHIL # BLD: 2.7 K/UL (ref 0–0.4)
EOSINOPHIL NFR BLD: 21 % (ref 0–7)
ERYTHROCYTE [DISTWIDTH] IN BLOOD BY AUTOMATED COUNT: 14.1 % (ref 11.5–14.5)
EST. AVERAGE GLUCOSE BLD GHB EST-MCNC: 105 MG/DL
FERRITIN SERPL-MCNC: 33 NG/ML (ref 26–388)
GLUCOSE SERPL-MCNC: 91 MG/DL (ref 65–100)
HBA1C MFR BLD: 5.3 % (ref 4–5.6)
HCT VFR BLD AUTO: 29.9 % (ref 35–47)
HGB BLD-MCNC: 9 G/DL (ref 11.5–16)
IMM GRANULOCYTES # BLD AUTO: 0 K/UL (ref 0–0.04)
IMM GRANULOCYTES NFR BLD AUTO: 0 % (ref 0–0.5)
IRON SATN MFR SERPL: 19 % (ref 20–50)
IRON SERPL-MCNC: 58 UG/DL (ref 35–150)
LYMPHOCYTES # BLD: 1.3 K/UL (ref 0.8–3.5)
LYMPHOCYTES NFR BLD: 10 % (ref 12–49)
MCH RBC QN AUTO: 26.2 PG (ref 26–34)
MCHC RBC AUTO-ENTMCNC: 30.1 G/DL (ref 30–36.5)
MCV RBC AUTO: 87.2 FL (ref 80–99)
MONOCYTES # BLD: 0.9 K/UL (ref 0–1)
MONOCYTES NFR BLD: 7 % (ref 5–13)
NEUTS SEG # BLD: 7.9 K/UL (ref 1.8–8)
NEUTS SEG NFR BLD: 61 % (ref 32–75)
NRBC # BLD: 0 K/UL (ref 0–0.01)
NRBC BLD-RTO: 0 PER 100 WBC
PHOSPHATE SERPL-MCNC: 4.6 MG/DL (ref 2.6–4.7)
PLATELET # BLD AUTO: 528 K/UL (ref 150–400)
PLATELET COMMENTS,PCOM: ABNORMAL
PMV BLD AUTO: 10.1 FL (ref 8.9–12.9)
POTASSIUM SERPL-SCNC: 4 MMOL/L (ref 3.5–5.1)
RBC # BLD AUTO: 3.43 M/UL (ref 3.8–5.2)
RBC MORPH BLD: ABNORMAL
RBC MORPH BLD: ABNORMAL
RETICS # AUTO: 0.07 M/UL (ref 0.02–0.08)
RETICS/RBC NFR AUTO: 2.1 % (ref 0.7–2.1)
SODIUM SERPL-SCNC: 138 MMOL/L (ref 136–145)
TIBC SERPL-MCNC: 307 UG/DL (ref 250–450)
WBC # BLD AUTO: 12.9 K/UL (ref 3.6–11)

## 2022-03-16 RX ORDER — ERGOCALCIFEROL 1.25 MG/1
50000 CAPSULE ORAL
Qty: 12 CAPSULE | Refills: 2 | Status: SHIPPED | OUTPATIENT
Start: 2022-03-16

## 2022-03-17 NOTE — PROGRESS NOTES
Vitamin D level is low and I have sent the prescription to your pharmacy for correction. Remainder of the studies are stable.   I sent a copy to Dr. Michael Hill

## 2022-03-18 PROBLEM — J11.1 INFLUENZA: Status: ACTIVE | Noted: 2020-01-20

## 2022-03-18 PROBLEM — K83.8 INTRAHEPATIC BILE DUCT DILATION: Status: ACTIVE | Noted: 2018-09-05

## 2022-03-18 PROBLEM — B35.3 TINEA PEDIS OF BOTH FEET: Status: ACTIVE | Noted: 2020-08-20
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
## 2022-03-19 PROBLEM — N18.9 CHRONIC KIDNEY DISEASE: Status: ACTIVE | Noted: 2019-02-06

## 2022-03-19 PROBLEM — N18.4 CKD (CHRONIC KIDNEY DISEASE), STAGE IV (HCC): Status: ACTIVE | Noted: 2021-10-20

## 2022-03-19 PROBLEM — R78.81 STREPTOCOCCAL BACTEREMIA: Status: ACTIVE | Noted: 2020-01-25

## 2022-03-19 PROBLEM — B95.5 STREPTOCOCCAL BACTEREMIA: Status: ACTIVE | Noted: 2020-01-25

## 2022-03-19 PROBLEM — J18.9 PNEUMONIA: Status: ACTIVE | Noted: 2020-01-25

## 2022-03-19 LAB
ALBUMIN SERPL ELPH-MCNC: 3.5 G/DL (ref 2.9–4.4)
ALBUMIN/GLOB SERPL: 0.6 {RATIO} (ref 0.7–1.7)
ALPHA1 GLOB SERPL ELPH-MCNC: 0.3 G/DL (ref 0–0.4)
ALPHA2 GLOB SERPL ELPH-MCNC: 0.7 G/DL (ref 0.4–1)
B-GLOBULIN SERPL ELPH-MCNC: 1.2 G/DL (ref 0.7–1.3)
GAMMA GLOB SERPL ELPH-MCNC: 3.7 G/DL (ref 0.4–1.8)
GLOBULIN SER-MCNC: 6 G/DL (ref 2.2–3.9)
IGA SERPL-MCNC: 281 MG/DL (ref 64–422)
IGG SERPL-MCNC: 4061 MG/DL (ref 586–1602)
IGM SERPL-MCNC: 60 MG/DL (ref 26–217)
INTERPRETATION SERPL IEP-IMP: ABNORMAL
KAPPA LC FREE SER-MCNC: 361.7 MG/L (ref 3.3–19.4)
KAPPA LC FREE/LAMBDA FREE SER: 2.57 {RATIO} (ref 0.26–1.65)
LAMBDA LC FREE SERPL-MCNC: 140.5 MG/L (ref 5.7–26.3)
M PROTEIN SERPL ELPH-MCNC: ABNORMAL G/DL
PROT SERPL-MCNC: 9.5 G/DL (ref 6–8.5)

## 2022-03-20 PROBLEM — R59.0 AXILLARY ADENOPATHY: Status: ACTIVE | Noted: 2021-01-04

## 2022-03-20 PROBLEM — Z92.89 HISTORY OF NUCLEAR STRESS TEST: Status: ACTIVE | Noted: 2021-01-22

## 2022-03-20 PROBLEM — E11.21 TYPE 2 DIABETES MELLITUS WITH NEPHROPATHY (HCC): Status: ACTIVE | Noted: 2018-01-16

## 2022-03-20 PROBLEM — J18.9 CAP (COMMUNITY ACQUIRED PNEUMONIA): Status: ACTIVE | Noted: 2021-12-05
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
## 2022-04-08 ENCOUNTER — PATIENT OUTREACH (OUTPATIENT)
Dept: CASE MANAGEMENT | Age: 87
End: 2022-04-08

## 2022-04-08 NOTE — PROGRESS NOTES
Ambulatory Care Management Note    Date/Time:  4/8/2022 1:57 PM    This patient was received as a referral from [address] [place] [address]. Ambulatory Care Manager outreached to patient today to offer care management services. Introduction to self and role of care manager provided. Patient accepted care management services at this time. Follow up call scheduled at this time. Patient has Ambulatory Care Manager's contact number for for any questions or concerns.      Reports taking Na+ Bicarb 10mg BID;  & Vit D3 daily

## 2022-04-11 RX ORDER — PRAVASTATIN SODIUM 20 MG/1
TABLET ORAL
Qty: 90 TABLET | Refills: 3 | Status: SHIPPED | OUTPATIENT
Start: 2022-04-11

## 2022-04-11 RX ORDER — PRAVASTATIN SODIUM 20 MG/1
TABLET ORAL
Qty: 10 TABLET | Refills: 0 | Status: SHIPPED | OUTPATIENT
Start: 2022-04-11 | End: 2022-04-11 | Stop reason: SDUPTHER

## 2022-04-11 NOTE — PATIENT INSTRUCTIONS
Ambulatory Care Management Note    Date/Time:  4/8/2022 12:04 PM    This patient was received as a referral from [employer]. Ambulatory Care Manager outreached to patient today to offer care management services. Introduction to self and role of care manager provided. Patient accepted care management services at this time. Follow up call scheduled at this time. Patient has Ambulatory Care Manager's contact number for for any questions or concerns.

## 2022-04-27 ENCOUNTER — TRANSCRIBE ORDER (OUTPATIENT)
Dept: SCHEDULING | Age: 87
End: 2022-04-27

## 2022-04-27 DIAGNOSIS — Z12.31 SCREENING MAMMOGRAM FOR HIGH-RISK PATIENT: Primary | ICD-10-CM

## 2022-04-28 RX ORDER — LAMOTRIGINE 25 MG/1
TABLET ORAL
Qty: 180 TABLET | Refills: 3 | Status: SHIPPED | OUTPATIENT
Start: 2022-04-28

## 2022-05-10 RX ORDER — TELMISARTAN 40 MG/1
40 TABLET ORAL DAILY
Qty: 90 TABLET | Refills: 3 | OUTPATIENT
Start: 2022-05-10

## 2022-05-10 RX ORDER — HYDROCHLOROTHIAZIDE 12.5 MG/1
12.5 TABLET ORAL DAILY
Qty: 90 TABLET | Refills: 3 | Status: SHIPPED | OUTPATIENT
Start: 2022-05-10

## 2022-05-18 ENCOUNTER — HOSPITAL ENCOUNTER (OUTPATIENT)
Dept: MAMMOGRAPHY | Age: 87
Discharge: HOME | End: 2022-05-18
Attending: INTERNAL MEDICINE
Payer: MEDICARE

## 2022-05-18 DIAGNOSIS — Z12.31 SCREENING MAMMOGRAM FOR HIGH-RISK PATIENT: ICD-10-CM

## 2022-05-18 PROCEDURE — 77063 BREAST TOMOSYNTHESIS BI: CPT

## 2022-05-27 ENCOUNTER — PATIENT OUTREACH (OUTPATIENT)
Dept: CASE MANAGEMENT | Age: 87
End: 2022-05-27

## 2022-06-01 ENCOUNTER — PATIENT OUTREACH (OUTPATIENT)
Dept: CASE MANAGEMENT | Age: 87
End: 2022-06-01

## 2022-06-01 NOTE — PROGRESS NOTES
Ambulatory Care Management Note    Date/Time:  6/1/2022 12:39 PM    This Ambulatory Care Manager (ACM) reviewed and updated the following screenings during this call; disease specific assessment     Patient's challenges to self-management identified:   medical condition      Medication Management:  good adherence    Advance Care Planning:   Does patient have an Advance Directive:  currently not on file; education provided    [address] remains son: Hever Bowling. [employer], Referrals, and Durable Medical Equipment: Dr. Denise Gamino, nephrology      Health Maintenance Due   Topic Date Due    Pneumococcal 65+ years (2 - PCV) 08/25/2007    Eye Exam Retinal or Dilated  11/16/2019     Health Maintenance Reviewed: Nephrology    Patient was asked to consider health care goals that they would like to focus on with this ACM. ACM will follow up with patient to discuss goals and establish care plan in the next 7-14 days.   TBA      PCP/Specialist follow up:   Future Appointments   Date Time Provider Elizabeth Tran   7/19/2022 12:30 PM Milton Jurado MD [facility] MAIN BS AMB

## 2022-06-11 RX ORDER — OXYBUTYNIN CHLORIDE 10 MG/1
TABLET, EXTENDED RELEASE ORAL
Qty: 90 TABLET | Refills: 3 | Status: SHIPPED | OUTPATIENT
Start: 2022-06-11

## 2022-07-13 ENCOUNTER — PATIENT OUTREACH (OUTPATIENT)
Dept: CASE MANAGEMENT | Age: 87
End: 2022-07-13

## 2022-07-13 NOTE — PROGRESS NOTES
Ambulatory Care Management Note      Date/Time:  7/13/2022 4:20 PM    This patient was received as a referral from [address]. Top Challenges reviewed with the provider   Reports needing to speak on elev Creat; report a Provider informed her of elev Cr but she was not aware. Reports was encouraged to take iron for low RBC count, but is unable to do so due to constipation and that CKD allowed limited water amounts. Reports cont Nutritionist f/u:   wt loss of 10lbs. Ambulatory  contacted patient for discussion and case management of CKD. elev labs. HTN & Sickle Cell Anemia. Summary of patients top problems: CKD/HTN/SSA-needs assistance with Low Na+/Renal diet and maintaining physical ability/walking as much as possible w/ SSA. Patient's challenges to self management identified:   medical condition      Medication Management:  good adherence    Advance Care Planning:   Does patient have an Advance Directive:  yes; reviewed and current Daughter Livia Feldman and son Allie Mayes remain given as [facility]. [employer], Referrals, and Durable Medical Equipment: Nephrologist    PCP/Specialist follow up:   Future Appointments   Date Time Provider Elizabeth Tran   7/19/2022 12:30 PM Danny Ware MD [facility] MAIN BS AMB      Goals:  Chronic Condition Management-CKD: renal diet/fluid intake. Patient verbalized understanding of all information discussed. Patient has this Ambulatory Care Manager's contact information for any further questions, concerns, or needs.

## 2022-07-19 ENCOUNTER — OFFICE VISIT (OUTPATIENT)
Dept: INTERNAL MEDICINE CLINIC | Age: 87
End: 2022-07-19
Payer: MEDICARE

## 2022-07-19 VITALS
SYSTOLIC BLOOD PRESSURE: 128 MMHG | TEMPERATURE: 98.3 F | HEART RATE: 61 BPM | DIASTOLIC BLOOD PRESSURE: 68 MMHG | WEIGHT: 133 LBS | HEIGHT: 63 IN | RESPIRATION RATE: 18 BRPM | OXYGEN SATURATION: 98 % | BODY MASS INDEX: 23.57 KG/M2

## 2022-07-19 DIAGNOSIS — E11.21 TYPE 2 DIABETES MELLITUS WITH NEPHROPATHY (HCC): ICD-10-CM

## 2022-07-19 DIAGNOSIS — Z85.3 HISTORY OF RIGHT BREAST CANCER: ICD-10-CM

## 2022-07-19 DIAGNOSIS — H91.92 HEARING DEFICIT, LEFT: ICD-10-CM

## 2022-07-19 DIAGNOSIS — I10 PRIMARY HYPERTENSION: Primary | ICD-10-CM

## 2022-07-19 DIAGNOSIS — D58.0 HEREDITARY SPHEROCYTOSIS (HCC): ICD-10-CM

## 2022-07-19 PROCEDURE — 1123F ACP DISCUSS/DSCN MKR DOCD: CPT | Performed by: INTERNAL MEDICINE

## 2022-07-19 PROCEDURE — 1101F PT FALLS ASSESS-DOCD LE1/YR: CPT | Performed by: INTERNAL MEDICINE

## 2022-07-19 PROCEDURE — G8754 DIAS BP LESS 90: HCPCS | Performed by: INTERNAL MEDICINE

## 2022-07-19 PROCEDURE — 3044F HG A1C LEVEL LT 7.0%: CPT | Performed by: INTERNAL MEDICINE

## 2022-07-19 PROCEDURE — 99214 OFFICE O/P EST MOD 30 MIN: CPT | Performed by: INTERNAL MEDICINE

## 2022-07-19 PROCEDURE — G8420 CALC BMI NORM PARAMETERS: HCPCS | Performed by: INTERNAL MEDICINE

## 2022-07-19 PROCEDURE — 1090F PRES/ABSN URINE INCON ASSESS: CPT | Performed by: INTERNAL MEDICINE

## 2022-07-19 PROCEDURE — G8427 DOCREV CUR MEDS BY ELIG CLIN: HCPCS | Performed by: INTERNAL MEDICINE

## 2022-07-19 PROCEDURE — G8399 PT W/DXA RESULTS DOCUMENT: HCPCS | Performed by: INTERNAL MEDICINE

## 2022-07-19 PROCEDURE — G8536 NO DOC ELDER MAL SCRN: HCPCS | Performed by: INTERNAL MEDICINE

## 2022-07-19 PROCEDURE — G8510 SCR DEP NEG, NO PLAN REQD: HCPCS | Performed by: INTERNAL MEDICINE

## 2022-07-19 PROCEDURE — G8752 SYS BP LESS 140: HCPCS | Performed by: INTERNAL MEDICINE

## 2022-07-19 RX ORDER — TELMISARTAN 40 MG/1
40 TABLET ORAL 2 TIMES DAILY
Qty: 60 CAPSULE | Refills: 11
Start: 2022-07-19

## 2022-07-19 RX ORDER — FERROUS SULFATE TAB 325 MG (65 MG ELEMENTAL FE) 325 (65 FE) MG
325 TAB ORAL 2 TIMES DAILY
COMMUNITY
Start: 2022-07-07

## 2022-07-19 NOTE — PROGRESS NOTES
Char Mcdaniel is a 82 y.o. female    Chief Complaint   Patient presents with    Diabetes    Hypertension     1. Have you been to the ER, urgent care clinic since your last visit? Hospitalized since your last visit? No     2. Have you seen or consulted any other health care providers outside of the [address] [place] [address] since your last visit? Include any pap smears or colon screening.  No
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
## 2022-07-20 LAB
ALBUMIN SERPL-MCNC: 3.8 G/DL (ref 3.5–5)
ANION GAP SERPL CALC-SCNC: 6 MMOL/L (ref 5–15)
BUN SERPL-MCNC: 46 MG/DL (ref 6–20)
BUN/CREAT SERPL: 17 (ref 12–20)
CALCIUM SERPL-MCNC: 8.9 MG/DL (ref 8.5–10.1)
CHLORIDE SERPL-SCNC: 104 MMOL/L (ref 97–108)
CO2 SERPL-SCNC: 27 MMOL/L (ref 21–32)
CREAT SERPL-MCNC: 2.77 MG/DL (ref 0.55–1.02)
EST. AVERAGE GLUCOSE BLD GHB EST-MCNC: 103 MG/DL
GLUCOSE SERPL-MCNC: 94 MG/DL (ref 65–100)
HBA1C MFR BLD: 5.2 % (ref 4–5.6)
PHOSPHATE SERPL-MCNC: 4.3 MG/DL (ref 2.6–4.7)
POTASSIUM SERPL-SCNC: 3.4 MMOL/L (ref 3.5–5.1)
SODIUM SERPL-SCNC: 137 MMOL/L (ref 136–145)

## 2022-08-16 ENCOUNTER — PATIENT OUTREACH (OUTPATIENT)
Dept: CASE MANAGEMENT | Age: 87
End: 2022-08-16

## 2022-08-16 NOTE — PROGRESS NOTES
Patient has graduated from the Complex Case Management  program on 8/16/2022. Patient/family has the ability to self-manage at this time. Care management goals have been completed. No further Ambulatory Care Manager follow up scheduled. Goals Addressed                   This Visit's Progress     Attends follow up appointments on schedule   On track     8/16/2022:  Patient has been brought to PCP follow-up appointments as scheduled. EW ACM       Reduce risk of comorbid complications related to diabetes (renal disease, heart disease, amputation, and retinopathy). On track     8/16/2022:  BS glucose testing now consistently done as scheduled. Patient has done very well with BS with norms. Son reports patient doing well;  low sugary foods intake & water as hydration. Water to be drunk also on the morning of PCP appointments. EW ACM            Patient's son has Ambulatory Care Manager's contact information for any further questions, concerns, or needs.   Patients upcoming visits:    Future Appointments   Date Time Provider Elizabeth Tran   11/21/2022 11:45 AM Iman Suárez MD [facility] MAIN BS AMB

## 2022-08-24 RX ORDER — METOPROLOL SUCCINATE 50 MG/1
TABLET, EXTENDED RELEASE ORAL
Qty: 90 TABLET | Refills: 3 | Status: SHIPPED | OUTPATIENT
Start: 2022-08-24

## 2022-09-12 RX ORDER — FLUTICASONE PROPIONATE 50 MCG
2 SPRAY, SUSPENSION (ML) NASAL 2 TIMES DAILY
Qty: 3 EACH | Refills: 3 | Status: SHIPPED | OUTPATIENT
Start: 2022-09-12

## 2022-11-21 ENCOUNTER — OFFICE VISIT (OUTPATIENT)
Dept: INTERNAL MEDICINE CLINIC | Age: 87
End: 2022-11-21
Payer: MEDICARE

## 2022-11-21 VITALS
SYSTOLIC BLOOD PRESSURE: 183 MMHG | TEMPERATURE: 97.9 F | HEART RATE: 61 BPM | RESPIRATION RATE: 20 BRPM | OXYGEN SATURATION: 100 % | BODY MASS INDEX: 22.18 KG/M2 | HEIGHT: 63 IN | WEIGHT: 125.2 LBS | DIASTOLIC BLOOD PRESSURE: 67 MMHG

## 2022-11-21 DIAGNOSIS — I87.2 VENOUS INSUFFICIENCY OF BOTH LOWER EXTREMITIES: ICD-10-CM

## 2022-11-21 DIAGNOSIS — D64.9 ANEMIA, UNSPECIFIED TYPE: ICD-10-CM

## 2022-11-21 DIAGNOSIS — M17.0 PRIMARY OSTEOARTHRITIS OF BOTH KNEES: ICD-10-CM

## 2022-11-21 DIAGNOSIS — N18.4 CKD (CHRONIC KIDNEY DISEASE), STAGE IV (HCC): ICD-10-CM

## 2022-11-21 DIAGNOSIS — I10 PRIMARY HYPERTENSION: Primary | ICD-10-CM

## 2022-11-21 DIAGNOSIS — E11.21 TYPE 2 DIABETES MELLITUS WITH NEPHROPATHY (HCC): ICD-10-CM

## 2022-11-21 DIAGNOSIS — J45.909 UNCOMPLICATED ASTHMA, UNSPECIFIED ASTHMA SEVERITY, UNSPECIFIED WHETHER PERSISTENT: ICD-10-CM

## 2022-11-21 DIAGNOSIS — Z23 ENCOUNTER FOR IMMUNIZATION: ICD-10-CM

## 2022-11-21 PROCEDURE — 1123F ACP DISCUSS/DSCN MKR DOCD: CPT | Performed by: INTERNAL MEDICINE

## 2022-11-21 PROCEDURE — G8754 DIAS BP LESS 90: HCPCS | Performed by: INTERNAL MEDICINE

## 2022-11-21 PROCEDURE — G8420 CALC BMI NORM PARAMETERS: HCPCS | Performed by: INTERNAL MEDICINE

## 2022-11-21 PROCEDURE — G0008 ADMIN INFLUENZA VIRUS VAC: HCPCS | Performed by: INTERNAL MEDICINE

## 2022-11-21 PROCEDURE — G8399 PT W/DXA RESULTS DOCUMENT: HCPCS | Performed by: INTERNAL MEDICINE

## 2022-11-21 PROCEDURE — 1101F PT FALLS ASSESS-DOCD LE1/YR: CPT | Performed by: INTERNAL MEDICINE

## 2022-11-21 PROCEDURE — G8536 NO DOC ELDER MAL SCRN: HCPCS | Performed by: INTERNAL MEDICINE

## 2022-11-21 PROCEDURE — 99214 OFFICE O/P EST MOD 30 MIN: CPT | Performed by: INTERNAL MEDICINE

## 2022-11-21 PROCEDURE — 3044F HG A1C LEVEL LT 7.0%: CPT | Performed by: INTERNAL MEDICINE

## 2022-11-21 PROCEDURE — 3078F DIAST BP <80 MM HG: CPT | Performed by: INTERNAL MEDICINE

## 2022-11-21 PROCEDURE — G8510 SCR DEP NEG, NO PLAN REQD: HCPCS | Performed by: INTERNAL MEDICINE

## 2022-11-21 PROCEDURE — G8753 SYS BP > OR = 140: HCPCS | Performed by: INTERNAL MEDICINE

## 2022-11-21 PROCEDURE — 3074F SYST BP LT 130 MM HG: CPT | Performed by: INTERNAL MEDICINE

## 2022-11-21 PROCEDURE — G8427 DOCREV CUR MEDS BY ELIG CLIN: HCPCS | Performed by: INTERNAL MEDICINE

## 2022-11-21 PROCEDURE — 90694 VACC AIIV4 NO PRSRV 0.5ML IM: CPT | Performed by: INTERNAL MEDICINE

## 2022-11-21 PROCEDURE — 1090F PRES/ABSN URINE INCON ASSESS: CPT | Performed by: INTERNAL MEDICINE

## 2022-11-21 RX ORDER — TELMISARTAN 40 MG/1
40 TABLET ORAL 2 TIMES DAILY
Qty: 60 CAPSULE | Refills: 11 | Status: SHIPPED | OUTPATIENT
Start: 2022-11-21

## 2022-11-21 NOTE — PROGRESS NOTES
Ricky Castrejon is a 82 y.o. female    Chief Complaint   Patient presents with    Diabetes    Hypertension     1. Have you been to the ER, urgent care clinic since your last visit? Hospitalized since your last visit? No  2. Have you seen or consulted any other health care providers outside of the [address] [place] [address] since your last visit? Include any pap smears or colon screening.  No
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
## 2022-11-22 LAB
ALBUMIN SERPL-MCNC: 3.7 G/DL (ref 3.5–5)
ANION GAP SERPL CALC-SCNC: 6 MMOL/L (ref 5–15)
BASOPHILS # BLD: 0.1 K/UL (ref 0–0.1)
BASOPHILS NFR BLD: 1 % (ref 0–1)
BUN SERPL-MCNC: 39 MG/DL (ref 6–20)
BUN/CREAT SERPL: 13 (ref 12–20)
CALCIUM SERPL-MCNC: 9.2 MG/DL (ref 8.5–10.1)
CHLORIDE SERPL-SCNC: 106 MMOL/L (ref 97–108)
CO2 SERPL-SCNC: 29 MMOL/L (ref 21–32)
CREAT SERPL-MCNC: 3.05 MG/DL (ref 0.55–1.02)
CREAT UR-MCNC: 101 MG/DL
DIFFERENTIAL METHOD BLD: ABNORMAL
EOSINOPHIL # BLD: 2.1 K/UL (ref 0–0.4)
EOSINOPHIL NFR BLD: 18 % (ref 0–7)
ERYTHROCYTE [DISTWIDTH] IN BLOOD BY AUTOMATED COUNT: 11.7 % (ref 11.5–14.5)
EST. AVERAGE GLUCOSE BLD GHB EST-MCNC: 85 MG/DL
GLUCOSE SERPL-MCNC: 90 MG/DL (ref 65–100)
HBA1C MFR BLD: 4.6 % (ref 4–5.6)
HCT VFR BLD AUTO: 29.8 % (ref 35–47)
HGB BLD-MCNC: 9.4 G/DL (ref 11.5–16)
IMM GRANULOCYTES # BLD AUTO: 0 K/UL (ref 0–0.04)
IMM GRANULOCYTES NFR BLD AUTO: 0 % (ref 0–0.5)
LYMPHOCYTES # BLD: 1.7 K/UL (ref 0.8–3.5)
LYMPHOCYTES NFR BLD: 14 % (ref 12–49)
MCH RBC QN AUTO: 27.8 PG (ref 26–34)
MCHC RBC AUTO-ENTMCNC: 31.5 G/DL (ref 30–36.5)
MCV RBC AUTO: 88.2 FL (ref 80–99)
MICROALBUMIN UR-MCNC: 141 MG/DL
MICROALBUMIN/CREAT UR-RTO: 1396 MG/G (ref 0–30)
MONOCYTES # BLD: 1.1 K/UL (ref 0–1)
MONOCYTES NFR BLD: 9 % (ref 5–13)
NEUTS SEG # BLD: 6.9 K/UL (ref 1.8–8)
NEUTS SEG NFR BLD: 58 % (ref 32–75)
NRBC # BLD: 0 K/UL (ref 0–0.01)
NRBC BLD-RTO: 0 PER 100 WBC
PHOSPHATE SERPL-MCNC: 4.1 MG/DL (ref 2.6–4.7)
PLATELET # BLD AUTO: 452 K/UL (ref 150–400)
PMV BLD AUTO: 9.7 FL (ref 8.9–12.9)
POTASSIUM SERPL-SCNC: 3.3 MMOL/L (ref 3.5–5.1)
RBC # BLD AUTO: 3.38 M/UL (ref 3.8–5.2)
RBC MORPH BLD: ABNORMAL
SODIUM SERPL-SCNC: 141 MMOL/L (ref 136–145)
WBC # BLD AUTO: 11.9 K/UL (ref 3.6–11)

## 2022-11-22 RX ORDER — ALBUTEROL SULFATE 90 UG/1
AEROSOL, METERED RESPIRATORY (INHALATION)
Qty: 25.5 G | Refills: 3 | Status: SHIPPED | OUTPATIENT
Start: 2022-11-22

## 2022-12-13 ENCOUNTER — HOSPITAL ENCOUNTER (OUTPATIENT)
Dept: INFUSION THERAPY | Age: 87
Discharge: HOME | End: 2022-12-13
Payer: MEDICARE

## 2022-12-13 VITALS
OXYGEN SATURATION: 100 % | HEART RATE: 72 BPM | DIASTOLIC BLOOD PRESSURE: 50 MMHG | RESPIRATION RATE: 20 BRPM | TEMPERATURE: 98.7 F | SYSTOLIC BLOOD PRESSURE: 155 MMHG

## 2022-12-13 LAB
ALBUMIN SERPL-MCNC: 3.4 G/DL (ref 3.5–5)
ANION GAP SERPL CALC-SCNC: 7 MMOL/L (ref 5–15)
BASOPHILS # BLD: 0.1 K/UL (ref 0–0.1)
BASOPHILS NFR BLD: 1 % (ref 0–1)
BUN SERPL-MCNC: 48 MG/DL (ref 6–20)
BUN/CREAT SERPL: 15 (ref 12–20)
CALCIUM SERPL-MCNC: 8.8 MG/DL (ref 8.5–10.1)
CHLORIDE SERPL-SCNC: 102 MMOL/L (ref 97–108)
CO2 SERPL-SCNC: 28 MMOL/L (ref 21–32)
CREAT SERPL-MCNC: 3.1 MG/DL (ref 0.55–1.02)
DIFFERENTIAL METHOD BLD: ABNORMAL
EOSINOPHIL # BLD: 1.4 K/UL (ref 0–0.4)
EOSINOPHIL NFR BLD: 15 % (ref 0–7)
ERYTHROCYTE [DISTWIDTH] IN BLOOD BY AUTOMATED COUNT: 11.7 % (ref 11.5–14.5)
GLUCOSE SERPL-MCNC: 87 MG/DL (ref 65–100)
HCT VFR BLD AUTO: 29.1 % (ref 35–47)
HGB BLD-MCNC: 9.4 G/DL (ref 11.5–16)
IMM GRANULOCYTES # BLD AUTO: 0 K/UL (ref 0–0.04)
IMM GRANULOCYTES NFR BLD AUTO: 0 % (ref 0–0.5)
IRON SATN MFR SERPL: 41 % (ref 20–50)
IRON SERPL-MCNC: 116 UG/DL (ref 35–150)
LYMPHOCYTES # BLD: 1 K/UL (ref 0.8–3.5)
LYMPHOCYTES NFR BLD: 10 % (ref 12–49)
MCH RBC QN AUTO: 27.2 PG (ref 26–34)
MCHC RBC AUTO-ENTMCNC: 32.3 G/DL (ref 30–36.5)
MCV RBC AUTO: 84.1 FL (ref 80–99)
MONOCYTES # BLD: 1 K/UL (ref 0–1)
MONOCYTES NFR BLD: 11 % (ref 5–13)
NEUTS SEG # BLD: 6 K/UL (ref 1.8–8)
NEUTS SEG NFR BLD: 63 % (ref 32–75)
NRBC # BLD: 0 K/UL (ref 0–0.01)
NRBC BLD-RTO: 0 PER 100 WBC
PHOSPHATE SERPL-MCNC: 4.2 MG/DL (ref 2.6–4.7)
PLATELET # BLD AUTO: 463 K/UL (ref 150–400)
PMV BLD AUTO: 9 FL (ref 8.9–12.9)
POTASSIUM SERPL-SCNC: 4.2 MMOL/L (ref 3.5–5.1)
RBC # BLD AUTO: 3.46 M/UL (ref 3.8–5.2)
RBC MORPH BLD: ABNORMAL
SODIUM SERPL-SCNC: 137 MMOL/L (ref 136–145)
TIBC SERPL-MCNC: 284 UG/DL (ref 250–450)
WBC # BLD AUTO: 9.5 K/UL (ref 3.6–11)

## 2022-12-13 PROCEDURE — 74011250636 HC RX REV CODE- 250/636: Performed by: INTERNAL MEDICINE

## 2022-12-13 PROCEDURE — 83540 ASSAY OF IRON: CPT

## 2022-12-13 PROCEDURE — 80069 RENAL FUNCTION PANEL: CPT

## 2022-12-13 PROCEDURE — 36415 COLL VENOUS BLD VENIPUNCTURE: CPT

## 2022-12-13 PROCEDURE — 96372 THER/PROPH/DIAG INJ SC/IM: CPT

## 2022-12-13 PROCEDURE — 85025 COMPLETE CBC W/AUTO DIFF WBC: CPT

## 2022-12-13 RX ADMIN — EPOETIN ALFA-EPBX 10000 UNITS: 10000 INJECTION, SOLUTION INTRAVENOUS; SUBCUTANEOUS at 10:58

## 2022-12-13 NOTE — DISCHARGE INSTRUCTIONS
OUTPATIENT INFUSION CENTER DISCHARGE INSTRUCTIONS FOR:    PROCRIT INJECTION (EPOGEN/EPOETIN KASANDRA): It is important that your injections be given according to schedule. Your physician may want you to take iron supplements or follow a special diet. You must have lab work drawn regularly while on this medication. All medications can potentially cause side effects. If these side effects should develop, contact your physicians office as needed. Possible side effects of Procrit may include the following:               *    mild headache             *    body aches             *    mild nausea   *    diarrhea   *    increase in blood pressure   *    burning, itching or tenderness at injection site   *    feelings of anxiety or trouble sleeping. Serious side effects or allergic reactions are rare, but may require immediate medical attention. Signs and symptoms may include one or more of the following:       Chest pain or sudden swelling of the hands, ankles or feet. Skin redness, itching, swelling, blistering, weeping, crusting, rash, eruptions, or;  Wheezing, chest tightness, cough, irregular heartbeat or shortness of breath;   Swelling of the face, eyelids, lips, tongue, or throat;   Stuffy nose, runny nose, sneezing, sore throat;   Red (bloodshot), itchy, swollen, or watery eyes;  Stomach pain, nausea, vomiting, severe diarrhea, or bloody stools; Severe or sudden headache, problems with vision, speech or walking;  Numbness or weakness in your arm, leg or on one side of your body; Severe tiredness, lightheadedness, dizziness, fainting or seizures; Change in how much you urinate or painful urination. Please contact your physicians office with any questions or concerns regarding your treatment. I have received the Procrit Medication Guide in its entirety.   Daniel Guerra  Patient/Representatives signature:  ___________________________    12/13/2022   Dipika Shell RN

## 2022-12-13 NOTE — PROGRESS NOTES
[facility] Progress Note  Date: December 13, 2022       Treatment: Retacrit    Ms. Guerra arrived in no acute distress at 1000. Patient COVID Screening completed:  Do you have any symptoms of COVID-19? SOB, coughing, fever, or generally not feeling well? NO  Have you been exposed to COVID-19 recently? NO  Have you had any recent contact with family/friend that has a pending COVID test? NO    Assessment was unremarkable with the exception of discolored skin and a couple open sores on R arm. Patient states it was due to an infestation of bed bugs. Labs drawn and processed. Problem: Knowledge Deficit  Goal: *Verbalizes understanding of procedures and medications  Outcome: Progressing Towards Goal     Problem: Patient Education:  Go to Education Activity  Goal: Patient/Family Education  Outcome: Progressing Towards Goal    Ms. Guerra's vitals for today's visit. Patient Vitals for the past 12 hrs:   Temp Pulse Resp BP SpO2   12/13/22 1003 98.7 °F (37.1 °C) 72 20 (!) 155/50 100 %     Lab results:  Recent Results (from the past 12 hour(s))   CBC WITH AUTOMATED DIFF    Collection Time: 12/13/22 10:34 AM   Result Value Ref Range    WBC 9.5 3.6 - 11.0 K/uL    RBC 3.46 (L) 3.80 - 5.20 M/uL    HGB 9.4 (L) 11.5 - 16.0 g/dL    HCT 29.1 (L) 35.0 - 47.0 %    MCV 84.1 80.0 - 99.0 FL    MCH 27.2 26.0 - 34.0 PG    MCHC 32.3 30.0 - 36.5 g/dL    RDW 11.7 11.5 - 14.5 %    PLATELET 920 (H) 123 - 400 K/uL    MPV 9.0 8.9 - 12.9 FL    NRBC 0.0 0  WBC    ABSOLUTE NRBC 0.00 0.00 - 0.01 K/uL    NEUTROPHILS 63 32 - 75 %    LYMPHOCYTES 10 (L) 12 - 49 %    MONOCYTES 11 5 - 13 %    EOSINOPHILS 15 (H) 0 - 7 %    BASOPHILS 1 0 - 1 %    IMMATURE GRANULOCYTES 0 0.0 - 0.5 %    ABS. NEUTROPHILS 6.0 1.8 - 8.0 K/UL    ABS. LYMPHOCYTES 1.0 0.8 - 3.5 K/UL    ABS. MONOCYTES 1.0 0.0 - 1.0 K/UL    ABS. EOSINOPHILS 1.4 (H) 0.0 - 0.4 K/UL    ABS. BASOPHILS 0.1 0.0 - 0.1 K/UL    ABS. IMM.  GRANS. 0.0 0.00 - 0.04 K/UL    DF SMEAR SCANNED      RBC COMMENTS NORMOCYTIC, NORMOCHROMIC     RENAL FUNCTION PANEL    Collection Time: 12/13/22 10:34 AM   Result Value Ref Range    Sodium 137 136 - 145 mmol/L    Potassium 4.2 3.5 - 5.1 mmol/L    Chloride 102 97 - 108 mmol/L    CO2 28 21 - 32 mmol/L    Anion gap 7 5 - 15 mmol/L    Glucose 87 65 - 100 mg/dL    BUN 48 (H) 6 - 20 MG/DL    Creatinine 3.10 (H) 0.55 - 1.02 MG/DL    BUN/Creatinine ratio 15 12 - 20      eGFR 14 (L) >60 ml/min/1.73m2    Calcium 8.8 8.5 - 10.1 MG/DL    Phosphorus 4.2 2.6 - 4.7 MG/DL    Albumin 3.4 (L) 3.5 - 5.0 g/dL   IRON PROFILE    Collection Time: 12/13/22 10:34 AM   Result Value Ref Range    Iron 116 35 - 150 ug/dL    TIBC 284 250 - 450 ug/dL    Iron % saturation 41 20 - 50 %     Medications given:  Medications Administered       epoetin eros-epbx (RETACRIT) injection 10,000 Units       Admin Date  12/13/2022 Action  Given Dose  10,000 Units Route  SubCUTAneous Administered By  Gates Riedel, RN             Injection given in R arm. Ms. Katerina Gomez tolerated the treatment without complaints. Ms. Katerina Gomez was discharged from [place] [address] in stable condition at 1100.      Future Appointments   Date Time Provider Elizabeth Tran   1/10/2023 10:00  [address] INFUSION NURSE 1 [address] [place] [address]   2/7/2023 10:00  [address] INFUSION NURSE 1 [facility]   3/21/2023 10:15 AM Nae Flores MD [facility] MAIN BS AMB     Duboisles Vila RN  December 13, 2022  10:52 AM

## 2022-12-21 RX ORDER — AZITHROMYCIN 250 MG/1
TABLET, FILM COATED ORAL
Qty: 6 TABLET | Refills: 0 | Status: SHIPPED | OUTPATIENT
Start: 2022-12-21 | End: 2022-12-26

## 2022-12-28 ENCOUNTER — HOSPITAL ENCOUNTER (EMERGENCY)
Age: 87
Discharge: HOME | End: 2022-12-28
Attending: EMERGENCY MEDICINE
Payer: MEDICARE

## 2022-12-28 ENCOUNTER — APPOINTMENT (OUTPATIENT)
Dept: GENERAL RADIOLOGY | Age: 87
End: 2022-12-28
Attending: PHYSICIAN ASSISTANT
Payer: MEDICARE

## 2022-12-28 VITALS
HEART RATE: 89 BPM | RESPIRATION RATE: 20 BRPM | OXYGEN SATURATION: 100 % | SYSTOLIC BLOOD PRESSURE: 165 MMHG | DIASTOLIC BLOOD PRESSURE: 60 MMHG | WEIGHT: 125 LBS | BODY MASS INDEX: 22.15 KG/M2 | TEMPERATURE: 99 F | HEIGHT: 63 IN
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
## 2022-12-28 DIAGNOSIS — S89.91XA INJURY OF RIGHT KNEE, INITIAL ENCOUNTER: Primary | ICD-10-CM

## 2022-12-28 DIAGNOSIS — M25.469 TRAUMATIC EFFUSION OF KNEE JOINT: ICD-10-CM

## 2022-12-28 DIAGNOSIS — I10 ACCELERATED HYPERTENSION: ICD-10-CM

## 2022-12-28 PROCEDURE — 99283 EMERGENCY DEPT VISIT LOW MDM: CPT

## 2022-12-28 PROCEDURE — 74011250637 HC RX REV CODE- 250/637: Performed by: EMERGENCY MEDICINE

## 2022-12-28 PROCEDURE — 73562 X-RAY EXAM OF KNEE 3: CPT

## 2022-12-28 RX ORDER — DICLOFENAC SODIUM 10 MG/G
GEL TOPICAL 4 TIMES DAILY
Qty: 20 G | Refills: 0 | Status: SHIPPED | OUTPATIENT
Start: 2022-12-28

## 2022-12-28 RX ORDER — HYDROCODONE BITARTRATE AND ACETAMINOPHEN 5; 325 MG/1; MG/1
1 TABLET ORAL
Qty: 10 TABLET | Refills: 0 | Status: SHIPPED | OUTPATIENT
Start: 2022-12-28 | End: 2022-12-31

## 2022-12-28 RX ORDER — HYDROCODONE BITARTRATE AND ACETAMINOPHEN 5; 325 MG/1; MG/1
1 TABLET ORAL
Status: COMPLETED | OUTPATIENT
Start: 2022-12-28 | End: 2022-12-28

## 2022-12-28 RX ADMIN — HYDROCODONE BITARTRATE AND ACETAMINOPHEN 1 TABLET: 5; 325 TABLET ORAL at 23:33
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
## 2022-12-29 NOTE — DISCHARGE INSTRUCTIONS
Called patient, no answer, left message that referral at  for .   Thank You! It was a pleasure taking care of you in our Emergency Department today. We know that when you come to [facility], you are entrusting us with your health, comfort, and safety. Our physicians and nurses honor that trust, and truly appreciate the opportunity to care for you and your loved ones. We also value your feedback. If you receive a survey about your Emergency Department experience today, please fill it out. We care about our patients' feedback, and we listen to what you have to say. Thank you. Dr. Brayan Faust M.D.      ________________________________________________________________________  I have included a copy of your lab results and/or radiologic studies from today's visit so you can have them easily available at your follow-up visit. We hope you feel better and please do not hesitate to contact the ED if you have any questions at all! No results found for this or any previous visit (from the past 12 hour(s)). XR KNEE RT 3 V   Final Result   No fracture with small suprapatellar effusion. Chronic changes as   above. CT Results  (Last 48 hours)      None          The exam and treatment you received in the Emergency Department were for an urgent problem and are not intended as complete care. It is important that you follow up with a doctor, nurse practitioner, or physician assistant for ongoing care. If your symptoms become worse or you do not improve as expected and you are unable to reach your usual health care provider, you should return to the Emergency Department. We are available 24 hours a day. Please take your discharge instructions with you when you go to your follow-up appointment. If a prescription has been provided, please have it filled as soon as possible to prevent a delay in treatment. Read the entire medication instruction sheet provided to you by the pharmacy.  If you have any questions or reservations about taking the medication due to side effects or interactions with other medications, please call your primary care physician or contact the ER to speak with the charge nurse. Please make an appointment with your family doctor or the physician you were referred to for follow-up of this visit as instructed on your discharge paperwork. Return to the ER if you are unable to be seen or if you are unable to be seen in a timely manner. Should you experience abdominal pain lasting greater than 6 hours, chest pain, difficulty breathing, fever/chills, numbness/tingling, skin changes or other symptoms that concern you, return to the ED sooner. If you feel worse over the next 24 hours, please return to the ED. We are available 24 hours a day. Thank you for trusting us with your care!

## 2023-01-03 NOTE — PROGRESS NOTES
New/updated order required for patient's upcoming [facility] appointment. Faxed doctor's office on 01/03/23 at 1:09 PM.  Refer to fax documents in pharmacy for further information.

## 2023-01-05 RX ORDER — PRAVASTATIN SODIUM 20 MG/1
TABLET ORAL
Qty: 90 TABLET | Refills: 3 | Status: SHIPPED | OUTPATIENT
Start: 2023-01-05

## 2023-01-10 ENCOUNTER — HOSPITAL ENCOUNTER (OUTPATIENT)
Dept: INFUSION THERAPY | Age: 88
Discharge: HOME | End: 2023-01-10
Payer: MEDICARE

## 2023-01-10 VITALS
HEART RATE: 60 BPM | SYSTOLIC BLOOD PRESSURE: 154 MMHG | RESPIRATION RATE: 18 BRPM | TEMPERATURE: 98.2 F | OXYGEN SATURATION: 100 % | DIASTOLIC BLOOD PRESSURE: 51 MMHG

## 2023-01-10 LAB
ALBUMIN SERPL-MCNC: 3.2 G/DL (ref 3.5–5)
ANION GAP SERPL CALC-SCNC: 9 MMOL/L (ref 5–15)
BASOPHILS # BLD: 0.1 K/UL (ref 0–0.1)
BASOPHILS NFR BLD: 1 % (ref 0–1)
BUN SERPL-MCNC: 47 MG/DL (ref 6–20)
BUN/CREAT SERPL: 16 (ref 12–20)
CALCIUM SERPL-MCNC: 8.5 MG/DL (ref 8.5–10.1)
CHLORIDE SERPL-SCNC: 104 MMOL/L (ref 97–108)
CO2 SERPL-SCNC: 26 MMOL/L (ref 21–32)
CREAT SERPL-MCNC: 2.97 MG/DL (ref 0.55–1.02)
DIFFERENTIAL METHOD BLD: ABNORMAL
EOSINOPHIL # BLD: 1.5 K/UL (ref 0–0.4)
EOSINOPHIL NFR BLD: 14 % (ref 0–7)
ERYTHROCYTE [DISTWIDTH] IN BLOOD BY AUTOMATED COUNT: 12.1 % (ref 11.5–14.5)
GLUCOSE SERPL-MCNC: 88 MG/DL (ref 65–100)
HCT VFR BLD AUTO: 27.4 % (ref 35–47)
HGB BLD-MCNC: 9.3 G/DL (ref 11.5–16)
IMM GRANULOCYTES # BLD AUTO: 0 K/UL (ref 0–0.04)
IMM GRANULOCYTES NFR BLD AUTO: 0 % (ref 0–0.5)
LYMPHOCYTES # BLD: 1.4 K/UL (ref 0.8–3.5)
LYMPHOCYTES NFR BLD: 13 % (ref 12–49)
MCH RBC QN AUTO: 27.9 PG (ref 26–34)
MCHC RBC AUTO-ENTMCNC: 33.9 G/DL (ref 30–36.5)
MCV RBC AUTO: 82.3 FL (ref 80–99)
MONOCYTES # BLD: 0.7 K/UL (ref 0–1)
MONOCYTES NFR BLD: 7 % (ref 5–13)
NEUTS SEG # BLD: 6.7 K/UL (ref 1.8–8)
NEUTS SEG NFR BLD: 65 % (ref 32–75)
NRBC # BLD: 0 K/UL (ref 0–0.01)
NRBC BLD-RTO: 0 PER 100 WBC
PHOSPHATE SERPL-MCNC: 4.6 MG/DL (ref 2.6–4.7)
PLATELET # BLD AUTO: 487 K/UL (ref 150–400)
PMV BLD AUTO: 9.1 FL (ref 8.9–12.9)
POTASSIUM SERPL-SCNC: 3.8 MMOL/L (ref 3.5–5.1)
RBC # BLD AUTO: 3.33 M/UL (ref 3.8–5.2)
RBC MORPH BLD: ABNORMAL
SODIUM SERPL-SCNC: 139 MMOL/L (ref 136–145)
WBC # BLD AUTO: 10.4 K/UL (ref 3.6–11)

## 2023-01-10 PROCEDURE — 74011250636 HC RX REV CODE- 250/636: Performed by: INTERNAL MEDICINE

## 2023-01-10 PROCEDURE — 80069 RENAL FUNCTION PANEL: CPT

## 2023-01-10 PROCEDURE — 85025 COMPLETE CBC W/AUTO DIFF WBC: CPT

## 2023-01-10 PROCEDURE — 36415 COLL VENOUS BLD VENIPUNCTURE: CPT

## 2023-01-10 PROCEDURE — 96372 THER/PROPH/DIAG INJ SC/IM: CPT

## 2023-01-10 RX ORDER — IBANDRONATE SODIUM 150 MG/1
TABLET, FILM COATED ORAL
Qty: 3 TABLET | Refills: 3 | Status: SHIPPED | OUTPATIENT
Start: 2023-01-10

## 2023-01-10 RX ADMIN — EPOETIN ALFA-EPBX 10000 UNITS: 10000 INJECTION, SOLUTION INTRAVENOUS; SUBCUTANEOUS at 10:32

## 2023-01-10 NOTE — DISCHARGE INSTRUCTIONS
OUTPATIENT INFUSION CENTER DISCHARGE INSTRUCTIONS FOR:    PROCRIT INJECTION (EPOGEN/EPOETIN KASANDRA): It is important that your injections be given according to schedule. Your physician may want you to take iron supplements or follow a special diet. You must have lab work drawn regularly while on this medication. All medications can potentially cause side effects. If these side effects should develop, contact your physicians office as needed. Possible side effects of Procrit may include the following:               *    mild headache             *    body aches             *    mild nausea   *    diarrhea   *    increase in blood pressure   *    burning, itching or tenderness at injection site   *    feelings of anxiety or trouble sleeping. Serious side effects or allergic reactions are rare, but may require immediate medical attention. Signs and symptoms may include one or more of the following:       Chest pain or sudden swelling of the hands, ankles or feet. Skin redness, itching, swelling, blistering, weeping, crusting, rash, eruptions, or;  Wheezing, chest tightness, cough, irregular heartbeat or shortness of breath;   Swelling of the face, eyelids, lips, tongue, or throat;   Stuffy nose, runny nose, sneezing, sore throat;   Red (bloodshot), itchy, swollen, or watery eyes;  Stomach pain, nausea, vomiting, severe diarrhea, or bloody stools; Severe or sudden headache, problems with vision, speech or walking;  Numbness or weakness in your arm, leg or on one side of your body; Severe tiredness, lightheadedness, dizziness, fainting or seizures; Change in how much you urinate or painful urination. Please contact your physicians office with any questions or concerns regarding your treatment. I have received the Procrit Medication Guide in its entirety.   Tommie Guerra  Patient/Representatives signature:  ___________________________    1/10/2023   Didi Guillen RN

## 2023-01-10 NOTE — PROGRESS NOTES
[facility] Progress Note  Date: January 10, 2023    Name: Chan JENSEN 100 [employer] Short Visit Note:    4387:  Pt arrived ambulatory and in no distress for Retacrit. Labs drawn via R A/C and processed. Assessment unremarkable with the exception of a fall this past Wednesday. Patient stated she broke her knee. Sores on arms are healing well. Recent Results (from the past 12 hour(s))   CBC WITH AUTOMATED DIFF    Collection Time: 01/10/23 10:04 AM   Result Value Ref Range    WBC 10.4 3.6 - 11.0 K/uL    RBC 3.33 (L) 3.80 - 5.20 M/uL    HGB 9.3 (L) 11.5 - 16.0 g/dL    HCT 27.4 (L) 35.0 - 47.0 %    MCV 82.3 80.0 - 99.0 FL    MCH 27.9 26.0 - 34.0 PG    MCHC 33.9 30.0 - 36.5 g/dL    RDW 12.1 11.5 - 14.5 %    PLATELET 429 (H) 400 - 400 K/uL    MPV 9.1 8.9 - 12.9 FL    NRBC 0.0 0  WBC    ABSOLUTE NRBC 0.00 0.00 - 0.01 K/uL    NEUTROPHILS 65 32 - 75 %    LYMPHOCYTES 13 12 - 49 %    MONOCYTES 7 5 - 13 %    EOSINOPHILS 14 (H) 0 - 7 %    BASOPHILS 1 0 - 1 %    IMMATURE GRANULOCYTES 0 0.0 - 0.5 %    ABS. NEUTROPHILS 6.7 1.8 - 8.0 K/UL    ABS. LYMPHOCYTES 1.4 0.8 - 3.5 K/UL    ABS. MONOCYTES 0.7 0.0 - 1.0 K/UL    ABS. EOSINOPHILS 1.5 (H) 0.0 - 0.4 K/UL    ABS. BASOPHILS 0.1 0.0 - 0.1 K/UL    ABS. IMM.  GRANS. 0.0 0.00 - 0.04 K/UL    DF SMEAR SCANNED      RBC COMMENTS ANISOCYTOSIS  1+       RENAL FUNCTION PANEL    Collection Time: 01/10/23 10:04 AM   Result Value Ref Range    Sodium 139 136 - 145 mmol/L    Potassium 3.8 3.5 - 5.1 mmol/L    Chloride 104 97 - 108 mmol/L    CO2 26 21 - 32 mmol/L    Anion gap 9 5 - 15 mmol/L    Glucose 88 65 - 100 mg/dL    BUN 47 (H) 6 - 20 MG/DL    Creatinine 2.97 (H) 0.55 - 1.02 MG/DL    BUN/Creatinine ratio 16 12 - 20      eGFR 15 (L) >60 ml/min/1.73m2    Calcium 8.5 8.5 - 10.1 MG/DL    Phosphorus 4.6 2.6 - 4.7 MG/DL    Albumin 3.2 (L) 3.5 - 5.0 g/dL      Problem: Knowledge Deficit  Goal: *Verbalizes understanding of procedures and medications  Outcome: Progressing Towards Goal     Problem: Patient Education:  Go to Education Activity  Goal: Patient/Family Education  Outcome: Progressing Towards Goal    Medications Administered       epoetin eros-epbx (RETACRIT) injection 10,000 Units       Admin Date  01/10/2023 Action  Given Dose  10,000 Units Route  SubCUTAneous Administered By  Otf Schafer RN             Received injection in L arm and tolerated well. D/Cd from [facility] ambulatory and in no distress.       Patient Vitals for the past 12 hrs:   Temp Pulse Resp BP SpO2   01/10/23 1020 98.2 °F (36.8 °C) 60 18 (!) 154/51 100 %     Future Appointments   Date Time Provider Elizabeth Tran   1/12/2023 11:15 AM Tiara Campos MD [facility] MAIN BS AMB   2/7/2023 10:00 AM [facility] INFUSION NURSE [address]   3/21/2023 10:15 AM Joelle Townsend MD [facility] MAIN BS AMB     Chiara Lemus RN  January 10, 2023

## 2023-01-12 ENCOUNTER — OFFICE VISIT (OUTPATIENT)
Dept: INTERNAL MEDICINE CLINIC | Age: 88
End: 2023-01-12
Payer: MEDICARE

## 2023-01-12 VITALS
SYSTOLIC BLOOD PRESSURE: 175 MMHG | HEART RATE: 64 BPM | DIASTOLIC BLOOD PRESSURE: 85 MMHG | HEIGHT: 63 IN | RESPIRATION RATE: 18 BRPM | BODY MASS INDEX: 23.25 KG/M2 | OXYGEN SATURATION: 98 % | TEMPERATURE: 98 F | WEIGHT: 131.2 LBS
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
## 2023-01-12 DIAGNOSIS — I10 PRIMARY HYPERTENSION: ICD-10-CM

## 2023-01-12 DIAGNOSIS — E11.21 TYPE 2 DIABETES MELLITUS WITH NEPHROPATHY (HCC): ICD-10-CM

## 2023-01-12 DIAGNOSIS — N18.4 CKD (CHRONIC KIDNEY DISEASE), STAGE IV (HCC): ICD-10-CM

## 2023-01-12 DIAGNOSIS — D58.0 HEREDITARY SPHEROCYTOSIS (HCC): ICD-10-CM

## 2023-01-12 DIAGNOSIS — H91.92 HEARING DEFICIT, LEFT: ICD-10-CM

## 2023-01-12 NOTE — PROGRESS NOTES
Patient returns today following a visit to the emergency room, where she saw Shawna Mixon on 12/31/22 for a right knee injury. The knee hit on the curb. She was walking, tripped and fell into the curb, landing on her right knee. There was no head injury, no loss of consciousness. She was taking OTC medications with some relief. In the emergency room she was evaluated and had an xray taken of the knee that revealed no fractures, a small suprapatellar effusion and chronic changes compatible with osteoarthritis and she was given Voltaren Gel, Hydrocodone 5/325, four tablets, and now comes in for follow up care. Other medical problems include type 2 diabetes, CKD stage 4, hereditary spherocytosis and right breast cancer. Patient returns today with some notes for me. She has had BP readings she takes at home - 126/55 is a bit average.
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
## 2023-01-12 NOTE — PROGRESS NOTES
Since we last saw her, her sister Calin Stewart was in the hospital.  She is at home now. She still has some discomfort from the fall and has an appointment to see orthopedics on Monday. We encouraged her to keep the appointment. Blood sugar control has been excellent. Blood sugars are in a normal range. Hereditary spherocytosis is asymptomatic. She has CKD, followed by nephrology, and gets iron infusions. Blood pressure is up as usual, but at home it is 126/55, which is where it usually runs, and therefore no adjustments will be made. The hearing deficit of the left ear is progressive. She will be back to see me in March, sooner if she has any problems.

## 2023-01-12 NOTE — PROGRESS NOTES
Gissel Gerard is a 82 y.o. female  Chief Complaint   Patient presents with    ED Follow-up     Patient here for ED follow up from fall on 12/28/2022 causing right knee injury. 1. Have you been to the ER, urgent care clinic since your last visit? Hospitalized since your last visit? Yes When: 12/28/2022 Where: [facility] Reason for visit: Fall causing right knee injury. 2. Have you seen or consulted any other health care providers outside of the [address] [place] [address] since your last visit? Include any pap smears or colon screening.  No

## 2023-02-06 RX ORDER — NIFEDIPINE 60 MG/1
TABLET, EXTENDED RELEASE ORAL
Qty: 180 TABLET | Refills: 3 | Status: SHIPPED | OUTPATIENT
Start: 2023-02-06

## 2023-02-06 NOTE — PROGRESS NOTES
Bedside shift change report GIVEN TO Isidro Flannery RN. Report included the following information SBAR and Kardex. SIGNIFICANT CHANGES DURING SHIFT:  None. CONCERNS TO ADDRESS WITH MD:  None. [id number] NURSING NOTE   Admission Date 1/20/2020   Admission Diagnosis Influenza [J11.1]   Consults IP CONSULT TO HOSPITALIST  IP CONSULT TO CARDIOLOGY  IP CONSULT TO INFECTIOUS DISEASES      Cardiac Monitoring [x] Yes [] No      Purposeful Hourly Rounding [x] Yes    Danitza Score Total Score: 2   Danitza score 3 or > [] Bed Alarm [] Avasys [] 1:1 sitter [] Patient refused (Signed refusal form in chart)   Pelon Score Pelon Score: 19   Pelon score 14 or < [] PMT consult [] Wound Care consult    []  Specialty bed  [] Nutrition consult      Influenza Vaccine Received Flu Vaccine for Current Season (usually Sept-March): Yes           Oxygen needs? [x] Room air Oxygen @  []1L    []2L    []3L   []4L    []5L   []6L via  NC   Chronic home O2 use? [] Yes [x] No  Perform O2 challenge test and document in progress note using smartphrase (.Homeoxygen)      Last bowel movement Last Bowel Movement Date: 01/23/20      Urinary Catheter             LDAs               Peripheral IV 01/24/20 Anterior; Left Hand (Active)   Site Assessment Clean, dry, & intact 1/27/2020  8:00 PM   Phlebitis Assessment 0 1/27/2020  8:00 PM   Infiltration Assessment 0 1/27/2020  8:00 PM   Dressing Status Clean, dry, & intact 1/27/2020  8:00 PM   Dressing Type Transparent;Tape 1/27/2020  8:00 PM   Hub Color/Line Status Flushed;Blue 1/27/2020  8:00 PM                         Readmission Risk Assessment Tool Score High Risk            39       Total Score        3 Has Seen PCP in Last 6 Months (Yes=3, No=0)    3 Patient Length of Stay (>5 days = 3)    5 Pt. Coverage (Medicare=5 , Medicaid, or Self-Pay=4)    28 Charlson Comorbidity Score (Age + Comorbid Conditions)        Criteria that do not apply:    . Living with Significant Other. Assisted Living. LTAC. SNF.  or   Rehab    IP Visits Last 12 Months (1-3=4, 4=9, >4=11)       Expected Length of Stay 3d 7h   Actual Length of Stay 7 Azelaic Acid Counseling: Patient counseled that medicine may cause skin irritation and to avoid applying near the eyes.  In the event of skin irritation, the patient was advised to reduce the amount of the drug applied or use it less frequently.   The patient verbalized understanding of the proper use and possible adverse effects of azelaic acid.  All of the patient's questions and concerns were addressed.

## 2023-02-07 ENCOUNTER — HOSPITAL ENCOUNTER (OUTPATIENT)
Dept: INFUSION THERAPY | Age: 88
Discharge: HOME | End: 2023-02-07
Payer: MEDICARE

## 2023-02-07 VITALS
BODY MASS INDEX: 23.38 KG/M2 | RESPIRATION RATE: 18 BRPM | WEIGHT: 132 LBS | SYSTOLIC BLOOD PRESSURE: 166 MMHG | DIASTOLIC BLOOD PRESSURE: 48 MMHG | HEART RATE: 66 BPM | TEMPERATURE: 97.8 F | OXYGEN SATURATION: 100 %

## 2023-02-07 LAB
ALBUMIN SERPL-MCNC: 3.3 G/DL (ref 3.5–5)
ANION GAP SERPL CALC-SCNC: 9 MMOL/L (ref 5–15)
BASOPHILS # BLD: 0.1 K/UL (ref 0–0.1)
BASOPHILS NFR BLD: 1 % (ref 0–1)
BUN SERPL-MCNC: 47 MG/DL (ref 6–20)
BUN/CREAT SERPL: 15 (ref 12–20)
CALCIUM SERPL-MCNC: 8.5 MG/DL (ref 8.5–10.1)
CHLORIDE SERPL-SCNC: 105 MMOL/L (ref 97–108)
CO2 SERPL-SCNC: 27 MMOL/L (ref 21–32)
CREAT SERPL-MCNC: 3.17 MG/DL (ref 0.55–1.02)
DIFFERENTIAL METHOD BLD: ABNORMAL
EOSINOPHIL # BLD: 1.2 K/UL (ref 0–0.4)
EOSINOPHIL NFR BLD: 13 % (ref 0–7)
ERYTHROCYTE [DISTWIDTH] IN BLOOD BY AUTOMATED COUNT: 12.4 % (ref 11.5–14.5)
GLUCOSE SERPL-MCNC: 101 MG/DL (ref 65–100)
HCT VFR BLD AUTO: 30.4 % (ref 35–47)
HGB BLD-MCNC: 9.9 G/DL (ref 11.5–16)
IMM GRANULOCYTES # BLD AUTO: 0 K/UL (ref 0–0.04)
IMM GRANULOCYTES NFR BLD AUTO: 0 % (ref 0–0.5)
IRON SATN MFR SERPL: 36 % (ref 20–50)
IRON SERPL-MCNC: 95 UG/DL (ref 35–150)
LYMPHOCYTES # BLD: 1.7 K/UL (ref 0.8–3.5)
LYMPHOCYTES NFR BLD: 19 % (ref 12–49)
MCH RBC QN AUTO: 26.5 PG (ref 26–34)
MCHC RBC AUTO-ENTMCNC: 32.6 G/DL (ref 30–36.5)
MCV RBC AUTO: 81.3 FL (ref 80–99)
MONOCYTES # BLD: 0.6 K/UL (ref 0–1)
MONOCYTES NFR BLD: 7 % (ref 5–13)
NEUTS SEG # BLD: 5.4 K/UL (ref 1.8–8)
NEUTS SEG NFR BLD: 60 % (ref 32–75)
NRBC # BLD: 0 K/UL (ref 0–0.01)
NRBC BLD-RTO: 0 PER 100 WBC
PHOSPHATE SERPL-MCNC: 4.6 MG/DL (ref 2.6–4.7)
PLATELET # BLD AUTO: 439 K/UL (ref 150–400)
PMV BLD AUTO: 9.5 FL (ref 8.9–12.9)
POTASSIUM SERPL-SCNC: 3.6 MMOL/L (ref 3.5–5.1)
RBC # BLD AUTO: 3.74 M/UL (ref 3.8–5.2)
RBC MORPH BLD: ABNORMAL
SODIUM SERPL-SCNC: 141 MMOL/L (ref 136–145)
TIBC SERPL-MCNC: 265 UG/DL (ref 250–450)
WBC # BLD AUTO: 9 K/UL (ref 3.6–11)

## 2023-02-07 PROCEDURE — 36415 COLL VENOUS BLD VENIPUNCTURE: CPT

## 2023-02-07 PROCEDURE — 96372 THER/PROPH/DIAG INJ SC/IM: CPT

## 2023-02-07 PROCEDURE — 83540 ASSAY OF IRON: CPT

## 2023-02-07 PROCEDURE — 80069 RENAL FUNCTION PANEL: CPT

## 2023-02-07 PROCEDURE — 74011250636 HC RX REV CODE- 250/636: Performed by: INTERNAL MEDICINE

## 2023-02-07 PROCEDURE — 85025 COMPLETE CBC W/AUTO DIFF WBC: CPT

## 2023-02-07 RX ADMIN — EPOETIN ALFA-EPBX 10000 UNITS: 10000 INJECTION, SOLUTION INTRAVENOUS; SUBCUTANEOUS at 10:51

## 2023-02-07 NOTE — PROGRESS NOTES
[facility] Short Note                       Date: 2023    Name: Griselda Silvan    MRN: [id number]         : 1935    Treatment: Retacrit    [facility] COVID-19 SCREENING      The patient was asked the following questions and answered as documented below:    Do you have any symptoms of COVID-19? SOB, coughing, fever, or generally not feeling well? NO  Have you been exposed to COVID-19 recently? NO  Have you had any recent contact with family/friend that has a pending COVID test? NO      Follow Up: Proceed with treatment    Ms. Vangie Reyes was assessed and education was provided. Labs drawn and processed. Problem: Knowledge Deficit  Goal: *Verbalizes understanding of procedures and medications  Outcome: Progressing Towards Goal     Problem: Patient Education:  Go to Education Activity  Goal: Patient/Family Education  Outcome: Progressing Towards Goal        Ms. Ortega vitals were reviewed prior to and after treatment. Patient Vitals for the past 12 hrs:   Temp Pulse Resp BP SpO2   23 1007 97.8 °F (36.6 °C) 66 18 (!) 166/48 100 %         Lab results were obtained and reviewed. Recent Results (from the past 12 hour(s))   CBC WITH AUTOMATED DIFF    Collection Time: 23 10:17 AM   Result Value Ref Range    WBC 9.0 3.6 - 11.0 K/uL    RBC 3.74 (L) 3.80 - 5.20 M/uL    HGB 9.9 (L) 11.5 - 16.0 g/dL    HCT 30.4 (L) 35.0 - 47.0 %    MCV 81.3 80.0 - 99.0 FL    MCH 26.5 26.0 - 34.0 PG    MCHC 32.6 30.0 - 36.5 g/dL    RDW 12.4 11.5 - 14.5 %    PLATELET 847 (H) 963 - 400 K/uL    MPV 9.5 8.9 - 12.9 FL    NRBC 0.0 0  WBC    ABSOLUTE NRBC 0.00 0.00 - 0.01 K/uL    NEUTROPHILS 60 32 - 75 %    LYMPHOCYTES 19 12 - 49 %    MONOCYTES 7 5 - 13 %    EOSINOPHILS 13 (H) 0 - 7 %    BASOPHILS 1 0 - 1 %    IMMATURE GRANULOCYTES 0 0.0 - 0.5 %    ABS. NEUTROPHILS 5.4 1.8 - 8.0 K/UL    ABS. LYMPHOCYTES 1.7 0.8 - 3.5 K/UL    ABS. MONOCYTES 0.6 0.0 - 1.0 K/UL    ABS. EOSINOPHILS 1.2 (H) 0.0 - 0.4 K/UL    ABS. BASOPHILS 0.1 0.0 - 0.1 K/UL    ABS. IMM. GRANS. 0.0 0.00 - 0.04 K/UL    DF SMEAR SCANNED      RBC COMMENTS ANISOCYTOSIS  1+       RENAL FUNCTION PANEL    Collection Time: 02/07/23 10:17 AM   Result Value Ref Range    Sodium 141 136 - 145 mmol/L    Potassium 3.6 3.5 - 5.1 mmol/L    Chloride 105 97 - 108 mmol/L    CO2 27 21 - 32 mmol/L    Anion gap 9 5 - 15 mmol/L    Glucose 101 (H) 65 - 100 mg/dL    BUN 47 (H) 6 - 20 MG/DL    Creatinine 3.17 (H) 0.55 - 1.02 MG/DL    BUN/Creatinine ratio 15 12 - 20      eGFR 14 (L) >60 ml/min/1.73m2    Calcium 8.5 8.5 - 10.1 MG/DL    Phosphorus 4.6 2.6 - 4.7 MG/DL    Albumin 3.3 (L) 3.5 - 5.0 g/dL   IRON PROFILE    Collection Time: 02/07/23 10:17 AM   Result Value Ref Range    Iron 95 35 - 150 ug/dL    TIBC 265 250 - 450 ug/dL    Iron % saturation 36 20 - 50 %       Medications given:  Medications Administered       epoetin eros-epbx (RETACRIT) injection 10,000 Units       Admin Date  02/07/2023 Action  Given Dose  10,000 Units Route  SubCUTAneous Administered By  Gladys Fisher RN                Given in the right arm    Ms. Guerra tolerated the treatment without complaints. Ms. Mari Emery was discharged from [place] [address] in stable condition at 1055.       Patient provided with AVS , which includes future appointment and written educational material.     Future Appointments   Date Time Provider Elizabeth Tran   3/7/2023 10:00  [facility] 1 [address] [facility] [place]   3/21/2023 10:15 AM Rm Townsend MD [facility] MAIN BS AMB   4/4/2023 10:00 AM [facility] INFUSION NURSE [address] [place] [address]       Lyn Garcia RN  February 7, 2023  4:06 PM

## 2023-02-07 NOTE — DISCHARGE INSTRUCTIONS
OUTPATIENT INFUSION CENTER DISCHARGE INSTRUCTIONS FOR:    RETACRIT INJECTION (EPOGEN/EPOETIN KASANDRA): It is important that your injections be given according to schedule. Your physician may want you to take iron supplements or follow a special diet. You must have lab work drawn regularly while on this medication. All medications can potentially cause side effects. If these side effects should develop, contact your physicians office as needed. Possible side effects of Procrit may include the following:               *    mild headache             *    body aches             *    mild nausea   *    diarrhea   *    increase in blood pressure   *    burning, itching or tenderness at injection site   *    feelings of anxiety or trouble sleeping. Serious side effects or allergic reactions are rare, but may require immediate medical attention. Signs and symptoms may include one or more of the following:       Chest pain or sudden swelling of the hands, ankles or feet. Skin redness, itching, swelling, blistering, weeping, crusting, rash, eruptions, or;  Wheezing, chest tightness, cough, irregular heartbeat or shortness of breath;   Swelling of the face, eyelids, lips, tongue, or throat;   Stuffy nose, runny nose, sneezing, sore throat;   Red (bloodshot), itchy, swollen, or watery eyes;  Stomach pain, nausea, vomiting, severe diarrhea, or bloody stools; Severe or sudden headache, problems with vision, speech or walking;  Numbness or weakness in your arm, leg or on one side of your body; Severe tiredness, lightheadedness, dizziness, fainting or seizures; Change in how much you urinate or painful urination. Please contact your physicians office with any questions or concerns regarding your treatment. I have received the Procrit Medication Guide in its entirety.   Venkat Vicente  Patient/Representatives signature:  ___________________________    2/7/2023   Mika Rajan RN

## 2023-03-07 ENCOUNTER — HOSPITAL ENCOUNTER (OUTPATIENT)
Dept: INFUSION THERAPY | Age: 88
Discharge: HOME | End: 2023-03-07
Payer: MEDICARE

## 2023-03-07 VITALS
HEART RATE: 62 BPM | DIASTOLIC BLOOD PRESSURE: 48 MMHG | RESPIRATION RATE: 18 BRPM | SYSTOLIC BLOOD PRESSURE: 152 MMHG | TEMPERATURE: 97.7 F | OXYGEN SATURATION: 100 %

## 2023-03-07 LAB
ALBUMIN SERPL-MCNC: 3.2 G/DL (ref 3.5–5)
ANION GAP SERPL CALC-SCNC: 10 MMOL/L (ref 5–15)
BASOPHILS # BLD: 0.1 K/UL (ref 0–0.1)
BASOPHILS NFR BLD: 1 % (ref 0–1)
BUN SERPL-MCNC: 49 MG/DL (ref 6–20)
BUN/CREAT SERPL: 15 (ref 12–20)
CALCIUM SERPL-MCNC: 8.5 MG/DL (ref 8.5–10.1)
CHLORIDE SERPL-SCNC: 106 MMOL/L (ref 97–108)
CO2 SERPL-SCNC: 27 MMOL/L (ref 21–32)
CREAT SERPL-MCNC: 3.32 MG/DL (ref 0.55–1.02)
DIFFERENTIAL METHOD BLD: ABNORMAL
EOSINOPHIL # BLD: 1.5 K/UL (ref 0–0.4)
EOSINOPHIL NFR BLD: 15 % (ref 0–7)
ERYTHROCYTE [DISTWIDTH] IN BLOOD BY AUTOMATED COUNT: 13 % (ref 11.5–14.5)
GLUCOSE SERPL-MCNC: 93 MG/DL (ref 65–100)
HCT VFR BLD AUTO: 30.3 % (ref 35–47)
HGB BLD-MCNC: 10.1 G/DL (ref 11.5–16)
IMM GRANULOCYTES # BLD AUTO: 0 K/UL (ref 0–0.04)
IMM GRANULOCYTES NFR BLD AUTO: 0 % (ref 0–0.5)
LYMPHOCYTES # BLD: 1.7 K/UL (ref 0.8–3.5)
LYMPHOCYTES NFR BLD: 17 % (ref 12–49)
MCH RBC QN AUTO: 27.2 PG (ref 26–34)
MCHC RBC AUTO-ENTMCNC: 33.3 G/DL (ref 30–36.5)
MCV RBC AUTO: 81.5 FL (ref 80–99)
MONOCYTES # BLD: 0.7 K/UL (ref 0–1)
MONOCYTES NFR BLD: 7 % (ref 5–13)
NEUTS SEG # BLD: 5.9 K/UL (ref 1.8–8)
NEUTS SEG NFR BLD: 60 % (ref 32–75)
NRBC # BLD: 0 K/UL (ref 0–0.01)
NRBC BLD-RTO: 0 PER 100 WBC
PHOSPHATE SERPL-MCNC: 4.7 MG/DL (ref 2.6–4.7)
PLATELET # BLD AUTO: 407 K/UL (ref 150–400)
PMV BLD AUTO: 9.1 FL (ref 8.9–12.9)
POTASSIUM SERPL-SCNC: 3.7 MMOL/L (ref 3.5–5.1)
RBC # BLD AUTO: 3.72 M/UL (ref 3.8–5.2)
RBC MORPH BLD: ABNORMAL
SODIUM SERPL-SCNC: 143 MMOL/L (ref 136–145)
WBC # BLD AUTO: 9.9 K/UL (ref 3.6–11)

## 2023-03-07 PROCEDURE — 36415 COLL VENOUS BLD VENIPUNCTURE: CPT

## 2023-03-07 PROCEDURE — 80069 RENAL FUNCTION PANEL: CPT

## 2023-03-07 PROCEDURE — 85025 COMPLETE CBC W/AUTO DIFF WBC: CPT

## 2023-03-07 NOTE — PROGRESS NOTES
[facility] Progress Note  Date: March 7, 2023    Name: Xenia Nicholson [employer] Short Visit Note:    1000:  Pt arrived ambulatory and in no distress for Retacrit. Labs drawn and processed. Assessment unremarkable with no concerns voiced. Problem: Knowledge Deficit  Goal: *Verbalizes understanding of procedures and medications  Outcome: Progressing Towards Goal     Problem: Patient Education:  Go to Education Activity  Goal: Patient/Family Education  Outcome: [address]    Recent Results (from the past 12 hour(s))   CBC WITH AUTOMATED DIFF    Collection Time: 03/07/23 10:08 AM   Result Value Ref Range    WBC 9.9 3.6 - 11.0 K/uL    RBC 3.72 (L) 3.80 - 5.20 M/uL    HGB 10.1 (L) 11.5 - 16.0 g/dL    HCT 30.3 (L) 35.0 - 47.0 %    MCV 81.5 80.0 - 99.0 FL    MCH 27.2 26.0 - 34.0 PG    MCHC 33.3 30.0 - 36.5 g/dL    RDW 13.0 11.5 - 14.5 %    PLATELET 851 (H) 765 - 400 K/uL    MPV 9.1 8.9 - 12.9 FL    NRBC 0.0 0  WBC    ABSOLUTE NRBC 0.00 0.00 - 0.01 K/uL    NEUTROPHILS 60 32 - 75 %    LYMPHOCYTES 17 12 - 49 %    MONOCYTES 7 5 - 13 %    EOSINOPHILS 15 (H) 0 - 7 %    BASOPHILS 1 0 - 1 %    IMMATURE GRANULOCYTES 0 0.0 - 0.5 %    ABS. NEUTROPHILS 5.9 1.8 - 8.0 K/UL    ABS. LYMPHOCYTES 1.7 0.8 - 3.5 K/UL    ABS. MONOCYTES 0.7 0.0 - 1.0 K/UL    ABS. EOSINOPHILS 1.5 (H) 0.0 - 0.4 K/UL    ABS. BASOPHILS 0.1 0.0 - 0.1 K/UL    ABS. IMM.  GRANS. 0.0 0.00 - 0.04 K/UL    DF SMEAR SCANNED      RBC COMMENTS NORMOCYTIC, NORMOCHROMIC     RENAL FUNCTION PANEL    Collection Time: 03/07/23 10:08 AM   Result Value Ref Range    Sodium 143 136 - 145 mmol/L    Potassium 3.7 3.5 - 5.1 mmol/L    Chloride 106 97 - 108 mmol/L    CO2 27 21 - 32 mmol/L    Anion gap 10 5 - 15 mmol/L    Glucose 93 65 - 100 mg/dL    BUN 49 (H) 6 - 20 MG/DL    Creatinine 3.32 (H) 0.55 - 1.02 MG/DL    BUN/Creatinine ratio 15 12 - 20      eGFR 13 (L) >60 ml/min/1.73m2    Calcium 8.5 8.5 - 10.1 MG/DL    Phosphorus 4.7 2.6 - 4.7 MG/DL    Albumin 3.2 (L) 3.5 - 5.0 g/dL      Hgb outside treatment parameters. Patient will NOT receive Retacrit. D/Cd from [facility] ambulatory and in no distress.       Patient Vitals for the past 12 hrs:   Temp Pulse Resp BP SpO2   03/07/23 1001 97.7 °F (36.5 °C) 62 18 (!) 152/48 100 %     Future Appointments   Date Time Provider Elizabeth Tran   3/21/2023 10:15 AM Janise Gilford, MD [facility] MAIN BS AMB   4/4/2023 10:00 AM [facility] INFUSION NURSE 1 [facility]   5/4/2023 10:00 AM [facility]   6/5/2023 10:00 AM [facility] INFUSION NURSE 1 [facility]     Hemant Clifton RN  March 7, 2023

## 2023-03-21 ENCOUNTER — OFFICE VISIT (OUTPATIENT)
Dept: INTERNAL MEDICINE CLINIC | Age: 88
End: 2023-03-21
Payer: MEDICARE

## 2023-03-21 VITALS
HEART RATE: 63 BPM | TEMPERATURE: 98.1 F | WEIGHT: 128.7 LBS | RESPIRATION RATE: 20 BRPM | DIASTOLIC BLOOD PRESSURE: 53 MMHG | HEIGHT: 63 IN | BODY MASS INDEX: 22.8 KG/M2 | OXYGEN SATURATION: 100 % | SYSTOLIC BLOOD PRESSURE: 136 MMHG

## 2023-03-21 DIAGNOSIS — N18.4 CKD (CHRONIC KIDNEY DISEASE), STAGE IV (HCC): ICD-10-CM

## 2023-03-21 DIAGNOSIS — M81.0 AGE-RELATED OSTEOPOROSIS WITHOUT CURRENT PATHOLOGICAL FRACTURE: ICD-10-CM

## 2023-03-21 DIAGNOSIS — E11.21 TYPE 2 DIABETES MELLITUS WITH NEPHROPATHY (HCC): ICD-10-CM

## 2023-03-21 DIAGNOSIS — E78.5 DYSLIPIDEMIA: ICD-10-CM

## 2023-03-21 DIAGNOSIS — I10 PRIMARY HYPERTENSION: ICD-10-CM

## 2023-03-21 DIAGNOSIS — Z85.3 HISTORY OF RIGHT BREAST CANCER: ICD-10-CM

## 2023-03-21 DIAGNOSIS — Z13.39 SCREENING FOR ALCOHOLISM: ICD-10-CM

## 2023-03-21 DIAGNOSIS — Z00.00 MEDICARE ANNUAL WELLNESS VISIT, SUBSEQUENT: Primary | ICD-10-CM

## 2023-03-21 PROBLEM — J11.1 INFLUENZA: Status: RESOLVED | Noted: 2020-01-20 | Resolved: 2023-03-21

## 2023-03-21 PROBLEM — Z92.89 HISTORY OF NUCLEAR STRESS TEST: Status: RESOLVED | Noted: 2021-01-22 | Resolved: 2023-03-21

## 2023-03-21 PROBLEM — J18.9 CAP (COMMUNITY ACQUIRED PNEUMONIA): Status: RESOLVED | Noted: 2021-12-05 | Resolved: 2023-03-21

## 2023-03-21 PROBLEM — B95.5 STREPTOCOCCAL BACTEREMIA: Status: RESOLVED | Noted: 2020-01-25 | Resolved: 2023-03-21

## 2023-03-21 PROBLEM — R78.81 STREPTOCOCCAL BACTEREMIA: Status: RESOLVED | Noted: 2020-01-25 | Resolved: 2023-03-21

## 2023-03-21 PROBLEM — J18.9 PNEUMONIA: Status: RESOLVED | Noted: 2020-01-25 | Resolved: 2023-03-21
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
## 2023-03-21 PROCEDURE — G8420 CALC BMI NORM PARAMETERS: HCPCS | Performed by: INTERNAL MEDICINE

## 2023-03-21 PROCEDURE — 3078F DIAST BP <80 MM HG: CPT | Performed by: INTERNAL MEDICINE

## 2023-03-21 PROCEDURE — G8427 DOCREV CUR MEDS BY ELIG CLIN: HCPCS | Performed by: INTERNAL MEDICINE

## 2023-03-21 PROCEDURE — G8510 SCR DEP NEG, NO PLAN REQD: HCPCS | Performed by: INTERNAL MEDICINE

## 2023-03-21 PROCEDURE — G0439 PPPS, SUBSEQ VISIT: HCPCS | Performed by: INTERNAL MEDICINE

## 2023-03-21 PROCEDURE — 1101F PT FALLS ASSESS-DOCD LE1/YR: CPT | Performed by: INTERNAL MEDICINE

## 2023-03-21 PROCEDURE — G8536 NO DOC ELDER MAL SCRN: HCPCS | Performed by: INTERNAL MEDICINE

## 2023-03-21 PROCEDURE — 1090F PRES/ABSN URINE INCON ASSESS: CPT | Performed by: INTERNAL MEDICINE

## 2023-03-21 PROCEDURE — 99213 OFFICE O/P EST LOW 20 MIN: CPT | Performed by: INTERNAL MEDICINE

## 2023-03-21 PROCEDURE — 1123F ACP DISCUSS/DSCN MKR DOCD: CPT | Performed by: INTERNAL MEDICINE

## 2023-03-21 PROCEDURE — 3075F SYST BP GE 130 - 139MM HG: CPT | Performed by: INTERNAL MEDICINE

## 2023-03-21 RX ORDER — LAMOTRIGINE 25 MG/1
TABLET ORAL
Qty: 180 TABLET | Refills: 3 | Status: SHIPPED | OUTPATIENT
Start: 2023-03-21

## 2023-03-21 RX ORDER — LAMOTRIGINE 25 MG/1
TABLET ORAL
Qty: 180 TABLET | Refills: 3 | Status: SHIPPED | OUTPATIENT
Start: 2023-03-21 | End: 2023-03-21 | Stop reason: SDUPTHER
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
## 2023-03-21 NOTE — PATIENT INSTRUCTIONS
Medicare Wellness Visit, Female     The best way to live healthy is to have a lifestyle where you eat a well-balanced diet, exercise regularly, limit alcohol use, and quit all forms of tobacco/nicotine, if applicable. Regular preventive services are another way to keep healthy. Preventive services (vaccines, screening tests, monitoring & exams) can help personalize your care plan, which helps you manage your own care. Screening tests can find health problems at the earliest stages, when they are easiest to treat. [facility] follows the current, evidence-based guidelines published by the [facility] ([facility]) when recommending preventive services for our patients. Because we follow these guidelines, sometimes recommendations change over time as research supports it. (For example, mammograms used to be recommended annually. Even though Medicare will still pay for an annual mammogram, the newer guidelines recommend a mammogram every two years for women of average risk). Of course, you and your doctor may decide to screen more often for some diseases, based on your risk and your co-morbidities (chronic disease you are already diagnosed with). Preventive services for you include:  - Medicare offers their members a free annual wellness visit, which is time for you and your primary care provider to discuss and plan for your preventive service needs.  Take advantage of this benefit every year!    -Over the age of 72 should receive the recommended pneumonia vaccines.    -All adults should have a flu vaccine yearly.  -All adults should have a tetanus vaccine every 10 years.   -Over the age 48 should receive the shingles vaccines.        -All adults should be screened once for Hepatitis C.  -All adults age 38-68 who are overweight should have a diabetes screening test once every three years.   -Other screening tests and preventive services for persons with diabetes include: an eye exam to screen for diabetic retinopathy, a kidney function test, a foot exam, and stricter control over your cholesterol.   -Cardiovascular screening for adults with routine risk involves an electrocardiogram (ECG) at intervals determined by your doctor.     -Colorectal cancer screenings should be done for adults age 39-70 with no increased risk factors for colorectal cancer. There are a number of acceptable methods of screening for this type of cancer. Each test has its own benefits and drawbacks. Discuss with your doctor what is most appropriate for you during your annual wellness visit. The different tests include: colonoscopy (considered the best screening method), a fecal occult blood test, a fecal DNA test, and sigmoidoscopy.    -Lung cancer screening is recommended annually with a low dose CT scan for adults between age 54 and 68, who have smoked at least 30 pack years (equivalent of 1 pack per day for 30 days), and who is a current smoker or quit less than 15 years ago.    -A bone mass density test is recommended when a woman turns 65 to screen for osteoporosis. This test is only recommended one time, as a screening. Some providers will use this same test as a disease monitoring tool if you already have osteoporosis. -Breast cancer screenings are recommended every other year for women of normal risk, age 54-69.    -Cervical cancer screenings for women over age 72 are only recommended with certain risk factors.      Here is a list of your current Health Maintenance items (your personalized list of preventive services) with a due date:  Health Maintenance Due   Topic Date Due    Eye Exam  11/16/2019    COVID-19 Vaccine (4 - Booster for Moderna series) 01/05/2022    Cholesterol Test   10/20/2022

## 2023-03-22 LAB
25(OH)D3 SERPL-MCNC: 19.7 NG/ML (ref 30–100)
CHOLEST SERPL-MCNC: 145 MG/DL
EST. AVERAGE GLUCOSE BLD GHB EST-MCNC: 94 MG/DL
HBA1C MFR BLD: 4.9 % (ref 4–5.6)
HDLC SERPL-MCNC: 104 MG/DL
HDLC SERPL: 1.4 (ref 0–5)
LDLC SERPL CALC-MCNC: 34.8 MG/DL (ref 0–100)
TRIGL SERPL-MCNC: 31 MG/DL (ref ?–150)
VLDLC SERPL CALC-MCNC: 6.2 MG/DL

## 2023-04-04 ENCOUNTER — HOSPITAL ENCOUNTER (OUTPATIENT)
Dept: INFUSION THERAPY | Age: 88
End: 2023-04-04
Payer: MEDICARE

## 2023-04-04 LAB
ALBUMIN SERPL-MCNC: 3.1 G/DL (ref 3.5–5)
ANION GAP SERPL CALC-SCNC: 9 MMOL/L (ref 5–15)
BASOPHILS # BLD: 0.1 K/UL (ref 0–0.1)
BASOPHILS NFR BLD: 1 % (ref 0–1)
BUN SERPL-MCNC: 48 MG/DL (ref 6–20)
BUN/CREAT SERPL: 15 (ref 12–20)
CALCIUM SERPL-MCNC: 8.5 MG/DL (ref 8.5–10.1)
CHLORIDE SERPL-SCNC: 105 MMOL/L (ref 97–108)
CO2 SERPL-SCNC: 28 MMOL/L (ref 21–32)
CREAT SERPL-MCNC: 3.27 MG/DL (ref 0.55–1.02)
DIFFERENTIAL METHOD BLD: ABNORMAL
EOSINOPHIL # BLD: 1.5 K/UL (ref 0–0.4)
EOSINOPHIL NFR BLD: 14 % (ref 0–7)
ERYTHROCYTE [DISTWIDTH] IN BLOOD BY AUTOMATED COUNT: 12.7 % (ref 11.5–14.5)
GLUCOSE SERPL-MCNC: 86 MG/DL (ref 65–100)
HCT VFR BLD AUTO: 27.7 % (ref 35–47)
HGB BLD-MCNC: 9.5 G/DL (ref 11.5–16)
IMM GRANULOCYTES # BLD AUTO: 0 K/UL (ref 0–0.04)
IMM GRANULOCYTES NFR BLD AUTO: 0 % (ref 0–0.5)
IRON SATN MFR SERPL: 24 % (ref 20–50)
IRON SERPL-MCNC: 59 UG/DL (ref 35–150)
LYMPHOCYTES # BLD: 1.3 K/UL (ref 0.8–3.5)
LYMPHOCYTES NFR BLD: 12 % (ref 12–49)
MCH RBC QN AUTO: 27.9 PG (ref 26–34)
MCHC RBC AUTO-ENTMCNC: 34.3 G/DL (ref 30–36.5)
MCV RBC AUTO: 81.2 FL (ref 80–99)
MONOCYTES # BLD: 0.8 K/UL (ref 0–1)
MONOCYTES NFR BLD: 7 % (ref 5–13)
NEUTS SEG # BLD: 7.3 K/UL (ref 1.8–8)
NEUTS SEG NFR BLD: 66 % (ref 32–75)
NRBC # BLD: 0 K/UL (ref 0–0.01)
NRBC BLD-RTO: 0 PER 100 WBC
PHOSPHATE SERPL-MCNC: 4.1 MG/DL (ref 2.6–4.7)
PLATELET # BLD AUTO: 422 K/UL (ref 150–400)
PMV BLD AUTO: 9.2 FL (ref 8.9–12.9)
POTASSIUM SERPL-SCNC: 3.5 MMOL/L (ref 3.5–5.1)
RBC # BLD AUTO: 3.41 M/UL (ref 3.8–5.2)
RBC MORPH BLD: ABNORMAL
SODIUM SERPL-SCNC: 142 MMOL/L (ref 136–145)
TIBC SERPL-MCNC: 249 UG/DL (ref 250–450)
WBC # BLD AUTO: 11 K/UL (ref 3.6–11)

## 2023-04-04 PROCEDURE — 80069 RENAL FUNCTION PANEL: CPT

## 2023-04-04 PROCEDURE — 83540 ASSAY OF IRON: CPT

## 2023-04-04 PROCEDURE — 96372 THER/PROPH/DIAG INJ SC/IM: CPT

## 2023-04-04 PROCEDURE — 85025 COMPLETE CBC W/AUTO DIFF WBC: CPT

## 2023-04-04 PROCEDURE — 36415 COLL VENOUS BLD VENIPUNCTURE: CPT

## 2023-04-04 PROCEDURE — 74011250636 HC RX REV CODE- 250/636: Performed by: INTERNAL MEDICINE

## 2023-04-04 RX ADMIN — EPOETIN ALFA-EPBX 10000 UNITS: 10000 INJECTION, SOLUTION INTRAVENOUS; SUBCUTANEOUS at 10:40

## 2023-04-04 NOTE — DISCHARGE INSTRUCTIONS
OUTPATIENT INFUSION CENTER DISCHARGE INSTRUCTIONS FOR:    PROCRIT INJECTION (EPOGEN/EPOETIN KASANDRA): It is important that your injections be given according to schedule. Your physician may want you to take iron supplements or follow a special diet. You must have lab work drawn regularly while on this medication. All medications can potentially cause side effects. If these side effects should develop, contact your physicians office as needed. Possible side effects of Procrit may include the following:               *    mild headache             *    body aches             *    mild nausea   *    diarrhea   *    increase in blood pressure   *    burning, itching or tenderness at injection site   *    feelings of anxiety or trouble sleeping. Serious side effects or allergic reactions are rare, but may require immediate medical attention. Signs and symptoms may include one or more of the following:       Chest pain or sudden swelling of the hands, ankles or feet. Skin redness, itching, swelling, blistering, weeping, crusting, rash, eruptions, or;  Wheezing, chest tightness, cough, irregular heartbeat or shortness of breath;   Swelling of the face, eyelids, lips, tongue, or throat;   Stuffy nose, runny nose, sneezing, sore throat;   Red (bloodshot), itchy, swollen, or watery eyes;  Stomach pain, nausea, vomiting, severe diarrhea, or bloody stools; Severe or sudden headache, problems with vision, speech or walking;  Numbness or weakness in your arm, leg or on one side of your body; Severe tiredness, lightheadedness, dizziness, fainting or seizures; Change in how much you urinate or painful urination. Please contact your physicians office with any questions or concerns regarding your treatment. I have received the Procrit Medication Guide in its entirety.   Ortega Guerra  Patient/Representatives signature:  ___________________________    4/4/2023   Baljit Mccallum RN

## 2023-04-04 NOTE — PROGRESS NOTES
[facility] Progress Note  Date: April 4, 2023    Name: Colleen Suresh [facility] [address] Short Visit Note:    1000:  Pt arrived ambulatory and in no distress for Retacrit. Labs drawn from R A/C and processed. Assessment unremarkable with the exception of skin discoloration from scarring, scabs and open areas where scabs have been scratched. Patient has also had diarrhea since yesterday. No other concerns voiced. Problem: Knowledge Deficit  Goal: *Verbalizes understanding of procedures and medications  Outcome: Progressing Towards Goal     Problem: Patient Education:  Go to Education Activity  Goal: Patient/Family Education  Outcome: Progressing Towards Goal    Recent Results (from the past 12 hour(s))   CBC WITH AUTOMATED DIFF    Collection Time: 04/04/23 10:14 AM   Result Value Ref Range    WBC 11.0 3.6 - 11.0 K/uL    RBC 3.41 (L) 3.80 - 5.20 M/uL    HGB 9.5 (L) 11.5 - 16.0 g/dL    HCT 27.7 (L) 35.0 - 47.0 %    MCV 81.2 80.0 - 99.0 FL    MCH 27.9 26.0 - 34.0 PG    MCHC 34.3 30.0 - 36.5 g/dL    RDW 12.7 11.5 - 14.5 %    PLATELET 441 (H) 512 - 400 K/uL    MPV 9.2 8.9 - 12.9 FL    NRBC 0.0 0  WBC    ABSOLUTE NRBC 0.00 0.00 - 0.01 K/uL    NEUTROPHILS 66 32 - 75 %    LYMPHOCYTES 12 12 - 49 %    MONOCYTES 7 5 - 13 %    EOSINOPHILS 14 (H) 0 - 7 %    BASOPHILS 1 0 - 1 %    IMMATURE GRANULOCYTES 0 0.0 - 0.5 %    ABS. NEUTROPHILS 7.3 1.8 - 8.0 K/UL    ABS. LYMPHOCYTES 1.3 0.8 - 3.5 K/UL    ABS. MONOCYTES 0.8 0.0 - 1.0 K/UL    ABS. EOSINOPHILS 1.5 (H) 0.0 - 0.4 K/UL    ABS. BASOPHILS 0.1 0.0 - 0.1 K/UL    ABS. IMM.  GRANS. 0.0 0.00 - 0.04 K/UL    DF SMEAR SCANNED      RBC COMMENTS NORMOCYTIC, NORMOCHROMIC     RENAL FUNCTION PANEL    Collection Time: 04/04/23 10:14 AM   Result Value Ref Range    Sodium 142 136 - 145 mmol/L    Potassium 3.5 3.5 - 5.1 mmol/L    Chloride 105 97 - 108 mmol/L    CO2 28 21 - 32 mmol/L    Anion gap 9 5 - 15 mmol/L    Glucose 86 65 - 100 mg/dL    BUN 48 (H) 6 - 20 MG/DL    Creatinine 3.27 (H) 0.55 - 1.02 MG/DL    BUN/Creatinine ratio 15 12 - 20      eGFR 13 (L) >60 ml/min/1.73m2    Calcium 8.5 8.5 - 10.1 MG/DL    Phosphorus 4.1 2.6 - 4.7 MG/DL    Albumin 3.1 (L) 3.5 - 5.0 g/dL   IRON PROFILE    Collection Time: 04/04/23 10:14 AM   Result Value Ref Range    Iron 59 35 - 150 ug/dL    TIBC 249 (L) 250 - 450 ug/dL    Iron % saturation 24 20 - 50 %      Received injection in R arm and tolerated well. D/Cd from [facility] ambulatory and in no distress.       Patient Vitals for the past 12 hrs:   Temp Pulse Resp BP SpO2   04/04/23 1007 98.4 °F (36.9 °C) (!) 58 16 (!) 152/47 100 %     Future Appointments   Date Time Provider Elizabeth Tran   4/21/2023 11:00 AM GISELLE DEXA 1 STACIA MONTALVO LIZETTE   5/4/2023 10:00 AM [facility] INFUSION NURSE 1 Amrik HARDING [employer]   6/5/2023 10:00 AM [facility] INFUSION NURSE 1 Amrik HARDING [employer]   7/25/2023 10:45 AM Kathi Townsend MD [facility] MAIN BS PABLO Abrams, STEPHANIE  April 4, 2023

## 2023-04-18 RX ORDER — HYDROCHLOROTHIAZIDE 12.5 MG/1
TABLET ORAL
Qty: 90 TABLET | Refills: 3 | Status: SHIPPED | OUTPATIENT
Start: 2023-04-18

## 2023-04-21 ENCOUNTER — HOSPITAL ENCOUNTER (OUTPATIENT)
Dept: BONE DENSITY | Age: 88
Discharge: HOME | End: 2023-04-21
Attending: INTERNAL MEDICINE
Payer: MEDICARE

## 2023-04-21 DIAGNOSIS — M81.0 AGE-RELATED OSTEOPOROSIS WITHOUT CURRENT PATHOLOGICAL FRACTURE: ICD-10-CM

## 2023-04-21 PROCEDURE — 77080 DXA BONE DENSITY AXIAL: CPT

## 2023-04-23 DIAGNOSIS — M81.0 AGE-RELATED OSTEOPOROSIS WITHOUT CURRENT PATHOLOGICAL FRACTURE: Primary | ICD-10-CM

## 2023-05-04 ENCOUNTER — HOSPITAL ENCOUNTER (OUTPATIENT)
Dept: INFUSION THERAPY | Age: 88
Discharge: HOME | End: 2023-05-04
Payer: MEDICARE

## 2023-05-04 VITALS
OXYGEN SATURATION: 100 % | RESPIRATION RATE: 16 BRPM | DIASTOLIC BLOOD PRESSURE: 56 MMHG | SYSTOLIC BLOOD PRESSURE: 159 MMHG | HEART RATE: 66 BPM | TEMPERATURE: 98 F

## 2023-05-04 LAB
ALBUMIN SERPL-MCNC: 3.1 G/DL (ref 3.5–5)
ANION GAP SERPL CALC-SCNC: 9 MMOL/L (ref 5–15)
BASOPHILS # BLD: 0.2 K/UL (ref 0–0.1)
BASOPHILS NFR BLD: 2 % (ref 0–1)
BUN SERPL-MCNC: 53 MG/DL (ref 6–20)
BUN/CREAT SERPL: 15 (ref 12–20)
CALCIUM SERPL-MCNC: 8.4 MG/DL (ref 8.5–10.1)
CHLORIDE SERPL-SCNC: 102 MMOL/L (ref 97–108)
CO2 SERPL-SCNC: 27 MMOL/L (ref 21–32)
CREAT SERPL-MCNC: 3.61 MG/DL (ref 0.55–1.02)
DIFFERENTIAL METHOD BLD: ABNORMAL
EOSINOPHIL # BLD: 1.5 K/UL (ref 0–0.4)
EOSINOPHIL NFR BLD: 12 % (ref 0–7)
ERYTHROCYTE [DISTWIDTH] IN BLOOD BY AUTOMATED COUNT: 13.2 % (ref 11.5–14.5)
GLUCOSE SERPL-MCNC: 117 MG/DL (ref 65–100)
HCT VFR BLD AUTO: 27.3 % (ref 35–47)
HGB BLD-MCNC: 8.9 G/DL (ref 11.5–16)
IMM GRANULOCYTES # BLD AUTO: 0 K/UL (ref 0–0.04)
IMM GRANULOCYTES NFR BLD AUTO: 0 % (ref 0–0.5)
LYMPHOCYTES # BLD: 1.5 K/UL (ref 0.8–3.5)
LYMPHOCYTES NFR BLD: 12 % (ref 12–49)
MCH RBC QN AUTO: 27.4 PG (ref 26–34)
MCHC RBC AUTO-ENTMCNC: 32.6 G/DL (ref 30–36.5)
MCV RBC AUTO: 84 FL (ref 80–99)
MONOCYTES # BLD: 1.1 K/UL (ref 0–1)
MONOCYTES NFR BLD: 9 % (ref 5–13)
NEUTS SEG # BLD: 7.8 K/UL (ref 1.8–8)
NEUTS SEG NFR BLD: 65 % (ref 32–75)
NRBC # BLD: 0 K/UL (ref 0–0.01)
NRBC BLD-RTO: 0 PER 100 WBC
PHOSPHATE SERPL-MCNC: 5 MG/DL (ref 2.6–4.7)
PLATELET # BLD AUTO: 435 K/UL (ref 150–400)
PMV BLD AUTO: 9.6 FL (ref 8.9–12.9)
POTASSIUM SERPL-SCNC: 3.6 MMOL/L (ref 3.5–5.1)
RBC # BLD AUTO: 3.25 M/UL (ref 3.8–5.2)
RBC MORPH BLD: ABNORMAL
SODIUM SERPL-SCNC: 138 MMOL/L (ref 136–145)
WBC # BLD AUTO: 12.1 K/UL (ref 3.6–11)

## 2023-05-04 PROCEDURE — 96372 THER/PROPH/DIAG INJ SC/IM: CPT

## 2023-05-04 PROCEDURE — 74011250636 HC RX REV CODE- 250/636: Performed by: INTERNAL MEDICINE

## 2023-05-04 PROCEDURE — 80069 RENAL FUNCTION PANEL: CPT

## 2023-05-04 PROCEDURE — 36415 COLL VENOUS BLD VENIPUNCTURE: CPT

## 2023-05-04 PROCEDURE — 85025 COMPLETE CBC W/AUTO DIFF WBC: CPT

## 2023-05-04 RX ADMIN — EPOETIN ALFA-EPBX 10000 UNITS: 10000 INJECTION, SOLUTION INTRAVENOUS; SUBCUTANEOUS at 11:15

## 2023-05-09 ENCOUNTER — TRANSCRIBE ORDERS (OUTPATIENT)
Facility: HOSPITAL | Age: 88
End: 2023-05-09

## 2023-05-09 DIAGNOSIS — Z12.31 ENCOUNTER FOR SCREENING MAMMOGRAM FOR BREAST CANCER: Primary | ICD-10-CM

## 2023-05-26 ENCOUNTER — HOSPITAL ENCOUNTER (OUTPATIENT)
Facility: HOSPITAL | Age: 88
End: 2023-05-26
Payer: MEDICARE

## 2023-05-26 DIAGNOSIS — Z12.31 ENCOUNTER FOR SCREENING MAMMOGRAM FOR BREAST CANCER: ICD-10-CM

## 2023-05-26 PROBLEM — N18.9 ANEMIA OF CHRONIC RENAL FAILURE: Status: ACTIVE | Noted: 2023-05-26

## 2023-05-26 PROBLEM — D63.1 ANEMIA OF CHRONIC RENAL FAILURE: Status: ACTIVE | Noted: 2023-05-26

## 2023-05-26 PROCEDURE — 77063 BREAST TOMOSYNTHESIS BI: CPT

## 2023-06-05 ENCOUNTER — HOSPITAL ENCOUNTER (OUTPATIENT)
Facility: HOSPITAL | Age: 88
Setting detail: INFUSION SERIES
End: 2023-06-05
Payer: MEDICARE

## 2023-06-05 VITALS
HEART RATE: 69 BPM | DIASTOLIC BLOOD PRESSURE: 52 MMHG | TEMPERATURE: 97.9 F | SYSTOLIC BLOOD PRESSURE: 148 MMHG | RESPIRATION RATE: 18 BRPM | BODY MASS INDEX: 22.45 KG/M2 | WEIGHT: 126.7 LBS | HEIGHT: 63 IN | OXYGEN SATURATION: 98 %

## 2023-06-05 DIAGNOSIS — N18.9 ANEMIA OF CHRONIC RENAL FAILURE, UNSPECIFIED CKD STAGE: Primary | ICD-10-CM

## 2023-06-05 DIAGNOSIS — D63.1 ANEMIA OF CHRONIC RENAL FAILURE, UNSPECIFIED CKD STAGE: Primary | ICD-10-CM

## 2023-06-05 LAB
ALBUMIN SERPL-MCNC: 3.2 G/DL (ref 3.5–5)
ANION GAP SERPL CALC-SCNC: 10 MMOL/L (ref 5–15)
BASOPHILS # BLD: 0.1 K/UL (ref 0–0.1)
BASOPHILS NFR BLD: 1 % (ref 0–1)
BUN SERPL-MCNC: 56 MG/DL (ref 6–20)
BUN/CREAT SERPL: 15 (ref 12–20)
CALCIUM SERPL-MCNC: 8.2 MG/DL (ref 8.5–10.1)
CHLORIDE SERPL-SCNC: 105 MMOL/L (ref 97–108)
CO2 SERPL-SCNC: 26 MMOL/L (ref 21–32)
CREAT SERPL-MCNC: 3.83 MG/DL (ref 0.55–1.02)
DIFFERENTIAL METHOD BLD: ABNORMAL
EOSINOPHIL # BLD: 1 K/UL (ref 0–0.4)
EOSINOPHIL NFR BLD: 9 % (ref 0–7)
ERYTHROCYTE [DISTWIDTH] IN BLOOD BY AUTOMATED COUNT: 13 % (ref 11.5–14.5)
GLUCOSE SERPL-MCNC: 126 MG/DL (ref 65–100)
HCT VFR BLD AUTO: 25.9 % (ref 35–47)
HGB BLD-MCNC: 8.7 G/DL (ref 11.5–16)
IMM GRANULOCYTES # BLD AUTO: 0 K/UL (ref 0–0.04)
IMM GRANULOCYTES NFR BLD AUTO: 0 % (ref 0–0.5)
IRON SATN MFR SERPL: 29 % (ref 20–50)
IRON SERPL-MCNC: 73 UG/DL (ref 35–150)
LYMPHOCYTES # BLD: 1.4 K/UL (ref 0.8–3.5)
LYMPHOCYTES NFR BLD: 12 % (ref 12–49)
MCH RBC QN AUTO: 27.9 PG (ref 26–34)
MCHC RBC AUTO-ENTMCNC: 33.6 G/DL (ref 30–36.5)
MCV RBC AUTO: 83 FL (ref 80–99)
MONOCYTES # BLD: 0.7 K/UL (ref 0–1)
MONOCYTES NFR BLD: 6 % (ref 5–13)
NEUTS SEG # BLD: 8.3 K/UL (ref 1.8–8)
NEUTS SEG NFR BLD: 72 % (ref 32–75)
NRBC # BLD: 0 K/UL (ref 0–0.01)
NRBC BLD-RTO: 0 PER 100 WBC
PHOSPHATE SERPL-MCNC: 4.9 MG/DL (ref 2.6–4.7)
PLATELET # BLD AUTO: 426 K/UL (ref 150–400)
PMV BLD AUTO: 9.1 FL (ref 8.9–12.9)
POTASSIUM SERPL-SCNC: 3.6 MMOL/L (ref 3.5–5.1)
RBC # BLD AUTO: 3.12 M/UL (ref 3.8–5.2)
RBC MORPH BLD: ABNORMAL
SODIUM SERPL-SCNC: 141 MMOL/L (ref 136–145)
TIBC SERPL-MCNC: 251 UG/DL (ref 250–450)
WBC # BLD AUTO: 11.5 K/UL (ref 3.6–11)

## 2023-06-05 PROCEDURE — 83550 IRON BINDING TEST: CPT

## 2023-06-05 PROCEDURE — 85025 COMPLETE CBC W/AUTO DIFF WBC: CPT

## 2023-06-05 PROCEDURE — 83540 ASSAY OF IRON: CPT

## 2023-06-05 PROCEDURE — 36415 COLL VENOUS BLD VENIPUNCTURE: CPT

## 2023-06-05 PROCEDURE — 6360000002 HC RX W HCPCS: Performed by: INTERNAL MEDICINE

## 2023-06-05 PROCEDURE — 80069 RENAL FUNCTION PANEL: CPT

## 2023-06-05 PROCEDURE — 96372 THER/PROPH/DIAG INJ SC/IM: CPT

## 2023-06-05 RX ORDER — ONDANSETRON 2 MG/ML
8 INJECTION INTRAMUSCULAR; INTRAVENOUS
Status: CANCELLED | OUTPATIENT
Start: 2023-06-12

## 2023-06-05 RX ORDER — ACETAMINOPHEN 325 MG/1
650 TABLET ORAL
Status: CANCELLED | OUTPATIENT
Start: 2023-06-12

## 2023-06-05 RX ORDER — EPINEPHRINE 1 MG/ML
0.3 INJECTION, SOLUTION, CONCENTRATE INTRAVENOUS PRN
Status: CANCELLED | OUTPATIENT
Start: 2023-06-12

## 2023-06-05 RX ORDER — DIPHENHYDRAMINE HYDROCHLORIDE 50 MG/ML
50 INJECTION INTRAMUSCULAR; INTRAVENOUS
Status: CANCELLED | OUTPATIENT
Start: 2023-06-12

## 2023-06-05 RX ORDER — ALBUTEROL SULFATE 90 UG/1
4 AEROSOL, METERED RESPIRATORY (INHALATION) PRN
Status: CANCELLED | OUTPATIENT
Start: 2023-06-12

## 2023-06-05 RX ORDER — SODIUM CHLORIDE 9 MG/ML
INJECTION, SOLUTION INTRAVENOUS CONTINUOUS
Status: CANCELLED | OUTPATIENT
Start: 2023-06-12

## 2023-06-05 RX ADMIN — EPOETIN ALFA-EPBX 10000 UNITS: 10000 INJECTION, SOLUTION INTRAVENOUS; SUBCUTANEOUS at 13:56

## 2023-06-05 NOTE — DISCHARGE INSTRUCTIONS
over-the-counter medicines, vitamins, and herbal products. Tell your doctor about all your current medicines and any medicine you start or stop using. Where can I get more information? Your pharmacist can provide more information about epoetin carrie. Remember, keep this and all other medicines out of the reach of children, never share your medicines with others, and use this medication only for the indication prescribed. Every effort has been made to ensure that the information provided by [address] [place] [address] is accurate, up-to-date, and complete, but no guarantee is made to that effect. Drug information contained herein may be time sensitive. [facility] information has been compiled for use by healthcare practitioners and consumers in the [place] and therefore [facility] does not warrant that uses outside of the [place] are appropriate, unless specifically indicated otherwise. [facility]'s drug information does not endorse drugs, diagnose patients or recommend therapy. [facility] drug information is an informational resource designed to assist licensed healthcare practitioners in caring for their patients and/or to serve consumers viewing this service as a supplement to, and not a substitute for, the expertise, skill, knowledge and judgment of healthcare practitioners. The absence of a warning for a given drug or drug combination in no way should be construed to indicate that the drug or drug combination is safe, effective or appropriate for any given patient. [facility] does not assume any responsibility for any aspect of healthcare administered with the aid of information [facility] provides. The information contained herein is not intended to cover all possible uses, directions, precautions, warnings, drug interactions, allergic reactions, or adverse effects.  If you have questions about the drugs you are taking, check with your doctor, nurse or pharmacist.  Copyright [id number] Leticia [address] [place] [address] Avenue:

## 2023-06-05 NOTE — PROGRESS NOTES
Renal Function Panel    Collection Time: 06/05/23  1:20 PM   Result Value Ref Range    Sodium 141 136 - 145 mmol/L    Potassium 3.6 3.5 - 5.1 mmol/L    Chloride 105 97 - 108 mmol/L    CO2 26 21 - 32 mmol/L    Anion Gap 10 5 - 15 mmol/L    Glucose 126 (H) 65 - 100 mg/dL    BUN 56 (H) 6 - 20 MG/DL    Creatinine 3.83 (H) 0.55 - 1.02 MG/DL    Bun/Cre Ratio 15 12 - 20      Est, Glom Filt Rate 11 (L) >60 ml/min/1.73m2    Calcium 8.2 (L) 8.5 - 10.1 MG/DL    Phosphorus 4.9 (H) 2.6 - 4.7 MG/DL    Albumin 3.2 (L) 3.5 - 5.0 g/dL   Iron studies pending    Medications given:  Medications Administered         epoetin carrie-epbx (RETACRIT) injection 10,000 Units Admin Date  06/05/2023 Action  Given    Given in the left arm. Dose  10,000 Units Route  SubCUTAneous Administered By  Pamela Barrera RN            Ms. Adriana Waters tolerated the treatment without complaints. Ms. Adriana Waters was discharged from [place] [address] in stable condition at 1400.       Patient provided with AVS , which includes future appointment and written educational material.     Future Appointments   Date Time Provider Neri Melvin   7/3/2023 10:00  [address]   7/25/2023 10:45 AM Meka Lam MD [facility] MAIN BS AMB   7/31/2023 10:00 AM [facility] INFUSION NURSE [address]       Neri Hickman RN  June 5, 2023  1:59 PM

## 2023-06-30 RX ORDER — FLUTICASONE PROPIONATE AND SALMETEROL 250; 50 UG/1; UG/1
POWDER RESPIRATORY (INHALATION)
Qty: 180 EACH | Refills: 3 | Status: SHIPPED | OUTPATIENT
Start: 2023-06-30

## 2023-07-03 ENCOUNTER — HOSPITAL ENCOUNTER (OUTPATIENT)
Facility: HOSPITAL | Age: 88
Setting detail: INFUSION SERIES
End: 2023-07-03
Payer: MEDICARE

## 2023-07-03 VITALS
HEIGHT: 63 IN | HEART RATE: 68 BPM | DIASTOLIC BLOOD PRESSURE: 52 MMHG | OXYGEN SATURATION: 97 % | WEIGHT: 127.4 LBS | BODY MASS INDEX: 22.57 KG/M2 | TEMPERATURE: 98.7 F | SYSTOLIC BLOOD PRESSURE: 153 MMHG | RESPIRATION RATE: 18 BRPM

## 2023-07-03 DIAGNOSIS — D63.1 ANEMIA OF CHRONIC RENAL FAILURE, UNSPECIFIED CKD STAGE: Primary | ICD-10-CM

## 2023-07-03 DIAGNOSIS — N18.9 ANEMIA OF CHRONIC RENAL FAILURE, UNSPECIFIED CKD STAGE: Primary | ICD-10-CM

## 2023-07-03 LAB
ALBUMIN SERPL-MCNC: 3.1 G/DL (ref 3.5–5)
ANION GAP SERPL CALC-SCNC: 11 MMOL/L (ref 5–15)
BASOPHILS # BLD: 0.1 K/UL (ref 0–0.1)
BASOPHILS NFR BLD: 1 % (ref 0–1)
BUN SERPL-MCNC: 48 MG/DL (ref 6–20)
BUN/CREAT SERPL: 13 (ref 12–20)
CALCIUM SERPL-MCNC: 8.6 MG/DL (ref 8.5–10.1)
CHLORIDE SERPL-SCNC: 105 MMOL/L (ref 97–108)
CO2 SERPL-SCNC: 25 MMOL/L (ref 21–32)
CREAT SERPL-MCNC: 3.71 MG/DL (ref 0.55–1.02)
DIFFERENTIAL METHOD BLD: ABNORMAL
EOSINOPHIL # BLD: 2.3 K/UL (ref 0–0.4)
EOSINOPHIL NFR BLD: 20 % (ref 0–7)
ERYTHROCYTE [DISTWIDTH] IN BLOOD BY AUTOMATED COUNT: 12.7 % (ref 11.5–14.5)
GLUCOSE SERPL-MCNC: 95 MG/DL (ref 65–100)
HCT VFR BLD AUTO: 26 % (ref 35–47)
HGB BLD-MCNC: 8.8 G/DL (ref 11.5–16)
IMM GRANULOCYTES # BLD AUTO: 0 K/UL (ref 0–0.04)
IMM GRANULOCYTES NFR BLD AUTO: 0 % (ref 0–0.5)
LYMPHOCYTES # BLD: 1.5 K/UL (ref 0.8–3.5)
LYMPHOCYTES NFR BLD: 13 % (ref 12–49)
MCH RBC QN AUTO: 28.2 PG (ref 26–34)
MCHC RBC AUTO-ENTMCNC: 33.8 G/DL (ref 30–36.5)
MCV RBC AUTO: 83.3 FL (ref 80–99)
MONOCYTES # BLD: 1 K/UL (ref 0–1)
MONOCYTES NFR BLD: 9 % (ref 5–13)
NEUTS SEG # BLD: 6.7 K/UL (ref 1.8–8)
NEUTS SEG NFR BLD: 57 % (ref 32–75)
NRBC # BLD: 0 K/UL (ref 0–0.01)
NRBC BLD-RTO: 0 PER 100 WBC
PHOSPHATE SERPL-MCNC: 4.4 MG/DL (ref 2.6–4.7)
PLATELET # BLD AUTO: 440 K/UL (ref 150–400)
PMV BLD AUTO: 9 FL (ref 8.9–12.9)
POTASSIUM SERPL-SCNC: 3.4 MMOL/L (ref 3.5–5.1)
RBC # BLD AUTO: 3.12 M/UL (ref 3.8–5.2)
RBC MORPH BLD: ABNORMAL
SODIUM SERPL-SCNC: 141 MMOL/L (ref 136–145)
WBC # BLD AUTO: 11.6 K/UL (ref 3.6–11)

## 2023-07-03 PROCEDURE — 6360000002 HC RX W HCPCS: Performed by: INTERNAL MEDICINE

## 2023-07-03 PROCEDURE — 80069 RENAL FUNCTION PANEL: CPT

## 2023-07-03 PROCEDURE — 36415 COLL VENOUS BLD VENIPUNCTURE: CPT

## 2023-07-03 PROCEDURE — 96372 THER/PROPH/DIAG INJ SC/IM: CPT

## 2023-07-03 PROCEDURE — 85025 COMPLETE CBC W/AUTO DIFF WBC: CPT

## 2023-07-03 RX ORDER — ALBUTEROL SULFATE 90 UG/1
4 AEROSOL, METERED RESPIRATORY (INHALATION) PRN
Status: CANCELLED | OUTPATIENT
Start: 2023-07-10

## 2023-07-03 RX ORDER — EPINEPHRINE 1 MG/ML
0.3 INJECTION, SOLUTION, CONCENTRATE INTRAVENOUS PRN
Status: CANCELLED | OUTPATIENT
Start: 2023-07-10

## 2023-07-03 RX ORDER — ONDANSETRON 2 MG/ML
8 INJECTION INTRAMUSCULAR; INTRAVENOUS
Status: CANCELLED | OUTPATIENT
Start: 2023-07-10

## 2023-07-03 RX ORDER — ACETAMINOPHEN 325 MG/1
650 TABLET ORAL
Status: CANCELLED | OUTPATIENT
Start: 2023-07-10

## 2023-07-03 RX ORDER — DIPHENHYDRAMINE HYDROCHLORIDE 50 MG/ML
50 INJECTION INTRAMUSCULAR; INTRAVENOUS
Status: CANCELLED | OUTPATIENT
Start: 2023-07-10

## 2023-07-03 RX ORDER — SODIUM CHLORIDE 9 MG/ML
INJECTION, SOLUTION INTRAVENOUS CONTINUOUS
Status: CANCELLED | OUTPATIENT
Start: 2023-07-10

## 2023-07-03 RX ADMIN — EPOETIN ALFA-EPBX 10000 UNITS: 10000 INJECTION, SOLUTION INTRAVENOUS; SUBCUTANEOUS at 10:50

## 2023-07-03 NOTE — PLAN OF CARE
[facility] Progress Note  Date: July 3, 2023    Name: Jaspal CARABALLO [address]  Short Visit Note:    3935:  Pt arrived ambulatory and in no distress for Retacrit. Labs drawn via R A/C and processed.     Problem: Chronic Conditions and Co-morbidities  Goal: Patient's chronic conditions and co-morbidity symptoms are monitored and maintained or improved  Outcome: Progressing     Problem: Safety - Adult  Goal: Free from fall injury  Outcome: Progressing    Recent Results (from the past 12 hour(s))   CBC with Auto Differential    Collection Time: 07/03/23 10:03 AM   Result Value Ref Range    WBC 11.6 (H) 3.6 - 11.0 K/uL    RBC 3.12 (L) 3.80 - 5.20 M/uL    Hemoglobin 8.8 (L) 11.5 - 16.0 g/dL    Hematocrit 26.0 (L) 35.0 - 47.0 %    MCV 83.3 80.0 - 99.0 FL    MCH 28.2 26.0 - 34.0 PG    MCHC 33.8 30.0 - 36.5 g/dL    RDW 12.7 11.5 - 14.5 %    Platelets 646 (H) 078 - 400 K/uL    MPV 9.0 8.9 - 12.9 FL    Nucleated RBCs 0.0 0  WBC    nRBC 0.00 0.00 - 0.01 K/uL    Neutrophils % 57 32 - 75 %    Lymphocytes % 13 12 - 49 %    Monocytes % 9 5 - 13 %    Eosinophils % 20 (H) 0 - 7 %    Basophils % 1 0 - 1 %    Immature Granulocytes 0 0.0 - 0.5 %    Neutrophils Absolute 6.7 1.8 - 8.0 K/UL    Lymphocytes Absolute 1.5 0.8 - 3.5 K/UL    Monocytes Absolute 1.0 0.0 - 1.0 K/UL    Eosinophils Absolute 2.3 (H) 0.0 - 0.4 K/UL    Basophils Absolute 0.1 0.0 - 0.1 K/UL    Absolute Immature Granulocyte 0.0 0.00 - 0.04 K/UL    Differential Type SMEAR SCANNED      RBC Comment NORMOCYTIC, NORMOCHROMIC     Renal Function Panel    Collection Time: 07/03/23 10:03 AM   Result Value Ref Range    Sodium 141 136 - 145 mmol/L    Potassium 3.4 (L) 3.5 - 5.1 mmol/L    Chloride 105 97 - 108 mmol/L    CO2 25 21 - 32 mmol/L    Anion Gap 11 5 - 15 mmol/L    Glucose 95 65 - 100 mg/dL    BUN 48 (H) 6 - 20 MG/DL    Creatinine 3.71 (H) 0.55 - 1.02 MG/DL    Bun/Cre Ratio 13 12 - 20      Est, Glom Filt Rate 11 (L) >60 ml/min/1.73m2    Calcium 8.6 8.5 - 10.1 MG/DL

## 2023-07-03 NOTE — DISCHARGE INSTRUCTIONS
darbepoetin carrie or epoetin carrie; or  you use an epoetin carrie multi-dose vial and you are pregnant or breastfeeding. Do not use epoetin carrie from a multi-dose vial when giving medicine to a baby. The multi-dose vial contains an ingredient that can cause serious side effects or death in very young infants or premature babies. Epoetin carrie may speed up tumor growth, or shorten remission or survival time in some people with certain types of cancer. Talk with your doctor about the risks and benefits of using epoetin carrie. Tell your doctor if you have ever had:  heart disease, high blood pressure;  a heart attack, stroke, or blood clot;  a seizure disorder;  phenylketonuria (PKU); or  kidney disease (or if you are on dialysis). It is not known whether this medicine will harm an unborn baby. Tell your doctor if you are pregnant or plan to become pregnant. Do not breastfeed while using this medicine,  and for at least 2 months after your last dose. Do not use epoetin carrie from a multi-dose vial if you are pregnant or breastfeeding. Epoetin carrie is made from donated human plasma and may contain viruses or other infectious agents. Donated plasma is tested and treated to reduce the risk of contamination, but there is still a small possibility it could transmit disease. Ask your doctor about any possible risk. How should I use epoetin carrie? Follow all directions on your prescription label and read all medication guides or instruction sheets. Your doctor may occasionally change your dose. Use the medicine exactly as directed. Epoetin carrie is injected under the skin, or as an infusion into a vein. A healthcare provider may teach you how to properly use the medication by yourself. Read and carefully follow any Instructions for Use provided with your medicine. Do not use epoetin carrie if you don't understand all instructions for proper use. Ask your doctor or pharmacist if you have questions.   Prepare your injection 12.01. Revision date: 11/23/2020. Care instructions adapted under license by [facility] ([facility]). If you have questions about a medical condition or this instruction, always ask your healthcare professional. [address] any warranty or liability for your use of this information.

## 2023-07-11 RX ORDER — POTASSIUM CHLORIDE 20 MEQ/1
20 TABLET, EXTENDED RELEASE ORAL DAILY
Qty: 90 TABLET | Refills: 3 | Status: SHIPPED | OUTPATIENT
Start: 2023-07-11 | End: 2023-07-12 | Stop reason: SDUPTHER

## 2023-07-12 RX ORDER — POTASSIUM CHLORIDE 20 MEQ/1
20 TABLET, EXTENDED RELEASE ORAL DAILY
Qty: 20 TABLET | Refills: 0 | Status: SHIPPED | OUTPATIENT
Start: 2023-07-12

## 2023-07-25 ENCOUNTER — OFFICE VISIT (OUTPATIENT)
Facility: CLINIC | Age: 88
End: 2023-07-25
Payer: MEDICARE

## 2023-07-25 VITALS
DIASTOLIC BLOOD PRESSURE: 82 MMHG | RESPIRATION RATE: 20 BRPM | HEIGHT: 63 IN | HEART RATE: 60 BPM | WEIGHT: 124 LBS | BODY MASS INDEX: 21.97 KG/M2 | OXYGEN SATURATION: 99 % | SYSTOLIC BLOOD PRESSURE: 134 MMHG | TEMPERATURE: 98 F

## 2023-07-25 DIAGNOSIS — I10 PRIMARY HYPERTENSION: ICD-10-CM

## 2023-07-25 DIAGNOSIS — M17.0 PRIMARY OSTEOARTHRITIS OF BOTH KNEES: ICD-10-CM

## 2023-07-25 DIAGNOSIS — N18.4 ANEMIA OF CHRONIC RENAL FAILURE, STAGE 4 (SEVERE) (HCC): Primary | ICD-10-CM

## 2023-07-25 DIAGNOSIS — I87.2 VENOUS INSUFFICIENCY OF BOTH LOWER EXTREMITIES: ICD-10-CM

## 2023-07-25 DIAGNOSIS — D58.0 HEREDITARY SPHEROCYTOSIS (HCC): ICD-10-CM

## 2023-07-25 DIAGNOSIS — E11.21 TYPE 2 DIABETES MELLITUS WITH NEPHROPATHY (HCC): ICD-10-CM

## 2023-07-25 DIAGNOSIS — D63.1 ANEMIA OF CHRONIC RENAL FAILURE, STAGE 4 (SEVERE) (HCC): Primary | ICD-10-CM

## 2023-07-25 DIAGNOSIS — N18.4 CKD (CHRONIC KIDNEY DISEASE), STAGE IV (HCC): ICD-10-CM

## 2023-07-25 PROCEDURE — 99214 OFFICE O/P EST MOD 30 MIN: CPT | Performed by: INTERNAL MEDICINE

## 2023-07-25 PROCEDURE — 1123F ACP DISCUSS/DSCN MKR DOCD: CPT | Performed by: INTERNAL MEDICINE

## 2023-07-25 PROCEDURE — 1090F PRES/ABSN URINE INCON ASSESS: CPT | Performed by: INTERNAL MEDICINE

## 2023-07-25 PROCEDURE — 3044F HG A1C LEVEL LT 7.0%: CPT | Performed by: INTERNAL MEDICINE

## 2023-07-25 PROCEDURE — G8427 DOCREV CUR MEDS BY ELIG CLIN: HCPCS | Performed by: INTERNAL MEDICINE

## 2023-07-25 PROCEDURE — 1036F TOBACCO NON-USER: CPT | Performed by: INTERNAL MEDICINE

## 2023-07-25 PROCEDURE — G8420 CALC BMI NORM PARAMETERS: HCPCS | Performed by: INTERNAL MEDICINE

## 2023-07-25 ASSESSMENT — PATIENT HEALTH QUESTIONNAIRE - PHQ9
SUM OF ALL RESPONSES TO PHQ9 QUESTIONS 1 & 2: 0
2. FEELING DOWN, DEPRESSED OR HOPELESS: 0
1. LITTLE INTEREST OR PLEASURE IN DOING THINGS: 0
SUM OF ALL RESPONSES TO PHQ QUESTIONS 1-9: 0

## 2023-07-31 ENCOUNTER — HOSPITAL ENCOUNTER (OUTPATIENT)
Facility: HOSPITAL | Age: 88
Setting detail: INFUSION SERIES
End: 2023-07-31
Payer: MEDICARE

## 2023-07-31 VITALS
RESPIRATION RATE: 18 BRPM | DIASTOLIC BLOOD PRESSURE: 50 MMHG | WEIGHT: 125.5 LBS | BODY MASS INDEX: 22.24 KG/M2 | SYSTOLIC BLOOD PRESSURE: 153 MMHG | TEMPERATURE: 98 F | HEART RATE: 64 BPM | HEIGHT: 63 IN | OXYGEN SATURATION: 100 %

## 2023-07-31 DIAGNOSIS — N18.9 ANEMIA OF CHRONIC RENAL FAILURE, UNSPECIFIED CKD STAGE: Primary | ICD-10-CM

## 2023-07-31 DIAGNOSIS — D63.1 ANEMIA OF CHRONIC RENAL FAILURE, UNSPECIFIED CKD STAGE: Primary | ICD-10-CM

## 2023-07-31 LAB
BASOPHILS # BLD: 0.1 K/UL (ref 0–0.1)
BASOPHILS NFR BLD: 1 % (ref 0–1)
DIFFERENTIAL METHOD BLD: ABNORMAL
EOSINOPHIL # BLD: 1 K/UL (ref 0–0.4)
EOSINOPHIL NFR BLD: 11 % (ref 0–7)
ERYTHROCYTE [DISTWIDTH] IN BLOOD BY AUTOMATED COUNT: 12.7 % (ref 11.5–14.5)
EST. AVERAGE GLUCOSE BLD GHB EST-MCNC: 88 MG/DL
HBA1C MFR BLD: 4.7 % (ref 4–5.6)
HCT VFR BLD AUTO: 26.2 % (ref 35–47)
HGB BLD-MCNC: 8.6 G/DL (ref 11.5–16)
IMM GRANULOCYTES # BLD AUTO: 0 K/UL (ref 0–0.04)
IMM GRANULOCYTES NFR BLD AUTO: 0 % (ref 0–0.5)
LYMPHOCYTES # BLD: 1.2 K/UL (ref 0.8–3.5)
LYMPHOCYTES NFR BLD: 13 % (ref 12–49)
MCH RBC QN AUTO: 27 PG (ref 26–34)
MCHC RBC AUTO-ENTMCNC: 32.8 G/DL (ref 30–36.5)
MCV RBC AUTO: 82.4 FL (ref 80–99)
MONOCYTES # BLD: 0.6 K/UL (ref 0–1)
MONOCYTES NFR BLD: 7 % (ref 5–13)
NEUTS SEG # BLD: 6 K/UL (ref 1.8–8)
NEUTS SEG NFR BLD: 68 % (ref 32–75)
NRBC # BLD: 0 K/UL (ref 0–0.01)
NRBC BLD-RTO: 0 PER 100 WBC
PLATELET # BLD AUTO: 435 K/UL (ref 150–400)
PMV BLD AUTO: 9 FL (ref 8.9–12.9)
RBC # BLD AUTO: 3.18 M/UL (ref 3.8–5.2)
WBC # BLD AUTO: 8.9 K/UL (ref 3.6–11)

## 2023-07-31 PROCEDURE — 96372 THER/PROPH/DIAG INJ SC/IM: CPT

## 2023-07-31 PROCEDURE — 6360000002 HC RX W HCPCS: Performed by: INTERNAL MEDICINE

## 2023-07-31 PROCEDURE — 85025 COMPLETE CBC W/AUTO DIFF WBC: CPT

## 2023-07-31 PROCEDURE — 36415 COLL VENOUS BLD VENIPUNCTURE: CPT

## 2023-07-31 PROCEDURE — 83036 HEMOGLOBIN GLYCOSYLATED A1C: CPT

## 2023-07-31 RX ORDER — DIPHENHYDRAMINE HYDROCHLORIDE 50 MG/ML
50 INJECTION INTRAMUSCULAR; INTRAVENOUS
Status: CANCELLED | OUTPATIENT
Start: 2023-08-07

## 2023-07-31 RX ORDER — SODIUM CHLORIDE 9 MG/ML
INJECTION, SOLUTION INTRAVENOUS CONTINUOUS
Status: CANCELLED | OUTPATIENT
Start: 2023-08-07

## 2023-07-31 RX ORDER — ALBUTEROL SULFATE 90 UG/1
4 AEROSOL, METERED RESPIRATORY (INHALATION) PRN
Status: CANCELLED | OUTPATIENT
Start: 2023-08-07

## 2023-07-31 RX ORDER — ACETAMINOPHEN 325 MG/1
650 TABLET ORAL
Status: CANCELLED | OUTPATIENT
Start: 2023-08-07

## 2023-07-31 RX ORDER — ONDANSETRON 2 MG/ML
8 INJECTION INTRAMUSCULAR; INTRAVENOUS
Status: CANCELLED | OUTPATIENT
Start: 2023-08-07

## 2023-07-31 RX ORDER — EPINEPHRINE 1 MG/ML
0.3 INJECTION, SOLUTION, CONCENTRATE INTRAVENOUS PRN
Status: CANCELLED | OUTPATIENT
Start: 2023-08-07

## 2023-07-31 RX ADMIN — EPOETIN ALFA-EPBX 10000 UNITS: 10000 INJECTION, SOLUTION INTRAVENOUS; SUBCUTANEOUS at 10:51

## 2023-08-07 ENCOUNTER — HOSPITAL ENCOUNTER (OUTPATIENT)
Facility: HOSPITAL | Age: 88
Discharge: HOME | End: 2023-08-10
Attending: INTERNAL MEDICINE
Payer: MEDICARE

## 2023-08-07 DIAGNOSIS — R92.8 ABNORMAL MAMMOGRAM: ICD-10-CM

## 2023-08-07 PROCEDURE — G0279 TOMOSYNTHESIS, MAMMO: HCPCS

## 2023-08-21 RX ORDER — METOPROLOL SUCCINATE 50 MG/1
TABLET, EXTENDED RELEASE ORAL
Qty: 90 TABLET | Refills: 3 | Status: SHIPPED | OUTPATIENT
Start: 2023-08-21

## 2023-08-25 ENCOUNTER — ANESTHESIA EVENT (OUTPATIENT)
Facility: HOSPITAL | Age: 88
End: 2023-08-25
Payer: MEDICARE

## 2023-08-28 ENCOUNTER — HOSPITAL ENCOUNTER (OUTPATIENT)
Facility: HOSPITAL | Age: 88
Setting detail: INFUSION SERIES
End: 2023-08-28
Payer: MEDICARE

## 2023-08-28 VITALS
RESPIRATION RATE: 18 BRPM | DIASTOLIC BLOOD PRESSURE: 47 MMHG | HEIGHT: 63 IN | HEART RATE: 67 BPM | BODY MASS INDEX: 21.32 KG/M2 | WEIGHT: 120.3 LBS | OXYGEN SATURATION: 100 % | SYSTOLIC BLOOD PRESSURE: 146 MMHG | TEMPERATURE: 98.4 F

## 2023-08-28 DIAGNOSIS — N18.9 ANEMIA OF CHRONIC RENAL FAILURE, UNSPECIFIED CKD STAGE: Primary | ICD-10-CM

## 2023-08-28 DIAGNOSIS — D63.1 ANEMIA OF CHRONIC RENAL FAILURE, UNSPECIFIED CKD STAGE: Primary | ICD-10-CM

## 2023-08-28 LAB
25(OH)D3 SERPL-MCNC: 30.8 NG/ML (ref 30–100)
ALBUMIN SERPL-MCNC: 3 G/DL (ref 3.5–5)
ANION GAP SERPL CALC-SCNC: 12 MMOL/L (ref 5–15)
BASOPHILS # BLD: 0.1 K/UL (ref 0–0.1)
BASOPHILS NFR BLD: 1 % (ref 0–1)
BUN SERPL-MCNC: 68 MG/DL (ref 6–20)
BUN/CREAT SERPL: 12 (ref 12–20)
CALCIUM SERPL-MCNC: 8.4 MG/DL (ref 8.5–10.1)
CALCIUM SERPL-MCNC: 8.7 MG/DL (ref 8.5–10.1)
CHLORIDE SERPL-SCNC: 101 MMOL/L (ref 97–108)
CO2 SERPL-SCNC: 25 MMOL/L (ref 21–32)
CREAT SERPL-MCNC: 5.64 MG/DL (ref 0.55–1.02)
DIFFERENTIAL METHOD BLD: ABNORMAL
EOSINOPHIL # BLD: 1.3 K/UL (ref 0–0.4)
EOSINOPHIL NFR BLD: 10 % (ref 0–7)
ERYTHROCYTE [DISTWIDTH] IN BLOOD BY AUTOMATED COUNT: 13 % (ref 11.5–14.5)
FERRITIN SERPL-MCNC: 265 NG/ML (ref 8–252)
GLUCOSE SERPL-MCNC: 104 MG/DL (ref 65–100)
HCT VFR BLD AUTO: 23.5 % (ref 35–47)
HGB BLD-MCNC: 7.7 G/DL (ref 11.5–16)
IMM GRANULOCYTES # BLD AUTO: 0 K/UL (ref 0–0.04)
IMM GRANULOCYTES NFR BLD AUTO: 0 % (ref 0–0.5)
IRON SATN MFR SERPL: 13 % (ref 20–50)
IRON SERPL-MCNC: 26 UG/DL (ref 35–150)
LYMPHOCYTES # BLD: 1.4 K/UL (ref 0.8–3.5)
LYMPHOCYTES NFR BLD: 11 % (ref 12–49)
MCH RBC QN AUTO: 26.5 PG (ref 26–34)
MCHC RBC AUTO-ENTMCNC: 32.8 G/DL (ref 30–36.5)
MCV RBC AUTO: 80.8 FL (ref 80–99)
MONOCYTES # BLD: 1 K/UL (ref 0–1)
MONOCYTES NFR BLD: 8 % (ref 5–13)
NEUTS SEG # BLD: 9.3 K/UL (ref 1.8–8)
NEUTS SEG NFR BLD: 70 % (ref 32–75)
NRBC # BLD: 0 K/UL (ref 0–0.01)
NRBC BLD-RTO: 0 PER 100 WBC
PHOSPHATE SERPL-MCNC: 4.9 MG/DL (ref 2.6–4.7)
PLATELET # BLD AUTO: 442 K/UL (ref 150–400)
PMV BLD AUTO: 9.6 FL (ref 8.9–12.9)
POTASSIUM SERPL-SCNC: 3.4 MMOL/L (ref 3.5–5.1)
PTH-INTACT SERPL-MCNC: 715.6 PG/ML (ref 18.4–88)
RBC # BLD AUTO: 2.91 M/UL (ref 3.8–5.2)
RBC MORPH BLD: ABNORMAL
SODIUM SERPL-SCNC: 138 MMOL/L (ref 136–145)
TIBC SERPL-MCNC: 204 UG/DL (ref 250–450)
WBC # BLD AUTO: 13.1 K/UL (ref 3.6–11)

## 2023-08-28 PROCEDURE — 6360000002 HC RX W HCPCS: Performed by: INTERNAL MEDICINE

## 2023-08-28 PROCEDURE — 83970 ASSAY OF PARATHORMONE: CPT

## 2023-08-28 PROCEDURE — 36415 COLL VENOUS BLD VENIPUNCTURE: CPT

## 2023-08-28 PROCEDURE — 83540 ASSAY OF IRON: CPT

## 2023-08-28 PROCEDURE — 83550 IRON BINDING TEST: CPT

## 2023-08-28 PROCEDURE — 82728 ASSAY OF FERRITIN: CPT

## 2023-08-28 PROCEDURE — 85025 COMPLETE CBC W/AUTO DIFF WBC: CPT

## 2023-08-28 PROCEDURE — 96372 THER/PROPH/DIAG INJ SC/IM: CPT

## 2023-08-28 PROCEDURE — 80069 RENAL FUNCTION PANEL: CPT

## 2023-08-28 PROCEDURE — 82306 VITAMIN D 25 HYDROXY: CPT

## 2023-08-28 RX ADMIN — EPOETIN ALFA-EPBX 10000 UNITS: 10000 INJECTION, SOLUTION INTRAVENOUS; SUBCUTANEOUS at 10:57

## 2023-08-28 NOTE — DISCHARGE INSTRUCTIONS
epoetin carrie  Pronunciation:  e MAGNO e tin AL fa  Brand:  Epogen, Procrit, Retacrit  What is the most important information I should know about epoetin carrie? This medicine can cause serious side effects, including heart attack or stroke. Epoetin carrie may also speed up tumor growth, or shorten remission or survival time in some people. Talk with your doctor about the risks and benefits of using epoetin carrie. You should not use this medicine if you have uncontrolled high blood pressure, or if you have ever had pure red cell aplasia (PRCA, a type of anemia) caused by using epoetin carrie or darbepoetin carrie. Call your doctor at once if you have signs of a blood clot: sudden numbness or weakness, problems with vision or speech, chest pain, trouble breathing, pain or cold feeling in an arm or leg. What is epoetin carrie? Epoetin carrie is a man-made form of a protein that helps your body produce red blood cells. This protein may be reduced when you have kidney failure or use certain medications. When fewer red blood cells are produced, you can develop a condition called anemia. Epoetin carrie is used to treat anemia caused by chemotherapy in adults and children at least 11years old. Epoetin carrie is also used to treat anemia caused by chronic kidney disease in adults and children at least 2 month old. Epoetin carrie is also used to treat anemia in adults taking zidovudine to treat HIV (human immunodeficiency virus). Epoetin carrie is also used to reduce the need for red blood cell transfusions in adults having certain types of surgery. Epoetin carrie may also be used for purposes not listed in this medication guide. What should I discuss with my healthcare provider before using epoetin carrie?   You should not use this medication if you are allergic to epoetin carrie or darbepoetin carrie, or if:  you have untreated or uncontrolled high blood pressure;  you have had pure red cell aplasia (PRCA, a type of anemia) after using darbepoetin carrie or epoetin carrie; or  you use an epoetin carrie multi-dose vial and you are pregnant or breastfeeding. Do not use epoetin carrie from a multi-dose vial when giving medicine to a baby. The multi-dose vial contains an ingredient that can cause serious side effects or death in very young infants or premature babies. Epoetin carrie may speed up tumor growth, or shorten remission or survival time in some people with certain types of cancer. Talk with your doctor about the risks and benefits of using epoetin carrie. Tell your doctor if you have ever had:  heart disease, high blood pressure;  a heart attack, stroke, or blood clot;  a seizure disorder;  phenylketonuria (PKU); or  kidney disease (or if you are on dialysis). It is not known whether this medicine will harm an unborn baby. Tell your doctor if you are pregnant or plan to become pregnant. Do not breastfeed while using this medicine,  and for at least 2 months after your last dose. Do not use epoetin carrie from a multi-dose vial if you are pregnant or breastfeeding. Epoetin carrie is made from donated human plasma and may contain viruses or other infectious agents. Donated plasma is tested and treated to reduce the risk of contamination, but there is still a small possibility it could transmit disease. Ask your doctor about any possible risk. How should I use epoetin carrie? Follow all directions on your prescription label and read all medication guides or instruction sheets. Your doctor may occasionally change your dose. Use the medicine exactly as directed. Epoetin carrie is injected under the skin, or as an infusion into a vein. A healthcare provider may teach you how to properly use the medication by yourself. Read and carefully follow any Instructions for Use provided with your medicine. Do not use epoetin carrie if you don't understand all instructions for proper use. Ask your doctor or pharmacist if you have questions.   Prepare your injection

## 2023-08-28 NOTE — PROGRESS NOTES
[facility] Progress Note  Date: August 28, 2023    Name: Jasbir Mendez [address]  Short Visit Note:    1000:  Pt arrived ambulatory and in no distress for Retacrit. Assessment unremarkable with no new concerns voiced. Labs drawn peripherally via R AC and processed.     Problem: Chronic Conditions and Co-morbidities  Goal: Patient's chronic conditions and co-morbidity symptoms are monitored and maintained or improved  Outcome: Progressing     Problem: Safety - Adult  Goal: Free from fall injury  Outcome: Progressing    Recent Results (from the past 12 hour(s))   Iron and TIBC    Collection Time: 08/28/23 10:18 AM   Result Value Ref Range    Iron 26 (L) 35 - 150 ug/dL    TIBC 204 (L) 250 - 450 ug/dL    Iron % Saturation 13 (L) 20 - 50 %   Renal Function Panel    Collection Time: 08/28/23 10:18 AM   Result Value Ref Range    Sodium 138 136 - 145 mmol/L    Potassium 3.4 (L) 3.5 - 5.1 mmol/L    Chloride 101 97 - 108 mmol/L    CO2 25 21 - 32 mmol/L    Anion Gap 12 5 - 15 mmol/L    Glucose 104 (H) 65 - 100 mg/dL    BUN 68 (H) 6 - 20 MG/DL    Creatinine 5.64 (H) 0.55 - 1.02 MG/DL    Bun/Cre Ratio 12 12 - 20      Est, Glom Filt Rate 7 (L) >60 ml/min/1.73m2    Calcium 8.4 (L) 8.5 - 10.1 MG/DL    Phosphorus 4.9 (H) 2.6 - 4.7 MG/DL    Albumin 3.0 (L) 3.5 - 5.0 g/dL   CBC with Auto Differential    Collection Time: 08/28/23 10:18 AM   Result Value Ref Range    WBC 13.1 (H) 3.6 - 11.0 K/uL    RBC 2.91 (L) 3.80 - 5.20 M/uL    Hemoglobin 7.7 (L) 11.5 - 16.0 g/dL    Hematocrit 23.5 (L) 35.0 - 47.0 %    MCV 80.8 80.0 - 99.0 FL    MCH 26.5 26.0 - 34.0 PG    MCHC 32.8 30.0 - 36.5 g/dL    RDW 13.0 11.5 - 14.5 %    Platelets 809 (H) 918 - 400 K/uL    MPV 9.6 8.9 - 12.9 FL    Nucleated RBCs 0.0 0  WBC    nRBC 0.00 0.00 - 0.01 K/uL    Neutrophils % 70 32 - 75 %    Lymphocytes % 11 (L) 12 - 49 %    Monocytes % 8 5 - 13 %    Eosinophils % 10 (H) 0 - 7 %    Basophils % 1 0 - 1 %    Immature Granulocytes 0 0.0 - 0.5 %    Neutrophils Absolute

## 2023-08-29 ENCOUNTER — HOSPITAL ENCOUNTER (OUTPATIENT)
Facility: HOSPITAL | Age: 88
Setting detail: OUTPATIENT SURGERY
Discharge: HOME | End: 2023-08-29
Attending: INTERNAL MEDICINE | Admitting: INTERNAL MEDICINE
Payer: MEDICARE

## 2023-08-29 ENCOUNTER — ANESTHESIA (OUTPATIENT)
Facility: HOSPITAL | Age: 88
End: 2023-08-29
Payer: MEDICARE

## 2023-08-29 VITALS
OXYGEN SATURATION: 99 % | RESPIRATION RATE: 18 BRPM | HEART RATE: 69 BPM | DIASTOLIC BLOOD PRESSURE: 67 MMHG | TEMPERATURE: 98 F | SYSTOLIC BLOOD PRESSURE: 120 MMHG

## 2023-08-29 PROCEDURE — 3700000001 HC ADD 15 MINUTES (ANESTHESIA): Performed by: INTERNAL MEDICINE

## 2023-08-29 PROCEDURE — 3600000012 HC SURGERY LEVEL 2 ADDTL 15MIN: Performed by: INTERNAL MEDICINE

## 2023-08-29 PROCEDURE — 2500000003 HC RX 250 WO HCPCS: Performed by: ANESTHESIOLOGY

## 2023-08-29 PROCEDURE — 6360000002 HC RX W HCPCS: Performed by: ANESTHESIOLOGY

## 2023-08-29 PROCEDURE — 3600000002 HC SURGERY LEVEL 2 BASE: Performed by: INTERNAL MEDICINE

## 2023-08-29 PROCEDURE — 6370000000 HC RX 637 (ALT 250 FOR IP): Performed by: INTERNAL MEDICINE

## 2023-08-29 PROCEDURE — 3700000000 HC ANESTHESIA ATTENDED CARE: Performed by: INTERNAL MEDICINE

## 2023-08-29 PROCEDURE — 2709999900 HC NON-CHARGEABLE SUPPLY: Performed by: INTERNAL MEDICINE

## 2023-08-29 PROCEDURE — 88305 TISSUE EXAM BY PATHOLOGIST: CPT

## 2023-08-29 PROCEDURE — 7100000000 HC PACU RECOVERY - FIRST 15 MIN: Performed by: INTERNAL MEDICINE

## 2023-08-29 PROCEDURE — 7100000010 HC PHASE II RECOVERY - FIRST 15 MIN: Performed by: INTERNAL MEDICINE

## 2023-08-29 RX ORDER — LIDOCAINE HYDROCHLORIDE 20 MG/ML
INJECTION, SOLUTION EPIDURAL; INFILTRATION; INTRACAUDAL; PERINEURAL PRN
Status: DISCONTINUED | OUTPATIENT
Start: 2023-08-29 | End: 2023-08-29 | Stop reason: SDUPTHER

## 2023-08-29 RX ORDER — SODIUM CHLORIDE 0.9 % (FLUSH) 0.9 %
5-40 SYRINGE (ML) INJECTION EVERY 12 HOURS SCHEDULED
Status: DISCONTINUED | OUTPATIENT
Start: 2023-08-29 | End: 2023-08-29 | Stop reason: HOSPADM

## 2023-08-29 RX ORDER — SODIUM CHLORIDE 0.9 % (FLUSH) 0.9 %
5-40 SYRINGE (ML) INJECTION PRN
Status: DISCONTINUED | OUTPATIENT
Start: 2023-08-29 | End: 2023-08-29 | Stop reason: HOSPADM

## 2023-08-29 RX ORDER — SODIUM CHLORIDE, SODIUM LACTATE, POTASSIUM CHLORIDE, CALCIUM CHLORIDE 600; 310; 30; 20 MG/100ML; MG/100ML; MG/100ML; MG/100ML
INJECTION, SOLUTION INTRAVENOUS CONTINUOUS
Status: DISCONTINUED | OUTPATIENT
Start: 2023-08-29 | End: 2023-08-29 | Stop reason: HOSPADM

## 2023-08-29 RX ORDER — SODIUM CHLORIDE 9 MG/ML
INJECTION, SOLUTION INTRAVENOUS PRN
Status: DISCONTINUED | OUTPATIENT
Start: 2023-08-29 | End: 2023-08-29 | Stop reason: HOSPADM

## 2023-08-29 RX ORDER — FENTANYL CITRATE 50 UG/ML
50 INJECTION, SOLUTION INTRAMUSCULAR; INTRAVENOUS EVERY 5 MIN PRN
Status: DISCONTINUED | OUTPATIENT
Start: 2023-08-29 | End: 2023-08-29 | Stop reason: HOSPADM

## 2023-08-29 RX ORDER — FENTANYL CITRATE 50 UG/ML
25 INJECTION, SOLUTION INTRAMUSCULAR; INTRAVENOUS EVERY 5 MIN PRN
Status: DISCONTINUED | OUTPATIENT
Start: 2023-08-29 | End: 2023-08-29 | Stop reason: HOSPADM

## 2023-08-29 RX ORDER — SODIUM CHLORIDE 9 MG/ML
25 INJECTION, SOLUTION INTRAVENOUS PRN
Status: DISCONTINUED | OUTPATIENT
Start: 2023-08-29 | End: 2023-08-29 | Stop reason: HOSPADM

## 2023-08-29 RX ADMIN — PROPOFOL 10 MG: 10 INJECTION, EMULSION INTRAVENOUS at 10:23

## 2023-08-29 RX ADMIN — PROPOFOL 10 MG: 10 INJECTION, EMULSION INTRAVENOUS at 09:58

## 2023-08-29 RX ADMIN — PROPOFOL 10 MG: 10 INJECTION, EMULSION INTRAVENOUS at 10:01

## 2023-08-29 RX ADMIN — PROPOFOL 10 MG: 10 INJECTION, EMULSION INTRAVENOUS at 10:26

## 2023-08-29 RX ADMIN — PROPOFOL 10 MG: 10 INJECTION, EMULSION INTRAVENOUS at 10:13

## 2023-08-29 RX ADMIN — PROPOFOL 10 MG: 10 INJECTION, EMULSION INTRAVENOUS at 10:00

## 2023-08-29 RX ADMIN — PROPOFOL 10 MG: 10 INJECTION, EMULSION INTRAVENOUS at 10:20

## 2023-08-29 RX ADMIN — PROPOFOL 10 MG: 10 INJECTION, EMULSION INTRAVENOUS at 10:22

## 2023-08-29 RX ADMIN — PROPOFOL 10 MG: 10 INJECTION, EMULSION INTRAVENOUS at 10:21

## 2023-08-29 RX ADMIN — PROPOFOL 10 MG: 10 INJECTION, EMULSION INTRAVENOUS at 10:40

## 2023-08-29 RX ADMIN — PROPOFOL 10 MG: 10 INJECTION, EMULSION INTRAVENOUS at 10:03

## 2023-08-29 RX ADMIN — PROPOFOL 10 MG: 10 INJECTION, EMULSION INTRAVENOUS at 10:25

## 2023-08-29 RX ADMIN — PROPOFOL 10 MG: 10 INJECTION, EMULSION INTRAVENOUS at 10:35

## 2023-08-29 RX ADMIN — PROPOFOL 10 MG: 10 INJECTION, EMULSION INTRAVENOUS at 10:12

## 2023-08-29 RX ADMIN — PROPOFOL 10 MG: 10 INJECTION, EMULSION INTRAVENOUS at 10:33

## 2023-08-29 RX ADMIN — PROPOFOL 10 MG: 10 INJECTION, EMULSION INTRAVENOUS at 10:02

## 2023-08-29 RX ADMIN — PROPOFOL 10 MG: 10 INJECTION, EMULSION INTRAVENOUS at 10:06

## 2023-08-29 RX ADMIN — PROPOFOL 10 MG: 10 INJECTION, EMULSION INTRAVENOUS at 10:04

## 2023-08-29 RX ADMIN — PROPOFOL 60 MG: 10 INJECTION, EMULSION INTRAVENOUS at 09:56

## 2023-08-29 RX ADMIN — PROPOFOL 10 MG: 10 INJECTION, EMULSION INTRAVENOUS at 10:18

## 2023-08-29 RX ADMIN — PROPOFOL 10 MG: 10 INJECTION, EMULSION INTRAVENOUS at 10:05

## 2023-08-29 RX ADMIN — PROPOFOL 10 MG: 10 INJECTION, EMULSION INTRAVENOUS at 10:17

## 2023-08-29 RX ADMIN — PROPOFOL 10 MG: 10 INJECTION, EMULSION INTRAVENOUS at 10:27

## 2023-08-29 RX ADMIN — PROPOFOL 10 MG: 10 INJECTION, EMULSION INTRAVENOUS at 10:28

## 2023-08-29 RX ADMIN — PROPOFOL 10 MG: 10 INJECTION, EMULSION INTRAVENOUS at 10:30

## 2023-08-29 RX ADMIN — PROPOFOL 10 MG: 10 INJECTION, EMULSION INTRAVENOUS at 09:57

## 2023-08-29 RX ADMIN — PROPOFOL 10 MG: 10 INJECTION, EMULSION INTRAVENOUS at 10:36

## 2023-08-29 RX ADMIN — PROPOFOL 10 MG: 10 INJECTION, EMULSION INTRAVENOUS at 10:11

## 2023-08-29 RX ADMIN — PROPOFOL 10 MG: 10 INJECTION, EMULSION INTRAVENOUS at 10:14

## 2023-08-29 RX ADMIN — PROPOFOL 10 MG: 10 INJECTION, EMULSION INTRAVENOUS at 10:29

## 2023-08-29 RX ADMIN — PROPOFOL 10 MG: 10 INJECTION, EMULSION INTRAVENOUS at 10:10

## 2023-08-29 RX ADMIN — LIDOCAINE HYDROCHLORIDE 50 MG: 20 INJECTION, SOLUTION EPIDURAL; INFILTRATION; INTRACAUDAL; PERINEURAL at 09:56

## 2023-08-29 RX ADMIN — PROPOFOL 10 MG: 10 INJECTION, EMULSION INTRAVENOUS at 10:08

## 2023-08-29 RX ADMIN — PROPOFOL 10 MG: 10 INJECTION, EMULSION INTRAVENOUS at 10:31

## 2023-08-29 RX ADMIN — PROPOFOL 10 MG: 10 INJECTION, EMULSION INTRAVENOUS at 10:32

## 2023-08-29 RX ADMIN — PROPOFOL 10 MG: 10 INJECTION, EMULSION INTRAVENOUS at 10:07

## 2023-08-29 RX ADMIN — PROPOFOL 10 MG: 10 INJECTION, EMULSION INTRAVENOUS at 10:09

## 2023-08-29 RX ADMIN — PROPOFOL 10 MG: 10 INJECTION, EMULSION INTRAVENOUS at 10:24

## 2023-08-29 RX ADMIN — PROPOFOL 10 MG: 10 INJECTION, EMULSION INTRAVENOUS at 10:38

## 2023-08-29 RX ADMIN — PROPOFOL 10 MG: 10 INJECTION, EMULSION INTRAVENOUS at 10:15

## 2023-08-29 RX ADMIN — PROPOFOL 10 MG: 10 INJECTION, EMULSION INTRAVENOUS at 09:59

## 2023-08-29 RX ADMIN — PROPOFOL 10 MG: 10 INJECTION, EMULSION INTRAVENOUS at 10:19

## 2023-08-29 RX ADMIN — PROPOFOL 10 MG: 10 INJECTION, EMULSION INTRAVENOUS at 10:34

## 2023-08-29 RX ADMIN — PROPOFOL 10 MG: 10 INJECTION, EMULSION INTRAVENOUS at 10:16

## 2023-08-29 ASSESSMENT — PAIN - FUNCTIONAL ASSESSMENT
PAIN_FUNCTIONAL_ASSESSMENT: NONE - DENIES PAIN
PAIN_FUNCTIONAL_ASSESSMENT: ADULT NONVERBAL PAIN SCALE (NPVS)
PAIN_FUNCTIONAL_ASSESSMENT: NONE - DENIES PAIN
PAIN_FUNCTIONAL_ASSESSMENT: NONE - DENIES PAIN
PAIN_FUNCTIONAL_ASSESSMENT: ADULT NONVERBAL PAIN SCALE (NPVS)
PAIN_FUNCTIONAL_ASSESSMENT: NONE - DENIES PAIN
PAIN_FUNCTIONAL_ASSESSMENT: NONE - DENIES PAIN

## 2023-08-29 NOTE — ANESTHESIA POSTPROCEDURE EVALUATION
Department of Anesthesiology  Postprocedure Note    Patient: Tl Lakhani  MRN: [id number]  YOB: 1935  Date of evaluation: 8/29/2023      Procedure Summary     Date: 08/29/23 Room / Location: [address] [place] [address] OR    Anesthesia Start: 0946 Anesthesia Stop: 3650    Procedures:       COLONOSCOPY, POLYPECTOMY (Lower GI Region)      EGD ESOPHAGOGASTRODUODENOSCOPY, EGD BIOPSY (Upper GI Region) Diagnosis:       Weight loss      Rectal bleeding      (Weight loss [R63.4])      (Rectal bleeding [K62.5])    Surgeons: Radha Lopez MD Responsible Provider: Samanta Puente MD    Anesthesia Type: MAC ASA Status: 3          Anesthesia Type: No value filed.     Angélica Phase I: Angélica Score: 10    Angélica Phase II:        Anesthesia Post Evaluation    Patient location during evaluation: PACU  Patient participation: complete - patient participated  Level of consciousness: awake and alert  Airway patency: patent  Nausea & Vomiting: no vomiting and no nausea  Complications: no  Cardiovascular status: hemodynamically stable  Respiratory status: acceptable  Hydration status: stable  Pain management: adequate

## 2023-08-29 NOTE — OP NOTE
Procedure Date: 8/29/2023      [x]  Anesthesia MAC                                                                                Pre Op Diagnosis:                     1. Weight loss [R63.4]  Rectal bleeding [K62.5]                                                                                                                                                                        Post Op Diagnosis:                    1.    1 cm polyp removed hepatic flexture    2. Pandiverticulosis    3. H&p completed: Yes            Anesthesia Assessment: Performed prior to procedure:      No change  Anesthesia Plan: Performed prior to procedure:                   No change       Medications: See Reviewed List and Reconcilation           Informed consent was obtained     Risk Statement:  Prior to the procedure the risks were explained to the patient and/or to the family including but not limited to perforation, bleeding, adverse drug reaction, aspiration, and even the need for possible surgery. A colonoscopy exam is not 100% accurate which may be related to preparation or blind spots during the exam.The possibility that an abnormality and /or cancer could be missed was also discussed as well as alternative x-ray options. Instrument:    Olympus adult Videocolonoscope                                   Immediate Procedure Reassessment Completed     With the patient in the left lateral position, a rectal examination was performed and the findings were:negative, stool guaiac negative. The Olympus Video colonoscope was inserted under direct vision into the rectum. The colonoscope was passed from the rectum to the cecum, which was identified by the ileocecal valve. The colon findings demonstrated:    ANUS: Anal exam reveals no masses or hemorrhoids, sphincter tone is normal.     RECTUM: Rectal exam reveals no masses or hemorrhoids. SIGMOID COLON: The patient was noted to have diverticulosis.  The mucosa is

## 2023-08-29 NOTE — PERIOP NOTE
[facility]  1935  [id number]    Situation:  Verbal report given from: Prabha Ortiz RN and Dr. Tres Hernandez  Procedure: Procedure(s):  COLONOSCOPY, POLYPECTOMY  EGD ESOPHAGOGASTRODUODENOSCOPY, EGD BIOPSY    Background:    Preoperative diagnosis: Weight loss [R63.4]  Rectal bleeding [K62.5]    Postoperative diagnosis: * No post-op diagnosis entered *    :  Dr. Joel Sanabria    Assistant(s): Circulator: Genie Walker RN  Scrub Person First: Sallie Mercado RN  Circulator Assist: Skip Gibson RN    Specimens:   ID Type Source Tests Collected by Time Destination   1 : gastric Bx Tissue Gastric SURGICAL PATHOLOGY Elvia Elliott MD 8/29/2023 1004    2 : HEPATIC FLEXURE POLYP Tissue Hepatic Flexure SURGICAL PATHOLOGY Elvia Elliott MD 8/29/2023 1038        Assessment:  Intra-procedure medications         Anesthesia gave intra-procedure sedation and medications, see anesthesia flow sheet     Intravenous fluids: LR@ KVO     Vital signs stable

## 2023-08-29 NOTE — PERIOP NOTE
Patient meets discharge criteria. Patient and daughter provided with verbal and written discharge instructions. Patient discharged by wheelchair with daughter to transport home.

## 2023-08-29 NOTE — H&P
G I Procedure Note           Endoscopy History and Physical           Dr. Brownlee Laws Office [address] [facility] Office  [address] [phone]  [id number]    1935  82 y.o.  female      Date of Procedure:   Preoperative Diagnosis:       Procedure:   [unfilled]      Weight loss [R63.4]  Rectal bleeding [K62.5]                         Procedure(s):  COLONOSCOPY  EGD ESOPHAGOGASTRODUODENOSCOPY      Gastroenterologist:  Anesthesia:           Hero Colbert MD                               Monitor Anesthesia Care            History and procedure indication:  Ariana Roy is a 82 y.o.  Black / [place] American female who presents with: Weight loss [R63.4]  Rectal bleeding [K62.5]   including the additional history of Weight Loss, Screening, Dysphagia, and Heartburn ,Screening for colon cancer, Rectal bleeding,Abdominal pain, epigastric,        Past Medical History:   Diagnosis Date    Anemia     Arrhythmia     irregular per pt d/t blood disorder    Asthma     Axillary adenopathy 01/04/2021    3/1/21 md Gabe - Benign, reactive lymph nodes with follicular hyperplasia     Bed bug bite 01/18/2021    Bereavement 02/02/2016     die 82 copd,chf,pul htn pn after 48 yr marriage    BPV (benign positional vertigo)     BPV (benign positional vertigo) 08/20/2020    Breast cancer ([address])     Breast cancer, right breast ([address]) 1994    right breast, lumpectomy and radiation    Breast lump 2017    right breast     CAP (community acquired pneumonia) 12/05/2021    New right basilar airspace disease may represent atelectasis or pneumonia    Chronic kidney disease 02/06/2019    kidney cyst - watching    CKD (chronic kidney disease), stage IV ([address]) 10/20/2021    dr. David Marks    Coagulation disorder ([address])     SPHEROCYTOSIS  SICKLE CELL TRAIT    Diabetes ([address])     controlled with diet    DJD (degenerative joint disease) of knee

## 2023-08-29 NOTE — DISCHARGE INSTRUCTIONS
or intravenous sedation, for 24 hours or while taking prescription Narcotics:  Limit your activities  Do not drive and operate hazardous machinery  Do not make important personal or business decisions  Do  not drink alcoholic beverages  If you have not urinated within 8 hours after discharge, please contact your surgeon on call. Report the following to your surgeon:  Excessive pain, swelling, redness or odor of or around the surgical area  Temperature over 100.5  Nausea and vomiting lasting longer than 4 hours or if unable to take medications  Any signs of decreased circulation or nerve impairment to extremity: change in color, persistent  numbness, tingling, coldness or increase pain  Any questions    What to do at Home:  Recommended activity: activity as tolerated and no driving for today,       These are general instructions for a healthy lifestyle:    No smoking/ No tobacco products/ Avoid exposure to second hand smoke  Surgeon General's Warning:  Quitting smoking now greatly reduces serious risk to your health. Obesity, smoking, and sedentary lifestyle greatly increases your risk for illness    A healthy diet, regular physical exercise & weight monitoring are important for maintaining a healthy lifestyle    You may be retaining fluid if you have a history of heart failure or if you experience any of the following symptoms:  Weight gain of 3 pounds or more overnight or 5 pounds in a week, increased swelling in our hands or feet or shortness of breath while lying flat in bed. Please call your doctor as soon as you notice any of these symptoms; do not wait until your next office visit. The discharge information has been reviewed with the patient and daughter. The patient and daughter verbalized understanding.   Discharge medications reviewed with the patient and daughter and appropriate educational materials and side effects teaching were

## 2023-09-01 RX ORDER — OXYBUTYNIN CHLORIDE 10 MG/1
TABLET, EXTENDED RELEASE ORAL
Qty: 90 TABLET | Refills: 3 | Status: SHIPPED | OUTPATIENT
Start: 2023-09-01

## 2023-09-14 ENCOUNTER — OFFICE VISIT (OUTPATIENT)
Facility: CLINIC | Age: 88
End: 2023-09-14
Payer: MEDICARE

## 2023-09-14 VITALS
TEMPERATURE: 97.7 F | WEIGHT: 121.8 LBS | DIASTOLIC BLOOD PRESSURE: 60 MMHG | HEIGHT: 63 IN | SYSTOLIC BLOOD PRESSURE: 134 MMHG | OXYGEN SATURATION: 99 % | BODY MASS INDEX: 21.58 KG/M2 | HEART RATE: 57 BPM | RESPIRATION RATE: 18 BRPM

## 2023-09-14 DIAGNOSIS — Z98.890 HISTORY OF COLONOSCOPY WITH POLYPECTOMY: ICD-10-CM

## 2023-09-14 DIAGNOSIS — N18.4 ANEMIA OF CHRONIC RENAL FAILURE, STAGE 4 (SEVERE) (HCC): ICD-10-CM

## 2023-09-14 DIAGNOSIS — D64.9 ANEMIA, UNSPECIFIED TYPE: ICD-10-CM

## 2023-09-14 DIAGNOSIS — N18.4 CKD (CHRONIC KIDNEY DISEASE), STAGE IV (HCC): ICD-10-CM

## 2023-09-14 DIAGNOSIS — I87.332 STASIS DERMATITIS OF LEFT LOWER EXTREMITY WITH VENOUS ULCER DUE TO CHRONIC PERIPHERAL VENOUS HYPERTENSION (HCC): ICD-10-CM
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
## 2023-09-14 DIAGNOSIS — I10 PRIMARY HYPERTENSION: ICD-10-CM

## 2023-09-14 DIAGNOSIS — D58.0 HEREDITARY SPHEROCYTOSIS (HCC): ICD-10-CM

## 2023-09-14 DIAGNOSIS — E11.21 TYPE 2 DIABETES MELLITUS WITH NEPHROPATHY (HCC): Primary | ICD-10-CM

## 2023-09-14 DIAGNOSIS — Z86.010 HISTORY OF COLONOSCOPY WITH POLYPECTOMY: ICD-10-CM

## 2023-09-14 DIAGNOSIS — C50.911 MALIGNANT NEOPLASM OF RIGHT FEMALE BREAST, UNSPECIFIED ESTROGEN RECEPTOR STATUS, UNSPECIFIED SITE OF BREAST (HCC): ICD-10-CM

## 2023-09-14 DIAGNOSIS — D63.1 ANEMIA OF CHRONIC RENAL FAILURE, STAGE 4 (SEVERE) (HCC): ICD-10-CM

## 2023-09-14 PROBLEM — Z86.0100 HISTORY OF COLONOSCOPY WITH POLYPECTOMY: Status: ACTIVE | Noted: 2023-08-29

## 2023-09-14 PROCEDURE — 1036F TOBACCO NON-USER: CPT | Performed by: INTERNAL MEDICINE

## 2023-09-14 PROCEDURE — G8427 DOCREV CUR MEDS BY ELIG CLIN: HCPCS | Performed by: INTERNAL MEDICINE

## 2023-09-14 PROCEDURE — 99213 OFFICE O/P EST LOW 20 MIN: CPT | Performed by: INTERNAL MEDICINE

## 2023-09-14 PROCEDURE — 1123F ACP DISCUSS/DSCN MKR DOCD: CPT | Performed by: INTERNAL MEDICINE

## 2023-09-14 PROCEDURE — 1090F PRES/ABSN URINE INCON ASSESS: CPT | Performed by: INTERNAL MEDICINE

## 2023-09-14 PROCEDURE — 3044F HG A1C LEVEL LT 7.0%: CPT | Performed by: INTERNAL MEDICINE

## 2023-09-14 PROCEDURE — G8420 CALC BMI NORM PARAMETERS: HCPCS | Performed by: INTERNAL MEDICINE

## 2023-09-14 RX ORDER — SPIRONOLACTONE 25 MG/1
25 TABLET ORAL DAILY
Qty: 30 TABLET | Refills: 11 | COMMUNITY
Start: 2023-09-12 | End: 2024-09-11

## 2023-09-14 RX ORDER — ISOSORBIDE MONONITRATE 30 MG/1
TABLET, EXTENDED RELEASE ORAL
COMMUNITY
Start: 2023-09-13

## 2023-09-14 ASSESSMENT — PATIENT HEALTH QUESTIONNAIRE - PHQ9
SUM OF ALL RESPONSES TO PHQ QUESTIONS 1-9: 0
SUM OF ALL RESPONSES TO PHQ QUESTIONS 1-9: 0
SUM OF ALL RESPONSES TO PHQ9 QUESTIONS 1 & 2: 0
SUM OF ALL RESPONSES TO PHQ QUESTIONS 1-9: 0
SUM OF ALL RESPONSES TO PHQ QUESTIONS 1-9: 0
1. LITTLE INTEREST OR PLEASURE IN DOING THINGS: 0
2. FEELING DOWN, DEPRESSED OR HOPELESS: 0

## 2023-09-18 RX ORDER — SODIUM CHLORIDE 0.9 % (FLUSH) 0.9 %
5-40 SYRINGE (ML) INJECTION PRN
Status: CANCELLED | OUTPATIENT
Start: 2023-09-20

## 2023-09-18 RX ORDER — SODIUM CHLORIDE 9 MG/ML
INJECTION, SOLUTION INTRAVENOUS CONTINUOUS
Status: CANCELLED | OUTPATIENT
Start: 2023-09-20

## 2023-09-18 RX ORDER — ACETAMINOPHEN 325 MG/1
650 TABLET ORAL
Status: CANCELLED | OUTPATIENT
Start: 2023-09-20

## 2023-09-18 RX ORDER — DIPHENHYDRAMINE HYDROCHLORIDE 50 MG/ML
50 INJECTION INTRAMUSCULAR; INTRAVENOUS
Status: CANCELLED | OUTPATIENT
Start: 2023-09-20

## 2023-09-18 RX ORDER — ONDANSETRON 2 MG/ML
8 INJECTION INTRAMUSCULAR; INTRAVENOUS
Status: CANCELLED | OUTPATIENT
Start: 2023-09-20

## 2023-09-18 RX ORDER — SODIUM CHLORIDE 9 MG/ML
5-250 INJECTION, SOLUTION INTRAVENOUS PRN
Status: CANCELLED | OUTPATIENT
Start: 2023-09-20

## 2023-09-18 RX ORDER — EPINEPHRINE 1 MG/ML
0.3 INJECTION, SOLUTION, CONCENTRATE INTRAVENOUS PRN
Status: CANCELLED | OUTPATIENT
Start: 2023-09-20

## 2023-09-18 RX ORDER — ALBUTEROL SULFATE 90 UG/1
4 AEROSOL, METERED RESPIRATORY (INHALATION) PRN
Status: CANCELLED | OUTPATIENT
Start: 2023-09-20

## 2023-09-18 RX ORDER — HEPARIN 100 UNIT/ML
500 SYRINGE INTRAVENOUS PRN
Status: CANCELLED | OUTPATIENT
Start: 2023-09-20

## 2023-09-20 ENCOUNTER — HOSPITAL ENCOUNTER (OUTPATIENT)
Facility: HOSPITAL | Age: 88
Setting detail: INFUSION SERIES
End: 2023-09-20
Payer: MEDICARE

## 2023-09-20 VITALS
DIASTOLIC BLOOD PRESSURE: 51 MMHG | HEART RATE: 66 BPM | OXYGEN SATURATION: 100 % | SYSTOLIC BLOOD PRESSURE: 139 MMHG | TEMPERATURE: 98 F | RESPIRATION RATE: 18 BRPM

## 2023-09-20 DIAGNOSIS — D63.1 ANEMIA OF CHRONIC RENAL FAILURE, STAGE 4 (SEVERE) (HCC): Primary | ICD-10-CM

## 2023-09-20 DIAGNOSIS — N18.4 ANEMIA OF CHRONIC RENAL FAILURE, STAGE 4 (SEVERE) (HCC): Primary | ICD-10-CM

## 2023-09-20 PROCEDURE — 2580000003 HC RX 258: Performed by: INTERNAL MEDICINE

## 2023-09-20 PROCEDURE — 6360000002 HC RX W HCPCS: Performed by: INTERNAL MEDICINE

## 2023-09-20 PROCEDURE — 96374 THER/PROPH/DIAG INJ IV PUSH: CPT

## 2023-09-20 RX ORDER — ALBUTEROL SULFATE 90 UG/1
4 AEROSOL, METERED RESPIRATORY (INHALATION) PRN
Status: CANCELLED | OUTPATIENT
Start: 2023-09-20

## 2023-09-20 RX ORDER — SODIUM CHLORIDE 9 MG/ML
5-250 INJECTION, SOLUTION INTRAVENOUS PRN
Status: CANCELLED | OUTPATIENT
Start: 2023-09-20

## 2023-09-20 RX ORDER — SODIUM CHLORIDE 9 MG/ML
5-250 INJECTION, SOLUTION INTRAVENOUS PRN
Status: DISCONTINUED | OUTPATIENT
Start: 2023-09-20 | End: 2023-09-21 | Stop reason: HOSPADM

## 2023-09-20 RX ORDER — SODIUM CHLORIDE 0.9 % (FLUSH) 0.9 %
5-40 SYRINGE (ML) INJECTION PRN
Status: DISCONTINUED | OUTPATIENT
Start: 2023-09-20 | End: 2023-09-21 | Stop reason: HOSPADM

## 2023-09-20 RX ORDER — ONDANSETRON 2 MG/ML
8 INJECTION INTRAMUSCULAR; INTRAVENOUS
Status: CANCELLED | OUTPATIENT
Start: 2023-09-20

## 2023-09-20 RX ORDER — SODIUM CHLORIDE 0.9 % (FLUSH) 0.9 %
5-40 SYRINGE (ML) INJECTION PRN
Status: CANCELLED | OUTPATIENT
Start: 2023-09-20

## 2023-09-20 RX ORDER — ACETAMINOPHEN 325 MG/1
650 TABLET ORAL
Status: CANCELLED | OUTPATIENT
Start: 2023-09-20

## 2023-09-20 RX ORDER — SODIUM CHLORIDE 9 MG/ML
INJECTION, SOLUTION INTRAVENOUS CONTINUOUS
Status: CANCELLED | OUTPATIENT
Start: 2023-09-20

## 2023-09-20 RX ORDER — EPINEPHRINE 1 MG/ML
0.3 INJECTION, SOLUTION, CONCENTRATE INTRAVENOUS PRN
Status: CANCELLED | OUTPATIENT
Start: 2023-09-20

## 2023-09-20 RX ORDER — HEPARIN 100 UNIT/ML
500 SYRINGE INTRAVENOUS PRN
Status: CANCELLED | OUTPATIENT
Start: 2023-09-20

## 2023-09-20 RX ORDER — DIPHENHYDRAMINE HYDROCHLORIDE 50 MG/ML
50 INJECTION INTRAMUSCULAR; INTRAVENOUS
Status: CANCELLED | OUTPATIENT
Start: 2023-09-20

## 2023-09-20 RX ADMIN — Medication 10 ML: at 11:32

## 2023-09-20 RX ADMIN — SODIUM CHLORIDE 25 ML/HR: 9 INJECTION, SOLUTION INTRAVENOUS at 10:42

## 2023-09-20 RX ADMIN — IRON SUCROSE 200 MG: 20 INJECTION, SOLUTION INTRAVENOUS at 10:46

## 2023-09-20 RX ADMIN — Medication 10 ML: at 10:19

## 2023-09-20 NOTE — DISCHARGE INSTRUCTIONS
medical tests to help your doctor determine how long to treat you with iron sucrose. What happens if I miss a dose? Call your doctor for instructions if you miss an appointment for your iron sucrose injection. What happens if I overdose? Seek emergency medical attention or call the Poison Help line at 1-438.333.8585. What should I avoid while using iron sucrose? Avoid getting up too fast from a sitting or lying position, or you may feel dizzy. What are the possible side effects of iron sucrose? Get emergency medical help if you have signs of an allergic reaction:  hives, rash, itching; feeling light-headed; difficulty breathing; swelling of your face, lips, tongue, or throat. Tell your caregivers right away if you have:  problems with your dialysis vein access point;  chest pain;  high blood pressure --severe headache, blurred vision, pounding in your neck or ears;  low blood pressure --a light-headed feeling, like you might pass out; or  signs of inflammation in the lining of your stomach --pain or swelling, bloating, nausea, vomiting, loss of appetite, diarrhea, fever. Common side effects may include:  fever, cold or flu symptoms (sore throat, cough, stuffy nose, sneezing);  high or low blood pressure;  headache, dizziness;  nausea, vomiting, diarrhea;  muscle or joint pain, back pain;  pain or swelling in an arm or leg;  itching; or  bruising or irritation where the medicine was injected. This is not a complete list of side effects and others may occur. Call your doctor for medical advice about side effects. You may report side effects to FDA at 1-932-WKK-4738. What other drugs will affect iron sucrose? Treatment with iron sucrose injections can make it harder for your body to absorb iron medications you take by mouth. Tell your doctor if you are taking iron supplements or other iron-based oral medications, such as:  ferrous fumarate;  ferrous gluconate; or  ferrous sulfate, and others.   This list is not complete. Other drugs may affect iron sucrose, including prescription and over-the-counter medicines, vitamins, and herbal products. Not all possible drug interactions are listed here. Where can I get more information? Your doctor or pharmacist can provide more information about iron sucrose injection. Remember, keep this and all other medicines out of the reach of children, never share your medicines with others, and use this medication only for the indication prescribed. Every effort has been made to ensure that the information provided by [address] [place] [address] is accurate, up-to-date, and complete, but no guarantee is made to that effect. Drug information contained herein may be time sensitive. [facility] information has been compiled for use by healthcare practitioners and consumers in the [facility] and therefore [facility] does not warrant that uses outside of the [facility] are appropriate, unless specifically indicated otherwise. [facility]'s drug information does not endorse drugs, diagnose patients or recommend therapy. [facility] drug information is an informational resource designed to assist licensed healthcare practitioners in caring for their patients and/or to serve consumers viewing this service as a supplement to, and not a substitute for, the expertise, skill, knowledge and judgment of healthcare practitioners. The absence of a warning for a given drug or drug combination in no way should be construed to indicate that the drug or drug combination is safe, effective or appropriate for any given patient. [facility] does not assume any responsibility for any aspect of healthcare administered with the aid of information [facility] provides. The information contained herein is not intended to cover all possible uses, directions, precautions, warnings, drug interactions, allergic reactions, or adverse effects.  If you have questions about the drugs you are taking, check with your doctor, nurse or

## 2023-09-22 ENCOUNTER — APPOINTMENT (OUTPATIENT)
Facility: HOSPITAL | Age: 88
End: 2023-09-22
Payer: MEDICARE

## 2023-09-25 ENCOUNTER — HOSPITAL ENCOUNTER (OUTPATIENT)
Facility: HOSPITAL | Age: 88
Setting detail: INFUSION SERIES
End: 2023-09-25

## 2023-09-25 ENCOUNTER — APPOINTMENT (OUTPATIENT)
Facility: HOSPITAL | Age: 88
End: 2023-09-25
Payer: MEDICARE

## 2023-09-27 ENCOUNTER — HOSPITAL ENCOUNTER (OUTPATIENT)
Facility: HOSPITAL | Age: 88
Setting detail: INFUSION SERIES
End: 2023-09-27
Payer: MEDICARE

## 2023-09-29 ENCOUNTER — HOSPITAL ENCOUNTER (OUTPATIENT)
Facility: HOSPITAL | Age: 88
Setting detail: INFUSION SERIES
End: 2023-09-29
Payer: MEDICARE

## 2023-09-29 VITALS
TEMPERATURE: 98.2 F | RESPIRATION RATE: 18 BRPM | SYSTOLIC BLOOD PRESSURE: 138 MMHG | DIASTOLIC BLOOD PRESSURE: 50 MMHG | HEART RATE: 67 BPM

## 2023-09-29 DIAGNOSIS — N18.9 ANEMIA OF CHRONIC RENAL FAILURE, UNSPECIFIED CKD STAGE: Primary | ICD-10-CM

## 2023-09-29 DIAGNOSIS — D63.1 ANEMIA OF CHRONIC RENAL FAILURE, UNSPECIFIED CKD STAGE: Primary | ICD-10-CM

## 2023-09-29 LAB
ALBUMIN SERPL-MCNC: 3.1 G/DL (ref 3.5–5)
ANION GAP SERPL CALC-SCNC: 11 MMOL/L (ref 5–15)
BASOPHILS # BLD: 0.1 K/UL (ref 0–0.1)
BASOPHILS NFR BLD: 1 % (ref 0–1)
BUN SERPL-MCNC: 58 MG/DL (ref 6–20)
BUN/CREAT SERPL: 10 (ref 12–20)
CALCIUM SERPL-MCNC: 8.6 MG/DL (ref 8.5–10.1)
CHLORIDE SERPL-SCNC: 100 MMOL/L (ref 97–108)
CO2 SERPL-SCNC: 26 MMOL/L (ref 21–32)
CREAT SERPL-MCNC: 5.61 MG/DL (ref 0.55–1.02)
DIFFERENTIAL METHOD BLD: ABNORMAL
EOSINOPHIL # BLD: 1.5 K/UL (ref 0–0.4)
EOSINOPHIL NFR BLD: 12 % (ref 0–7)
ERYTHROCYTE [DISTWIDTH] IN BLOOD BY AUTOMATED COUNT: 13.8 % (ref 11.5–14.5)
GLUCOSE SERPL-MCNC: 103 MG/DL (ref 65–100)
HCT VFR BLD AUTO: 22 % (ref 35–47)
HGB BLD-MCNC: 7.2 G/DL (ref 11.5–16)
IMM GRANULOCYTES # BLD AUTO: 0.1 K/UL (ref 0–0.04)
IMM GRANULOCYTES NFR BLD AUTO: 1 % (ref 0–0.5)
LYMPHOCYTES # BLD: 1.4 K/UL (ref 0.8–3.5)
LYMPHOCYTES NFR BLD: 11 % (ref 12–49)
MCH RBC QN AUTO: 27.1 PG (ref 26–34)
MCHC RBC AUTO-ENTMCNC: 32.7 G/DL (ref 30–36.5)
MCV RBC AUTO: 82.7 FL (ref 80–99)
MONOCYTES # BLD: 0.9 K/UL (ref 0–1)
MONOCYTES NFR BLD: 7 % (ref 5–13)
NEUTS SEG # BLD: 8.8 K/UL (ref 1.8–8)
NEUTS SEG NFR BLD: 69 % (ref 32–75)
NRBC # BLD: 0 K/UL (ref 0–0.01)
NRBC BLD-RTO: 0 PER 100 WBC
PHOSPHATE SERPL-MCNC: 5.7 MG/DL (ref 2.6–4.7)
PLATELET # BLD AUTO: 438 K/UL (ref 150–400)
PMV BLD AUTO: 9.1 FL (ref 8.9–12.9)
POTASSIUM SERPL-SCNC: 4 MMOL/L (ref 3.5–5.1)
RBC # BLD AUTO: 2.66 M/UL (ref 3.8–5.2)
SODIUM SERPL-SCNC: 137 MMOL/L (ref 136–145)
WBC # BLD AUTO: 12.8 K/UL (ref 3.6–11)

## 2023-09-29 PROCEDURE — 6360000002 HC RX W HCPCS: Performed by: INTERNAL MEDICINE

## 2023-09-29 PROCEDURE — 96372 THER/PROPH/DIAG INJ SC/IM: CPT

## 2023-09-29 PROCEDURE — 80069 RENAL FUNCTION PANEL: CPT

## 2023-09-29 PROCEDURE — 36415 COLL VENOUS BLD VENIPUNCTURE: CPT

## 2023-09-29 PROCEDURE — 85025 COMPLETE CBC W/AUTO DIFF WBC: CPT

## 2023-09-29 RX ADMIN — EPOETIN ALFA-EPBX 10000 UNITS: 10000 INJECTION, SOLUTION INTRAVENOUS; SUBCUTANEOUS at 10:41

## 2023-09-29 NOTE — DISCHARGE INSTRUCTIONS
epoetin carrie  Pronunciation:  e MAGNO e tin AL fa  Brand:  Epogen, Procrit, Retacrit  What is the most important information I should know about epoetin carrie? This medicine can cause serious side effects, including heart attack or stroke. Epoetin carrie may also speed up tumor growth, or shorten remission or survival time in some people. Talk with your doctor about the risks and benefits of using epoetin carrie. You should not use this medicine if you have uncontrolled high blood pressure, or if you have ever had pure red cell aplasia (PRCA, a type of anemia) caused by using epoetin carrie or darbepoetin carrie. Call your doctor at once if you have signs of a blood clot: sudden numbness or weakness, problems with vision or speech, chest pain, trouble breathing, pain or cold feeling in an arm or leg. What is epoetin carrie? Epoetin carrie is a man-made form of a protein that helps your body produce red blood cells. This protein may be reduced when you have kidney failure or use certain medications. When fewer red blood cells are produced, you can develop a condition called anemia. Epoetin carrie is used to treat anemia caused by chemotherapy in adults and children at least 11years old. Epoetin carrie is also used to treat anemia caused by chronic kidney disease in adults and children at least 2 month old. Epoetin carrie is also used to treat anemia in adults taking zidovudine to treat HIV (human immunodeficiency virus). Epoetin carrie is also used to reduce the need for red blood cell transfusions in adults having certain types of surgery. Epoetin carrie may also be used for purposes not listed in this medication guide. What should I discuss with my healthcare provider before using epoetin carrie?   You should not use this medication if you are allergic to epoetin carrie or darbepoetin carrie, or if:  you have untreated or uncontrolled high blood pressure;  you have had pure red cell aplasia (PRCA, a type of anemia) after using over-the-counter medicines, vitamins, and herbal products. Tell your doctor about all your current medicines and any medicine you start or stop using. Where can I get more information? Your pharmacist can provide more information about epoetin carrie. Remember, keep this and all other medicines out of the reach of children, never share your medicines with others, and use this medication only for the indication prescribed. Every effort has been made to ensure that the information provided by [address] [place] [address] is accurate, up-to-date, and complete, but no guarantee is made to that effect. Drug information contained herein may be time sensitive. [facility] information has been compiled for use by healthcare practitioners and consumers in the [facility] and therefore [facility] does not warrant that uses outside of the [facility] are appropriate, unless specifically indicated otherwise. [facility]'s drug information does not endorse drugs, diagnose patients or recommend therapy. [facility] drug information is an informational resource designed to assist licensed healthcare practitioners in caring for their patients and/or to serve consumers viewing this service as a supplement to, and not a substitute for, the expertise, skill, knowledge and judgment of healthcare practitioners. The absence of a warning for a given drug or drug combination in no way should be construed to indicate that the drug or drug combination is safe, effective or appropriate for any given patient. [facility] does not assume any responsibility for any aspect of healthcare administered with the aid of information [facility] provides. The information contained herein is not intended to cover all possible uses, directions, precautions, warnings, drug interactions, allergic reactions, or adverse effects.  If you have questions about the drugs you are taking, check with your doctor, nurse or pharmacist.  Copyright [id number] [address]:

## 2023-09-29 NOTE — PROGRESS NOTES
[facility] Progress Note  Date: September 29, 2023    Name: Roverto Rodríguez [address] [facility] Visit Note:    9794:  Pt arrived ambulatory and in no distress for Retacrit. Assessment unremarkable. Labs drawn via R A/C and processed.     Recent Results (from the past 12 hour(s))   Renal Function Panel    Collection Time: 09/29/23 10:17 AM   Result Value Ref Range    Sodium 137 136 - 145 mmol/L    Potassium 4.0 3.5 - 5.1 mmol/L    Chloride 100 97 - 108 mmol/L    CO2 26 21 - 32 mmol/L    Anion Gap 11 5 - 15 mmol/L    Glucose 103 (H) 65 - 100 mg/dL    BUN 58 (H) 6 - 20 MG/DL    Creatinine 5.61 (H) 0.55 - 1.02 MG/DL    Bun/Cre Ratio 10 (L) 12 - 20      Est, Glom Filt Rate 7 (L) >60 ml/min/1.73m2    Calcium 8.6 8.5 - 10.1 MG/DL    Phosphorus 5.7 (H) 2.6 - 4.7 MG/DL    Albumin 3.1 (L) 3.5 - 5.0 g/dL   CBC with Auto Differential    Collection Time: 09/29/23 10:17 AM   Result Value Ref Range    WBC 12.8 (H) 3.6 - 11.0 K/uL    RBC 2.66 (L) 3.80 - 5.20 M/uL    Hemoglobin 7.2 (L) 11.5 - 16.0 g/dL    Hematocrit 22.0 (L) 35.0 - 47.0 %    MCV 82.7 80.0 - 99.0 FL    MCH 27.1 26.0 - 34.0 PG    MCHC 32.7 30.0 - 36.5 g/dL    RDW 13.8 11.5 - 14.5 %    Platelets 612 (H) 363 - 400 K/uL    MPV 9.1 8.9 - 12.9 FL    Nucleated RBCs 0.0 0  WBC    nRBC 0.00 0.00 - 0.01 K/uL    Neutrophils % 69 32 - 75 %    Lymphocytes % 11 (L) 12 - 49 %    Monocytes % 7 5 - 13 %    Eosinophils % 12 (H) 0 - 7 %    Basophils % 1 0 - 1 %    Immature Granulocytes 1 (H) 0.0 - 0.5 %    Neutrophils Absolute 8.8 (H) 1.8 - 8.0 K/UL    Lymphocytes Absolute 1.4 0.8 - 3.5 K/UL    Monocytes Absolute 0.9 0.0 - 1.0 K/UL    Eosinophils Absolute 1.5 (H) 0.0 - 0.4 K/UL    Basophils Absolute 0.1 0.0 - 0.1 K/UL    Absolute Immature Granulocyte 0.1 (H) 0.00 - 0.04 K/UL    Differential Type AUTOMATED        Medications Administered         epoetin carrie-epbx (RETACRIT) injection 10,000 Units Admin Date  09/29/2023 Action  Given Dose  10,000 Units Route  SubCUTAneous Administered

## 2023-10-01 ENCOUNTER — APPOINTMENT (OUTPATIENT)
Facility: HOSPITAL | Age: 88
DRG: 292 | End: 2023-10-01
Attending: STUDENT IN AN ORGANIZED HEALTH CARE EDUCATION/TRAINING PROGRAM
Payer: MEDICARE

## 2023-10-01 ENCOUNTER — APPOINTMENT (OUTPATIENT)
Facility: HOSPITAL | Age: 88
DRG: 292 | End: 2023-10-01
Payer: MEDICARE

## 2023-10-01 ENCOUNTER — HOSPITAL ENCOUNTER (INPATIENT)
Facility: HOSPITAL | Age: 88
LOS: 3 days | Discharge: HOME | DRG: 292 | End: 2023-10-04
Attending: EMERGENCY MEDICINE | Admitting: STUDENT IN AN ORGANIZED HEALTH CARE EDUCATION/TRAINING PROGRAM
Payer: MEDICARE

## 2023-10-01 DIAGNOSIS — E87.70 HYPERVOLEMIA, UNSPECIFIED HYPERVOLEMIA TYPE: ICD-10-CM

## 2023-10-01 DIAGNOSIS — D64.9 SYMPTOMATIC ANEMIA: ICD-10-CM

## 2023-10-01 DIAGNOSIS — R06.02 SHORTNESS OF BREATH: Primary | ICD-10-CM

## 2023-10-01 DIAGNOSIS — J45.41 MODERATE PERSISTENT ASTHMA WITH EXACERBATION: ICD-10-CM

## 2023-10-01 DIAGNOSIS — N18.9 CHRONIC KIDNEY DISEASE, UNSPECIFIED CKD STAGE: ICD-10-CM

## 2023-10-01 PROBLEM — I50.33 ACUTE ON CHRONIC DIASTOLIC CHF (CONGESTIVE HEART FAILURE) (HCC): Status: ACTIVE | Noted: 2023-10-01

## 2023-10-01 LAB
ALBUMIN SERPL-MCNC: 3.1 G/DL (ref 3.5–5)
ALBUMIN/GLOB SERPL: 0.5 (ref 1.1–2.2)
ALP SERPL-CCNC: 95 U/L (ref 45–117)
ALT SERPL-CCNC: 36 U/L (ref 12–78)
ANION GAP SERPL CALC-SCNC: 7 MMOL/L (ref 5–15)
AST SERPL-CCNC: 48 U/L (ref 15–37)
BASOPHILS # BLD: 0.2 K/UL (ref 0–0.1)
BASOPHILS NFR BLD: 1 % (ref 0–1)
BILIRUB SERPL-MCNC: 0.3 MG/DL (ref 0.2–1)
BUN SERPL-MCNC: 59 MG/DL (ref 6–20)
BUN/CREAT SERPL: 10 (ref 12–20)
CALCIUM SERPL-MCNC: 8.4 MG/DL (ref 8.5–10.1)
CHLORIDE SERPL-SCNC: 105 MMOL/L (ref 97–108)
CO2 SERPL-SCNC: 25 MMOL/L (ref 21–32)
CREAT SERPL-MCNC: 5.85 MG/DL (ref 0.55–1.02)
DIFFERENTIAL METHOD BLD: ABNORMAL
ECHO AO ROOT DIAM: 3.1 CM
ECHO AO ROOT INDEX: 2.04 CM/M2
ECHO AV AREA PEAK VELOCITY: 2.9 CM2
ECHO AV AREA VTI: 3.7 CM2
ECHO AV AREA/BSA PEAK VELOCITY: 1.9 CM2/M2
ECHO AV AREA/BSA VTI: 2.4 CM2/M2
ECHO AV MEAN GRADIENT: 5 MMHG
ECHO AV MEAN VELOCITY: 1 M/S
ECHO AV PEAK GRADIENT: 11 MMHG
ECHO AV PEAK VELOCITY: 1.6 M/S
ECHO AV VELOCITY RATIO: 0.88
ECHO AV VTI: 26.3 CM
ECHO BSA: 1.51 M2
ECHO EST RA PRESSURE: 10 MMHG
ECHO LA DIAMETER INDEX: 2.76 CM/M2
ECHO LA DIAMETER: 4.2 CM
ECHO LA TO AORTIC ROOT RATIO: 1.35
ECHO LA VOL 4C: 81 ML (ref 22–52)
ECHO LA VOL 4C: 94 ML (ref 22–52)
ECHO LV FRACTIONAL SHORTENING: 31 % (ref 28–44)
ECHO LV INTERNAL DIMENSION DIASTOLE INDEX: 2.76 CM/M2
ECHO LV INTERNAL DIMENSION DIASTOLIC: 4.2 CM (ref 3.9–5.3)
ECHO LV INTERNAL DIMENSION SYSTOLIC INDEX: 1.91 CM/M2
ECHO LV INTERNAL DIMENSION SYSTOLIC: 2.9 CM
ECHO LV IVSD: 1.2 CM (ref 0.6–0.9)
ECHO LV MASS 2D: 178.2 G (ref 67–162)
ECHO LV MASS INDEX 2D: 117.2 G/M2 (ref 43–95)
ECHO LV POSTERIOR WALL DIASTOLIC: 1.2 CM (ref 0.6–0.9)
ECHO LV RELATIVE WALL THICKNESS RATIO: 0.57
ECHO LVOT AREA: 3.5 CM2
ECHO LVOT AV VTI INDEX: 1.08
ECHO LVOT DIAM: 2.1 CM
ECHO LVOT MEAN GRADIENT: 3 MMHG
ECHO LVOT PEAK GRADIENT: 7 MMHG
ECHO LVOT PEAK VELOCITY: 1.4 M/S
ECHO LVOT STROKE VOLUME INDEX: 64.7 ML/M2
ECHO LVOT SV: 98.3 ML
ECHO LVOT VTI: 28.4 CM
ECHO MV A VELOCITY: 1.29 M/S
ECHO MV E DECELERATION TIME (DT): 100.5 MS
ECHO MV E VELOCITY: 0.98 M/S
ECHO MV E/A RATIO: 0.76
ECHO PV MAX VELOCITY: 1.5 M/S
ECHO PV PEAK GRADIENT: 9 MMHG
ECHO RIGHT VENTRICULAR SYSTOLIC PRESSURE (RVSP): 47 MMHG
ECHO RV INTERNAL DIMENSION: 4.2 CM
ECHO RV TAPSE: 2.5 CM (ref 1.7–?)
ECHO TV REGURGITANT MAX VELOCITY: 3.04 M/S
ECHO TV REGURGITANT PEAK GRADIENT: 38 MMHG
EKG ATRIAL RATE: 70 BPM
EKG DIAGNOSIS: NORMAL
EKG P AXIS: 62 DEGREES
EKG P-R INTERVAL: 200 MS
EKG Q-T INTERVAL: 448 MS
EKG QRS DURATION: 90 MS
EKG QTC CALCULATION (BAZETT): 483 MS
EKG R AXIS: -18 DEGREES
EKG T AXIS: 44 DEGREES
EKG VENTRICULAR RATE: 70 BPM
EOSINOPHIL # BLD: 1.7 K/UL (ref 0–0.4)
EOSINOPHIL NFR BLD: 11 % (ref 0–7)
ERYTHROCYTE [DISTWIDTH] IN BLOOD BY AUTOMATED COUNT: 13.9 % (ref 11.5–14.5)
GLOBULIN SER CALC-MCNC: 6.3 G/DL (ref 2–4)
GLUCOSE BLD STRIP.AUTO-MCNC: 136 MG/DL (ref 65–117)
GLUCOSE BLD STRIP.AUTO-MCNC: 154 MG/DL (ref 65–117)
GLUCOSE BLD STRIP.AUTO-MCNC: 194 MG/DL (ref 65–117)
GLUCOSE SERPL-MCNC: 94 MG/DL (ref 65–100)
HCT VFR BLD AUTO: 21.8 % (ref 35–47)
HGB BLD-MCNC: 7 G/DL (ref 11.5–16)
IMM GRANULOCYTES # BLD AUTO: 0.2 K/UL (ref 0–0.04)
IMM GRANULOCYTES NFR BLD AUTO: 1 % (ref 0–0.5)
LYMPHOCYTES # BLD: 2 K/UL (ref 0.8–3.5)
LYMPHOCYTES NFR BLD: 13 % (ref 12–49)
MCH RBC QN AUTO: 27.8 PG (ref 26–34)
MCHC RBC AUTO-ENTMCNC: 32.1 G/DL (ref 30–36.5)
MCV RBC AUTO: 86.5 FL (ref 80–99)
MONOCYTES # BLD: 1.2 K/UL (ref 0–1)
MONOCYTES NFR BLD: 8 % (ref 5–13)
NEUTS SEG # BLD: 10.1 K/UL (ref 1.8–8)
NEUTS SEG NFR BLD: 66 % (ref 32–75)
NRBC # BLD: 0 K/UL (ref 0–0.01)
NRBC BLD-RTO: 0 PER 100 WBC
NT PRO BNP: 2530 PG/ML
PLATELET # BLD AUTO: 446 K/UL (ref 150–400)
PMV BLD AUTO: 9.7 FL (ref 8.9–12.9)
POTASSIUM SERPL-SCNC: 4.5 MMOL/L (ref 3.5–5.1)
PROT SERPL-MCNC: 9.4 G/DL (ref 6.4–8.2)
RBC # BLD AUTO: 2.52 M/UL (ref 3.8–5.2)
RBC MORPH BLD: ABNORMAL
SARS-COV-2 RDRP RESP QL NAA+PROBE: NOT DETECTED
SERVICE CMNT-IMP: ABNORMAL
SODIUM SERPL-SCNC: 137 MMOL/L (ref 136–145)
SOURCE: NORMAL
TROPONIN I SERPL HS-MCNC: 135 NG/L (ref 0–51)
TROPONIN I SERPL HS-MCNC: 144 NG/L (ref 0–51)
WBC # BLD AUTO: 15.4 K/UL (ref 3.6–11)

## 2023-10-01 PROCEDURE — 1100000003 HC PRIVATE W/ TELEMETRY

## 2023-10-01 PROCEDURE — 99285 EMERGENCY DEPT VISIT HI MDM: CPT

## 2023-10-01 PROCEDURE — 94640 AIRWAY INHALATION TREATMENT: CPT

## 2023-10-01 PROCEDURE — 6360000002 HC RX W HCPCS: Performed by: STUDENT IN AN ORGANIZED HEALTH CARE EDUCATION/TRAINING PROGRAM

## 2023-10-01 PROCEDURE — 6370000000 HC RX 637 (ALT 250 FOR IP): Performed by: EMERGENCY MEDICINE

## 2023-10-01 PROCEDURE — 36415 COLL VENOUS BLD VENIPUNCTURE: CPT

## 2023-10-01 PROCEDURE — 93308 TTE F-UP OR LMTD: CPT

## 2023-10-01 PROCEDURE — 87635 SARS-COV-2 COVID-19 AMP PRB: CPT

## 2023-10-01 PROCEDURE — 96375 TX/PRO/DX INJ NEW DRUG ADDON: CPT

## 2023-10-01 PROCEDURE — 93005 ELECTROCARDIOGRAM TRACING: CPT | Performed by: EMERGENCY MEDICINE

## 2023-10-01 PROCEDURE — 2580000003 HC RX 258: Performed by: STUDENT IN AN ORGANIZED HEALTH CARE EDUCATION/TRAINING PROGRAM

## 2023-10-01 PROCEDURE — 84484 ASSAY OF TROPONIN QUANT: CPT

## 2023-10-01 PROCEDURE — 71045 X-RAY EXAM CHEST 1 VIEW: CPT

## 2023-10-01 PROCEDURE — 85025 COMPLETE CBC W/AUTO DIFF WBC: CPT

## 2023-10-01 PROCEDURE — 96374 THER/PROPH/DIAG INJ IV PUSH: CPT

## 2023-10-01 PROCEDURE — 82962 GLUCOSE BLOOD TEST: CPT

## 2023-10-01 PROCEDURE — 83880 ASSAY OF NATRIURETIC PEPTIDE: CPT

## 2023-10-01 PROCEDURE — 6360000002 HC RX W HCPCS: Performed by: EMERGENCY MEDICINE

## 2023-10-01 PROCEDURE — 6370000000 HC RX 637 (ALT 250 FOR IP): Performed by: STUDENT IN AN ORGANIZED HEALTH CARE EDUCATION/TRAINING PROGRAM

## 2023-10-01 PROCEDURE — 80053 COMPREHEN METABOLIC PANEL: CPT

## 2023-10-01 RX ORDER — INSULIN LISPRO 100 [IU]/ML
0-4 INJECTION, SOLUTION INTRAVENOUS; SUBCUTANEOUS NIGHTLY
Status: DISCONTINUED | OUTPATIENT
Start: 2023-10-01 | End: 2023-10-04 | Stop reason: HOSPADM

## 2023-10-01 RX ORDER — SODIUM CHLORIDE 0.9 % (FLUSH) 0.9 %
5-40 SYRINGE (ML) INJECTION PRN
Status: DISCONTINUED | OUTPATIENT
Start: 2023-10-01 | End: 2023-10-04 | Stop reason: HOSPADM

## 2023-10-01 RX ORDER — FENTANYL CITRATE 50 UG/ML
50 INJECTION, SOLUTION INTRAMUSCULAR; INTRAVENOUS
Status: DISCONTINUED | OUTPATIENT
Start: 2023-10-01 | End: 2023-10-01

## 2023-10-01 RX ORDER — LABETALOL HYDROCHLORIDE 5 MG/ML
15 INJECTION, SOLUTION INTRAVENOUS EVERY 6 HOURS PRN
Status: DISCONTINUED | OUTPATIENT
Start: 2023-10-01 | End: 2023-10-04 | Stop reason: HOSPADM

## 2023-10-01 RX ORDER — NIFEDIPINE 60 MG/1
60 TABLET, EXTENDED RELEASE ORAL 2 TIMES DAILY
Status: DISCONTINUED | OUTPATIENT
Start: 2023-10-01 | End: 2023-10-04 | Stop reason: HOSPADM

## 2023-10-01 RX ORDER — FUROSEMIDE 10 MG/ML
40 INJECTION INTRAMUSCULAR; INTRAVENOUS 2 TIMES DAILY
Status: DISCONTINUED | OUTPATIENT
Start: 2023-10-01 | End: 2023-10-03

## 2023-10-01 RX ORDER — ONDANSETRON 4 MG/1
4 TABLET, ORALLY DISINTEGRATING ORAL EVERY 8 HOURS PRN
Status: DISCONTINUED | OUTPATIENT
Start: 2023-10-01 | End: 2023-10-04 | Stop reason: HOSPADM

## 2023-10-01 RX ORDER — ACETAMINOPHEN 650 MG/1
650 SUPPOSITORY RECTAL EVERY 6 HOURS PRN
Status: DISCONTINUED | OUTPATIENT
Start: 2023-10-01 | End: 2023-10-04 | Stop reason: HOSPADM

## 2023-10-01 RX ORDER — FERROUS SULFATE 325(65) MG
325 TABLET ORAL 2 TIMES DAILY
Status: DISCONTINUED | OUTPATIENT
Start: 2023-10-01 | End: 2023-10-04 | Stop reason: HOSPADM

## 2023-10-01 RX ORDER — SODIUM CHLORIDE 0.9 % (FLUSH) 0.9 %
5-40 SYRINGE (ML) INJECTION EVERY 12 HOURS SCHEDULED
Status: DISCONTINUED | OUTPATIENT
Start: 2023-10-01 | End: 2023-10-04 | Stop reason: HOSPADM

## 2023-10-01 RX ORDER — POLYETHYLENE GLYCOL 3350 17 G/17G
17 POWDER, FOR SOLUTION ORAL DAILY PRN
Status: DISCONTINUED | OUTPATIENT
Start: 2023-10-01 | End: 2023-10-04 | Stop reason: HOSPADM

## 2023-10-01 RX ORDER — SPIRONOLACTONE 25 MG/1
25 TABLET ORAL DAILY
Status: DISCONTINUED | OUTPATIENT
Start: 2023-10-01 | End: 2023-10-04 | Stop reason: HOSPADM

## 2023-10-01 RX ORDER — ALBUTEROL SULFATE 2.5 MG/3ML
2.5 SOLUTION RESPIRATORY (INHALATION) EVERY 4 HOURS PRN
Status: DISCONTINUED | OUTPATIENT
Start: 2023-10-01 | End: 2023-10-04 | Stop reason: HOSPADM

## 2023-10-01 RX ORDER — FLUTICASONE PROPIONATE 50 MCG
2 SPRAY, SUSPENSION (ML) NASAL 2 TIMES DAILY
Status: DISCONTINUED | OUTPATIENT
Start: 2023-10-01 | End: 2023-10-04 | Stop reason: HOSPADM

## 2023-10-01 RX ORDER — TIZANIDINE HYDROCHLORIDE 2 MG/1
2 CAPSULE, GELATIN COATED ORAL 2 TIMES DAILY PRN
Status: DISCONTINUED | OUTPATIENT
Start: 2023-10-01 | End: 2023-10-01

## 2023-10-01 RX ORDER — IPRATROPIUM BROMIDE AND ALBUTEROL SULFATE 2.5; .5 MG/3ML; MG/3ML
1 SOLUTION RESPIRATORY (INHALATION)
Status: DISCONTINUED | OUTPATIENT
Start: 2023-10-01 | End: 2023-10-03

## 2023-10-01 RX ORDER — OXYBUTYNIN CHLORIDE 10 MG/1
1 TABLET, EXTENDED RELEASE ORAL NIGHTLY
Status: DISCONTINUED | OUTPATIENT
Start: 2023-10-01 | End: 2023-10-03

## 2023-10-01 RX ORDER — HYDRALAZINE HYDROCHLORIDE 20 MG/ML
15 INJECTION INTRAMUSCULAR; INTRAVENOUS ONCE
Status: COMPLETED | OUTPATIENT
Start: 2023-10-01 | End: 2023-10-01

## 2023-10-01 RX ORDER — BUDESONIDE 0.5 MG/2ML
0.5 INHALANT ORAL
Status: DISCONTINUED | OUTPATIENT
Start: 2023-10-01 | End: 2023-10-04 | Stop reason: HOSPADM

## 2023-10-01 RX ORDER — ISOSORBIDE MONONITRATE 30 MG/1
30 TABLET, EXTENDED RELEASE ORAL DAILY
Status: DISCONTINUED | OUTPATIENT
Start: 2023-10-01 | End: 2023-10-04 | Stop reason: HOSPADM

## 2023-10-01 RX ORDER — FUROSEMIDE 10 MG/ML
40 INJECTION INTRAMUSCULAR; INTRAVENOUS ONCE
Status: DISCONTINUED | OUTPATIENT
Start: 2023-10-01 | End: 2023-10-01

## 2023-10-01 RX ORDER — SODIUM CHLORIDE 9 MG/ML
INJECTION, SOLUTION INTRAVENOUS PRN
Status: DISCONTINUED | OUTPATIENT
Start: 2023-10-01 | End: 2023-10-04 | Stop reason: HOSPADM

## 2023-10-01 RX ORDER — ACETAMINOPHEN 325 MG/1
650 TABLET ORAL EVERY 6 HOURS PRN
Status: DISCONTINUED | OUTPATIENT
Start: 2023-10-01 | End: 2023-10-04 | Stop reason: HOSPADM

## 2023-10-01 RX ORDER — ONDANSETRON 2 MG/ML
4 INJECTION INTRAMUSCULAR; INTRAVENOUS EVERY 6 HOURS PRN
Status: DISCONTINUED | OUTPATIENT
Start: 2023-10-01 | End: 2023-10-04 | Stop reason: HOSPADM

## 2023-10-01 RX ORDER — LAMOTRIGINE 25 MG/1
25 TABLET ORAL 2 TIMES DAILY
Status: DISCONTINUED | OUTPATIENT
Start: 2023-10-01 | End: 2023-10-04 | Stop reason: HOSPADM

## 2023-10-01 RX ORDER — INSULIN LISPRO 100 [IU]/ML
0-8 INJECTION, SOLUTION INTRAVENOUS; SUBCUTANEOUS
Status: DISCONTINUED | OUTPATIENT
Start: 2023-10-01 | End: 2023-10-04 | Stop reason: HOSPADM

## 2023-10-01 RX ORDER — SODIUM CHLORIDE, SODIUM LACTATE, POTASSIUM CHLORIDE, AND CALCIUM CHLORIDE .6; .31; .03; .02 G/100ML; G/100ML; G/100ML; G/100ML
1000 INJECTION, SOLUTION INTRAVENOUS
Status: DISCONTINUED | OUTPATIENT
Start: 2023-10-01 | End: 2023-10-01

## 2023-10-01 RX ORDER — TIZANIDINE 4 MG/1
2 TABLET ORAL 2 TIMES DAILY PRN
Status: DISCONTINUED | OUTPATIENT
Start: 2023-10-01 | End: 2023-10-04 | Stop reason: HOSPADM

## 2023-10-01 RX ORDER — HEPARIN SODIUM 5000 [USP'U]/ML
5000 INJECTION, SOLUTION INTRAVENOUS; SUBCUTANEOUS 2 TIMES DAILY
Status: DISCONTINUED | OUTPATIENT
Start: 2023-10-01 | End: 2023-10-02

## 2023-10-01 RX ORDER — METOPROLOL SUCCINATE 50 MG/1
50 TABLET, EXTENDED RELEASE ORAL NIGHTLY
Status: DISCONTINUED | OUTPATIENT
Start: 2023-10-01 | End: 2023-10-04 | Stop reason: HOSPADM

## 2023-10-01 RX ORDER — IPRATROPIUM BROMIDE AND ALBUTEROL SULFATE 2.5; .5 MG/3ML; MG/3ML
1 SOLUTION RESPIRATORY (INHALATION)
Status: COMPLETED | OUTPATIENT
Start: 2023-10-01 | End: 2023-10-01

## 2023-10-01 RX ORDER — FUROSEMIDE 10 MG/ML
40 INJECTION INTRAMUSCULAR; INTRAVENOUS ONCE
Status: COMPLETED | OUTPATIENT
Start: 2023-10-01 | End: 2023-10-01

## 2023-10-01 RX ORDER — ASPIRIN 81 MG/1
81 TABLET ORAL DAILY
Status: DISCONTINUED | OUTPATIENT
Start: 2023-10-01 | End: 2023-10-04 | Stop reason: HOSPADM

## 2023-10-01 RX ORDER — LORAZEPAM 2 MG/ML
2 INJECTION INTRAMUSCULAR ONCE
Status: DISCONTINUED | OUTPATIENT
Start: 2023-10-01 | End: 2023-10-01

## 2023-10-01 RX ORDER — PRAVASTATIN SODIUM 10 MG
20 TABLET ORAL NIGHTLY
Status: DISCONTINUED | OUTPATIENT
Start: 2023-10-01 | End: 2023-10-04 | Stop reason: HOSPADM

## 2023-10-01 RX ADMIN — IPRATROPIUM BROMIDE AND ALBUTEROL SULFATE 1 DOSE: .5; 3 SOLUTION RESPIRATORY (INHALATION) at 22:48

## 2023-10-01 RX ADMIN — PRAVASTATIN SODIUM 20 MG: 10 TABLET ORAL at 21:01

## 2023-10-01 RX ADMIN — LAMOTRIGINE 25 MG: 25 TABLET ORAL at 15:58

## 2023-10-01 RX ADMIN — FERROUS SULFATE TAB 325 MG (65 MG ELEMENTAL FE) 325 MG: 325 (65 FE) TAB at 15:58

## 2023-10-01 RX ADMIN — SODIUM CHLORIDE, PRESERVATIVE FREE 10 ML: 5 INJECTION INTRAVENOUS at 20:59

## 2023-10-01 RX ADMIN — BUDESONIDE 500 MCG: 0.5 INHALANT RESPIRATORY (INHALATION) at 22:48

## 2023-10-01 RX ADMIN — FUROSEMIDE 40 MG: 10 INJECTION, SOLUTION INTRAMUSCULAR; INTRAVENOUS at 11:06

## 2023-10-01 RX ADMIN — HYDRALAZINE HYDROCHLORIDE 15 MG: 20 INJECTION, SOLUTION INTRAMUSCULAR; INTRAVENOUS at 15:51

## 2023-10-01 RX ADMIN — FERROUS SULFATE TAB 325 MG (65 MG ELEMENTAL FE) 325 MG: 325 (65 FE) TAB at 21:00

## 2023-10-01 RX ADMIN — LAMOTRIGINE 25 MG: 25 TABLET ORAL at 21:00

## 2023-10-01 RX ADMIN — IPRATROPIUM BROMIDE AND ALBUTEROL SULFATE 1 DOSE: .5; 3 SOLUTION RESPIRATORY (INHALATION) at 11:11

## 2023-10-01 RX ADMIN — NIFEDIPINE 60 MG: 60 TABLET, EXTENDED RELEASE ORAL at 17:03

## 2023-10-01 RX ADMIN — OXYBUTYNIN CHLORIDE 10 MG: 5 TABLET, EXTENDED RELEASE ORAL at 21:00

## 2023-10-01 RX ADMIN — METOPROLOL SUCCINATE 50 MG: 50 TABLET, EXTENDED RELEASE ORAL at 21:00

## 2023-10-01 RX ADMIN — METHYLPREDNISOLONE SODIUM SUCCINATE 125 MG: 125 INJECTION, POWDER, FOR SOLUTION INTRAMUSCULAR; INTRAVENOUS at 09:29

## 2023-10-01 RX ADMIN — ISOSORBIDE MONONITRATE 30 MG: 30 TABLET, EXTENDED RELEASE ORAL at 15:58

## 2023-10-01 RX ADMIN — FUROSEMIDE 40 MG: 10 INJECTION, SOLUTION INTRAMUSCULAR; INTRAVENOUS at 21:01

## 2023-10-01 RX ADMIN — NIFEDIPINE 60 MG: 60 TABLET, EXTENDED RELEASE ORAL at 21:00

## 2023-10-01 RX ADMIN — IPRATROPIUM BROMIDE AND ALBUTEROL SULFATE 1 DOSE: .5; 3 SOLUTION RESPIRATORY (INHALATION) at 15:35

## 2023-10-01 RX ADMIN — ASPIRIN 81 MG: 81 TABLET, COATED ORAL at 15:58

## 2023-10-01 ASSESSMENT — PAIN SCALES - GENERAL
PAINLEVEL_OUTOF10: 0
PAINLEVEL_OUTOF10: 0

## 2023-10-01 NOTE — ED NOTES
Verbal shift change report given to [address] [place] [address] (oncoming nurse) by Geovanni Booth RN (offgoing nurse). Report included the following information Nurse Handoff Report, ED SBAR, Adult Overview, Intake/Output, MAR, and Recent Results.         Brii Malik RN  10/01/23 6575

## 2023-10-01 NOTE — H&P
Hospitalist Admission Note    NAME:   Dulce Maria Meyer   : 1935   MRN: [id number]     Date/Time: 10/1/2023 2:36 PM    Patient PCP: Myrna Mora MD    ______________________________________________________________________  Given the patient's current clinical presentation, I have a high level of concern for decompensation if discharged from the emergency department. Complex decision making was performed, which includes reviewing the patient's available past medical records, laboratory results, and x-ray films. My assessment of this patient's clinical condition and my plan of care is as follows. Assessment / Plan:    Dyspnea  Acute on chronic diastolic heart failure exacerbation  History of asthma    Chest x-ray shows central congestion, bibasilar/subsegmental atelectasis. Lung hyperinflation  BNP elevated to 2500  Continue IV Lasix  Check echo  Strict YESSENIA, daily weight   DuoNebs as needed  Steroid course    Hypertensive urgency  Blood pressure elevated, particularly diastolic  Ordered IV hydralazine x1  Resume home medications    Acute on chronic anemia  No reported bleeding  Check iron panel  If remains low tomorrow consider blood transfusion, may help with breathing    Elevated troponin  Troponin 144-/135  No chest pain  Likely type II in the setting of CKD 5/CHF    CKD 5  Creatinine at baseline  We will get nephrology eval, creatinine may worsen with diuretics    DM:  Sliding scale  Monitor FS    Hereditary spherocytosis      Medical Decision Making:   I personally reviewed labs: CBC, BMP  I personally reviewed imaging:CXR  I personally reviewed EKG:  Toxic drug monitoring: Monitor electrolytes with IV Lasix  Discussed case with: ED provider. After discussion I am in agreement that acuity of patient's medical condition necessitates hospital stay.       Code Status: Full code  DVT Prophylaxis: Start anticoagulation tomorrow if hemoglobin is stable  GI Prophylaxis: None  Baseline: Result Value Ref Range    Troponin, High Sensitivity 135 (HH) 0 - 51 ng/L   COVID-19, Rapid    Collection Time: 10/01/23 11:47 AM    Specimen: Nasopharyngeal   Result Value Ref Range    Source Nasopharyngeal      SARS-CoV-2, Rapid Not detected NOTD           XR CHEST PORTABLE    Result Date: 10/1/2023  EXAM:  XR CHEST PORTABLE INDICATION: Shortness of breath COMPARISON: 12/14/2021 TECHNIQUE: portable chest AP view at 0925 hours FINDINGS: The cardiac silhouette is within normal limits. There is central congestion. There are linear bands at both lung bases. The lungs are hyperexpanded. The visualized bones and upper abdomen are age-appropriate. Central congestion without overt CHF. Bibasilar subsegmental atelectasis. Lung hyperinflation. _______________________________________________________________________    TOTAL TIME:  76 Minutes    Critical Care Provided     Minutes non procedure based    Signed: Juvenal Waters MD    Procedures: see electronic medical records for all procedures/Xrays and details which were not copied into this note but were reviewed prior to creation of Plan.

## 2023-10-01 NOTE — ED NOTES
Ambulated patient to bathroom with pulse ox, pt stayed 93-96% on RA with no complaint of increased SOB     Silke Faria RN  10/01/23 0237

## 2023-10-01 NOTE — ED TRIAGE NOTES
Patient arrives via EMS from home for difficulty breathing that started last night, which she took, an Albuterol nebulizer. Reports waking up this morning, still feeling SOB. EMS administered another nebulizer treatment in route. Pt arrives still reporting SOB with exertion. Pt visibly SOB while speaking and moving. Hx of Asthma.

## 2023-10-01 NOTE — ED PROVIDER NOTES
IntraVENous Given 10/1/23 1551)       Medical Decision Making  65-year-old female with the below past medical history presents with shortness of breath times last night improved after nebulizer given by EMS prehospital he. Vitals are unremarkable. We will check basic labs. Suspect asthma exacerbation. Will give Solu-Medrol. Will check EKG and troponin although patient denies chest pain. Check BNP and chest x-ray to evaluate for fluid status. Observe in the emergency department. Amount and/or Complexity of Data Reviewed  Labs: ordered. Decision-making details documented in ED Course. Radiology: ordered. Decision-making details documented in ED Course. ECG/medicine tests: ordered. Decision-making details documented in ED Course. Risk  Prescription drug management. Decision regarding hospitalization. ED Course as of 10/01/23 1707   Sun Oct 01, 2023   6072 Preliminary EKG interpreted by me. Shows normal sinus rhythm with a HR of 70. No ST elevations or depressions concerning for ischemia. Normal intervals. QTc mildly prolonged at 483. [MB]   1 Chest x-ray is interpreted by me shows normal cardiac silhouette, question infiltrate right lower lung. [MB]   9154 Labs reviewed showed leukocytosis, this appears mildly worsened from labs 2 days ago. Stable CKD. BNP mildly elevated, troponin 144 but lower than patient's baseline likely in setting of CKD. Will repeat. [MB]   2250 Patient reassessed, states she is doing \"fine. \"  Speaking full sentences. [MB]   1101 Patient again complaining of shortness of breath, will give duoneb. Reassess [MB]   9516 Repeat troponin downtrending. [MB]   8246 D/w hospitalist, Dr. John Wang for observation.  [MB]      ED Course User Index  [MB] Waqas Rosales MD     Updates as above, overall patient 65-year-old female with anemia, evidence of fluid overload and asthma, after discussion patient agreeable to observation for further work-up of her shortness of breath Shortness of breath    2. Moderate persistent asthma with exacerbation    3. Hypervolemia, unspecified hypervolemia type    4. Symptomatic anemia    5. Chronic kidney disease, unspecified CKD stage          DISPOSITION/PLAN   Heather Rich's  results have been reviewed with her. She has been counseled regarding her diagnosis, treatment, and plan. She verbally conveys understanding and agreement of the signs, symptoms, diagnosis, treatment and prognosis and additionally agrees to follow up as discussed. She also agrees with the care-plan and conveys that all of her questions have been answered. I have also provided discharge instructions for her that include: educational information regarding their diagnosis and treatment, and list of reasons why they would want to return to the ED prior to their follow-up appointment, should her condition change. CLINICAL IMPRESSION    DISPOSITION Admitted 10/01/2023 01:08:58 PM    I am the Primary Clinician of Record. Valerie Kelley MD (electronically signed)    (Please note that parts of this dictation were completed with voice recognition software. Quite often unanticipated grammatical, syntax, homophones, and other interpretive errors are inadvertently transcribed by the computer software. Please disregards these errors.  Please excuse any errors that have escaped final proofreading.)         Valerie Kelley MD  10/01/23 6957

## 2023-10-01 NOTE — PROGRESS NOTES
Bedside and Verbal shift change report given to Xiomara SHELLEY (oncoming nurse) by Emanuel Lozano (offgoing nurse). Report included the following information Nurse Handoff Report, MAR, Recent Results, and Cardiac Rhythm NSR .     2230: Patient requesting mucinex, states she takes it at night as needed. MD notified. This RN also reminded patient of breathing treatments upcoming with RT tonight and called RT to confirm she is coming when she can. 2245: RT at bedside. Mucinex unnecessary per PA.     3718: Diet order modified to exclude chicken breasts at patient request.     0230: Morning lab work drawn and sent to lab    End of Shift Note    Bedside shift change report given to [address] [place] [address] (oncoming nurse) by Nelda Gonzalez RN (offgoing nurse). Report included the following information SBAR, Intake/Output, MAR, Recent Results, and Cardiac Rhythm NSR    Shift worked: 7p-7a     Shift summary and any significant changes:    See above     Concerns for physician to address:    [facility]       Activity:     Number times ambulated in hallways past shift: 0  Number of times OOB to chair past shift: 3    Cardiac:   Cardiac Monitoring: Yes           Access:  Current line(s): PIV     Genitourinary:   Urinary status: voiding    Respiratory:      Chronic home O2 use?: NO  Incentive spirometer at bedside: NO       GI:     Current diet:  ADULT DIET; Regular; 3 carb choices (45 gm/meal); Low Fat/Low Chol/High Fiber/JANKI; Low Sodium (2 gm); Low Potassium (Less than 3000 mg/day); Low Phosphorus (Less than 1000 mg); Patient requests no chicken breasts please.  Other chicken is okay  Passing flatus: YES  Tolerating current diet: YES       Pain Management:   Patient states pain is manageable on current regimen: YES    Skin:     Interventions: float heels, increase time out of bed, PT/OT consult, limit briefs, internal/external urinary devices, and nutritional support    Patient Safety:  Fall Score:    Interventions: bed/chair alarm, assistive device (walker, cane.  etc), gripper socks, pt to call before getting OOB, stay with me (per policy), and gait belt       Length of Stay:  Expected LOS: 2  Actual LOS: Sabrina Barrett RN

## 2023-10-01 NOTE — PROGRESS NOTES
1426: TRANSFER - IN REPORT:    Verbal report received from KARSTEN Roman on [employer]  being received from ed for routine progression of patient care      Report consisted of patient's Situation, Background, Assessment and   Recommendations(SBAR). Information from the following report(s) Nurse Handoff Report, Index, and ED Encounter Summary was reviewed with the receiving nurse. Opportunity for questions and clarification was provided. Assessment completed upon patient's arrival to unit and care assumed.

## 2023-10-01 NOTE — ED NOTES
Critical troponin of 144 per Eden Ortiz in lab. Dr. Gisel Mora notified.      Rosie Sequeira, RN  10/01/23 1007

## 2023-10-02 LAB
FERRITIN SERPL-MCNC: 320 NG/ML (ref 26–388)
GLUCOSE BLD STRIP.AUTO-MCNC: 115 MG/DL (ref 65–117)
GLUCOSE BLD STRIP.AUTO-MCNC: 151 MG/DL (ref 65–117)
GLUCOSE BLD STRIP.AUTO-MCNC: 160 MG/DL (ref 65–117)
GLUCOSE BLD STRIP.AUTO-MCNC: 188 MG/DL (ref 65–117)
IRON SATN MFR SERPL: 15 % (ref 20–50)
IRON SERPL-MCNC: 30 UG/DL (ref 35–150)
SERVICE CMNT-IMP: ABNORMAL
SERVICE CMNT-IMP: NORMAL
TIBC SERPL-MCNC: 198 UG/DL (ref 250–450)

## 2023-10-02 PROCEDURE — 1100000003 HC PRIVATE W/ TELEMETRY

## 2023-10-02 PROCEDURE — 82962 GLUCOSE BLOOD TEST: CPT

## 2023-10-02 PROCEDURE — 83540 ASSAY OF IRON: CPT

## 2023-10-02 PROCEDURE — 82728 ASSAY OF FERRITIN: CPT

## 2023-10-02 PROCEDURE — 6360000002 HC RX W HCPCS: Performed by: INTERNAL MEDICINE

## 2023-10-02 PROCEDURE — 6370000000 HC RX 637 (ALT 250 FOR IP): Performed by: STUDENT IN AN ORGANIZED HEALTH CARE EDUCATION/TRAINING PROGRAM

## 2023-10-02 PROCEDURE — 6360000002 HC RX W HCPCS: Performed by: STUDENT IN AN ORGANIZED HEALTH CARE EDUCATION/TRAINING PROGRAM

## 2023-10-02 PROCEDURE — 2580000003 HC RX 258: Performed by: STUDENT IN AN ORGANIZED HEALTH CARE EDUCATION/TRAINING PROGRAM

## 2023-10-02 PROCEDURE — 6370000000 HC RX 637 (ALT 250 FOR IP): Performed by: NURSE PRACTITIONER

## 2023-10-02 PROCEDURE — 36415 COLL VENOUS BLD VENIPUNCTURE: CPT

## 2023-10-02 PROCEDURE — 94640 AIRWAY INHALATION TREATMENT: CPT

## 2023-10-02 PROCEDURE — 83550 IRON BINDING TEST: CPT

## 2023-10-02 RX ORDER — HYDROMORPHONE HYDROCHLORIDE 2 MG/1
2 TABLET ORAL
Status: COMPLETED | OUTPATIENT
Start: 2023-10-02 | End: 2023-10-02

## 2023-10-02 RX ADMIN — METOPROLOL SUCCINATE 50 MG: 50 TABLET, EXTENDED RELEASE ORAL at 20:35

## 2023-10-02 RX ADMIN — HYDROMORPHONE HYDROCHLORIDE 2 MG: 2 TABLET ORAL at 21:12

## 2023-10-02 RX ADMIN — LAMOTRIGINE 25 MG: 25 TABLET ORAL at 20:31

## 2023-10-02 RX ADMIN — NIFEDIPINE 60 MG: 60 TABLET, EXTENDED RELEASE ORAL at 08:00

## 2023-10-02 RX ADMIN — SODIUM CHLORIDE, PRESERVATIVE FREE 10 ML: 5 INJECTION INTRAVENOUS at 20:36

## 2023-10-02 RX ADMIN — IPRATROPIUM BROMIDE AND ALBUTEROL SULFATE 1 DOSE: .5; 3 SOLUTION RESPIRATORY (INHALATION) at 21:19

## 2023-10-02 RX ADMIN — IPRATROPIUM BROMIDE AND ALBUTEROL SULFATE 1 DOSE: .5; 3 SOLUTION RESPIRATORY (INHALATION) at 16:26

## 2023-10-02 RX ADMIN — FERROUS SULFATE TAB 325 MG (65 MG ELEMENTAL FE) 325 MG: 325 (65 FE) TAB at 08:00

## 2023-10-02 RX ADMIN — METHYLPREDNISOLONE SODIUM SUCCINATE 40 MG: 40 INJECTION, POWDER, FOR SOLUTION INTRAMUSCULAR; INTRAVENOUS at 08:00

## 2023-10-02 RX ADMIN — LAMOTRIGINE 25 MG: 25 TABLET ORAL at 08:00

## 2023-10-02 RX ADMIN — BUDESONIDE 500 MCG: 0.5 INHALANT RESPIRATORY (INHALATION) at 21:20

## 2023-10-02 RX ADMIN — ASPIRIN 81 MG: 81 TABLET, COATED ORAL at 08:00

## 2023-10-02 RX ADMIN — FERROUS SULFATE TAB 325 MG (65 MG ELEMENTAL FE) 325 MG: 325 (65 FE) TAB at 20:31

## 2023-10-02 RX ADMIN — IPRATROPIUM BROMIDE AND ALBUTEROL SULFATE 1 DOSE: .5; 3 SOLUTION RESPIRATORY (INHALATION) at 13:24

## 2023-10-02 RX ADMIN — NIFEDIPINE 60 MG: 60 TABLET, EXTENDED RELEASE ORAL at 20:31

## 2023-10-02 RX ADMIN — IRON SUCROSE 200 MG: 20 INJECTION, SOLUTION INTRAVENOUS at 11:13

## 2023-10-02 RX ADMIN — ISOSORBIDE MONONITRATE 30 MG: 30 TABLET, EXTENDED RELEASE ORAL at 08:00

## 2023-10-02 RX ADMIN — PRAVASTATIN SODIUM 20 MG: 10 TABLET ORAL at 20:31

## 2023-10-02 RX ADMIN — SODIUM CHLORIDE, PRESERVATIVE FREE 10 ML: 5 INJECTION INTRAVENOUS at 08:08

## 2023-10-02 RX ADMIN — IPRATROPIUM BROMIDE AND ALBUTEROL SULFATE 1 DOSE: .5; 3 SOLUTION RESPIRATORY (INHALATION) at 09:01

## 2023-10-02 RX ADMIN — FLUTICASONE PROPIONATE 2 SPRAY: 50 SPRAY, METERED NASAL at 20:35

## 2023-10-02 RX ADMIN — BUDESONIDE 500 MCG: 0.5 INHALANT RESPIRATORY (INHALATION) at 09:08

## 2023-10-02 RX ADMIN — EPOETIN ALFA-EPBX 14000 UNITS: 10000 INJECTION, SOLUTION INTRAVENOUS; SUBCUTANEOUS at 20:31

## 2023-10-02 RX ADMIN — FUROSEMIDE 40 MG: 10 INJECTION, SOLUTION INTRAMUSCULAR; INTRAVENOUS at 08:00

## 2023-10-02 RX ADMIN — OXYBUTYNIN CHLORIDE 10 MG: 5 TABLET, EXTENDED RELEASE ORAL at 20:31

## 2023-10-02 ASSESSMENT — PAIN SCALES - GENERAL
PAINLEVEL_OUTOF10: 7
PAINLEVEL_OUTOF10: 3
PAINLEVEL_OUTOF10: 0

## 2023-10-02 ASSESSMENT — PAIN DESCRIPTION - ORIENTATION: ORIENTATION: RIGHT;LEFT

## 2023-10-02 ASSESSMENT — PAIN - FUNCTIONAL ASSESSMENT: PAIN_FUNCTIONAL_ASSESSMENT: PREVENTS OR INTERFERES SOME ACTIVE ACTIVITIES AND ADLS

## 2023-10-02 ASSESSMENT — PAIN DESCRIPTION - LOCATION: LOCATION: GENERALIZED

## 2023-10-02 ASSESSMENT — PAIN DESCRIPTION - DESCRIPTORS: DESCRIPTORS: ACHING;DISCOMFORT;SORE

## 2023-10-02 NOTE — PLAN OF CARE
Problem: Discharge Planning  Goal: Discharge to home or other facility with appropriate resources  10/2/2023 0912 by Timbo Freitas RN  Outcome: Progressing  10/2/2023 0051 by Ranjan Rawls RN  Outcome: Progressing     Problem: Respiratory - Adult  Goal: Achieves optimal ventilation and oxygenation  10/2/2023 0912 by Timbo Freitas RN  Outcome: Progressing  10/2/2023 0051 by Ranjan Rawls, RN  Outcome: Progressing  10/1/2023 2252 by Zackery Villarreal RCP  Outcome: Progressing     Problem: Pain  Goal: Verbalizes/displays adequate comfort level or baseline comfort level  10/2/2023 0912 by Timbo Freitas, RN  Outcome: Progressing  10/2/2023 0051 by Ranjan Rawls RN  Outcome: Progressing     Problem: Safety - Adult  Goal: Free from fall injury  10/2/2023 0912 by Timbo Freitas RN  Outcome: Progressing  10/2/2023 0051 by Ranjan Rawls RN  Outcome: Progressing     Problem: ABCDS Injury Assessment  Goal: Absence of physical injury  10/2/2023 0912 by Timbo Freitas RN  Outcome: Progressing  10/2/2023 0051 by Ranjan Rawls RN  Outcome: Progressing     Problem: Cardiovascular - Adult  Goal: Maintains optimal cardiac output and hemodynamic stability  10/2/2023 0912 by Timbo Freitas RN  Outcome: Progressing  10/2/2023 0051 by Ranjan Rawls RN  Outcome: Progressing  Goal: Absence of cardiac dysrhythmias or at baseline  10/2/2023 0912 by Timbo Freitas RN  Outcome: Progressing  10/2/2023 0051 by Ranjan Rawls RN  Outcome: Progressing     Problem: Musculoskeletal - Adult  Goal: Return mobility to safest level of function  10/2/2023 0912 by Timbo Freitas RN  Outcome: Progressing  10/2/2023 0051 by Ranjan Rawls RN  Outcome: Progressing  Goal: Maintain proper alignment of affected body part  10/2/2023 0912 by Timbo Freitas RN  Outcome: Progressing  10/2/2023 0051 by Ranjan Rawls RN  Outcome: Progressing  Goal: Return ADL status to a safe level of function  10/2/2023 0912 by Timbo Freitas RN  Outcome:

## 2023-10-02 NOTE — PLAN OF CARE
Problem: Discharge Planning  Goal: Discharge to home or other facility with appropriate resources  10/2/2023 0051 by Clare Jo RN  Outcome: Progressing  10/1/2023 1648 by Shayy Stephens RN  Outcome: Progressing     Problem: Respiratory - Adult  Goal: Achieves optimal ventilation and oxygenation  10/2/2023 0051 by Clare Jo RN  Outcome: Progressing  10/1/2023 2252 by Lurdes Cortes RCP  Outcome: Progressing  10/1/2023 1648 by Shayy Stephens RN  Outcome: Progressing  10/1/2023 1540 by Daphne Guy RT  Outcome: Progressing     Problem: Pain  Goal: Verbalizes/displays adequate comfort level or baseline comfort level  10/2/2023 0051 by Clare Jo RN  Outcome: Progressing  10/1/2023 1648 by Shayy Stephens RN  Outcome: Progressing     Problem: Safety - Adult  Goal: Free from fall injury  10/2/2023 0051 by Clare Jo RN  Outcome: Progressing  10/1/2023 1648 by Shayy Stephens RN  Outcome: Progressing     Problem: ABCDS Injury Assessment  Goal: Absence of physical injury  10/2/2023 0051 by Clare Jo RN  Outcome: Progressing  10/1/2023 1648 by Shayy Stephens RN  Outcome: Progressing     Problem: Cardiovascular - Adult  Goal: Maintains optimal cardiac output and hemodynamic stability  10/2/2023 0051 by Clare Jo RN  Outcome: Progressing  10/1/2023 1648 by Shayy Stephens RN  Outcome: Progressing  Goal: Absence of cardiac dysrhythmias or at baseline  10/2/2023 0051 by Clare Jo RN  Outcome: Progressing  10/1/2023 1648 by Shayy Stephens RN  Outcome: Progressing     Problem: Musculoskeletal - Adult  Goal: Return mobility to safest level of function  10/2/2023 0051 by Clare Jo RN  Outcome: Progressing  10/1/2023 1648 by Shayy Stephens RN  Outcome: Progressing  Goal: Maintain proper alignment of affected body part  10/2/2023 0051 by Clare Jo RN  Outcome: Progressing  10/1/2023 1648 by Shayy Stephens RN  Outcome: Progressing  Goal: Return ADL status to a

## 2023-10-02 NOTE — PROGRESS NOTES
Hospitalist Progress Note    NAME:   Angelika Ruvalcaba   : 1935   MRN: [id number]     Date/Time: 10/2/2023 3:38 PM  Patient PCP: Joanne Desir MD    Estimated discharge date: 10/3?-4  Barriers: Nephrology clearance      Assessment / Plan:    Dyspnea POA-not hypoxic on room air oxygen  Acute on chronic diastolic heart failure exacerbation POA-no apparent fluid overload noted  History of asthma  Rapid COVID test was negative in ED  Chest x-ray shows central congestion, bibasilar/subsegmental atelectasis.   Lung hyperinflation  BNP elevated to 2500  Echo noted for normal EF 55 to 60% with no wall motion abnormality    S/p IV Lasix-we will DC Lasix today as patient is euvolemic  Strict YESSENIA, daily weight   DuoNebs as needed  Cont Steroid course-seems to have improved     Hypertensive urgency POA-resolved  Blood pressure elevated, particularly diastolic  S/p IV hydralazine x1 in ER  Resume home medications     Acute on chronic anemia  No reported bleeding    IV iron as per nephrology  No plans of initiating dialysis as no signs of fluid overload or uremia as per nephrology     Elevated troponin  Troponin 144-/135  No chest pain  Likely type II in the setting of CKD 5/CHF     CKD 5  Creatinine at baseline  Inpatient nephrology consult appreciated-plans to consider peritoneal dialysis as outpatient with primary nephrology team in near future, no plans of RRT initiation this admission     DM:  Sliding scale  Monitor FS     Hereditary spherocytosis POA        Medical Decision Making:   I personally reviewed labs: CBC, BMP  I personally reviewed imaging:CXR  I personally reviewed EKG: Normal sinus rhythm at 70 bpm with no ST or T wave changes on my interpretation  Toxic drug monitoring: None  Discussed case with: Patient, RN, case management on IDR's, Dr. Carmen Barclay nephrologist        Code Status: Full code  DVT Prophylaxis: Start anticoagulation tomorrow if hemoglobin is stable  GI Prophylaxis:

## 2023-10-02 NOTE — PROGRESS NOTES
0700: Bedside and Verbal shift change report given to    (oncoming nurse) by Angela See (offgoing nurse). Report included the following information Nurse Handoff Report, Index, Intake/Output, MAR, Recent Results, and Cardiac Rhythm NSR .     0725: PIV leaking and painful when flushed. PIV removed and new PIV placed.

## 2023-10-02 NOTE — PLAN OF CARE
ADULT PROTOCOL: JET AEROSOL ASSESSMENT    Patient  Saumya Baxter     82 y.o.   female     10/2/2023  9:23 AM    Breath Sounds Pre Procedure: Breath Sounds Pre-Tx DARRIN: Diminished                                  Breath Sounds Pre-Tx LLL: Diminished        Breath Sounds Pre-Tx RUL: Diminished        Breath Sounds Pre-Tx RML: Diminished        Breath Sounds Pre-Tx RLL: Diminished  Breath Sounds Post Procedure: Breath Sounds Post-Tx DARRIN: Diminished          Breath Sounds Post-Tx LLL: Diminished          Breath Sounds Post-Tx RUL: Diminished          Breath Sounds Post-Tx RML: Diminished          Breath Sounds Post-Tx RLL: Diminished                                   Heart Rate: Pre procedure Pre-Tx Pulse: 79           Post procedure Post-Tx Pulse: 80    Resp Rate: Pre procedure Pre-Tx Resps: 16           Post procedure Post-Tx Resps: 18    SpO2:  SpO2: 95 %   without Oxygen                Nebulizer Therapy: Current medications Medications: Budesonide      Changed: No    Problem List:   Patient Active Problem List   Diagnosis    DJD (degenerative joint disease) of knee    Bed bug bite    Renal cyst    Intrahepatic bile duct dilation    Tinea pedis of both feet    History of right breast cancer    HTN (hypertension)    Early satiety    Venous insufficiency of both lower extremities    CKD (chronic kidney disease), stage IV (HCC)    Hereditary spherocytosis (HCC)    BPV (benign positional vertigo)    Anemia    Hearing deficit, left    Axillary adenopathy    Type 2 diabetes mellitus with nephropathy (HCC)    S/P colonoscopy    Fall (on)(from) sidewalk curb, sequela    Osteoporosis    Anemia of chronic renal failure    History of colonoscopy with polypectomy    Malignant neoplasm of right female breast, unspecified estrogen receptor status, unspecified site of breast (HCC)    Stasis dermatitis of left lower extremity with venous ulcer due to chronic peripheral venous hypertension (HCC)    Acute on chronic diastolic CHF

## 2023-10-03 LAB
ALBUMIN SERPL-MCNC: 2.8 G/DL (ref 3.5–5)
ANION GAP SERPL CALC-SCNC: 8 MMOL/L (ref 5–15)
BASOPHILS # BLD: 0 K/UL (ref 0–0.1)
BASOPHILS NFR BLD: 0 % (ref 0–1)
BUN SERPL-MCNC: 75 MG/DL (ref 6–20)
BUN/CREAT SERPL: 12 (ref 12–20)
CALCIUM SERPL-MCNC: 8.2 MG/DL (ref 8.5–10.1)
CHLORIDE SERPL-SCNC: 106 MMOL/L (ref 97–108)
CO2 SERPL-SCNC: 22 MMOL/L (ref 21–32)
CREAT SERPL-MCNC: 6.29 MG/DL (ref 0.55–1.02)
DIFFERENTIAL METHOD BLD: ABNORMAL
EOSINOPHIL # BLD: 0.1 K/UL (ref 0–0.4)
EOSINOPHIL NFR BLD: 0 % (ref 0–7)
ERYTHROCYTE [DISTWIDTH] IN BLOOD BY AUTOMATED COUNT: 14.1 % (ref 11.5–14.5)
GLUCOSE BLD STRIP.AUTO-MCNC: 109 MG/DL (ref 65–117)
GLUCOSE BLD STRIP.AUTO-MCNC: 142 MG/DL (ref 65–117)
GLUCOSE BLD STRIP.AUTO-MCNC: 188 MG/DL (ref 65–117)
GLUCOSE BLD STRIP.AUTO-MCNC: 199 MG/DL (ref 65–117)
GLUCOSE SERPL-MCNC: 89 MG/DL (ref 65–100)
HCT VFR BLD AUTO: 20.5 % (ref 35–47)
HGB BLD-MCNC: 6.4 G/DL (ref 11.5–16)
HISTORY CHECK: NORMAL
IMM GRANULOCYTES # BLD AUTO: 0.1 K/UL (ref 0–0.04)
IMM GRANULOCYTES NFR BLD AUTO: 1 % (ref 0–0.5)
LYMPHOCYTES # BLD: 1.2 K/UL (ref 0.8–3.5)
LYMPHOCYTES NFR BLD: 9 % (ref 12–49)
MCH RBC QN AUTO: 26.9 PG (ref 26–34)
MCHC RBC AUTO-ENTMCNC: 31.2 G/DL (ref 30–36.5)
MCV RBC AUTO: 86.1 FL (ref 80–99)
MONOCYTES # BLD: 1.2 K/UL (ref 0–1)
MONOCYTES NFR BLD: 10 % (ref 5–13)
NEUTS SEG # BLD: 10.1 K/UL (ref 1.8–8)
NEUTS SEG NFR BLD: 80 % (ref 32–75)
NRBC # BLD: 0 K/UL (ref 0–0.01)
NRBC BLD-RTO: 0 PER 100 WBC
PHOSPHATE SERPL-MCNC: 5.2 MG/DL (ref 2.6–4.7)
PLATELET # BLD AUTO: 442 K/UL (ref 150–400)
PMV BLD AUTO: 9.7 FL (ref 8.9–12.9)
POTASSIUM SERPL-SCNC: 4.3 MMOL/L (ref 3.5–5.1)
RBC # BLD AUTO: 2.38 M/UL (ref 3.8–5.2)
SERVICE CMNT-IMP: ABNORMAL
SERVICE CMNT-IMP: NORMAL
SODIUM SERPL-SCNC: 136 MMOL/L (ref 136–145)
WBC # BLD AUTO: 12.7 K/UL (ref 3.6–11)

## 2023-10-03 PROCEDURE — 6370000000 HC RX 637 (ALT 250 FOR IP): Performed by: STUDENT IN AN ORGANIZED HEALTH CARE EDUCATION/TRAINING PROGRAM

## 2023-10-03 PROCEDURE — 36430 TRANSFUSION BLD/BLD COMPNT: CPT

## 2023-10-03 PROCEDURE — 6360000002 HC RX W HCPCS: Performed by: STUDENT IN AN ORGANIZED HEALTH CARE EDUCATION/TRAINING PROGRAM

## 2023-10-03 PROCEDURE — 1100000003 HC PRIVATE W/ TELEMETRY

## 2023-10-03 PROCEDURE — 36415 COLL VENOUS BLD VENIPUNCTURE: CPT

## 2023-10-03 PROCEDURE — 94640 AIRWAY INHALATION TREATMENT: CPT

## 2023-10-03 PROCEDURE — 86850 RBC ANTIBODY SCREEN: CPT

## 2023-10-03 PROCEDURE — 86923 COMPATIBILITY TEST ELECTRIC: CPT

## 2023-10-03 PROCEDURE — 86901 BLOOD TYPING SEROLOGIC RH(D): CPT

## 2023-10-03 PROCEDURE — 30233N1 TRANSFUSION OF NONAUTOLOGOUS RED BLOOD CELLS INTO PERIPHERAL VEIN, PERCUTANEOUS APPROACH: ICD-10-PCS | Performed by: INTERNAL MEDICINE

## 2023-10-03 PROCEDURE — 2580000003 HC RX 258: Performed by: STUDENT IN AN ORGANIZED HEALTH CARE EDUCATION/TRAINING PROGRAM

## 2023-10-03 PROCEDURE — 6360000002 HC RX W HCPCS: Performed by: INTERNAL MEDICINE

## 2023-10-03 PROCEDURE — 86900 BLOOD TYPING SEROLOGIC ABO: CPT

## 2023-10-03 PROCEDURE — 85025 COMPLETE CBC W/AUTO DIFF WBC: CPT

## 2023-10-03 PROCEDURE — 6370000000 HC RX 637 (ALT 250 FOR IP): Performed by: INTERNAL MEDICINE

## 2023-10-03 PROCEDURE — 82962 GLUCOSE BLOOD TEST: CPT

## 2023-10-03 PROCEDURE — P9016 RBC LEUKOCYTES REDUCED: HCPCS

## 2023-10-03 PROCEDURE — 2580000003 HC RX 258: Performed by: INTERNAL MEDICINE

## 2023-10-03 PROCEDURE — 80069 RENAL FUNCTION PANEL: CPT

## 2023-10-03 RX ORDER — SODIUM CHLORIDE 9 MG/ML
INJECTION, SOLUTION INTRAVENOUS PRN
Status: DISCONTINUED | OUTPATIENT
Start: 2023-10-03 | End: 2023-10-04 | Stop reason: HOSPADM

## 2023-10-03 RX ORDER — PREDNISONE 20 MG/1
40 TABLET ORAL DAILY
Status: DISCONTINUED | OUTPATIENT
Start: 2023-10-03 | End: 2023-10-04 | Stop reason: HOSPADM

## 2023-10-03 RX ORDER — OXYBUTYNIN CHLORIDE 5 MG/1
10 TABLET, EXTENDED RELEASE ORAL NIGHTLY
Status: DISCONTINUED | OUTPATIENT
Start: 2023-10-03 | End: 2023-10-04 | Stop reason: HOSPADM

## 2023-10-03 RX ORDER — SODIUM CHLORIDE, SODIUM LACTATE, POTASSIUM CHLORIDE, CALCIUM CHLORIDE 600; 310; 30; 20 MG/100ML; MG/100ML; MG/100ML; MG/100ML
INJECTION, SOLUTION INTRAVENOUS CONTINUOUS
Status: ACTIVE | OUTPATIENT
Start: 2023-10-03 | End: 2023-10-03

## 2023-10-03 RX ORDER — IPRATROPIUM BROMIDE AND ALBUTEROL SULFATE 2.5; .5 MG/3ML; MG/3ML
1 SOLUTION RESPIRATORY (INHALATION)
Status: DISCONTINUED | OUTPATIENT
Start: 2023-10-03 | End: 2023-10-04 | Stop reason: HOSPADM

## 2023-10-03 RX ADMIN — METOPROLOL SUCCINATE 50 MG: 50 TABLET, EXTENDED RELEASE ORAL at 21:13

## 2023-10-03 RX ADMIN — OXYBUTYNIN CHLORIDE 10 MG: 5 TABLET, EXTENDED RELEASE ORAL at 21:14

## 2023-10-03 RX ADMIN — LAMOTRIGINE 25 MG: 25 TABLET ORAL at 08:36

## 2023-10-03 RX ADMIN — ISOSORBIDE MONONITRATE 30 MG: 30 TABLET, EXTENDED RELEASE ORAL at 08:37

## 2023-10-03 RX ADMIN — SODIUM CHLORIDE, PRESERVATIVE FREE 10 ML: 5 INJECTION INTRAVENOUS at 21:15

## 2023-10-03 RX ADMIN — FERROUS SULFATE TAB 325 MG (65 MG ELEMENTAL FE) 325 MG: 325 (65 FE) TAB at 21:14

## 2023-10-03 RX ADMIN — IPRATROPIUM BROMIDE AND ALBUTEROL SULFATE 1 DOSE: .5; 3 SOLUTION RESPIRATORY (INHALATION) at 13:28

## 2023-10-03 RX ADMIN — NIFEDIPINE 60 MG: 60 TABLET, EXTENDED RELEASE ORAL at 08:36

## 2023-10-03 RX ADMIN — IPRATROPIUM BROMIDE AND ALBUTEROL SULFATE 1 DOSE: .5; 3 SOLUTION RESPIRATORY (INHALATION) at 07:38

## 2023-10-03 RX ADMIN — FLUTICASONE PROPIONATE 2 SPRAY: 50 SPRAY, METERED NASAL at 08:59

## 2023-10-03 RX ADMIN — METHYLPREDNISOLONE SODIUM SUCCINATE 40 MG: 40 INJECTION, POWDER, FOR SOLUTION INTRAMUSCULAR; INTRAVENOUS at 08:37

## 2023-10-03 RX ADMIN — SODIUM CHLORIDE, POTASSIUM CHLORIDE, SODIUM LACTATE AND CALCIUM CHLORIDE: 600; 310; 30; 20 INJECTION, SOLUTION INTRAVENOUS at 13:52

## 2023-10-03 RX ADMIN — BUDESONIDE 500 MCG: 0.5 INHALANT RESPIRATORY (INHALATION) at 20:00

## 2023-10-03 RX ADMIN — IRON SUCROSE 200 MG: 20 INJECTION, SOLUTION INTRAVENOUS at 09:22

## 2023-10-03 RX ADMIN — BUDESONIDE 500 MCG: 0.5 INHALANT RESPIRATORY (INHALATION) at 07:43

## 2023-10-03 RX ADMIN — NIFEDIPINE 60 MG: 60 TABLET, EXTENDED RELEASE ORAL at 21:14

## 2023-10-03 RX ADMIN — FERROUS SULFATE TAB 325 MG (65 MG ELEMENTAL FE) 325 MG: 325 (65 FE) TAB at 08:36

## 2023-10-03 RX ADMIN — ASPIRIN 81 MG: 81 TABLET, COATED ORAL at 08:37

## 2023-10-03 RX ADMIN — LAMOTRIGINE 25 MG: 25 TABLET ORAL at 21:15

## 2023-10-03 RX ADMIN — PRAVASTATIN SODIUM 20 MG: 10 TABLET ORAL at 21:14

## 2023-10-03 RX ADMIN — FLUTICASONE PROPIONATE 2 SPRAY: 50 SPRAY, METERED NASAL at 21:14

## 2023-10-03 RX ADMIN — FLUTICASONE PROPIONATE 2 SPRAY: 50 SPRAY, METERED NASAL at 21:15

## 2023-10-03 RX ADMIN — PREDNISONE 40 MG: 20 TABLET ORAL at 09:18

## 2023-10-03 RX ADMIN — SODIUM CHLORIDE, PRESERVATIVE FREE 10 ML: 5 INJECTION INTRAVENOUS at 08:40

## 2023-10-03 RX ADMIN — IPRATROPIUM BROMIDE AND ALBUTEROL SULFATE 1 DOSE: .5; 3 SOLUTION RESPIRATORY (INHALATION) at 19:55

## 2023-10-03 ASSESSMENT — PAIN SCALES - GENERAL
PAINLEVEL_OUTOF10: 0

## 2023-10-03 NOTE — PLAN OF CARE
Problem: Discharge Planning  Goal: Discharge to home or other facility with appropriate resources  Outcome: Progressing  Flowsheets (Taken 10/2/2023 2035)  Discharge to home or other facility with appropriate resources:   Identify barriers to discharge with patient and caregiver   Arrange for needed discharge resources and transportation as appropriate     Problem: Respiratory - Adult  Goal: Achieves optimal ventilation and oxygenation  10/3/2023 0058 by Ari Heart RN  Outcome: Progressing  10/2/2023 2122 by Se Walker RCP  Outcome: Progressing     Problem: Cardiovascular - Adult  Goal: Maintains optimal cardiac output and hemodynamic stability  Outcome: Progressing  Flowsheets (Taken 10/2/2023 2035)  Maintains optimal cardiac output and hemodynamic stability:   Monitor blood pressure and heart rate   Monitor urine output and notify Licensed Independent Practitioner for values outside of normal range   Assess for signs of decreased cardiac output  Goal: Absence of cardiac dysrhythmias or at baseline  Outcome: Progressing  Flowsheets (Taken 10/2/2023 2035)  Absence of cardiac dysrhythmias or at baseline:   Monitor cardiac rate and rhythm   Assess for signs of decreased cardiac output   Administer antiarrhythmia medication and electrolyte replacement as ordered     Problem: Musculoskeletal - Adult  Goal: Return mobility to safest level of function  Outcome: Progressing  Flowsheets (Taken 10/2/2023 2035)  Return Mobility to Safest Level of Function:   Assess patient stability and activity tolerance for standing, transferring and ambulating with or without assistive devices   Assist with transfers and ambulation using safe patient handling equipment as needed   Ensure adequate protection for wounds/incisions during mobilization  Goal: Maintain proper alignment of affected body part  Outcome: Progressing  Goal: Return ADL status to a safe level of function  Outcome: Progressing     Problem: Metabolic/Fluid and

## 2023-10-03 NOTE — PLAN OF CARE
Problem: Discharge Planning  Goal: Discharge to home or other facility with appropriate resources  10/3/2023 1019 by Bruce Nash RN  Outcome: Progressing  Flowsheets (Taken 10/3/2023 0715)  Discharge to home or other facility with appropriate resources: Identify barriers to discharge with patient and caregiver  10/3/2023 0058 by Saida Vaughn RN  Outcome: Nuha Mcguire (Taken 10/2/2023 2035)  Discharge to home or other facility with appropriate resources:   Identify barriers to discharge with patient and caregiver   Arrange for needed discharge resources and transportation as appropriate     Problem: Respiratory - Adult  Goal: Achieves optimal ventilation and oxygenation  10/3/2023 1019 by Bruce Nash RN  Outcome: Progressing  10/3/2023 0058 by Saida Vaughn RN  Outcome: Progressing  10/2/2023 2122 by Ning Gonzalez RCP  Outcome: Progressing     Problem: Pain  Goal: Verbalizes/displays adequate comfort level or baseline comfort level  Recent Flowsheet Documentation  Taken 10/3/2023 0415 by Saida Vaughn RN  Verbalizes/displays adequate comfort level or baseline comfort level:   Encourage patient to monitor pain and request assistance   Assess pain using appropriate pain scale  10/3/2023 0058 by Saida Vaughn RN  Outcome: Progressing  Flowsheets  Taken 10/2/2023 2315  Verbalizes/displays adequate comfort level or baseline comfort level:   Encourage patient to monitor pain and request assistance   Assess pain using appropriate pain scale  Taken 10/2/2023 2112  Verbalizes/displays adequate comfort level or baseline comfort level:   Encourage patient to monitor pain and request assistance   Assess pain using appropriate pain scale     Problem: Safety - Adult  Goal: Free from fall injury  10/3/2023 0058 by Saida Vaughn RN  Outcome: Progressing     Problem: ABCDS Injury Assessment  Goal: Absence of physical injury  10/3/2023 0058 by Saida Vaughn RN  Outcome: Progressing     Problem: Cardiovascular - Adult  Goal: Maintains Progressing  Goal: Return ADL status to a safe level of function  10/3/2023 0058 by Denny Novoa RN  Outcome: Progressing     Problem: Metabolic/Fluid and Electrolytes - Adult  Goal: Electrolytes maintained within normal limits  Recent Flowsheet Documentation  Taken 10/3/2023 0715 by Grayson Medellin RN  Electrolytes maintained within normal limits: Monitor labs and assess patient for signs and symptoms of electrolyte imbalances  10/3/2023 0058 by Denny Novoa RN  Outcome: Progressing  [address] (Taken 10/2/2023 2035)  Electrolytes maintained within normal limits:   Monitor labs and assess patient for signs and symptoms of electrolyte imbalances   Administer electrolyte replacement as ordered   Monitor response to electrolyte replacements, including repeat lab results as appropriate  Goal: Hemodynamic stability and optimal renal function maintained  Recent Flowsheet Documentation  Taken 10/3/2023 0715 by Grayson Medellin RN  Hemodynamic stability and optimal renal function maintained: Monitor labs and assess for signs and symptoms of volume excess or deficit  10/3/2023 0058 by Denny Novoa RN  Outcome: Progressing  Flowsheets (Taken 10/2/2023 2035)  Hemodynamic stability and optimal renal function maintained:   Monitor labs and assess for signs and symptoms of volume excess or deficit   Monitor intake, output and patient weight  Goal: Glucose maintained within prescribed range  Recent Flowsheet Documentation  Taken 10/3/2023 0715 by Grayson Medellin RN  Glucose maintained within prescribed range: Monitor blood glucose as ordered  10/3/2023 0058 by Denny Novoa RN  Outcome: Progressing  Flowsheets (Taken 10/2/2023 2035)  Glucose maintained within prescribed range:   Monitor blood glucose as ordered   Assess for signs and symptoms of hyperglycemia and hypoglycemia   Administer ordered medications to maintain glucose within target range     Problem: Hematologic - Adult  Goal: Maintains hematologic stability  10/3/2023 0058 by Denny Novoa

## 2023-10-03 NOTE — PROGRESS NOTES
7:00 Bedside and Verbal shift change report given to KARSTEN Medina (oncoming nurse) by Luis Miguel Polanco RN (offgoing nurse). Report included the following information Nurse Handoff Report, Index, Intake/Output, MAR, and Recent Results.      1221: received 1 unit PRBC, started LR at 50ml/hr

## 2023-10-03 NOTE — CONSULTS
[address] [place] [address]    Name:  Alicia Mclain  MR#:  [id number]  :  1935  ACCOUNT #:  [de-identified]  DATE OF SERVICE:  10/01/2023    REFERRING PHYSICIAN:  Dr. Jemima Snow. REASON FOR CONSULTATION:  Acute kidney injury. HISTORY OF PRESENT ILLNESS:  The patient is an 82-year-old female with past medical history of CKD, diabetes, hypertension, who presented to ER with complaint of shortness of breath. The patient is found to have a renal failure with the creatinine level of 5.85 and BUN of 59. The patient has severe anemia with a hemoglobin of 7.0. The patient has been followed by nephrologist as outpatient. She has been seen by [facility] physician. She was seen at nephrologist office last month. At that time, her creatinine level was elevated. Her diuretics and angiotensin receptor blocker was discontinued. She has anemia and she has been receiving Epogen as outpatient. She has history of sickle cell trait. Her creatinine level was 5.6 in 2023. Creatinine level was 3.8 in 2023. Her Creatinine was 3.2 in 2023. She has known history of CKD. She denies any vomiting or diarrhea prior to admission. She has been started on sodium bicarb recently. She denies taking any NSAIDs or recent antibiotic use. She reports that her blood pressure has been stable at home. Her blood pressure was high in the emergency room, although her blood pressure has improved this morning. She denies any nausea or vomiting or metallic taste in the mouth. She does not have any uremic symptoms. potassium 4.5, BUN 59, creatinine 5.8, calcium 8.4. PAST MEDICAL HISTORY:  1.  CKD. 2.  Anemia. 3.  Sickle cell trait. 4.  Breast cancer. 5.  Hypertension. 6.  Seizure disorder. PAST SURGICAL HISTORY:  1. Breast cancer, requiring right breast lumpectomy. 2.  Cataract surgery. 3.  Cholecystectomy. 4.  Colonoscopy.     FAMILY HISTORY:  Positive for breast cancer to sister, diabetes to mother. SOCIAL HISTORY:  Nonsmoker. No alcohol abuse. ALLERGIES:  To CODEINE, SHE GETS NAUSEA. HOME MEDICATIONS:  1. Aldactone. 2.  Imdur. 3.  Ditropan. 4.  Metoprolol. 5. Vitamin D2.  6.  Iron sulfate. 7.  Claritin. 8.  Meclizine. 9.  Procardia  10. Pravachol. 11.  Zanaflex. IMPRESSION:  1. Acute kidney injury on chronic kidney disease. Acute kidney injury likely due to progression of chronic kidney disease. VS DEHYDRATION  2. Chronic kidney disease, stage V, recent creatinine 5.6. Creatinine level 3.5 in 05/2023. 3.  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 4.  Anemia due to chronic kidney disease. 5.  Iron deficiency anemia. 6.  Shortness of breath, likely due to anemia. She is not fluid overloaded. PLAN:  1. No indication for emergent dialysis at present. 2.  Hold the Lasix. 3.  Give IV iron, Venofer. 4.  Start Epogen. 5.  Avoid ACE inhibitors or ARB. 6.  Continue nifedipine. 7.  Hold Aldactone. 8.  Check BMP in the morning. 9.  Treatment plan discussed with Dr. Kyung Colbert and with the patient. We will continue to follow with you. Thanks for consultation.       Wale Kerr MD      [id number]  D:  10/02/2023 16:53  T:  10/02/2023 20:57  JOB #:  [id number]

## 2023-10-03 NOTE — CONSENT
Informed Consent for Blood Component Transfusion Note    I have discussed with the patient the rationale for blood component transfusion; its benefits in treating or preventing fatigue, organ damage, or death; and its risk which includes mild transfusion reactions, rare risk of blood borne infection, or more serious but rare reactions. I have discussed the alternatives to transfusion, including the risk and consequences of not receiving transfusion. The patient had an opportunity to ask questions and had agreed to proceed with transfusion of blood components.     Electronically signed by MALINDA Jaime NP on 10/3/23 at 6:58 AM EDT

## 2023-10-03 NOTE — PROGRESS NOTES
Physician Progress Note      PATIENT:               Louise Dukes  CSN #:                  [id number]  :                       1935  ADMIT DATE:       10/1/2023 8:59 AM  DISCH DATE:  RESPONDING  PROVIDER #:        Faith Rossi MD          QUERY TEXT:    Patient admitted 10/1 with SOB. Noted documentation of Acute on Chronic   Diastolic CHF (H&P). CXR noted no overt CHF, 10/2 Dr Glass Comp pn documented no   apparent fluid overload noted and Lasix was discontinued. In order to support the diagnosis of Acute diastolic CHF, please include   additional clinical indicators in your documentation. Or please document if   the diagnosis of Acute diastolic CHF has been ruled out after further study. The medical record reflects the following:    Risk Factors:  h/o chronic diastolic CHF    Clinical Indicators:  -no rales or JVD noted  -10/1 CXR:  Central congestion without overt CHF. Bibasilar subsegmental   atelectasis. Lung hyperinflation. -pBNP 2,530  -Echo noted for normal EF 55 to 60% with no wall motion abnormality  -10/2 Dr House Doing pn: no apparent fluid overload noted; will DC Lasix as Pt is   euvolemic  -10/2 Dr. Knight Speak Neph pn: Shortness of breath, likely due to anemia. She is   not fluid overloaded    Treatment: ECHO, CXR, IV Lasix on 10/1, pBNP evaluated x1    Thank you,  Crescencio Ludwig RN, CDI, CRCR  [email or web address]  Options provided:  -- Acute Diastolic CHF/HFpEF as evidenced by, Please document evidence.   -- Acute diastolic CHF ruled out, and Chronic Diastolic CHF only confirmed  -- Other - I will add my own diagnosis  -- Disagree - Not applicable / Not valid  -- Disagree - Clinically unable to determine / Unknown  -- Refer to Clinical Documentation Reviewer    PROVIDER RESPONSE TEXT:    After further study, acute diastolic CHF ruled out and chronic diastolic CHF   only confirmed    Query created by: Crescencio Ludwig on 10/3/2023 8:13 AM      Electronically signed by:  Riva Paget

## 2023-10-03 NOTE — PROGRESS NOTES
Hospitalist Progress Note    NAME:   Saumya Baxter   : 1935   MRN: [id number]     Date/Time: 10/3/2023 8:54 AM  Patient PCP: Lashell Soto MD    Estimated discharge date: 10/4  Barriers: Nephrology clearance, Hb stable      Assessment / Plan:    Dyspnea POA-not hypoxic on room air oxygen, likely related to Symptomatic Acute on chronic Anemia of Chronic Disease (CKD) POA  Iron deficiency POA  Acute on chronic diastolic heart failure exacerbation POA- ruled out, no apparent fluid overload noted  History of asthma- in mild exacerbation POA  Rapid COVID test was negative in ED  Chest x-ray shows central congestion, bibasilar/subsegmental atelectasis.   Lung hyperinflation  BNP elevated to 2500  Echo noted for normal EF 55 to 60% with no wall motion abnormality    S/p IV Lasix- DCd Lasix yesterday  Strict YESSENIA, daily weight   DuoNebs as needed  Cont Steroid course-seems to have improved, changed to PO today  Transfusing 1 unit PRBC today for Hb 6.4 today  S/p IV iron as per nephrology yesterday  Epogen       Hypertensive urgency POA-resolved  Blood pressure elevated, particularly diastolic  S/p IV hydralazine x1 in ER  Resume home medications      Elevated troponin  Troponin 144-/135  No chest pain  Likely type II in the setting of CKD 5/CHF     CKD 5  Creatinine at baseline  Inpatient nephrology consult appreciated-plans to consider peritoneal dialysis as outpatient with primary nephrology team in near future, no plans of RRT initiation this admission     DM:  Sliding scale  Monitor FS     Hereditary spherocytosis POA        Medical Decision Making:   I personally reviewed labs: CBC, BMP  I personally reviewed imaging:none today  I personally reviewed EKG: Normal sinus rhythm at 70 bpm with no ST or T wave changes on my interpretation  Toxic drug monitoring: None  Discussed case with: Patient, RN, case management on IDR's, Dr. Myriam Ratliff nephrologist yesterday        Code Status: Full code  DVT

## 2023-10-03 NOTE — CARE COORDINATION
Care Management Initial Assessment       RUR: 22%  Readmission? No  1st IM letter given? Yes - 10/1/2023  1st  letter given: Yes - 10/03/2023       10/03/23 1430   Service Assessment   Patient Orientation Alert and Oriented   Cognition Alert   History Provided By Patient   Primary Caregiver Self   Support Systems Children;Family Members  (2 children and grandson)   [address] is: Legal Next of Kin  (two children)   PCP Verified by CM Yes  (Sept 2023)   Last Visit to PCP Within last 3 months   Prior Functional Level Independent in ADLs/IADLs   Current Functional Level   (assess for mobility - PT/OT assess?)   Can patient return to prior living arrangement Yes   Ability to make needs known: Good   Family able to assist with home care needs: No  (family members work)   Financial Resources Medicare; ([place])  ()   Social/Functional History   Lives With Alone   Type of [place]  (Bungalow 1 story with above ground basement and 2 steps to enter)   Home Equipment Cane;Walker, rolling   Discharge Planning   Type of Residence House  (?eval for skilled home health)   Living Arrangements Alone   Current Services Prior To Admission None   Patient expects to be discharged to: House     CM met with patient - lives alone- has DME (jet nebulizer, cane and walker which she does not need for ambulation prior to admit)- uses [address] at [address] [facility]- has had hx of  but does not recall name of agency - declined AMD completion and her 2 children are her [address] if ever needed- Patient is not currently connected with [facility] and does not wish to transfer. Upon discharge son can transport later in the day and maybe her grandson depending upon date/time. Patient will let staff know if she has any barrier to discharge once date is determined.   MOI Palumbo

## 2023-10-04 VITALS
DIASTOLIC BLOOD PRESSURE: 53 MMHG | RESPIRATION RATE: 18 BRPM | BODY MASS INDEX: 21.65 KG/M2 | HEART RATE: 81 BPM | TEMPERATURE: 97.3 F | SYSTOLIC BLOOD PRESSURE: 141 MMHG | OXYGEN SATURATION: 97 % | WEIGHT: 122.2 LBS | HEIGHT: 63 IN

## 2023-10-04 LAB
ABO + RH BLD: NORMAL
ALBUMIN SERPL-MCNC: 2.7 G/DL (ref 3.5–5)
ANION GAP SERPL CALC-SCNC: 6 MMOL/L (ref 5–15)
BASOPHILS # BLD: 0 K/UL (ref 0–0.1)
BASOPHILS NFR BLD: 0 % (ref 0–1)
BLD PROD TYP BPU: NORMAL
BLOOD BANK DISPENSE STATUS: NORMAL
BLOOD GROUP ANTIBODIES SERPL: NORMAL
BPU ID: NORMAL
BUN SERPL-MCNC: 74 MG/DL (ref 6–20)
BUN/CREAT SERPL: 12 (ref 12–20)
CALCIUM SERPL-MCNC: 7.8 MG/DL (ref 8.5–10.1)
CHLORIDE SERPL-SCNC: 107 MMOL/L (ref 97–108)
CO2 SERPL-SCNC: 24 MMOL/L (ref 21–32)
CREAT SERPL-MCNC: 6.22 MG/DL (ref 0.55–1.02)
CROSSMATCH RESULT: NORMAL
DIFFERENTIAL METHOD BLD: ABNORMAL
EOSINOPHIL # BLD: 0 K/UL (ref 0–0.4)
EOSINOPHIL NFR BLD: 0 % (ref 0–7)
ERYTHROCYTE [DISTWIDTH] IN BLOOD BY AUTOMATED COUNT: 14.1 % (ref 11.5–14.5)
GLUCOSE BLD STRIP.AUTO-MCNC: 108 MG/DL (ref 65–117)
GLUCOSE BLD STRIP.AUTO-MCNC: 142 MG/DL (ref 65–117)
GLUCOSE BLD STRIP.AUTO-MCNC: 155 MG/DL (ref 65–117)
GLUCOSE SERPL-MCNC: 117 MG/DL (ref 65–100)
HCT VFR BLD AUTO: 30.1 % (ref 35–47)
HGB BLD-MCNC: 9.7 G/DL (ref 11.5–16)
IMM GRANULOCYTES # BLD AUTO: 0.2 K/UL (ref 0–0.04)
IMM GRANULOCYTES NFR BLD AUTO: 1 % (ref 0–0.5)
LYMPHOCYTES # BLD: 1.5 K/UL (ref 0.8–3.5)
LYMPHOCYTES NFR BLD: 10 % (ref 12–49)
MCH RBC QN AUTO: 28 PG (ref 26–34)
MCHC RBC AUTO-ENTMCNC: 32.2 G/DL (ref 30–36.5)
MCV RBC AUTO: 87 FL (ref 80–99)
MONOCYTES # BLD: 1 K/UL (ref 0–1)
MONOCYTES NFR BLD: 7 % (ref 5–13)
NEUTS SEG # BLD: 12 K/UL (ref 1.8–8)
NEUTS SEG NFR BLD: 82 % (ref 32–75)
NRBC # BLD: 0.02 K/UL (ref 0–0.01)
NRBC BLD-RTO: 0.1 PER 100 WBC
PHOSPHATE SERPL-MCNC: 4.6 MG/DL (ref 2.6–4.7)
PLATELET # BLD AUTO: 498 K/UL (ref 150–400)
PMV BLD AUTO: 9.5 FL (ref 8.9–12.9)
POTASSIUM SERPL-SCNC: 4.1 MMOL/L (ref 3.5–5.1)
RBC # BLD AUTO: 3.46 M/UL (ref 3.8–5.2)
SERVICE CMNT-IMP: ABNORMAL
SERVICE CMNT-IMP: ABNORMAL
SERVICE CMNT-IMP: NORMAL
SODIUM SERPL-SCNC: 137 MMOL/L (ref 136–145)
SPECIMEN EXP DATE BLD: NORMAL
UNIT DIVISION: 0
WBC # BLD AUTO: 14.7 K/UL (ref 3.6–11)

## 2023-10-04 PROCEDURE — 6360000002 HC RX W HCPCS: Performed by: STUDENT IN AN ORGANIZED HEALTH CARE EDUCATION/TRAINING PROGRAM

## 2023-10-04 PROCEDURE — 6370000000 HC RX 637 (ALT 250 FOR IP): Performed by: INTERNAL MEDICINE

## 2023-10-04 PROCEDURE — 94640 AIRWAY INHALATION TREATMENT: CPT

## 2023-10-04 PROCEDURE — 6360000002 HC RX W HCPCS: Performed by: INTERNAL MEDICINE

## 2023-10-04 PROCEDURE — 36415 COLL VENOUS BLD VENIPUNCTURE: CPT

## 2023-10-04 PROCEDURE — 6370000000 HC RX 637 (ALT 250 FOR IP): Performed by: STUDENT IN AN ORGANIZED HEALTH CARE EDUCATION/TRAINING PROGRAM

## 2023-10-04 PROCEDURE — 85025 COMPLETE CBC W/AUTO DIFF WBC: CPT

## 2023-10-04 PROCEDURE — 82962 GLUCOSE BLOOD TEST: CPT

## 2023-10-04 PROCEDURE — 80069 RENAL FUNCTION PANEL: CPT

## 2023-10-04 RX ORDER — PREDNISONE 20 MG/1
40 TABLET ORAL DAILY
Qty: 6 TABLET | Refills: 0 | Status: SHIPPED | OUTPATIENT
Start: 2023-10-05 | End: 2023-10-08

## 2023-10-04 RX ORDER — DEXTROSE MONOHYDRATE 100 MG/ML
INJECTION, SOLUTION INTRAVENOUS CONTINUOUS PRN
Status: DISCONTINUED | OUTPATIENT
Start: 2023-10-04 | End: 2023-10-04 | Stop reason: HOSPADM

## 2023-10-04 RX ADMIN — LAMOTRIGINE 25 MG: 25 TABLET ORAL at 08:28

## 2023-10-04 RX ADMIN — PREDNISONE 40 MG: 20 TABLET ORAL at 08:28

## 2023-10-04 RX ADMIN — IRON SUCROSE 200 MG: 20 INJECTION, SOLUTION INTRAVENOUS at 10:56

## 2023-10-04 RX ADMIN — BUDESONIDE 500 MCG: 0.5 INHALANT RESPIRATORY (INHALATION) at 09:23

## 2023-10-04 RX ADMIN — IPRATROPIUM BROMIDE AND ALBUTEROL SULFATE 1 DOSE: .5; 3 SOLUTION RESPIRATORY (INHALATION) at 09:23

## 2023-10-04 RX ADMIN — NIFEDIPINE 60 MG: 60 TABLET, EXTENDED RELEASE ORAL at 08:28

## 2023-10-04 RX ADMIN — FLUTICASONE PROPIONATE 2 SPRAY: 50 SPRAY, METERED NASAL at 09:30

## 2023-10-04 RX ADMIN — IPRATROPIUM BROMIDE AND ALBUTEROL SULFATE 1 DOSE: .5; 3 SOLUTION RESPIRATORY (INHALATION) at 14:42

## 2023-10-04 RX ADMIN — ASPIRIN 81 MG: 81 TABLET, COATED ORAL at 08:28

## 2023-10-04 RX ADMIN — ISOSORBIDE MONONITRATE 30 MG: 30 TABLET, EXTENDED RELEASE ORAL at 08:28

## 2023-10-04 RX ADMIN — FERROUS SULFATE TAB 325 MG (65 MG ELEMENTAL FE) 325 MG: 325 (65 FE) TAB at 08:28

## 2023-10-04 ASSESSMENT — PAIN SCALES - GENERAL
PAINLEVEL_OUTOF10: 0

## 2023-10-04 NOTE — PLAN OF CARE
Problem: Discharge Planning  Goal: Discharge to home or other facility with appropriate resources  10/4/2023 1059 by Dean Coon RN  Outcome: Progressing  10/3/2023 2233 by Flavio Leiva RN  Outcome: Progressing     Problem: Respiratory - Adult  Goal: Achieves optimal ventilation and oxygenation  10/4/2023 1059 by Dean Coon RN  Outcome: Progressing  10/3/2023 2233 by Flavio Leiva RN  Outcome: Progressing     Problem: Cardiovascular - Adult  Goal: Maintains optimal cardiac output and hemodynamic stability  10/4/2023 1059 by Dean Coon RN  Outcome: Progressing  10/3/2023 2233 by Flavio Leiva RN  Outcome: Progressing  Goal: Absence of cardiac dysrhythmias or at baseline  10/4/2023 1059 by Dean Coon RN  Outcome: Progressing  10/3/2023 2233 by Flavio Leiva RN  Outcome: Progressing     Problem: Musculoskeletal - Adult  Goal: Return mobility to safest level of function  10/4/2023 1059 by Dean Coon RN  Outcome: Progressing  10/3/2023 2233 by Flavio Leiva RN  Outcome: Progressing  Goal: Maintain proper alignment of affected body part  10/4/2023 1059 by Dean Coon RN  Outcome: Progressing  10/3/2023 2233 by Flavio Leiva RN  Outcome: Progressing  Goal: Return ADL status to a safe level of function  10/4/2023 1059 by Dean Coon RN  Outcome: Progressing  10/3/2023 2233 by Flavio Leiva RN  Outcome: Progressing     Problem: Hematologic - Adult  Goal: Maintains hematologic stability  10/4/2023 1059 by Dean Coon RN  Outcome: Progressing  10/3/2023 2233 by Flavio Leiva RN  Outcome: Progressing     Problem: Pain  Goal: Verbalizes/displays adequate comfort level or baseline comfort level  10/4/2023 1059 by Dean Coon RN  Outcome: Progressing  10/3/2023 2233 by Flavio Leiva RN  Outcome: Progressing     Problem: Safety - Adult  Goal: Free from fall injury  10/4/2023 1059 by Dean Coon RN  Outcome: Progressing  10/3/2023 2233 by Flavio Leiva RN  Outcome:

## 2023-10-04 NOTE — CARE COORDINATION
Patient is clear from a CM standpoint. Transition of Care Plan: Home with follow ups    RUR: 21% (high RUR)  Prior Level of Functioning: Independent  Disposition: Home with follow ups  If SNF or IPR: Date FOC offered: N/A  Date FOC received: N/A  Accepting facility: N/A  Date authorization started with reference number: N/A  Date authorization received and expires: N/A  Follow up appointments: PCP/specialists if needed. DME needed: None  Transportation at discharge: Son to transport after 3:30 PM.  IM/IMM Medicare/ letter given: 2nd IM/ letter received 10/4/2023. Is patient a  and connected with [facility]? No.   If yes, was [employer] transfer form completed and [facility] notified? N/A  Caregiver Contact: Orlando Cuba.  child - [phone]. Discharge Caregiver contacted prior to discharge? Patient to contact. Care Conference needed? No.  Barriers to discharge: None. RN notified that patient is clear from a CM standpoint. SMAART tool left outside door.      10/04/23 1436   Services At/After Discharge   Transition of Care Consult (CM Consult) N/A   Services At/After Discharge None   Mode of Transport at Discharge Other (see comment)  (Son)   Confirm Follow Up Transport Family         ASMITA BettsN, RN    Care Management  [phone]

## 2023-10-04 NOTE — PROGRESS NOTES
Bedside and Verbal shift change report given to [address] [place] [address] Road (oncoming nurse) by Elias Vazquez RN  (offgoing nurse). Report included the following information Nurse Handoff Report.

## 2023-10-04 NOTE — PROGRESS NOTES
1227 - PCP hospital follow-up transitional care appointment has been scheduled with Dr. Donovan Okeefe on 10/9/23 at 1330. Pending patient discharge. Teresa Yuan Care Management Assistant     Attempted to schedule PCP hospital follow up appointment. Unable to reach anyone, unable to leave voicemail. Pending patient discharge.  Teresa Yuan Care Management Assistant

## 2023-10-04 NOTE — DISCHARGE INSTRUCTIONS
HOSPITALIST DISCHARGE INSTRUCTIONS    NAME: Alexsandra Forrest   :  1935   MRN:  [id number]     Date/Time:  10/4/2023 2:05 PM    ADMIT DATE: 10/1/2023     DISCHARGE DATE: 10/4/2023     DISCHARGE DIAGNOSIS:  Dyspnea POA-not hypoxic on room air oxygen, likely related to Symptomatic Acute on chronic Anemia of Chronic Disease (CKD) POA-status post Epogen, status post 1 unit PRBC  Hereditary spherocytosis POA  Iron deficiency POA-status post IV iron therapy per nephrology  Acute on chronic diastolic heart failure exacerbation POA- ruled out, no apparent fluid overload noted  History of asthma- in mild exacerbation POA, p.o. prednisone for 3 more days  Hypertensive urgency POA-resolved  CKD 5  DM  Elevated troponin    MEDICATIONS:  As per medication reconciliation  list  It is important that you take the medication exactly as they are prescribed. Keep your medication in the bottles provided by the pharmacist and keep a list of the medication names, dosages, and times to be taken in your wallet. Do not take other medications without consulting your doctor. Pain Management: per above medications    What to do at Home    Recommended diet:  cardiac diet, diabetic diet, low fat, low cholesterol diet, and renal diet    Recommended activity: activity as tolerated    If you have questions regarding the hospital related prescriptions or hospital related issues please call at [phone]. If you experience any of the following symptoms then please call your primary care physician or return to the emergency room if you cannot get hold of your doctor:  Fever, chills, nausea, vomiting, diarrhea, change in mentation, falling, bleeding, shortness of breath,     Follow Up:    Nephrologist in 2 weeks  PCP you are to call and set up an appointment to see them in 7-10 days. Information obtained by :  I understand that if any problems occur once I am at home I am to contact my physician.     I understand and

## 2023-10-04 NOTE — PROGRESS NOTES
Discharge instructions and education provided to patient; patient verbalized understanding. Written discharge instructions and education given to patient. Awaiting patient' son to  the patient. 1830: Patient discharged home this evening with son. Patient wheeled off unit by wheelchair accompanied by nurse assistant and son. Patient alert and oriented to person, place, time and situation; no sign of distress noted at discharge.

## 2023-10-06 ENCOUNTER — TELEPHONE (OUTPATIENT)
Facility: CLINIC | Age: 88
End: 2023-10-06

## 2023-10-06 ENCOUNTER — HOSPITAL ENCOUNTER (OUTPATIENT)
Facility: HOSPITAL | Age: 88
Setting detail: INFUSION SERIES
End: 2023-10-06
Payer: MEDICARE

## 2023-10-06 VITALS
OXYGEN SATURATION: 100 % | BODY MASS INDEX: 22.02 KG/M2 | SYSTOLIC BLOOD PRESSURE: 145 MMHG | WEIGHT: 124.3 LBS | TEMPERATURE: 98.5 F | DIASTOLIC BLOOD PRESSURE: 52 MMHG | HEART RATE: 67 BPM | HEIGHT: 63 IN | RESPIRATION RATE: 18 BRPM

## 2023-10-06 DIAGNOSIS — D63.1 ANEMIA OF CHRONIC RENAL FAILURE, STAGE 4 (SEVERE) (HCC): Primary | ICD-10-CM

## 2023-10-06 DIAGNOSIS — N18.4 ANEMIA OF CHRONIC RENAL FAILURE, STAGE 4 (SEVERE) (HCC): Primary | ICD-10-CM

## 2023-10-06 PROCEDURE — 96374 THER/PROPH/DIAG INJ IV PUSH: CPT

## 2023-10-06 PROCEDURE — 6360000002 HC RX W HCPCS: Performed by: INTERNAL MEDICINE

## 2023-10-06 PROCEDURE — 2580000003 HC RX 258: Performed by: INTERNAL MEDICINE

## 2023-10-06 RX ORDER — SODIUM CHLORIDE 9 MG/ML
5-250 INJECTION, SOLUTION INTRAVENOUS PRN
Status: DISCONTINUED | OUTPATIENT
Start: 2023-10-06 | End: 2023-10-07 | Stop reason: HOSPADM

## 2023-10-06 RX ORDER — SODIUM CHLORIDE 9 MG/ML
5-250 INJECTION, SOLUTION INTRAVENOUS PRN
Status: CANCELLED | OUTPATIENT
Start: 2023-10-06

## 2023-10-06 RX ORDER — ACETAMINOPHEN 325 MG/1
650 TABLET ORAL
Status: CANCELLED | OUTPATIENT
Start: 2023-10-06

## 2023-10-06 RX ORDER — HEPARIN 100 UNIT/ML
500 SYRINGE INTRAVENOUS PRN
Status: CANCELLED | OUTPATIENT
Start: 2023-10-06

## 2023-10-06 RX ORDER — ALBUTEROL SULFATE 90 UG/1
4 AEROSOL, METERED RESPIRATORY (INHALATION) PRN
Status: CANCELLED | OUTPATIENT
Start: 2023-10-06

## 2023-10-06 RX ORDER — SODIUM CHLORIDE 0.9 % (FLUSH) 0.9 %
5-40 SYRINGE (ML) INJECTION PRN
Status: DISCONTINUED | OUTPATIENT
Start: 2023-10-06 | End: 2023-10-07 | Stop reason: HOSPADM

## 2023-10-06 RX ORDER — ONDANSETRON 2 MG/ML
8 INJECTION INTRAMUSCULAR; INTRAVENOUS
Status: CANCELLED | OUTPATIENT
Start: 2023-10-06

## 2023-10-06 RX ORDER — SODIUM CHLORIDE 0.9 % (FLUSH) 0.9 %
5-40 SYRINGE (ML) INJECTION PRN
Status: CANCELLED | OUTPATIENT
Start: 2023-10-06

## 2023-10-06 RX ORDER — SODIUM CHLORIDE 9 MG/ML
INJECTION, SOLUTION INTRAVENOUS CONTINUOUS
Status: CANCELLED | OUTPATIENT
Start: 2023-10-06

## 2023-10-06 RX ORDER — DIPHENHYDRAMINE HYDROCHLORIDE 50 MG/ML
50 INJECTION INTRAMUSCULAR; INTRAVENOUS
Status: CANCELLED | OUTPATIENT
Start: 2023-10-06

## 2023-10-06 RX ORDER — EPINEPHRINE 1 MG/ML
0.3 INJECTION, SOLUTION, CONCENTRATE INTRAVENOUS PRN
Status: CANCELLED | OUTPATIENT
Start: 2023-10-06

## 2023-10-06 RX ADMIN — Medication 10 ML: at 10:30

## 2023-10-06 RX ADMIN — IRON SUCROSE 200 MG: 20 INJECTION, SOLUTION INTRAVENOUS at 11:57

## 2023-10-06 RX ADMIN — SODIUM CHLORIDE 25 ML/HR: 9 INJECTION, SOLUTION INTRAVENOUS at 11:52

## 2023-10-06 RX ADMIN — Medication 10 ML: at 12:55

## 2023-10-06 NOTE — TELEPHONE ENCOUNTER
Care Transitions Initial Follow Up Call    Outreach made within 2 business days of discharge: Yes    Patient: Sae Small Patient : 1935   MRN: [id number]  Reason for Admission: There are no discharge diagnoses documented for the most recent discharge. Discharge Date: 10/4/23       Spoke with: Patient    Discharge department/facility: [address] [place] [address] Interactive Patient Contact:  Was patient able to fill all prescriptions: Yes  Was patient instructed to bring all medications to the follow-up visit: Yes  Is patient taking all medications as directed in the discharge summary?  Yes  Does patient understand their discharge instructions: Yes  Does patient have questions or concerns that need addressed prior to 7-14 day follow up office visit: no    Scheduled appointment with PCP within 7-14 days    Follow Up  Future Appointments   Date Time Provider [address] [place] [address]   10/6/2023 11:00 AM [address] [place] [address] 1 [facility]   10/10/2023  1:30 PM Ree Campos MD [facility] MAIN BS AMB   10/13/2023 10:00 AM [facility] INFUSION NURSE 1 [address] [place] [address]   10/20/2023 10:00 AM [facility] INFUSION NURSE 2 [facility]   10/23/2023 10:00 AM [facility] INFUSION NURSE 1 [address] [place] [address]   2023 10:00 AM [facility] INFUSION NURSE 1 [address] [place] [address]   2023 10:00 AM [facility] INFUSION NURSE 1 [address] [place] [address]   2023 11:15 AM Ree Campos MD [facility] MAIN BS AMB       Juan Carlos Patricio LPN

## 2023-10-10 ENCOUNTER — OFFICE VISIT (OUTPATIENT)
Facility: CLINIC | Age: 88
End: 2023-10-10

## 2023-10-10 VITALS
HEIGHT: 61 IN | TEMPERATURE: 97.6 F | OXYGEN SATURATION: 97 % | BODY MASS INDEX: 23.09 KG/M2 | WEIGHT: 122.3 LBS | DIASTOLIC BLOOD PRESSURE: 53 MMHG | HEART RATE: 66 BPM | RESPIRATION RATE: 18 BRPM | SYSTOLIC BLOOD PRESSURE: 162 MMHG

## 2023-10-10 DIAGNOSIS — E11.21 TYPE 2 DIABETES MELLITUS WITH NEPHROPATHY (HCC): ICD-10-CM

## 2023-10-10 DIAGNOSIS — D63.1 ANEMIA DUE TO STAGE 4 CHRONIC KIDNEY DISEASE (HCC): ICD-10-CM

## 2023-10-10 DIAGNOSIS — C50.911 MALIGNANT NEOPLASM OF RIGHT FEMALE BREAST, UNSPECIFIED ESTROGEN RECEPTOR STATUS, UNSPECIFIED SITE OF BREAST (HCC): ICD-10-CM

## 2023-10-10 DIAGNOSIS — I87.332 STASIS DERMATITIS OF LEFT LOWER EXTREMITY WITH VENOUS ULCER DUE TO CHRONIC PERIPHERAL VENOUS HYPERTENSION (HCC): ICD-10-CM

## 2023-10-10 DIAGNOSIS — N18.4 ANEMIA DUE TO STAGE 4 CHRONIC KIDNEY DISEASE (HCC): ICD-10-CM

## 2023-10-10 DIAGNOSIS — M17.0 PRIMARY OSTEOARTHRITIS OF BOTH KNEES: ICD-10-CM

## 2023-10-10 DIAGNOSIS — I10 PRIMARY HYPERTENSION: ICD-10-CM

## 2023-10-10 DIAGNOSIS — N18.4 CKD (CHRONIC KIDNEY DISEASE), STAGE IV (HCC): Primary | ICD-10-CM

## 2023-10-10 DIAGNOSIS — D58.0 HEREDITARY SPHEROCYTOSIS (HCC): ICD-10-CM

## 2023-10-10 SDOH — ECONOMIC STABILITY: HOUSING INSECURITY
IN THE LAST 12 MONTHS, WAS THERE A TIME WHEN YOU DID NOT HAVE A STEADY PLACE TO SLEEP OR SLEPT IN A SHELTER (INCLUDING NOW)?: NO

## 2023-10-10 SDOH — ECONOMIC STABILITY: FOOD INSECURITY: WITHIN THE PAST 12 MONTHS, THE FOOD YOU BOUGHT JUST DIDN'T LAST AND YOU DIDN'T HAVE MONEY TO GET MORE.: NEVER TRUE

## 2023-10-10 SDOH — ECONOMIC STABILITY: TRANSPORTATION INSECURITY
IN THE PAST 12 MONTHS, HAS THE LACK OF TRANSPORTATION KEPT YOU FROM MEDICAL APPOINTMENTS OR FROM GETTING MEDICATIONS?: NO

## 2023-10-10 SDOH — ECONOMIC STABILITY: INCOME INSECURITY: IN THE LAST 12 MONTHS, WAS THERE A TIME WHEN YOU WERE NOT ABLE TO PAY THE MORTGAGE OR RENT ON TIME?: NO

## 2023-10-10 SDOH — ECONOMIC STABILITY: FOOD INSECURITY: WITHIN THE PAST 12 MONTHS, YOU WORRIED THAT YOUR FOOD WOULD RUN OUT BEFORE YOU GOT MONEY TO BUY MORE.: NEVER TRUE

## 2023-10-10 SDOH — ECONOMIC STABILITY: HOUSING INSECURITY: IN THE LAST 12 MONTHS, HOW MANY PLACES HAVE YOU LIVED?: 1

## 2023-10-10 SDOH — HEALTH STABILITY: PHYSICAL HEALTH: ON AVERAGE, HOW MANY DAYS PER WEEK DO YOU ENGAGE IN MODERATE TO STRENUOUS EXERCISE (LIKE A BRISK WALK)?: 1 DAY

## 2023-10-10 SDOH — ECONOMIC STABILITY: INCOME INSECURITY: HOW HARD IS IT FOR YOU TO PAY FOR THE VERY BASICS LIKE FOOD, HOUSING, MEDICAL CARE, AND HEATING?: NOT HARD AT ALL

## 2023-10-10 SDOH — ECONOMIC STABILITY: TRANSPORTATION INSECURITY
IN THE PAST 12 MONTHS, HAS LACK OF TRANSPORTATION KEPT YOU FROM MEETINGS, WORK, OR FROM GETTING THINGS NEEDED FOR DAILY LIVING?: NO

## 2023-10-10 SDOH — HEALTH STABILITY: PHYSICAL HEALTH: ON AVERAGE, HOW MANY MINUTES DO YOU ENGAGE IN EXERCISE AT THIS LEVEL?: 20 MIN

## 2023-10-10 ASSESSMENT — SOCIAL DETERMINANTS OF HEALTH (SDOH)
DO YOU BELONG TO ANY CLUBS OR ORGANIZATIONS SUCH AS CHURCH GROUPS UNIONS, FRATERNAL OR ATHLETIC GROUPS, OR SCHOOL GROUPS?: NO
HOW OFTEN DO YOU GET TOGETHER WITH FRIENDS OR RELATIVES?: TWICE A WEEK
HOW OFTEN DO YOU ATTEND CHURCH OR RELIGIOUS SERVICES?: MORE THAN 4 TIMES PER YEAR
IN A TYPICAL WEEK, HOW MANY TIMES DO YOU TALK ON THE PHONE WITH FAMILY, FRIENDS, OR NEIGHBORS?: MORE THAN THREE TIMES A WEEK
HOW OFTEN DO YOU ATTENT MEETINGS OF THE CLUB OR ORGANIZATION YOU BELONG TO?: NEVER
WITHIN THE LAST YEAR, HAVE YOU BEEN KICKED, HIT, SLAPPED, OR OTHERWISE PHYSICALLY HURT BY YOUR PARTNER OR EX-PARTNER?: NO
WITHIN THE LAST YEAR, HAVE TO BEEN RAPED OR FORCED TO HAVE ANY KIND OF SEXUAL ACTIVITY BY YOUR PARTNER OR EX-PARTNER?: NO
WITHIN THE LAST YEAR, HAVE YOU BEEN AFRAID OF YOUR PARTNER OR EX-PARTNER?: NO
WITHIN THE LAST YEAR, HAVE YOU BEEN HUMILIATED OR EMOTIONALLY ABUSED IN OTHER WAYS BY YOUR PARTNER OR EX-PARTNER?: NO

## 2023-10-10 ASSESSMENT — ANXIETY QUESTIONNAIRES
1. FEELING NERVOUS, ANXIOUS, OR ON EDGE: 0
3. WORRYING TOO MUCH ABOUT DIFFERENT THINGS: 0
5. BEING SO RESTLESS THAT IT IS HARD TO SIT STILL: 0
7. FEELING AFRAID AS IF SOMETHING AWFUL MIGHT HAPPEN: 0
4. TROUBLE RELAXING: 0
IF YOU CHECKED OFF ANY PROBLEMS ON THIS QUESTIONNAIRE, HOW DIFFICULT HAVE THESE PROBLEMS MADE IT FOR YOU TO DO YOUR WORK, TAKE CARE OF THINGS AT HOME, OR GET ALONG WITH OTHER PEOPLE: NOT DIFFICULT AT ALL
6. BECOMING EASILY ANNOYED OR IRRITABLE: 0
GAD7 TOTAL SCORE: 0
2. NOT BEING ABLE TO STOP OR CONTROL WORRYING: 0

## 2023-10-10 ASSESSMENT — LIFESTYLE VARIABLES
HOW MANY STANDARD DRINKS CONTAINING ALCOHOL DO YOU HAVE ON A TYPICAL DAY: PATIENT DOES NOT DRINK
HOW OFTEN DO YOU HAVE A DRINK CONTAINING ALCOHOL: NEVER

## 2023-10-10 ASSESSMENT — PATIENT HEALTH QUESTIONNAIRE - PHQ9
SUM OF ALL RESPONSES TO PHQ QUESTIONS 1-9: 0
1. LITTLE INTEREST OR PLEASURE IN DOING THINGS: 0
2. FEELING DOWN, DEPRESSED OR HOPELESS: 0
SUM OF ALL RESPONSES TO PHQ QUESTIONS 1-9: 0
SUM OF ALL RESPONSES TO PHQ QUESTIONS 1-9: 0
SUM OF ALL RESPONSES TO PHQ9 QUESTIONS 1 & 2: 0
SUM OF ALL RESPONSES TO PHQ QUESTIONS 1-9: 0

## 2023-10-13 ENCOUNTER — HOSPITAL ENCOUNTER (OUTPATIENT)
Facility: HOSPITAL | Age: 88
Setting detail: INFUSION SERIES
End: 2023-10-13
Payer: MEDICARE

## 2023-10-13 VITALS
SYSTOLIC BLOOD PRESSURE: 150 MMHG | TEMPERATURE: 98.5 F | DIASTOLIC BLOOD PRESSURE: 51 MMHG | OXYGEN SATURATION: 99 % | RESPIRATION RATE: 18 BRPM | HEART RATE: 62 BPM

## 2023-10-13 DIAGNOSIS — D63.1 ANEMIA OF CHRONIC RENAL FAILURE, STAGE 4 (SEVERE) (HCC): Primary | ICD-10-CM

## 2023-10-13 DIAGNOSIS — N18.4 ANEMIA OF CHRONIC RENAL FAILURE, STAGE 4 (SEVERE) (HCC): Primary | ICD-10-CM

## 2023-10-13 PROCEDURE — 2580000003 HC RX 258: Performed by: INTERNAL MEDICINE

## 2023-10-13 PROCEDURE — 96374 THER/PROPH/DIAG INJ IV PUSH: CPT

## 2023-10-13 PROCEDURE — 6360000002 HC RX W HCPCS: Performed by: INTERNAL MEDICINE

## 2023-10-13 RX ORDER — ACETAMINOPHEN 325 MG/1
650 TABLET ORAL
Status: CANCELLED | OUTPATIENT
Start: 2023-10-13

## 2023-10-13 RX ORDER — EPINEPHRINE 1 MG/ML
0.3 INJECTION, SOLUTION, CONCENTRATE INTRAVENOUS PRN
Status: CANCELLED | OUTPATIENT
Start: 2023-10-13

## 2023-10-13 RX ORDER — SODIUM CHLORIDE 0.9 % (FLUSH) 0.9 %
5-40 SYRINGE (ML) INJECTION PRN
Status: DISCONTINUED | OUTPATIENT
Start: 2023-10-13 | End: 2023-10-14 | Stop reason: HOSPADM

## 2023-10-13 RX ORDER — SODIUM CHLORIDE 9 MG/ML
5-250 INJECTION, SOLUTION INTRAVENOUS PRN
Status: DISCONTINUED | OUTPATIENT
Start: 2023-10-13 | End: 2023-10-14 | Stop reason: HOSPADM

## 2023-10-13 RX ORDER — ALBUTEROL SULFATE 90 UG/1
4 AEROSOL, METERED RESPIRATORY (INHALATION) PRN
Status: CANCELLED | OUTPATIENT
Start: 2023-10-13

## 2023-10-13 RX ORDER — SODIUM CHLORIDE 9 MG/ML
INJECTION, SOLUTION INTRAVENOUS CONTINUOUS
Status: CANCELLED | OUTPATIENT
Start: 2023-10-13

## 2023-10-13 RX ORDER — HEPARIN 100 UNIT/ML
500 SYRINGE INTRAVENOUS PRN
Status: CANCELLED | OUTPATIENT
Start: 2023-10-13

## 2023-10-13 RX ORDER — SODIUM CHLORIDE 9 MG/ML
5-250 INJECTION, SOLUTION INTRAVENOUS PRN
Status: CANCELLED | OUTPATIENT
Start: 2023-10-13

## 2023-10-13 RX ORDER — ONDANSETRON 2 MG/ML
8 INJECTION INTRAMUSCULAR; INTRAVENOUS
Status: CANCELLED | OUTPATIENT
Start: 2023-10-13

## 2023-10-13 RX ORDER — SODIUM CHLORIDE 0.9 % (FLUSH) 0.9 %
5-40 SYRINGE (ML) INJECTION PRN
Status: CANCELLED | OUTPATIENT
Start: 2023-10-13

## 2023-10-13 RX ORDER — DIPHENHYDRAMINE HYDROCHLORIDE 50 MG/ML
50 INJECTION INTRAMUSCULAR; INTRAVENOUS
Status: CANCELLED | OUTPATIENT
Start: 2023-10-13

## 2023-10-13 RX ADMIN — IRON SUCROSE 200 MG: 20 INJECTION, SOLUTION INTRAVENOUS at 10:55

## 2023-10-13 RX ADMIN — Medication 10 ML: at 10:15

## 2023-10-16 ENCOUNTER — TRANSCRIBE ORDERS (OUTPATIENT)
Facility: HOSPITAL | Age: 88
End: 2023-10-16

## 2023-10-16 ENCOUNTER — HOSPITAL ENCOUNTER (OUTPATIENT)
Facility: HOSPITAL | Age: 88
Discharge: HOME | End: 2023-10-19
Attending: INTERNAL MEDICINE
Payer: MEDICARE

## 2023-10-16 DIAGNOSIS — N18.5 CHRONIC KIDNEY DISEASE, STAGE V (HCC): ICD-10-CM

## 2023-10-16 DIAGNOSIS — N18.5 CHRONIC KIDNEY DISEASE, STAGE V (HCC): Primary | ICD-10-CM

## 2023-10-16 PROCEDURE — 71046 X-RAY EXAM CHEST 2 VIEWS: CPT

## 2023-10-17 ENCOUNTER — HOSPITAL ENCOUNTER (OUTPATIENT)
Facility: HOSPITAL | Age: 88
Discharge: HOME | End: 2023-10-17
Attending: SURGERY
Payer: MEDICARE

## 2023-10-17 VITALS — TEMPERATURE: 98.3 F | HEART RATE: 67 BPM | DIASTOLIC BLOOD PRESSURE: 65 MMHG | SYSTOLIC BLOOD PRESSURE: 153 MMHG

## 2023-10-17 DIAGNOSIS — N18.5 CKD (CHRONIC KIDNEY DISEASE) STAGE 5, GFR LESS THAN 15 ML/MIN (HCC): ICD-10-CM

## 2023-10-17 DIAGNOSIS — L97.912 NON-PRESSURE CHRONIC ULCER OF RIGHT LOWER LEG WITH FAT LAYER EXPOSED (HCC): ICD-10-CM

## 2023-10-17 DIAGNOSIS — R60.0 BILATERAL LEG EDEMA: ICD-10-CM

## 2023-10-17 DIAGNOSIS — L97.922 NON-PRESSURE CHRONIC ULCER OF LEFT LOWER LEG WITH FAT LAYER EXPOSED (HCC): ICD-10-CM

## 2023-10-17 PROCEDURE — 99203 OFFICE O/P NEW LOW 30 MIN: CPT | Performed by: SURGERY

## 2023-10-17 PROCEDURE — 99203 OFFICE O/P NEW LOW 30 MIN: CPT

## 2023-10-17 PROCEDURE — 29581 APPL MULTLAYER CMPRN SYS LEG: CPT

## 2023-10-17 RX ORDER — CLOBETASOL PROPIONATE 0.5 MG/G
OINTMENT TOPICAL ONCE
OUTPATIENT
Start: 2023-10-17 | End: 2023-10-17

## 2023-10-17 RX ORDER — LIDOCAINE 50 MG/G
OINTMENT TOPICAL ONCE
OUTPATIENT
Start: 2023-10-17 | End: 2023-10-17

## 2023-10-17 RX ORDER — BETAMETHASONE DIPROPIONATE 0.05 %
OINTMENT (GRAM) TOPICAL ONCE
OUTPATIENT
Start: 2023-10-17 | End: 2023-10-17

## 2023-10-17 RX ORDER — LIDOCAINE HYDROCHLORIDE 20 MG/ML
JELLY TOPICAL ONCE
OUTPATIENT
Start: 2023-10-17 | End: 2023-10-17

## 2023-10-17 RX ORDER — LIDOCAINE HYDROCHLORIDE 40 MG/ML
SOLUTION TOPICAL ONCE
OUTPATIENT
Start: 2023-10-17 | End: 2023-10-17

## 2023-10-17 RX ORDER — BACITRACIN ZINC AND POLYMYXIN B SULFATE 500; 1000 [USP'U]/G; [USP'U]/G
OINTMENT TOPICAL ONCE
OUTPATIENT
Start: 2023-10-17 | End: 2023-10-17

## 2023-10-17 RX ORDER — IBUPROFEN 200 MG
TABLET ORAL ONCE
OUTPATIENT
Start: 2023-10-17 | End: 2023-10-17

## 2023-10-17 RX ORDER — SODIUM CHLOR/HYPOCHLOROUS ACID 0.033 %
SOLUTION, IRRIGATION IRRIGATION ONCE
OUTPATIENT
Start: 2023-10-17 | End: 2023-10-17

## 2023-10-17 RX ORDER — TRIAMCINOLONE ACETONIDE 1 MG/G
OINTMENT TOPICAL ONCE
OUTPATIENT
Start: 2023-10-17 | End: 2023-10-17

## 2023-10-17 RX ORDER — LIDOCAINE 40 MG/G
CREAM TOPICAL ONCE
OUTPATIENT
Start: 2023-10-17 | End: 2023-10-17

## 2023-10-17 RX ORDER — GENTAMICIN SULFATE 1 MG/G
OINTMENT TOPICAL ONCE
OUTPATIENT
Start: 2023-10-17 | End: 2023-10-17

## 2023-10-17 RX ORDER — GINSENG 100 MG
CAPSULE ORAL ONCE
OUTPATIENT
Start: 2023-10-17 | End: 2023-10-17

## 2023-10-17 ASSESSMENT — PAIN SCALES - GENERAL: PAINLEVEL_OUTOF10: 2

## 2023-10-17 NOTE — FLOWSHEET NOTE
10/17/23 1137   Right Leg Edema Point of Measurement   Compression Therapy 2 layer compression wrap   Left Leg Edema Point of Measurement   Compression Therapy 2 layer compression wrap   Wound 10/17/23 Leg Right;Medial;Lower   Date First Assessed/Time First Assessed: 10/17/23 1030   Present on Original Admission: Yes  Wound Approximate Age at First Assessment (Weeks): 52 weeks  Location: Leg  Wound Location Orientation: Right;Medial;Lower   Dressing Status New dressing applied   Dressing/Treatment Xeroform  (two layer calamine lite + additional layer of coban due to regular two layer dressing out of stock)   Wound 10/17/23 Leg Right;Lateral;Lower   Date First Assessed/Time First Assessed: 10/17/23 1031   Present on Original Admission: Yes  Wound Approximate Age at First Assessment (Weeks): 52 weeks  Location: Leg  Wound Location Orientation: Right;Lateral;Lower   Dressing Status New dressing applied   Dressing/Treatment Gauze dressing/dressing sponge;Xeroform  (two layer calamine lite + additional layer of coban due to regular two layer dressing out of stock)   Wound 10/17/23 Leg Left; Lower; Lateral   Date First Assessed/Time First Assessed: 10/17/23 1031   Present on Original Admission: Yes  Wound Approximate Age at First Assessment (Weeks): 52 weeks  Location: Leg  Wound Location Orientation: Left; Lower; Lateral   Dressing Status New dressing applied   Dressing/Treatment Gauze dressing/dressing sponge;Xeroform  (two layer calamine lite + additional layer of coban due to regular two layer dressing out of stock)   Wound 10/17/23 Leg Left; Lower;Medial   Date First Assessed/Time First Assessed: 10/17/23 1031   Present on Original Admission: Yes  Wound Approximate Age at First Assessment (Weeks): 52 weeks  Location: Leg  Wound Location Orientation: Left; Lower;Medial   Dressing Status New dressing applied   Dressing/Treatment Gauze dressing/dressing sponge;Xeroform  (two layer calamine lite + additional layer of coban

## 2023-10-17 NOTE — PROGRESS NOTES
Wound care    The patient is an 72-year-old woman who was referred to the wound care center regarding ulcerations on the right and left lower legs. The patient was first seen at the wound care center on 10/17/2023. The patient reports that she has noticed ulcerations on the lower legs for several months. Most recently prior to her appointment she was using bacitracin ointment on the sites. The patient has history of prediabetes. The patient has history of CKD 5 and is scheduled for placement of peritoneal dialysis catheter. She is not presently on dialysis. The patient does not report history of anginal symptoms and does not have history of MI or coronary intervention. She has had recent asthma and was treated with steroids. Patient is ambulatory. Past medical history includes prior history of breast cancer, hypertension, hereditary spherocytosis, sickle cell trait, osteoporosis, chronic diastolic congestive heart failure. The patient presently limits salt intake. She reports following a renal diet and avoiding starches. Reported weight 122 pounds height 5 feet 1 inch    Physical examination    Vital signs blood pressure 153/65 pulse 67 temperature 98.3    The patient is an alert woman in no acute distress. Head and neck examination showed no jaundice. Lungs are clear bilaterally without rales rhonchi or wheezes. Heart is regular without murmur gallop or rub. The patient is alert and oriented. She moves all extremities. Facial movement is symmetrical.  Speech is normal.  The patient is quite articulate. Examination of the right lower extremity revealed 2+ dorsalis pedis pulse. There is trace to 1+ pitting edema in the lower half of the right lower leg. On the right lateral lower leg there is ulceration 0.5 x 0.9 x 0.1 cm in dimension with pale base. On the right medial lower leg there is ulceration 0.5 x 0.6 x 0.1 cm in dimension with pale base.     Examination of the left

## 2023-10-17 NOTE — DISCHARGE INSTRUCTIONS
Discharge Instructions for          [address]  Phone: [phone] Fax: [phone]    NAME:  Tenisha Lentz  YOB: 1935  MEDICAL RECORD NUMBER:  [id number]  DATE:  October 17, 2023  WOUND CARE ORDERS:  Bilateral lower legs: Cleanse with baby shampoo, rinse and pat dry. Cover open areas with xeroform. Cover with gauze. Apply 2 layer compression wraps with calamine. Change weekly in clinic. Obtain cast covers with both legs ([address] [address]). Activity:  [x] Elevate leg(s) above the level of the heart when sitting. Elevate often throughout the day. [x] Avoid prolonged standing in one place. [x] Do no get dressing/wrap wet. Dietary:  [] Diet as tolerated      [x] Diabetic Diet            [] Increase Protein: examples (Meat, cheese, eggs, greek yogurt, fish, nuts)          [] Elias Therapeutic Nutrition Powder  [x] Other: continue low sodium diet  [] Dial a Dietician : Call [facility] at [phone] enter code [phone] when prompted. M-F 9am-5pm EST. Return Appointment:  [x] Return Appointment: With Dr. Rickie Jordan in [facility])  [] Nurse Visit : *** days  [] Ordered tests:    Electronically signed Scotty Felton RN on 10/17/2023 at [address] [place] [address] Information: Should you experience any significant changes in your wound(s) or have questions about your wound care, please contact the [address] [place] [address] at 1 [place] 8:00 am - 4:30. If you need help with your wound outside these hours and cannot wait until we are again available, contact your PCP or go to the hospital emergency room. PLEASE NOTE: IF YOU ARE UNABLE TO OBTAIN WOUND SUPPLIES, CONTINUE TO USE THE SUPPLIES YOU HAVE AVAILABLE UNTIL YOU ARE ABLE TO REACH US. IT IS MOST IMPORTANT TO KEEP THE WOUND COVERED AT ALL TIMES. Sofy Grande, MS, RD, CDN #[phone]/yes

## 2023-10-17 NOTE — FLOWSHEET NOTE
10/17/23 1022   Right Leg Edema Point of Measurement   Leg circumference 31 cm   Ankle circumference 22 cm   Left Leg Edema Point of Measurement   Leg circumference 30 cm   Ankle circumference 21.5 cm   Wound 10/17/23 Leg Right;Medial;Lower   Date First Assessed/Time First Assessed: 10/17/23 1030   Present on Original Admission: Yes  Wound Approximate Age at First Assessment (Weeks): 52 weeks  Location: Leg  Wound Location Orientation: Right;Medial;Lower   Wound Image    Dressing Status Other (Comment)  (no dressing in place)   Wound Cleansed Cleansed with saline   Offloading for Diabetic Foot Ulcers Offloading not required   Wound Length (cm) 0.5 cm   Wound Width (cm) 0.6 cm   Wound Depth (cm) 0.1 cm   Wound Surface Area (cm^2) 0.3 cm^2   Wound Volume (cm^3) 0.03 cm^3   Wound Assessment [place]/red;Slough   Drainage Amount Small (< 25%)   Drainage Description Serous   Odor None   Liliana-wound Assessment Edematous; Hemosiderin staining (brown yellow)   Margins Undefined edges   Wound Thickness Description not for Pressure Injury Full thickness   Wound 10/17/23 Leg Right;Lateral;Lower   Date First Assessed/Time First Assessed: 10/17/23 1031   Present on Original Admission: Yes  Wound Approximate Age at First Assessment (Weeks): 52 weeks  Location: Leg  Wound Location Orientation: Right;Lateral;Lower   Wound Image    Dressing Status Other (Comment)  (no dressing in place)   Wound Cleansed Cleansed with saline   Offloading for Diabetic Foot Ulcers Offloading not required   Wound Length (cm) 0.5 cm   Wound Width (cm) 0.9 cm   Wound Depth (cm) 0.1 cm   Wound Surface Area (cm^2) 0.45 cm^2   Wound Volume (cm^3) 0.045 cm^3   Wound Assessment [place]/red;Slough   Drainage Amount Scant (moist but unmeasurable)   Drainage Description Serous   Odor None   Liliana-wound Assessment Edematous; Hemosiderin staining (brown yellow)   Margins Undefined edges   Wound Thickness Description not for Pressure Injury Full thickness   Wound 10/17/23

## 2023-10-20 ENCOUNTER — HOSPITAL ENCOUNTER (OUTPATIENT)
Facility: HOSPITAL | Age: 88
Setting detail: INFUSION SERIES
End: 2023-10-20
Payer: MEDICARE

## 2023-10-20 VITALS
DIASTOLIC BLOOD PRESSURE: 47 MMHG | SYSTOLIC BLOOD PRESSURE: 160 MMHG | OXYGEN SATURATION: 100 % | RESPIRATION RATE: 18 BRPM | HEART RATE: 55 BPM | TEMPERATURE: 97.8 F

## 2023-10-20 DIAGNOSIS — N18.4 ANEMIA OF CHRONIC RENAL FAILURE, STAGE 4 (SEVERE) (HCC): Primary | ICD-10-CM

## 2023-10-20 DIAGNOSIS — D63.1 ANEMIA OF CHRONIC RENAL FAILURE, STAGE 4 (SEVERE) (HCC): Primary | ICD-10-CM

## 2023-10-20 PROCEDURE — 2580000003 HC RX 258: Performed by: INTERNAL MEDICINE

## 2023-10-20 PROCEDURE — 6360000002 HC RX W HCPCS: Performed by: INTERNAL MEDICINE

## 2023-10-20 PROCEDURE — 96365 THER/PROPH/DIAG IV INF INIT: CPT

## 2023-10-20 RX ORDER — ACETAMINOPHEN 325 MG/1
650 TABLET ORAL
OUTPATIENT
Start: 2023-10-20

## 2023-10-20 RX ORDER — ALBUTEROL SULFATE 90 UG/1
4 AEROSOL, METERED RESPIRATORY (INHALATION) PRN
OUTPATIENT
Start: 2023-10-20

## 2023-10-20 RX ORDER — SODIUM CHLORIDE 9 MG/ML
INJECTION, SOLUTION INTRAVENOUS CONTINUOUS
OUTPATIENT
Start: 2023-10-20

## 2023-10-20 RX ORDER — SODIUM CHLORIDE 9 MG/ML
5-250 INJECTION, SOLUTION INTRAVENOUS PRN
Status: DISCONTINUED | OUTPATIENT
Start: 2023-10-20 | End: 2023-10-21 | Stop reason: HOSPADM

## 2023-10-20 RX ORDER — SODIUM CHLORIDE 0.9 % (FLUSH) 0.9 %
5-40 SYRINGE (ML) INJECTION PRN
Status: CANCELLED | OUTPATIENT
Start: 2023-10-20

## 2023-10-20 RX ORDER — SODIUM CHLORIDE 0.9 % (FLUSH) 0.9 %
5-40 SYRINGE (ML) INJECTION PRN
Status: DISCONTINUED | OUTPATIENT
Start: 2023-10-20 | End: 2023-10-21 | Stop reason: HOSPADM

## 2023-10-20 RX ORDER — ONDANSETRON 2 MG/ML
8 INJECTION INTRAMUSCULAR; INTRAVENOUS
OUTPATIENT
Start: 2023-10-20

## 2023-10-20 RX ORDER — DIPHENHYDRAMINE HYDROCHLORIDE 50 MG/ML
50 INJECTION INTRAMUSCULAR; INTRAVENOUS
OUTPATIENT
Start: 2023-10-20

## 2023-10-20 RX ORDER — HEPARIN 100 UNIT/ML
500 SYRINGE INTRAVENOUS PRN
OUTPATIENT
Start: 2023-10-20

## 2023-10-20 RX ORDER — EPINEPHRINE 1 MG/ML
0.3 INJECTION, SOLUTION, CONCENTRATE INTRAVENOUS PRN
OUTPATIENT
Start: 2023-10-20

## 2023-10-20 RX ORDER — SODIUM CHLORIDE 9 MG/ML
5-250 INJECTION, SOLUTION INTRAVENOUS PRN
OUTPATIENT
Start: 2023-10-20

## 2023-10-20 RX ORDER — SODIUM CHLORIDE 9 MG/ML
5-250 INJECTION, SOLUTION INTRAVENOUS PRN
Status: CANCELLED | OUTPATIENT
Start: 2023-10-20

## 2023-10-20 RX ADMIN — Medication 10 ML: at 10:28

## 2023-10-20 RX ADMIN — IRON SUCROSE 200 MG: 20 INJECTION, SOLUTION INTRAVENOUS at 11:10

## 2023-10-20 RX ADMIN — SODIUM CHLORIDE 25 ML/HR: 9 INJECTION, SOLUTION INTRAVENOUS at 11:09

## 2023-10-20 ASSESSMENT — PAIN DESCRIPTION - DESCRIPTORS: DESCRIPTORS: SORE

## 2023-10-20 ASSESSMENT — PAIN DESCRIPTION - LOCATION: LOCATION: FLANK

## 2023-10-20 ASSESSMENT — PAIN SCALES - GENERAL: PAINLEVEL_OUTOF10: 2

## 2023-10-20 ASSESSMENT — PAIN DESCRIPTION - ORIENTATION: ORIENTATION: LEFT

## 2023-10-20 NOTE — DISCHARGE INSTRUCTIONS
iron sucrose (injection)  Pronunciation:  EYE urn RUSTY carlos  Brand:  Venofer  What is the most important information I should know about iron sucrose? Follow all directions on your medicine label and package. Tell each of your healthcare providers about all your medical conditions, allergies, and all medicines you use. What is iron sucrose? Iron sucrose is used to treat iron deficiency anemia in people with kidney disease. Iron sucrose is for use in adults and children at least 3years old. Iron sucrose is not for treating other forms of anemia not caused by iron deficiency. Iron sucrose may also be used for purposes not listed in this medication guide. What should I discuss with my healthcare provider before I receive iron sucrose? You should not be treated with this medicine if you have ever had an allergic reaction to an iron injection. Tell your doctor if you have ever had:  hemochromatosis or iron overload (the buildup of excess iron). Tell your doctor if you are pregnant or plan to become pregnant. Iron sucrose can harm an unborn baby if you have a severe reaction to this medicine during your second or third trimester. However, not treating iron deficiency anemia during pregnancy may cause complications such as premature birth or low birth weight. The benefit of treating your condition during pregnancy may outweigh any risks. If you are breastfeeding, tell your doctor if you notice diarrhea or constipation in the nursing baby. How is iron sucrose given? Iron sucrose is given as an infusion into a vein. A healthcare provider will give you this injection. This medicine is sometimes given slowly, and the infusion can take up to 2.5 hours to complete. Tell your caregivers if you feel any burning, pain, or swelling around the IV needle when iron sucrose is injected. You will be watched closely for at least 30 minutes to make sure you do not have an allergic reaction.   You will need frequent

## 2023-10-24 ENCOUNTER — HOSPITAL ENCOUNTER (OUTPATIENT)
Facility: HOSPITAL | Age: 88
Discharge: HOME | End: 2023-10-24
Attending: SURGERY
Payer: MEDICARE

## 2023-10-24 VITALS — TEMPERATURE: 98.2 F | HEART RATE: 69 BPM | DIASTOLIC BLOOD PRESSURE: 73 MMHG | SYSTOLIC BLOOD PRESSURE: 173 MMHG

## 2023-10-24 DIAGNOSIS — L97.922 NON-PRESSURE CHRONIC ULCER OF LEFT LOWER LEG WITH FAT LAYER EXPOSED (HCC): Primary | ICD-10-CM

## 2023-10-24 PROCEDURE — 87070 CULTURE OTHR SPECIMN AEROBIC: CPT

## 2023-10-24 PROCEDURE — 87186 SC STD MICRODIL/AGAR DIL: CPT

## 2023-10-24 PROCEDURE — 87205 SMEAR GRAM STAIN: CPT

## 2023-10-24 PROCEDURE — 29581 APPL MULTLAYER CMPRN SYS LEG: CPT

## 2023-10-24 PROCEDURE — 99213 OFFICE O/P EST LOW 20 MIN: CPT | Performed by: SURGERY

## 2023-10-24 PROCEDURE — 87077 CULTURE AEROBIC IDENTIFY: CPT

## 2023-10-24 RX ORDER — LIDOCAINE 40 MG/G
CREAM TOPICAL ONCE
OUTPATIENT
Start: 2023-10-24 | End: 2023-10-24

## 2023-10-24 RX ORDER — GINSENG 100 MG
CAPSULE ORAL ONCE
OUTPATIENT
Start: 2023-10-24 | End: 2023-10-24

## 2023-10-24 RX ORDER — BETAMETHASONE DIPROPIONATE 0.05 %
OINTMENT (GRAM) TOPICAL ONCE
OUTPATIENT
Start: 2023-10-24 | End: 2023-10-24

## 2023-10-24 RX ORDER — LIDOCAINE 50 MG/G
OINTMENT TOPICAL ONCE
OUTPATIENT
Start: 2023-10-24 | End: 2023-10-24

## 2023-10-24 RX ORDER — LIDOCAINE HYDROCHLORIDE 20 MG/ML
JELLY TOPICAL ONCE
OUTPATIENT
Start: 2023-10-24 | End: 2023-10-24

## 2023-10-24 RX ORDER — IBUPROFEN 200 MG
TABLET ORAL ONCE
OUTPATIENT
Start: 2023-10-24 | End: 2023-10-24

## 2023-10-24 RX ORDER — LIDOCAINE HYDROCHLORIDE 40 MG/ML
SOLUTION TOPICAL ONCE
OUTPATIENT
Start: 2023-10-24 | End: 2023-10-24

## 2023-10-24 RX ORDER — TRIAMCINOLONE ACETONIDE 1 MG/G
OINTMENT TOPICAL ONCE
OUTPATIENT
Start: 2023-10-24 | End: 2023-10-24

## 2023-10-24 RX ORDER — GENTAMICIN SULFATE 1 MG/G
OINTMENT TOPICAL ONCE
OUTPATIENT
Start: 2023-10-24 | End: 2023-10-24

## 2023-10-24 RX ORDER — BACITRACIN ZINC AND POLYMYXIN B SULFATE 500; 1000 [USP'U]/G; [USP'U]/G
OINTMENT TOPICAL ONCE
OUTPATIENT
Start: 2023-10-24 | End: 2023-10-24

## 2023-10-24 RX ORDER — SODIUM CHLOR/HYPOCHLOROUS ACID 0.033 %
SOLUTION, IRRIGATION IRRIGATION ONCE
OUTPATIENT
Start: 2023-10-24 | End: 2023-10-24

## 2023-10-24 RX ORDER — CLOBETASOL PROPIONATE 0.5 MG/G
OINTMENT TOPICAL ONCE
OUTPATIENT
Start: 2023-10-24 | End: 2023-10-24

## 2023-10-24 NOTE — FLOWSHEET NOTE
10/24/23 1006   Anesthetic   Anesthetic 4% Lidocaine Cream   Right Leg Edema Point of Measurement   Leg circumference 29.5 cm   Ankle circumference 21 cm   Compression Therapy 2 layer compression wrap   Left Leg Edema Point of Measurement   Leg circumference 28 cm   Ankle circumference 20 cm   Compression Therapy 2 layer compression wrap   Wound 10/17/23 Leg Right;Medial;Lower   Date First Assessed/Time First Assessed: 10/17/23 1030   Present on Original Admission: Yes  Wound Approximate Age at First Assessment (Weeks): 52 weeks  Location: Leg  Wound Location Orientation: Right;Medial;Lower   Wound Image    Dressing Status Intact   Wound Cleansed Soap and water   Offloading for Diabetic Foot Ulcers Offloading not required   Wound Length (cm) 0.6 cm   Wound Width (cm) 0.6 cm   Wound Depth (cm) 0.1 cm   Wound Surface Area (cm^2) 0.36 cm^2   Change in Wound Size % (l*w) -20   Wound Volume (cm^3) 0.036 cm^3   Wound Healing % -20   Wound Assessment Simpson/red;Slough   Drainage Amount Small (< 25%)   Drainage Description Black   Odor Mild   Liliana-wound Assessment Dry/flaky   Margins Defined edges   Wound Thickness Description not for Pressure Injury Full thickness   Wound 10/17/23 Leg Right;Lateral;Lower   Date First Assessed/Time First Assessed: 10/17/23 1031   Present on Original Admission: Yes  Wound Approximate Age at First Assessment (Weeks): 52 weeks  Location: Leg  Wound Location Orientation: Right;Lateral;Lower   Wound Image    Wound Cleansed Soap and water   Offloading for Diabetic Foot Ulcers Offloading not required   Wound Length (cm) 1 cm   Wound Width (cm) 1 cm   Wound Depth (cm) 0.1 cm   Wound Surface Area (cm^2) 1 cm^2   Change in Wound Size % (l*w) -122.22   Wound Volume (cm^3) 0.1 cm^3   Wound Healing % -122   Wound Assessment Simpson/red;Slough   Drainage Amount Moderate (25-50%)   Drainage Description Black   Odor Mild   Liliana-wound Assessment Edematous   Margins Defined edges   Wound Thickness Description

## 2023-10-24 NOTE — FLOWSHEET NOTE
10/24/23 1121   Right Leg Edema Point of Measurement   Compression Therapy 2 layer compression wrap   Left Leg Edema Point of Measurement   Compression Therapy 2 layer compression wrap     Multilayer Compression Wrap   (Not Unna) Below the Knee    NAME:  Michaela Rich  YOB: 1935  MEDICAL RECORD NUMBER:  [id number]  DATE:  October 24, 2023    Applied primary and secondary dressing as ordered, Placed compression to right lower leg, Place compression to left lower leg, Instructed patient/caregiver not to remove dressing and to keep it clean and dry, Instructed patient/caregiver on complications to report to provider, such as pain, numbness in toes, heavy drainage, and slippage of dressing, and Instructed patient/caregiver on purpose of compression dressing and on activity and exercise recommendations    Response to treatment: Well tolerated by patient      Electronically signed by Anthony Johnson RN on 10/24/2023 at 11:21 AM

## 2023-10-24 NOTE — PROGRESS NOTES
Wound care     The patient is an 82-year-old woman who was referred to the wound care center regarding ulcerations on the right and left lower legs. The patient was first seen at the wound care center on 10/17/2023. The patient reports that she has noticed ulcerations on the lower legs for several months. Most recently prior to her appointment she was using bacitracin ointment on the sites. The patient has history of prediabetes. The patient has history of CKD 5. She had PD catheter placed on 10/19/2023. Anitha Delgado She is not presently on dialysis. The patient does not report history of anginal symptoms and does not have history of MI or coronary intervention. She has had recent asthma and was treated with steroids. Patient is ambulatory. Past medical history includes prior history of breast cancer, hypertension, hereditary spherocytosis, sickle cell trait, osteoporosis, chronic diastolic congestive heart failure. The patient presently limits salt intake. She reports following a renal diet and avoiding starches. Dressing as of 10/17/2023: For right and left lower legs, apply Xeroform to ulcerated areas. Apply 2 layer compression wrap with calamine from base of toes to upper calf. Reported weight 122 pounds height 5 feet 1 inch     Physical examination     The patient is an alert woman in no acute distress. Examination of the right lower extremity revealed 2+ dorsalis pedis pulse. There is trace pitting edema in the lower half of the right lower leg. On the right lateral lower leg there is ulceration 1 x 1 x 0.1 cm in dimension with pale base. On the right medial lower leg there is ulceration 0.6 x 0.6 x 0.1 cm in dimension with pale base. Examination of the left lower extremity revealed 2+ dorsalis pedis pulse. There is trace pitting edema in the lower half of the left lower leg.   On the left lateral lower leg there is a cluster of ulcers 2.4 x 1 x 0.1 cm in dimension with

## 2023-10-24 NOTE — PATIENT INSTRUCTIONS
Discharge Instructions for          [address], [address]  Phone: [phone] Fax: [phone]    NAME:  Tung Miller  YOB: 1935  MEDICAL RECORD NUMBER:  [id number]  DATE:  October 24, 2023  WOUND CARE ORDERS:  Bilateral lower legs: Cleanse with baby shampoo, rinse and pat dry. Cover open areas with xeroform. Cover with gauze. Apply 2 layer compression wraps with calamine. Change weekly in clinic. Activity:  [x] Elevate leg(s) above the level of the heart when sitting. [x] Avoid prolonged standing in one place. [x] Do no get dressing/wrap wet. Dietary:  [x] Diet as tolerated      [] Diabetic Diet            [] Increase Protein: examples (Meat, cheese, eggs, greek yogurt, fish, nuts)          [] Elias Therapeutic Nutrition Powder    Return Appointment:  [x] Return Appointment: With Dr. Willy Velasco in 1 week  [] Nurse Visit :  [] Ordered tests:     Electronically signed Ruslan Beltran RN on 10/24/2023 at 10:36 AM     [address] [place] [address] Information: Should you experience any significant changes in your wound(s) or have questions about your wound care, please contact the [address] [place] [address] at [address] [place], [place] [address]way 8:00 am - 4:30. If you need help with your wound outside these hours and cannot wait until we are again available, contact your PCP or go to the hospital emergency room. PLEASE NOTE: IF YOU ARE UNABLE TO OBTAIN WOUND SUPPLIES, CONTINUE TO USE THE SUPPLIES YOU HAVE AVAILABLE UNTIL YOU ARE ABLE TO REACH US. IT IS MOST IMPORTANT TO KEEP THE WOUND COVERED AT ALL TIMES.      Physician Signature:_______________________    Date: ___________ Time:  ____________

## 2023-10-26 LAB
BACTERIA SPEC CULT: ABNORMAL
GRAM STN SPEC: ABNORMAL
GRAM STN SPEC: ABNORMAL
SERVICE CMNT-IMP: ABNORMAL

## 2023-10-27 ENCOUNTER — TELEPHONE (OUTPATIENT)
Facility: HOSPITAL | Age: 88
End: 2023-10-27

## 2023-10-27 ENCOUNTER — CLINICAL DOCUMENTATION (OUTPATIENT)
Facility: HOSPITAL | Age: 88
End: 2023-10-27

## 2023-10-27 ENCOUNTER — HOSPITAL ENCOUNTER (OUTPATIENT)
Facility: HOSPITAL | Age: 88
Setting detail: INFUSION SERIES
Discharge: HOME | End: 2023-10-27
Payer: MEDICARE

## 2023-10-27 ENCOUNTER — APPOINTMENT (OUTPATIENT)
Facility: HOSPITAL | Age: 88
End: 2023-10-27
Payer: MEDICARE

## 2023-10-27 VITALS
SYSTOLIC BLOOD PRESSURE: 166 MMHG | RESPIRATION RATE: 16 BRPM | OXYGEN SATURATION: 100 % | TEMPERATURE: 98 F | DIASTOLIC BLOOD PRESSURE: 56 MMHG | HEART RATE: 63 BPM

## 2023-10-27 DIAGNOSIS — N18.4 ANEMIA OF CHRONIC RENAL FAILURE, STAGE 4 (SEVERE) (HCC): ICD-10-CM

## 2023-10-27 DIAGNOSIS — L97.922 NON-PRESSURE CHRONIC ULCER OF LEFT LOWER LEG WITH FAT LAYER EXPOSED (HCC): Primary | ICD-10-CM

## 2023-10-27 DIAGNOSIS — N18.9 ANEMIA OF CHRONIC RENAL FAILURE, UNSPECIFIED CKD STAGE: Primary | ICD-10-CM

## 2023-10-27 DIAGNOSIS — D63.1 ANEMIA OF CHRONIC RENAL FAILURE, UNSPECIFIED CKD STAGE: Primary | ICD-10-CM

## 2023-10-27 DIAGNOSIS — D63.1 ANEMIA OF CHRONIC RENAL FAILURE, STAGE 4 (SEVERE) (HCC): ICD-10-CM

## 2023-10-27 LAB
ALBUMIN SERPL-MCNC: 3.3 G/DL (ref 3.5–5)
ANION GAP SERPL CALC-SCNC: 11 MMOL/L (ref 5–15)
BASOPHILS # BLD: 0.1 K/UL (ref 0–0.1)
BASOPHILS NFR BLD: 1 % (ref 0–1)
BUN SERPL-MCNC: 51 MG/DL (ref 6–20)
BUN/CREAT SERPL: 9 (ref 12–20)
CALCIUM SERPL-MCNC: 9.1 MG/DL (ref 8.5–10.1)
CHLORIDE SERPL-SCNC: 101 MMOL/L (ref 97–108)
CO2 SERPL-SCNC: 27 MMOL/L (ref 21–32)
CREAT SERPL-MCNC: 5.56 MG/DL (ref 0.55–1.02)
DIFFERENTIAL METHOD BLD: ABNORMAL
EOSINOPHIL # BLD: 0.7 K/UL (ref 0–0.4)
EOSINOPHIL NFR BLD: 6 % (ref 0–7)
ERYTHROCYTE [DISTWIDTH] IN BLOOD BY AUTOMATED COUNT: 14.4 % (ref 11.5–14.5)
GLUCOSE SERPL-MCNC: 105 MG/DL (ref 65–100)
HCT VFR BLD AUTO: 30.2 % (ref 35–47)
HGB BLD-MCNC: 10 G/DL (ref 11.5–16)
IMM GRANULOCYTES # BLD AUTO: 0.1 K/UL (ref 0–0.04)
IMM GRANULOCYTES NFR BLD AUTO: 0 % (ref 0–0.5)
LYMPHOCYTES # BLD: 1.2 K/UL (ref 0.8–3.5)
LYMPHOCYTES NFR BLD: 10 % (ref 12–49)
MCH RBC QN AUTO: 28.3 PG (ref 26–34)
MCHC RBC AUTO-ENTMCNC: 33.1 G/DL (ref 30–36.5)
MCV RBC AUTO: 85.6 FL (ref 80–99)
MONOCYTES # BLD: 0.9 K/UL (ref 0–1)
MONOCYTES NFR BLD: 7 % (ref 5–13)
NEUTS SEG # BLD: 9.1 K/UL (ref 1.8–8)
NEUTS SEG NFR BLD: 76 % (ref 32–75)
NRBC # BLD: 0 K/UL (ref 0–0.01)
NRBC BLD-RTO: 0 PER 100 WBC
PHOSPHATE SERPL-MCNC: 4.9 MG/DL (ref 2.6–4.7)
PLATELET # BLD AUTO: 431 K/UL (ref 150–400)
PMV BLD AUTO: 10.1 FL (ref 8.9–12.9)
POTASSIUM SERPL-SCNC: 3.5 MMOL/L (ref 3.5–5.1)
RBC # BLD AUTO: 3.53 M/UL (ref 3.8–5.2)
SODIUM SERPL-SCNC: 139 MMOL/L (ref 136–145)
WBC # BLD AUTO: 12.1 K/UL (ref 3.6–11)

## 2023-10-27 PROCEDURE — 2580000003 HC RX 258: Performed by: INTERNAL MEDICINE

## 2023-10-27 PROCEDURE — 36415 COLL VENOUS BLD VENIPUNCTURE: CPT

## 2023-10-27 PROCEDURE — 96365 THER/PROPH/DIAG IV INF INIT: CPT

## 2023-10-27 PROCEDURE — 6360000002 HC RX W HCPCS: Performed by: INTERNAL MEDICINE

## 2023-10-27 PROCEDURE — 85025 COMPLETE CBC W/AUTO DIFF WBC: CPT

## 2023-10-27 PROCEDURE — 80069 RENAL FUNCTION PANEL: CPT

## 2023-10-27 RX ORDER — HEPARIN 100 UNIT/ML
500 SYRINGE INTRAVENOUS PRN
OUTPATIENT
Start: 2023-10-27

## 2023-10-27 RX ORDER — ACETAMINOPHEN 325 MG/1
650 TABLET ORAL
OUTPATIENT
Start: 2023-10-27

## 2023-10-27 RX ORDER — SODIUM CHLORIDE 9 MG/ML
5-250 INJECTION, SOLUTION INTRAVENOUS PRN
OUTPATIENT
Start: 2023-10-27

## 2023-10-27 RX ORDER — CEPHALEXIN 500 MG/1
500 CAPSULE ORAL DAILY
Qty: 14 CAPSULE | Refills: 0 | Status: SHIPPED | OUTPATIENT
Start: 2023-10-27 | End: 2023-11-10

## 2023-10-27 RX ORDER — SODIUM CHLORIDE 0.9 % (FLUSH) 0.9 %
5-40 SYRINGE (ML) INJECTION PRN
OUTPATIENT
Start: 2023-10-27

## 2023-10-27 RX ORDER — ALBUTEROL SULFATE 90 UG/1
4 AEROSOL, METERED RESPIRATORY (INHALATION) PRN
OUTPATIENT
Start: 2023-10-27

## 2023-10-27 RX ORDER — DIPHENHYDRAMINE HYDROCHLORIDE 50 MG/ML
50 INJECTION INTRAMUSCULAR; INTRAVENOUS
OUTPATIENT
Start: 2023-10-27

## 2023-10-27 RX ORDER — EPINEPHRINE 1 MG/ML
0.3 INJECTION, SOLUTION, CONCENTRATE INTRAVENOUS PRN
OUTPATIENT
Start: 2023-10-27

## 2023-10-27 RX ORDER — SODIUM CHLORIDE 9 MG/ML
INJECTION, SOLUTION INTRAVENOUS CONTINUOUS
OUTPATIENT
Start: 2023-10-27

## 2023-10-27 RX ORDER — SODIUM CHLORIDE 0.9 % (FLUSH) 0.9 %
5-40 SYRINGE (ML) INJECTION PRN
Status: DISCONTINUED | OUTPATIENT
Start: 2023-10-27 | End: 2023-10-28 | Stop reason: HOSPADM

## 2023-10-27 RX ORDER — ONDANSETRON 2 MG/ML
8 INJECTION INTRAMUSCULAR; INTRAVENOUS
OUTPATIENT
Start: 2023-10-27

## 2023-10-27 RX ORDER — SODIUM CHLORIDE 9 MG/ML
5-250 INJECTION, SOLUTION INTRAVENOUS PRN
Status: DISCONTINUED | OUTPATIENT
Start: 2023-10-27 | End: 2023-10-28 | Stop reason: HOSPADM

## 2023-10-27 RX ADMIN — Medication 10 ML: at 11:28

## 2023-10-27 RX ADMIN — IRON SUCROSE 200 MG: 20 INJECTION, SOLUTION INTRAVENOUS at 12:10

## 2023-10-27 RX ADMIN — SODIUM CHLORIDE 25 ML/HR: 9 INJECTION, SOLUTION INTRAVENOUS at 12:09

## 2023-10-27 NOTE — TELEPHONE ENCOUNTER
Called to inform patient that Dr. Danica Jordan sent over keflex to her [employer]. Informed patient to take once a day for 14 days. Patient verbalized understanding and states she will start taking the medication.

## 2023-10-27 NOTE — PROGRESS NOTES
Wound care    Wound culture of 10/24/2023 grew moderate methicillin sensitive Staph aureus and a few Klebsiella. I will treat MSSA, but will hold any treatment for Klebsiella and observe wound response. The patient has CKD 5 and will soon start peritoneal dialysis. Dosing was adjusted to cephalexin 500 mg p.o. daily for 14 days.     Tito Kelley MD

## 2023-10-30 RX ORDER — TELMISARTAN 40 MG/1
40 TABLET ORAL 2 TIMES DAILY
Qty: 180 TABLET | Refills: 3 | Status: SHIPPED | OUTPATIENT
Start: 2023-10-30

## 2023-10-31 ENCOUNTER — HOSPITAL ENCOUNTER (OUTPATIENT)
Facility: HOSPITAL | Age: 88
Discharge: HOME | End: 2023-10-31
Attending: SURGERY
Payer: MEDICARE

## 2023-10-31 VITALS — SYSTOLIC BLOOD PRESSURE: 162 MMHG | DIASTOLIC BLOOD PRESSURE: 66 MMHG | HEART RATE: 66 BPM | TEMPERATURE: 97.6 F

## 2023-10-31 DIAGNOSIS — L97.922 NON-PRESSURE CHRONIC ULCER OF LEFT LOWER LEG WITH FAT LAYER EXPOSED (HCC): ICD-10-CM

## 2023-10-31 DIAGNOSIS — L03.115 CELLULITIS OF RIGHT LOWER LEG: Primary | ICD-10-CM

## 2023-10-31 DIAGNOSIS — L03.116 CELLULITIS OF LEFT LOWER LEG: ICD-10-CM

## 2023-10-31 PROCEDURE — 29581 APPL MULTLAYER CMPRN SYS LEG: CPT

## 2023-10-31 PROCEDURE — 99213 OFFICE O/P EST LOW 20 MIN: CPT | Performed by: SURGERY

## 2023-10-31 RX ORDER — SODIUM CHLOR/HYPOCHLOROUS ACID 0.033 %
SOLUTION, IRRIGATION IRRIGATION ONCE
OUTPATIENT
Start: 2023-10-31 | End: 2023-10-31

## 2023-10-31 RX ORDER — LIDOCAINE 50 MG/G
OINTMENT TOPICAL ONCE
OUTPATIENT
Start: 2023-10-31 | End: 2023-10-31

## 2023-10-31 RX ORDER — LIDOCAINE 40 MG/G
CREAM TOPICAL ONCE
OUTPATIENT
Start: 2023-10-31 | End: 2023-10-31

## 2023-10-31 RX ORDER — BETAMETHASONE DIPROPIONATE 0.05 %
OINTMENT (GRAM) TOPICAL ONCE
OUTPATIENT
Start: 2023-10-31 | End: 2023-10-31

## 2023-10-31 RX ORDER — TRIAMCINOLONE ACETONIDE 1 MG/G
OINTMENT TOPICAL ONCE
OUTPATIENT
Start: 2023-10-31 | End: 2023-10-31

## 2023-10-31 RX ORDER — LIDOCAINE HYDROCHLORIDE 20 MG/ML
JELLY TOPICAL ONCE
OUTPATIENT
Start: 2023-10-31 | End: 2023-10-31

## 2023-10-31 RX ORDER — CLOBETASOL PROPIONATE 0.5 MG/G
OINTMENT TOPICAL ONCE
OUTPATIENT
Start: 2023-10-31 | End: 2023-10-31

## 2023-10-31 RX ORDER — LIDOCAINE HYDROCHLORIDE 40 MG/ML
SOLUTION TOPICAL ONCE
OUTPATIENT
Start: 2023-10-31 | End: 2023-10-31

## 2023-10-31 RX ORDER — GENTAMICIN SULFATE 1 MG/G
OINTMENT TOPICAL ONCE
OUTPATIENT
Start: 2023-10-31 | End: 2023-10-31

## 2023-10-31 RX ORDER — BACITRACIN ZINC AND POLYMYXIN B SULFATE 500; 1000 [USP'U]/G; [USP'U]/G
OINTMENT TOPICAL ONCE
OUTPATIENT
Start: 2023-10-31 | End: 2023-10-31

## 2023-10-31 RX ORDER — IBUPROFEN 200 MG
TABLET ORAL ONCE
OUTPATIENT
Start: 2023-10-31 | End: 2023-10-31

## 2023-10-31 RX ORDER — GINSENG 100 MG
CAPSULE ORAL ONCE
OUTPATIENT
Start: 2023-10-31 | End: 2023-10-31

## 2023-10-31 NOTE — PROGRESS NOTES
half of the right lower leg. On the right lateral lower leg there is ulceration 0.9 x 1 x 0.1 cm in dimension with pale base. On the right medial lower leg there is ulceration 0.6 x 0.6 x 0.1 cm in dimension with pale base. Examination of the left lower extremity revealed 2+ dorsalis pedis pulse. There is trace pitting edema in the lower half of the left lower leg. On the left lateral lower leg there is a cluster of ulcers 2 x 0.6 x 0.1 cm in dimension with pale base. On the left medial lower leg there is a cluster of small ulcers with total dimensions 0.8 x 0.6 x 0.1 cm with pale base. The patient has ulcerations on the distal right and left lower legs in association with leg edema. The patient has CKD 5 and will soon be initiating peritoneal dialysis. Edema is decreased in the lower legs after initiation of the compression wraps and the skin appears drier. The patient is taking Keflex for MSSA. Ulcers are improved. I have reviewed with the patient the impact of leg edema on wound healing. The patient will continue limiting salt intake. Glucose control is good. The patient will likely have less edema when she initiates dialysis. Dressing ordered: For right and left lower legs, apply Xeroform to ulcerated areas. Apply 2 layer compression wrap with calamine from base of toes to upper calf. The patient will need to use a cast cover for showering. Complete course of oral Keflex. The patient will follow-up in the wound care center in 1 week. Diagnosis: Nonpressure ulcers right lower leg with fat layer exposed, nonpressure ulcers left lower leg with fat layer exposed, bilateral leg edema, CKD 5. Cellulitis right and left lower legs. L97.912, L97.922, R60.0, N18.5. L03.115, L03.116.         Lukasz Barraza MD

## 2023-10-31 NOTE — DISCHARGE INSTRUCTIONS
Discharge Instructions for          [address], [address]  Phone: [phone] Fax: [phone]    NAME:  Kristin Yusuf  YOB: 1935  MEDICAL RECORD NUMBER:  [id number]  DATE:  October 31, 2023  WOUND CARE ORDERS:  Bilateral lower legs: Cleanse with baby shampoo, rinse and pat dry. Cover open areas with xeroform. Cover with gauze. Apply 2 layer compression wraps with calamine. Change weekly in clinic. Activity:  [x] Elevate leg(s) above the level of the heart when sitting. [x] Avoid prolonged standing in one place. [x] Do no get dressing/wrap wet. Dietary:  [] Diet as tolerated      [] Diabetic Diet            [] Increase Protein: examples (Meat, cheese, eggs, greek yogurt, fish, nuts)          [] Elias Therapeutic Nutrition Powder  [x] Other: Low Sodium  [] Dial a Dietician : Call [employer] at [phone] enter code ([phone]) when prompted. M-F 9am-5pm EST. Return Appointment:  [x] Return Appointment: With Dr. Rosio Rob in 1 [facility])  [] Nurse Visit : *** days  [] Ordered tests:    Electronically signed Kaia Simmons RN on 10/31/2023 at 10:37 AM     [address] [place] [address] Information: Should you experience any significant changes in your wound(s) or have questions about your wound care, please contact the [address] [place] [address] at [address] [place], [place] [address]way 8:00 am - 4:30. If you need help with your wound outside these hours and cannot wait until we are again available, contact your PCP or go to the hospital emergency room. PLEASE NOTE: IF YOU ARE UNABLE TO OBTAIN WOUND SUPPLIES, CONTINUE TO USE THE SUPPLIES YOU HAVE AVAILABLE UNTIL YOU ARE ABLE TO REACH US. IT IS MOST IMPORTANT TO KEEP THE WOUND COVERED AT ALL TIMES.      Physician Signature:_______________________    Date: ___________ Time:  ____________

## 2023-10-31 NOTE — FLOWSHEET NOTE
10/31/23 1056   Right Leg Edema Point of Measurement   Compression Therapy 2 layer compression wrap   Left Leg Edema Point of Measurement   Compression Therapy 2 layer compression wrap     Multilayer Compression Wrap   (Not Unna) Below the Knee    NAME:  Ana Amin  YOB: 1935  MEDICAL RECORD NUMBER:  [id number]  DATE:  October 31, 2023    Applied primary and secondary dressing as ordered, Placed compression to right lower leg, Place compression to left lower leg, Instructed patient/caregiver not to remove dressing and to keep it clean and dry, Instructed patient/caregiver on complications to report to provider, such as pain, numbness in toes, heavy drainage, and slippage of dressing, and Instructed patient/caregiver on purpose of compression dressing and on activity and exercise recommendations    Response to treatment: Well tolerated by patient      Electronically signed by Britni Feldman RN on 10/31/2023 at 10:56 AM
for Pressure Injury Full thickness   Wound 10/17/23 Leg Left; Lower; Lateral   Date First Assessed/Time First Assessed: 10/17/23 1031   Present on Original Admission: Yes  Wound Approximate Age at First Assessment (Weeks): 52 weeks  Location: Leg  Wound Location Orientation: Left; Lower; Lateral   Wound Image    Dressing Status Intact   Wound Cleansed Soap and water   Offloading for Diabetic Foot Ulcers Offloading not required   Wound Length (cm) 2 cm   Wound Width (cm) 0.6 cm   Wound Depth (cm) 0.1 cm   Wound Surface Area (cm^2) 1.2 cm^2   Change in Wound Size % (l*w) 62.96   Wound Volume (cm^3) 0.12 cm^3   Wound Healing % 63   Wound Assessment Corinne/red;Slough   Drainage Amount Moderate (25-50%)   Drainage Description Serosanguinous   Odor Mild   Liliana-wound Assessment Dry/flaky   Margins Defined edges   Wound Thickness Description not for Pressure Injury Full thickness   Wound 10/17/23 Leg Left; Lower;Medial   Date First Assessed/Time First Assessed: 10/17/23 1031   Present on Original Admission: Yes  Wound Approximate Age at First Assessment (Weeks): 52 weeks  Location: Leg  Wound Location Orientation: Left; Lower;Medial   Wound Image    Dressing Status Intact   Wound Cleansed Soap and water   Offloading for Diabetic Foot Ulcers Offloading not ordered   Wound Length (cm) 0.8 cm   Wound Width (cm) 0.6 cm   Wound Depth (cm) 0.1 cm   Wound Surface Area (cm^2) 0.48 cm^2   Change in Wound Size % (l*w) 98.86   Wound Volume (cm^3) 0.048 cm^3   Wound Healing % 99   Wound Assessment Corinne/red;Slough   Drainage Amount Moderate (25-50%)   Drainage Description Serosanguinous   Odor None   Liliana-wound Assessment Dry/flaky   Margins Defined edges   Wound Thickness Description not for Pressure Injury Full thickness

## 2023-11-07 ENCOUNTER — HOSPITAL ENCOUNTER (OUTPATIENT)
Facility: HOSPITAL | Age: 88
Discharge: HOME | End: 2023-11-07
Attending: SURGERY
Payer: MEDICARE

## 2023-11-07 VITALS — RESPIRATION RATE: 17 BRPM | HEART RATE: 79 BPM | TEMPERATURE: 97.8 F

## 2023-11-07 DIAGNOSIS — L97.922 NON-PRESSURE CHRONIC ULCER OF LEFT LOWER LEG WITH FAT LAYER EXPOSED (HCC): Primary | ICD-10-CM

## 2023-11-07 PROCEDURE — 29581 APPL MULTLAYER CMPRN SYS LEG: CPT

## 2023-11-07 PROCEDURE — 99213 OFFICE O/P EST LOW 20 MIN: CPT | Performed by: SURGERY

## 2023-11-07 RX ORDER — LIDOCAINE 40 MG/G
CREAM TOPICAL ONCE
OUTPATIENT
Start: 2023-11-07 | End: 2023-11-07

## 2023-11-07 RX ORDER — LIDOCAINE HYDROCHLORIDE 20 MG/ML
JELLY TOPICAL ONCE
OUTPATIENT
Start: 2023-11-07 | End: 2023-11-07

## 2023-11-07 RX ORDER — SODIUM CHLOR/HYPOCHLOROUS ACID 0.033 %
SOLUTION, IRRIGATION IRRIGATION ONCE
OUTPATIENT
Start: 2023-11-07 | End: 2023-11-07

## 2023-11-07 RX ORDER — TRIAMCINOLONE ACETONIDE 1 MG/G
OINTMENT TOPICAL ONCE
OUTPATIENT
Start: 2023-11-07 | End: 2023-11-07

## 2023-11-07 RX ORDER — IBUPROFEN 200 MG
TABLET ORAL ONCE
OUTPATIENT
Start: 2023-11-07 | End: 2023-11-07

## 2023-11-07 RX ORDER — BACITRACIN ZINC AND POLYMYXIN B SULFATE 500; 1000 [USP'U]/G; [USP'U]/G
OINTMENT TOPICAL ONCE
OUTPATIENT
Start: 2023-11-07 | End: 2023-11-07

## 2023-11-07 RX ORDER — GINSENG 100 MG
CAPSULE ORAL ONCE
OUTPATIENT
Start: 2023-11-07 | End: 2023-11-07

## 2023-11-07 RX ORDER — LIDOCAINE HYDROCHLORIDE 40 MG/ML
SOLUTION TOPICAL ONCE
OUTPATIENT
Start: 2023-11-07 | End: 2023-11-07

## 2023-11-07 RX ORDER — GENTAMICIN SULFATE 1 MG/G
OINTMENT TOPICAL ONCE
OUTPATIENT
Start: 2023-11-07 | End: 2023-11-07

## 2023-11-07 RX ORDER — CLOBETASOL PROPIONATE 0.5 MG/G
OINTMENT TOPICAL ONCE
OUTPATIENT
Start: 2023-11-07 | End: 2023-11-07

## 2023-11-07 RX ORDER — BETAMETHASONE DIPROPIONATE 0.05 %
OINTMENT (GRAM) TOPICAL ONCE
OUTPATIENT
Start: 2023-11-07 | End: 2023-11-07

## 2023-11-07 RX ORDER — LIDOCAINE 50 MG/G
OINTMENT TOPICAL ONCE
OUTPATIENT
Start: 2023-11-07 | End: 2023-11-07

## 2023-11-07 NOTE — PROGRESS NOTES
Wound care     The patient is an 82-year-old woman who was referred to the wound care center regarding ulcerations on the right and left lower legs. The patient was first seen at the wound care center on 10/17/2023. The patient reports that she has noticed ulcerations on the lower legs for several months. Most recently prior to her appointment she was using bacitracin ointment on the sites. The patient has history of prediabetes. The patient has history of CKD 5. She had PD catheter placed on 10/19/2023. Emanuel Johns She is not presently on dialysis. The patient does not report history of anginal symptoms and does not have history of MI or coronary intervention. She has had recent asthma and was treated with steroids. Patient is ambulatory. As of 10/31/2023, the patient has started training in preparation for initiation of peritoneal dialysis. Past medical history includes prior history of breast cancer, hypertension, hereditary spherocytosis, sickle cell trait, osteoporosis, chronic diastolic congestive heart failure. The patient presently limits salt intake. She reports following a renal diet and avoiding starches. Wound culture of 10/24/2023 grew moderate methicillin sensitive Staph aureus and a few Klebsiella. I chose to treat MSSA, but will hold any treatment for Klebsiella and observe wound response. The patient has CKD 5 and will soon start peritoneal dialysis. Dosing was adjusted to cephalexin 500 mg p.o. daily for 14 days. Started on 10/27/2023. Dressing as of 10/17/2023: For right and left lower legs, apply Xeroform to ulcerated areas. Apply 2 layer compression wrap with calamine from base of toes to upper calf. Reported weight 122 pounds height 5 feet 1 inch     Physical examination     The patient is an alert woman in no acute distress. Examination of the right lower extremity revealed 2+ dorsalis pedis pulse.   There is minimal trace pitting edema in none

## 2023-11-07 NOTE — PATIENT INSTRUCTIONS
Discharge Instructions for  [address], [address]  Phone: [phone] Fax: [phone]    NAME:  Wyatt Siddiqui  YOB: 1935  MEDICAL RECORD NUMBER:  [id number]  DATE:  November 7, 2023  WOUND CARE ORDERS:  Bilateral lower legs: Cleanse with baby shampoo, rinse and pat dry. Cover open areas with xeroform. Cover with gauze. Apply 2 layer compression wraps with calamine. Change weekly in clinic. Activity:  [x] Elevate leg(s) above the level of the heart when sitting. [x] Avoid prolonged standing in one place. [x] Do no get dressing/wrap wet. Dietary:  [x] Diet as tolerated      [] Diabetic Diet            [] Increase Protein: examples (Meat, cheese, eggs, greek yogurt, fish, nuts)          [] Elias Therapeutic Nutrition Powder    Return Appointment:  [x] Return Appointment: With Dr. Marinelli Born in 1 week. [] Nurse Visit :   [] Ordered tests:     Electronically signed Jordan Remy RN on 11/7/2023 at 10:44 AM     [address] [place] [address] Information: Should you experience any significant changes in your wound(s) or have questions about your wound care, please contact the [address] [place] [address] at 1 [facility] 8:00 am - 4:30. If you need help with your wound outside these hours and cannot wait until we are again available, contact your PCP or go to the hospital emergency room. PLEASE NOTE: IF YOU ARE UNABLE TO OBTAIN WOUND SUPPLIES, CONTINUE TO USE THE SUPPLIES YOU HAVE AVAILABLE UNTIL YOU ARE ABLE TO REACH US. IT IS MOST IMPORTANT TO KEEP THE WOUND COVERED AT ALL TIMES.      Physician Signature:_______________________    Date: ___________ Time:  ____________

## 2023-11-14 ENCOUNTER — HOSPITAL ENCOUNTER (OUTPATIENT)
Facility: HOSPITAL | Age: 88
Discharge: HOME | End: 2023-11-14
Attending: SURGERY
Payer: MEDICARE

## 2023-11-14 VITALS
RESPIRATION RATE: 17 BRPM | TEMPERATURE: 97.7 F | DIASTOLIC BLOOD PRESSURE: 73 MMHG | SYSTOLIC BLOOD PRESSURE: 181 MMHG | HEART RATE: 70 BPM

## 2023-11-14 DIAGNOSIS — L97.922 NON-PRESSURE CHRONIC ULCER OF LEFT LOWER LEG WITH FAT LAYER EXPOSED (HCC): Primary | ICD-10-CM

## 2023-11-14 PROCEDURE — 99213 OFFICE O/P EST LOW 20 MIN: CPT | Performed by: SURGERY

## 2023-11-14 PROCEDURE — 29581 APPL MULTLAYER CMPRN SYS LEG: CPT

## 2023-11-14 RX ORDER — IBUPROFEN 200 MG
TABLET ORAL ONCE
OUTPATIENT
Start: 2023-11-14 | End: 2023-11-14

## 2023-11-14 RX ORDER — BACITRACIN ZINC AND POLYMYXIN B SULFATE 500; 1000 [USP'U]/G; [USP'U]/G
OINTMENT TOPICAL ONCE
OUTPATIENT
Start: 2023-11-14 | End: 2023-11-14

## 2023-11-14 RX ORDER — SODIUM CHLOR/HYPOCHLOROUS ACID 0.033 %
SOLUTION, IRRIGATION IRRIGATION ONCE
OUTPATIENT
Start: 2023-11-14 | End: 2023-11-14

## 2023-11-14 RX ORDER — GENTAMICIN SULFATE 1 MG/G
OINTMENT TOPICAL ONCE
OUTPATIENT
Start: 2023-11-14 | End: 2023-11-14

## 2023-11-14 RX ORDER — LIDOCAINE HYDROCHLORIDE 20 MG/ML
JELLY TOPICAL ONCE
OUTPATIENT
Start: 2023-11-14 | End: 2023-11-14

## 2023-11-14 RX ORDER — BETAMETHASONE DIPROPIONATE 0.05 %
OINTMENT (GRAM) TOPICAL ONCE
OUTPATIENT
Start: 2023-11-14 | End: 2023-11-14

## 2023-11-14 RX ORDER — LIDOCAINE HYDROCHLORIDE 40 MG/ML
SOLUTION TOPICAL ONCE
OUTPATIENT
Start: 2023-11-14 | End: 2023-11-14

## 2023-11-14 RX ORDER — GINSENG 100 MG
CAPSULE ORAL ONCE
OUTPATIENT
Start: 2023-11-14 | End: 2023-11-14

## 2023-11-14 RX ORDER — TRIAMCINOLONE ACETONIDE 1 MG/G
OINTMENT TOPICAL ONCE
OUTPATIENT
Start: 2023-11-14 | End: 2023-11-14

## 2023-11-14 RX ORDER — LIDOCAINE 50 MG/G
OINTMENT TOPICAL ONCE
OUTPATIENT
Start: 2023-11-14 | End: 2023-11-14

## 2023-11-14 RX ORDER — CLOBETASOL PROPIONATE 0.5 MG/G
OINTMENT TOPICAL ONCE
OUTPATIENT
Start: 2023-11-14 | End: 2023-11-14

## 2023-11-14 RX ORDER — LIDOCAINE 40 MG/G
CREAM TOPICAL ONCE
OUTPATIENT
Start: 2023-11-14 | End: 2023-11-14

## 2023-11-14 NOTE — PROGRESS NOTES
lower half of the right lower leg. There are small ulcers on right medial and lateral lower leg which are improved. Dimensions in nurses notes. Examination of the left lower extremity revealed 2+ dorsalis pedis pulse. There is minimal trace pitting edema in the lower half of the left lower leg. Ulcers distal left lower leg appear to have thin epithelial coverage. The patient has ulcerations on the distal right and left lower legs in association with leg edema. The patient has CKD 5 and will soon be initiating peritoneal dialysis. Edema is decreased in the lower legs after initiation of the compression wraps and the skin appears drier. Ulcers on right are improved. Ulcers on the left are newly healed. I have reviewed with the patient the impact of leg edema on wound healing. The patient will continue limiting salt intake. Glucose control is good. The patient will likely have less edema when she initiates dialysis. Dressing ordered: For right and left lower legs, apply Xeroform to ulcerated areas. Apply 2 layer compression wrap with calamine from base of toes to upper calf. The patient will need to use a cast cover for showering. The patient will follow-up in the wound care center in 1 week. Diagnosis: Nonpressure ulcers right lower leg with fat layer exposed, nonpressure ulcers left lower leg with fat layer exposed, bilateral leg edema, CKD 5. L97.912, L97.922, R60.0, N18.5.          Jeremiah Trivedi MD

## 2023-11-14 NOTE — DISCHARGE INSTRUCTIONS
Discharge Instructions for          [address], [address]  Phone: [phone] Fax: [phone]    NAME:  Tanya Root  YOB: 1935  MEDICAL RECORD NUMBER:  [id number]  DATE:  November 14, 2023  WOUND CARE ORDERS:  Right lower leg: Cleanse with baby shampoo, rinse and pat dry. Cover open areas with xeroform. Cover with gauze. Apply 2 layer compression wrap with calamine. Change weekly in clinic. Left lower leg: Cleanse with baby shampoo, rinse and pat dry. Apply 2 layer compression wrap with calamine. Change weekly in clinic. Activity:  [x] Elevate leg(s) above the level of the heart when sitting. [x] Avoid prolonged standing in one place. [x] Do no get dressing/wrap wet. Dietary:  [x] Diet as tolerated      [] Diabetic Diet            [] Increase Protein: examples (Meat, cheese, eggs, greek yogurt, fish, nuts)          [] Elias Therapeutic Nutrition Powder  [] Other:  [] Dial a Dietician : Call [employer] at [phone] enter code ([phone]) when prompted. M-F 9am-5pm EST. Return Appointment:  [x] Return Appointment: With Dr. Tasha Burk in [address] [place] [address])  [] Nurse Visit : *** days  [] Ordered tests:    Electronically signed Stacy Pete RN on 11/14/2023 at 9:48 AM     [address] [place] [address] Information: Should you experience any significant changes in your wound(s) or have questions about your wound care, please contact the [address] [place] [address] at 1 [employer] 8:00 am - 4:30. If you need help with your wound outside these hours and cannot wait until we are again available, contact your PCP or go to the hospital emergency room. PLEASE NOTE: IF YOU ARE UNABLE TO OBTAIN WOUND SUPPLIES, CONTINUE TO USE THE SUPPLIES YOU HAVE AVAILABLE UNTIL YOU ARE ABLE TO REACH US.  IT IS MOST IMPORTANT TO KEEP THE WOUND COVERED AT ALL

## 2023-11-20 ENCOUNTER — APPOINTMENT (OUTPATIENT)
Facility: HOSPITAL | Age: 88
End: 2023-11-20
Payer: MEDICARE

## 2023-11-21 ENCOUNTER — HOSPITAL ENCOUNTER (OUTPATIENT)
Facility: HOSPITAL | Age: 88
Discharge: HOME | End: 2023-11-21
Attending: SURGERY
Payer: MEDICARE

## 2023-11-21 ENCOUNTER — OFFICE VISIT (OUTPATIENT)
Facility: CLINIC | Age: 88
End: 2023-11-21
Payer: MEDICARE

## 2023-11-21 VITALS — DIASTOLIC BLOOD PRESSURE: 65 MMHG | TEMPERATURE: 97.6 F | SYSTOLIC BLOOD PRESSURE: 137 MMHG | HEART RATE: 79 BPM

## 2023-11-21 VITALS
SYSTOLIC BLOOD PRESSURE: 143 MMHG | TEMPERATURE: 98.3 F | BODY MASS INDEX: 21.22 KG/M2 | DIASTOLIC BLOOD PRESSURE: 64 MMHG | HEIGHT: 61 IN | RESPIRATION RATE: 17 BRPM | OXYGEN SATURATION: 99 % | WEIGHT: 112.4 LBS | HEART RATE: 73 BPM

## 2023-11-21 DIAGNOSIS — I10 PRIMARY HYPERTENSION: ICD-10-CM

## 2023-11-21 DIAGNOSIS — M17.0 PRIMARY OSTEOARTHRITIS OF BOTH KNEES: ICD-10-CM

## 2023-11-21 DIAGNOSIS — L97.912 NON-PRESSURE CHRONIC ULCER OF RIGHT LOWER LEG WITH FAT LAYER EXPOSED (HCC): ICD-10-CM

## 2023-11-21 DIAGNOSIS — N18.5 CKD (CHRONIC KIDNEY DISEASE) STAGE 5, GFR LESS THAN 15 ML/MIN (HCC): Primary | ICD-10-CM

## 2023-11-21 DIAGNOSIS — L97.922 NON-PRESSURE CHRONIC ULCER OF LEFT LOWER LEG WITH FAT LAYER EXPOSED (HCC): ICD-10-CM

## 2023-11-21 DIAGNOSIS — N18.4 ANEMIA DUE TO STAGE 4 CHRONIC KIDNEY DISEASE (HCC): ICD-10-CM

## 2023-11-21 DIAGNOSIS — E11.21 TYPE 2 DIABETES MELLITUS WITH NEPHROPATHY (HCC): ICD-10-CM

## 2023-11-21 DIAGNOSIS — L97.922 NON-PRESSURE CHRONIC ULCER OF LEFT LOWER LEG WITH FAT LAYER EXPOSED (HCC): Primary | ICD-10-CM

## 2023-11-21 DIAGNOSIS — D63.1 ANEMIA DUE TO STAGE 4 CHRONIC KIDNEY DISEASE (HCC): ICD-10-CM

## 2023-11-21 PROCEDURE — 1090F PRES/ABSN URINE INCON ASSESS: CPT | Performed by: INTERNAL MEDICINE

## 2023-11-21 PROCEDURE — G8420 CALC BMI NORM PARAMETERS: HCPCS | Performed by: INTERNAL MEDICINE

## 2023-11-21 PROCEDURE — 1036F TOBACCO NON-USER: CPT | Performed by: INTERNAL MEDICINE

## 2023-11-21 PROCEDURE — G8427 DOCREV CUR MEDS BY ELIG CLIN: HCPCS | Performed by: INTERNAL MEDICINE

## 2023-11-21 PROCEDURE — 1123F ACP DISCUSS/DSCN MKR DOCD: CPT | Performed by: INTERNAL MEDICINE

## 2023-11-21 PROCEDURE — 99213 OFFICE O/P EST LOW 20 MIN: CPT | Performed by: SURGERY

## 2023-11-21 PROCEDURE — 29581 APPL MULTLAYER CMPRN SYS LEG: CPT

## 2023-11-21 PROCEDURE — G8484 FLU IMMUNIZE NO ADMIN: HCPCS | Performed by: INTERNAL MEDICINE

## 2023-11-21 PROCEDURE — 3044F HG A1C LEVEL LT 7.0%: CPT | Performed by: INTERNAL MEDICINE

## 2023-11-21 PROCEDURE — 99214 OFFICE O/P EST MOD 30 MIN: CPT | Performed by: INTERNAL MEDICINE

## 2023-11-21 RX ORDER — LIDOCAINE 50 MG/G
OINTMENT TOPICAL ONCE
OUTPATIENT
Start: 2023-11-21 | End: 2023-11-21

## 2023-11-21 RX ORDER — GENTAMICIN SULFATE 1 MG/G
OINTMENT TOPICAL ONCE
OUTPATIENT
Start: 2023-11-21 | End: 2023-11-21

## 2023-11-21 RX ORDER — LIDOCAINE HYDROCHLORIDE 20 MG/ML
JELLY TOPICAL ONCE
OUTPATIENT
Start: 2023-11-21 | End: 2023-11-21

## 2023-11-21 RX ORDER — SODIUM CHLOR/HYPOCHLOROUS ACID 0.033 %
SOLUTION, IRRIGATION IRRIGATION ONCE
OUTPATIENT
Start: 2023-11-21 | End: 2023-11-21

## 2023-11-21 RX ORDER — LIDOCAINE 40 MG/G
CREAM TOPICAL ONCE
OUTPATIENT
Start: 2023-11-21 | End: 2023-11-21

## 2023-11-21 RX ORDER — TRIAMCINOLONE ACETONIDE 1 MG/G
OINTMENT TOPICAL ONCE
OUTPATIENT
Start: 2023-11-21 | End: 2023-11-21

## 2023-11-21 RX ORDER — IBUPROFEN 200 MG
TABLET ORAL ONCE
OUTPATIENT
Start: 2023-11-21 | End: 2023-11-21

## 2023-11-21 RX ORDER — BACITRACIN ZINC AND POLYMYXIN B SULFATE 500; 1000 [USP'U]/G; [USP'U]/G
OINTMENT TOPICAL ONCE
OUTPATIENT
Start: 2023-11-21 | End: 2023-11-21

## 2023-11-21 RX ORDER — GINSENG 100 MG
CAPSULE ORAL ONCE
OUTPATIENT
Start: 2023-11-21 | End: 2023-11-21

## 2023-11-21 RX ORDER — BETAMETHASONE DIPROPIONATE 0.05 %
OINTMENT (GRAM) TOPICAL ONCE
OUTPATIENT
Start: 2023-11-21 | End: 2023-11-21

## 2023-11-21 RX ORDER — LIDOCAINE HYDROCHLORIDE 40 MG/ML
SOLUTION TOPICAL ONCE
OUTPATIENT
Start: 2023-11-21 | End: 2023-11-21

## 2023-11-21 RX ORDER — CLOBETASOL PROPIONATE 0.5 MG/G
OINTMENT TOPICAL ONCE
OUTPATIENT
Start: 2023-11-21 | End: 2023-11-21

## 2023-11-21 NOTE — PROGRESS NOTES
half of the right lower leg. There are small ulcers on right medial  lower leg which are improved. Right lateral ulcer is healed. Skin of the heel is dry. Dimensions in nurses notes. Examination of the left lower extremity revealed 2+ dorsalis pedis pulse. There is minimal trace pitting edema in the lower half of the left lower leg. Ulcers distal left lower leg appear to have thickened crust at medial malleolus and just superior to that level. Skin of the heel is dry. The patient has had ulcerations on the distal right and left lower legs in association with leg edema. The patient has CKD 5 and is training for peritoneal dialysis. Edema is decreased in the lower legs after initiation of the compression wraps and the skin appears drier. Ulcers on right are improved. Ulcers on the left are newly healed. I have reviewed with the patient the impact of leg edema on wound healing. The patient will continue limiting salt intake. Glucose control is good. The patient will likely have less edema when she initiates dialysis. Modify contact layers:     Dressing ordered: For right lower leg, apply Vaseline to dry skin of heel. Apply Xeroform to ulcerated areas. Apply 2 layer compression wrap with calamine from base of toes to upper calf. For left lower leg, apply Vaseline to dry skin of heel. Cover crusted sites at left medial malleolus and proximally with thin DuoDERM. Apply 2 layer compression wrap calamine from base of toes to upper calf. The patient will need to use a cast cover for showering. The patient will follow-up in the wound care center in 1 week. Diagnosis: Nonpressure ulcers right lower leg with fat layer exposed, nonpressure ulcers left lower leg with fat layer exposed, bilateral leg edema, CKD 5. L97.912, L97.922, R60.0, N18.5.          Anjana Hawkins MD

## 2023-11-21 NOTE — PATIENT INSTRUCTIONS
Discharge Instructions for  [address], [address]  Phone: [phone] Fax: [phone]     NAME:  Chapincito Yusuf  YOB: 1935  MEDICAL RECORD NUMBER:  [id number]  DATE:  November 21, 2023  WOUND CARE ORDERS:  Bilateral lower extremities - Cleanse with baby shampoo, rinse and pat dry. Apply A&D to dry skin. Place thin duoderm over crust on medial part of left leg. Cover wound on right leg with xeroform and cover with gauze. Apply 2 layer compression wraps with calamine. Change weekly in clinic. Activity:  [x] Elevate leg(s) above the level of the heart when sitting. [x] Avoid prolonged standing in one place. [x] Do no get dressing/wrap wet. Dietary:  [x] Diet as tolerated      [] Diabetic Diet            [] Increase Protein: examples (Meat, cheese, eggs, greek yogurt, fish, nuts)          [] Elias Therapeutic Nutrition Powder     Return Appointment:  [x] Return Appointment: With Dr. Chris Villarreal in 1 week. [] Nurse Visit :   [] Ordered tests:      Electronically signed Jose Cruz Vang RN on 11/21/2023 at 9:03 AM      [address] [place] [address] Information: Should you experience any significant changes in your wound(s) or have questions about your wound care, please contact the [address] [place] [address] at [address] [place] [address] 8:00 am - 4:30. If you need help with your wound outside these hours and cannot wait until we are again available, contact your PCP or go to the hospital emergency room. PLEASE NOTE: IF YOU ARE UNABLE TO OBTAIN WOUND SUPPLIES, CONTINUE TO USE THE SUPPLIES YOU HAVE AVAILABLE UNTIL YOU ARE ABLE TO REACH US. IT IS MOST IMPORTANT TO KEEP THE WOUND COVERED AT ALL TIMES.      Physician Signature:_______________________     Date: ___________ Time:  ____________

## 2023-11-21 NOTE — FLOWSHEET NOTE
11/21/23 0915   Right Leg Edema Point of Measurement   Compression Therapy 2 layer compression wrap   Left Leg Edema Point of Measurement   Compression Therapy 2 layer compression wrap     Multilayer Compression Wrap   (Not Unna) Below the Knee    NAME:  Eliane Rich  YOB: 1935  MEDICAL RECORD NUMBER:  [id number]  DATE:  November 21, 2023    Applied primary and secondary dressing as ordered, Placed compression to right lower leg, Place compression to left lower leg, Instructed patient/caregiver not to remove dressing and to keep it clean and dry, Instructed patient/caregiver on complications to report to provider, such as pain, numbness in toes, heavy drainage, and slippage of dressing, and Instructed patient/caregiver on purpose of compression dressing and on activity and exercise recommendations    Response to treatment: Well tolerated by patient      Electronically signed by Ralf Montenegro RN on 11/21/2023 at 9:15 AM

## 2023-11-28 ENCOUNTER — HOSPITAL ENCOUNTER (OUTPATIENT)
Facility: HOSPITAL | Age: 88
Discharge: HOME | End: 2023-11-28
Attending: SURGERY
Payer: MEDICARE

## 2023-11-28 VITALS
HEART RATE: 71 BPM | SYSTOLIC BLOOD PRESSURE: 153 MMHG | TEMPERATURE: 98 F | RESPIRATION RATE: 16 BRPM | DIASTOLIC BLOOD PRESSURE: 73 MMHG

## 2023-11-28 DIAGNOSIS — L97.922 NON-PRESSURE CHRONIC ULCER OF LEFT LOWER LEG WITH FAT LAYER EXPOSED (HCC): Primary | ICD-10-CM

## 2023-11-28 PROCEDURE — 99213 OFFICE O/P EST LOW 20 MIN: CPT | Performed by: SURGERY

## 2023-11-28 PROCEDURE — 29581 APPL MULTLAYER CMPRN SYS LEG: CPT

## 2023-11-28 RX ORDER — BETAMETHASONE DIPROPIONATE 0.05 %
OINTMENT (GRAM) TOPICAL ONCE
OUTPATIENT
Start: 2023-11-28 | End: 2023-11-28

## 2023-11-28 RX ORDER — IBUPROFEN 200 MG
TABLET ORAL ONCE
OUTPATIENT
Start: 2023-11-28 | End: 2023-11-28

## 2023-11-28 RX ORDER — BACITRACIN ZINC AND POLYMYXIN B SULFATE 500; 1000 [USP'U]/G; [USP'U]/G
OINTMENT TOPICAL ONCE
OUTPATIENT
Start: 2023-11-28 | End: 2023-11-28

## 2023-11-28 RX ORDER — LIDOCAINE HYDROCHLORIDE 20 MG/ML
JELLY TOPICAL ONCE
OUTPATIENT
Start: 2023-11-28 | End: 2023-11-28

## 2023-11-28 RX ORDER — GINSENG 100 MG
CAPSULE ORAL ONCE
OUTPATIENT
Start: 2023-11-28 | End: 2023-11-28

## 2023-11-28 RX ORDER — LIDOCAINE 50 MG/G
OINTMENT TOPICAL ONCE
OUTPATIENT
Start: 2023-11-28 | End: 2023-11-28

## 2023-11-28 RX ORDER — LIDOCAINE HYDROCHLORIDE 40 MG/ML
SOLUTION TOPICAL ONCE
OUTPATIENT
Start: 2023-11-28 | End: 2023-11-28

## 2023-11-28 RX ORDER — TRIAMCINOLONE ACETONIDE 1 MG/G
OINTMENT TOPICAL ONCE
OUTPATIENT
Start: 2023-11-28 | End: 2023-11-28

## 2023-11-28 RX ORDER — CLOBETASOL PROPIONATE 0.5 MG/G
OINTMENT TOPICAL ONCE
OUTPATIENT
Start: 2023-11-28 | End: 2023-11-28

## 2023-11-28 RX ORDER — GENTAMICIN SULFATE 1 MG/G
OINTMENT TOPICAL ONCE
OUTPATIENT
Start: 2023-11-28 | End: 2023-11-28

## 2023-11-28 RX ORDER — LIDOCAINE 40 MG/G
CREAM TOPICAL ONCE
OUTPATIENT
Start: 2023-11-28 | End: 2023-11-28

## 2023-11-28 RX ORDER — SODIUM CHLOR/HYPOCHLOROUS ACID 0.033 %
SOLUTION, IRRIGATION IRRIGATION ONCE
OUTPATIENT
Start: 2023-11-28 | End: 2023-11-28

## 2023-11-28 NOTE — DISCHARGE INSTRUCTIONS
Discharge Instructions for          [address], [address]   Phone: [phone] Fax: [phone]    NAME:  Kristin Yusuf  YOB: 1935  MEDICAL RECORD NUMBER:  [id number]  DATE:  November 28, 2023  WOUND CARE ORDERS:  Bilateral lower extremities - Cleanse with baby shampoo, rinse and pat dry. Apply A&D to dry skin. Cover wound on right leg with xeroform and cover with gauze. Apply 2 layer compression wraps with calamine. Change weekly in clinic. Activity:  [x] Elevate leg(s) above the level of the heart when sitting. [x] Avoid prolonged standing in one place. [x] Do no get dressing/wrap wet. Dietary:  [] Diet as tolerated      [] Diabetic Diet            [] Increase Protein: examples (Meat, cheese, eggs, greek yogurt, fish, nuts)          [] Elias Therapeutic Nutrition Powder  [x] Other: Renal, low sodium  [] Dial a Dietician : Call [facility] at [phone] enter code [phone]) when prompted. M-F 9am-5pm EST. Return Appointment:  [x] Return Appointment: With Dr. Rosio Rob in [address] [place] [address])  [] Nurse Visit : *** days  [] Ordered tests:    Electronically signed Kaia Simmons RN on 11/28/2023 at 9:32 AM     [address] [place] [address] Information: Should you experience any significant changes in your wound(s) or have questions about your wound care, please contact the [address] [place] [address] at  [facility] 8:00 am - 4:30. If you need help with your wound outside these hours and cannot wait until we are again available, contact your PCP or go to the hospital emergency room. PLEASE NOTE: IF YOU ARE UNABLE TO OBTAIN WOUND SUPPLIES, CONTINUE TO USE THE SUPPLIES YOU HAVE AVAILABLE UNTIL YOU ARE ABLE TO REACH US. IT IS MOST IMPORTANT TO KEEP THE WOUND COVERED AT ALL TIMES.      Physician Signature:_______________________    Date: ___________

## 2023-11-28 NOTE — PROGRESS NOTES
Wound care     The patient is an 62-year-old woman who was referred to the wound care center regarding ulcerations on the right and left lower legs. The patient was first seen at the wound care center on 10/17/2023. The patient reports that she has noticed ulcerations on the lower legs for several months. Most recently prior to her appointment she was using bacitracin ointment on the sites. The patient has history of prediabetes. The patient has history of CKD 5. She had PD catheter placed on 10/19/2023. As of 10/31/2023, the patient is training in preparation for initiation of peritoneal dialysis. The patient does not report history of anginal symptoms and does not have history of MI or coronary intervention. She has had recent asthma and was treated with steroids. Patient is ambulatory. Past medical history includes prior history of breast cancer, hypertension, hereditary spherocytosis, sickle cell trait, osteoporosis, chronic diastolic congestive heart failure. The patient presently limits salt intake. She reports following a renal diet and avoiding starches. Wound culture of 10/24/2023 grew moderate methicillin sensitive Staph aureus and a few Klebsiella. I chose to treat MSSA, but will hold any treatment for Klebsiella and observe wound response. The patient has CKD 5 and will soon start peritoneal dialysis. Dosing was adjusted to cephalexin 500 mg p.o. daily for 14 days. Started on 10/27/2023. Dressing as of 10/17/2023: For right and left lower legs, apply Xeroform to ulcerated areas. Apply 2 layer compression wrap with calamine from base of toes to upper calf. Reported weight 122 pounds height 5 feet 1 inch     Physical examination     The patient is an alert woman in no acute distress. Examination of the right lower extremity revealed 2+ dorsalis pedis pulse. There is minimal trace pitting edema in the lower half of the right lower leg.   There

## 2023-11-28 NOTE — FLOWSHEET NOTE
Multilayer Compression Wrap   (Not Unna) Below the Knee    NAME:  Darryle Spiller Fortson  YOB: 1935  MEDICAL RECORD NUMBER:  [id number]  DATE:  November 28, 2023 11/28/23 0946   Right Leg Edema Point of Measurement   Compression Therapy 2 layer compression wrap   Left Leg Edema Point of Measurement   Compression Therapy 2 layer compression wrap   Wound 10/17/23 Leg Right;Medial;Lower   Date First Assessed/Time First Assessed: 10/17/23 1030   Present on Original Admission: Yes  Wound Approximate Age at First Assessment (Weeks): 52 weeks  Location: Leg  Wound Location Orientation: Right;Medial;Lower   Dressing Status New dressing applied   Dressing/Treatment Xeroform;Gauze dressing/dressing sponge       Removed old dressing; wash with soap and water, Applied primary and secondary dressing as ordered, Placed compression to right lower leg, Place compression to left lower leg, Instructed patient/caregiver not to remove dressing and to keep it clean and dry, Instructed patient/caregiver on complications to report to provider, such as pain, numbness in toes, heavy drainage, and slippage of dressing, and Instructed patient/caregiver on purpose of compression dressing and on activity and exercise recommendations    Response to treatment: Well tolerated by patient      Electronically signed by Rohan Peguero RN on 11/28/2023 at 9:47 AM

## 2023-12-05 ENCOUNTER — HOSPITAL ENCOUNTER (OUTPATIENT)
Facility: HOSPITAL | Age: 88
Discharge: HOME | End: 2023-12-05
Attending: SURGERY
Payer: MEDICARE

## 2023-12-05 VITALS
SYSTOLIC BLOOD PRESSURE: 149 MMHG | TEMPERATURE: 98.6 F | HEART RATE: 88 BPM | DIASTOLIC BLOOD PRESSURE: 70 MMHG | RESPIRATION RATE: 18 BRPM

## 2023-12-05 DIAGNOSIS — Z99.2 PERITONEAL DIALYSIS STATUS (HCC): ICD-10-CM

## 2023-12-05 DIAGNOSIS — L97.922 NON-PRESSURE CHRONIC ULCER OF LEFT LOWER LEG WITH FAT LAYER EXPOSED (HCC): Primary | ICD-10-CM

## 2023-12-05 PROBLEM — B35.3 TINEA PEDIS OF BOTH FEET: Status: RESOLVED | Noted: 2020-08-20 | Resolved: 2023-12-05

## 2023-12-05 PROBLEM — L03.116 CELLULITIS OF LEFT LOWER LEG: Status: RESOLVED | Noted: 2023-10-31 | Resolved: 2023-12-05

## 2023-12-05 PROBLEM — L03.115 CELLULITIS OF RIGHT LOWER LEG: Status: RESOLVED | Noted: 2023-10-31 | Resolved: 2023-12-05

## 2023-12-05 PROBLEM — L97.912 NON-PRESSURE CHRONIC ULCER OF RIGHT LOWER LEG WITH FAT LAYER EXPOSED (HCC): Status: RESOLVED | Noted: 2023-10-17 | Resolved: 2023-12-05

## 2023-12-05 PROCEDURE — 99212 OFFICE O/P EST SF 10 MIN: CPT

## 2023-12-05 PROCEDURE — 99213 OFFICE O/P EST LOW 20 MIN: CPT | Performed by: SURGERY

## 2023-12-05 RX ORDER — GENTAMICIN SULFATE 1 MG/G
OINTMENT TOPICAL ONCE
Status: CANCELLED | OUTPATIENT
Start: 2023-12-05 | End: 2023-12-05

## 2023-12-05 RX ORDER — LIDOCAINE HYDROCHLORIDE 20 MG/ML
JELLY TOPICAL ONCE
Status: CANCELLED | OUTPATIENT
Start: 2023-12-05 | End: 2023-12-05

## 2023-12-05 RX ORDER — IBUPROFEN 200 MG
TABLET ORAL ONCE
Status: CANCELLED | OUTPATIENT
Start: 2023-12-05 | End: 2023-12-05

## 2023-12-05 RX ORDER — LIDOCAINE 50 MG/G
OINTMENT TOPICAL ONCE
Status: CANCELLED | OUTPATIENT
Start: 2023-12-05 | End: 2023-12-05

## 2023-12-05 RX ORDER — BETAMETHASONE DIPROPIONATE 0.05 %
OINTMENT (GRAM) TOPICAL ONCE
Status: CANCELLED | OUTPATIENT
Start: 2023-12-05 | End: 2023-12-05

## 2023-12-05 RX ORDER — TRIAMCINOLONE ACETONIDE 1 MG/G
OINTMENT TOPICAL ONCE
Status: CANCELLED | OUTPATIENT
Start: 2023-12-05 | End: 2023-12-05

## 2023-12-05 RX ORDER — GINSENG 100 MG
CAPSULE ORAL ONCE
Status: CANCELLED | OUTPATIENT
Start: 2023-12-05 | End: 2023-12-05

## 2023-12-05 RX ORDER — CLOBETASOL PROPIONATE 0.5 MG/G
OINTMENT TOPICAL ONCE
Status: CANCELLED | OUTPATIENT
Start: 2023-12-05 | End: 2023-12-05

## 2023-12-05 RX ORDER — LIDOCAINE HYDROCHLORIDE 40 MG/ML
SOLUTION TOPICAL ONCE
Status: CANCELLED | OUTPATIENT
Start: 2023-12-05 | End: 2023-12-05

## 2023-12-05 RX ORDER — BACITRACIN ZINC AND POLYMYXIN B SULFATE 500; 1000 [USP'U]/G; [USP'U]/G
OINTMENT TOPICAL ONCE
Status: CANCELLED | OUTPATIENT
Start: 2023-12-05 | End: 2023-12-05

## 2023-12-05 RX ORDER — LIDOCAINE 40 MG/G
CREAM TOPICAL ONCE
Status: CANCELLED | OUTPATIENT
Start: 2023-12-05 | End: 2023-12-05

## 2023-12-05 RX ORDER — SODIUM CHLOR/HYPOCHLOROUS ACID 0.033 %
SOLUTION, IRRIGATION IRRIGATION ONCE
Status: CANCELLED | OUTPATIENT
Start: 2023-12-05 | End: 2023-12-05

## 2023-12-05 ASSESSMENT — PAIN SCALES - GENERAL: PAINLEVEL_OUTOF10: 0

## 2023-12-05 NOTE — PATIENT INSTRUCTIONS
Discharge Instructions for  [address], [address]  Phone: [phone] Fax: [phone]      NAME:  Saumya Baxter  YOB: 1935  MEDICAL RECORD NUMBER:  [id number]  DATE:  December 5, 2023  WOUND CARE ORDERS:  Bilateral lower extremities - Moisturize and wear your compression stockings daily. Activity:  [x] Elevate leg(s) above the level of the heart when sitting. [x] Avoid prolonged standing in one place. [] Do no get dressing/wrap wet. Return Appointment:  [x] Return Appointment: With Dr. Gris Covarrubias as needed. [] Nurse Visit :   [] Ordered tests:       Electronically signed Roosevelt Nance RN on 12/5/2023 at 9:23 AM     [address]: Should you experience any significant changes in your wound(s) or have questions about your wound care, please contact the [address] [place] [address] at [facility] 8:00 am - 4:30. If you need help with your wound outside these hours and cannot wait until we are again available, contact your PCP or go to the hospital emergency room. PLEASE NOTE: IF YOU ARE UNABLE TO OBTAIN WOUND SUPPLIES, CONTINUE TO USE THE SUPPLIES YOU HAVE AVAILABLE UNTIL YOU ARE ABLE TO REACH US. IT IS MOST IMPORTANT TO KEEP THE WOUND COVERED AT ALL TIMES.      Physician Signature:_______________________    Date: ___________ Time:  ____________

## 2023-12-05 NOTE — PROGRESS NOTES
Wound care     The patient is an 82-year-old woman who was referred to the wound care center regarding ulcerations on the right and left lower legs. The patient was first seen at the wound care center on 10/17/2023. The patient reports that she has noticed ulcerations on the lower legs for several months. Most recently prior to her first appointment she was using bacitracin ointment on the sites. The patient has history of prediabetes. The patient has history of CKD 5. She had PD catheter placed on 10/19/2023. The patient initiated home peritoneal dialysis as of 11/30/2023. The patient does not report history of anginal symptoms and does not have history of MI or coronary intervention. She has had recent asthma and was treated with steroids. Patient is ambulatory. Past medical history includes prior history of breast cancer, hypertension, hereditary spherocytosis, sickle cell trait, osteoporosis, chronic diastolic congestive heart failure. The patient presently limits salt intake. She reports following a renal diet and avoiding starches. Wound culture of 10/24/2023 grew moderate methicillin sensitive Staph aureus and a few Klebsiella. I chose to treat MSSA, but will hold any treatment for Klebsiella and observe wound response. The patient has CKD 5 and will soon start peritoneal dialysis. Dosing was adjusted to cephalexin 500 mg p.o. daily for 14 days. Started on 10/27/2023. Dressing as of 10/17/2023: For right and left lower legs, apply Xeroform to ulcerated areas. Apply 2 layer compression wrap with calamine from base of toes to upper calf. Reported weight 112 pounds height 5 feet 1 inch     Physical examination     The patient is an alert woman in no acute distress. Examination of the right lower extremity revealed 2+ dorsalis pedis pulse. There is minimal trace pitting edema in the  right lower leg. There is no active ulcer.      Examination of the

## 2023-12-07 RX ORDER — ALBUTEROL SULFATE 90 UG/1
AEROSOL, METERED RESPIRATORY (INHALATION)
Qty: 25.5 G | Refills: 3 | Status: SHIPPED | OUTPATIENT
Start: 2023-12-07

## 2023-12-18 PROBLEM — N18.6 ESRD ON PERITONEAL DIALYSIS (HCC): Status: ACTIVE | Noted: 2023-12-18

## 2023-12-18 PROBLEM — Z99.2 ESRD ON PERITONEAL DIALYSIS (HCC): Status: ACTIVE | Noted: 2023-12-18

## 2024-01-18 RX ORDER — PRAVASTATIN SODIUM 20 MG
TABLET ORAL
Qty: 90 TABLET | Refills: 3 | Status: SHIPPED | OUTPATIENT
Start: 2024-01-18

## 2024-01-29 ENCOUNTER — HOSPITAL ENCOUNTER (OUTPATIENT)
Facility: HOSPITAL | Age: 89
Discharge: HOME | End: 2024-02-01
Attending: INTERNAL MEDICINE
Payer: MEDICARE

## 2024-01-29 DIAGNOSIS — K43.9 ABDOMINAL WALL HERNIA: ICD-10-CM

## 2024-01-29 PROCEDURE — 76705 ECHO EXAM OF ABDOMEN: CPT

## 2024-02-20 RX ORDER — NIFEDIPINE 60 MG/1
TABLET, EXTENDED RELEASE ORAL
Qty: 180 TABLET | Refills: 3 | Status: SHIPPED | OUTPATIENT
Start: 2024-02-20

## 2024-03-18 ENCOUNTER — OFFICE VISIT (OUTPATIENT)
Facility: CLINIC | Age: 89
End: 2024-03-18
Payer: MEDICARE

## 2024-03-18 VITALS
TEMPERATURE: 97.9 F | DIASTOLIC BLOOD PRESSURE: 67 MMHG | BODY MASS INDEX: 22.36 KG/M2 | WEIGHT: 118.4 LBS | RESPIRATION RATE: 18 BRPM | HEIGHT: 61 IN | SYSTOLIC BLOOD PRESSURE: 145 MMHG | HEART RATE: 71 BPM | OXYGEN SATURATION: 99 %

## 2024-03-18 DIAGNOSIS — D58.0 HEREDITARY SPHEROCYTOSIS (HCC): ICD-10-CM

## 2024-03-18 DIAGNOSIS — Z99.2 TYPE 2 DIABETES MELLITUS WITH CHRONIC KIDNEY DISEASE ON CHRONIC DIALYSIS, WITHOUT LONG-TERM CURRENT USE OF INSULIN (HCC): Primary | ICD-10-CM

## 2024-03-18 DIAGNOSIS — E11.22 TYPE 2 DIABETES MELLITUS WITH CHRONIC KIDNEY DISEASE ON CHRONIC DIALYSIS, WITHOUT LONG-TERM CURRENT USE OF INSULIN (HCC): Primary | ICD-10-CM

## 2024-03-18 DIAGNOSIS — Z99.2 PERITONEAL DIALYSIS STATUS (HCC): ICD-10-CM

## 2024-03-18 DIAGNOSIS — Z99.2 ESRD ON PERITONEAL DIALYSIS (HCC): ICD-10-CM

## 2024-03-18 DIAGNOSIS — N18.6 TYPE 2 DIABETES MELLITUS WITH CHRONIC KIDNEY DISEASE ON CHRONIC DIALYSIS, WITHOUT LONG-TERM CURRENT USE OF INSULIN (HCC): Primary | ICD-10-CM

## 2024-03-18 DIAGNOSIS — K42.9 UMBILICAL HERNIA WITHOUT OBSTRUCTION AND WITHOUT GANGRENE: ICD-10-CM

## 2024-03-18 DIAGNOSIS — N18.6 ESRD ON PERITONEAL DIALYSIS (HCC): ICD-10-CM

## 2024-03-18 DIAGNOSIS — I10 PRIMARY HYPERTENSION: ICD-10-CM

## 2024-03-18 DIAGNOSIS — C50.911 MALIGNANT NEOPLASM OF RIGHT FEMALE BREAST, UNSPECIFIED ESTROGEN RECEPTOR STATUS, UNSPECIFIED SITE OF BREAST (HCC): ICD-10-CM

## 2024-03-18 DIAGNOSIS — I87.2 VENOUS INSUFFICIENCY OF BOTH LOWER EXTREMITIES: ICD-10-CM

## 2024-03-18 PROBLEM — I87.332 STASIS DERMATITIS OF LEFT LOWER EXTREMITY WITH VENOUS ULCER DUE TO CHRONIC PERIPHERAL VENOUS HYPERTENSION (HCC): Status: RESOLVED | Noted: 2023-09-14 | Resolved: 2024-03-18

## 2024-03-18 PROBLEM — E11.29 TYPE 2 DIABETES MELLITUS WITH KIDNEY COMPLICATION, WITHOUT LONG-TERM CURRENT USE OF INSULIN (HCC): Status: ACTIVE | Noted: 2018-01-16

## 2024-03-18 PROBLEM — I50.33 ACUTE ON CHRONIC DIASTOLIC CHF (CONGESTIVE HEART FAILURE) (HCC): Status: RESOLVED | Noted: 2023-10-01 | Resolved: 2024-03-18

## 2024-03-18 PROBLEM — N18.5 CKD (CHRONIC KIDNEY DISEASE) STAGE 5, GFR LESS THAN 15 ML/MIN (HCC): Status: RESOLVED | Noted: 2023-10-17 | Resolved: 2024-03-18

## 2024-03-18 PROCEDURE — G8484 FLU IMMUNIZE NO ADMIN: HCPCS | Performed by: INTERNAL MEDICINE

## 2024-03-18 PROCEDURE — G8420 CALC BMI NORM PARAMETERS: HCPCS | Performed by: INTERNAL MEDICINE

## 2024-03-18 PROCEDURE — 1123F ACP DISCUSS/DSCN MKR DOCD: CPT | Performed by: INTERNAL MEDICINE

## 2024-03-18 PROCEDURE — 99214 OFFICE O/P EST MOD 30 MIN: CPT | Performed by: INTERNAL MEDICINE

## 2024-03-18 PROCEDURE — 1090F PRES/ABSN URINE INCON ASSESS: CPT | Performed by: INTERNAL MEDICINE

## 2024-03-18 PROCEDURE — 1036F TOBACCO NON-USER: CPT | Performed by: INTERNAL MEDICINE

## 2024-03-18 PROCEDURE — G8427 DOCREV CUR MEDS BY ELIG CLIN: HCPCS | Performed by: INTERNAL MEDICINE

## 2024-03-18 ASSESSMENT — PATIENT HEALTH QUESTIONNAIRE - PHQ9
2. FEELING DOWN, DEPRESSED OR HOPELESS: NOT AT ALL
SUM OF ALL RESPONSES TO PHQ9 QUESTIONS 1 & 2: 0
SUM OF ALL RESPONSES TO PHQ QUESTIONS 1-9: 0
1. LITTLE INTEREST OR PLEASURE IN DOING THINGS: NOT AT ALL

## 2024-03-18 NOTE — PROGRESS NOTES
Chief Complaint   Patient presents with    Diabetes    Hypertension     \"Have you been to the ER, urgent care clinic since your last visit?  Hospitalized since your last visit?\"    NO    “Have you seen or consulted any other health care providers outside of [facility] since your last visit?”    NO            Click Here for Release of Records Request  
Humiliation, Afraid, Rape, and Kick questionnaire     Fear of Current or Ex-Partner: No     Emotionally Abused: No     Physically Abused: No     Sexually Abused: No   Housing Stability: Low Risk  (10/10/2023)    Housing Stability Vital Sign     Unable to Pay for Housing in the Last Year: No     Number of Places Lived in the Last Year: 1     Unstable Housing in the Last Year: No     Family History   Problem Relation Age of Onset    Breast Cancer Sister 69    Sleep Apnea Sister     Other Sister         spleen removed    Alzheimer's Disease Sister     Diabetes Mother     Other Other         spleen removed    Gall Bladder Disease Other     Cancer Sister         breast    Other Father         'sphero'-blood disorder    Colon Cancer Sister        OBJECTIVE:    BP (!) 145/67 (Site: Left Upper Arm, Position: Sitting)   Pulse 71   Temp 97.9 °F (36.6 °C) (Oral)   Resp 18   Ht 1.549 m (5' 1\")   Wt 53.7 kg (118 lb 6.4 oz)   SpO2 99%   BMI 22.37 kg/m²   CONSTITUTIONAL: well , well nourished, appears age appropriate  EYES: perrla, eom intact  ENMT:moist mucous membranes, pharynx clear  NECK: supple. Thyroid normal  RESPIRATORY: Chest: clear bilaterally   CARDIOVASCULAR: Heart: regular rate and rhythm  GASTROINTESTINAL: Abdomen: soft, bowel sounds active  HEMATOLOGIC: no pathological lymph nodes palpated  MUSCULOSKELETAL: Extremities: no edema, pulse 1+   INTEGUMENT: No unusual rashes or suspicious skin lesions noted. Nails appear normal.  NEUROLOGIC: non-focal exam   MENTAL STATUS: alert and oriented, appropriate affect           ASSESSMENT:  1. Type 2 diabetes mellitus with chronic kidney disease on chronic dialysis, without long-term current use of insulin (HCC)    2. Malignant neoplasm of right female breast, unspecified estrogen receptor status, unspecified site of breast (HCC)    3. Hereditary spherocytosis (HCC)    4. ESRD on peritoneal dialysis (HCC)    5. Venous insufficiency of both lower extremities    6. Primary

## 2024-03-20 ENCOUNTER — TELEPHONE (OUTPATIENT)
Age: 89
End: 2024-03-20

## 2024-03-20 NOTE — TELEPHONE ENCOUNTER
Attempted to contact patient regarding referral for umbilical hernia.  Patient did not answer, LVM requesting a return call if interested in scheduling. Will follow up.

## 2024-03-27 ENCOUNTER — OFFICE VISIT (OUTPATIENT)
Age: 89
End: 2024-03-27
Payer: MEDICARE

## 2024-03-27 VITALS
BODY MASS INDEX: 22.36 KG/M2 | DIASTOLIC BLOOD PRESSURE: 57 MMHG | SYSTOLIC BLOOD PRESSURE: 126 MMHG | OXYGEN SATURATION: 95 % | WEIGHT: 118.4 LBS | RESPIRATION RATE: 18 BRPM | HEART RATE: 70 BPM | TEMPERATURE: 97.7 F | HEIGHT: 61 IN

## 2024-03-27 DIAGNOSIS — K43.9 VENTRAL HERNIA WITHOUT OBSTRUCTION OR GANGRENE: Primary | ICD-10-CM

## 2024-03-27 DIAGNOSIS — K42.9 UMBILICAL HERNIA WITHOUT OBSTRUCTION AND WITHOUT GANGRENE: ICD-10-CM

## 2024-03-27 PROCEDURE — G8484 FLU IMMUNIZE NO ADMIN: HCPCS | Performed by: SURGERY

## 2024-03-27 PROCEDURE — G8427 DOCREV CUR MEDS BY ELIG CLIN: HCPCS | Performed by: SURGERY

## 2024-03-27 PROCEDURE — 99203 OFFICE O/P NEW LOW 30 MIN: CPT | Performed by: SURGERY

## 2024-03-27 PROCEDURE — G8420 CALC BMI NORM PARAMETERS: HCPCS | Performed by: SURGERY

## 2024-03-27 PROCEDURE — 1036F TOBACCO NON-USER: CPT | Performed by: SURGERY

## 2024-03-27 PROCEDURE — 1090F PRES/ABSN URINE INCON ASSESS: CPT | Performed by: SURGERY

## 2024-03-27 PROCEDURE — 1123F ACP DISCUSS/DSCN MKR DOCD: CPT | Performed by: SURGERY

## 2024-03-27 ASSESSMENT — PATIENT HEALTH QUESTIONNAIRE - PHQ9
1. LITTLE INTEREST OR PLEASURE IN DOING THINGS: NOT AT ALL
SUM OF ALL RESPONSES TO PHQ9 QUESTIONS 1 & 2: 0
SUM OF ALL RESPONSES TO PHQ QUESTIONS 1-9: 0
2. FEELING DOWN, DEPRESSED OR HOPELESS: NOT AT ALL
SUM OF ALL RESPONSES TO PHQ QUESTIONS 1-9: 0

## 2024-03-27 NOTE — PROGRESS NOTES
Identified patient with two patient identifiers (name and ). Reviewed chart in preparation for visit and have obtained necessary documentation.    Heather Rich is a 88 y.o. female  Chief Complaint   Patient presents with    New Patient     Umbilical hernia      BP (!) 126/57 (Site: Left Upper Arm, Position: Sitting, Cuff Size: Small Adult)   Pulse 70   Temp 97.7 °F (36.5 °C) (Oral)   Resp 18   Ht 1.549 m (5' 1\")   Wt 53.7 kg (118 lb 6.4 oz)   SpO2 95%   BMI 22.37 kg/m²     1. Have you been to the ER, urgent care clinic since your last visit?  Hospitalized since your last visit?no    2. Have you seen or consulted any other health care providers outside of the [facility] since your last visit?  Include any pap smears or colon screening. no

## 2024-03-27 NOTE — PROGRESS NOTES
[facility] History and Physical    History of Present Illness:      Heather Rich is a 88 y.o. female who has a past medical history of end-stage renal disease who does peritoneal dialysis.  More recently over the past 6 months she has noticed a bulge above her umbilicus and appears to have a hernia.  She is sent for an ultrasound which showed a fluid filled hernia in that area with a small defect.  She does not have any symptoms at all.  She does not have any pain with straining lifting or with peritoneal dialysis.    Past Medical History:   Diagnosis Date    Anemia     Arrhythmia     irregular per pt d/t blood disorder    Asthma     Axillary adenopathy 01/04/2021    3/1/21 md Gabe - Benign, reactive lymph nodes with follicular hyperplasia     Bed bug bite 01/18/2021    Bereavement 02/02/2016     die 85 copd,chf,pul htn pn after 53 yr marriage    BPV (benign positional vertigo)     BPV (benign positional vertigo) 08/20/2020    Breast cancer (HCC)     Breast cancer, right breast (HCC) 1994    right breast, lumpectomy and radiation    Breast lump 2017    right breast     CAP (community acquired pneumonia) 12/05/2021    New right basilar airspace disease may represent atelectasis or pneumonia    Chronic kidney disease 02/06/2019    kidney cyst - watching    Coagulation disorder (HCC)     SPHEROCYTOSIS  SICKLE CELL TRAIT    Diabetes (HCC)     controlled with diet    Early satiety     ESRD on peritoneal dialysis (HCC) 10/20/2021    dr. franklin    Fall (on)(from) sidewalk curb, sequela 12/28/2022    Hearing deficit, left     Hereditary spherocytosis (HCC)     History of colonoscopy with polypectomy 08/29/2023    md lucita 5 yrs    History of nuclear stress test 01/22/2021    Normal myocardial perfusion scan without evidence of ischemia or prior infarct ejection fraction 68%    History of therapeutic radiation     1994 on rt    HTN (hypertension)     Ill-defined

## 2024-03-29 RX ORDER — LAMOTRIGINE 25 MG/1
TABLET ORAL
Qty: 180 TABLET | Refills: 3 | Status: SHIPPED | OUTPATIENT
Start: 2024-03-29

## 2024-04-17 RX ORDER — BUDESONIDE 0.5 MG/2ML
INHALANT ORAL
Qty: 180 EACH | Refills: 3 | Status: SHIPPED | OUTPATIENT
Start: 2024-04-17

## 2024-04-17 RX ORDER — TELMISARTAN 40 MG/1
40 TABLET ORAL 2 TIMES DAILY
Qty: 180 TABLET | Refills: 3 | Status: SHIPPED | OUTPATIENT
Start: 2024-04-17

## 2024-04-17 RX ORDER — ALBUTEROL SULFATE 2.5 MG/3ML
2.5 SOLUTION RESPIRATORY (INHALATION) 4 TIMES DAILY PRN
Qty: 360 EACH | Refills: 3 | Status: SHIPPED | OUTPATIENT
Start: 2024-04-17

## 2024-04-17 RX ORDER — ARFORMOTEROL TARTRATE 15 UG/2ML
SOLUTION RESPIRATORY (INHALATION)
Qty: 180 ML | Refills: 3 | Status: SHIPPED | OUTPATIENT
Start: 2024-04-17

## 2024-05-05 ENCOUNTER — HOSPITAL ENCOUNTER (INPATIENT)
Facility: HOSPITAL | Age: 89
LOS: 18 days | Discharge: NURSING, REHAB OR LONG-TERM CARE | DRG: 353 | End: 2024-05-23
Attending: STUDENT IN AN ORGANIZED HEALTH CARE EDUCATION/TRAINING PROGRAM | Admitting: INTERNAL MEDICINE
Payer: MEDICARE

## 2024-05-05 ENCOUNTER — ANESTHESIA EVENT (OUTPATIENT)
Facility: HOSPITAL | Age: 89
End: 2024-05-05
Payer: MEDICARE

## 2024-05-05 ENCOUNTER — APPOINTMENT (OUTPATIENT)
Facility: HOSPITAL | Age: 89
DRG: 353 | End: 2024-05-05
Payer: MEDICARE

## 2024-05-05 ENCOUNTER — ANESTHESIA (OUTPATIENT)
Facility: HOSPITAL | Age: 89
End: 2024-05-05
Payer: MEDICARE

## 2024-05-05 DIAGNOSIS — K56.609 SBO (SMALL BOWEL OBSTRUCTION) (HCC): ICD-10-CM

## 2024-05-05 DIAGNOSIS — K46.0 INCARCERATED HERNIA: Primary | ICD-10-CM

## 2024-05-05 PROBLEM — K43.6 INCARCERATED VENTRAL HERNIA: Status: ACTIVE | Noted: 2024-05-05

## 2024-05-05 LAB
ALBUMIN SERPL-MCNC: 2.6 G/DL (ref 3.5–5)
ALBUMIN/GLOB SERPL: 0.4 (ref 1.1–2.2)
ALP SERPL-CCNC: 83 U/L (ref 45–117)
ALT SERPL-CCNC: 25 U/L (ref 12–78)
ANION GAP SERPL CALC-SCNC: 7 MMOL/L (ref 5–15)
AST SERPL-CCNC: 26 U/L (ref 15–37)
BASOPHILS # BLD: 0 K/UL (ref 0–0.1)
BASOPHILS NFR BLD: 0 % (ref 0–1)
BILIRUB SERPL-MCNC: 0.8 MG/DL (ref 0.2–1)
BUN SERPL-MCNC: 51 MG/DL (ref 6–20)
BUN/CREAT SERPL: 8 (ref 12–20)
CALCIUM SERPL-MCNC: 9.8 MG/DL (ref 8.5–10.1)
CHLORIDE SERPL-SCNC: 99 MMOL/L (ref 97–108)
CO2 SERPL-SCNC: 28 MMOL/L (ref 21–32)
COMMENT:: NORMAL
CREAT SERPL-MCNC: 6.57 MG/DL (ref 0.55–1.02)
DIFFERENTIAL METHOD BLD: ABNORMAL
EOSINOPHIL # BLD: 2.3 K/UL (ref 0–0.4)
EOSINOPHIL NFR BLD: 11 % (ref 0–7)
ERYTHROCYTE [DISTWIDTH] IN BLOOD BY AUTOMATED COUNT: 14.7 % (ref 11.5–14.5)
FLUAV RNA SPEC QL NAA+PROBE: NOT DETECTED
FLUBV RNA SPEC QL NAA+PROBE: NOT DETECTED
GLOBULIN SER CALC-MCNC: 7.3 G/DL (ref 2–4)
GLUCOSE SERPL-MCNC: 126 MG/DL (ref 65–100)
HCT VFR BLD AUTO: 29.3 % (ref 35–47)
HGB BLD-MCNC: 9.5 G/DL (ref 11.5–16)
IMM GRANULOCYTES # BLD AUTO: 0.2 K/UL (ref 0–0.04)
IMM GRANULOCYTES NFR BLD AUTO: 1 % (ref 0–0.5)
LACTATE SERPL-SCNC: 1.2 MMOL/L (ref 0.4–2)
LACTATE SERPL-SCNC: 1.6 MMOL/L (ref 0.4–2)
LIPASE SERPL-CCNC: 34 U/L (ref 13–75)
LYMPHOCYTES # BLD: 1.2 K/UL (ref 0.8–3.5)
LYMPHOCYTES NFR BLD: 6 % (ref 12–49)
MAGNESIUM SERPL-MCNC: 2.5 MG/DL (ref 1.6–2.4)
MCH RBC QN AUTO: 27.3 PG (ref 26–34)
MCHC RBC AUTO-ENTMCNC: 32.4 G/DL (ref 30–36.5)
MCV RBC AUTO: 84.2 FL (ref 80–99)
MONOCYTES # BLD: 1.4 K/UL (ref 0–1)
MONOCYTES NFR BLD: 7 % (ref 5–13)
NEUTS SEG # BLD: 15.4 K/UL (ref 1.8–8)
NEUTS SEG NFR BLD: 75 % (ref 32–75)
NRBC # BLD: 0 K/UL (ref 0–0.01)
NRBC BLD-RTO: 0 PER 100 WBC
PLATELET # BLD AUTO: 426 K/UL (ref 150–400)
PMV BLD AUTO: 8.9 FL (ref 8.9–12.9)
POTASSIUM SERPL-SCNC: 2.7 MMOL/L (ref 3.5–5.1)
PROT SERPL-MCNC: 9.9 G/DL (ref 6.4–8.2)
RBC # BLD AUTO: 3.48 M/UL (ref 3.8–5.2)
RBC MORPH BLD: ABNORMAL
SARS-COV-2 RNA RESP QL NAA+PROBE: NOT DETECTED
SODIUM SERPL-SCNC: 134 MMOL/L (ref 136–145)
SPECIMEN HOLD: NORMAL
WBC # BLD AUTO: 20.5 K/UL (ref 3.6–11)

## 2024-05-05 PROCEDURE — 80053 COMPREHEN METABOLIC PANEL: CPT

## 2024-05-05 PROCEDURE — 88302 TISSUE EXAM BY PATHOLOGIST: CPT

## 2024-05-05 PROCEDURE — 2580000003 HC RX 258: Performed by: SURGERY

## 2024-05-05 PROCEDURE — 7100000000 HC PACU RECOVERY - FIRST 15 MIN: Performed by: SURGERY

## 2024-05-05 PROCEDURE — 2580000003 HC RX 258: Performed by: NURSE ANESTHETIST, CERTIFIED REGISTERED

## 2024-05-05 PROCEDURE — 96365 THER/PROPH/DIAG IV INF INIT: CPT

## 2024-05-05 PROCEDURE — 87040 BLOOD CULTURE FOR BACTERIA: CPT

## 2024-05-05 PROCEDURE — 96375 TX/PRO/DX INJ NEW DRUG ADDON: CPT

## 2024-05-05 PROCEDURE — 83690 ASSAY OF LIPASE: CPT

## 2024-05-05 PROCEDURE — 3700000001 HC ADD 15 MINUTES (ANESTHESIA): Performed by: SURGERY

## 2024-05-05 PROCEDURE — 6360000002 HC RX W HCPCS: Performed by: STUDENT IN AN ORGANIZED HEALTH CARE EDUCATION/TRAINING PROGRAM

## 2024-05-05 PROCEDURE — 83735 ASSAY OF MAGNESIUM: CPT

## 2024-05-05 PROCEDURE — 2580000003 HC RX 258: Performed by: STUDENT IN AN ORGANIZED HEALTH CARE EDUCATION/TRAINING PROGRAM

## 2024-05-05 PROCEDURE — 6360000002 HC RX W HCPCS: Performed by: NURSE ANESTHETIST, CERTIFIED REGISTERED

## 2024-05-05 PROCEDURE — 96376 TX/PRO/DX INJ SAME DRUG ADON: CPT

## 2024-05-05 PROCEDURE — 2500000003 HC RX 250 WO HCPCS: Performed by: SURGERY

## 2024-05-05 PROCEDURE — 2500000003 HC RX 250 WO HCPCS: Performed by: NURSE ANESTHETIST, CERTIFIED REGISTERED

## 2024-05-05 PROCEDURE — 85025 COMPLETE CBC W/AUTO DIFF WBC: CPT

## 2024-05-05 PROCEDURE — 6360000002 HC RX W HCPCS: Performed by: SURGERY

## 2024-05-05 PROCEDURE — 6370000000 HC RX 637 (ALT 250 FOR IP)

## 2024-05-05 PROCEDURE — 0WQF0ZZ REPAIR ABDOMINAL WALL, OPEN APPROACH: ICD-10-PCS | Performed by: SURGERY

## 2024-05-05 PROCEDURE — 3600000012 HC SURGERY LEVEL 2 ADDTL 15MIN: Performed by: SURGERY

## 2024-05-05 PROCEDURE — 36415 COLL VENOUS BLD VENIPUNCTURE: CPT

## 2024-05-05 PROCEDURE — 99285 EMERGENCY DEPT VISIT HI MDM: CPT

## 2024-05-05 PROCEDURE — 2709999900 HC NON-CHARGEABLE SUPPLY: Performed by: SURGERY

## 2024-05-05 PROCEDURE — 6360000002 HC RX W HCPCS

## 2024-05-05 PROCEDURE — 2580000003 HC RX 258: Performed by: INTERNAL MEDICINE

## 2024-05-05 PROCEDURE — 87636 SARSCOV2 & INF A&B AMP PRB: CPT

## 2024-05-05 PROCEDURE — 83605 ASSAY OF LACTIC ACID: CPT

## 2024-05-05 PROCEDURE — 3700000000 HC ANESTHESIA ATTENDED CARE: Performed by: SURGERY

## 2024-05-05 PROCEDURE — 6370000000 HC RX 637 (ALT 250 FOR IP): Performed by: NURSE ANESTHETIST, CERTIFIED REGISTERED

## 2024-05-05 PROCEDURE — 74176 CT ABD & PELVIS W/O CONTRAST: CPT

## 2024-05-05 PROCEDURE — 7100000001 HC PACU RECOVERY - ADDTL 15 MIN: Performed by: SURGERY

## 2024-05-05 PROCEDURE — 3600000002 HC SURGERY LEVEL 2 BASE: Performed by: SURGERY

## 2024-05-05 PROCEDURE — 6370000000 HC RX 637 (ALT 250 FOR IP): Performed by: SURGERY

## 2024-05-05 PROCEDURE — 49592 RPR AA HRN 1ST < 3 NCR/STRN: CPT | Performed by: SURGERY

## 2024-05-05 PROCEDURE — 2060000000 HC ICU INTERMEDIATE R&B

## 2024-05-05 RX ORDER — ONDANSETRON 2 MG/ML
4 INJECTION INTRAMUSCULAR; INTRAVENOUS EVERY 6 HOURS PRN
Status: DISCONTINUED | OUTPATIENT
Start: 2024-05-05 | End: 2024-05-23 | Stop reason: HOSPADM

## 2024-05-05 RX ORDER — PROCHLORPERAZINE EDISYLATE 5 MG/ML
5 INJECTION INTRAMUSCULAR; INTRAVENOUS
Status: DISCONTINUED | OUTPATIENT
Start: 2024-05-05 | End: 2024-05-05 | Stop reason: HOSPADM

## 2024-05-05 RX ORDER — POTASSIUM CHLORIDE 7.45 MG/ML
10 INJECTION INTRAVENOUS
Status: COMPLETED | OUTPATIENT
Start: 2024-05-05 | End: 2024-05-05

## 2024-05-05 RX ORDER — OXYCODONE HYDROCHLORIDE 5 MG/1
5 TABLET ORAL
Status: DISCONTINUED | OUTPATIENT
Start: 2024-05-05 | End: 2024-05-05 | Stop reason: HOSPADM

## 2024-05-05 RX ORDER — NALOXONE HYDROCHLORIDE 0.4 MG/ML
INJECTION, SOLUTION INTRAMUSCULAR; INTRAVENOUS; SUBCUTANEOUS PRN
Status: DISCONTINUED | OUTPATIENT
Start: 2024-05-05 | End: 2024-05-05 | Stop reason: HOSPADM

## 2024-05-05 RX ORDER — FENTANYL CITRATE 50 UG/ML
INJECTION, SOLUTION INTRAMUSCULAR; INTRAVENOUS
Status: COMPLETED
Start: 2024-05-05 | End: 2024-05-05

## 2024-05-05 RX ORDER — ALBUTEROL SULFATE 2.5 MG/3ML
2.5 SOLUTION RESPIRATORY (INHALATION) 4 TIMES DAILY PRN
Status: DISCONTINUED | OUTPATIENT
Start: 2024-05-05 | End: 2024-05-07

## 2024-05-05 RX ORDER — ALBUTEROL SULFATE 2.5 MG/3ML
SOLUTION RESPIRATORY (INHALATION)
Status: COMPLETED
Start: 2024-05-05 | End: 2024-05-05

## 2024-05-05 RX ORDER — ASPIRIN 81 MG/1
81 TABLET ORAL DAILY
Status: DISCONTINUED | OUTPATIENT
Start: 2024-05-05 | End: 2024-05-08

## 2024-05-05 RX ORDER — FENTANYL CITRATE 50 UG/ML
25 INJECTION, SOLUTION INTRAMUSCULAR; INTRAVENOUS EVERY 5 MIN PRN
Status: DISCONTINUED | OUTPATIENT
Start: 2024-05-05 | End: 2024-05-05

## 2024-05-05 RX ORDER — OXYBUTYNIN CHLORIDE 5 MG/1
10 TABLET, EXTENDED RELEASE ORAL NIGHTLY
Status: DISCONTINUED | OUTPATIENT
Start: 2024-05-05 | End: 2024-05-23 | Stop reason: HOSPADM

## 2024-05-05 RX ORDER — PROPOFOL 10 MG/ML
INJECTION, EMULSION INTRAVENOUS PRN
Status: DISCONTINUED | OUTPATIENT
Start: 2024-05-05 | End: 2024-05-05 | Stop reason: SDUPTHER

## 2024-05-05 RX ORDER — SODIUM CHLORIDE 0.9 % (FLUSH) 0.9 %
5-40 SYRINGE (ML) INJECTION EVERY 12 HOURS SCHEDULED
Status: DISCONTINUED | OUTPATIENT
Start: 2024-05-05 | End: 2024-05-05 | Stop reason: HOSPADM

## 2024-05-05 RX ORDER — ESMOLOL HYDROCHLORIDE 10 MG/ML
INJECTION INTRAVENOUS PRN
Status: DISCONTINUED | OUTPATIENT
Start: 2024-05-05 | End: 2024-05-05 | Stop reason: SDUPTHER

## 2024-05-05 RX ORDER — TIZANIDINE 4 MG/1
2 TABLET ORAL 2 TIMES DAILY PRN
Status: DISCONTINUED | OUTPATIENT
Start: 2024-05-05 | End: 2024-05-23 | Stop reason: HOSPADM

## 2024-05-05 RX ORDER — NIFEDIPINE 60 MG/1
60 TABLET, EXTENDED RELEASE ORAL 2 TIMES DAILY
Status: DISCONTINUED | OUTPATIENT
Start: 2024-05-05 | End: 2024-05-09

## 2024-05-05 RX ORDER — ALBUTEROL SULFATE 90 UG/1
AEROSOL, METERED RESPIRATORY (INHALATION) PRN
Status: DISCONTINUED | OUTPATIENT
Start: 2024-05-05 | End: 2024-05-05 | Stop reason: SDUPTHER

## 2024-05-05 RX ORDER — SUCCINYLCHOLINE/SOD CL,ISO/PF 200MG/10ML
SYRINGE (ML) INTRAVENOUS PRN
Status: DISCONTINUED | OUTPATIENT
Start: 2024-05-05 | End: 2024-05-05 | Stop reason: SDUPTHER

## 2024-05-05 RX ORDER — HYDRALAZINE HYDROCHLORIDE 20 MG/ML
10 INJECTION INTRAMUSCULAR; INTRAVENOUS
Status: DISCONTINUED | OUTPATIENT
Start: 2024-05-05 | End: 2024-05-05 | Stop reason: HOSPADM

## 2024-05-05 RX ORDER — SODIUM CHLORIDE 0.9 % (FLUSH) 0.9 %
5-40 SYRINGE (ML) INJECTION PRN
Status: DISCONTINUED | OUTPATIENT
Start: 2024-05-05 | End: 2024-05-23 | Stop reason: HOSPADM

## 2024-05-05 RX ORDER — SODIUM CHLORIDE, SODIUM LACTATE, POTASSIUM CHLORIDE, AND CALCIUM CHLORIDE .6; .31; .03; .02 G/100ML; G/100ML; G/100ML; G/100ML
1000 INJECTION, SOLUTION INTRAVENOUS ONCE
Status: COMPLETED | OUTPATIENT
Start: 2024-05-05 | End: 2024-05-05

## 2024-05-05 RX ORDER — PROCHLORPERAZINE EDISYLATE 5 MG/ML
10 INJECTION INTRAMUSCULAR; INTRAVENOUS EVERY 6 HOURS PRN
Status: DISCONTINUED | OUTPATIENT
Start: 2024-05-05 | End: 2024-05-23 | Stop reason: HOSPADM

## 2024-05-05 RX ORDER — POLYETHYLENE GLYCOL 3350 17 G/17G
17 POWDER, FOR SOLUTION ORAL DAILY PRN
Status: DISCONTINUED | OUTPATIENT
Start: 2024-05-05 | End: 2024-05-23 | Stop reason: HOSPADM

## 2024-05-05 RX ORDER — SODIUM CHLORIDE 9 MG/ML
INJECTION, SOLUTION INTRAVENOUS PRN
Status: DISCONTINUED | OUTPATIENT
Start: 2024-05-05 | End: 2024-05-05 | Stop reason: HOSPADM

## 2024-05-05 RX ORDER — SODIUM CHLORIDE 0.9 % (FLUSH) 0.9 %
5-40 SYRINGE (ML) INJECTION EVERY 12 HOURS SCHEDULED
Status: DISCONTINUED | OUTPATIENT
Start: 2024-05-05 | End: 2024-05-23 | Stop reason: HOSPADM

## 2024-05-05 RX ORDER — SODIUM CHLORIDE 9 MG/ML
INJECTION, SOLUTION INTRAVENOUS CONTINUOUS PRN
Status: DISCONTINUED | OUTPATIENT
Start: 2024-05-05 | End: 2024-05-05 | Stop reason: SDUPTHER

## 2024-05-05 RX ORDER — FENTANYL CITRATE 50 UG/ML
INJECTION, SOLUTION INTRAMUSCULAR; INTRAVENOUS PRN
Status: DISCONTINUED | OUTPATIENT
Start: 2024-05-05 | End: 2024-05-05 | Stop reason: SDUPTHER

## 2024-05-05 RX ORDER — SODIUM CHLORIDE 0.9 % (FLUSH) 0.9 %
5-40 SYRINGE (ML) INJECTION PRN
Status: DISCONTINUED | OUTPATIENT
Start: 2024-05-05 | End: 2024-05-05 | Stop reason: HOSPADM

## 2024-05-05 RX ORDER — SODIUM CHLORIDE 9 MG/ML
INJECTION, SOLUTION INTRAVENOUS PRN
Status: DISCONTINUED | OUTPATIENT
Start: 2024-05-05 | End: 2024-05-23 | Stop reason: HOSPADM

## 2024-05-05 RX ORDER — DEXAMETHASONE SODIUM PHOSPHATE 4 MG/ML
INJECTION, SOLUTION INTRA-ARTICULAR; INTRALESIONAL; INTRAMUSCULAR; INTRAVENOUS; SOFT TISSUE PRN
Status: DISCONTINUED | OUTPATIENT
Start: 2024-05-05 | End: 2024-05-05 | Stop reason: SDUPTHER

## 2024-05-05 RX ORDER — LAMOTRIGINE 25 MG/1
25 TABLET ORAL 2 TIMES DAILY
Status: DISCONTINUED | OUTPATIENT
Start: 2024-05-05 | End: 2024-05-23 | Stop reason: HOSPADM

## 2024-05-05 RX ORDER — POTASSIUM CHLORIDE 7.45 MG/ML
10 INJECTION INTRAVENOUS
Status: ACTIVE | OUTPATIENT
Start: 2024-05-05 | End: 2024-05-05

## 2024-05-05 RX ORDER — ALBUTEROL SULFATE 2.5 MG/3ML
2.5 SOLUTION RESPIRATORY (INHALATION) ONCE
Status: CANCELLED | OUTPATIENT
Start: 2024-05-05

## 2024-05-05 RX ORDER — ACETAMINOPHEN 325 MG/1
650 TABLET ORAL EVERY 4 HOURS PRN
Status: DISCONTINUED | OUTPATIENT
Start: 2024-05-05 | End: 2024-05-23 | Stop reason: HOSPADM

## 2024-05-05 RX ORDER — HYDROMORPHONE HYDROCHLORIDE 1 MG/ML
1 INJECTION, SOLUTION INTRAMUSCULAR; INTRAVENOUS; SUBCUTANEOUS
Status: DISCONTINUED | OUTPATIENT
Start: 2024-05-05 | End: 2024-05-23 | Stop reason: HOSPADM

## 2024-05-05 RX ORDER — BUPIVACAINE HYDROCHLORIDE AND EPINEPHRINE 5; 5 MG/ML; UG/ML
INJECTION, SOLUTION PERINEURAL PRN
Status: DISCONTINUED | OUTPATIENT
Start: 2024-05-05 | End: 2024-05-05 | Stop reason: HOSPADM

## 2024-05-05 RX ORDER — ONDANSETRON 2 MG/ML
4 INJECTION INTRAMUSCULAR; INTRAVENOUS
Status: DISCONTINUED | OUTPATIENT
Start: 2024-05-05 | End: 2024-05-05 | Stop reason: HOSPADM

## 2024-05-05 RX ORDER — HYDROMORPHONE HYDROCHLORIDE 1 MG/ML
0.5 INJECTION, SOLUTION INTRAMUSCULAR; INTRAVENOUS; SUBCUTANEOUS
Status: COMPLETED | OUTPATIENT
Start: 2024-05-05 | End: 2024-05-05

## 2024-05-05 RX ORDER — ISOSORBIDE MONONITRATE 30 MG/1
30 TABLET, EXTENDED RELEASE ORAL DAILY
Status: DISCONTINUED | OUTPATIENT
Start: 2024-05-05 | End: 2024-05-23 | Stop reason: HOSPADM

## 2024-05-05 RX ORDER — BUPIVACAINE HYDROCHLORIDE 2.5 MG/ML
30 INJECTION, SOLUTION EPIDURAL; INFILTRATION; INTRACAUDAL ONCE
Status: CANCELLED | OUTPATIENT
Start: 2024-05-05 | End: 2024-05-05

## 2024-05-05 RX ORDER — ONDANSETRON 2 MG/ML
INJECTION INTRAMUSCULAR; INTRAVENOUS PRN
Status: DISCONTINUED | OUTPATIENT
Start: 2024-05-05 | End: 2024-05-05 | Stop reason: SDUPTHER

## 2024-05-05 RX ORDER — HYDROMORPHONE HYDROCHLORIDE 1 MG/ML
0.5 INJECTION, SOLUTION INTRAMUSCULAR; INTRAVENOUS; SUBCUTANEOUS EVERY 5 MIN PRN
Status: DISCONTINUED | OUTPATIENT
Start: 2024-05-05 | End: 2024-05-05 | Stop reason: HOSPADM

## 2024-05-05 RX ORDER — METOPROLOL SUCCINATE 50 MG/1
50 TABLET, EXTENDED RELEASE ORAL NIGHTLY
Status: DISCONTINUED | OUTPATIENT
Start: 2024-05-05 | End: 2024-05-07

## 2024-05-05 RX ORDER — ROCURONIUM BROMIDE 10 MG/ML
INJECTION, SOLUTION INTRAVENOUS PRN
Status: DISCONTINUED | OUTPATIENT
Start: 2024-05-05 | End: 2024-05-05 | Stop reason: SDUPTHER

## 2024-05-05 RX ORDER — LIDOCAINE HYDROCHLORIDE 20 MG/ML
INJECTION, SOLUTION EPIDURAL; INFILTRATION; INTRACAUDAL; PERINEURAL PRN
Status: DISCONTINUED | OUTPATIENT
Start: 2024-05-05 | End: 2024-05-05 | Stop reason: SDUPTHER

## 2024-05-05 RX ORDER — SPIRONOLACTONE 25 MG/1
25 TABLET ORAL DAILY
Status: DISCONTINUED | OUTPATIENT
Start: 2024-05-05 | End: 2024-05-23 | Stop reason: HOSPADM

## 2024-05-05 RX ADMIN — POTASSIUM CHLORIDE 10 MEQ: 10 INJECTION, SOLUTION INTRAVENOUS at 11:51

## 2024-05-05 RX ADMIN — POTASSIUM CHLORIDE 10 MEQ: 10 INJECTION, SOLUTION INTRAVENOUS at 09:49

## 2024-05-05 RX ADMIN — ROCURONIUM BROMIDE 5 MG: 50 INJECTION INTRAVENOUS at 16:00

## 2024-05-05 RX ADMIN — SPIRONOLACTONE 25 MG: 25 TABLET ORAL at 22:10

## 2024-05-05 RX ADMIN — SODIUM CHLORIDE, PRESERVATIVE FREE 10 ML: 5 INJECTION INTRAVENOUS at 22:11

## 2024-05-05 RX ADMIN — ALBUTEROL SULFATE 3 PUFF: 90 AEROSOL, METERED RESPIRATORY (INHALATION) at 17:40

## 2024-05-05 RX ADMIN — OXYBUTYNIN CHLORIDE 10 MG: 5 TABLET, EXTENDED RELEASE ORAL at 22:09

## 2024-05-05 RX ADMIN — LIDOCAINE HYDROCHLORIDE 40 MG: 20 INJECTION, SOLUTION EPIDURAL; INFILTRATION; INTRACAUDAL; PERINEURAL at 16:00

## 2024-05-05 RX ADMIN — Medication 100 MG: at 16:01

## 2024-05-05 RX ADMIN — DEXAMETHASONE SODIUM PHOSPHATE 4 MG: 4 INJECTION, SOLUTION INTRAMUSCULAR; INTRAVENOUS at 16:10

## 2024-05-05 RX ADMIN — FENTANYL CITRATE 25 MCG: 50 INJECTION, SOLUTION INTRAMUSCULAR; INTRAVENOUS at 16:52

## 2024-05-05 RX ADMIN — ESMOLOL HYDROCHLORIDE 20 MG: 10 INJECTION, SOLUTION INTRAVENOUS at 16:26

## 2024-05-05 RX ADMIN — FENTANYL CITRATE 25 MCG: 50 INJECTION, SOLUTION INTRAMUSCULAR; INTRAVENOUS at 16:29

## 2024-05-05 RX ADMIN — ALBUTEROL SULFATE 2.5 MG: 2.5 SOLUTION RESPIRATORY (INHALATION) at 17:46

## 2024-05-05 RX ADMIN — NIFEDIPINE 60 MG: 60 TABLET, EXTENDED RELEASE ORAL at 22:10

## 2024-05-05 RX ADMIN — PIPERACILLIN AND TAZOBACTAM 4500 MG: 4; .5 INJECTION, POWDER, FOR SOLUTION INTRAVENOUS at 14:36

## 2024-05-05 RX ADMIN — FENTANYL CITRATE 25 MCG: 50 INJECTION, SOLUTION INTRAMUSCULAR; INTRAVENOUS at 16:00

## 2024-05-05 RX ADMIN — ONDANSETRON 4 MG: 2 INJECTION INTRAMUSCULAR; INTRAVENOUS at 09:54

## 2024-05-05 RX ADMIN — HYDROMORPHONE HYDROCHLORIDE 0.5 MG: 1 INJECTION, SOLUTION INTRAMUSCULAR; INTRAVENOUS; SUBCUTANEOUS at 09:44

## 2024-05-05 RX ADMIN — ALBUTEROL SULFATE 2.5 MG: 2.5 SOLUTION RESPIRATORY (INHALATION) at 18:15

## 2024-05-05 RX ADMIN — SODIUM CHLORIDE, POTASSIUM CHLORIDE, SODIUM LACTATE AND CALCIUM CHLORIDE 1000 ML: 600; 310; 30; 20 INJECTION, SOLUTION INTRAVENOUS at 09:28

## 2024-05-05 RX ADMIN — ACETAMINOPHEN 650 MG: 325 TABLET ORAL at 22:09

## 2024-05-05 RX ADMIN — ALBUTEROL SULFATE 3 PUFF: 90 AEROSOL, METERED RESPIRATORY (INHALATION) at 15:57

## 2024-05-05 RX ADMIN — POLYETHYLENE GLYCOL 3350 17 G: 17 POWDER, FOR SOLUTION ORAL at 22:10

## 2024-05-05 RX ADMIN — LAMOTRIGINE 25 MG: 25 TABLET ORAL at 22:09

## 2024-05-05 RX ADMIN — PROPOFOL 70 MG: 10 INJECTION, EMULSION INTRAVENOUS at 16:00

## 2024-05-05 RX ADMIN — ONDANSETRON 4 MG: 2 INJECTION INTRAMUSCULAR; INTRAVENOUS at 17:23

## 2024-05-05 RX ADMIN — FENTANYL CITRATE 25 MCG: 50 INJECTION, SOLUTION INTRAMUSCULAR; INTRAVENOUS at 17:09

## 2024-05-05 RX ADMIN — FENTANYL CITRATE 25 MCG: 50 INJECTION, SOLUTION INTRAMUSCULAR; INTRAVENOUS at 17:54

## 2024-05-05 RX ADMIN — METOPROLOL SUCCINATE 50 MG: 50 TABLET, EXTENDED RELEASE ORAL at 22:09

## 2024-05-05 RX ADMIN — ROCURONIUM BROMIDE 25 MG: 50 INJECTION INTRAVENOUS at 16:06

## 2024-05-05 RX ADMIN — SODIUM CHLORIDE: 9 INJECTION, SOLUTION INTRAVENOUS at 15:43

## 2024-05-05 RX ADMIN — ASPIRIN 81 MG: 81 TABLET, COATED ORAL at 22:09

## 2024-05-05 RX ADMIN — ALBUTEROL SULFATE 3 PUFF: 90 AEROSOL, METERED RESPIRATORY (INHALATION) at 17:31

## 2024-05-05 RX ADMIN — ISOSORBIDE MONONITRATE 30 MG: 30 TABLET, EXTENDED RELEASE ORAL at 22:09

## 2024-05-05 RX ADMIN — HYDROMORPHONE HYDROCHLORIDE 0.5 MG: 1 INJECTION, SOLUTION INTRAMUSCULAR; INTRAVENOUS; SUBCUTANEOUS at 18:15

## 2024-05-05 RX ADMIN — SUGAMMADEX 200 MG: 100 INJECTION, SOLUTION INTRAVENOUS at 17:28

## 2024-05-05 ASSESSMENT — PAIN DESCRIPTION - FREQUENCY
FREQUENCY: CONTINUOUS
FREQUENCY: OTHER (COMMENT)

## 2024-05-05 ASSESSMENT — PAIN DESCRIPTION - PAIN TYPE: TYPE: SURGICAL PAIN

## 2024-05-05 ASSESSMENT — PAIN DESCRIPTION - ORIENTATION
ORIENTATION: MID
ORIENTATION: LEFT
ORIENTATION: MID

## 2024-05-05 ASSESSMENT — PAIN DESCRIPTION - LOCATION
LOCATION: NOSE
LOCATION: ABDOMEN
LOCATION: ABDOMEN

## 2024-05-05 ASSESSMENT — PAIN SCALES - GENERAL
PAINLEVEL_OUTOF10: 0
PAINLEVEL_OUTOF10: 10
PAINLEVEL_OUTOF10: 7
PAINLEVEL_OUTOF10: 0

## 2024-05-05 ASSESSMENT — PAIN DESCRIPTION - DESCRIPTORS
DESCRIPTORS: OTHER (COMMENT)
DESCRIPTORS: PATIENT UNABLE TO DESCRIBE

## 2024-05-05 ASSESSMENT — PAIN - FUNCTIONAL ASSESSMENT
PAIN_FUNCTIONAL_ASSESSMENT: 0-10
PAIN_FUNCTIONAL_ASSESSMENT: ACTIVITIES ARE NOT PREVENTED

## 2024-05-05 NOTE — H&P
History and Physical    Date of Service:  5/5/2024  Primary Care Provider: Kehinde Barney MD  Source of information: The patient and Chart review    Chief Complaint: Constipation      History of Presenting Illness:   Heather Rich is a 88 y.o. female with anemia, arrhythmia, asthma, BPPV, h/o R breast CA s/p lumpectomy/XRT, ESRD on PD, sickle cell trait, hereditary spherocytosis, diet-controlled T2DM, left hearing deficit, HTN, seizure disorder, chronic LLE PVD, and chronic ventral wall hernia who presented with abdominal pain, constipation, irreducible hernia, chills, and low grade fever 100 this morning. Pt c/o constipation x2 days despite aggressive bowel regimen. She was advised by her nephrologist to come for evaluation of constipation due to concern for developing bacterial peritonitis with persistent constipation. In the ED, CTAP showed incarcerated ventral hernia, developing small bowel obstruction, free intraperitoneal fluid, intrahepatic biliary and CBD dilation. General Surgery was consulted, recommended HM admission due to multiple medical comorbidities, and took her emergently to the OR for incarcerated hernia repair.     Pt was seen in PACU and denied any pain.      REVIEW OF SYSTEMS:  Pertinent items are noted in the History of Present Illness.     Past Medical History:   Diagnosis Date    Anemia     Arrhythmia     irregular per pt d/t blood disorder    Asthma     Axillary adenopathy 01/04/2021    3/1/21 md Gabe - Benign, reactive lymph nodes with follicular hyperplasia     Bed bug bite 01/18/2021    Bereavement 02/02/2016     die 85 copd,chf,pul htn pn after 53 yr marriage    BPV (benign positional vertigo)     BPV (benign positional vertigo) 08/20/2020    Breast cancer (HCC)     Breast cancer, right breast (HCC) 1994    right breast, lumpectomy and radiation    Breast lump 2017    right breast     CAP (community acquired pneumonia) 12/05/2021    New right basilar

## 2024-05-05 NOTE — BRIEF OP NOTE
Brief Postoperative Note      Patient: Heather Rich  YOB: 1935  MRN: [id number]    Date of Procedure: 5/5/2024    Pre-Op Diagnosis Codes:     * Incarcerated hernia [K46.0]    Post-Op Diagnosis: Incarcerated ventral hernia with small bowel obstruction       Procedure(s):  PRIMARY INCARCERATED  HERNIA REPAIR    Surgeon(s):  Bronson Mitchell MD    Assistant:  Surgical Assistant: Pepe Ríos    Anesthesia: General    Estimated Blood Loss (mL): Minimal    Complications: None    Specimens:   ID Type Source Tests Collected by Time Destination   1 : HERNIA SAC Tissue Hernia Sac SURGICAL PATHOLOGY Bronson Mitchell MD 5/5/2024 1643        Implants:  * No implants in log *      Drains:   Urinary Catheter 05/05/24 2 Way (Active)       Findings:  Infection Present At Time Of Surgery (PATOS) (choose all levels that have infection present):  No infection present  Other Findings: There was a 2.5 cm hernia defect above the umbilicus with incarcerated small bowel.  The incarcerated bowel was mildly ischemic but pink up after release from incarceration.       Electronically signed by Bronson Mitchell MD on 5/5/2024 at 5:40 PM

## 2024-05-05 NOTE — ED NOTES
Bedside shift change report given to Immanuel RN (oncoming nurse) by Marlo RN (offgoing nurse). Report included the following information ED Encounter Summary, ED SBAR, MAR, and Recent Results.

## 2024-05-05 NOTE — ANESTHESIA PRE PROCEDURE
Darius Lund MD at [facility] MAIN OR   • GI      COLONOSCOPY   • ORTHOPEDIC SURGERY      CALICIUM DEPOSIT R THUMB REMOVED   • OTHER SURGICAL HISTORY  02/26/2021    Right axillary lymph node [id number]   • [place] UNLISTED PROCEDURE ABDOMEN PERITONEUM & OMENTUM  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   • UPPER GASTROINTESTINAL ENDOSCOPY N/A 8/29/2023    EGD ESOPHAGOGASTRODUODENOSCOPY, EGD BIOPSY performed by Darius Lund MD at [facility] MAIN OR       Social History:    Social History     Tobacco Use   • Smoking status: Former     Types: Cigarettes     Passive exposure: Never   • Smokeless tobacco: Never   • Tobacco comments:     Patient states she smoked for 1 month of her life.   Substance Use Topics   • Alcohol use: No                                Counseling given: Not Answered  Tobacco comments: Patient states she smoked for 1 month of her life.      Vital Signs (Current):   Vitals:    05/05/24 0730 05/05/24 0930 05/05/24 1330 05/05/24 1400   BP: (!) 168/72 (!) 156/63 (!) 176/58 (!) 168/64   Pulse: 82 85 83 82   Resp: 18 24 24 25   Temp:  98.6 °F (37 °C)     TempSrc:  Oral     SpO2: 95% 96% 94% 94%   Weight:                                                  BP Readings from Last 3 Encounters:   05/05/24 (!) 168/64   03/27/24 (!) 126/57   03/18/24 (!) 145/67       NPO Status:                                                                                 BMI:   Wt Readings from Last 3 Encounters:   05/05/24 51.3 kg (113 lb)   03/27/24 53.7 kg (118 lb 6.4 oz)   03/18/24 53.7 kg (118 lb 6.4 oz)     Body mass index is 21.35 kg/m².    CBC:   Lab Results   Component Value Date/Time    WBC 20.5 05/05/2024 06:57 AM    RBC 3.48 05/05/2024 06:57 AM    HGB 9.5 05/05/2024 06:57 AM    HCT 29.3 05/05/2024 06:57 AM    MCV 84.2 05/05/2024 06:57 AM    RDW 14.7 05/05/2024 06:57 AM     05/05/2024 06:57 AM       CMP:   Lab Results   Component Value Date/Time     05/05/2024 06:57 AM    K 2.7 05/05/2024 06:57 AM    CL 99 05/05/2024

## 2024-05-05 NOTE — ED PROVIDER NOTES
[facility] EMERGENCY DEP  EMERGENCY DEPARTMENT ENCOUNTER      Pt Name: Heather Rich  MRN: [id number]  Birthdate 1935  Date of evaluation: 5/5/2024  Provider: Soni Lopez MD    CHIEF COMPLAINT       Chief Complaint   Patient presents with    Constipation         HISTORY OF PRESENT ILLNESS    Review of Medical Records:     Nursing Triage Notes were reviewed.    HPI    Heather Rich is a 88 y.o. female with a history of ESRD on peritoneal dialysis, hypertension, type 2 diabetes, ventral wall hernia states who presents to the emergency department for evaluation of abdominal pain.  Patient reports she has been unable to reduce her hernia noted has been tender to palpation over the last day.  States that she has had constipation for the last 2 days despite taking multiple laxatives including Dulcolax and suppository.  Patient states that her nephrologist with [facility] referred her for evaluation of constipation as they were concerned about the potential of developing bacterial peritonitis if constipation persisted.  Patient reports she has had some subjective chills this morning, and that her Tmax at home was 100 °F. Completed PD yesterday, only made it senior care through PD this morning prior to coming.         PAST MEDICAL HISTORY     Past Medical History:   Diagnosis Date    Anemia     Arrhythmia     irregular per pt d/t blood disorder    Asthma     Axillary adenopathy 01/04/2021    3/1/21 md Gabe - Benign, reactive lymph nodes with follicular hyperplasia     Bed bug bite 01/18/2021    Bereavement 02/02/2016     die 85 copd,chf,pul htn pn after 53 yr marriage    BPV (benign positional vertigo)     BPV (benign positional vertigo) 08/20/2020    Breast cancer (HCC)     Breast cancer, right breast (HCC) 1994    right breast, lumpectomy and radiation    Breast lump 2017    right breast     CAP (community acquired pneumonia) 12/05/2021    New right basilar airspace disease may represent atelectasis

## 2024-05-05 NOTE — ED TRIAGE NOTES
Pt comes in from home with CC of constipation for 2 days. Pt has an abd hernia that hurts when you touch it. Pt is on PD.

## 2024-05-06 ENCOUNTER — APPOINTMENT (OUTPATIENT)
Facility: HOSPITAL | Age: 89
DRG: 353 | End: 2024-05-06
Payer: MEDICARE

## 2024-05-06 ENCOUNTER — APPOINTMENT (OUTPATIENT)
Facility: HOSPITAL | Age: 89
DRG: 353 | End: 2024-05-06
Attending: INTERNAL MEDICINE
Payer: MEDICARE

## 2024-05-06 PROBLEM — K56.609 SBO (SMALL BOWEL OBSTRUCTION) (HCC): Status: ACTIVE | Noted: 2024-05-06

## 2024-05-06 PROBLEM — E11.29 TYPE 2 DIABETES MELLITUS WITH KIDNEY COMPLICATION, WITHOUT LONG-TERM CURRENT USE OF INSULIN (HCC): Status: ACTIVE | Noted: 2024-05-06

## 2024-05-06 PROBLEM — K83.8 DILATED CBD, ACQUIRED: Status: ACTIVE | Noted: 2024-05-06

## 2024-05-06 LAB
ALBUMIN SERPL-MCNC: 2 G/DL (ref 3.5–5)
ALBUMIN/GLOB SERPL: 0.3 (ref 1.1–2.2)
ALP SERPL-CCNC: 122 U/L (ref 45–117)
ALT SERPL-CCNC: 113 U/L (ref 12–78)
ANION GAP SERPL CALC-SCNC: 6 MMOL/L (ref 5–15)
AST SERPL-CCNC: 119 U/L (ref 15–37)
BASOPHILS # BLD: 0 K/UL (ref 0–0.1)
BASOPHILS NFR BLD: 0 % (ref 0–1)
BILIRUB SERPL-MCNC: 0.8 MG/DL (ref 0.2–1)
BUN SERPL-MCNC: 57 MG/DL (ref 6–20)
BUN/CREAT SERPL: 8 (ref 12–20)
CALCIUM SERPL-MCNC: 8.7 MG/DL (ref 8.5–10.1)
CHLORIDE SERPL-SCNC: 105 MMOL/L (ref 97–108)
CO2 SERPL-SCNC: 25 MMOL/L (ref 21–32)
CREAT SERPL-MCNC: 6.99 MG/DL (ref 0.55–1.02)
DIFFERENTIAL METHOD BLD: ABNORMAL
EOSINOPHIL # BLD: 0 K/UL (ref 0–0.4)
EOSINOPHIL NFR BLD: 0 % (ref 0–7)
ERYTHROCYTE [DISTWIDTH] IN BLOOD BY AUTOMATED COUNT: 14.6 % (ref 11.5–14.5)
GLOBULIN SER CALC-MCNC: 6 G/DL (ref 2–4)
GLUCOSE SERPL-MCNC: 91 MG/DL (ref 65–100)
HBV SURFACE AB SER QL: NONREACTIVE
HBV SURFACE AB SER-ACNC: <3.1 MIU/ML
HBV SURFACE AG SER QL: <0.1 INDEX
HBV SURFACE AG SER QL: NEGATIVE
HCT VFR BLD AUTO: 24.5 % (ref 35–47)
HGB BLD-MCNC: 7.9 G/DL (ref 11.5–16)
IMM GRANULOCYTES # BLD AUTO: 0.2 K/UL (ref 0–0.04)
IMM GRANULOCYTES NFR BLD AUTO: 1 % (ref 0–0.5)
IRON SATN MFR SERPL: 12 % (ref 20–50)
IRON SERPL-MCNC: 18 UG/DL (ref 35–150)
LYMPHOCYTES # BLD: 1 K/UL (ref 0.8–3.5)
LYMPHOCYTES NFR BLD: 4 % (ref 12–49)
MCH RBC QN AUTO: 27.3 PG (ref 26–34)
MCHC RBC AUTO-ENTMCNC: 32.2 G/DL (ref 30–36.5)
MCV RBC AUTO: 84.8 FL (ref 80–99)
MONOCYTES # BLD: 1.7 K/UL (ref 0–1)
MONOCYTES NFR BLD: 7 % (ref 5–13)
NEUTS SEG # BLD: 21.6 K/UL (ref 1.8–8)
NEUTS SEG NFR BLD: 88 % (ref 32–75)
NRBC # BLD: 0 K/UL (ref 0–0.01)
NRBC BLD-RTO: 0 PER 100 WBC
PLATELET # BLD AUTO: 360 K/UL (ref 150–400)
PMV BLD AUTO: 8.9 FL (ref 8.9–12.9)
POTASSIUM SERPL-SCNC: 4.1 MMOL/L (ref 3.5–5.1)
PROT SERPL-MCNC: 8 G/DL (ref 6.4–8.2)
RBC # BLD AUTO: 2.89 M/UL (ref 3.8–5.2)
RBC MORPH BLD: ABNORMAL
SODIUM SERPL-SCNC: 136 MMOL/L (ref 136–145)
TIBC SERPL-MCNC: 147 UG/DL (ref 250–450)
WBC # BLD AUTO: 24.5 K/UL (ref 3.6–11)

## 2024-05-06 PROCEDURE — 6370000000 HC RX 637 (ALT 250 FOR IP): Performed by: SURGERY

## 2024-05-06 PROCEDURE — 6370000000 HC RX 637 (ALT 250 FOR IP)

## 2024-05-06 PROCEDURE — 80053 COMPREHEN METABOLIC PANEL: CPT

## 2024-05-06 PROCEDURE — 74018 RADEX ABDOMEN 1 VIEW: CPT

## 2024-05-06 PROCEDURE — 2500000003 HC RX 250 WO HCPCS: Performed by: PHYSICIAN ASSISTANT

## 2024-05-06 PROCEDURE — 97535 SELF CARE MNGMENT TRAINING: CPT

## 2024-05-06 PROCEDURE — 86704 HEP B CORE ANTIBODY TOTAL: CPT

## 2024-05-06 PROCEDURE — 97530 THERAPEUTIC ACTIVITIES: CPT

## 2024-05-06 PROCEDURE — 76937 US GUIDE VASCULAR ACCESS: CPT

## 2024-05-06 PROCEDURE — 2060000000 HC ICU INTERMEDIATE R&B

## 2024-05-06 PROCEDURE — 83550 IRON BINDING TEST: CPT

## 2024-05-06 PROCEDURE — 6360000002 HC RX W HCPCS: Performed by: PHYSICIAN ASSISTANT

## 2024-05-06 PROCEDURE — 5A1D70Z PERFORMANCE OF URINARY FILTRATION, INTERMITTENT, LESS THAN 6 HOURS PER DAY: ICD-10-PCS | Performed by: INTERNAL MEDICINE

## 2024-05-06 PROCEDURE — 2580000003 HC RX 258: Performed by: SURGERY

## 2024-05-06 PROCEDURE — 85025 COMPLETE CBC W/AUTO DIFF WBC: CPT

## 2024-05-06 PROCEDURE — 36415 COLL VENOUS BLD VENIPUNCTURE: CPT

## 2024-05-06 PROCEDURE — 86706 HEP B SURFACE ANTIBODY: CPT

## 2024-05-06 PROCEDURE — 83540 ASSAY OF IRON: CPT

## 2024-05-06 PROCEDURE — 90935 HEMODIALYSIS ONE EVALUATION: CPT

## 2024-05-06 PROCEDURE — 99232 SBSQ HOSP IP/OBS MODERATE 35: CPT | Performed by: INTERNAL MEDICINE

## 2024-05-06 PROCEDURE — 2580000003 HC RX 258: Performed by: INTERNAL MEDICINE

## 2024-05-06 PROCEDURE — B548ZZA ULTRASONOGRAPHY OF SUPERIOR VENA CAVA, GUIDANCE: ICD-10-PCS | Performed by: PHYSICIAN ASSISTANT

## 2024-05-06 PROCEDURE — 76942 ECHO GUIDE FOR BIOPSY: CPT

## 2024-05-06 PROCEDURE — 6360000002 HC RX W HCPCS: Performed by: SURGERY

## 2024-05-06 PROCEDURE — 97161 PT EVAL LOW COMPLEX 20 MIN: CPT

## 2024-05-06 PROCEDURE — 6370000000 HC RX 637 (ALT 250 FOR IP): Performed by: INTERNAL MEDICINE

## 2024-05-06 PROCEDURE — 97165 OT EVAL LOW COMPLEX 30 MIN: CPT

## 2024-05-06 PROCEDURE — 71045 X-RAY EXAM CHEST 1 VIEW: CPT

## 2024-05-06 PROCEDURE — 02H633Z INSERTION OF INFUSION DEVICE INTO RIGHT ATRIUM, PERCUTANEOUS APPROACH: ICD-10-PCS | Performed by: PHYSICIAN ASSISTANT

## 2024-05-06 PROCEDURE — 87340 HEPATITIS B SURFACE AG IA: CPT

## 2024-05-06 RX ORDER — LIDOCAINE HYDROCHLORIDE 10 MG/ML
INJECTION, SOLUTION INFILTRATION; PERINEURAL PRN
Status: COMPLETED | OUTPATIENT
Start: 2024-05-06 | End: 2024-05-06

## 2024-05-06 RX ORDER — GUAIFENESIN 600 MG/1
600 TABLET, EXTENDED RELEASE ORAL 2 TIMES DAILY
Status: DISCONTINUED | OUTPATIENT
Start: 2024-05-06 | End: 2024-05-23 | Stop reason: HOSPADM

## 2024-05-06 RX ORDER — HEPARIN SODIUM 1000 [USP'U]/ML
INJECTION, SOLUTION INTRAVENOUS; SUBCUTANEOUS PRN
Status: COMPLETED | OUTPATIENT
Start: 2024-05-06 | End: 2024-05-06

## 2024-05-06 RX ADMIN — ASPIRIN 81 MG: 81 TABLET, COATED ORAL at 09:40

## 2024-05-06 RX ADMIN — LAMOTRIGINE 25 MG: 25 TABLET ORAL at 09:40

## 2024-05-06 RX ADMIN — METOPROLOL SUCCINATE 50 MG: 50 TABLET, EXTENDED RELEASE ORAL at 23:49

## 2024-05-06 RX ADMIN — SODIUM CHLORIDE, PRESERVATIVE FREE 10 ML: 5 INJECTION INTRAVENOUS at 09:40

## 2024-05-06 RX ADMIN — HEPARIN SODIUM 2600 UNITS: 1000 INJECTION INTRAVENOUS; SUBCUTANEOUS at 12:33

## 2024-05-06 RX ADMIN — PIPERACILLIN SODIUM AND TAZOBACTAM SODIUM 3375 MG: 3; .375 INJECTION, POWDER, LYOPHILIZED, FOR SOLUTION INTRAVENOUS at 14:15

## 2024-05-06 RX ADMIN — SODIUM CHLORIDE, PRESERVATIVE FREE 10 ML: 5 INJECTION INTRAVENOUS at 23:50

## 2024-05-06 RX ADMIN — OXYBUTYNIN CHLORIDE 10 MG: 5 TABLET, EXTENDED RELEASE ORAL at 23:50

## 2024-05-06 RX ADMIN — GUAIFENESIN 600 MG: 600 TABLET, EXTENDED RELEASE ORAL at 23:49

## 2024-05-06 RX ADMIN — PIPERACILLIN SODIUM AND TAZOBACTAM SODIUM 3375 MG: 3; .375 INJECTION, POWDER, LYOPHILIZED, FOR SOLUTION INTRAVENOUS at 01:35

## 2024-05-06 RX ADMIN — SPIRONOLACTONE 25 MG: 25 TABLET ORAL at 09:40

## 2024-05-06 RX ADMIN — LIDOCAINE HYDROCHLORIDE 7 ML: 10 INJECTION, SOLUTION INFILTRATION; PERINEURAL at 12:33

## 2024-05-06 RX ADMIN — PHENOL 1 SPRAY: 1.5 LIQUID ORAL at 09:40

## 2024-05-06 RX ADMIN — LAMOTRIGINE 25 MG: 25 TABLET ORAL at 23:49

## 2024-05-06 RX ADMIN — NIFEDIPINE 60 MG: 60 TABLET, EXTENDED RELEASE ORAL at 09:40

## 2024-05-06 RX ADMIN — NIFEDIPINE 60 MG: 60 TABLET, EXTENDED RELEASE ORAL at 23:49

## 2024-05-06 RX ADMIN — ISOSORBIDE MONONITRATE 30 MG: 30 TABLET, EXTENDED RELEASE ORAL at 09:40

## 2024-05-06 RX ADMIN — GUAIFENESIN 600 MG: 600 TABLET, EXTENDED RELEASE ORAL at 05:35

## 2024-05-06 ASSESSMENT — PAIN SCALES - GENERAL
PAINLEVEL_OUTOF10: 10
PAINLEVEL_OUTOF10: 9

## 2024-05-06 ASSESSMENT — PAIN - FUNCTIONAL ASSESSMENT: PAIN_FUNCTIONAL_ASSESSMENT: ACTIVITIES ARE NOT PREVENTED

## 2024-05-06 ASSESSMENT — PAIN DESCRIPTION - LOCATION: LOCATION: THROAT

## 2024-05-06 ASSESSMENT — PAIN DESCRIPTION - DESCRIPTORS: DESCRIPTORS: SORE

## 2024-05-06 NOTE — CARE COORDINATION
Brief Note:    Chart reviewed.  CM aware that patient is transitioning from PD to HD following her hernia repair.   Referral sent to [facility] Intake/Admissions for ins auth for HD.  CM awaiting Hep B panel orders and results to send.      MARIA INES Madsen, CRM  [phone]    5:11pm  [facility]  Admissions has confirmed receipt of referral number [id number] with Tracking # [id number].  New contact for this patient referral at [facility] is Myah Menjivar [phone] ext [phone]    The [facility] [facility] [address]) has a chair avail on M/W/F shift with a 2-4p chair time pending results of:   Hep B Core Antibody, Hep B Surface Antigen, Hep B  Surface Antibody.    CM will send above along with CXR results.   CM continuing to follow.    MARIA INES Madsen, CRM  [phone]

## 2024-05-06 NOTE — CARE COORDINATION
Care Management Initial Assessment       RUR:22%  Readmission? No  1st IM letter given? Yes   1st  letter given: No      05/06/24 0954   Service Assessment   Patient Orientation Alert and Oriented;Person;Place;Situation;Self   Cognition Alert   History Provided By Patient;Medical Record   Primary Caregiver Self   Support Systems Children;Family Members   PCP Verified by CM Yes   Last Visit to PCP Within last 3 months   Prior Functional Level Independent in ADLs/IADLs   Current Functional Level Independent in ADLs/IADLs   Can patient return to prior living arrangement Yes   Ability to make needs known: Good   Family able to assist with home care needs: Yes   Would you like for me to discuss the discharge plan with any other family members/significant others, and if so, who? No   Financial Resources None     CM met with pt to introduce her to the role of CM and transition of care. This pt lives alone at home. She has been independent with all of her ADL's and IADL's. She is very active and also a . She has PD at home and her clinic is the [facility] on [address]. CM noted that pt will be getting HD while here in the hospital. CM will follow for d/c needs. Em Manuel,BSW,ACM-SW

## 2024-05-07 LAB
ALBUMIN SERPL-MCNC: 2.1 G/DL (ref 3.5–5)
ALBUMIN/GLOB SERPL: 0.3 (ref 1.1–2.2)
ALP SERPL-CCNC: 123 U/L (ref 45–117)
ALT SERPL-CCNC: 81 U/L (ref 12–78)
ANION GAP SERPL CALC-SCNC: 15 MMOL/L (ref 5–15)
AST SERPL-CCNC: 60 U/L (ref 15–37)
BASOPHILS # BLD: 0 K/UL (ref 0–0.1)
BASOPHILS NFR BLD: 0 % (ref 0–1)
BILIRUB SERPL-MCNC: 0.7 MG/DL (ref 0.2–1)
BUN SERPL-MCNC: 27 MG/DL (ref 6–20)
BUN/CREAT SERPL: 7 (ref 12–20)
CALCIUM SERPL-MCNC: 8.5 MG/DL (ref 8.5–10.1)
CHLORIDE SERPL-SCNC: 101 MMOL/L (ref 97–108)
CO2 SERPL-SCNC: 21 MMOL/L (ref 21–32)
CREAT SERPL-MCNC: 3.66 MG/DL (ref 0.55–1.02)
DIFFERENTIAL METHOD BLD: ABNORMAL
EOSINOPHIL # BLD: 0 K/UL (ref 0–0.4)
EOSINOPHIL NFR BLD: 0 % (ref 0–7)
ERYTHROCYTE [DISTWIDTH] IN BLOOD BY AUTOMATED COUNT: 14.7 % (ref 11.5–14.5)
GLOBULIN SER CALC-MCNC: 6.3 G/DL (ref 2–4)
GLUCOSE BLD STRIP.AUTO-MCNC: 122 MG/DL (ref 65–117)
GLUCOSE BLD STRIP.AUTO-MCNC: 86 MG/DL (ref 65–117)
GLUCOSE SERPL-MCNC: 71 MG/DL (ref 65–100)
HBV CORE AB SERPL QL IA: NEGATIVE
HCT VFR BLD AUTO: 25.7 % (ref 35–47)
HGB BLD-MCNC: 8.6 G/DL (ref 11.5–16)
IMM GRANULOCYTES # BLD AUTO: 0.3 K/UL (ref 0–0.04)
IMM GRANULOCYTES NFR BLD AUTO: 1 % (ref 0–0.5)
LYMPHOCYTES # BLD: 0.9 K/UL (ref 0.8–3.5)
LYMPHOCYTES NFR BLD: 3 % (ref 12–49)
MCH RBC QN AUTO: 28 PG (ref 26–34)
MCHC RBC AUTO-ENTMCNC: 33.5 G/DL (ref 30–36.5)
MCV RBC AUTO: 83.7 FL (ref 80–99)
MONOCYTES # BLD: 2 K/UL (ref 0–1)
MONOCYTES NFR BLD: 7 % (ref 5–13)
NEUTS SEG # BLD: 25.8 K/UL (ref 1.8–8)
NEUTS SEG NFR BLD: 89 % (ref 32–75)
NRBC # BLD: 0 K/UL (ref 0–0.01)
NRBC BLD-RTO: 0 PER 100 WBC
PHOSPHATE SERPL-MCNC: 4.2 MG/DL (ref 2.6–4.7)
PLATELET # BLD AUTO: 321 K/UL (ref 150–400)
PMV BLD AUTO: 9 FL (ref 8.9–12.9)
POTASSIUM SERPL-SCNC: 3.8 MMOL/L (ref 3.5–5.1)
PROT SERPL-MCNC: 8.4 G/DL (ref 6.4–8.2)
RBC # BLD AUTO: 3.07 M/UL (ref 3.8–5.2)
RBC MORPH BLD: ABNORMAL
RBC MORPH BLD: ABNORMAL
SERVICE CMNT-IMP: ABNORMAL
SERVICE CMNT-IMP: NORMAL
SODIUM SERPL-SCNC: 137 MMOL/L (ref 136–145)
WBC # BLD AUTO: 29 K/UL (ref 3.6–11)

## 2024-05-07 PROCEDURE — 6370000000 HC RX 637 (ALT 250 FOR IP): Performed by: INTERNAL MEDICINE

## 2024-05-07 PROCEDURE — 80053 COMPREHEN METABOLIC PANEL: CPT

## 2024-05-07 PROCEDURE — 6360000002 HC RX W HCPCS: Performed by: SURGERY

## 2024-05-07 PROCEDURE — 84100 ASSAY OF PHOSPHORUS: CPT

## 2024-05-07 PROCEDURE — 2580000003 HC RX 258: Performed by: INTERNAL MEDICINE

## 2024-05-07 PROCEDURE — 6360000002 HC RX W HCPCS: Performed by: INTERNAL MEDICINE

## 2024-05-07 PROCEDURE — 97116 GAIT TRAINING THERAPY: CPT

## 2024-05-07 PROCEDURE — 2060000000 HC ICU INTERMEDIATE R&B

## 2024-05-07 PROCEDURE — 6370000000 HC RX 637 (ALT 250 FOR IP): Performed by: SURGERY

## 2024-05-07 PROCEDURE — 6370000000 HC RX 637 (ALT 250 FOR IP)

## 2024-05-07 PROCEDURE — 2580000003 HC RX 258: Performed by: SURGERY

## 2024-05-07 PROCEDURE — 85025 COMPLETE CBC W/AUTO DIFF WBC: CPT

## 2024-05-07 PROCEDURE — 94640 AIRWAY INHALATION TREATMENT: CPT

## 2024-05-07 PROCEDURE — 36415 COLL VENOUS BLD VENIPUNCTURE: CPT

## 2024-05-07 PROCEDURE — 99232 SBSQ HOSP IP/OBS MODERATE 35: CPT | Performed by: INTERNAL MEDICINE

## 2024-05-07 PROCEDURE — 82962 GLUCOSE BLOOD TEST: CPT

## 2024-05-07 RX ORDER — ALBUTEROL SULFATE 2.5 MG/3ML
2.5 SOLUTION RESPIRATORY (INHALATION) EVERY 6 HOURS PRN
Status: DISCONTINUED | OUTPATIENT
Start: 2024-05-07 | End: 2024-05-07

## 2024-05-07 RX ORDER — ARFORMOTEROL TARTRATE 15 UG/2ML
15 SOLUTION RESPIRATORY (INHALATION)
Status: DISCONTINUED | OUTPATIENT
Start: 2024-05-07 | End: 2024-05-16

## 2024-05-07 RX ORDER — FLUTICASONE PROPIONATE AND SALMETEROL 250; 50 UG/1; UG/1
1 POWDER RESPIRATORY (INHALATION) 2 TIMES DAILY
Status: DISCONTINUED | OUTPATIENT
Start: 2024-05-07 | End: 2024-05-23 | Stop reason: HOSPADM

## 2024-05-07 RX ORDER — IPRATROPIUM BROMIDE AND ALBUTEROL SULFATE 2.5; .5 MG/3ML; MG/3ML
1 SOLUTION RESPIRATORY (INHALATION) EVERY 4 HOURS PRN
Status: DISCONTINUED | OUTPATIENT
Start: 2024-05-07 | End: 2024-05-07

## 2024-05-07 RX ORDER — DOCUSATE SODIUM 100 MG/1
100 CAPSULE, LIQUID FILLED ORAL DAILY
Status: ON HOLD | COMMUNITY
End: 2024-05-23 | Stop reason: HOSPADM

## 2024-05-07 RX ORDER — DEXTROSE AND SODIUM CHLORIDE 5; .9 G/100ML; G/100ML
INJECTION, SOLUTION INTRAVENOUS CONTINUOUS
Status: DISCONTINUED | OUTPATIENT
Start: 2024-05-07 | End: 2024-05-12

## 2024-05-07 RX ORDER — ACETYLCYSTEINE 200 MG/ML
600 SOLUTION ORAL; RESPIRATORY (INHALATION)
Status: DISCONTINUED | OUTPATIENT
Start: 2024-05-07 | End: 2024-05-15

## 2024-05-07 RX ORDER — IPRATROPIUM BROMIDE AND ALBUTEROL SULFATE 2.5; .5 MG/3ML; MG/3ML
1 SOLUTION RESPIRATORY (INHALATION) EVERY 4 HOURS PRN
Status: DISCONTINUED | OUTPATIENT
Start: 2024-05-07 | End: 2024-05-23 | Stop reason: HOSPADM

## 2024-05-07 RX ORDER — METOPROLOL SUCCINATE 50 MG/1
100 TABLET, EXTENDED RELEASE ORAL NIGHTLY
Status: DISCONTINUED | OUTPATIENT
Start: 2024-05-07 | End: 2024-05-23 | Stop reason: HOSPADM

## 2024-05-07 RX ORDER — ALBUTEROL SULFATE 90 UG/1
1 AEROSOL, METERED RESPIRATORY (INHALATION) EVERY 6 HOURS PRN
Status: DISCONTINUED | OUTPATIENT
Start: 2024-05-07 | End: 2024-05-23 | Stop reason: HOSPADM

## 2024-05-07 RX ORDER — BUDESONIDE 0.25 MG/2ML
0.25 INHALANT ORAL
Status: DISCONTINUED | OUTPATIENT
Start: 2024-05-07 | End: 2024-05-16

## 2024-05-07 RX ORDER — DEXTROSE MONOHYDRATE 100 MG/ML
INJECTION, SOLUTION INTRAVENOUS CONTINUOUS PRN
Status: DISCONTINUED | OUTPATIENT
Start: 2024-05-07 | End: 2024-05-23 | Stop reason: HOSPADM

## 2024-05-07 RX ORDER — BUDESONIDE 0.25 MG/2ML
0.25 INHALANT ORAL
Status: DISCONTINUED | OUTPATIENT
Start: 2024-05-07 | End: 2024-05-07 | Stop reason: SDUPTHER

## 2024-05-07 RX ADMIN — METOPROLOL SUCCINATE 100 MG: 50 TABLET, EXTENDED RELEASE ORAL at 20:32

## 2024-05-07 RX ADMIN — ISOSORBIDE MONONITRATE 30 MG: 30 TABLET, EXTENDED RELEASE ORAL at 10:23

## 2024-05-07 RX ADMIN — FLUTICASONE PROPIONATE AND SALMETEROL 1 PUFF: 250; 50 POWDER RESPIRATORY (INHALATION) at 19:25

## 2024-05-07 RX ADMIN — GUAIFENESIN 600 MG: 600 TABLET, EXTENDED RELEASE ORAL at 10:23

## 2024-05-07 RX ADMIN — DEXTROSE AND SODIUM CHLORIDE: 5; 900 INJECTION, SOLUTION INTRAVENOUS at 16:31

## 2024-05-07 RX ADMIN — ACETYLCYSTEINE 600 MG: 200 INHALANT RESPIRATORY (INHALATION) at 19:00

## 2024-05-07 RX ADMIN — NIFEDIPINE 60 MG: 60 TABLET, EXTENDED RELEASE ORAL at 10:23

## 2024-05-07 RX ADMIN — LAMOTRIGINE 25 MG: 25 TABLET ORAL at 20:31

## 2024-05-07 RX ADMIN — SPIRONOLACTONE 25 MG: 25 TABLET ORAL at 10:23

## 2024-05-07 RX ADMIN — ACETYLCYSTEINE 600 MG: 200 INHALANT RESPIRATORY (INHALATION) at 07:39

## 2024-05-07 RX ADMIN — ASPIRIN 81 MG: 81 TABLET, COATED ORAL at 10:23

## 2024-05-07 RX ADMIN — ARFORMOTEROL TARTRATE 15 MCG: 15 SOLUTION RESPIRATORY (INHALATION) at 07:39

## 2024-05-07 RX ADMIN — SODIUM CHLORIDE, PRESERVATIVE FREE 10 ML: 5 INJECTION INTRAVENOUS at 10:23

## 2024-05-07 RX ADMIN — PIPERACILLIN SODIUM AND TAZOBACTAM SODIUM 3375 MG: 3; .375 INJECTION, POWDER, LYOPHILIZED, FOR SOLUTION INTRAVENOUS at 13:01

## 2024-05-07 RX ADMIN — BUDESONIDE 250 MCG: 0.25 INHALANT RESPIRATORY (INHALATION) at 19:00

## 2024-05-07 RX ADMIN — LAMOTRIGINE 25 MG: 25 TABLET ORAL at 10:23

## 2024-05-07 RX ADMIN — GUAIFENESIN 600 MG: 600 TABLET, EXTENDED RELEASE ORAL at 20:31

## 2024-05-07 RX ADMIN — OXYBUTYNIN CHLORIDE 10 MG: 5 TABLET, EXTENDED RELEASE ORAL at 20:32

## 2024-05-07 RX ADMIN — NIFEDIPINE 60 MG: 60 TABLET, EXTENDED RELEASE ORAL at 20:32

## 2024-05-07 RX ADMIN — PIPERACILLIN SODIUM AND TAZOBACTAM SODIUM 3375 MG: 3; .375 INJECTION, POWDER, LYOPHILIZED, FOR SOLUTION INTRAVENOUS at 02:36

## 2024-05-07 RX ADMIN — ONDANSETRON 4 MG: 2 INJECTION INTRAMUSCULAR; INTRAVENOUS at 13:01

## 2024-05-07 RX ADMIN — ALBUTEROL SULFATE 1 PUFF: 90 AEROSOL, METERED RESPIRATORY (INHALATION) at 19:25

## 2024-05-07 RX ADMIN — ARFORMOTEROL TARTRATE 15 MCG: 15 SOLUTION RESPIRATORY (INHALATION) at 19:00

## 2024-05-07 NOTE — ANESTHESIA POSTPROCEDURE EVALUATION
Department of Anesthesiology  Postprocedure Note    Patient: Heather Rich  MRN: [id number]  YOB: 1935  Date of evaluation: 5/7/2024    Procedure Summary       Date: 05/05/24 Room / Location: [facility] MAIN OR [address] [place] [address] MAIN OR    Anesthesia Start: 1550 Anesthesia Stop: 1748    Procedure: PRIMARY INCARCERATED  HERNIA REPAIR (Abdomen) Diagnosis:       Incarcerated hernia      (Incarcerated ventral  hernia [K46.0])    Providers: Bronson Mitchell MD Responsible Provider: Nissa Lozano DO    Anesthesia Type: General ASA Status: 3 - Emergent            Anesthesia Type: General    Angélica Phase I: Angélica Score: 10    Angélica Phase II:      Anesthesia Post Evaluation    Patient location during evaluation: PACU  Patient participation: complete - patient participated  Level of consciousness: awake and alert  Pain score: 0  Airway patency: patent  Nausea & Vomiting: no nausea and no vomiting  Cardiovascular status: blood pressure returned to baseline and hemodynamically stable  Respiratory status: acceptable and room air  Hydration status: euvolemic  Multimodal analgesia pain management approach  Pain management: adequate and satisfactory to patient    No notable events documented.

## 2024-05-07 NOTE — CARE COORDINATION
Transition of Care Plan:  21%  RUR:   Prior Level of Functioning: independent  Has help available (If needed) from supportive friends and family.  Disposition: home with outpatient HD    [facility] has confirmed receipt of referral number [id number] with Tracking # [id number].  New contact for this patient referral at [facility] is Myah Otto [phone] ext [phone]     The [facility] ([address] [place] [address]) has a chair avail on M/W/F shift with a 2-4p chair time pending lab results.  Hep B Core Antibody, Hep B Surface Antigen, Hep B  Surface Antibody and CXR results faxed to [facility] this morning.    Follow up appointments:   DME needed:   Transportation at discharge: family/friends  IM/IMM Medicare/ letter given: 5/6/24  Caregiver Contact:   Pipo Rich [phone]   Discharge Caregiver contacted prior to discharge?   Care Conference needed?   Barriers to discharge:      CM awaiting is auth for [facility] HD chair at [facility] [address] [place] [address].    CM continuing to follow.    MARIA INES Madsen, CRM  [phone]

## 2024-05-08 ENCOUNTER — APPOINTMENT (OUTPATIENT)
Facility: HOSPITAL | Age: 89
DRG: 353 | End: 2024-05-08
Payer: MEDICARE

## 2024-05-08 LAB
ALBUMIN SERPL-MCNC: 2.1 G/DL (ref 3.5–5)
ALBUMIN/GLOB SERPL: 0.4 (ref 1.1–2.2)
ALP SERPL-CCNC: 94 U/L (ref 45–117)
ALT SERPL-CCNC: 60 U/L (ref 12–78)
ANION GAP SERPL CALC-SCNC: 9 MMOL/L (ref 5–15)
APTT PPP: 24.9 SEC (ref 22.1–31)
AST SERPL-CCNC: 38 U/L (ref 15–37)
BASOPHILS # BLD: 0 K/UL (ref 0–0.1)
BASOPHILS NFR BLD: 0 % (ref 0–1)
BILIRUB SERPL-MCNC: 0.7 MG/DL (ref 0.2–1)
BUN SERPL-MCNC: 57 MG/DL (ref 6–20)
BUN/CREAT SERPL: 9 (ref 12–20)
CALCIUM SERPL-MCNC: 7.9 MG/DL (ref 8.5–10.1)
CHLORIDE SERPL-SCNC: 101 MMOL/L (ref 97–108)
CO2 SERPL-SCNC: 28 MMOL/L (ref 21–32)
CREAT SERPL-MCNC: 6.51 MG/DL (ref 0.55–1.02)
DIFFERENTIAL METHOD BLD: ABNORMAL
EKG DIAGNOSIS: NORMAL
EKG Q-T INTERVAL: 366 MS
EKG QRS DURATION: 86 MS
EKG QTC CALCULATION (BAZETT): 474 MS
EKG R AXIS: -7 DEGREES
EKG T AXIS: 92 DEGREES
EKG VENTRICULAR RATE: 101 BPM
EOSINOPHIL # BLD: 0.2 K/UL (ref 0–0.4)
EOSINOPHIL NFR BLD: 1 % (ref 0–7)
ERYTHROCYTE [DISTWIDTH] IN BLOOD BY AUTOMATED COUNT: 14.4 % (ref 11.5–14.5)
ERYTHROCYTE [DISTWIDTH] IN BLOOD BY AUTOMATED COUNT: 14.5 % (ref 11.5–14.5)
GLOBULIN SER CALC-MCNC: 5.8 G/DL (ref 2–4)
GLUCOSE BLD STRIP.AUTO-MCNC: 102 MG/DL (ref 65–117)
GLUCOSE BLD STRIP.AUTO-MCNC: 108 MG/DL (ref 65–117)
GLUCOSE BLD STRIP.AUTO-MCNC: 123 MG/DL (ref 65–117)
GLUCOSE SERPL-MCNC: 106 MG/DL (ref 65–100)
HCT VFR BLD AUTO: 24.6 % (ref 35–47)
HCT VFR BLD AUTO: 29.4 % (ref 35–47)
HGB BLD-MCNC: 8.3 G/DL (ref 11.5–16)
HGB BLD-MCNC: 9.4 G/DL (ref 11.5–16)
IMM GRANULOCYTES # BLD AUTO: 0.2 K/UL (ref 0–0.04)
IMM GRANULOCYTES NFR BLD AUTO: 1 % (ref 0–0.5)
INR PPP: 1.1 (ref 0.9–1.1)
LYMPHOCYTES # BLD: 1.1 K/UL (ref 0.8–3.5)
LYMPHOCYTES NFR BLD: 5 % (ref 12–49)
MCH RBC QN AUTO: 27.4 PG (ref 26–34)
MCH RBC QN AUTO: 28 PG (ref 26–34)
MCHC RBC AUTO-ENTMCNC: 32 G/DL (ref 30–36.5)
MCHC RBC AUTO-ENTMCNC: 33.7 G/DL (ref 30–36.5)
MCV RBC AUTO: 83.1 FL (ref 80–99)
MCV RBC AUTO: 85.7 FL (ref 80–99)
MONOCYTES # BLD: 2.2 K/UL (ref 0–1)
MONOCYTES NFR BLD: 10 % (ref 5–13)
NEUTS SEG # BLD: 17.8 K/UL (ref 1.8–8)
NEUTS SEG NFR BLD: 83 % (ref 32–75)
NRBC # BLD: 0 K/UL (ref 0–0.01)
NRBC # BLD: 0 K/UL (ref 0–0.01)
NRBC BLD-RTO: 0 PER 100 WBC
NRBC BLD-RTO: 0 PER 100 WBC
PHOSPHATE SERPL-MCNC: 7.1 MG/DL (ref 2.6–4.7)
PLATELET # BLD AUTO: 301 K/UL (ref 150–400)
PLATELET # BLD AUTO: 342 K/UL (ref 150–400)
PMV BLD AUTO: 9.3 FL (ref 8.9–12.9)
PMV BLD AUTO: 9.3 FL (ref 8.9–12.9)
POTASSIUM SERPL-SCNC: 3.7 MMOL/L (ref 3.5–5.1)
PROT SERPL-MCNC: 7.9 G/DL (ref 6.4–8.2)
PROTHROMBIN TIME: 11.1 SEC (ref 9–11.1)
RBC # BLD AUTO: 2.96 M/UL (ref 3.8–5.2)
RBC # BLD AUTO: 3.43 M/UL (ref 3.8–5.2)
RBC MORPH BLD: ABNORMAL
SERVICE CMNT-IMP: ABNORMAL
SERVICE CMNT-IMP: NORMAL
SERVICE CMNT-IMP: NORMAL
SODIUM SERPL-SCNC: 138 MMOL/L (ref 136–145)
T4 FREE SERPL-MCNC: 1.5 NG/DL (ref 0.8–1.5)
THERAPEUTIC RANGE: NORMAL SECS (ref 58–77)
UFH PPP CHRO-ACNC: 0.32 IU/ML
UFH PPP CHRO-ACNC: <0.1 IU/ML
WBC # BLD AUTO: 21.5 K/UL (ref 3.6–11)
WBC # BLD AUTO: 21.9 K/UL (ref 3.6–11)

## 2024-05-08 PROCEDURE — 6370000000 HC RX 637 (ALT 250 FOR IP): Performed by: INTERNAL MEDICINE

## 2024-05-08 PROCEDURE — 2060000000 HC ICU INTERMEDIATE R&B

## 2024-05-08 PROCEDURE — 80053 COMPREHEN METABOLIC PANEL: CPT

## 2024-05-08 PROCEDURE — 99232 SBSQ HOSP IP/OBS MODERATE 35: CPT | Performed by: INTERNAL MEDICINE

## 2024-05-08 PROCEDURE — 6360000002 HC RX W HCPCS: Performed by: INTERNAL MEDICINE

## 2024-05-08 PROCEDURE — 82962 GLUCOSE BLOOD TEST: CPT

## 2024-05-08 PROCEDURE — 94640 AIRWAY INHALATION TREATMENT: CPT

## 2024-05-08 PROCEDURE — 6360000002 HC RX W HCPCS: Performed by: SURGERY

## 2024-05-08 PROCEDURE — 2580000003 HC RX 258: Performed by: SURGERY

## 2024-05-08 PROCEDURE — 97116 GAIT TRAINING THERAPY: CPT

## 2024-05-08 PROCEDURE — 36415 COLL VENOUS BLD VENIPUNCTURE: CPT

## 2024-05-08 PROCEDURE — 84443 ASSAY THYROID STIM HORMONE: CPT

## 2024-05-08 PROCEDURE — 85730 THROMBOPLASTIN TIME PARTIAL: CPT

## 2024-05-08 PROCEDURE — 85025 COMPLETE CBC W/AUTO DIFF WBC: CPT

## 2024-05-08 PROCEDURE — 84100 ASSAY OF PHOSPHORUS: CPT

## 2024-05-08 PROCEDURE — 2580000003 HC RX 258: Performed by: INTERNAL MEDICINE

## 2024-05-08 PROCEDURE — 6370000000 HC RX 637 (ALT 250 FOR IP): Performed by: SURGERY

## 2024-05-08 PROCEDURE — 97530 THERAPEUTIC ACTIVITIES: CPT

## 2024-05-08 PROCEDURE — 90935 HEMODIALYSIS ONE EVALUATION: CPT

## 2024-05-08 PROCEDURE — 93005 ELECTROCARDIOGRAM TRACING: CPT | Performed by: INTERNAL MEDICINE

## 2024-05-08 PROCEDURE — 85520 HEPARIN ASSAY: CPT

## 2024-05-08 PROCEDURE — 6370000000 HC RX 637 (ALT 250 FOR IP)

## 2024-05-08 PROCEDURE — 85027 COMPLETE CBC AUTOMATED: CPT

## 2024-05-08 PROCEDURE — 93010 ELECTROCARDIOGRAM REPORT: CPT | Performed by: INTERNAL MEDICINE

## 2024-05-08 PROCEDURE — 85610 PROTHROMBIN TIME: CPT

## 2024-05-08 PROCEDURE — 2500000003 HC RX 250 WO HCPCS: Performed by: INTERNAL MEDICINE

## 2024-05-08 PROCEDURE — 97535 SELF CARE MNGMENT TRAINING: CPT

## 2024-05-08 PROCEDURE — 84439 ASSAY OF FREE THYROXINE: CPT

## 2024-05-08 PROCEDURE — 6360000004 HC RX CONTRAST MEDICATION: Performed by: RADIOLOGY

## 2024-05-08 PROCEDURE — 94760 N-INVAS EAR/PLS OXIMETRY 1: CPT

## 2024-05-08 PROCEDURE — 71275 CT ANGIOGRAPHY CHEST: CPT

## 2024-05-08 RX ORDER — HEPARIN SODIUM 1000 [USP'U]/ML
60 INJECTION, SOLUTION INTRAVENOUS; SUBCUTANEOUS PRN
Status: DISCONTINUED | OUTPATIENT
Start: 2024-05-08 | End: 2024-05-09

## 2024-05-08 RX ORDER — HEPARIN SODIUM 1000 [USP'U]/ML
30 INJECTION, SOLUTION INTRAVENOUS; SUBCUTANEOUS PRN
Status: DISCONTINUED | OUTPATIENT
Start: 2024-05-08 | End: 2024-05-09

## 2024-05-08 RX ORDER — HEPARIN SODIUM 1000 [USP'U]/ML
60 INJECTION, SOLUTION INTRAVENOUS; SUBCUTANEOUS ONCE
Status: COMPLETED | OUTPATIENT
Start: 2024-05-08 | End: 2024-05-08

## 2024-05-08 RX ORDER — HEPARIN SODIUM 10000 [USP'U]/100ML
5-30 INJECTION, SOLUTION INTRAVENOUS CONTINUOUS
Status: DISCONTINUED | OUTPATIENT
Start: 2024-05-08 | End: 2024-05-09

## 2024-05-08 RX ADMIN — ACETAMINOPHEN 650 MG: 325 TABLET ORAL at 01:26

## 2024-05-08 RX ADMIN — ISOSORBIDE MONONITRATE 30 MG: 30 TABLET, EXTENDED RELEASE ORAL at 08:55

## 2024-05-08 RX ADMIN — ACETYLCYSTEINE 600 MG: 200 INHALANT RESPIRATORY (INHALATION) at 07:42

## 2024-05-08 RX ADMIN — METOPROLOL SUCCINATE 100 MG: 50 TABLET, EXTENDED RELEASE ORAL at 20:15

## 2024-05-08 RX ADMIN — IOPAMIDOL 70 ML: 755 INJECTION, SOLUTION INTRAVENOUS at 16:37

## 2024-05-08 RX ADMIN — LAMOTRIGINE 25 MG: 25 TABLET ORAL at 20:15

## 2024-05-08 RX ADMIN — DEXTROSE AND SODIUM CHLORIDE: 5; 900 INJECTION, SOLUTION INTRAVENOUS at 12:03

## 2024-05-08 RX ADMIN — PHENOL 1 SPRAY: 1.5 LIQUID ORAL at 17:41

## 2024-05-08 RX ADMIN — GUAIFENESIN 600 MG: 600 TABLET, EXTENDED RELEASE ORAL at 08:55

## 2024-05-08 RX ADMIN — NIFEDIPINE 60 MG: 60 TABLET, EXTENDED RELEASE ORAL at 20:15

## 2024-05-08 RX ADMIN — GUAIFENESIN 600 MG: 600 TABLET, EXTENDED RELEASE ORAL at 20:15

## 2024-05-08 RX ADMIN — IPRATROPIUM BROMIDE AND ALBUTEROL SULFATE 1 DOSE: .5; 3 SOLUTION RESPIRATORY (INHALATION) at 19:35

## 2024-05-08 RX ADMIN — BUDESONIDE 250 MCG: 0.25 INHALANT RESPIRATORY (INHALATION) at 19:35

## 2024-05-08 RX ADMIN — ARFORMOTEROL TARTRATE 15 MCG: 15 SOLUTION RESPIRATORY (INHALATION) at 19:35

## 2024-05-08 RX ADMIN — ARFORMOTEROL TARTRATE 15 MCG: 15 SOLUTION RESPIRATORY (INHALATION) at 07:41

## 2024-05-08 RX ADMIN — OXYBUTYNIN CHLORIDE 10 MG: 5 TABLET, EXTENDED RELEASE ORAL at 20:15

## 2024-05-08 RX ADMIN — DILTIAZEM HYDROCHLORIDE 5 MG/HR: 5 INJECTION, SOLUTION INTRAVENOUS at 07:52

## 2024-05-08 RX ADMIN — ACETYLCYSTEINE 600 MG: 200 INHALANT RESPIRATORY (INHALATION) at 19:35

## 2024-05-08 RX ADMIN — HEPARIN SODIUM 3000 UNITS: 1000 INJECTION INTRAVENOUS; SUBCUTANEOUS at 11:34

## 2024-05-08 RX ADMIN — DILTIAZEM HYDROCHLORIDE 5 MG/HR: 5 INJECTION, SOLUTION INTRAVENOUS at 21:37

## 2024-05-08 RX ADMIN — SODIUM CHLORIDE, PRESERVATIVE FREE 10 ML: 5 INJECTION INTRAVENOUS at 08:58

## 2024-05-08 RX ADMIN — LAMOTRIGINE 25 MG: 25 TABLET ORAL at 08:55

## 2024-05-08 RX ADMIN — HEPARIN SODIUM 12 UNITS/KG/HR: 10000 INJECTION, SOLUTION INTRAVENOUS at 11:36

## 2024-05-08 RX ADMIN — IPRATROPIUM BROMIDE AND ALBUTEROL SULFATE 1 DOSE: .5; 3 SOLUTION RESPIRATORY (INHALATION) at 07:42

## 2024-05-08 RX ADMIN — PIPERACILLIN SODIUM AND TAZOBACTAM SODIUM 3375 MG: 3; .375 INJECTION, POWDER, LYOPHILIZED, FOR SOLUTION INTRAVENOUS at 14:09

## 2024-05-08 RX ADMIN — SPIRONOLACTONE 25 MG: 25 TABLET ORAL at 08:55

## 2024-05-08 RX ADMIN — PIPERACILLIN SODIUM AND TAZOBACTAM SODIUM 3375 MG: 3; .375 INJECTION, POWDER, LYOPHILIZED, FOR SOLUTION INTRAVENOUS at 01:32

## 2024-05-08 RX ADMIN — BUDESONIDE 250 MCG: 0.25 INHALANT RESPIRATORY (INHALATION) at 07:41

## 2024-05-08 RX ADMIN — PHENOL 1 SPRAY: 1.5 LIQUID ORAL at 01:27

## 2024-05-08 ASSESSMENT — PAIN DESCRIPTION - LOCATION: LOCATION: THROAT

## 2024-05-08 ASSESSMENT — PAIN SCALES - GENERAL
PAINLEVEL_OUTOF10: 3
PAINLEVEL_OUTOF10: 0
PAINLEVEL_OUTOF10: 1

## 2024-05-08 NOTE — CARE COORDINATION
Transition of Care Plan:    RUR: 21%  Prior Level of Functioning: independent  has help at home   Disposition: home with outpatient HD    Community HD Update: Myah from [facility] Admissions called [phone]) .  [facility] [place] [facility] [address] [facility] has an additional chair available for T/TH/SAT fisrt visit is 10:15am and remaining visits at 11:15am start time.  CM will discuss with patient/family.     12:27p  CM met with patient nd her daughter.  Patient has accepted this chair time and family will assist patient with transport to her new HD appts.     Follow up appointments:   DME needed: none  Transportation at discharge:   IM/IMM Medicare/ letter given: 5/6/24    Caregiver Contact: son/daughter  Discharge Caregiver contacted prior to discharge? yes  Care Conference needed?   Barriers to discharge:  medical stability.     MARIA INES Madsen, CRM  [phone]

## 2024-05-09 LAB
ANION GAP SERPL CALC-SCNC: 9 MMOL/L (ref 5–15)
BASOPHILS # BLD: 0.2 K/UL (ref 0–0.1)
BASOPHILS NFR BLD: 1 % (ref 0–1)
BUN SERPL-MCNC: 40 MG/DL (ref 6–20)
BUN/CREAT SERPL: 7 (ref 12–20)
CALCIUM SERPL-MCNC: 7.9 MG/DL (ref 8.5–10.1)
CHLORIDE SERPL-SCNC: 102 MMOL/L (ref 97–108)
CO2 SERPL-SCNC: 25 MMOL/L (ref 21–32)
COMMENT:: NORMAL
CREAT SERPL-MCNC: 6.05 MG/DL (ref 0.55–1.02)
DIFFERENTIAL METHOD BLD: ABNORMAL
EOSINOPHIL # BLD: 0.4 K/UL (ref 0–0.4)
EOSINOPHIL NFR BLD: 2 % (ref 0–7)
ERYTHROCYTE [DISTWIDTH] IN BLOOD BY AUTOMATED COUNT: 14.6 % (ref 11.5–14.5)
GLUCOSE SERPL-MCNC: 126 MG/DL (ref 65–100)
HCT VFR BLD AUTO: 24.8 % (ref 35–47)
HGB BLD-MCNC: 7.5 G/DL (ref 11.5–16)
IMM GRANULOCYTES # BLD AUTO: 0.2 K/UL (ref 0–0.04)
IMM GRANULOCYTES NFR BLD AUTO: 1 % (ref 0–0.5)
LYMPHOCYTES # BLD: 1.5 K/UL (ref 0.8–3.5)
LYMPHOCYTES NFR BLD: 7 % (ref 12–49)
MCH RBC QN AUTO: 26.9 PG (ref 26–34)
MCHC RBC AUTO-ENTMCNC: 30.2 G/DL (ref 30–36.5)
MCV RBC AUTO: 88.9 FL (ref 80–99)
MONOCYTES # BLD: 2.5 K/UL (ref 0–1)
MONOCYTES NFR BLD: 12 % (ref 5–13)
NEUTS SEG # BLD: 16.2 K/UL (ref 1.8–8)
NEUTS SEG NFR BLD: 77 % (ref 32–75)
NRBC # BLD: 0 K/UL (ref 0–0.01)
NRBC BLD-RTO: 0 PER 100 WBC
PLATELET # BLD AUTO: 322 K/UL (ref 150–400)
PMV BLD AUTO: 10 FL (ref 8.9–12.9)
POTASSIUM SERPL-SCNC: 4.1 MMOL/L (ref 3.5–5.1)
RBC # BLD AUTO: 2.79 M/UL (ref 3.8–5.2)
RBC MORPH BLD: ABNORMAL
RBC MORPH BLD: ABNORMAL
SODIUM SERPL-SCNC: 136 MMOL/L (ref 136–145)
SPECIMEN HOLD: NORMAL
UFH PPP CHRO-ACNC: 0.13 IU/ML
UFH PPP CHRO-ACNC: 0.29 IU/ML
WBC # BLD AUTO: 21 K/UL (ref 3.6–11)

## 2024-05-09 PROCEDURE — 6360000002 HC RX W HCPCS: Performed by: INTERNAL MEDICINE

## 2024-05-09 PROCEDURE — 85025 COMPLETE CBC W/AUTO DIFF WBC: CPT

## 2024-05-09 PROCEDURE — 2580000003 HC RX 258: Performed by: SURGERY

## 2024-05-09 PROCEDURE — 6370000000 HC RX 637 (ALT 250 FOR IP): Performed by: INTERNAL MEDICINE

## 2024-05-09 PROCEDURE — 6370000000 HC RX 637 (ALT 250 FOR IP): Performed by: SURGERY

## 2024-05-09 PROCEDURE — 2060000000 HC ICU INTERMEDIATE R&B

## 2024-05-09 PROCEDURE — 6360000002 HC RX W HCPCS: Performed by: SURGERY

## 2024-05-09 PROCEDURE — 94640 AIRWAY INHALATION TREATMENT: CPT

## 2024-05-09 PROCEDURE — 97116 GAIT TRAINING THERAPY: CPT

## 2024-05-09 PROCEDURE — 6370000000 HC RX 637 (ALT 250 FOR IP)

## 2024-05-09 PROCEDURE — C9113 INJ PANTOPRAZOLE SODIUM, VIA: HCPCS

## 2024-05-09 PROCEDURE — 6360000002 HC RX W HCPCS

## 2024-05-09 PROCEDURE — 2580000003 HC RX 258: Performed by: INTERNAL MEDICINE

## 2024-05-09 PROCEDURE — 36415 COLL VENOUS BLD VENIPUNCTURE: CPT

## 2024-05-09 PROCEDURE — 80048 BASIC METABOLIC PNL TOTAL CA: CPT

## 2024-05-09 PROCEDURE — 2580000003 HC RX 258

## 2024-05-09 PROCEDURE — A4216 STERILE WATER/SALINE, 10 ML: HCPCS

## 2024-05-09 PROCEDURE — 99232 SBSQ HOSP IP/OBS MODERATE 35: CPT | Performed by: INTERNAL MEDICINE

## 2024-05-09 PROCEDURE — 85520 HEPARIN ASSAY: CPT

## 2024-05-09 RX ORDER — DILTIAZEM HYDROCHLORIDE 120 MG/1
120 CAPSULE, COATED, EXTENDED RELEASE ORAL DAILY
Status: DISCONTINUED | OUTPATIENT
Start: 2024-05-09 | End: 2024-05-09

## 2024-05-09 RX ORDER — DILTIAZEM HYDROCHLORIDE 60 MG/1
60 TABLET, FILM COATED ORAL EVERY 6 HOURS SCHEDULED
Status: DISCONTINUED | OUTPATIENT
Start: 2024-05-09 | End: 2024-05-17

## 2024-05-09 RX ADMIN — GUAIFENESIN 600 MG: 600 TABLET, EXTENDED RELEASE ORAL at 10:16

## 2024-05-09 RX ADMIN — LAMOTRIGINE 25 MG: 25 TABLET ORAL at 21:57

## 2024-05-09 RX ADMIN — SODIUM CHLORIDE 100 MG: 9 INJECTION, SOLUTION INTRAVENOUS at 12:31

## 2024-05-09 RX ADMIN — SODIUM CHLORIDE, PRESERVATIVE FREE 10 ML: 5 INJECTION INTRAVENOUS at 10:16

## 2024-05-09 RX ADMIN — HEPARIN SODIUM 1500 UNITS: 1000 INJECTION INTRAVENOUS; SUBCUTANEOUS at 02:39

## 2024-05-09 RX ADMIN — METOPROLOL SUCCINATE 100 MG: 50 TABLET, EXTENDED RELEASE ORAL at 21:58

## 2024-05-09 RX ADMIN — PIPERACILLIN SODIUM AND TAZOBACTAM SODIUM 3375 MG: 3; .375 INJECTION, POWDER, LYOPHILIZED, FOR SOLUTION INTRAVENOUS at 14:15

## 2024-05-09 RX ADMIN — BUDESONIDE 250 MCG: 0.25 INHALANT RESPIRATORY (INHALATION) at 20:43

## 2024-05-09 RX ADMIN — DILTIAZEM HYDROCHLORIDE 60 MG: 60 TABLET, FILM COATED ORAL at 14:11

## 2024-05-09 RX ADMIN — IPRATROPIUM BROMIDE AND ALBUTEROL SULFATE 1 DOSE: .5; 3 SOLUTION RESPIRATORY (INHALATION) at 08:57

## 2024-05-09 RX ADMIN — ACETYLCYSTEINE 600 MG: 200 INHALANT RESPIRATORY (INHALATION) at 08:57

## 2024-05-09 RX ADMIN — GUAIFENESIN 600 MG: 600 TABLET, EXTENDED RELEASE ORAL at 21:57

## 2024-05-09 RX ADMIN — ACETYLCYSTEINE 600 MG: 200 INHALANT RESPIRATORY (INHALATION) at 20:43

## 2024-05-09 RX ADMIN — PIPERACILLIN SODIUM AND TAZOBACTAM SODIUM 3375 MG: 3; .375 INJECTION, POWDER, LYOPHILIZED, FOR SOLUTION INTRAVENOUS at 02:37

## 2024-05-09 RX ADMIN — OXYBUTYNIN CHLORIDE 10 MG: 5 TABLET, EXTENDED RELEASE ORAL at 21:58

## 2024-05-09 RX ADMIN — ISOSORBIDE MONONITRATE 30 MG: 30 TABLET, EXTENDED RELEASE ORAL at 10:16

## 2024-05-09 RX ADMIN — HEPARIN SODIUM 1500 UNITS: 1000 INJECTION INTRAVENOUS; SUBCUTANEOUS at 12:04

## 2024-05-09 RX ADMIN — ARFORMOTEROL TARTRATE 15 MCG: 15 SOLUTION RESPIRATORY (INHALATION) at 08:57

## 2024-05-09 RX ADMIN — PROCHLORPERAZINE EDISYLATE 10 MG: 5 INJECTION INTRAMUSCULAR; INTRAVENOUS at 17:20

## 2024-05-09 RX ADMIN — LAMOTRIGINE 25 MG: 25 TABLET ORAL at 10:16

## 2024-05-09 RX ADMIN — BUDESONIDE 250 MCG: 0.25 INHALANT RESPIRATORY (INHALATION) at 08:57

## 2024-05-09 RX ADMIN — DILTIAZEM HYDROCHLORIDE 60 MG: 60 TABLET, FILM COATED ORAL at 18:29

## 2024-05-09 RX ADMIN — DEXTROSE AND SODIUM CHLORIDE: 5; 900 INJECTION, SOLUTION INTRAVENOUS at 21:49

## 2024-05-09 RX ADMIN — PANTOPRAZOLE SODIUM 40 MG: 40 INJECTION, POWDER, FOR SOLUTION INTRAVENOUS at 14:09

## 2024-05-09 RX ADMIN — ARFORMOTEROL TARTRATE 15 MCG: 15 SOLUTION RESPIRATORY (INHALATION) at 20:43

## 2024-05-09 RX ADMIN — SPIRONOLACTONE 25 MG: 25 TABLET ORAL at 10:16

## 2024-05-09 NOTE — CARE COORDINATION
Transition of Care Plan:      UPDATE:  CM received call from facility liaison (Maria Guadalupe)  [facility] has approved patient for an HD chair and patient approved for admission to the facility.     RUR: 21%  Prior Level of Functioning:   Disposition: d/c to [facility] with  chair available at the Dialysis Den    If SNF or IPR: Date FOC offered: 5/8/24  Date FOC received: 5/8/24  Accepting facility: [facility]  Date authorization started with reference number:   Date authorization received and expires:   Follow up appointments:   DME needed: none    Transportation at discharge: stretcher vs BLS    IM/IMM Medicare/ letter given: 5/6/24    Caregiver Contact: son/dter  Discharge Caregiver contacted prior to discharge? Yes dter (Bob)  Care Conference needed? no  Barriers to discharge:  medical stability    CM continuing to follow.    Sabina Mena, BSW, CRM  [phone]

## 2024-05-10 LAB
ANION GAP SERPL CALC-SCNC: 8 MMOL/L (ref 5–15)
BACTERIA SPEC CULT: NORMAL
BACTERIA SPEC CULT: NORMAL
BASOPHILS # BLD: 0.1 K/UL (ref 0–0.1)
BASOPHILS NFR BLD: 1 % (ref 0–1)
BUN SERPL-MCNC: 47 MG/DL (ref 6–20)
BUN/CREAT SERPL: 6 (ref 12–20)
CALCIUM SERPL-MCNC: 7.9 MG/DL (ref 8.5–10.1)
CHLORIDE SERPL-SCNC: 105 MMOL/L (ref 97–108)
CO2 SERPL-SCNC: 25 MMOL/L (ref 21–32)
CREAT SERPL-MCNC: 7.84 MG/DL (ref 0.55–1.02)
DIFFERENTIAL METHOD BLD: ABNORMAL
EOSINOPHIL # BLD: 0.5 K/UL (ref 0–0.4)
EOSINOPHIL NFR BLD: 3 % (ref 0–7)
ERYTHROCYTE [DISTWIDTH] IN BLOOD BY AUTOMATED COUNT: 14.6 % (ref 11.5–14.5)
GLUCOSE BLD STRIP.AUTO-MCNC: 116 MG/DL (ref 65–117)
GLUCOSE SERPL-MCNC: 102 MG/DL (ref 65–100)
HCT VFR BLD AUTO: 22.1 % (ref 35–47)
HGB BLD-MCNC: 7 G/DL (ref 11.5–16)
IMM GRANULOCYTES # BLD AUTO: 0.3 K/UL (ref 0–0.04)
IMM GRANULOCYTES NFR BLD AUTO: 2 % (ref 0–0.5)
LYMPHOCYTES # BLD: 1.7 K/UL (ref 0.8–3.5)
LYMPHOCYTES NFR BLD: 9 % (ref 12–49)
MCH RBC QN AUTO: 27.6 PG (ref 26–34)
MCHC RBC AUTO-ENTMCNC: 31.7 G/DL (ref 30–36.5)
MCV RBC AUTO: 87 FL (ref 80–99)
MONOCYTES # BLD: 1.9 K/UL (ref 0–1)
MONOCYTES NFR BLD: 10 % (ref 5–13)
NEUTS SEG # BLD: 13.9 K/UL (ref 1.8–8)
NEUTS SEG NFR BLD: 75 % (ref 32–75)
NRBC # BLD: 0 K/UL (ref 0–0.01)
NRBC BLD-RTO: 0 PER 100 WBC
PLATELET # BLD AUTO: 296 K/UL (ref 150–400)
PMV BLD AUTO: 9.1 FL (ref 8.9–12.9)
POTASSIUM SERPL-SCNC: 3.6 MMOL/L (ref 3.5–5.1)
RBC # BLD AUTO: 2.54 M/UL (ref 3.8–5.2)
SERVICE CMNT-IMP: NORMAL
SODIUM SERPL-SCNC: 138 MMOL/L (ref 136–145)
WBC # BLD AUTO: 18.4 K/UL (ref 3.6–11)

## 2024-05-10 PROCEDURE — 2580000003 HC RX 258: Performed by: INTERNAL MEDICINE

## 2024-05-10 PROCEDURE — 6370000000 HC RX 637 (ALT 250 FOR IP): Performed by: SURGERY

## 2024-05-10 PROCEDURE — 6360000002 HC RX W HCPCS: Performed by: INTERNAL MEDICINE

## 2024-05-10 PROCEDURE — 6370000000 HC RX 637 (ALT 250 FOR IP): Performed by: INTERNAL MEDICINE

## 2024-05-10 PROCEDURE — 2580000003 HC RX 258: Performed by: SURGERY

## 2024-05-10 PROCEDURE — 90945 DIALYSIS ONE EVALUATION: CPT

## 2024-05-10 PROCEDURE — 3E1M39Z IRRIGATION OF PERITONEAL CAVITY USING DIALYSATE, PERCUTANEOUS APPROACH: ICD-10-PCS | Performed by: INTERNAL MEDICINE

## 2024-05-10 PROCEDURE — 82962 GLUCOSE BLOOD TEST: CPT

## 2024-05-10 PROCEDURE — 99232 SBSQ HOSP IP/OBS MODERATE 35: CPT | Performed by: INTERNAL MEDICINE

## 2024-05-10 PROCEDURE — 80048 BASIC METABOLIC PNL TOTAL CA: CPT

## 2024-05-10 PROCEDURE — 85025 COMPLETE CBC W/AUTO DIFF WBC: CPT

## 2024-05-10 PROCEDURE — 6370000000 HC RX 637 (ALT 250 FOR IP)

## 2024-05-10 PROCEDURE — 90935 HEMODIALYSIS ONE EVALUATION: CPT

## 2024-05-10 PROCEDURE — 36415 COLL VENOUS BLD VENIPUNCTURE: CPT

## 2024-05-10 PROCEDURE — 94640 AIRWAY INHALATION TREATMENT: CPT

## 2024-05-10 PROCEDURE — 6360000002 HC RX W HCPCS: Performed by: SURGERY

## 2024-05-10 PROCEDURE — 2060000000 HC ICU INTERMEDIATE R&B

## 2024-05-10 RX ORDER — GENTAMICIN SULFATE 1 MG/G
CREAM TOPICAL PRN
Status: DISCONTINUED | OUTPATIENT
Start: 2024-05-10 | End: 2024-05-10 | Stop reason: CLARIF

## 2024-05-10 RX ORDER — SODIUM CHLORIDE, SODIUM LACTATE, CALCIUM CHLORIDE, MAGNESIUM CHLORIDE AND DEXTROSE 1.5; 538; 448; 18.3; 5.08 G/100ML; MG/100ML; MG/100ML; MG/100ML; MG/100ML
2000 INJECTION, SOLUTION INTRAPERITONEAL WEEKLY
Status: DISCONTINUED | OUTPATIENT
Start: 2024-05-10 | End: 2024-05-14

## 2024-05-10 RX ORDER — GENTAMICIN SULFATE 1 MG/G
OINTMENT TOPICAL PRN
Status: DISCONTINUED | OUTPATIENT
Start: 2024-05-10 | End: 2024-05-23 | Stop reason: DRUGHIGH

## 2024-05-10 RX ADMIN — DILTIAZEM HYDROCHLORIDE 60 MG: 60 TABLET, FILM COATED ORAL at 18:27

## 2024-05-10 RX ADMIN — SODIUM CHLORIDE, SODIUM LACTATE, CALCIUM CHLORIDE, MAGNESIUM CHLORIDE AND DEXTROSE 2000 ML: 1.5; 538; 448; 18.3; 5.08 INJECTION, SOLUTION INTRAPERITONEAL at 19:12

## 2024-05-10 RX ADMIN — METOPROLOL SUCCINATE 100 MG: 50 TABLET, EXTENDED RELEASE ORAL at 21:39

## 2024-05-10 RX ADMIN — SODIUM CHLORIDE, PRESERVATIVE FREE 10 ML: 5 INJECTION INTRAVENOUS at 21:48

## 2024-05-10 RX ADMIN — DEXTROSE AND SODIUM CHLORIDE: 5; 900 INJECTION, SOLUTION INTRAVENOUS at 16:49

## 2024-05-10 RX ADMIN — BUDESONIDE 250 MCG: 0.25 INHALANT RESPIRATORY (INHALATION) at 07:06

## 2024-05-10 RX ADMIN — DILTIAZEM HYDROCHLORIDE 60 MG: 60 TABLET, FILM COATED ORAL at 11:50

## 2024-05-10 RX ADMIN — DILTIAZEM HYDROCHLORIDE 60 MG: 60 TABLET, FILM COATED ORAL at 06:50

## 2024-05-10 RX ADMIN — SODIUM CHLORIDE, PRESERVATIVE FREE 10 ML: 5 INJECTION INTRAVENOUS at 11:49

## 2024-05-10 RX ADMIN — ALBUTEROL SULFATE 1 PUFF: 90 AEROSOL, METERED RESPIRATORY (INHALATION) at 21:38

## 2024-05-10 RX ADMIN — DILTIAZEM HYDROCHLORIDE 60 MG: 60 TABLET, FILM COATED ORAL at 00:49

## 2024-05-10 RX ADMIN — EPOETIN ALFA-EPBX 10000 UNITS: 10000 INJECTION, SOLUTION INTRAVENOUS; SUBCUTANEOUS at 18:28

## 2024-05-10 RX ADMIN — ACETYLCYSTEINE 600 MG: 200 INHALANT RESPIRATORY (INHALATION) at 07:06

## 2024-05-10 RX ADMIN — PIPERACILLIN SODIUM AND TAZOBACTAM SODIUM 3375 MG: 3; .375 INJECTION, POWDER, LYOPHILIZED, FOR SOLUTION INTRAVENOUS at 02:48

## 2024-05-10 RX ADMIN — IPRATROPIUM BROMIDE AND ALBUTEROL SULFATE 1 DOSE: .5; 3 SOLUTION RESPIRATORY (INHALATION) at 07:06

## 2024-05-10 RX ADMIN — GENTAMICIN SULFATE: 1 OINTMENT TOPICAL at 19:13

## 2024-05-10 RX ADMIN — GUAIFENESIN 600 MG: 600 TABLET, EXTENDED RELEASE ORAL at 21:39

## 2024-05-10 RX ADMIN — ARFORMOTEROL TARTRATE 15 MCG: 15 SOLUTION RESPIRATORY (INHALATION) at 07:06

## 2024-05-10 RX ADMIN — OXYBUTYNIN CHLORIDE 10 MG: 5 TABLET, EXTENDED RELEASE ORAL at 21:39

## 2024-05-10 RX ADMIN — LAMOTRIGINE 25 MG: 25 TABLET ORAL at 21:39

## 2024-05-10 RX ADMIN — FLUTICASONE PROPIONATE AND SALMETEROL 1 PUFF: 250; 50 POWDER RESPIRATORY (INHALATION) at 21:38

## 2024-05-11 LAB
ANION GAP SERPL CALC-SCNC: 6 MMOL/L (ref 5–15)
BASOPHILS # BLD: 0.4 K/UL (ref 0–0.1)
BASOPHILS NFR BLD: 2 % (ref 0–1)
BUN SERPL-MCNC: 22 MG/DL (ref 6–20)
BUN/CREAT SERPL: 4 (ref 12–20)
CALCIUM SERPL-MCNC: 8.7 MG/DL (ref 8.5–10.1)
CHLORIDE SERPL-SCNC: 105 MMOL/L (ref 97–108)
CO2 SERPL-SCNC: 27 MMOL/L (ref 21–32)
CREAT SERPL-MCNC: 5.08 MG/DL (ref 0.55–1.02)
DIFFERENTIAL METHOD BLD: ABNORMAL
EOSINOPHIL # BLD: 1.3 K/UL (ref 0–0.4)
EOSINOPHIL NFR BLD: 6 % (ref 0–7)
ERYTHROCYTE [DISTWIDTH] IN BLOOD BY AUTOMATED COUNT: 14.6 % (ref 11.5–14.5)
GLUCOSE SERPL-MCNC: 98 MG/DL (ref 65–100)
HCT VFR BLD AUTO: 24.2 % (ref 35–47)
HGB BLD-MCNC: 8.1 G/DL (ref 11.5–16)
IMM GRANULOCYTES # BLD AUTO: 0 K/UL
IMM GRANULOCYTES NFR BLD AUTO: 0 %
LYMPHOCYTES # BLD: 1.8 K/UL (ref 0.8–3.5)
LYMPHOCYTES NFR BLD: 8 % (ref 12–49)
MAGNESIUM SERPL-MCNC: 2 MG/DL (ref 1.6–2.4)
MCH RBC QN AUTO: 28.1 PG (ref 26–34)
MCHC RBC AUTO-ENTMCNC: 33.5 G/DL (ref 30–36.5)
MCV RBC AUTO: 84 FL (ref 80–99)
METAMYELOCYTES NFR BLD MANUAL: 1 %
MONOCYTES # BLD: 2 K/UL (ref 0–1)
MONOCYTES NFR BLD: 9 % (ref 5–13)
MYELOCYTES NFR BLD MANUAL: 1 %
NEUTS SEG # BLD: 16.1 K/UL (ref 1.8–8)
NEUTS SEG NFR BLD: 73 % (ref 32–75)
NRBC # BLD: 0.05 K/UL (ref 0–0.01)
NRBC BLD-RTO: 0.2 PER 100 WBC
PLATELET # BLD AUTO: 341 K/UL (ref 150–400)
PMV BLD AUTO: 8.9 FL (ref 8.9–12.9)
POTASSIUM SERPL-SCNC: 3.2 MMOL/L (ref 3.5–5.1)
RBC # BLD AUTO: 2.88 M/UL (ref 3.8–5.2)
RBC MORPH BLD: ABNORMAL
SODIUM SERPL-SCNC: 138 MMOL/L (ref 136–145)
WBC # BLD AUTO: 22 K/UL (ref 3.6–11)

## 2024-05-11 PROCEDURE — 99232 SBSQ HOSP IP/OBS MODERATE 35: CPT | Performed by: INTERNAL MEDICINE

## 2024-05-11 PROCEDURE — 6360000002 HC RX W HCPCS: Performed by: INTERNAL MEDICINE

## 2024-05-11 PROCEDURE — 6370000000 HC RX 637 (ALT 250 FOR IP): Performed by: INTERNAL MEDICINE

## 2024-05-11 PROCEDURE — 80048 BASIC METABOLIC PNL TOTAL CA: CPT

## 2024-05-11 PROCEDURE — 6370000000 HC RX 637 (ALT 250 FOR IP)

## 2024-05-11 PROCEDURE — 2580000003 HC RX 258: Performed by: INTERNAL MEDICINE

## 2024-05-11 PROCEDURE — 85025 COMPLETE CBC W/AUTO DIFF WBC: CPT

## 2024-05-11 PROCEDURE — 2060000000 HC ICU INTERMEDIATE R&B

## 2024-05-11 PROCEDURE — A4216 STERILE WATER/SALINE, 10 ML: HCPCS

## 2024-05-11 PROCEDURE — 6360000002 HC RX W HCPCS

## 2024-05-11 PROCEDURE — 94640 AIRWAY INHALATION TREATMENT: CPT

## 2024-05-11 PROCEDURE — 6370000000 HC RX 637 (ALT 250 FOR IP): Performed by: SURGERY

## 2024-05-11 PROCEDURE — 2580000003 HC RX 258

## 2024-05-11 PROCEDURE — 36415 COLL VENOUS BLD VENIPUNCTURE: CPT

## 2024-05-11 PROCEDURE — 83735 ASSAY OF MAGNESIUM: CPT

## 2024-05-11 PROCEDURE — C9113 INJ PANTOPRAZOLE SODIUM, VIA: HCPCS

## 2024-05-11 RX ADMIN — ISOSORBIDE MONONITRATE 30 MG: 30 TABLET, EXTENDED RELEASE ORAL at 08:59

## 2024-05-11 RX ADMIN — SODIUM CHLORIDE, PRESERVATIVE FREE 10 ML: 5 INJECTION INTRAVENOUS at 22:11

## 2024-05-11 RX ADMIN — DILTIAZEM HYDROCHLORIDE 60 MG: 60 TABLET, FILM COATED ORAL at 18:43

## 2024-05-11 RX ADMIN — FLUTICASONE PROPIONATE AND SALMETEROL 1 PUFF: 250; 50 POWDER RESPIRATORY (INHALATION) at 22:10

## 2024-05-11 RX ADMIN — LAMOTRIGINE 25 MG: 25 TABLET ORAL at 22:09

## 2024-05-11 RX ADMIN — ALBUTEROL SULFATE 1 PUFF: 90 AEROSOL, METERED RESPIRATORY (INHALATION) at 22:10

## 2024-05-11 RX ADMIN — ARFORMOTEROL TARTRATE 15 MCG: 15 SOLUTION RESPIRATORY (INHALATION) at 20:03

## 2024-05-11 RX ADMIN — BUDESONIDE 250 MCG: 0.25 INHALANT RESPIRATORY (INHALATION) at 20:03

## 2024-05-11 RX ADMIN — GUAIFENESIN 600 MG: 600 TABLET, EXTENDED RELEASE ORAL at 22:10

## 2024-05-11 RX ADMIN — DILTIAZEM HYDROCHLORIDE 60 MG: 60 TABLET, FILM COATED ORAL at 06:35

## 2024-05-11 RX ADMIN — ACETYLCYSTEINE 600 MG: 200 INHALANT RESPIRATORY (INHALATION) at 20:03

## 2024-05-11 RX ADMIN — PANTOPRAZOLE SODIUM 40 MG: 40 INJECTION, POWDER, FOR SOLUTION INTRAVENOUS at 08:59

## 2024-05-11 RX ADMIN — BUDESONIDE 250 MCG: 0.25 INHALANT RESPIRATORY (INHALATION) at 07:30

## 2024-05-11 RX ADMIN — LAMOTRIGINE 25 MG: 25 TABLET ORAL at 08:59

## 2024-05-11 RX ADMIN — DILTIAZEM HYDROCHLORIDE 60 MG: 60 TABLET, FILM COATED ORAL at 00:41

## 2024-05-11 RX ADMIN — OXYBUTYNIN CHLORIDE 10 MG: 5 TABLET, EXTENDED RELEASE ORAL at 22:09

## 2024-05-11 RX ADMIN — ACETYLCYSTEINE 600 MG: 200 INHALANT RESPIRATORY (INHALATION) at 07:31

## 2024-05-11 RX ADMIN — DILTIAZEM HYDROCHLORIDE 60 MG: 60 TABLET, FILM COATED ORAL at 14:34

## 2024-05-11 RX ADMIN — GUAIFENESIN 600 MG: 600 TABLET, EXTENDED RELEASE ORAL at 08:59

## 2024-05-11 RX ADMIN — ARFORMOTEROL TARTRATE 15 MCG: 15 SOLUTION RESPIRATORY (INHALATION) at 07:30

## 2024-05-11 RX ADMIN — METOPROLOL SUCCINATE 100 MG: 50 TABLET, EXTENDED RELEASE ORAL at 22:09

## 2024-05-11 RX ADMIN — SPIRONOLACTONE 25 MG: 25 TABLET ORAL at 08:59

## 2024-05-12 LAB
BASOPHILS # BLD: 0.2 K/UL (ref 0–0.1)
BASOPHILS NFR BLD: 1 % (ref 0–1)
DIFFERENTIAL METHOD BLD: ABNORMAL
EOSINOPHIL # BLD: 1.9 K/UL (ref 0–0.4)
EOSINOPHIL NFR BLD: 9 % (ref 0–7)
ERYTHROCYTE [DISTWIDTH] IN BLOOD BY AUTOMATED COUNT: 14.7 % (ref 11.5–14.5)
HCT VFR BLD AUTO: 21.5 % (ref 35–47)
HGB BLD-MCNC: 6.9 G/DL (ref 11.5–16)
IMM GRANULOCYTES # BLD AUTO: 0.4 K/UL (ref 0–0.04)
IMM GRANULOCYTES NFR BLD AUTO: 2 % (ref 0–0.5)
LYMPHOCYTES # BLD: 2.1 K/UL (ref 0.8–3.5)
LYMPHOCYTES NFR BLD: 10 % (ref 12–49)
MCH RBC QN AUTO: 27.5 PG (ref 26–34)
MCHC RBC AUTO-ENTMCNC: 32.1 G/DL (ref 30–36.5)
MCV RBC AUTO: 85.7 FL (ref 80–99)
MONOCYTES # BLD: 2.3 K/UL (ref 0–1)
MONOCYTES NFR BLD: 11 % (ref 5–13)
NEUTS SEG # BLD: 14 K/UL (ref 1.8–8)
NEUTS SEG NFR BLD: 67 % (ref 32–75)
NRBC # BLD: 0.03 K/UL (ref 0–0.01)
NRBC BLD-RTO: 0.1 PER 100 WBC
PLATELET # BLD AUTO: 370 K/UL (ref 150–400)
PMV BLD AUTO: 9.3 FL (ref 8.9–12.9)
RBC # BLD AUTO: 2.51 M/UL (ref 3.8–5.2)
RBC MORPH BLD: ABNORMAL
UFH PPP CHRO-ACNC: <0.1 IU/ML
WBC # BLD AUTO: 20.9 K/UL (ref 3.6–11)

## 2024-05-12 PROCEDURE — 6370000000 HC RX 637 (ALT 250 FOR IP): Performed by: SURGERY

## 2024-05-12 PROCEDURE — 85520 HEPARIN ASSAY: CPT

## 2024-05-12 PROCEDURE — 6360000002 HC RX W HCPCS: Performed by: SURGERY

## 2024-05-12 PROCEDURE — 36415 COLL VENOUS BLD VENIPUNCTURE: CPT

## 2024-05-12 PROCEDURE — 2580000003 HC RX 258: Performed by: INTERNAL MEDICINE

## 2024-05-12 PROCEDURE — 99232 SBSQ HOSP IP/OBS MODERATE 35: CPT | Performed by: INTERNAL MEDICINE

## 2024-05-12 PROCEDURE — A4216 STERILE WATER/SALINE, 10 ML: HCPCS

## 2024-05-12 PROCEDURE — 6360000002 HC RX W HCPCS: Performed by: INTERNAL MEDICINE

## 2024-05-12 PROCEDURE — 94640 AIRWAY INHALATION TREATMENT: CPT

## 2024-05-12 PROCEDURE — 6360000002 HC RX W HCPCS

## 2024-05-12 PROCEDURE — C9113 INJ PANTOPRAZOLE SODIUM, VIA: HCPCS

## 2024-05-12 PROCEDURE — 6370000000 HC RX 637 (ALT 250 FOR IP)

## 2024-05-12 PROCEDURE — 2060000000 HC ICU INTERMEDIATE R&B

## 2024-05-12 PROCEDURE — 6370000000 HC RX 637 (ALT 250 FOR IP): Performed by: INTERNAL MEDICINE

## 2024-05-12 PROCEDURE — 2580000003 HC RX 258: Performed by: SURGERY

## 2024-05-12 PROCEDURE — 2580000003 HC RX 258

## 2024-05-12 PROCEDURE — 85025 COMPLETE CBC W/AUTO DIFF WBC: CPT

## 2024-05-12 RX ADMIN — ACETYLCYSTEINE 600 MG: 200 INHALANT RESPIRATORY (INHALATION) at 20:48

## 2024-05-12 RX ADMIN — LAMOTRIGINE 25 MG: 25 TABLET ORAL at 21:47

## 2024-05-12 RX ADMIN — ARFORMOTEROL TARTRATE 15 MCG: 15 SOLUTION RESPIRATORY (INHALATION) at 07:18

## 2024-05-12 RX ADMIN — FLUTICASONE PROPIONATE AND SALMETEROL 1 PUFF: 250; 50 POWDER RESPIRATORY (INHALATION) at 08:37

## 2024-05-12 RX ADMIN — BUDESONIDE 250 MCG: 0.25 INHALANT RESPIRATORY (INHALATION) at 20:49

## 2024-05-12 RX ADMIN — DILTIAZEM HYDROCHLORIDE 60 MG: 60 TABLET, FILM COATED ORAL at 16:37

## 2024-05-12 RX ADMIN — SODIUM CHLORIDE, PRESERVATIVE FREE 10 ML: 5 INJECTION INTRAVENOUS at 21:49

## 2024-05-12 RX ADMIN — DILTIAZEM HYDROCHLORIDE 60 MG: 60 TABLET, FILM COATED ORAL at 11:09

## 2024-05-12 RX ADMIN — BUDESONIDE 250 MCG: 0.25 INHALANT RESPIRATORY (INHALATION) at 07:19

## 2024-05-12 RX ADMIN — DILTIAZEM HYDROCHLORIDE 60 MG: 60 TABLET, FILM COATED ORAL at 08:01

## 2024-05-12 RX ADMIN — DILTIAZEM HYDROCHLORIDE 60 MG: 60 TABLET, FILM COATED ORAL at 01:21

## 2024-05-12 RX ADMIN — ACETYLCYSTEINE 600 MG: 200 INHALANT RESPIRATORY (INHALATION) at 07:19

## 2024-05-12 RX ADMIN — SPIRONOLACTONE 25 MG: 25 TABLET ORAL at 11:01

## 2024-05-12 RX ADMIN — PANTOPRAZOLE SODIUM 40 MG: 40 INJECTION, POWDER, FOR SOLUTION INTRAVENOUS at 10:59

## 2024-05-12 RX ADMIN — LAMOTRIGINE 25 MG: 25 TABLET ORAL at 11:01

## 2024-05-12 RX ADMIN — ALBUTEROL SULFATE 1 PUFF: 90 AEROSOL, METERED RESPIRATORY (INHALATION) at 21:48

## 2024-05-12 RX ADMIN — SODIUM CHLORIDE, PRESERVATIVE FREE 10 ML: 5 INJECTION INTRAVENOUS at 11:05

## 2024-05-12 RX ADMIN — METOPROLOL SUCCINATE 100 MG: 50 TABLET, EXTENDED RELEASE ORAL at 21:47

## 2024-05-12 RX ADMIN — GUAIFENESIN 600 MG: 600 TABLET, EXTENDED RELEASE ORAL at 21:47

## 2024-05-12 RX ADMIN — ARFORMOTEROL TARTRATE 15 MCG: 15 SOLUTION RESPIRATORY (INHALATION) at 20:49

## 2024-05-12 RX ADMIN — FLUTICASONE PROPIONATE AND SALMETEROL 1 PUFF: 250; 50 POWDER RESPIRATORY (INHALATION) at 21:48

## 2024-05-12 RX ADMIN — ISOSORBIDE MONONITRATE 30 MG: 30 TABLET, EXTENDED RELEASE ORAL at 11:00

## 2024-05-12 RX ADMIN — OXYBUTYNIN CHLORIDE 10 MG: 5 TABLET, EXTENDED RELEASE ORAL at 21:47

## 2024-05-12 RX ADMIN — PIPERACILLIN AND TAZOBACTAM 4500 MG: 4; .5 INJECTION, POWDER, FOR SOLUTION INTRAVENOUS at 16:37

## 2024-05-12 RX ADMIN — GUAIFENESIN 600 MG: 600 TABLET, EXTENDED RELEASE ORAL at 11:02

## 2024-05-13 LAB
ALBUMIN SERPL-MCNC: 1.9 G/DL (ref 3.5–5)
ANION GAP SERPL CALC-SCNC: 5 MMOL/L (ref 5–15)
BASOPHILS # BLD: 0.2 K/UL (ref 0–0.1)
BASOPHILS NFR BLD: 1 % (ref 0–1)
BUN SERPL-MCNC: 42 MG/DL (ref 6–20)
BUN/CREAT SERPL: 5 (ref 12–20)
CALCIUM SERPL-MCNC: 8.7 MG/DL (ref 8.5–10.1)
CHLORIDE SERPL-SCNC: 106 MMOL/L (ref 97–108)
CO2 SERPL-SCNC: 26 MMOL/L (ref 21–32)
CREAT SERPL-MCNC: 8.34 MG/DL (ref 0.55–1.02)
DIFFERENTIAL METHOD BLD: ABNORMAL
EOSINOPHIL # BLD: 2.1 K/UL (ref 0–0.4)
EOSINOPHIL NFR BLD: 9 % (ref 0–7)
ERYTHROCYTE [DISTWIDTH] IN BLOOD BY AUTOMATED COUNT: 14.7 % (ref 11.5–14.5)
GLUCOSE SERPL-MCNC: 106 MG/DL (ref 65–100)
HCT VFR BLD AUTO: 21.9 % (ref 35–47)
HGB BLD-MCNC: 7.3 G/DL (ref 11.5–16)
IMM GRANULOCYTES # BLD AUTO: 0.5 K/UL (ref 0–0.04)
IMM GRANULOCYTES NFR BLD AUTO: 2 % (ref 0–0.5)
LYMPHOCYTES # BLD: 1.6 K/UL (ref 0.8–3.5)
LYMPHOCYTES NFR BLD: 7 % (ref 12–49)
MCH RBC QN AUTO: 28.3 PG (ref 26–34)
MCHC RBC AUTO-ENTMCNC: 33.3 G/DL (ref 30–36.5)
MCV RBC AUTO: 84.9 FL (ref 80–99)
MONOCYTES # BLD: 1.8 K/UL (ref 0–1)
MONOCYTES NFR BLD: 8 % (ref 5–13)
NEUTS SEG # BLD: 16.8 K/UL (ref 1.8–8)
NEUTS SEG NFR BLD: 73 % (ref 32–75)
NRBC # BLD: 0.02 K/UL (ref 0–0.01)
NRBC BLD-RTO: 0.1 PER 100 WBC
PHOSPHATE SERPL-MCNC: 4.2 MG/DL (ref 2.6–4.7)
PLATELET # BLD AUTO: 417 K/UL (ref 150–400)
PMV BLD AUTO: 9.2 FL (ref 8.9–12.9)
POTASSIUM SERPL-SCNC: 3.6 MMOL/L (ref 3.5–5.1)
RBC # BLD AUTO: 2.58 M/UL (ref 3.8–5.2)
RBC MORPH BLD: ABNORMAL
RBC MORPH BLD: ABNORMAL
SODIUM SERPL-SCNC: 137 MMOL/L (ref 136–145)
WBC # BLD AUTO: 23 K/UL (ref 3.6–11)

## 2024-05-13 PROCEDURE — 6370000000 HC RX 637 (ALT 250 FOR IP)

## 2024-05-13 PROCEDURE — 90935 HEMODIALYSIS ONE EVALUATION: CPT

## 2024-05-13 PROCEDURE — 99232 SBSQ HOSP IP/OBS MODERATE 35: CPT | Performed by: INTERNAL MEDICINE

## 2024-05-13 PROCEDURE — 80069 RENAL FUNCTION PANEL: CPT

## 2024-05-13 PROCEDURE — 6360000002 HC RX W HCPCS: Performed by: INTERNAL MEDICINE

## 2024-05-13 PROCEDURE — 2060000000 HC ICU INTERMEDIATE R&B

## 2024-05-13 PROCEDURE — 2580000003 HC RX 258: Performed by: INTERNAL MEDICINE

## 2024-05-13 PROCEDURE — 6370000000 HC RX 637 (ALT 250 FOR IP): Performed by: INTERNAL MEDICINE

## 2024-05-13 PROCEDURE — 6360000002 HC RX W HCPCS: Performed by: SURGERY

## 2024-05-13 PROCEDURE — 2580000003 HC RX 258: Performed by: SURGERY

## 2024-05-13 PROCEDURE — 94640 AIRWAY INHALATION TREATMENT: CPT

## 2024-05-13 PROCEDURE — 6370000000 HC RX 637 (ALT 250 FOR IP): Performed by: SURGERY

## 2024-05-13 PROCEDURE — 85025 COMPLETE CBC W/AUTO DIFF WBC: CPT

## 2024-05-13 PROCEDURE — 36415 COLL VENOUS BLD VENIPUNCTURE: CPT

## 2024-05-13 RX ORDER — LIDOCAINE 50 MG/G
OINTMENT TOPICAL PRN
Status: DISCONTINUED | OUTPATIENT
Start: 2024-05-13 | End: 2024-05-23

## 2024-05-13 RX ADMIN — DILTIAZEM HYDROCHLORIDE 60 MG: 60 TABLET, FILM COATED ORAL at 00:42

## 2024-05-13 RX ADMIN — METOPROLOL SUCCINATE 100 MG: 50 TABLET, EXTENDED RELEASE ORAL at 21:24

## 2024-05-13 RX ADMIN — ARFORMOTEROL TARTRATE 15 MCG: 15 SOLUTION RESPIRATORY (INHALATION) at 19:53

## 2024-05-13 RX ADMIN — PIPERACILLIN SODIUM AND TAZOBACTAM SODIUM 3375 MG: 3; .375 INJECTION, POWDER, LYOPHILIZED, FOR SOLUTION INTRAVENOUS at 12:45

## 2024-05-13 RX ADMIN — LAMOTRIGINE 25 MG: 25 TABLET ORAL at 21:22

## 2024-05-13 RX ADMIN — FLUTICASONE PROPIONATE AND SALMETEROL 1 PUFF: 250; 50 POWDER RESPIRATORY (INHALATION) at 08:39

## 2024-05-13 RX ADMIN — DILTIAZEM HYDROCHLORIDE 60 MG: 60 TABLET, FILM COATED ORAL at 17:13

## 2024-05-13 RX ADMIN — ACETYLCYSTEINE 600 MG: 200 INHALANT RESPIRATORY (INHALATION) at 19:53

## 2024-05-13 RX ADMIN — EPOETIN ALFA-EPBX 10000 UNITS: 10000 INJECTION, SOLUTION INTRAVENOUS; SUBCUTANEOUS at 17:11

## 2024-05-13 RX ADMIN — GUAIFENESIN 600 MG: 600 TABLET, EXTENDED RELEASE ORAL at 21:22

## 2024-05-13 RX ADMIN — PIPERACILLIN SODIUM AND TAZOBACTAM SODIUM 3375 MG: 3; .375 INJECTION, POWDER, LYOPHILIZED, FOR SOLUTION INTRAVENOUS at 00:48

## 2024-05-13 RX ADMIN — FLUTICASONE PROPIONATE AND SALMETEROL 1 PUFF: 250; 50 POWDER RESPIRATORY (INHALATION) at 19:55

## 2024-05-13 RX ADMIN — OXYBUTYNIN CHLORIDE 10 MG: 5 TABLET, EXTENDED RELEASE ORAL at 21:22

## 2024-05-13 RX ADMIN — BUDESONIDE 250 MCG: 0.25 INHALANT RESPIRATORY (INHALATION) at 19:53

## 2024-05-13 RX ADMIN — SODIUM CHLORIDE, PRESERVATIVE FREE 10 ML: 5 INJECTION INTRAVENOUS at 08:39

## 2024-05-13 RX ADMIN — SODIUM CHLORIDE, PRESERVATIVE FREE 10 ML: 5 INJECTION INTRAVENOUS at 21:23

## 2024-05-13 RX ADMIN — DILTIAZEM HYDROCHLORIDE 60 MG: 60 TABLET, FILM COATED ORAL at 06:55

## 2024-05-13 RX ADMIN — DILTIAZEM HYDROCHLORIDE 60 MG: 60 TABLET, FILM COATED ORAL at 12:42

## 2024-05-14 ENCOUNTER — APPOINTMENT (OUTPATIENT)
Facility: HOSPITAL | Age: 89
DRG: 353 | End: 2024-05-14
Payer: MEDICARE

## 2024-05-14 LAB
BASOPHILS # BLD: 0.3 K/UL (ref 0–0.1)
BASOPHILS NFR BLD: 1 % (ref 0–1)
DIFFERENTIAL METHOD BLD: ABNORMAL
EOSINOPHIL # BLD: 2.1 K/UL (ref 0–0.4)
EOSINOPHIL NFR BLD: 8 % (ref 0–7)
ERYTHROCYTE [DISTWIDTH] IN BLOOD BY AUTOMATED COUNT: 15.2 % (ref 11.5–14.5)
GLUCOSE BLD STRIP.AUTO-MCNC: 139 MG/DL (ref 65–117)
HCT VFR BLD AUTO: 26.1 % (ref 35–47)
HGB BLD-MCNC: 8.3 G/DL (ref 11.5–16)
IMM GRANULOCYTES # BLD AUTO: 0.5 K/UL (ref 0–0.04)
IMM GRANULOCYTES NFR BLD AUTO: 2 % (ref 0–0.5)
LYMPHOCYTES # BLD: 2.3 K/UL (ref 0.8–3.5)
LYMPHOCYTES NFR BLD: 9 % (ref 12–49)
MCH RBC QN AUTO: 27.9 PG (ref 26–34)
MCHC RBC AUTO-ENTMCNC: 31.8 G/DL (ref 30–36.5)
MCV RBC AUTO: 87.6 FL (ref 80–99)
MONOCYTES # BLD: 2.8 K/UL (ref 0–1)
MONOCYTES NFR BLD: 11 % (ref 5–13)
NEUTS SEG # BLD: 17.9 K/UL (ref 1.8–8)
NEUTS SEG NFR BLD: 69 % (ref 32–75)
NRBC # BLD: 0.03 K/UL (ref 0–0.01)
NRBC BLD-RTO: 0.1 PER 100 WBC
PLATELET # BLD AUTO: 473 K/UL (ref 150–400)
PLATELET COMMENT: ABNORMAL
PMV BLD AUTO: 8.8 FL (ref 8.9–12.9)
RBC # BLD AUTO: 2.98 M/UL (ref 3.8–5.2)
RBC MORPH BLD: ABNORMAL
SERVICE CMNT-IMP: ABNORMAL
WBC # BLD AUTO: 25.9 K/UL (ref 3.6–11)
WBC MORPH BLD: ABNORMAL

## 2024-05-14 PROCEDURE — 2580000003 HC RX 258: Performed by: INTERNAL MEDICINE

## 2024-05-14 PROCEDURE — 74177 CT ABD & PELVIS W/CONTRAST: CPT

## 2024-05-14 PROCEDURE — 6360000002 HC RX W HCPCS: Performed by: SURGERY

## 2024-05-14 PROCEDURE — 99232 SBSQ HOSP IP/OBS MODERATE 35: CPT | Performed by: INTERNAL MEDICINE

## 2024-05-14 PROCEDURE — 2060000000 HC ICU INTERMEDIATE R&B

## 2024-05-14 PROCEDURE — A4216 STERILE WATER/SALINE, 10 ML: HCPCS

## 2024-05-14 PROCEDURE — 6360000002 HC RX W HCPCS: Performed by: INTERNAL MEDICINE

## 2024-05-14 PROCEDURE — 6370000000 HC RX 637 (ALT 250 FOR IP): Performed by: INTERNAL MEDICINE

## 2024-05-14 PROCEDURE — 6370000000 HC RX 637 (ALT 250 FOR IP)

## 2024-05-14 PROCEDURE — 2580000003 HC RX 258: Performed by: SURGERY

## 2024-05-14 PROCEDURE — 97530 THERAPEUTIC ACTIVITIES: CPT

## 2024-05-14 PROCEDURE — 36415 COLL VENOUS BLD VENIPUNCTURE: CPT

## 2024-05-14 PROCEDURE — C9113 INJ PANTOPRAZOLE SODIUM, VIA: HCPCS

## 2024-05-14 PROCEDURE — 6360000004 HC RX CONTRAST MEDICATION: Performed by: RADIOLOGY

## 2024-05-14 PROCEDURE — 6360000002 HC RX W HCPCS

## 2024-05-14 PROCEDURE — 97116 GAIT TRAINING THERAPY: CPT

## 2024-05-14 PROCEDURE — 2580000003 HC RX 258

## 2024-05-14 PROCEDURE — 85025 COMPLETE CBC W/AUTO DIFF WBC: CPT

## 2024-05-14 PROCEDURE — 6370000000 HC RX 637 (ALT 250 FOR IP): Performed by: SURGERY

## 2024-05-14 PROCEDURE — 82962 GLUCOSE BLOOD TEST: CPT

## 2024-05-14 PROCEDURE — 94640 AIRWAY INHALATION TREATMENT: CPT

## 2024-05-14 RX ORDER — METRONIDAZOLE 500 MG/100ML
500 INJECTION, SOLUTION INTRAVENOUS EVERY 8 HOURS
Status: DISCONTINUED | OUTPATIENT
Start: 2024-05-14 | End: 2024-05-16

## 2024-05-14 RX ORDER — PANTOPRAZOLE SODIUM 40 MG/1
40 TABLET, DELAYED RELEASE ORAL
Status: DISCONTINUED | OUTPATIENT
Start: 2024-05-15 | End: 2024-05-23 | Stop reason: HOSPADM

## 2024-05-14 RX ADMIN — PANTOPRAZOLE SODIUM 40 MG: 40 INJECTION, POWDER, FOR SOLUTION INTRAVENOUS at 09:25

## 2024-05-14 RX ADMIN — IOPAMIDOL 80 ML: 755 INJECTION, SOLUTION INTRAVENOUS at 13:43

## 2024-05-14 RX ADMIN — PIPERACILLIN SODIUM AND TAZOBACTAM SODIUM 3375 MG: 3; .375 INJECTION, POWDER, LYOPHILIZED, FOR SOLUTION INTRAVENOUS at 00:13

## 2024-05-14 RX ADMIN — PIPERACILLIN SODIUM AND TAZOBACTAM SODIUM 3375 MG: 3; .375 INJECTION, POWDER, LYOPHILIZED, FOR SOLUTION INTRAVENOUS at 13:03

## 2024-05-14 RX ADMIN — DILTIAZEM HYDROCHLORIDE 60 MG: 60 TABLET, FILM COATED ORAL at 17:44

## 2024-05-14 RX ADMIN — IPRATROPIUM BROMIDE AND ALBUTEROL SULFATE 1 DOSE: .5; 3 SOLUTION RESPIRATORY (INHALATION) at 07:11

## 2024-05-14 RX ADMIN — LAMOTRIGINE 25 MG: 25 TABLET ORAL at 21:26

## 2024-05-14 RX ADMIN — SODIUM CHLORIDE, PRESERVATIVE FREE 10 ML: 5 INJECTION INTRAVENOUS at 21:27

## 2024-05-14 RX ADMIN — WATER 1000 MG: 1 INJECTION INTRAMUSCULAR; INTRAVENOUS; SUBCUTANEOUS at 21:24

## 2024-05-14 RX ADMIN — SODIUM CHLORIDE, PRESERVATIVE FREE 10 ML: 5 INJECTION INTRAVENOUS at 09:26

## 2024-05-14 RX ADMIN — METOPROLOL SUCCINATE 100 MG: 50 TABLET, EXTENDED RELEASE ORAL at 21:27

## 2024-05-14 RX ADMIN — SPIRONOLACTONE 25 MG: 25 TABLET ORAL at 09:24

## 2024-05-14 RX ADMIN — LAMOTRIGINE 25 MG: 25 TABLET ORAL at 09:24

## 2024-05-14 RX ADMIN — DILTIAZEM HYDROCHLORIDE 60 MG: 60 TABLET, FILM COATED ORAL at 00:08

## 2024-05-14 RX ADMIN — ARFORMOTEROL TARTRATE 15 MCG: 15 SOLUTION RESPIRATORY (INHALATION) at 07:11

## 2024-05-14 RX ADMIN — GUAIFENESIN 600 MG: 600 TABLET, EXTENDED RELEASE ORAL at 09:25

## 2024-05-14 RX ADMIN — FLUTICASONE PROPIONATE AND SALMETEROL 1 PUFF: 250; 50 POWDER RESPIRATORY (INHALATION) at 21:25

## 2024-05-14 RX ADMIN — OXYBUTYNIN CHLORIDE 10 MG: 5 TABLET, EXTENDED RELEASE ORAL at 21:26

## 2024-05-14 RX ADMIN — GUAIFENESIN 600 MG: 600 TABLET, EXTENDED RELEASE ORAL at 21:26

## 2024-05-14 RX ADMIN — ACETYLCYSTEINE 600 MG: 200 INHALANT RESPIRATORY (INHALATION) at 07:11

## 2024-05-14 RX ADMIN — DILTIAZEM HYDROCHLORIDE 60 MG: 60 TABLET, FILM COATED ORAL at 23:40

## 2024-05-14 RX ADMIN — BUDESONIDE 250 MCG: 0.25 INHALANT RESPIRATORY (INHALATION) at 07:11

## 2024-05-14 RX ADMIN — DILTIAZEM HYDROCHLORIDE 60 MG: 60 TABLET, FILM COATED ORAL at 05:48

## 2024-05-14 RX ADMIN — ISOSORBIDE MONONITRATE 30 MG: 30 TABLET, EXTENDED RELEASE ORAL at 09:25

## 2024-05-14 RX ADMIN — METRONIDAZOLE 500 MG: 500 INJECTION, SOLUTION INTRAVENOUS at 21:24

## 2024-05-14 RX ADMIN — DILTIAZEM HYDROCHLORIDE 60 MG: 60 TABLET, FILM COATED ORAL at 13:03

## 2024-05-14 NOTE — OP NOTE
[address]  [address]                            OPERATIVE REPORT      PATIENT NAME: JOANNE NUÑEZ              : 1935  MED REC NO: [id number]                       ROOM: 444  ACCOUNT NO: [id number]                       ADMIT DATE: 2024  PROVIDER: Bronson Mitchell MD    DATE OF SERVICE:  2024    PREOPERATIVE DIAGNOSES:  Incarcerated ventral hernia.    POSTOPERATIVE DIAGNOSES:  Incarcerated ventral hernia with small bowel obstruction.    PROCEDURES PERFORMED:  Primary incarcerated hernia repair.    SURGEON:  Bronson Mitchell MD    ASSISTANT:  Pepe Ríos.    ANESTHESIA:  General endotracheal.    ESTIMATED BLOOD LOSS:  Minimal.    SPECIMENS REMOVED:  Hernia sac.    INTRAOPERATIVE FINDINGS:  There was a 2.5 cm hernia defect above the umbilicus with incarcerated small bowel.  Small bowel was noted to be mildly ischemic when the hernia sac was opened, but then pinked up after we released it from incarceration.  There was free fluid as well as some gelatinous dialysate from her peritoneal dialysis in the abdominal cavity.     COMPLICATIONS:  None.    IMPLANTS:  None.    INDICATIONS:  The patient is an 88-year-old woman with a history of end-stage renal disease, on peritoneal dialysis, who presented to the emergency department with abdominal pain, nausea, vomiting.  She has a known history of ventral hernia, for which she has seen Dr. Ronn Pierre.  However, give her multiple comorbidities, the decision was made to not do hernia repair unless becomes symptomatic.  She presented to the emergency department with incarcerated hernia with bowel obstruction noted on CT scan.  The hernia was not reducible in the emergency room, so decision was made to take her to the operating room for an open ventral hernia repair with possible mesh placement.    DESCRIPTION OF PROCEDURE:  After informed consent was obtained from the patient, the patient was

## 2024-05-15 LAB
ALBUMIN SERPL-MCNC: 2 G/DL (ref 3.5–5)
ANION GAP SERPL CALC-SCNC: 8 MMOL/L (ref 5–15)
APPEARANCE FLD: CLEAR
BUN SERPL-MCNC: 37 MG/DL (ref 6–20)
BUN/CREAT SERPL: 5 (ref 12–20)
CALCIUM SERPL-MCNC: 8.7 MG/DL (ref 8.5–10.1)
CHLORIDE SERPL-SCNC: 101 MMOL/L (ref 97–108)
CO2 SERPL-SCNC: 25 MMOL/L (ref 21–32)
COLOR FLD: ABNORMAL
CREAT SERPL-MCNC: 7.08 MG/DL (ref 0.55–1.02)
ERYTHROCYTE [DISTWIDTH] IN BLOOD BY AUTOMATED COUNT: 15.6 % (ref 11.5–14.5)
GLUCOSE SERPL-MCNC: 98 MG/DL (ref 65–100)
HCT VFR BLD AUTO: 24.1 % (ref 35–47)
HGB BLD-MCNC: 7.5 G/DL (ref 11.5–16)
LYMPHOCYTES NFR FLD: 12 %
MCH RBC QN AUTO: 27.7 PG (ref 26–34)
MCHC RBC AUTO-ENTMCNC: 31.1 G/DL (ref 30–36.5)
MCV RBC AUTO: 88.9 FL (ref 80–99)
MESOTHL CELL NFR FLD: 18 %
MONOS+MACROS NFR FLD: 27 %
NEUTROPHILS NFR FLD: 43 %
NRBC # BLD: 0.05 K/UL (ref 0–0.01)
NRBC BLD-RTO: 0.2 PER 100 WBC
NUC CELL # FLD: 405 /CU MM
PHOSPHATE SERPL-MCNC: 3.5 MG/DL (ref 2.6–4.7)
PLATELET # BLD AUTO: 407 K/UL (ref 150–400)
PMV BLD AUTO: 9.3 FL (ref 8.9–12.9)
POTASSIUM SERPL-SCNC: 3.9 MMOL/L (ref 3.5–5.1)
RBC # BLD AUTO: 2.71 M/UL (ref 3.8–5.2)
RBC # FLD: >100 /CU MM
SODIUM SERPL-SCNC: 134 MMOL/L (ref 136–145)
SPECIMEN SOURCE FLD: ABNORMAL
WBC # BLD AUTO: 21.4 K/UL (ref 3.6–11)

## 2024-05-15 PROCEDURE — 6370000000 HC RX 637 (ALT 250 FOR IP): Performed by: SURGERY

## 2024-05-15 PROCEDURE — 6370000000 HC RX 637 (ALT 250 FOR IP)

## 2024-05-15 PROCEDURE — 36415 COLL VENOUS BLD VENIPUNCTURE: CPT

## 2024-05-15 PROCEDURE — 6360000002 HC RX W HCPCS: Performed by: SURGERY

## 2024-05-15 PROCEDURE — 99232 SBSQ HOSP IP/OBS MODERATE 35: CPT | Performed by: INTERNAL MEDICINE

## 2024-05-15 PROCEDURE — 90935 HEMODIALYSIS ONE EVALUATION: CPT

## 2024-05-15 PROCEDURE — 6370000000 HC RX 637 (ALT 250 FOR IP): Performed by: INTERNAL MEDICINE

## 2024-05-15 PROCEDURE — 6360000002 HC RX W HCPCS: Performed by: INTERNAL MEDICINE

## 2024-05-15 PROCEDURE — 87205 SMEAR GRAM STAIN: CPT

## 2024-05-15 PROCEDURE — 85027 COMPLETE CBC AUTOMATED: CPT

## 2024-05-15 PROCEDURE — 2580000003 HC RX 258: Performed by: INTERNAL MEDICINE

## 2024-05-15 PROCEDURE — 2060000000 HC ICU INTERMEDIATE R&B

## 2024-05-15 PROCEDURE — 94640 AIRWAY INHALATION TREATMENT: CPT

## 2024-05-15 PROCEDURE — 2580000003 HC RX 258: Performed by: SURGERY

## 2024-05-15 PROCEDURE — 80069 RENAL FUNCTION PANEL: CPT

## 2024-05-15 PROCEDURE — 87070 CULTURE OTHR SPECIMN AEROBIC: CPT

## 2024-05-15 PROCEDURE — 89050 BODY FLUID CELL COUNT: CPT

## 2024-05-15 PROCEDURE — 90945 DIALYSIS ONE EVALUATION: CPT

## 2024-05-15 RX ORDER — SODIUM CHLORIDE, SODIUM LACTATE, CALCIUM CHLORIDE, MAGNESIUM CHLORIDE AND DEXTROSE 1.5; 538; 448; 18.3; 5.08 G/100ML; MG/100ML; MG/100ML; MG/100ML; MG/100ML
2000 INJECTION, SOLUTION INTRAPERITONEAL ONCE
Status: COMPLETED | OUTPATIENT
Start: 2024-05-15 | End: 2024-05-15

## 2024-05-15 RX ADMIN — IPRATROPIUM BROMIDE AND ALBUTEROL SULFATE 1 DOSE: .5; 3 SOLUTION RESPIRATORY (INHALATION) at 07:48

## 2024-05-15 RX ADMIN — ARFORMOTEROL TARTRATE 15 MCG: 15 SOLUTION RESPIRATORY (INHALATION) at 21:38

## 2024-05-15 RX ADMIN — ACETAMINOPHEN 650 MG: 325 TABLET ORAL at 19:20

## 2024-05-15 RX ADMIN — LAMOTRIGINE 25 MG: 25 TABLET ORAL at 21:05

## 2024-05-15 RX ADMIN — ALBUTEROL SULFATE 1 PUFF: 90 AEROSOL, METERED RESPIRATORY (INHALATION) at 21:39

## 2024-05-15 RX ADMIN — FLUTICASONE PROPIONATE AND SALMETEROL 1 PUFF: 250; 50 POWDER RESPIRATORY (INHALATION) at 21:39

## 2024-05-15 RX ADMIN — SODIUM CHLORIDE, PRESERVATIVE FREE 10 ML: 5 INJECTION INTRAVENOUS at 08:41

## 2024-05-15 RX ADMIN — METOPROLOL SUCCINATE 100 MG: 50 TABLET, EXTENDED RELEASE ORAL at 21:06

## 2024-05-15 RX ADMIN — BUDESONIDE 250 MCG: 0.25 INHALANT RESPIRATORY (INHALATION) at 07:48

## 2024-05-15 RX ADMIN — METRONIDAZOLE 500 MG: 500 INJECTION, SOLUTION INTRAVENOUS at 20:56

## 2024-05-15 RX ADMIN — PANTOPRAZOLE SODIUM 40 MG: 40 TABLET, DELAYED RELEASE ORAL at 06:21

## 2024-05-15 RX ADMIN — METRONIDAZOLE 500 MG: 500 INJECTION, SOLUTION INTRAVENOUS at 12:39

## 2024-05-15 RX ADMIN — SODIUM CHLORIDE, SODIUM LACTATE, CALCIUM CHLORIDE, MAGNESIUM CHLORIDE AND DEXTROSE 2000 ML: 1.5; 538; 448; 18.3; 5.08 INJECTION, SOLUTION INTRAPERITONEAL at 14:36

## 2024-05-15 RX ADMIN — ARFORMOTEROL TARTRATE 15 MCG: 15 SOLUTION RESPIRATORY (INHALATION) at 07:48

## 2024-05-15 RX ADMIN — DILTIAZEM HYDROCHLORIDE 60 MG: 60 TABLET, FILM COATED ORAL at 17:00

## 2024-05-15 RX ADMIN — DILTIAZEM HYDROCHLORIDE 60 MG: 60 TABLET, FILM COATED ORAL at 05:58

## 2024-05-15 RX ADMIN — METRONIDAZOLE 500 MG: 500 INJECTION, SOLUTION INTRAVENOUS at 03:59

## 2024-05-15 RX ADMIN — GUAIFENESIN 600 MG: 600 TABLET, EXTENDED RELEASE ORAL at 21:06

## 2024-05-15 RX ADMIN — BUDESONIDE 250 MCG: 0.25 INHALANT RESPIRATORY (INHALATION) at 21:38

## 2024-05-15 RX ADMIN — ACETYLCYSTEINE 600 MG: 200 INHALANT RESPIRATORY (INHALATION) at 07:48

## 2024-05-15 RX ADMIN — OXYBUTYNIN CHLORIDE 10 MG: 5 TABLET, EXTENDED RELEASE ORAL at 21:05

## 2024-05-15 RX ADMIN — EPOETIN ALFA-EPBX 10000 UNITS: 10000 INJECTION, SOLUTION INTRAVENOUS; SUBCUTANEOUS at 17:00

## 2024-05-15 RX ADMIN — DILTIAZEM HYDROCHLORIDE 60 MG: 60 TABLET, FILM COATED ORAL at 12:37

## 2024-05-15 RX ADMIN — WATER 1000 MG: 1 INJECTION INTRAMUSCULAR; INTRAVENOUS; SUBCUTANEOUS at 20:57

## 2024-05-15 ASSESSMENT — PAIN DESCRIPTION - LOCATION
LOCATION: HEAD
LOCATION: HEAD

## 2024-05-15 ASSESSMENT — PAIN SCALES - GENERAL: PAINLEVEL_OUTOF10: 0

## 2024-05-15 ASSESSMENT — PAIN DESCRIPTION - DESCRIPTORS
DESCRIPTORS: ACHING
DESCRIPTORS: ACHING

## 2024-05-15 NOTE — CARE COORDINATION
Transition of Care Plan:    RUR: 24%  Prior Level of Functioning:   Disposition: d/c to [facility] when medically stable  Per IDR, d/c is anticipated on 5/17/24 pending medical stability  Patient has elevated WBC and iv abx re-started.  If SNF or IPR: Date FOC offered: 5/8/2  Date FOC received: 5/8/24  Accepting facility: [facility]  Date authorization started with reference number: no auth required.  Date authorization received and expires: n/a    Follow up appointments:   DME needed: none  Transportation at discharge: stretcher vs BLS  IM/IMM Medicare/ letter given:   Is patient a [place] and connected with [facility]?    If yes, was [place] transfer form completed and [facility] notified?   Caregiver Contact: nancy Decker)  Discharge Caregiver contacted prior to discharge?   Care Conference needed? no  Barriers to discharge:  medical stability    CM continuing to follow.    MARIA INES Umana, [facility]  [phone]

## 2024-05-16 PROBLEM — D72.829 LEUKOCYTOSIS: Status: ACTIVE | Noted: 2024-05-16

## 2024-05-16 PROBLEM — K65.9 PERITONITIS (HCC): Status: ACTIVE | Noted: 2024-05-16

## 2024-05-16 PROBLEM — L02.211 ABDOMINAL WALL ABSCESS: Status: ACTIVE | Noted: 2024-05-16

## 2024-05-16 LAB
ERYTHROCYTE [DISTWIDTH] IN BLOOD BY AUTOMATED COUNT: 16.2 % (ref 11.5–14.5)
HCT VFR BLD AUTO: 25.6 % (ref 35–47)
HGB BLD-MCNC: 8.5 G/DL (ref 11.5–16)
MCH RBC QN AUTO: 28.5 PG (ref 26–34)
MCHC RBC AUTO-ENTMCNC: 33.2 G/DL (ref 30–36.5)
MCV RBC AUTO: 85.9 FL (ref 80–99)
NRBC # BLD: 0.1 K/UL (ref 0–0.01)
NRBC BLD-RTO: 0.4 PER 100 WBC
PLATELET # BLD AUTO: 500 K/UL (ref 150–400)
PMV BLD AUTO: 8.5 FL (ref 8.9–12.9)
RBC # BLD AUTO: 2.98 M/UL (ref 3.8–5.2)
WBC # BLD AUTO: 23.5 K/UL (ref 3.6–11)

## 2024-05-16 PROCEDURE — 99222 1ST HOSP IP/OBS MODERATE 55: CPT | Performed by: INTERNAL MEDICINE

## 2024-05-16 PROCEDURE — 6370000000 HC RX 637 (ALT 250 FOR IP): Performed by: SURGERY

## 2024-05-16 PROCEDURE — 6360000002 HC RX W HCPCS: Performed by: SURGERY

## 2024-05-16 PROCEDURE — 2060000000 HC ICU INTERMEDIATE R&B

## 2024-05-16 PROCEDURE — 6360000002 HC RX W HCPCS: Performed by: INTERNAL MEDICINE

## 2024-05-16 PROCEDURE — 97116 GAIT TRAINING THERAPY: CPT

## 2024-05-16 PROCEDURE — 2580000003 HC RX 258: Performed by: INTERNAL MEDICINE

## 2024-05-16 PROCEDURE — 6370000000 HC RX 637 (ALT 250 FOR IP)

## 2024-05-16 PROCEDURE — 6370000000 HC RX 637 (ALT 250 FOR IP): Performed by: INTERNAL MEDICINE

## 2024-05-16 PROCEDURE — 94640 AIRWAY INHALATION TREATMENT: CPT

## 2024-05-16 PROCEDURE — 99232 SBSQ HOSP IP/OBS MODERATE 35: CPT | Performed by: INTERNAL MEDICINE

## 2024-05-16 PROCEDURE — 36415 COLL VENOUS BLD VENIPUNCTURE: CPT

## 2024-05-16 PROCEDURE — 85027 COMPLETE CBC AUTOMATED: CPT

## 2024-05-16 PROCEDURE — 93005 ELECTROCARDIOGRAM TRACING: CPT | Performed by: INTERNAL MEDICINE

## 2024-05-16 RX ADMIN — OXYBUTYNIN CHLORIDE 10 MG: 5 TABLET, EXTENDED RELEASE ORAL at 21:31

## 2024-05-16 RX ADMIN — PIPERACILLIN AND TAZOBACTAM 4500 MG: 4; .5 INJECTION, POWDER, FOR SOLUTION INTRAVENOUS at 15:43

## 2024-05-16 RX ADMIN — PIPERACILLIN SODIUM AND TAZOBACTAM SODIUM 3375 MG: 3; .375 INJECTION, POWDER, LYOPHILIZED, FOR SOLUTION INTRAVENOUS at 21:43

## 2024-05-16 RX ADMIN — SODIUM CHLORIDE, PRESERVATIVE FREE 10 ML: 5 INJECTION INTRAVENOUS at 09:03

## 2024-05-16 RX ADMIN — SODIUM CHLORIDE, PRESERVATIVE FREE 10 ML: 5 INJECTION INTRAVENOUS at 00:03

## 2024-05-16 RX ADMIN — FLUTICASONE PROPIONATE AND SALMETEROL 1 PUFF: 250; 50 POWDER RESPIRATORY (INHALATION) at 09:01

## 2024-05-16 RX ADMIN — PANTOPRAZOLE SODIUM 40 MG: 40 TABLET, DELAYED RELEASE ORAL at 06:08

## 2024-05-16 RX ADMIN — METRONIDAZOLE 500 MG: 500 INJECTION, SOLUTION INTRAVENOUS at 12:32

## 2024-05-16 RX ADMIN — METRONIDAZOLE 500 MG: 500 INJECTION, SOLUTION INTRAVENOUS at 03:57

## 2024-05-16 RX ADMIN — SODIUM CHLORIDE: 9 INJECTION, SOLUTION INTRAVENOUS at 21:42

## 2024-05-16 RX ADMIN — DILTIAZEM HYDROCHLORIDE 60 MG: 60 TABLET, FILM COATED ORAL at 12:30

## 2024-05-16 RX ADMIN — DILTIAZEM HYDROCHLORIDE 60 MG: 60 TABLET, FILM COATED ORAL at 00:00

## 2024-05-16 RX ADMIN — GUAIFENESIN 600 MG: 600 TABLET, EXTENDED RELEASE ORAL at 09:02

## 2024-05-16 RX ADMIN — ACETAMINOPHEN 650 MG: 325 TABLET ORAL at 15:41

## 2024-05-16 RX ADMIN — DILTIAZEM HYDROCHLORIDE 60 MG: 60 TABLET, FILM COATED ORAL at 06:08

## 2024-05-16 RX ADMIN — GUAIFENESIN 600 MG: 600 TABLET, EXTENDED RELEASE ORAL at 21:32

## 2024-05-16 RX ADMIN — ALBUTEROL SULFATE 1 PUFF: 90 AEROSOL, METERED RESPIRATORY (INHALATION) at 09:02

## 2024-05-16 RX ADMIN — LAMOTRIGINE 25 MG: 25 TABLET ORAL at 21:32

## 2024-05-16 RX ADMIN — FLUTICASONE PROPIONATE AND SALMETEROL 1 PUFF: 250; 50 POWDER RESPIRATORY (INHALATION) at 22:37

## 2024-05-16 RX ADMIN — SODIUM CHLORIDE, PRESERVATIVE FREE 10 ML: 5 INJECTION INTRAVENOUS at 21:45

## 2024-05-16 RX ADMIN — DILTIAZEM HYDROCHLORIDE 60 MG: 60 TABLET, FILM COATED ORAL at 17:35

## 2024-05-16 RX ADMIN — LAMOTRIGINE 25 MG: 25 TABLET ORAL at 09:02

## 2024-05-16 ASSESSMENT — PAIN DESCRIPTION - LOCATION: LOCATION: HEAD

## 2024-05-16 ASSESSMENT — PAIN SCALES - GENERAL
PAINLEVEL_OUTOF10: 0
PAINLEVEL_OUTOF10: 6
PAINLEVEL_OUTOF10: 0

## 2024-05-16 ASSESSMENT — PAIN DESCRIPTION - DESCRIPTORS: DESCRIPTORS: ACHING

## 2024-05-17 ENCOUNTER — APPOINTMENT (OUTPATIENT)
Facility: HOSPITAL | Age: 89
DRG: 353 | End: 2024-05-17
Payer: MEDICARE

## 2024-05-17 LAB
ALBUMIN SERPL-MCNC: 2 G/DL (ref 3.5–5)
ANION GAP SERPL CALC-SCNC: 9 MMOL/L (ref 5–15)
APPEARANCE FLD: ABNORMAL
APPEARANCE FLD: CLEAR
BASOPHILS # BLD: 0.2 K/UL (ref 0–0.1)
BASOPHILS NFR BLD: 1 % (ref 0–1)
BUN SERPL-MCNC: 37 MG/DL (ref 6–20)
BUN/CREAT SERPL: 6 (ref 12–20)
CALCIUM SERPL-MCNC: 8.6 MG/DL (ref 8.5–10.1)
CHLORIDE SERPL-SCNC: 102 MMOL/L (ref 97–108)
CO2 SERPL-SCNC: 24 MMOL/L (ref 21–32)
COLOR FLD: ABNORMAL
COLOR FLD: COLORLESS
CREAT SERPL-MCNC: 5.84 MG/DL (ref 0.55–1.02)
DIFFERENTIAL METHOD BLD: ABNORMAL
EKG DIAGNOSIS: NORMAL
EKG Q-T INTERVAL: 490 MS
EKG QRS DURATION: 82 MS
EKG QTC CALCULATION (BAZETT): 384 MS
EKG R AXIS: -4 DEGREES
EKG T AXIS: 39 DEGREES
EKG VENTRICULAR RATE: 37 BPM
EOSINOPHIL # BLD: 1.8 K/UL (ref 0–0.4)
EOSINOPHIL NFR BLD: 9 % (ref 0–7)
EOSINOPHIL NFR FLD MANUAL: 1 %
ERYTHROCYTE [DISTWIDTH] IN BLOOD BY AUTOMATED COUNT: 16.5 % (ref 11.5–14.5)
GLUCOSE SERPL-MCNC: 136 MG/DL (ref 65–100)
HCT VFR BLD AUTO: 24.3 % (ref 35–47)
HGB BLD-MCNC: 8 G/DL (ref 11.5–16)
IMM GRANULOCYTES # BLD AUTO: 0 K/UL
IMM GRANULOCYTES NFR BLD AUTO: 0 %
LYMPHOCYTES # BLD: 2 K/UL (ref 0.8–3.5)
LYMPHOCYTES NFR BLD: 10 % (ref 12–49)
LYMPHOCYTES NFR FLD: 4 %
LYMPHOCYTES NFR FLD: 5 %
MCH RBC QN AUTO: 28.5 PG (ref 26–34)
MCHC RBC AUTO-ENTMCNC: 32.9 G/DL (ref 30–36.5)
MCV RBC AUTO: 86.5 FL (ref 80–99)
MESOTHL CELL NFR FLD: 1 %
MONOCYTES # BLD: 2.2 K/UL (ref 0–1)
MONOCYTES NFR BLD: 11 % (ref 5–13)
MONOS+MACROS NFR FLD: 11 %
MONOS+MACROS NFR FLD: 72 %
MYELOCYTES NFR BLD MANUAL: 1 %
NEUTROPHILS NFR FLD: 23 %
NEUTROPHILS NFR FLD: 83 %
NEUTS BAND NFR BLD MANUAL: 1 % (ref 0–6)
NEUTS SEG # BLD: 13.9 K/UL (ref 1.8–8)
NEUTS SEG NFR BLD: 67 % (ref 32–75)
NRBC # BLD: 0.05 K/UL (ref 0–0.01)
NRBC BLD-RTO: 0.2 PER 100 WBC
NUC CELL # FLD: 250 /CU MM
NUC CELL # FLD: 692 /CU MM
PHOSPHATE SERPL-MCNC: 2.9 MG/DL (ref 2.6–4.7)
PLATELET # BLD AUTO: 522 K/UL (ref 150–400)
PMV BLD AUTO: 8.6 FL (ref 8.9–12.9)
POTASSIUM SERPL-SCNC: 3.8 MMOL/L (ref 3.5–5.1)
RBC # BLD AUTO: 2.81 M/UL (ref 3.8–5.2)
RBC # FLD: 52 /CU MM
RBC # FLD: >100 /CU MM
RBC MORPH BLD: ABNORMAL
SODIUM SERPL-SCNC: 135 MMOL/L (ref 136–145)
SPECIMEN SOURCE FLD: ABNORMAL
SPECIMEN SOURCE FLD: ABNORMAL
WBC # BLD AUTO: 20.4 K/UL (ref 3.6–11)

## 2024-05-17 PROCEDURE — 2580000003 HC RX 258: Performed by: INTERNAL MEDICINE

## 2024-05-17 PROCEDURE — 6370000000 HC RX 637 (ALT 250 FOR IP): Performed by: INTERNAL MEDICINE

## 2024-05-17 PROCEDURE — 6370000000 HC RX 637 (ALT 250 FOR IP)

## 2024-05-17 PROCEDURE — 89050 BODY FLUID CELL COUNT: CPT

## 2024-05-17 PROCEDURE — 6360000002 HC RX W HCPCS: Performed by: INTERNAL MEDICINE

## 2024-05-17 PROCEDURE — 99232 SBSQ HOSP IP/OBS MODERATE 35: CPT | Performed by: INTERNAL MEDICINE

## 2024-05-17 PROCEDURE — 2060000000 HC ICU INTERMEDIATE R&B

## 2024-05-17 PROCEDURE — 80069 RENAL FUNCTION PANEL: CPT

## 2024-05-17 PROCEDURE — 10005 FNA BX W/US GDN 1ST LES: CPT

## 2024-05-17 PROCEDURE — 6370000000 HC RX 637 (ALT 250 FOR IP): Performed by: SURGERY

## 2024-05-17 PROCEDURE — 36415 COLL VENOUS BLD VENIPUNCTURE: CPT

## 2024-05-17 PROCEDURE — 97116 GAIT TRAINING THERAPY: CPT

## 2024-05-17 PROCEDURE — 85025 COMPLETE CBC W/AUTO DIFF WBC: CPT

## 2024-05-17 PROCEDURE — 87205 SMEAR GRAM STAIN: CPT

## 2024-05-17 PROCEDURE — 0W9F3ZX DRAINAGE OF ABDOMINAL WALL, PERCUTANEOUS APPROACH, DIAGNOSTIC: ICD-10-PCS | Performed by: STUDENT IN AN ORGANIZED HEALTH CARE EDUCATION/TRAINING PROGRAM

## 2024-05-17 PROCEDURE — 90945 DIALYSIS ONE EVALUATION: CPT

## 2024-05-17 PROCEDURE — 87070 CULTURE OTHR SPECIMN AEROBIC: CPT

## 2024-05-17 PROCEDURE — 90935 HEMODIALYSIS ONE EVALUATION: CPT

## 2024-05-17 RX ORDER — SODIUM CHLORIDE, SODIUM LACTATE, CALCIUM CHLORIDE, MAGNESIUM CHLORIDE AND DEXTROSE 2.5; 538; 448; 18.3; 5.08 G/100ML; MG/100ML; MG/100ML; MG/100ML; MG/100ML
2000 INJECTION, SOLUTION INTRAPERITONEAL ONCE
Status: COMPLETED | OUTPATIENT
Start: 2024-05-17 | End: 2024-05-17

## 2024-05-17 RX ORDER — DILTIAZEM HYDROCHLORIDE 60 MG/1
60 TABLET, FILM COATED ORAL EVERY 6 HOURS SCHEDULED
Status: DISCONTINUED | OUTPATIENT
Start: 2024-05-18 | End: 2024-05-20

## 2024-05-17 RX ADMIN — ACETAMINOPHEN 650 MG: 325 TABLET ORAL at 19:14

## 2024-05-17 RX ADMIN — ISOSORBIDE MONONITRATE 30 MG: 30 TABLET, EXTENDED RELEASE ORAL at 12:19

## 2024-05-17 RX ADMIN — PANTOPRAZOLE SODIUM 40 MG: 40 TABLET, DELAYED RELEASE ORAL at 06:50

## 2024-05-17 RX ADMIN — SODIUM CHLORIDE, SODIUM LACTATE, CALCIUM CHLORIDE, MAGNESIUM CHLORIDE AND DEXTROSE 2000 ML: 2.5; 538; 448; 18.3; 5.08 INJECTION, SOLUTION INTRAPERITONEAL at 15:07

## 2024-05-17 RX ADMIN — OXYBUTYNIN CHLORIDE 10 MG: 5 TABLET, EXTENDED RELEASE ORAL at 22:02

## 2024-05-17 RX ADMIN — LAMOTRIGINE 25 MG: 25 TABLET ORAL at 08:19

## 2024-05-17 RX ADMIN — PIPERACILLIN SODIUM AND TAZOBACTAM SODIUM 3375 MG: 3; .375 INJECTION, POWDER, LYOPHILIZED, FOR SOLUTION INTRAVENOUS at 12:21

## 2024-05-17 RX ADMIN — SODIUM CHLORIDE, PRESERVATIVE FREE 10 ML: 5 INJECTION INTRAVENOUS at 08:19

## 2024-05-17 RX ADMIN — DILTIAZEM HYDROCHLORIDE 30 MG: 30 TABLET, FILM COATED ORAL at 08:18

## 2024-05-17 RX ADMIN — EPOETIN ALFA-EPBX 10000 UNITS: 10000 INJECTION, SOLUTION INTRAVENOUS; SUBCUTANEOUS at 17:20

## 2024-05-17 RX ADMIN — SODIUM CHLORIDE, PRESERVATIVE FREE 10 ML: 5 INJECTION INTRAVENOUS at 22:02

## 2024-05-17 RX ADMIN — DILTIAZEM HYDROCHLORIDE 30 MG: 30 TABLET, FILM COATED ORAL at 17:20

## 2024-05-17 RX ADMIN — FLUTICASONE PROPIONATE AND SALMETEROL 1 PUFF: 250; 50 POWDER RESPIRATORY (INHALATION) at 22:04

## 2024-05-17 RX ADMIN — GENTAMICIN SULFATE: 1 OINTMENT TOPICAL at 15:11

## 2024-05-17 RX ADMIN — LAMOTRIGINE 25 MG: 25 TABLET ORAL at 22:02

## 2024-05-17 RX ADMIN — ACETAMINOPHEN 650 MG: 325 TABLET ORAL at 08:20

## 2024-05-17 RX ADMIN — GUAIFENESIN 600 MG: 600 TABLET, EXTENDED RELEASE ORAL at 22:02

## 2024-05-17 RX ADMIN — PIPERACILLIN SODIUM AND TAZOBACTAM SODIUM 3375 MG: 3; .375 INJECTION, POWDER, LYOPHILIZED, FOR SOLUTION INTRAVENOUS at 22:11

## 2024-05-17 RX ADMIN — GUAIFENESIN 600 MG: 600 TABLET, EXTENDED RELEASE ORAL at 08:18

## 2024-05-17 RX ADMIN — DILTIAZEM HYDROCHLORIDE 30 MG: 30 TABLET, FILM COATED ORAL at 11:32

## 2024-05-17 RX ADMIN — SPIRONOLACTONE 25 MG: 25 TABLET ORAL at 12:20

## 2024-05-17 RX ADMIN — FLUTICASONE PROPIONATE AND SALMETEROL 1 PUFF: 250; 50 POWDER RESPIRATORY (INHALATION) at 08:21

## 2024-05-17 RX ADMIN — ALBUTEROL SULFATE 1 PUFF: 90 AEROSOL, METERED RESPIRATORY (INHALATION) at 08:21

## 2024-05-17 ASSESSMENT — PAIN DESCRIPTION - LOCATION
LOCATION: HEAD
LOCATION: HEAD

## 2024-05-17 ASSESSMENT — PAIN SCALES - GENERAL
PAINLEVEL_OUTOF10: 5
PAINLEVEL_OUTOF10: 0
PAINLEVEL_OUTOF10: 0
PAINLEVEL_OUTOF10: 8

## 2024-05-17 ASSESSMENT — PAIN DESCRIPTION - DESCRIPTORS
DESCRIPTORS: ACHING
DESCRIPTORS: ACHING

## 2024-05-18 LAB
BASOPHILS # BLD: 0.2 K/UL (ref 0–0.1)
BASOPHILS NFR BLD: 1 % (ref 0–1)
COMMENT:: NORMAL
DIFFERENTIAL METHOD BLD: ABNORMAL
EOSINOPHIL # BLD: 1.1 K/UL (ref 0–0.4)
EOSINOPHIL NFR BLD: 5 % (ref 0–7)
ERYTHROCYTE [DISTWIDTH] IN BLOOD BY AUTOMATED COUNT: 17.3 % (ref 11.5–14.5)
HCT VFR BLD AUTO: 27.1 % (ref 35–47)
HGB BLD-MCNC: 8.6 G/DL (ref 11.5–16)
IMM GRANULOCYTES # BLD AUTO: 0 K/UL
IMM GRANULOCYTES NFR BLD AUTO: 0 %
LYMPHOCYTES # BLD: 3.1 K/UL (ref 0.8–3.5)
LYMPHOCYTES NFR BLD: 14 % (ref 12–49)
MCH RBC QN AUTO: 28.1 PG (ref 26–34)
MCHC RBC AUTO-ENTMCNC: 31.7 G/DL (ref 30–36.5)
MCV RBC AUTO: 88.6 FL (ref 80–99)
MONOCYTES # BLD: 2 K/UL (ref 0–1)
MONOCYTES NFR BLD: 9 % (ref 5–13)
NEUTS BAND NFR BLD MANUAL: 1 % (ref 0–6)
NEUTS SEG # BLD: 15.5 K/UL (ref 1.8–8)
NEUTS SEG NFR BLD: 70 % (ref 32–75)
NRBC # BLD: 0.05 K/UL (ref 0–0.01)
NRBC BLD-RTO: 0.2 PER 100 WBC
PLATELET # BLD AUTO: 520 K/UL (ref 150–400)
PMV BLD AUTO: 8.4 FL (ref 8.9–12.9)
RBC # BLD AUTO: 3.06 M/UL (ref 3.8–5.2)
RBC MORPH BLD: ABNORMAL
SPECIMEN HOLD: NORMAL
WBC # BLD AUTO: 21.9 K/UL (ref 3.6–11)

## 2024-05-18 PROCEDURE — 2580000003 HC RX 258: Performed by: INTERNAL MEDICINE

## 2024-05-18 PROCEDURE — 36415 COLL VENOUS BLD VENIPUNCTURE: CPT

## 2024-05-18 PROCEDURE — 85025 COMPLETE CBC W/AUTO DIFF WBC: CPT

## 2024-05-18 PROCEDURE — 2060000000 HC ICU INTERMEDIATE R&B

## 2024-05-18 PROCEDURE — 94640 AIRWAY INHALATION TREATMENT: CPT

## 2024-05-18 PROCEDURE — 6370000000 HC RX 637 (ALT 250 FOR IP): Performed by: INTERNAL MEDICINE

## 2024-05-18 PROCEDURE — 6370000000 HC RX 637 (ALT 250 FOR IP): Performed by: SURGERY

## 2024-05-18 PROCEDURE — 6370000000 HC RX 637 (ALT 250 FOR IP)

## 2024-05-18 PROCEDURE — 6360000002 HC RX W HCPCS: Performed by: INTERNAL MEDICINE

## 2024-05-18 RX ADMIN — LAMOTRIGINE 25 MG: 25 TABLET ORAL at 10:13

## 2024-05-18 RX ADMIN — ACETAMINOPHEN 650 MG: 325 TABLET ORAL at 04:37

## 2024-05-18 RX ADMIN — GUAIFENESIN 600 MG: 600 TABLET, EXTENDED RELEASE ORAL at 10:12

## 2024-05-18 RX ADMIN — LAMOTRIGINE 25 MG: 25 TABLET ORAL at 20:46

## 2024-05-18 RX ADMIN — DILTIAZEM HYDROCHLORIDE 60 MG: 60 TABLET, FILM COATED ORAL at 18:13

## 2024-05-18 RX ADMIN — PIPERACILLIN SODIUM AND TAZOBACTAM SODIUM 3375 MG: 3; .375 INJECTION, POWDER, LYOPHILIZED, FOR SOLUTION INTRAVENOUS at 10:12

## 2024-05-18 RX ADMIN — PANTOPRAZOLE SODIUM 40 MG: 40 TABLET, DELAYED RELEASE ORAL at 07:29

## 2024-05-18 RX ADMIN — SPIRONOLACTONE 25 MG: 25 TABLET ORAL at 10:15

## 2024-05-18 RX ADMIN — DILTIAZEM HYDROCHLORIDE 60 MG: 60 TABLET, FILM COATED ORAL at 07:29

## 2024-05-18 RX ADMIN — DILTIAZEM HYDROCHLORIDE 60 MG: 60 TABLET, FILM COATED ORAL at 00:11

## 2024-05-18 RX ADMIN — PIPERACILLIN SODIUM AND TAZOBACTAM SODIUM 3375 MG: 3; .375 INJECTION, POWDER, LYOPHILIZED, FOR SOLUTION INTRAVENOUS at 20:57

## 2024-05-18 RX ADMIN — OXYBUTYNIN CHLORIDE 10 MG: 5 TABLET, EXTENDED RELEASE ORAL at 20:46

## 2024-05-18 RX ADMIN — FLUTICASONE PROPIONATE AND SALMETEROL 1 PUFF: 250; 50 POWDER RESPIRATORY (INHALATION) at 20:46

## 2024-05-18 RX ADMIN — SODIUM CHLORIDE, PRESERVATIVE FREE 10 ML: 5 INJECTION INTRAVENOUS at 10:16

## 2024-05-18 RX ADMIN — ACETAMINOPHEN 650 MG: 325 TABLET ORAL at 12:18

## 2024-05-18 RX ADMIN — DILTIAZEM HYDROCHLORIDE 60 MG: 60 TABLET, FILM COATED ORAL at 12:18

## 2024-05-18 RX ADMIN — GUAIFENESIN 600 MG: 600 TABLET, EXTENDED RELEASE ORAL at 20:46

## 2024-05-18 RX ADMIN — SODIUM CHLORIDE, PRESERVATIVE FREE 10 ML: 5 INJECTION INTRAVENOUS at 20:47

## 2024-05-18 RX ADMIN — ISOSORBIDE MONONITRATE 30 MG: 30 TABLET, EXTENDED RELEASE ORAL at 10:13

## 2024-05-18 RX ADMIN — FLUTICASONE PROPIONATE AND SALMETEROL 1 PUFF: 250; 50 POWDER RESPIRATORY (INHALATION) at 07:23

## 2024-05-18 ASSESSMENT — PAIN SCALES - GENERAL
PAINLEVEL_OUTOF10: 10
PAINLEVEL_OUTOF10: 8

## 2024-05-18 ASSESSMENT — PAIN DESCRIPTION - DESCRIPTORS: DESCRIPTORS: ACHING

## 2024-05-18 ASSESSMENT — PAIN DESCRIPTION - LOCATION
LOCATION: HEAD
LOCATION: HEAD

## 2024-05-19 LAB
ALBUMIN SERPL-MCNC: 2.1 G/DL (ref 3.5–5)
ALBUMIN/GLOB SERPL: 0.4 (ref 1.1–2.2)
ALP SERPL-CCNC: 67 U/L (ref 45–117)
ALT SERPL-CCNC: 14 U/L (ref 12–78)
ANION GAP SERPL CALC-SCNC: 8 MMOL/L (ref 5–15)
AST SERPL-CCNC: 16 U/L (ref 15–37)
BACTERIA SPEC CULT: NORMAL
BACTERIA SPEC CULT: NORMAL
BASOPHILS # BLD: 0.2 K/UL (ref 0–0.1)
BASOPHILS NFR BLD: 1 % (ref 0–1)
BILIRUB SERPL-MCNC: 0.8 MG/DL (ref 0.2–1)
BUN SERPL-MCNC: 34 MG/DL (ref 6–20)
BUN/CREAT SERPL: 5 (ref 12–20)
CALCIUM SERPL-MCNC: 8.9 MG/DL (ref 8.5–10.1)
CHLORIDE SERPL-SCNC: 100 MMOL/L (ref 97–108)
CO2 SERPL-SCNC: 25 MMOL/L (ref 21–32)
CREAT SERPL-MCNC: 6.47 MG/DL (ref 0.55–1.02)
DIFFERENTIAL METHOD BLD: ABNORMAL
EOSINOPHIL # BLD: 1.6 K/UL (ref 0–0.4)
EOSINOPHIL NFR BLD: 8 % (ref 0–7)
ERYTHROCYTE [DISTWIDTH] IN BLOOD BY AUTOMATED COUNT: 17.2 % (ref 11.5–14.5)
GLOBULIN SER CALC-MCNC: 6 G/DL (ref 2–4)
GLUCOSE SERPL-MCNC: 96 MG/DL (ref 65–100)
GRAM STN SPEC: NORMAL
HCT VFR BLD AUTO: 27.6 % (ref 35–47)
HGB BLD-MCNC: 8.7 G/DL (ref 11.5–16)
IMM GRANULOCYTES # BLD AUTO: 0.4 K/UL (ref 0–0.04)
IMM GRANULOCYTES NFR BLD AUTO: 2 % (ref 0–0.5)
LYMPHOCYTES # BLD: 2.6 K/UL (ref 0.8–3.5)
LYMPHOCYTES NFR BLD: 13 % (ref 12–49)
MCH RBC QN AUTO: 28.3 PG (ref 26–34)
MCHC RBC AUTO-ENTMCNC: 31.5 G/DL (ref 30–36.5)
MCV RBC AUTO: 89.9 FL (ref 80–99)
MONOCYTES # BLD: 2 K/UL (ref 0–1)
MONOCYTES NFR BLD: 10 % (ref 5–13)
NEUTS SEG # BLD: 13.3 K/UL (ref 1.8–8)
NEUTS SEG NFR BLD: 66 % (ref 32–75)
NRBC # BLD: 0.03 K/UL (ref 0–0.01)
NRBC BLD-RTO: 0.1 PER 100 WBC
PLATELET # BLD AUTO: 566 K/UL (ref 150–400)
PMV BLD AUTO: 8.5 FL (ref 8.9–12.9)
POTASSIUM SERPL-SCNC: 4 MMOL/L (ref 3.5–5.1)
PROT SERPL-MCNC: 8.1 G/DL (ref 6.4–8.2)
RBC # BLD AUTO: 3.07 M/UL (ref 3.8–5.2)
RBC MORPH BLD: ABNORMAL
RBC MORPH BLD: ABNORMAL
SERVICE CMNT-IMP: NORMAL
SODIUM SERPL-SCNC: 133 MMOL/L (ref 136–145)
WBC # BLD AUTO: 20.1 K/UL (ref 3.6–11)

## 2024-05-19 PROCEDURE — 2580000003 HC RX 258: Performed by: INTERNAL MEDICINE

## 2024-05-19 PROCEDURE — 80053 COMPREHEN METABOLIC PANEL: CPT

## 2024-05-19 PROCEDURE — 6370000000 HC RX 637 (ALT 250 FOR IP): Performed by: INTERNAL MEDICINE

## 2024-05-19 PROCEDURE — 94640 AIRWAY INHALATION TREATMENT: CPT

## 2024-05-19 PROCEDURE — 85025 COMPLETE CBC W/AUTO DIFF WBC: CPT

## 2024-05-19 PROCEDURE — 6360000002 HC RX W HCPCS: Performed by: INTERNAL MEDICINE

## 2024-05-19 PROCEDURE — 6370000000 HC RX 637 (ALT 250 FOR IP)

## 2024-05-19 PROCEDURE — 2060000000 HC ICU INTERMEDIATE R&B

## 2024-05-19 PROCEDURE — 6370000000 HC RX 637 (ALT 250 FOR IP): Performed by: SURGERY

## 2024-05-19 PROCEDURE — 36415 COLL VENOUS BLD VENIPUNCTURE: CPT

## 2024-05-19 RX ADMIN — OXYBUTYNIN CHLORIDE 10 MG: 5 TABLET, EXTENDED RELEASE ORAL at 22:05

## 2024-05-19 RX ADMIN — LAMOTRIGINE 25 MG: 25 TABLET ORAL at 22:05

## 2024-05-19 RX ADMIN — LAMOTRIGINE 25 MG: 25 TABLET ORAL at 10:03

## 2024-05-19 RX ADMIN — FLUTICASONE PROPIONATE AND SALMETEROL 1 PUFF: 250; 50 POWDER RESPIRATORY (INHALATION) at 07:37

## 2024-05-19 RX ADMIN — ACETAMINOPHEN 650 MG: 325 TABLET ORAL at 11:22

## 2024-05-19 RX ADMIN — FLUTICASONE PROPIONATE AND SALMETEROL 1 PUFF: 250; 50 POWDER RESPIRATORY (INHALATION) at 19:34

## 2024-05-19 RX ADMIN — ALBUTEROL SULFATE 1 PUFF: 90 AEROSOL, METERED RESPIRATORY (INHALATION) at 19:35

## 2024-05-19 RX ADMIN — PIPERACILLIN SODIUM AND TAZOBACTAM SODIUM 3375 MG: 3; .375 INJECTION, POWDER, LYOPHILIZED, FOR SOLUTION INTRAVENOUS at 10:10

## 2024-05-19 RX ADMIN — DILTIAZEM HYDROCHLORIDE 60 MG: 60 TABLET, FILM COATED ORAL at 05:33

## 2024-05-19 RX ADMIN — DILTIAZEM HYDROCHLORIDE 60 MG: 60 TABLET, FILM COATED ORAL at 18:38

## 2024-05-19 RX ADMIN — DILTIAZEM HYDROCHLORIDE 60 MG: 60 TABLET, FILM COATED ORAL at 22:05

## 2024-05-19 RX ADMIN — ACETAMINOPHEN 650 MG: 325 TABLET ORAL at 22:05

## 2024-05-19 RX ADMIN — DILTIAZEM HYDROCHLORIDE 60 MG: 60 TABLET, FILM COATED ORAL at 11:22

## 2024-05-19 RX ADMIN — SPIRONOLACTONE 25 MG: 25 TABLET ORAL at 10:04

## 2024-05-19 RX ADMIN — ISOSORBIDE MONONITRATE 30 MG: 30 TABLET, EXTENDED RELEASE ORAL at 10:03

## 2024-05-19 RX ADMIN — PANTOPRAZOLE SODIUM 40 MG: 40 TABLET, DELAYED RELEASE ORAL at 05:33

## 2024-05-19 RX ADMIN — DILTIAZEM HYDROCHLORIDE 60 MG: 60 TABLET, FILM COATED ORAL at 00:26

## 2024-05-19 RX ADMIN — SODIUM CHLORIDE, PRESERVATIVE FREE 10 ML: 5 INJECTION INTRAVENOUS at 10:11

## 2024-05-19 RX ADMIN — PIPERACILLIN SODIUM AND TAZOBACTAM SODIUM 3375 MG: 3; .375 INJECTION, POWDER, LYOPHILIZED, FOR SOLUTION INTRAVENOUS at 22:08

## 2024-05-19 RX ADMIN — GUAIFENESIN 600 MG: 600 TABLET, EXTENDED RELEASE ORAL at 22:05

## 2024-05-19 RX ADMIN — GUAIFENESIN 600 MG: 600 TABLET, EXTENDED RELEASE ORAL at 10:03

## 2024-05-19 ASSESSMENT — PAIN SCALES - GENERAL
PAINLEVEL_OUTOF10: 8
PAINLEVEL_OUTOF10: 8

## 2024-05-20 PROBLEM — D75.839 THROMBOCYTOSIS: Status: ACTIVE | Noted: 2024-05-20

## 2024-05-20 PROBLEM — E61.1 IRON DEFICIENCY: Status: ACTIVE | Noted: 2024-05-20

## 2024-05-20 LAB
ALBUMIN SERPL-MCNC: 2.1 G/DL (ref 3.5–5)
ANION GAP SERPL CALC-SCNC: 8 MMOL/L (ref 5–15)
BASOPHILS # BLD: 0.2 K/UL (ref 0–0.1)
BASOPHILS NFR BLD: 1 % (ref 0–1)
BUN SERPL-MCNC: 46 MG/DL (ref 6–20)
BUN/CREAT SERPL: 6 (ref 12–20)
CALCIUM SERPL-MCNC: 8.8 MG/DL (ref 8.5–10.1)
CHLORIDE SERPL-SCNC: 101 MMOL/L (ref 97–108)
CO2 SERPL-SCNC: 25 MMOL/L (ref 21–32)
CREAT SERPL-MCNC: 7.88 MG/DL (ref 0.55–1.02)
DIFFERENTIAL METHOD BLD: ABNORMAL
EOSINOPHIL # BLD: 1.7 K/UL (ref 0–0.4)
EOSINOPHIL NFR BLD: 9 % (ref 0–7)
ERYTHROCYTE [DISTWIDTH] IN BLOOD BY AUTOMATED COUNT: 17.2 % (ref 11.5–14.5)
GLUCOSE SERPL-MCNC: 95 MG/DL (ref 65–100)
HCT VFR BLD AUTO: 26.5 % (ref 35–47)
HGB BLD-MCNC: 8.4 G/DL (ref 11.5–16)
IMM GRANULOCYTES # BLD AUTO: 0.4 K/UL (ref 0–0.04)
IMM GRANULOCYTES NFR BLD AUTO: 2 % (ref 0–0.5)
LYMPHOCYTES # BLD: 2.3 K/UL (ref 0.8–3.5)
LYMPHOCYTES NFR BLD: 12 % (ref 12–49)
MCH RBC QN AUTO: 28.5 PG (ref 26–34)
MCHC RBC AUTO-ENTMCNC: 31.7 G/DL (ref 30–36.5)
MCV RBC AUTO: 89.8 FL (ref 80–99)
MONOCYTES # BLD: 1.9 K/UL (ref 0–1)
MONOCYTES NFR BLD: 10 % (ref 5–13)
NEUTS SEG # BLD: 12.9 K/UL (ref 1.8–8)
NEUTS SEG NFR BLD: 66 % (ref 32–75)
NRBC # BLD: 0.02 K/UL (ref 0–0.01)
NRBC BLD-RTO: 0.1 PER 100 WBC
PHOSPHATE SERPL-MCNC: 5.2 MG/DL (ref 2.6–4.7)
PLATELET # BLD AUTO: 550 K/UL (ref 150–400)
PMV BLD AUTO: 8.7 FL (ref 8.9–12.9)
POTASSIUM SERPL-SCNC: 3.8 MMOL/L (ref 3.5–5.1)
PROCALCITONIN SERPL-MCNC: 0.37 NG/ML
RBC # BLD AUTO: 2.95 M/UL (ref 3.8–5.2)
RBC MORPH BLD: ABNORMAL
SODIUM SERPL-SCNC: 134 MMOL/L (ref 136–145)
WBC # BLD AUTO: 19.4 K/UL (ref 3.6–11)

## 2024-05-20 PROCEDURE — 90935 HEMODIALYSIS ONE EVALUATION: CPT

## 2024-05-20 PROCEDURE — 6360000002 HC RX W HCPCS: Performed by: INTERNAL MEDICINE

## 2024-05-20 PROCEDURE — 6370000000 HC RX 637 (ALT 250 FOR IP): Performed by: SURGERY

## 2024-05-20 PROCEDURE — 2580000003 HC RX 258: Performed by: INTERNAL MEDICINE

## 2024-05-20 PROCEDURE — 6370000000 HC RX 637 (ALT 250 FOR IP): Performed by: INTERNAL MEDICINE

## 2024-05-20 PROCEDURE — 80069 RENAL FUNCTION PANEL: CPT

## 2024-05-20 PROCEDURE — 6370000000 HC RX 637 (ALT 250 FOR IP)

## 2024-05-20 PROCEDURE — 99222 1ST HOSP IP/OBS MODERATE 55: CPT | Performed by: INTERNAL MEDICINE

## 2024-05-20 PROCEDURE — 99232 SBSQ HOSP IP/OBS MODERATE 35: CPT | Performed by: INTERNAL MEDICINE

## 2024-05-20 PROCEDURE — 2060000000 HC ICU INTERMEDIATE R&B

## 2024-05-20 PROCEDURE — 84145 PROCALCITONIN (PCT): CPT

## 2024-05-20 PROCEDURE — 85025 COMPLETE CBC W/AUTO DIFF WBC: CPT

## 2024-05-20 PROCEDURE — 36415 COLL VENOUS BLD VENIPUNCTURE: CPT

## 2024-05-20 RX ORDER — DILTIAZEM HYDROCHLORIDE 240 MG/1
240 CAPSULE, COATED, EXTENDED RELEASE ORAL EVERY EVENING
Status: DISCONTINUED | OUTPATIENT
Start: 2024-05-20 | End: 2024-05-23 | Stop reason: HOSPADM

## 2024-05-20 RX ADMIN — FLUTICASONE PROPIONATE AND SALMETEROL 1 PUFF: 250; 50 POWDER RESPIRATORY (INHALATION) at 20:54

## 2024-05-20 RX ADMIN — OXYBUTYNIN CHLORIDE 10 MG: 5 TABLET, EXTENDED RELEASE ORAL at 20:50

## 2024-05-20 RX ADMIN — DILTIAZEM HYDROCHLORIDE 60 MG: 60 TABLET, FILM COATED ORAL at 14:27

## 2024-05-20 RX ADMIN — GUAIFENESIN 600 MG: 600 TABLET, EXTENDED RELEASE ORAL at 08:56

## 2024-05-20 RX ADMIN — DILTIAZEM HYDROCHLORIDE 240 MG: 240 CAPSULE, COATED, EXTENDED RELEASE ORAL at 20:51

## 2024-05-20 RX ADMIN — ACETAMINOPHEN 650 MG: 325 TABLET ORAL at 11:12

## 2024-05-20 RX ADMIN — ACETAMINOPHEN 650 MG: 325 TABLET ORAL at 05:21

## 2024-05-20 RX ADMIN — LAMOTRIGINE 25 MG: 25 TABLET ORAL at 20:51

## 2024-05-20 RX ADMIN — GUAIFENESIN 600 MG: 600 TABLET, EXTENDED RELEASE ORAL at 20:51

## 2024-05-20 RX ADMIN — SODIUM CHLORIDE, PRESERVATIVE FREE 10 ML: 5 INJECTION INTRAVENOUS at 20:51

## 2024-05-20 RX ADMIN — PANTOPRAZOLE SODIUM 40 MG: 40 TABLET, DELAYED RELEASE ORAL at 05:21

## 2024-05-20 RX ADMIN — PIPERACILLIN SODIUM AND TAZOBACTAM SODIUM 3375 MG: 3; .375 INJECTION, POWDER, LYOPHILIZED, FOR SOLUTION INTRAVENOUS at 09:00

## 2024-05-20 RX ADMIN — SODIUM CHLORIDE, PRESERVATIVE FREE 10 ML: 5 INJECTION INTRAVENOUS at 08:54

## 2024-05-20 RX ADMIN — SPIRONOLACTONE 25 MG: 25 TABLET ORAL at 08:57

## 2024-05-20 RX ADMIN — LAMOTRIGINE 25 MG: 25 TABLET ORAL at 08:56

## 2024-05-20 RX ADMIN — PIPERACILLIN SODIUM AND TAZOBACTAM SODIUM 3375 MG: 3; .375 INJECTION, POWDER, LYOPHILIZED, FOR SOLUTION INTRAVENOUS at 20:56

## 2024-05-20 RX ADMIN — EPOETIN ALFA-EPBX 10000 UNITS: 10000 INJECTION, SOLUTION INTRAVENOUS; SUBCUTANEOUS at 17:04

## 2024-05-20 RX ADMIN — ACETAMINOPHEN 650 MG: 325 TABLET ORAL at 17:03

## 2024-05-20 RX ADMIN — DILTIAZEM HYDROCHLORIDE 60 MG: 60 TABLET, FILM COATED ORAL at 05:21

## 2024-05-20 RX ADMIN — ISOSORBIDE MONONITRATE 30 MG: 30 TABLET, EXTENDED RELEASE ORAL at 08:56

## 2024-05-20 RX ADMIN — ALBUTEROL SULFATE 1 PUFF: 90 AEROSOL, METERED RESPIRATORY (INHALATION) at 08:55

## 2024-05-20 RX ADMIN — FLUTICASONE PROPIONATE AND SALMETEROL 1 PUFF: 250; 50 POWDER RESPIRATORY (INHALATION) at 08:53

## 2024-05-20 ASSESSMENT — PAIN DESCRIPTION - LOCATION
LOCATION: HEAD

## 2024-05-20 ASSESSMENT — PAIN SCALES - GENERAL
PAINLEVEL_OUTOF10: 0
PAINLEVEL_OUTOF10: 8
PAINLEVEL_OUTOF10: 0
PAINLEVEL_OUTOF10: 8

## 2024-05-20 ASSESSMENT — PAIN DESCRIPTION - DESCRIPTORS
DESCRIPTORS: ACHING

## 2024-05-20 ASSESSMENT — PAIN - FUNCTIONAL ASSESSMENT: PAIN_FUNCTIONAL_ASSESSMENT: ACTIVITIES ARE NOT PREVENTED

## 2024-05-21 ENCOUNTER — APPOINTMENT (OUTPATIENT)
Facility: HOSPITAL | Age: 89
DRG: 353 | End: 2024-05-21
Payer: MEDICARE

## 2024-05-21 LAB
BACTERIA SPEC CULT: NORMAL
BASOPHILS # BLD: 0.2 K/UL (ref 0–0.1)
BASOPHILS NFR BLD: 1 % (ref 0–1)
DIFFERENTIAL METHOD BLD: ABNORMAL
EOSINOPHIL # BLD: 1.3 K/UL (ref 0–0.4)
EOSINOPHIL NFR BLD: 8 % (ref 0–7)
ERYTHROCYTE [DISTWIDTH] IN BLOOD BY AUTOMATED COUNT: 17.4 % (ref 11.5–14.5)
GRAM STN SPEC: NORMAL
HCT VFR BLD AUTO: 33.4 % (ref 35–47)
HGB BLD-MCNC: 10.6 G/DL (ref 11.5–16)
IMM GRANULOCYTES # BLD AUTO: 0.3 K/UL (ref 0–0.04)
IMM GRANULOCYTES NFR BLD AUTO: 2 % (ref 0–0.5)
LYMPHOCYTES # BLD: 1.6 K/UL (ref 0.8–3.5)
LYMPHOCYTES NFR BLD: 10 % (ref 12–49)
MCH RBC QN AUTO: 28.3 PG (ref 26–34)
MCHC RBC AUTO-ENTMCNC: 31.7 G/DL (ref 30–36.5)
MCV RBC AUTO: 89.1 FL (ref 80–99)
MONOCYTES # BLD: 1.1 K/UL (ref 0–1)
MONOCYTES NFR BLD: 7 % (ref 5–13)
NEUTS SEG # BLD: 11.5 K/UL (ref 1.8–8)
NEUTS SEG NFR BLD: 72 % (ref 32–75)
NRBC # BLD: 0.02 K/UL (ref 0–0.01)
NRBC BLD-RTO: 0.1 PER 100 WBC
PLATELET # BLD AUTO: 607 K/UL (ref 150–400)
PMV BLD AUTO: 8.6 FL (ref 8.9–12.9)
RBC # BLD AUTO: 3.75 M/UL (ref 3.8–5.2)
RBC MORPH BLD: ABNORMAL
SERVICE CMNT-IMP: NORMAL
WBC # BLD AUTO: 16 K/UL (ref 3.6–11)

## 2024-05-21 PROCEDURE — 6360000002 HC RX W HCPCS: Performed by: INTERNAL MEDICINE

## 2024-05-21 PROCEDURE — 97530 THERAPEUTIC ACTIVITIES: CPT

## 2024-05-21 PROCEDURE — 97535 SELF CARE MNGMENT TRAINING: CPT

## 2024-05-21 PROCEDURE — 6370000000 HC RX 637 (ALT 250 FOR IP): Performed by: INTERNAL MEDICINE

## 2024-05-21 PROCEDURE — 6370000000 HC RX 637 (ALT 250 FOR IP)

## 2024-05-21 PROCEDURE — 6370000000 HC RX 637 (ALT 250 FOR IP): Performed by: SURGERY

## 2024-05-21 PROCEDURE — 99232 SBSQ HOSP IP/OBS MODERATE 35: CPT | Performed by: INTERNAL MEDICINE

## 2024-05-21 PROCEDURE — 99222 1ST HOSP IP/OBS MODERATE 55: CPT | Performed by: PSYCHIATRY & NEUROLOGY

## 2024-05-21 PROCEDURE — 70450 CT HEAD/BRAIN W/O DYE: CPT

## 2024-05-21 PROCEDURE — 94640 AIRWAY INHALATION TREATMENT: CPT

## 2024-05-21 PROCEDURE — 2580000003 HC RX 258: Performed by: INTERNAL MEDICINE

## 2024-05-21 PROCEDURE — 85025 COMPLETE CBC W/AUTO DIFF WBC: CPT

## 2024-05-21 PROCEDURE — 97116 GAIT TRAINING THERAPY: CPT

## 2024-05-21 PROCEDURE — 36415 COLL VENOUS BLD VENIPUNCTURE: CPT

## 2024-05-21 PROCEDURE — 2060000000 HC ICU INTERMEDIATE R&B

## 2024-05-21 RX ADMIN — ACETAMINOPHEN 650 MG: 325 TABLET ORAL at 11:10

## 2024-05-21 RX ADMIN — OXYBUTYNIN CHLORIDE 10 MG: 5 TABLET, EXTENDED RELEASE ORAL at 20:49

## 2024-05-21 RX ADMIN — LAMOTRIGINE 25 MG: 25 TABLET ORAL at 09:54

## 2024-05-21 RX ADMIN — SODIUM CHLORIDE, PRESERVATIVE FREE 10 ML: 5 INJECTION INTRAVENOUS at 20:50

## 2024-05-21 RX ADMIN — DILTIAZEM HYDROCHLORIDE 240 MG: 240 CAPSULE, COATED, EXTENDED RELEASE ORAL at 17:27

## 2024-05-21 RX ADMIN — ALBUTEROL SULFATE 1 PUFF: 90 AEROSOL, METERED RESPIRATORY (INHALATION) at 07:46

## 2024-05-21 RX ADMIN — SPIRONOLACTONE 25 MG: 25 TABLET ORAL at 09:54

## 2024-05-21 RX ADMIN — PANTOPRAZOLE SODIUM 40 MG: 40 TABLET, DELAYED RELEASE ORAL at 06:55

## 2024-05-21 RX ADMIN — SODIUM CHLORIDE, PRESERVATIVE FREE 10 ML: 5 INJECTION INTRAVENOUS at 09:54

## 2024-05-21 RX ADMIN — FLUTICASONE PROPIONATE AND SALMETEROL 1 PUFF: 250; 50 POWDER RESPIRATORY (INHALATION) at 07:46

## 2024-05-21 RX ADMIN — GUAIFENESIN 600 MG: 600 TABLET, EXTENDED RELEASE ORAL at 20:48

## 2024-05-21 RX ADMIN — LAMOTRIGINE 25 MG: 25 TABLET ORAL at 20:49

## 2024-05-21 RX ADMIN — PIPERACILLIN SODIUM AND TAZOBACTAM SODIUM 3375 MG: 3; .375 INJECTION, POWDER, LYOPHILIZED, FOR SOLUTION INTRAVENOUS at 09:53

## 2024-05-21 RX ADMIN — ACETAMINOPHEN 650 MG: 325 TABLET ORAL at 15:22

## 2024-05-21 RX ADMIN — PIPERACILLIN SODIUM AND TAZOBACTAM SODIUM 3375 MG: 3; .375 INJECTION, POWDER, LYOPHILIZED, FOR SOLUTION INTRAVENOUS at 20:52

## 2024-05-21 RX ADMIN — ISOSORBIDE MONONITRATE 30 MG: 30 TABLET, EXTENDED RELEASE ORAL at 09:54

## 2024-05-21 RX ADMIN — FLUTICASONE PROPIONATE AND SALMETEROL 1 PUFF: 250; 50 POWDER RESPIRATORY (INHALATION) at 20:44

## 2024-05-21 RX ADMIN — GUAIFENESIN 600 MG: 600 TABLET, EXTENDED RELEASE ORAL at 09:54

## 2024-05-21 ASSESSMENT — PAIN SCALES - GENERAL
PAINLEVEL_OUTOF10: 0
PAINLEVEL_OUTOF10: 8
PAINLEVEL_OUTOF10: 10

## 2024-05-21 ASSESSMENT — PAIN DESCRIPTION - LOCATION: LOCATION: HEAD

## 2024-05-21 ASSESSMENT — PAIN DESCRIPTION - FREQUENCY: FREQUENCY: CONTINUOUS

## 2024-05-21 ASSESSMENT — PAIN DESCRIPTION - ONSET: ONSET: ON-GOING

## 2024-05-21 ASSESSMENT — PAIN DESCRIPTION - DESCRIPTORS: DESCRIPTORS: OTHER (COMMENT)

## 2024-05-21 ASSESSMENT — PAIN - FUNCTIONAL ASSESSMENT: PAIN_FUNCTIONAL_ASSESSMENT: ACTIVITIES ARE NOT PREVENTED

## 2024-05-21 ASSESSMENT — PAIN DESCRIPTION - PAIN TYPE: TYPE: CHRONIC PAIN

## 2024-05-21 ASSESSMENT — PAIN DESCRIPTION - ORIENTATION: ORIENTATION: ANTERIOR

## 2024-05-21 NOTE — CONSULTS
Neurology Consult Note     NAME: Heather Rich   :  1935   MRN:  [id number]   DATE:   2024       HPI:  Pt is an 89yo RH female admitted 24 with c/o constipation x 2 days and painful abdominal hernia. Found to have incarcerated ventral hernia s/p surgery on 24. Course complicated by Afib with RVR, currently on hep gtt, fever with ID consulted on 24 and imaging c/w possible superficial abdominal abscess. She was on PD at home, started on HD on admission while recovering from surgery. Neurology is consulted for c/o headache. She is seen with her grandson at the bedside. Pt reports HA has been intermittent since admission. She has difficulty telling me exactly how frequent the HA has been, is responding to tylenol. Pain is bifrontal ache, 8-10/10, no N/V/P/P, no vision changes except when her BP is elevated, she sees zigzags in one eye or the other.  SBP currently in 170-180's. Has noticed an increase in headache when moving her eyes. Has h/o MHAs. No h/o SAURABH. She notes poor appetite since admission, not pleased with her breakfast choices. She typically drinks tea in the morning at home, but has not been drinking it here most days.     PMH:  ESRD on HD  Epilepsy  Breast CA  HTN  BPPV  Hereditary spherocytosis  DM  PVD  MHA    ROS:  Per HPI      MEDS:  Home:  Current Outpatient Medications   Medication Instructions    albuterol (PROVENTIL) 2.5 mg, Nebulization, 4 TIMES DAILY PRN    albuterol sulfate HFA (PROVENTIL;VENTOLIN;PROAIR) 108 (90 Base) MCG/ACT inhaler USE 1 INHALATION EVERY 4 HOURS AS NEEDED FOR WHEEZING    arformoterol tartrate (BROVANA) 15 MCG/2ML NEBU USE 1 VIAL VIA NEBULIZER EVERY 12 HOURS    ascorbic acid (VITAMIN C) 500 mg, Oral    aspirin 81 mg, Oral, DAILY    budesonide (PULMICORT) 0.5 MG/2ML nebulizer suspension USE 1 VIAL VIA 
                       [address]  [address]                              CONSULTATION      PATIENT NAME: JOANNE NUÑEZ              : 1935  MED REC NO: [id number]                       ROOM: 444  ACCOUNT NO: [id number]                       ADMIT DATE: 2024  PROVIDER: Richardson Coates MD    DATE OF SERVICE:  2024    ATTENDING PHYSICIAN:  PIPE LÓPEZ    REASON FOR CONSULTATION:  Management of end-stage renal disease.    HISTORY OF PRESENT ILLNESS:  The patient is well known to me, 88-year-old peritoneal dialysis patient from [address] [place] [address].  The patient is very independent and living alone, doing her peritoneal dialysis without any difficulty.  It was noted in January that she has umbilical hernia.  The patient was sent for visualization study and surgical consult.  It was not felt that there is a need of immediate treatment or repair.  The patient called the on-call peritoneal dialysis nurse on May 4, stating that she has constipation, not feeling well, and has abdominal pain.  The patient was advised to go to the emergency room.  She was diagnosed with incarcerated hernia with a loop of her bowel trapped within it.  The patient already had surgical repair and she is expected to recover.  The patient still has residual kidney function.    PAST MEDICAL HISTORY:  Right breast cancer, benign positional vertigo, end-stage renal disease, arrhythmia, anemia, diabetes, hearing deficit on the left side, spherocytosis, hypertension, osteoporosis, and seizures.    PAST SURGICAL HISTORY:  Breast biopsy, breast lumpectomy, cataract removal, cholecystectomy, and placement of Tenckhoff catheter for PD.    ALLERGIES:  ALLERGIC TO POLLEN, DUST MITES, AND CODEINE.      MEDICATIONS:  At home, Brovana, Pulmicort, Micardis, Proventil, Lamictal, Procardia, Pravachol, Ventolin, Aldactone, Ditropan, Toprol, ascorbic acid, and meclizine.    FAMILY HISTORY:  
Chief Complaint:  Incarcerated ventral hernia with possible small bowel obstruction    HPI:  89 yo woman with multiple medical comorbidity including ESRD on peritoneal dialysis, seizures, breast cancer, HTN consulted for incarcerated ventral hernia.  Pt reported her hernia developed after having PD placed in November 2023.  Sh had gone to see Dr. Ronn Pierre for possible hernia repair but given multiple medical issues,decided best to hold off on operation unless hernia became a problem.  Pt reported developing abdominal pain yesterday and was unable to reduce hernia.  She reported nausea and 1 episode of vomiting.  She also reported chills.  CT scan showed incarcerated hernia with possible small bowel entrapment causing early bowel obstruction.   She is hypokalemic and has leukocytosis.     Past Medical History:   Diagnosis Date    Anemia     Arrhythmia     irregular per pt d/t blood disorder    Asthma     Axillary adenopathy 01/04/2021    3/1/21 md Gabe - Benign, reactive lymph nodes with follicular hyperplasia     Bed bug bite 01/18/2021    Bereavement 02/02/2016     die 85 copd,chf,pul htn pn after 53 yr marriage    BPV (benign positional vertigo)     BPV (benign positional vertigo) 08/20/2020    Breast cancer (HCC)     Breast cancer, right breast (HCC) 1994    right breast, lumpectomy and radiation    Breast lump 2017    right breast     CAP (community acquired pneumonia) 12/05/2021    New right basilar airspace disease may represent atelectasis or pneumonia    Chronic kidney disease 02/06/2019    kidney cyst - watching    Coagulation disorder (HCC)     SPHEROCYTOSIS  SICKLE CELL TRAIT    Diabetes (HCC)     controlled with diet    Early satiety     ESRD on peritoneal dialysis (HCC) 10/20/2021    dr. franklin    Fall (on)(from) sidewalk curb, sequela 12/28/2022    Hearing deficit, left     Hereditary spherocytosis (HCC)     History of colonoscopy with polypectomy 08/29/2023    md lucita 5 yrs    History of 
Patient is a PD patient from [address] [place] [address]. She just recently started PD. She still has some residual GFR  Hypokalemia- being replaced  Incarcerated hernia- awaiting surgery    Hold PD  After surgery she will need HD temporarily to allow Hernia repair to heal    I was notified about patient's admission by the PD RN on call  No formal consult was called  
See progress note  Pt to be seen today  
Range    Specimen FLDC     Color, Fluid STRAW      Appearance, Fluid CLEAR      RBC, Fluid >100 (H) 0 /cu mm    Total Nucleated Cell Count 405 /cu mm    Polys, Fluid 43 (A) NRRE %    Lymphocytes, Body Fluid 12 (A) NRRE %    Mono/Macro, Fluid 27 (A) NRRE %    MESOTHELIAL 18 (A) NRRE %   Culture, Body Fluid    Collection Time: 05/15/24  7:30 PM    Specimen: Peritoneal Dialysis Fluid; Body Fluid   Result Value Ref Range    Special Requests NO SPECIAL REQUESTS      Gram Stain OCCASIONAL WBCS SEEN      Gram Stain NO ORGANISMS SEEN      Gram Stain Smear Reviewed by Microbiology      Culture Culture performed on Unspun Fluid      Culture NO GROWTH THUS FAR     CBC    Collection Time: 05/16/24  3:00 AM   Result Value Ref Range    WBC 23.5 (H) 3.6 - 11.0 K/uL    RBC 2.98 (L) 3.80 - 5.20 M/uL    Hemoglobin 8.5 (L) 11.5 - 16.0 g/dL    Hematocrit 25.6 (L) 35.0 - 47.0 %    MCV 85.9 80.0 - 99.0 FL    MCH 28.5 26.0 - 34.0 PG    MCHC 33.2 30.0 - 36.5 g/dL    RDW 16.2 (H) 11.5 - 14.5 %    Platelets 500 (H) 150 - 400 K/uL    MPV 8.5 (L) 8.9 - 12.9 FL    Nucleated RBCs 0.4 (H) 0  WBC    nRBC 0.10 (H) 0.00 - 0.01 K/uL       Microbiology Data:       Blood: NGTD    Pathology Results:    Imaging: 4 x 3 x 1cm abscess anterior abdominal wall    Procedures:     Assessment / Plan:       87 y/o female admitted for incarcerated hernia now seen for PD catheter related peritonitis and leukocytosis.    Changed Ceftriaxone and Flagyl to Zosyn - may give a bit better coverage of nosocomial denice at this point.    Follow repeat cell count and culture from peritoneal fluid tomorrow.    Trend WBC.         Thank you Dr. Barney for the opportunity to participate in the care of this patient. Please contact with questions or concerns.     A total time of 60 minutes was spent on today's encounter.  Greater than 50% of the time was spent on the following:  Preparing for visit and chart review.  Obtaining and/or reviewing separately obtained 
at 100s  ECG: Afib with rapid VR, NSSTWA  Echocardiogram: not done    Labs:   Recent Results (from the past 24 hour(s))   POCT Glucose    Collection Time: 05/07/24 12:20 PM   Result Value Ref Range    POC Glucose 86 65 - 117 mg/dL    Performed by: Atilio Lou    POCT Glucose    Collection Time: 05/07/24 11:56 PM   Result Value Ref Range    POC Glucose 122 (H) 65 - 117 mg/dL    Performed by: RACHEL Fuentes    Comprehensive Metabolic Panel w/ Reflex to MG    Collection Time: 05/08/24  1:31 AM   Result Value Ref Range    Sodium 138 136 - 145 mmol/L    Potassium 3.7 3.5 - 5.1 mmol/L    Chloride 101 97 - 108 mmol/L    CO2 28 21 - 32 mmol/L    Anion Gap 9 5 - 15 mmol/L    Glucose 106 (H) 65 - 100 mg/dL    BUN 57 (H) 6 - 20 MG/DL    Creatinine 6.51 (H) 0.55 - 1.02 MG/DL    Bun/Cre Ratio 9 (L) 12 - 20      Est, Glom Filt Rate 6 (L) >60 ml/min/1.73m2    Calcium 7.9 (L) 8.5 - 10.1 MG/DL    Total Bilirubin 0.7 0.2 - 1.0 MG/DL    ALT 60 12 - 78 U/L    AST 38 (H) 15 - 37 U/L    Alk Phosphatase 94 45 - 117 U/L    Total Protein 7.9 6.4 - 8.2 g/dL    Albumin 2.1 (L) 3.5 - 5.0 g/dL    Globulin 5.8 (H) 2.0 - 4.0 g/dL    Albumin/Globulin Ratio 0.4 (L) 1.1 - 2.2     Phosphorus    Collection Time: 05/08/24  1:31 AM   Result Value Ref Range    Phosphorus 7.1 (H) 2.6 - 4.7 MG/DL   CBC with Auto Differential    Collection Time: 05/08/24  1:31 AM   Result Value Ref Range    WBC 21.5 (H) 3.6 - 11.0 K/uL    RBC 2.96 (L) 3.80 - 5.20 M/uL    Hemoglobin 8.3 (L) 11.5 - 16.0 g/dL    Hematocrit 24.6 (L) 35.0 - 47.0 %    MCV 83.1 80.0 - 99.0 FL    MCH 28.0 26.0 - 34.0 PG    MCHC 33.7 30.0 - 36.5 g/dL    RDW 14.5 11.5 - 14.5 %    Platelets 301 150 - 400 K/uL    MPV 9.3 8.9 - 12.9 FL    Nucleated RBCs 0.0 0  WBC    nRBC 0.00 0.00 - 0.01 K/uL    Neutrophils % 83 (H) 32 - 75 %    Lymphocytes % 5 (L) 12 - 49 %    Monocytes % 10 5 - 13 %    Eosinophils % 1 0 - 7 %    Basophils % 0 0 - 1 %    Immature Granulocytes % 1 (H) 0.0 - 0.5 %

## 2024-05-22 ENCOUNTER — HOSPITAL ENCOUNTER (OUTPATIENT)
Facility: HOSPITAL | Age: 89
Discharge: HOME | DRG: 353 | End: 2024-05-25
Payer: MEDICARE

## 2024-05-22 VITALS
HEART RATE: 74 BPM | HEIGHT: 62 IN | WEIGHT: 102 LBS | OXYGEN SATURATION: 97 % | TEMPERATURE: 98.2 F | SYSTOLIC BLOOD PRESSURE: 161 MMHG | RESPIRATION RATE: 19 BRPM | BODY MASS INDEX: 18.77 KG/M2 | DIASTOLIC BLOOD PRESSURE: 66 MMHG

## 2024-05-22 PROBLEM — K46.0 INCARCERATED HERNIA: Status: ACTIVE | Noted: 2024-05-22

## 2024-05-22 PROBLEM — R51.9 NONINTRACTABLE EPISODIC HEADACHE: Status: ACTIVE | Noted: 2024-05-22

## 2024-05-22 LAB
ALBUMIN SERPL-MCNC: 2.3 G/DL (ref 3.5–5)
ANION GAP SERPL CALC-SCNC: 8 MMOL/L (ref 5–15)
BASOPHILS # BLD: 0.2 K/UL (ref 0–0.1)
BASOPHILS NFR BLD: 1 % (ref 0–1)
BUN SERPL-MCNC: 50 MG/DL (ref 6–20)
BUN/CREAT SERPL: 7 (ref 12–20)
CALCIUM SERPL-MCNC: 9.1 MG/DL (ref 8.5–10.1)
CHLORIDE SERPL-SCNC: 98 MMOL/L (ref 97–108)
CO2 SERPL-SCNC: 25 MMOL/L (ref 21–32)
CREAT SERPL-MCNC: 7.25 MG/DL (ref 0.55–1.02)
DIFFERENTIAL METHOD BLD: ABNORMAL
EOSINOPHIL # BLD: 1.4 K/UL (ref 0–0.4)
EOSINOPHIL NFR BLD: 9 % (ref 0–7)
ERYTHROCYTE [DISTWIDTH] IN BLOOD BY AUTOMATED COUNT: 16.9 % (ref 11.5–14.5)
GLUCOSE SERPL-MCNC: 158 MG/DL (ref 65–100)
HCT VFR BLD AUTO: 27.5 % (ref 35–47)
HGB BLD-MCNC: 8.8 G/DL (ref 11.5–16)
IMM GRANULOCYTES # BLD AUTO: 0.3 K/UL (ref 0–0.04)
IMM GRANULOCYTES NFR BLD AUTO: 2 % (ref 0–0.5)
LYMPHOCYTES # BLD: 1.8 K/UL (ref 0.8–3.5)
LYMPHOCYTES NFR BLD: 12 % (ref 12–49)
MCH RBC QN AUTO: 28.1 PG (ref 26–34)
MCHC RBC AUTO-ENTMCNC: 32 G/DL (ref 30–36.5)
MCV RBC AUTO: 87.9 FL (ref 80–99)
MONOCYTES # BLD: 1.4 K/UL (ref 0–1)
MONOCYTES NFR BLD: 9 % (ref 5–13)
NEUTS SEG # BLD: 10.2 K/UL (ref 1.8–8)
NEUTS SEG NFR BLD: 67 % (ref 32–75)
NRBC # BLD: 0.02 K/UL (ref 0–0.01)
NRBC BLD-RTO: 0.1 PER 100 WBC
PHOSPHATE SERPL-MCNC: 5.8 MG/DL (ref 2.6–4.7)
PLATELET # BLD AUTO: 535 K/UL (ref 150–400)
PLATELET COMMENT: ABNORMAL
PMV BLD AUTO: 8.7 FL (ref 8.9–12.9)
POTASSIUM SERPL-SCNC: 3.7 MMOL/L (ref 3.5–5.1)
RBC # BLD AUTO: 3.13 M/UL (ref 3.8–5.2)
RBC MORPH BLD: ABNORMAL
SODIUM SERPL-SCNC: 131 MMOL/L (ref 136–145)
WBC # BLD AUTO: 15.3 K/UL (ref 3.6–11)

## 2024-05-22 PROCEDURE — 2500000003 HC RX 250 WO HCPCS: Performed by: PHYSICIAN ASSISTANT

## 2024-05-22 PROCEDURE — 6370000000 HC RX 637 (ALT 250 FOR IP): Performed by: INTERNAL MEDICINE

## 2024-05-22 PROCEDURE — 87040 BLOOD CULTURE FOR BACTERIA: CPT

## 2024-05-22 PROCEDURE — 6360000002 HC RX W HCPCS: Performed by: PHYSICIAN ASSISTANT

## 2024-05-22 PROCEDURE — 99232 SBSQ HOSP IP/OBS MODERATE 35: CPT | Performed by: INTERNAL MEDICINE

## 2024-05-22 PROCEDURE — 90935 HEMODIALYSIS ONE EVALUATION: CPT

## 2024-05-22 PROCEDURE — 2060000000 HC ICU INTERMEDIATE R&B

## 2024-05-22 PROCEDURE — 99231 SBSQ HOSP IP/OBS SF/LOW 25: CPT | Performed by: NURSE PRACTITIONER

## 2024-05-22 PROCEDURE — 2580000003 HC RX 258: Performed by: INTERNAL MEDICINE

## 2024-05-22 PROCEDURE — B5181ZA FLUOROSCOPY OF SUPERIOR VENA CAVA USING LOW OSMOLAR CONTRAST, GUIDANCE: ICD-10-PCS | Performed by: PHYSICIAN ASSISTANT

## 2024-05-22 PROCEDURE — 6360000002 HC RX W HCPCS: Performed by: INTERNAL MEDICINE

## 2024-05-22 PROCEDURE — 02H633Z INSERTION OF INFUSION DEVICE INTO RIGHT ATRIUM, PERCUTANEOUS APPROACH: ICD-10-PCS | Performed by: PHYSICIAN ASSISTANT

## 2024-05-22 PROCEDURE — 36415 COLL VENOUS BLD VENIPUNCTURE: CPT

## 2024-05-22 PROCEDURE — 36558 INSERT TUNNELED CV CATH: CPT

## 2024-05-22 PROCEDURE — 6370000000 HC RX 637 (ALT 250 FOR IP): Performed by: SURGERY

## 2024-05-22 PROCEDURE — 85025 COMPLETE CBC W/AUTO DIFF WBC: CPT

## 2024-05-22 PROCEDURE — 02PA33Z REMOVAL OF INFUSION DEVICE FROM HEART, PERCUTANEOUS APPROACH: ICD-10-PCS | Performed by: PHYSICIAN ASSISTANT

## 2024-05-22 PROCEDURE — 6370000000 HC RX 637 (ALT 250 FOR IP)

## 2024-05-22 PROCEDURE — 80069 RENAL FUNCTION PANEL: CPT

## 2024-05-22 PROCEDURE — 77001 FLUOROGUIDE FOR VEIN DEVICE: CPT

## 2024-05-22 PROCEDURE — 0JH63XZ INSERTION OF TUNNELED VASCULAR ACCESS DEVICE INTO CHEST SUBCUTANEOUS TISSUE AND FASCIA, PERCUTANEOUS APPROACH: ICD-10-PCS | Performed by: PHYSICIAN ASSISTANT

## 2024-05-22 RX ORDER — LIDOCAINE HYDROCHLORIDE 10 MG/ML
INJECTION, SOLUTION EPIDURAL; INFILTRATION; INTRACAUDAL; PERINEURAL PRN
Status: COMPLETED | OUTPATIENT
Start: 2024-05-22 | End: 2024-05-22

## 2024-05-22 RX ORDER — HEPARIN SODIUM 1000 [USP'U]/ML
INJECTION, SOLUTION INTRAVENOUS; SUBCUTANEOUS PRN
Status: COMPLETED | OUTPATIENT
Start: 2024-05-22 | End: 2024-05-22

## 2024-05-22 RX ADMIN — ACETAMINOPHEN 650 MG: 325 TABLET ORAL at 16:26

## 2024-05-22 RX ADMIN — DILTIAZEM HYDROCHLORIDE 240 MG: 240 CAPSULE, COATED, EXTENDED RELEASE ORAL at 16:51

## 2024-05-22 RX ADMIN — PIPERACILLIN SODIUM AND TAZOBACTAM SODIUM 3375 MG: 3; .375 INJECTION, POWDER, LYOPHILIZED, FOR SOLUTION INTRAVENOUS at 11:45

## 2024-05-22 RX ADMIN — SODIUM CHLORIDE, PRESERVATIVE FREE 10 ML: 5 INJECTION INTRAVENOUS at 21:31

## 2024-05-22 RX ADMIN — PIPERACILLIN SODIUM AND TAZOBACTAM SODIUM 3375 MG: 3; .375 INJECTION, POWDER, LYOPHILIZED, FOR SOLUTION INTRAVENOUS at 21:41

## 2024-05-22 RX ADMIN — EPOETIN ALFA-EPBX 10000 UNITS: 10000 INJECTION, SOLUTION INTRAVENOUS; SUBCUTANEOUS at 16:51

## 2024-05-22 RX ADMIN — GUAIFENESIN 600 MG: 600 TABLET, EXTENDED RELEASE ORAL at 21:29

## 2024-05-22 RX ADMIN — HEPARIN SODIUM 3600 UNITS: 1000 INJECTION, SOLUTION INTRAVENOUS; SUBCUTANEOUS at 10:39

## 2024-05-22 RX ADMIN — ACETAMINOPHEN 650 MG: 325 TABLET ORAL at 21:29

## 2024-05-22 RX ADMIN — OXYBUTYNIN CHLORIDE 10 MG: 5 TABLET, EXTENDED RELEASE ORAL at 21:30

## 2024-05-22 RX ADMIN — LIDOCAINE HYDROCHLORIDE 14 ML: 10 INJECTION, SOLUTION EPIDURAL; INFILTRATION; INTRACAUDAL; PERINEURAL at 10:28

## 2024-05-22 RX ADMIN — LAMOTRIGINE 25 MG: 25 TABLET ORAL at 21:29

## 2024-05-22 RX ADMIN — FLUTICASONE PROPIONATE AND SALMETEROL 1 PUFF: 250; 50 POWDER RESPIRATORY (INHALATION) at 21:29

## 2024-05-22 RX ADMIN — SODIUM CHLORIDE: 9 INJECTION, SOLUTION INTRAVENOUS at 11:44

## 2024-05-22 ASSESSMENT — PULMONARY FUNCTION TESTS
PIF_VALUE: 0

## 2024-05-22 ASSESSMENT — PAIN DESCRIPTION - LOCATION
LOCATION: HEAD
LOCATION: HEAD

## 2024-05-22 ASSESSMENT — PAIN SCALES - GENERAL
PAINLEVEL_OUTOF10: 8
PAINLEVEL_OUTOF10: 4
PAINLEVEL_OUTOF10: 8
PAINLEVEL_OUTOF10: 0

## 2024-05-22 NOTE — OR NURSING
TRANSFER - OUT REPORT:    Verbal report given to Ruth Ann SHELLEY on Heather Rich  being transferred to [facility] for routine progression of patient care       Report consisted of patient's Situation, Background, Assessment and   Recommendations(SBAR).     Information from the following report(s) Nurse Handoff Report, MAR, and Event Log was reviewed with the receiving nurse.           Lines:   Peripheral IV 05/14/24 Distal;Left;Dorsal Cephalic (Active)   Site Assessment Clean, dry & intact 05/22/24 0800   Line Status Flushed 05/22/24 0800   Line Care Connections checked and tightened 05/22/24 0800   Phlebitis Assessment No symptoms 05/22/24 0800   Infiltration Assessment 0 05/22/24 0800   Alcohol Cap Used Yes 05/22/24 0800   Dressing Status Clean, dry & intact 05/22/24 0800   Dressing Type Transparent 05/22/24 0800       Hemodialysis Central Access Right Neck (Active)   Continued need for line? Yes 05/22/24 0800   Site Assessment Clean, dry & intact 05/22/24 0800   CVC Lumen Status Brisk blood return;Flushed 05/20/24 1100   Venous Lumen Status Alcohol cap present 05/22/24 0800   Arterial Lumen Status Alcohol cap present 05/22/24 0800   Alcohol Cap Used Yes 05/22/24 0800   Line Care Connections checked and tightened 05/22/24 0800   Dressing Type Bacteriocidal;Transparent 05/22/24 0800   Date of Last Dressing Change 05/21/24 05/22/24 0800   Dressing Status Clean, dry & intact 05/22/24 0800   Dressing Intervention Dressing changed 05/17/24 1600   Dressing Change Due 05/24/24 05/22/24 0800   Verification by x-ray Yes 05/17/24 0850        Opportunity for questions and clarification was provided.      Patient transported with:  Monitor and RN.

## 2024-05-22 NOTE — FLOWSHEET NOTE
05/08/24 0548   Vital Signs   Temp 98.5 °F (36.9 °C)   Pain Assessment   Pain Assessment None - Denies Pain   Pain Level 0   Treatment   Time On 0606   Treatment Goal 1 liter   Observations & Evaluations   Level of Consciousness 0   Respiratory Quality/Effort Unlabored   O2 Device None (Room air)   Bilateral Breath Sounds Diminished   Skin Condition/Temp Warm   Abdomen Inspection Distended   Edema Periorbital   Technical Checks   Dialysis Machine No. 03   RO Machine Number [id number]   Dialyzer Lot No. [id number]   [place] Lot Number [id number]   All Connections Secure Yes   NS Bag Yes   Saline Line Double Clamped Yes   Dialyzer Revaclear 300   Prime Volume (mL) 200 mL   ICEBOAT I;C;E;B;O;A;T   RO Machine Log Sheet Completed Yes   Machine Alarm Self Test Completed;Passed   Air Foam Detector Tested;Proper Function   Extracorporeal Circuit Tested for Integrity Yes   Machine Conductivity 13.6   Manual Ph 7.4   Bleach Test (Neg) Yes   Bath Temperature 96.8 °F (36 °C)   Dialysis Bath   K+ (Potassium) 3   Ca+ (Calcium) 2.5   Na+ (Sodium) 138   HCO3 (Bicarb) 35   Bicarbonate Concentrate Lot No. [id number]   Acid Concentrate Lot No. [id number]   Treatment Initiation   Dialyze Hours 3   Treatment  Initiation Universal Precautions maintained;Lines secured to patient;Connections secured;Prime given;Venous Parameters set;Arterial Parameters set;Air foam detector engaged;Saline line double clamped;Revaclear Dialyzer   Hemodialysis Central Access Right Neck   Placement Date/Time: 05/06/24 1233   Present on Admission/Arrival: No  Inserted by: MANE Veras  Insertion Practices: Chlorohexadine skin antisepsis;Optimal catheter site selection;Sterile ultrasound technique;Hand hygiene;Maximal barrier precautio...   Continued need for line? Yes   Site Assessment Clean, dry & intact   CVC Lumen Status Flushed   Venous Lumen Status Flushed   Arterial Lumen Status Flushed   Line Care Cap changed;Chlorhexidine wipes;Ports 
   05/08/24 0726   Vital Signs   /71   Temp 98.4 °F (36.9 °C)   Pain Assessment   Pain Assessment None - Denies Pain   Pain Level 1   Post-Hemodialysis Assessment   Post-Treatment Procedures Blood returned;Catheter Capped, clamped with Saline x2 ports   Machine Disinfection Process Acid/Vinegar Clean;Heat Disinfect;Exterior Machine Disinfection   Rinseback Volume (ml) 300 ml   Blood Volume Processed (Liters) 27.8 L   Dialyzer Clearance Clear   Duration of Treatment (minutes) 84 minutes   Hemodialysis Intake (ml) 500 ml   Hemodialysis Output (ml) 702 ml   NET Removed (ml) 202   Tolerated Treatment Fair  (noted appx 45 mins into treatment HR increased up to the 140's; patient remained asymptomatic)   Bilateral Breath Sounds Diminished   Edema Periorbital   Physician Notified Yes  (Dr Barros)   Time Off 0726   Patient Disposition Other (Comment)  (patient remained in room 444)   Observations & Evaluations   Level of Consciousness 0   Respiratory Quality/Effort Unlabored   O2 Device None (Room air)   Skin Condition/Temp Warm   Abdomen Inspection Distended     Primary RN SBAR: Matthew Bird RN  Comments: Appx 45 minutes into treatment noted HR increasing to the 140's; informed Dr Barros of increased HR, current vital signs, uf goal, potassium was 3.7 this morning patient currently on 3k dialysate, and no apparent distress noted patient asymptomatic; per Dr Barros change dialysate to 4k and have primary nurse to do EKG; once EKG resulted informed Dr Barros that patient is having Afib RVR; per Dr Barros treatment stopped and Dr Barros will come by to assess patient; post treatment no apparent distress noted; patient talking with her daughter at the bedside  
   05/10/24 0804   Vital Signs   BP (!) 131/45   Temp 98.2 °F (36.8 °C)   Pulse 72   Respirations 18   Pain Assessment   Pain Assessment None - Denies Pain   Treatment   Time On 0819   Treatment Goal 2kg in 3hrs   Observations & Evaluations   Level of Consciousness 0   Oriented X 4   Heart Rhythm Regular   Respiratory Quality/Effort Unlabored   O2 Device None (Room air)   Bilateral Breath Sounds Diminished   Skin Condition/Temp Dry;Warm   Abdomen Inspection Distended   Technical Checks   Dialysis Machine No. 02   RO Machine Number [id number]   Dialyzer Lot No. [id number]   [place] Lot Number [id number]   All Connections Secure Yes   NS Bag Yes   Saline Line Double Clamped Yes   Dialyzer Revaclear 300   Prime Volume (mL) 200 mL   ICEBOAT I;C;E;B;O;A;T   RO Machine Log Sheet Completed Yes   Machine Alarm Self Test Passed;Completed   Air Foam Detector Tested;Proper Function;pH Reading   Extracorporeal Circuit Tested for Integrity Yes   Machine Conductivity 14   Manual Conductivity 14   Manual Ph 7.2   Bleach Test (Neg) Yes   Bath Temperature 96.8 °F (36 °C)   Dialysis Bath   K+ (Potassium) 3   Ca+ (Calcium) 2.5   Na+ (Sodium) 138   HCO3 (Bicarb) 35   Treatment Initiation   Dialyze Hours 3   Treatment  Initiation Universal Precautions maintained;Lines secured to patient;Connections secured;Prime given;Venous Parameters set;Arterial Parameters set;Air foam detector engaged;[place] Device;Saline line double clamped;Hemo-Safe Applied;Dialyzer;Revaclear Dialyzer;REV-300   Hemodialysis Central Access Right Neck   Placement Date/Time: 05/06/24 1233   Present on Admission/Arrival: No  Inserted by: MANE Veras  Insertion Practices: Chlorohexadine skin antisepsis;Optimal catheter site selection;Sterile ultrasound technique;Hand hygiene;Maximal barrier precautio...   Continued need for line? Yes   Site Assessment Clean, dry & intact   CVC Lumen Status Flushed   Venous Lumen Status Brisk blood return;Flushed   Arterial Lumen Status 
   05/10/24 1930   Vitals   Pulse 79   SpO2 100 %   Observations & Evaluations   Level of Consciousness 0   Heart Rhythm Regular   Respiratory Quality/Effort Unlabored   O2 Device None (Room air)   Skin Condition/Temp Dry;Warm   Abdomen Inspection Distended   Edema Periorbital   Periorbital Edema +1   Peritoneal Dialysis Catheter Mid lower abdomen   No placement date or time found.   Present on Admission/Arrival: Yes  Catheter Location: Mid lower abdomen   Status Accessed   Site Condition Clean, dry, intact   Dressing Status New dressing applied   Dressing Gauze  (gentamicin cream)   Date of Last Dressing Change 05/10/24   Dialysis Type Continuous cycling on hold   Exit Site Condition good   Catheter Care Given Yes   Peritoneal Dialysis (CAPD manual)   Exchange Number 1   Dianeal Solution Dextrose 1.5% in 2000 mL   Peritoneal Input Status Completed  (in increments of 200-300 4 x to flush post surgery)   Peritoneal Output Status Completed   Effluent Appearance [place]   Peritoneal Dialysis Volume In (ml) 1200 ml   Effluent Volume Out (mL) 1400 ml   Balance This Exchange (mL) 200 ml     Time Out (time): 1915 ICEBOAT  Primary RN SBAR: LORE Oden RN   Patient Education: PROCEDRAL  Hepatitis B Surface Ag   Date/Time Value Ref Range Status   05/06/2024 03:33 PM <0.10 Index Final     Hep B S Ab   Date/Time Value Ref Range Status   05/06/2024 03:33 PM <3.10 mIU/mL Final      Comments:  Orders, labs, consent chronic and code status reviewed.  Post hernia repair 5/5/24, performed first weekly flush since surgery dianeal 1.5% 200-300 in and out until clear, was originally pink in nature, dressing changed per policy.  Patient with frequent loose stools per her report.  Upon departure up to bedside toilet with patient care technician at her side. Sbar with primary nurse.    
   05/13/24 0908   Vital Signs   BP (!) 136/46   Temp 98.4 °F (36.9 °C)   Pulse 70   SpO2 100 %   Pain Assessment   Pain Assessment None - Denies Pain   Treatment   Time On 0905   Treatment Goal 2L   Observations & Evaluations   Level of Consciousness 0   Oriented X 4   Heart Rhythm Regular   Respiratory Quality/Effort Unlabored   O2 Device None (Room air)   Skin Condition/Temp Dry;Warm   Edema None   Technical Checks   Dialysis Machine No. 07   RO Machine Number [id number]   Dialyzer Lot No. [id number]   [place] Lot Number [id number]   All Connections Secure Yes   NS Bag Yes   Saline Line Double Clamped Yes   Dialyzer Revaclear 300   Prime Volume (mL) 200 mL   ICEBOAT I;C;E;B;O;A;T   RO Machine Log Sheet Completed Yes   Machine Alarm Self Test Completed;Passed   Air Foam Detector Tested;Proper Function;pH Reading   Extracorporeal Circuit Tested for Integrity Yes   Machine Conductivity 14.1   Machine Ph 7.4   Bleach Test (Neg) Yes   Bath Temperature 96.8 °F (36 °C)   Dialysis Bath   K+ (Potassium) 3   Ca+ (Calcium) 2.5   Na+ (Sodium) 138   HCO3 (Bicarb) 35   Treatment Initiation   Dialyze Hours 3   Treatment  Initiation Universal Precautions maintained;Lines secured to patient;Connections secured;Prime given;Venous Parameters set;Arterial Parameters set;Air foam detector engaged;Dialysate;Saline line double clamped;Dialyzer;Revaclear Dialyzer;REV-300   Hemodialysis Central Access Right Neck   Placement Date/Time: 05/06/24 1233   Present on Admission/Arrival: No  Inserted by: MANE Veras  Insertion Practices: Chlorohexadine skin antisepsis;Optimal catheter site selection;Sterile ultrasound technique;Hand hygiene;Maximal barrier precautio...   Continued need for line? Yes   Site Assessment Clean, dry & intact   CVC Lumen Status Flushed   Venous Lumen Status Brisk blood return   Arterial Lumen Status Brisk blood return   Alcohol Cap Used Yes   Line Care Ports disinfected   Dressing Type Bacteriocidal;Transparent   Date of 
   05/13/24 1210   Vital Signs   BP (!) 142/42   Temp 98.2 °F (36.8 °C)   Pulse 74   Respirations 20   Pain Assessment   Pain Assessment None - Denies Pain   Post-Hemodialysis Assessment   Post-Treatment Procedures Blood returned;Catheter Capped, clamped with Saline x2 ports   Machine Disinfection Process Exterior Machine Disinfection   Rinseback Volume (ml) 300 ml   Blood Volume Processed (Liters) 59 L   Dialyzer Clearance Lightly streaked   Duration of Treatment (minutes) 180 minutes   Hemodialysis Intake (ml) 500 ml   Hemodialysis Output (ml) 2500 ml   NET Removed (ml) 2000   Tolerated Treatment Good   Edema None   Observations & Evaluations   Level of Consciousness 0   Oriented X 4   Respiratory Quality/Effort Unlabored   O2 Device None (Room air)   Skin Condition/Temp Dry;Warm     Primary RN SBAR: Radha Oden, RN    Comments: HD tx completed, tolerated well  
   05/15/24 1920   Vitals   BP (!) 151/42   Temp 98.5 °F (36.9 °C)   Pulse 76   Respirations 18   SpO2 100 %   Observations & Evaluations   Level of Consciousness 0   Oriented X 4   Heart Rhythm Regular   Respiratory Quality/Effort Unlabored   O2 Device None (Room air)   Bilateral Breath Sounds Clear   Skin Color Ecchymosis (comment)   Skin Condition/Temp Warm;Dry   Abdomen Inspection Soft   Edema None   Peritoneal Dialysis Catheter Mid lower abdomen   No placement date or time found.   Present on Admission/Arrival: Yes  Catheter Location: Mid lower abdomen   Status Accessed   Site Condition Clean, dry, intact   Dressing Status Clean, dry & intact   Dressing Gauze   Date of Last Dressing Change 05/10/24   Dialysis Type Continuous ambulatory   Catheter Care Given Yes  (2 min alcavis scrub/soak)   Peritoneal Dialysis (CAPD manual)   Peritoneal Input Status Completed   Peritoneal Output Status Completed   Effluent Appearance Yellow;Clear   Peritoneal Dialysis Volume In (ml) 1000 ml   Effluent Volume Out (mL) 750 ml   Balance This Exchange (mL) -250 ml        05/15/24 1920   Vital Signs   BP (!) 151/42   Temp 98.5 °F (36.9 °C)   Pulse 76   Respirations 18   SpO2 100 %   Pain Assessment   Pain Assessment None - Denies Pain   Pain Level 0   Pain Location Head   Pain Descriptors Aching   Observations & Evaluations   Level of Consciousness 0   Oriented X 4   Heart Rhythm Regular   Respiratory Quality/Effort Unlabored   O2 Device None (Room air)   Bilateral Breath Sounds Clear   Skin Color Ecchymosis (comment)   Skin Condition/Temp Warm;Dry   Abdomen Inspection Soft   Edema None   Technical Checks   ICEBOAT I;C;E;B;O;A;T     Primary RN SBAR: TEZ Oden RN  Patient Education provided: PD catheter infection prevention, CAPD specimen collection process  Preferred Education method and Primary language: verbal explanation, English  Hospital associated wait time; reason: 0  Hepatitis B Surface Ag   Date/Time Value Ref Range Status 
2nd neb tx given for continued wheezing and cough. Although has decreased from frequent to moderate. Pt is more comfortable also after pain med given.  
Albuterol neb given at onset to pacu. Pt with audible inspiratory/expiratory wheezing and dry cough`.  
CAPD  05/17/24 1510   Vitals   BP (!) 128/45   Temp 98.2 °F (36.8 °C)   Pulse 89   Respirations 17   SpO2 100 %   Observations & Evaluations   Level of Consciousness 0   Oriented X 4   Heart Rhythm Regular   Respiratory Quality/Effort Unlabored   O2 Device None (Room air)   Bilateral Breath Sounds Clear   Skin Condition/Temp Warm;Dry   Abdomen Inspection Soft   Edema None   Peritoneal Dialysis Catheter Mid lower abdomen   No placement date or time found.   Present on Admission/Arrival: Yes  Catheter Location: Mid lower abdomen   Status Accessed   Site Condition Clean, dry, intact   Dressing Status New dressing applied   Dressing Gauze   Date of Last Dressing Change 05/17/24   Dialysis Type Continuous ambulatory   Exit Site Condition good   Catheter Care Given   (2 min alcavis scrub/soak)   Peritoneal Dialysis (CAPD manual)   Dianeal Solution Dextrose 2.5% in 2000 mL   Bag Weight (g) 2 g   Peritoneal Input Status Completed   Peritoneal Output Status Completed   Peritoneal Dialysis Volume In (ml) 1000 ml   Dwell Time (Hours:Minutes) 2   Effluent Volume Out (mL) 0 ml   Balance This Exchange (mL) -1000 ml     Hepatitis B Surface Ag   Date/Time Value Ref Range Status   05/06/2024 03:33 PM <0.10 Index Final            Hep B S Ab   Date/Time Value Ref Range Status   05/06/2024 03:33 PM <3.10 mIU/mL Final   Time Out (time): 1400  Primary RN SBAR: TEZ Oden, RN  Patient Education: Infection prevention  Comment: Initial drain completed prior to 1L fill for specimen collection.     Start time: 1510  Est end time: 1710  
CAPD Drain/specimen collection: 05/17/24 1840   Vitals   BP (!) 114/55   Pulse 97   Respirations 24   SpO2 100 %   Observations & Evaluations   Level of Consciousness 0   Oriented X 4   Heart Rhythm Regular   Respiratory Quality/Effort Unlabored   O2 Device None (Room air)   Bilateral Breath Sounds Clear   Skin Condition/Temp Warm;Dry   Appetite Good   Abdomen Inspection Soft   Edema None   Peritoneal Dialysis (CAPD manual)   Peritoneal Output Status Completed   Effluent Appearance Clear;Yellow   Effluent Volume Out (mL) 1000 ml     Hepatitis B Surface Ag   Date/Time Value Ref Range Status   05/06/2024 03:33 PM <0.10 Index Final     Hep B S Ab   Date/Time Value Ref Range Status   05/06/2024 03:33 PM <3.10 mIU/mL Final     Time Out (time): 1840  Primary RN SBAR: TAMMY Coto RN  Patient Education: infection prevention  Comment: CAPD drain performed for ordered cell count.  
CAPD: 05/15/24 1400   Vitals   BP (!) 138/46   Temp 97.9 °F (36.6 °C)   Pulse 74   Respirations 18   Observations & Evaluations   Level of Consciousness 0   Oriented X 4   Heart Rhythm Regular   Respiratory Quality/Effort Unlabored   O2 Device None (Room air)   Bilateral Breath Sounds Clear   Skin Condition/Temp Warm;Dry   Appetite Good   Abdomen Inspection Soft   Edema None   Peritoneal Dialysis Catheter Mid lower abdomen   No placement date or time found.   Present on Admission/Arrival: Yes  Catheter Location: Mid lower abdomen   Status Accessed   Dressing Status Clean, dry & intact   Dressing Gauze   Date of Last Dressing Change 05/10/24   Dialysis Type Continuous ambulatory   Catheter Care Given Yes  (2 min alcavis scrub/soak)   Peritoneal Dialysis (CAPD manual)   Dianeal Solution Dextrose 1.5% in 2000 mL   Peritoneal Input Status Completed   Peritoneal Output Status Completed   Peritoneal Dialysis Volume In (ml) 1000 ml   Effluent Volume Out (mL) 0 ml   Balance This Exchange (mL) -1000 ml     Hepatitis B Surface Ag   Date/Time Value Ref Range Status   05/06/2024 03:33 PM <0.10 Index Final     Hep B S Ab   Date/Time Value Ref Range Status   05/06/2024 03:33 PM <3.10 mIU/mL Final   Time Out (time): 1400  Primary RN SBAR: TEZ Oden, RN  Patient Education: Infection prevention  Comment: Initial drain completed prior to 1L fill for specimen collection.    Start time: 1400  Est end time: 1600  
PRE HD: 05/15/24 0910   Vital Signs   BP (!) 128/46   Temp 98.6 °F (37 °C)   Pulse 68   Respirations 16   SpO2 100 %   Pain Assessment   Pain Assessment None - Denies Pain   Treatment   Time On 0910   Treatment Goal 3hrs, 2L   Observations & Evaluations   Level of Consciousness 0   Oriented X 4   Heart Rhythm Regular   Respiratory Quality/Effort Unlabored   O2 Device None (Room air)   Bilateral Breath Sounds Clear   Skin Condition/Temp Warm;Dry   Abdomen Inspection Soft   Edema None   Technical Checks   Dialysis Machine No. 07   RO Machine Number R07   Dialyzer Lot No. [id number]   [place] Lot Number [id number]   All Connections Secure Yes   NS Bag Yes   Saline Line Double Clamped Yes   Dialyzer Revaclear 300   Prime Volume (mL) 200 mL   ICEBOAT I;C;E;B;O;A;T   RO Machine Log Sheet Completed Yes   Machine Alarm Self Test Completed;Passed   Air Foam Detector Tested;Proper Function   Extracorporeal Circuit Tested for Integrity Yes   Machine Conductivity 14.3   Manual Conductivity 14.3   Manual Ph 7.4   Bleach Test (Neg) Yes   Bath Temperature 96.8 °F (36 °C)   Dialysis Bath   K+ (Potassium) 3   Ca+ (Calcium) 2.5   Na+ (Sodium) 138   HCO3 (Bicarb) 35   Bicarbonate Concentrate Lot No. [id number]   Acid Concentrate Lot No. [id number]   Treatment Initiation   Dialyze Hours 3   Treatment  Initiation [place] Precautions maintained;Connections secured;Prime given;Venous Parameters set;Arterial Parameters set;Air foam detector engaged;Dialysate;Saline line double clamped;Revaclear Dialyzer   Hemodialysis Central Access Right Neck   Placement Date/Time: 05/06/24 1233   Present on Admission/Arrival: No  Inserted by: MANE Veras  Insertion Practices: Chlorohexadine skin antisepsis;Optimal catheter site selection;Sterile ultrasound technique;Hand hygiene;Maximal barrier precautio...   Continued need for line? Yes   Site Assessment Clean, dry & intact   Venous Lumen Status Brisk blood return;Flushed;Infusing   Arterial Lumen 
Primary RN SBAR: BRITTANY Galaviz RN  Patient Education provided: Procedural, labs, fluid management and diet  Preferred Education method and Primary language: Verbal, English  Hospital associated wait time; reason: none    Hepatitis B Surface Ag   Date/Time Value Ref Range Status   05/06/2024 03:33 PM <0.10 Index Final     Hep B S Ab   Date/Time Value Ref Range Status   05/06/2024 03:33 PM <3.10 mIU/mL Final       05/22/24 1250   Vital Signs   BP (!) 176/57   Temp 98.5 °F (36.9 °C)   Pulse 79   Respirations 21   SpO2 99 %   Weight - Scale 49.6 kg (109 lb 5.6 oz)   Weight Method Standing scale   Percent Weight Change 0   Pain Assessment   Pain Assessment None - Denies Pain   Treatment   Time On 1250   Treatment Goal 1.5-2L   Observations & Evaluations   Level of Consciousness 0   Oriented X 4   Heart Rhythm Regular   Respiratory Quality/Effort Unlabored   O2 Device None (Room air)   Skin Color Other (comment)  (appropriate)   Skin Condition/Temp Dry;Warm;Fragile   Appetite Good   Abdomen Inspection Soft;Other (Comment)  (pd cath)   Edema None   Comments Pt alert, appropriate, VSS / BP slightly elevated, on tele, no distress or pain. Labs reviewed/ drawn. Procedure explained.   Technical Checks   Dialysis Machine No. 07   RO Machine Number R07   Dialyzer Lot No. [id number]   [place] Lot Number [id number]   All Connections Secure Yes   NS Bag Yes   Saline Line Double Clamped Yes   Dialyzer Revaclear 300   Prime Volume (mL) 200 mL   ICEBOAT I;C;E;B;O;A;T   RO Machine Log Sheet Completed Yes   Machine Alarm Self Test Completed;Passed   Air Foam Detector Tested;Proper Function   Extracorporeal Circuit Tested for Integrity Yes   Machine Conductivity 13.8   Manual Conductivity 14.1   Manual Ph 7.4   Bleach Test (Neg) Yes   Bath Temperature 96.8 °F (36 °C)   Dialysis Bath   K+ (Potassium) 3   Ca+ (Calcium) 2.5   Na+ (Sodium) 138   HCO3 (Bicarb) 35   Bicarbonate Concentrate Lot No. [id number]   Acid Concentrate Lot No. [id number] 
Primary RN SBAR: Laya Galaviz RN  Patient Education provided: Ordered Dialysis Treatment; Catheter Infection Prevention  Preferred Education method and Primary language: Verbal; English  Hospital associated wait time; reason: N/A  Hepatitis B Surface Ag   Date/Time Value Ref Range Status   05/06/2024 03:33 PM <0.10 Index Final     Hep B S Ab   Date/Time Value Ref Range Status   05/06/2024 03:33 PM <3.10 mIU/mL Final        05/20/24 1100   Vital Signs   BP (!) 149/58   Temp 98.2 °F (36.8 °C)   Pulse 73   Respirations 21   SpO2 99 %   Pain Assessment   Pain Assessment None - Denies Pain   Pain Level 0   Treatment   Time On 1106   Treatment Goal 1.5-2L as tolerated   Observations & Evaluations   Level of Consciousness 0   Oriented X x4   Heart Rhythm Regular   Respiratory Quality/Effort Unlabored   O2 Device None (Room air)   Bilateral Breath Sounds Clear   Skin Color Hyperpigmentation   Skin Condition/Temp Dry;Warm   Appetite Fair   Abdomen Inspection Soft   Bowel Sounds (All Quadrants) Active   Edema None   Technical Checks   Dialysis Machine No. 08   RO Machine Number 08   Dialyzer Lot No. [id number]   [place] Lot Number [id number]   All Connections Secure Yes   NS Bag Yes   Saline Line Double Clamped Yes   Dialyzer Revaclear 300   Prime Volume (mL) 200 mL   ICEBOAT I;C;E;B;O;A;T   RO Machine Log Sheet Completed Yes   Machine Alarm Self Test Completed;Passed   Air Foam Detector Tested;Proper Function;pH Reading   Extracorporeal Circuit Tested for Integrity Yes   Machine Conductivity 13.8   Bleach Test (Neg) Yes   Bath Temperature 98.6 °F (37 °C)   Dialysis Bath   K+ (Potassium) 3   Ca+ (Calcium) 2.5   Na+ (Sodium) 138   HCO3 (Bicarb) 35   Treatment Initiation   Dialyze Hours 3   Treatment  Initiation Universal Precautions maintained;Lines secured to patient;Connections secured;Prime given;Venous Parameters set;Arterial Parameters set;Air foam detector engaged;Saline line double clamped;Revaclear Dialyzer;REV-300 
Primary RN SBAR: [place] Galaviz RN  Comments: Tolerated treatment well.      05/20/24 1415   Vital Signs   BP (!) 155/55   Temp 98.2 °F (36.8 °C)   Pulse 83   Respirations 21   SpO2 97 %   Pain Assessment   Pain Assessment None - Denies Pain   Pain Level 0   Post-Hemodialysis Assessment   Post-Treatment Procedures Blood returned;Catheter Capped, clamped with Saline x2 ports   Machine Disinfection Process Exterior Machine Disinfection   Rinseback Volume (ml) 300 ml   Blood Volume Processed (Liters) 63.6 L   Dialyzer Clearance Lightly streaked   Duration of Treatment (minutes)   (3 hours)   Hemodialysis Intake (ml) 700 ml   Hemodialysis Output (ml) 2700 ml   NET Removed (ml) 2000   Tolerated Treatment Good   Patient Response to Treatment Tolerated treatment well   Bilateral Breath Sounds Clear   Edema None   Time Off 1406   Patient Disposition Other (Comment)  (remain in room)   Observations & Evaluations   Level of Consciousness 0   Oriented X x4   Heart Rhythm Regular   Respiratory Quality/Effort Unlabored   O2 Device None (Room air)   Skin Color Hyperpigmentation   Skin Condition/Temp Dry;Warm   Appetite Fair   Abdomen Inspection Soft   Bowel Sounds (All Quadrants) Active        05/20/24 1630   Hemodialysis Central Access Right Neck   Placement Date/Time: 05/06/24 1233   Present on Admission/Arrival: No  Inserted by: MANE Veras  Insertion Practices: Chlorohexadine skin antisepsis;Optimal catheter site selection;Sterile ultrasound technique;Hand hygiene;Maximal barrier precautio...   Continued need for line? Yes   Site Assessment Clean, dry & intact   Venous Lumen Status Brisk blood return;Normal saline locked   Arterial Lumen Status Brisk blood return;Normal saline locked   Line Care Cap changed;Connections checked and tightened   Dressing Type Bacteriocidal;Sterile dressing, transparent   Date of Last Dressing Change 05/17/24   Dressing Status Clean, dry & intact     
  Venous Lumen Status Brisk blood return;Flushed;Infusing   Arterial Lumen Status Brisk blood return;Flushed;Infusing   Line Care Ports disinfected;Connections checked and tightened;Chlorhexidine wipes   Dressing Type Bacteriocidal;Sterile dressing, transparent   Date of Last Dressing Change 05/06/24   Dressing Status Clean, dry & intact   Dressing Intervention New   Verification by x-ray Yes   Primary RN SBAR: Ling Paiz   Patient Education provided: Treatment process explained.   Preferred Education method and Primary language: Verbal; English    Hepatitis B Surface Ag   Date/Time Value Ref Range Status   05/06/2024 03:33 PM <0.10 Index Final     Hep B S Ab   Date/Time Value Ref Range Status   05/06/2024 03:33 PM <3.10 mIU/mL Final       05/06/24 2315   Vital Signs   BP (!) 125/55   Temp 99 °F (37.2 °C)   Respirations 23   SpO2 100 %   Pain Assessment   Pain Assessment None - Denies Pain   Post-Hemodialysis Assessment   Post-Treatment Procedures Blood returned;Catheter Capped, clamped with Saline x2 ports   Machine Disinfection Process Exterior Machine Disinfection   Rinseback Volume (ml) 250 ml   Blood Volume Processed (Liters) 58 L   Dialyzer Clearance Clear   Duration of Treatment (minutes) 180 minutes   Hemodialysis Intake (ml) 500 ml   Hemodialysis Output (ml) 1500 ml   NET Removed (ml) 1000   Tolerated Treatment Good   Patient Response to Treatment Good   Bilateral Breath Sounds Clear   Edema Periorbital   Periorbital Edema +2   Time Off 2315   Patient Disposition   (Remain in room)   Observations & Evaluations   Level of Consciousness 0   Oriented X 4   Respiratory Quality/Effort Dyspnea with exertion   O2 Device None (Room air)   Skin Condition/Temp Warm;Dry   Appetite Fair   Abdomen Inspection Distended;Surgical scar;Other (Comment)  (PD Cath)   Primary RN SBAR: Matthew Bird   Comments: Patient tolerated treatment well. Patient had a lot of questions about Hemodialysis and how different it is from PD. 
lowest position.

## 2024-05-23 VITALS
HEIGHT: 61 IN | RESPIRATION RATE: 18 BRPM | BODY MASS INDEX: 20.06 KG/M2 | OXYGEN SATURATION: 100 % | WEIGHT: 106.26 LBS | HEART RATE: 93 BPM | SYSTOLIC BLOOD PRESSURE: 134 MMHG | DIASTOLIC BLOOD PRESSURE: 49 MMHG | TEMPERATURE: 98.5 F

## 2024-05-23 LAB
BASOPHILS # BLD: 0.1 K/UL (ref 0–0.1)
BASOPHILS NFR BLD: 1 % (ref 0–1)
COMMENT:: NORMAL
DIFFERENTIAL METHOD BLD: ABNORMAL
EOSINOPHIL # BLD: 1.2 K/UL (ref 0–0.4)
EOSINOPHIL NFR BLD: 8 % (ref 0–7)
ERYTHROCYTE [DISTWIDTH] IN BLOOD BY AUTOMATED COUNT: 17.2 % (ref 11.5–14.5)
HCT VFR BLD AUTO: 30.9 % (ref 35–47)
HGB BLD-MCNC: 9.9 G/DL (ref 11.5–16)
IMM GRANULOCYTES # BLD AUTO: 0.3 K/UL (ref 0–0.04)
IMM GRANULOCYTES NFR BLD AUTO: 2 % (ref 0–0.5)
LYMPHOCYTES # BLD: 1.6 K/UL (ref 0.8–3.5)
LYMPHOCYTES NFR BLD: 11 % (ref 12–49)
MCH RBC QN AUTO: 28.5 PG (ref 26–34)
MCHC RBC AUTO-ENTMCNC: 32 G/DL (ref 30–36.5)
MCV RBC AUTO: 89 FL (ref 80–99)
MONOCYTES # BLD: 1.6 K/UL (ref 0–1)
MONOCYTES NFR BLD: 11 % (ref 5–13)
NEUTS SEG # BLD: 9.7 K/UL (ref 1.8–8)
NEUTS SEG NFR BLD: 67 % (ref 32–75)
NRBC # BLD: 0.02 K/UL (ref 0–0.01)
NRBC BLD-RTO: 0.1 PER 100 WBC
PLATELET # BLD AUTO: 483 K/UL (ref 150–400)
PLATELET COMMENT: ABNORMAL
PMV BLD AUTO: 8.2 FL (ref 8.9–12.9)
RBC # BLD AUTO: 3.47 M/UL (ref 3.8–5.2)
RBC MORPH BLD: ABNORMAL
SPECIMEN HOLD: NORMAL
WBC # BLD AUTO: 14.5 K/UL (ref 3.6–11)

## 2024-05-23 PROCEDURE — 6370000000 HC RX 637 (ALT 250 FOR IP)

## 2024-05-23 PROCEDURE — 97535 SELF CARE MNGMENT TRAINING: CPT

## 2024-05-23 PROCEDURE — 36415 COLL VENOUS BLD VENIPUNCTURE: CPT

## 2024-05-23 PROCEDURE — 2580000003 HC RX 258: Performed by: INTERNAL MEDICINE

## 2024-05-23 PROCEDURE — 6370000000 HC RX 637 (ALT 250 FOR IP): Performed by: INTERNAL MEDICINE

## 2024-05-23 PROCEDURE — 85025 COMPLETE CBC W/AUTO DIFF WBC: CPT

## 2024-05-23 PROCEDURE — 6360000002 HC RX W HCPCS: Performed by: INTERNAL MEDICINE

## 2024-05-23 PROCEDURE — 99238 HOSP IP/OBS DSCHRG MGMT 30/<: CPT | Performed by: INTERNAL MEDICINE

## 2024-05-23 PROCEDURE — 6370000000 HC RX 637 (ALT 250 FOR IP): Performed by: SURGERY

## 2024-05-23 RX ORDER — POLYETHYLENE GLYCOL 3350 17 G/17G
17 POWDER, FOR SOLUTION ORAL DAILY PRN
Qty: 527 G | Refills: 1 | Status: SHIPPED | OUTPATIENT
Start: 2024-05-23

## 2024-05-23 RX ORDER — LIDOCAINE 40 MG/G
CREAM TOPICAL PRN
Status: DISCONTINUED | OUTPATIENT
Start: 2024-05-23 | End: 2024-05-23 | Stop reason: HOSPADM

## 2024-05-23 RX ORDER — LIDOCAINE 40 MG/G
CREAM TOPICAL
Qty: 28 G | Refills: 2 | Status: SHIPPED | OUTPATIENT
Start: 2024-05-23

## 2024-05-23 RX ORDER — CEFTAZIDIME 1 G/1
1 INJECTION, POWDER, FOR SOLUTION INTRAMUSCULAR; INTRAVENOUS
Qty: 5 EACH | Refills: 0 | Status: SHIPPED
Start: 2024-05-24 | End: 2024-06-03

## 2024-05-23 RX ORDER — METRONIDAZOLE 500 MG/1
500 TABLET ORAL 3 TIMES DAILY
Qty: 33 TABLET | Refills: 0 | Status: SHIPPED
Start: 2024-05-23 | End: 2024-06-03

## 2024-05-23 RX ORDER — GENTAMICIN SULFATE 1 MG/G
CREAM TOPICAL
Qty: 15 G | Refills: 0 | Status: SHIPPED | OUTPATIENT
Start: 2024-05-23

## 2024-05-23 RX ORDER — GENTAMICIN SULFATE 1 MG/G
CREAM TOPICAL PRN
Status: DISCONTINUED | OUTPATIENT
Start: 2024-05-23 | End: 2024-05-23 | Stop reason: HOSPADM

## 2024-05-23 RX ORDER — DILTIAZEM HYDROCHLORIDE 240 MG/1
240 CAPSULE, COATED, EXTENDED RELEASE ORAL EVERY EVENING
Qty: 30 CAPSULE | Refills: 3 | Status: SHIPPED | OUTPATIENT
Start: 2024-05-23

## 2024-05-23 RX ORDER — GUAIFENESIN 600 MG/1
600 TABLET, EXTENDED RELEASE ORAL 2 TIMES DAILY
Qty: 30 TABLET | Refills: 3 | Status: SHIPPED | OUTPATIENT
Start: 2024-05-23

## 2024-05-23 RX ORDER — PANTOPRAZOLE SODIUM 40 MG/1
40 TABLET, DELAYED RELEASE ORAL
Qty: 30 TABLET | Refills: 3 | Status: SHIPPED | OUTPATIENT
Start: 2024-05-24

## 2024-05-23 RX ADMIN — SODIUM CHLORIDE, PRESERVATIVE FREE 10 ML: 5 INJECTION INTRAVENOUS at 08:24

## 2024-05-23 RX ADMIN — ISOSORBIDE MONONITRATE 30 MG: 30 TABLET, EXTENDED RELEASE ORAL at 08:24

## 2024-05-23 RX ADMIN — PANTOPRAZOLE SODIUM 40 MG: 40 TABLET, DELAYED RELEASE ORAL at 06:27

## 2024-05-23 RX ADMIN — ACETAMINOPHEN 650 MG: 325 TABLET ORAL at 15:36

## 2024-05-23 RX ADMIN — LIDOCAINE 4%: 4 CREAM TOPICAL at 03:27

## 2024-05-23 RX ADMIN — SPIRONOLACTONE 25 MG: 25 TABLET ORAL at 08:25

## 2024-05-23 RX ADMIN — LAMOTRIGINE 25 MG: 25 TABLET ORAL at 08:24

## 2024-05-23 RX ADMIN — PIPERACILLIN SODIUM AND TAZOBACTAM SODIUM 3375 MG: 3; .375 INJECTION, POWDER, LYOPHILIZED, FOR SOLUTION INTRAVENOUS at 09:39

## 2024-05-23 RX ADMIN — FLUTICASONE PROPIONATE AND SALMETEROL 1 PUFF: 250; 50 POWDER RESPIRATORY (INHALATION) at 08:27

## 2024-05-23 RX ADMIN — GUAIFENESIN 600 MG: 600 TABLET, EXTENDED RELEASE ORAL at 08:25

## 2024-05-23 RX ADMIN — ACETAMINOPHEN 650 MG: 325 TABLET ORAL at 02:15

## 2024-05-23 RX ADMIN — ACETAMINOPHEN 650 MG: 325 TABLET ORAL at 08:24

## 2024-05-23 ASSESSMENT — PAIN - FUNCTIONAL ASSESSMENT: PAIN_FUNCTIONAL_ASSESSMENT: ACTIVITIES ARE NOT PREVENTED

## 2024-05-23 ASSESSMENT — PAIN SCALES - GENERAL
PAINLEVEL_OUTOF10: 5
PAINLEVEL_OUTOF10: 6
PAINLEVEL_OUTOF10: 8
PAINLEVEL_OUTOF10: 6

## 2024-05-23 ASSESSMENT — PAIN DESCRIPTION - ORIENTATION
ORIENTATION: RIGHT
ORIENTATION: RIGHT;LEFT

## 2024-05-23 ASSESSMENT — PAIN DESCRIPTION - DESCRIPTORS: DESCRIPTORS: ACHING

## 2024-05-23 ASSESSMENT — PAIN DESCRIPTION - LOCATION
LOCATION: HEAD
LOCATION: HEAD

## 2024-05-23 NOTE — CARE COORDINATION
Barrier:  Order for home IV abx noted by ID.. However, patient does not have PICC line nor Midline.  There are no orders for PICC Line nor Midline.  KARSTEN Christopher has left message for Dr. Barney to clarify.    Transport placed on hold until this issue is addressed.    ROSEMARIE fakalig clinicals and called [facility] with update re new IV abx orders.

## 2024-05-23 NOTE — CARE COORDINATION
Brief Note:    Patient has been accepted at [facility] and their HD (Dialysis Den).  CM to coordinate transport after 3pm today.    MARIA INES Madsen, CRM  [phone]

## 2024-05-23 NOTE — PROGRESS NOTES
Neurology Progress Note     NAME: Heather Rich   :  1935   MRN:  [id number]   DATE:  2024    Assessment:     Principal Problem:    Incarcerated ventral hernia  Active Problems:    HTN (hypertension)    ESRD on peritoneal dialysis (HCC)    SBO (small bowel obstruction) (HCC)    Dilated cbd, acquired    Type 2 diabetes mellitus with kidney complication, without long-term current use of insulin (HCC)    Leukocytosis    Abdominal wall abscess    Peritonitis (HCC)    Iron deficiency    Thrombocytosis    Nonintractable episodic headache    Incarcerated hernia  Resolved Problems:    * No resolved hospital problems. *    Patient is an 88 year-old R-handed female who was admitted on 24 with c/o constipation x 2 days and painful abdominal hernia. She was found to have an incarcerated ventral hernia and s/p surgery on 24. Her course has been complicated by Afib with RVR and fever. ID was consulted on 24 and imaging c/w possible superficial abdominal abscess. She was on peritoneal dialysis at home and started on HD on admission while recovering from surgery. She had a headache intermittently since admission with bifrontal aching 8-10/10, no N/V photophobia and phonophobia. She has no vision changes except when her BP is elevated and she sees \"zigzags\" in one eye or the other. Headache was responding to Tylenol yesterday. She states headache has resolved today and she has not required any pain meds today.   Exam- non-toxic , no neck stiffness, and non-focal. CT head negative for hemorrhage or other source of headache.     Headache, now resolved- likely benign etiology. Caffeine w/d due to decreased intake since hospitalized, as well as due to HD. Primary HD associated headache or secondary HD associated HA due to metabolic changes with HD.  Plan:   - No 
        [facility]                                                                                          Hospitalist Progress Note  Cornelio Ziegler MD  Office Phone: [phone]        Date of Service:  2024  NAME:  Heather Rich  :  1935  MRN:  [id number]       Admission Summary:   Patient was admitted on  for incarcerated ventral hernia with small bowel obstruction and underwent emergency surgery on the same day.       Interval history / Subjective:   .Status post repair of incarcerated ventral hernia    Weekend coverage for Dr. Barney  Patient lying comfortably, no nausea, abdominal pain.  Offers no complaints.       Assessment & Plan:     SBO due to incarcerated ventral hernia s/p repair point 2024.  General surgery following.  Patient tolerating diet.  Abdominal wall abscess.  PD peritonitis.  Status post drainage.  On Zosyn  -Transitioned from PD to HD  ESRD, PTA on peritoneal dialysis, now transition to HD due to peritonitis/abdominal abscess.  Access: Right IJ PC     Anemia of chronic disease, Epogen per nephrology.  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  Currently on Cardizem.  She manage pacemaker, cardiology following.  History of asthma, currently not bronchospastic: Continue maintenance bronchodilators  Type II DM, diet controlled: Accu-Cheks with Humalog correction, hypoglycemic protocol  Seizure disorder: Continue home medications.  History of right breast CA s/p lumpectomy/XRT     Code status: Full code  Prophylaxis: SCD.  Care Plan discussed with: Patient  Anticipated Disposition: To determine  Inpatient  Cardiac monitoring: Telemetry  Central Line:            Social Determinants of Health     Tobacco Use: Medium Risk (2024)    Patient History     Smoking Tobacco Use: Former     Smokeless Tobacco Use: Never     Passive Exposure: Never   Alcohol Use: Not At Risk (10/10/2023)    AUDIT-C     Frequency of 
    [facility]  [address], [address] [place] [id number]  W: [phone]  F: [phone]    Reason for Evaluation   Heather Rich is a 88 y.o. female who is seen as a new patient for evaluation of leukocytosis.    Treatment History:   None from us    History of Present Illness:     Seen today in hospital for chronic leukocytosis.   Wbc 12/13 with mild anemia since 8/23.   Wbc normal 7/23  SBO due to incarcerated ventral hernia s/p repair point 5/5/2024   ESRD, PTA on peritoneal dialysis     Past Medical History:   Diagnosis Date    Anemia     Arrhythmia     irregular per pt d/t blood disorder    Asthma     Axillary adenopathy 01/04/2021    3/1/21 md Gabe - Benign, reactive lymph nodes with follicular hyperplasia     Bed bug bite 01/18/2021    Bereavement 02/02/2016     die 85 copd,chf,pul htn pn after 53 yr marriage    BPV (benign positional vertigo)     BPV (benign positional vertigo) 08/20/2020    Breast cancer (HCC)     Breast cancer, right breast (HCC) 1994    right breast, lumpectomy and radiation    Breast lump 2017    right breast     CAP (community acquired pneumonia) 12/05/2021    New right basilar airspace disease may represent atelectasis or pneumonia    Chronic kidney disease 02/06/2019    kidney cyst - watching    Coagulation disorder (HCC)     SPHEROCYTOSIS  SICKLE CELL TRAIT    Diabetes (HCC)     controlled with diet    Early satiety     ESRD on peritoneal dialysis (HCC) 10/20/2021    dr. franklin    Fall (on)(from) sidewalk curb, sequela 12/28/2022    Hearing deficit, left     Hereditary spherocytosis (HCC)     History of colonoscopy with polypectomy 08/29/2023    md lucita 5 yrs    History of nuclear stress test 01/22/2021    Normal myocardial perfusion scan without evidence of ischemia or prior infarct ejection fraction 68%    History of therapeutic radiation     1994 on rt    HTN (hypertension)     Ill-defined condition     sickle cell trait 
    Infectious Disease Progress Note     Subjective:      Denies abdominal complaints and feels OK, tolerating PO diet, abscess drained this AM    Objective:    Vitals:   Patient Vitals for the past 24 hrs:   Temp Pulse Resp BP SpO2   05/17/24 1515 98.7 °F (37.1 °C) 94 27 138/67 98 %   05/17/24 1510 98.2 °F (36.8 °C) 89 17 (!) 128/45 100 %   05/17/24 1500 -- 88 21 -- 99 %   05/17/24 1400 -- 88 -- -- --   05/17/24 1200 -- 83 19 (!) 139/42 96 %   05/17/24 1150 -- 87 24 -- 99 %   05/17/24 1145 -- 87 -- 123/70 --   05/17/24 1132 -- 86 -- (!) 132/53 --   05/17/24 1130 -- 87 -- (!) 132/53 --   05/17/24 1115 -- 90 -- (!) 134/58 --   05/17/24 1100 98.6 °F (37 °C) 86 -- (!) 134/50 --   05/17/24 1045 -- 86 -- (!) 142/42 --   05/17/24 1030 -- 90 -- (!) 141/56 --   05/17/24 1015 -- 84 -- (!) 145/52 --   05/17/24 1000 -- 83 -- (!) 147/58 --   05/17/24 0945 -- 82 -- (!) 154/46 --   05/17/24 0930 -- 80 -- 136/61 --   05/17/24 0915 -- 81 -- (!) 145/55 --   05/17/24 0850 98.4 °F (36.9 °C) 80 19 (!) 155/52 98 %   05/17/24 0818 -- 81 -- (!) 162/50 --   05/17/24 0700 98.4 °F (36.9 °C) 80 21 (!) 145/50 97 %   05/17/24 0551 -- 73 -- -- --   05/17/24 0354 -- 75 -- -- --   05/17/24 0335 98.5 °F (36.9 °C) 73 21 (!) 146/49 99 %   05/17/24 0155 -- 73 -- -- --   05/16/24 2309 98.8 °F (37.1 °C) 68 20 (!) 147/43 98 %   05/16/24 2154 -- 67 -- -- --   05/16/24 1952 -- 71 -- -- --   05/16/24 1900 97.8 °F (36.6 °C) 73 18 (!) 141/40 100 %   05/16/24 1800 -- 76 -- -- --   05/16/24 1735 -- 79 -- (!) 133/47 --       Physical Exam:  Gen: No apparent distress  HEENT:  Normocephalic, atraumatic  CV: normal rate  Lungs: no wheezing  Abdomen: soft, healing surgical site no significant drainage  Genitourinary:  no olea catheter   Skin: healing surgical sites with dressing  Psych: good affect, good eye contact, non tearful  Neuro: alert, oriented to time,  place, and situation, moves all extremities to commands, verbal  Musculoskeletal:  No joint edema, 
    Infectious Disease Progress Note     Subjective:      Feeling well, tolerating HD, afebrile    Objective:    Vitals:   Patient Vitals for the past 24 hrs:   Temp Pulse Resp BP SpO2   05/20/24 1521 98 °F (36.7 °C) 76 22 (!) 137/58 99 %   05/20/24 1427 -- 84 -- (!) 155/55 --   05/20/24 1400 -- 89 -- -- --   05/20/24 1315 -- 83 -- (!) 176/69 --   05/20/24 1300 -- 78 -- (!) 167/101 --   05/20/24 1245 -- 89 -- (!) 159/86 --   05/20/24 1230 -- 78 -- (!) 169/58 --   05/20/24 1215 -- 82 -- (!) 161/125 --   05/20/24 1201 -- 81 -- -- --   05/20/24 1200 -- 75 -- (!) 160/61 --   05/20/24 1145 -- 76 -- (!) 177/63 --   05/20/24 1130 -- 78 -- (!) 190/60 --   05/20/24 1115 -- 73 -- (!) 171/54 --   05/20/24 1100 98.2 °F (36.8 °C) 73 21 (!) 149/58 99 %   05/20/24 0856 -- -- -- (!) 156/45 --   05/20/24 0800 -- -- -- -- 99 %   05/20/24 0748 98.3 °F (36.8 °C) 70 14 (!) 139/46 99 %   05/20/24 0600 -- 68 -- -- --   05/20/24 0521 -- 72 -- (!) 144/47 --   05/20/24 0400 -- 82 -- -- --   05/20/24 0344 98.5 °F (36.9 °C) 89 12 (!) 138/53 95 %   05/20/24 0200 -- 90 -- -- --   05/19/24 2310 98.3 °F (36.8 °C) 81 18 (!) 160/49 100 %   05/19/24 2205 -- 86 -- (!) 157/48 --   05/19/24 2200 -- 83 -- -- --   05/19/24 2000 -- 80 -- -- --   05/19/24 1829 -- 85 23 (!) 163/63 97 %   05/19/24 1800 -- 80 -- -- --         Physical Exam:  Gen: NAD  HEENT:  Normocephalic, atraumatic  CV: normal rate  Lungs: no wheezing  Abdomen: soft, healing surgical site no significant drainage  Genitourinary:  no olea catheter   Skin: healing surgical sites with dressing minimal to no drainage, PD catheter with dressing  Psych: good affect, good eye contact, non tearful  Neuro: alert, oriented to time,  place, and situation, moves all extremities to commands, verbal  Musculoskeletal:  No joint edema, erythema or tenderness noted     Central Line:      Medications:    Current Facility-Administered Medications:     dilTIAZem (CARDIZEM CD) extended release capsule 240 mg, 
    Name: Heather Rich MRN: [id number]   : 1935 Hospital: [facility]   Date: 2024        IMPRESSION:   ESRD was on PD. Now on HD temporary after abdominal surgery. Currently ob HD MWF. Seen during HD in her room  Status post repair of incarcerated umbilical hernia- waiting for flatus and BM. Pain control is adequate.  Hypokalemia- corrected with dialysis  Anemia of ESRD and ? Blood loss.  Early hypervolemia      PLAN:   HD again on Monday will increase the UF goal to 2 kg  PD catheter care- drain, flush and change dressing. [facility] PD nurse contacted   Anemia management- Start Retacrit, increase dose  Advance nutrition if OK with surgery     Subjective/Interval History:   I have reviewed the flowsheet and previous day’s notes.    ROS:Pertinent items are noted in HPI.  Patient feels fairly well. Denies significant pain. Reports SOB ob excretion    14 Doing better. Had a BM and flatus. Becomes SOB on attempt to walk. Daughter was in attendance    24 participated with PT today. Denies complaints    5/10/24 Seen on HD. NGT out, on clear liquids     sitting up in chair, tolerating liquid diet, no pain, only complaint is some drainage from incision site. Had dialysis yesterday    Objective:   Vital Signs:    BP (!) 132/43   Pulse 79   Temp 98.1 °F (36.7 °C) (Oral)   Resp 18   Ht 1.549 m (5' 0.98\")   Wt 50 kg (110 lb 3.7 oz)   SpO2 100%    L/min   BMI 20.84 kg/m²             Temp (24hrs), Av.3 °F (36.8 °C), Min:98 °F (36.7 °C), Max:98.6 °F (37 °C)       Intake/Output:   Last shift:      No intake/output data recorded.  Last 3 shifts:  1901 -  0700  In: 1700   Out: 3900     Intake/Output Summary (Last 24 hours) at 2024 1306  Last data filed at 5/10/2024 1930  Gross per 24 hour   Intake 1200 ml   Output 1400 ml   Net -200 ml        Current Facility-Administered Medications   Medication Dose Route Frequency    dianeal lo-mar 1.5% 2,000 mL  2,000 mL 
    Name: Heather Rich MRN: [id number]   : 1935 Hospital: [facility]   Date: 2024        IMPRESSION:   ESRD was on PD. Now on HD temporary after abdominal surgery. Currently on HD MWF.   Status post repair of incarcerated umbilical hernia- had BM. Pain control is adequate.  Abd wall abscess , Leukocytosis, Ct 24: small dermal fluid collection which is decreased in size but demonstrates peripheral enhancement which may represent a small abscess.-on zosyn now, ID following-s/p drainage of abdominal wall abscess this am,     *She has had mild leukocytosis prior to all these events as outpatient.  There is family history of spherocytosis.  She is asplenic and this might explain some degrees of leukocytosis and thrombocytosis    PD Peritonitis-PD fluid cell count consistent with peritonitis, culture NGTD, cell count decreasing PMN down to 250  Abdominal wall abscess fluid cell count with WBC almost 700 and 83% neutrophils, culture pending  Hypokalemia- corrected with dialysis-on aldactone  Anemia of ESRD and ? Blood loss.  Early hypervolemia-resolved       PLAN:   HD MWF  HD again on .  Leukocytosis persists.  Will remove Santos tomorrow after hemodialysis and replace Wednesday.    Recommend hematology consult if leukocytosis persist.  C/w Zosyn  Repeat PD fluid cell count and culture tomorrow.  Cultures so far negative  Surgery and ID following, s/p drainage of abdominal wall abscess this am, f/u culture  Repeat Peritoneal fluid cell count in couple of days  PD catheter care- drain, flush and change dressing. [facility] PD nurse contacted   Anemia management- Started Retacrit, increased dose  Consider repeating blood cultures and 2D echo?     ## She needs to stay inpatient. Santos catheter needs to be changed to permcath prior to discharge and Leukocytosis needs to improve prior to changing the catheter           Subjective/Interval History:   I have reviewed the flowsheet and previous 
    Name: Heather Rich MRN: [id number]   : 1935 Hospital: [facility]   Date: 2024        IMPRESSION:   ESRD was on PD. Now on HD temporary after abdominal surgery. Currently on HD MWF.   Status post repair of incarcerated umbilical hernia- had BM. Pain control is adequate.  Abd wall abscess , Leukocytosis, Ct 24: small dermal fluid collection which is decreased in size but demonstrates peripheral enhancement which may represent a small abscess.-on zosyn now, ID following-s/p drainage of abdominal wall abscess this am,     *She has had mild leukocytosis prior to all these events as outpatient.  There is family history of leukemia.  She is asplenic and this might explain some degrees of leukocytosis and thrombocytosis    PD Peritonitis-PD fluid cell count consistent with peritonitis, culture NGTD, cell count decreasing PMN down to 250  Abdominal wall abscess fluid cell count with WBC almost 700 and 83% neutrophils, culture pending  Hypokalemia- corrected with dialysis-on aldactone  Anemia of ESRD and ? Blood loss.  Early hypervolemia-resolved       PLAN:   HD MWF  HD on Monday  Leukocytosis persists.  Will remove Santos tomorrow after hemodialysis and replace Wednesday.    Recommend hematology consult if leukocytosis persist.  C/w Zosyn  Repeat PD fluid cell count and culture tomorrow.  Cultures so far negative  Surgery and ID following, s/p drainage of abdominal wall abscess this am, f/u culture  Repeat Peritoneal fluid cell count in couple of days  PD catheter care- drain, flush and change dressing. [facility] PD nurse contacted   Anemia management- Started Retacrit, increased dose  Consider repeating blood cultures and 2D echo?     ## She needs to stay inpatient. Santos catheter needs to be changed to permcath prior to discharge and Leukocytosis needs to improve prior to changing the catheter           Subjective/Interval History:   I have reviewed the flowsheet and previous day’s 
    Name: Heather Rich MRN: [id number]   : 1935 Hospital: [facility]   Date: 2024        IMPRESSION:   ESRD was on PD. Now on HD temporary after abdominal surgery. Currently on HD MWF.   Status post repair of incarcerated umbilical hernia- had BM. Pain control is adequate.  Leukocytosis, Ct 24: small dermal fluid collection which is decreased in size but demonstrates         peripheral enhancement which may represent a small abscess.-on Rocephine  Peritonitis-PD fluid cell count consistent with peritonitis, culture is pending  Hypokalemia- corrected with dialysis-on aldactone  Anemia of ESRD and ? Blood loss.  Early hypervolemia-resolved       PLAN:   HD MWF  C/w rocephine and flagyl  Surgery is following for abdominal wall abscess. Leukocytosis is not improving. Recommend ID consult. Might need I&D of the abscess which might be the source for peritonitis  Repeat Peritoneal fluid cell count in couple of days  PD catheter care- drain, flush and change dressing. [facility] PD nurse contacted   Anemia management- Started Retacrit, increased dose   ## She needs to stay inpatient. Santos catheter needs to be changed to permcath prior to discharge and Leukocytosis needs to improve prior to changing the catheter  Repeat BC         Subjective/Interval History:   I have reviewed the flowsheet and previous day’s notes.    ROS:Pertinent items are noted in HPI.  Patient feels fairly well. Denies significant pain. Reports SOB ob excretion    14 Doing better. Had a BM and flatus. Becomes SOB on attempt to walk. Daughter was in attendance    24 participated with PT today. Denies complaints    5/10/24 Seen on HD. NGT out, on clear liquids     sitting up in chair, tolerating liquid diet, no pain, only complaint is some drainage from incision site. Had dialysis yesterday    -seen on HD, BP stable, UF 2Kg. No complaints. Had bM , on regular diet now  -no complaints, VSS, afebrile, 
    Name: Heather Rich MRN: [id number]   : 1935 Hospital: [facility]   Date: 2024        IMPRESSION:   ESRD was on PD. Now on HD temporary after abdominal surgery. Currently ob HD MWF  Status post repair of incarcerated umbilical hernia- waiting for flatus and BM. Pain control is adequate.  Hypokalemia- corrected with dialysis  Anemia of ESRD and ? Blood loss.  Early hypervolemia      PLAN:   HD again tomorrow, will increase the UF goal to 2 kg  Anemia management- Start Retacrit, increase dose  Advance nutrition if OK with surgery     Subjective/Interval History:   I have reviewed the flowsheet and previous day’s notes.    ROS:Pertinent items are noted in HPI.  Patient feels fairly well. Denies significant pain. Reports SOB ob excretion    14 Doing better. Had a BM and flatus. Becomes SOB on attempt to walk. Daughter was in attendance    24 participated with PT today. Denies complaints    Objective:   Vital Signs:    BP (!) 110/47   Pulse 68   Temp 98.9 °F (37.2 °C) (Oral)   Resp 20   Ht 1.549 m (5' 0.98\")   Wt 50.1 kg (110 lb 7.2 oz)   SpO2 94%    L/min   BMI 20.88 kg/m²             Temp (24hrs), Av.5 °F (36.9 °C), Min:97.1 °F (36.2 °C), Max:99.1 °F (37.3 °C)       Intake/Output:   Last shift:      701 - 1900  In: 1897.3 [P.O.:150; I.V.:1597.4]  Out: -   Last 3 shifts: 1901 -  07  In: 784.6 [P.O.:200; I.V.:29.2]  Out: 722     Intake/Output Summary (Last 24 hours) at 2024 1107  Last data filed at 2024 0757  Gross per 24 hour   Intake 2072.25 ml   Output 20 ml   Net 2052.25 ml          Current Facility-Administered Medications   Medication Dose Route Frequency    dilTIAZem 125 mg in sodium chloride 0.9 % 125 mL infusion  2.5-15 mg/hr IntraVENous Continuous    heparin (porcine) injection 3,000 Units  60 Units/kg IntraVENous PRN    heparin (porcine) injection 1,500 Units  30 Units/kg IntraVENous PRN    heparin 25,000 units in dextrose 
    Name: Heather Rich MRN: [id number]   : 1935 Hospital: [facility]   Date: 2024        IMPRESSION:   ESRD was on PD. Now on HD temporary after abdominal surgery. Due for the routine HD today  Status post repair of incarcerated umbilical hernia- waiting for flatus and BM. Pain control is adequate.  Hypokalemia- corrected with dialysis  Anemia of ESRD and ? Blood loss.  Early hypervolemia      PLAN:   HD again today, will increase the UF goal to 2 kg  Anemia management- Start Retacrit  Advance nutrition if OK with surgery     Subjective/Interval History:   I have reviewed the flowsheet and previous day’s notes.    ROS:Pertinent items are noted in HPI.  Patient feels fairly well. Denies significant pain. Reports SOB ob excretion    14 Doing better. Had a BM and flatus. Becomes SOB on attempt to walk. Daughter was in attendance    Objective:   Vital Signs:    BP (!) 121/56   Pulse 100   Temp 98.4 °F (36.9 °C)   Resp 25   Ht 1.549 m (5' 0.98\")   Wt 49.8 kg (109 lb 12.6 oz)   SpO2 92%    L/min   BMI 20.76 kg/m²             Temp (24hrs), Av.8 °F (37.1 °C), Min:98.4 °F (36.9 °C), Max:99.4 °F (37.4 °C)       Intake/Output:   Last shift:       07 -  1900  In: 500   Out: 702   Last 3 shifts: 1901 -  0700  In: 1005.5 [P.O.:150; I.V.:129.6]  Out: 2100     Intake/Output Summary (Last 24 hours) at 2024 1053  Last data filed at 2024 0726  Gross per 24 hour   Intake 765.58 ml   Output 852 ml   Net -86.42 ml          Current Facility-Administered Medications   Medication Dose Route Frequency    dilTIAZem 125 mg in sodium chloride 0.9 % 125 mL infusion  2.5-15 mg/hr IntraVENous Continuous    heparin (porcine) injection 3,000 Units  60 Units/kg IntraVENous Once    heparin (porcine) injection 3,000 Units  60 Units/kg IntraVENous PRN    heparin (porcine) injection 1,500 Units  30 Units/kg IntraVENous PRN    heparin 25,000 units in dextrose 5% 250 mL (premix) 
    Name: Heather Rich MRN: [id number]   : 1935 Hospital: [facility]   Date: 2024        IMPRESSION:   ESRD was on PD. Now on HD temporary after abdominal surgery. Currently on HD MWF.   Status post repair of incarcerated umbilical hernia- had BM. Pain control is adequate.  Abd wall abscess , Leukocytosis, Ct 24: small dermal fluid collection which is decreased in size but demonstrates peripheral enhancement which may represent a small abscess.-on zosyn now, ID following-s/p drainage of abdominal wall abscess this am,   -s/p removal of elva catheter   -s/p permcath -WBC count decreasing    *She has had mild leukocytosis prior to all these events as outpatient.  There is family history of spherocytosis.  She is asplenic and this might explain some degrees of leukocytosis and thrombocytosis    PD Peritonitis-PD fluid cell count consistent with peritonitis, culture NGTD, cell count decreasing PMN down to 250  Abdominal wall abscess , s/p drain. fluid cell count with WBC almost 700 and 83% neutrophils, culture pending  Hypokalemia- corrected with dialysis-on aldactone  Anemia of ESRD and ? Blood loss.  Early hypervolemia-resolved       PLAN:   HD MWF  S/p rt IJ permcath   C/w Zosyn-per ID: When ready for discharge, transition to IV Ceftazidime 1g at end of HD with PO Flagyl 500mg TID until 6/3/24, inclusive.   PD catheter needs to be flushed weekly  Anemia management- Started Retacrit, increased dose  Plan for DC to SNF today             Subjective/Interval History:   I have reviewed the flowsheet and previous day’s notes.    -no complaints. Plan for discharge to SNF today. Gets headaches after HD. Will cut back on UF to 2Kg    Objective:   Vital Signs:    BP (!) 152/52   Pulse 90   Temp 98.2 °F (36.8 °C) (Oral)   Resp 18   Ht 1.549 m (5' 1\")   Wt 48.2 kg (106 lb 4.2 oz)   SpO2 99%    L/min   BMI 20.08 kg/m²             Temp (24hrs), Av.2 °F (36.8 °C), 
    Name: Heather Rich MRN: [id number]   : 1935 Hospital: [facility]   Date: 2024        IMPRESSION:   ESRD was on PD. Now on HD temporary after abdominal surgery. Currently on HD MWF. Seen during HD in her room  Status post repair of incarcerated umbilical hernia- had BM. Pain control is adequate.  Hypokalemia- corrected with dialysis  Anemia of ESRD and ? Blood loss.  Early hypervolemia      PLAN:   Complete hd tofay, UF goal increased to 2 kg  PD catheter care- drain, flush and change dressing. [facility]   Anemia management- Started Retacrit, increase dose  On regular diet now     Subjective/Interval History:   I have reviewed the flowsheet and previous day’s notes.    ROS:Pertinent items are noted in HPI.  Patient feels fairly well. Denies significant pain. Reports SOB ob excretion    14 Doing better. Had a BM and flatus. Becomes SOB on attempt to walk. Daughter was in attendance    24 participated with PT today. Denies complaints    5/10/24 Seen on HD. NGT out, on clear liquids     sitting up in chair, tolerating liquid diet, no pain, only complaint is some drainage from incision site. Had dialysis yesterday    -seen on HD, BP stable, UF 2Kg. No complaints. Had bM , on regular diet now  Objective:   Vital Signs:    BP (!) 142/49   Pulse 64   Temp 98.4 °F (36.9 °C)   Resp 18   Ht 1.549 m (5' 0.98\")   Wt 50 kg (110 lb 4.4 oz)   SpO2 100%    L/min   BMI 20.85 kg/m²             Temp (24hrs), Av.6 °F (37 °C), Min:98.4 °F (36.9 °C), Max:98.9 °F (37.2 °C)       Intake/Output:   Last shift:      No intake/output data recorded.  Last 3 shifts:  1901 -  0700  In: 20 [P.O.:20]  Out: 0   No intake or output data in the 24 hours ending 24 1115       Current Facility-Administered Medications   Medication Dose Route Frequency    lidocaine (XYLOCAINE) 5 % ointment   Topical PRN    piperacillin-tazobactam (ZOSYN) 3,375 mg in sodium 
    Name: Heather Rich MRN: [id number]   : 1935 Hospital: [facility]   Date: 2024        IMPRESSION:   ESRD was on PD. Now on HD temporary after abdominal surgery. Currently on HD MWF.   Status post repair of incarcerated umbilical hernia- had BM. Pain control is adequate.  Abd wall abscess , Leukocytosis, Ct 24: small dermal fluid collection which is decreased in size but demonstrates peripheral enhancement which may represent a small abscess.-on zosyn now, ID following-s/p drainage of abdominal wall abscess this am,   PD Peritonitis-PD fluid cell count consistent with peritonitis, culture NGTD, cell count decreasing PMN down to 250  Abdominal wall abscess fluid cell count with WBC almost 700 and 83% neutrophils, culture pending  Hypokalemia- corrected with dialysis-on aldactone  Anemia of ESRD and ? Blood loss.  Early hypervolemia-resolved       PLAN:   HD MWF  HD on Monday  C/w Zosyn  Surgery and ID following, s/p drainage of abdominal wall abscess this am, f/u culture  Repeat Peritoneal fluid cell count in couple of days  PD catheter care- drain, flush and change dressing. Ileana PD nurse contacted   Anemia management- Started Retacrit, increased dose  Consider repeating blood cultures and 2D echo?     ## She needs to stay inpatient. Santos catheter needs to be changed to permcath prior to discharge and Leukocytosis needs to improve prior to changing the catheter           Subjective/Interval History:   I have reviewed the flowsheet and previous day’s notes.    ROS:Pertinent items are noted in HPI.  Patient feels fairly well. Denies significant pain. Reports SOB ob excretion    14 Doing better. Had a BM and flatus. Becomes SOB on attempt to walk. Daughter was in attendance    24 participated with PT today. Denies complaints    5/10/24 Seen on HD. NGT out, on clear liquids     sitting up in chair, tolerating liquid diet, no pain, only complaint is some drainage from 
0730 - received shift report from Matthew SHELLEY    0800 - asked for hypoglycemic protocol orders dt BG 71 in AM labs. Per gen surg team, no juice, ask attending for orders. Pt up to chair.    0900 - MD Barney messaged, awaiting orders.    1115 - 150 NGT OP    1300 - walked w PT, pt c/o SOB. Went over meds again - pt unhappy with scheduled meds and asking to use own inhaler. Orders updated.     1400 - pt back to bed    1430 - MD messaged again about hypogly orders, as pt remains NPO    1515 - orders for D5 NS and hypogly protocol    1930 - Bedside and Verbal shift change report given to Matthew SHELLEY (oncoming nurse) by Ling SHELLEY (offgoing nurse). Report included the following information Nurse Handoff Report, ED SBAR, Intake/Output, MAR, Recent Results, and Cardiac Rhythm NSR .    
0830: Pt down to angio for permacath placement    1100: Pt back to floor after successful permacath placement. Given breakfast. VSS.    1300: HD started.    1630: HD ended. 2L pulled off.    2000: Bedside shift change report given to KARSTEN Castro (oncoming nurse) by KARSTEN Christopher (offgoing nurse). Report included the following information Nurse Handoff Report.     
2025: Bedside report given to KARSTEN Hutchinson.  
45 minutes into dialysis treatment shantel Chavez notified me and her nephrologist of new HR up to 140s, Nephro changed K bath and ordered EKG, I contacted provider and performed the EKG, afib rvr, printouts available for MD when he arrived to see the patient    Patient asymptomatic otherwise. Dialysis was stopped per nephro.    Orders placed to start dilt, done prior to shift change, more orders placed.     I uped the dilt to 7.5 per 30min protocol as HR still over 100 and bp > than lower limit to raise Dilt.    Patient still asymptomatic. Leaving shift to Radha  
8:07pm: Patient's HR sustaining in the 40s. RN changed leads. Patient sustaining in 40s, HR increased to 50-60 range, then will sustain in 40s for 5 seconds and increase again.    8:10pm: RN placed order for EKG.     8:11pm: RN messaged Keyla Jiang NP that we are collecting EKG and HR sustaining in 45-55 range    8:14pm: EKG collected; EKG showing a fib with slow ventricular response.    8:24pm: RN called Dr. Barney; no new orders; MD verbalized to hold metoprolol and diltiazem; RN to continue to monitor.    8:28pm: RN gave report to KARSTEN Hutchinson/ Harry RN  
Admit Date: 5/5/2024  POD 1 s/p ventral hernia repair     Subjective:     Patient without any flatus      Scheduled Meds:   guaiFENesin  600 mg Oral BID    piperacillin-tazobactam  3,375 mg IntraVENous Q12H    aspirin  81 mg Oral Daily    isosorbide mononitrate  30 mg Oral Daily    lamoTRIgine  25 mg Oral BID    metoprolol succinate  50 mg Oral Nightly    NIFEdipine  60 mg Oral BID    oxyBUTYnin  10 mg Oral Nightly    spironolactone  25 mg Oral Daily    sodium chloride flush  5-40 mL IntraVENous 2 times per day     Continuous Infusions:   sodium chloride       PRN Meds:ondansetron, HYDROmorphone, prochlorperazine, albuterol, tiZANidine, sodium chloride flush, sodium chloride, acetaminophen, polyethylene glycol        Objective:     Patient Vitals for the past 8 hrs:   BP Temp Temp src Pulse Resp SpO2 Weight   05/06/24 0600 -- -- -- 91 -- -- --   05/06/24 0400 (!) 150/61 98.8 °F (37.1 °C) Oral 89 18 95 % 51.5 kg (113 lb 8.6 oz)   05/06/24 0200 -- -- -- 84 -- -- --     I/O last 3 completed shifts:  In: 300 [I.V.:300]  Out: 510 [Urine:150; Emesis/NG output:350; Blood:10]  No intake/output data recorded.    Gen: comfortable   Resp: non-labored   Abd soft, non-distended, normal post op pain   Ext wam      cc     Assessment:     Principal Problem:    Incarcerated ventral hernia  Active Problems:    HTN (hypertension)    ESRD on peritoneal dialysis ([facility])    SBO (small bowel obstruction) ([facility])    Dilated cbd, acquired    Type 2 diabetes mellitus with kidney complication, without long-term current use of insulin ([facility])  Resolved Problems:    * No resolved hospital problems. *      Plan:     Continue NGT until return of bowel function     Cover with iv abx  in case of bacteria translocation secondary to obstruction     Will need temp HD   
Admit Date: 5/5/2024  S/p open repair of incarcerated ventral hernia     Subjective:     Patient continues to tolerate diet     Scheduled Meds:   dilTIAZem  60 mg Oral 4 times per day    piperacillin-tazobactam  3,375 mg IntraVENous Q12H    pantoprazole  40 mg Oral QAM AC    epoetin carrie-epbx  10,000 Units IntraVENous Once per day on Mon Wed Fri    [Held by provider] metoprolol succinate  100 mg Oral Nightly    fluticasone-salmeterol  1 puff Inhalation BID    guaiFENesin  600 mg Oral BID    isosorbide mononitrate  30 mg Oral Daily    lamoTRIgine  25 mg Oral BID    oxyBUTYnin  10 mg Oral Nightly    spironolactone  25 mg Oral Daily    sodium chloride flush  5-40 mL IntraVENous 2 times per day     Continuous Infusions:   dextrose      sodium chloride 10 mL/hr at 05/17/24 1927     PRN Meds:lidocaine, gentamicin, glucose, dextrose bolus **OR** dextrose bolus, glucagon (rDNA), dextrose, ipratropium 0.5 mg-albuterol 2.5 mg, albuterol sulfate HFA, phenol, ondansetron, HYDROmorphone, prochlorperazine, tiZANidine, sodium chloride flush, sodium chloride, acetaminophen, polyethylene glycol      Objective:     Patient Vitals for the past 8 hrs:   BP Temp Temp src Pulse Resp SpO2 Weight   05/18/24 1000 -- -- -- 77 -- -- --   05/18/24 0725 (!) 119/54 98.6 °F (37 °C) Oral 90 19 96 % --   05/18/24 0700 -- -- -- -- -- -- 49.7 kg (109 lb 9.1 oz)   05/18/24 0600 -- -- -- 85 -- -- --   05/18/24 0400 -- -- -- 81 -- -- --   05/18/24 0330 (!) 132/48 99.1 °F (37.3 °C) Oral 86 17 96 % --     I/O last 3 completed shifts:  In: 2689.7 [P.O.:300; I.V.:131.6; Other:1000; IV Piggyback:758.1]  Out: 3500 [Other:1000]  No intake/output data recorded.    WBC 21k    CT no intra-peritoneal fluid collection/ no wall thickening/ no free air       Assessment:     Principal Problem:    Incarcerated ventral hernia  Active Problems:    HTN (hypertension)    ESRD on peritoneal dialysis (HCC)    SBO (small bowel obstruction) (HCC)    Dilated cbd, acquired    
Bedside and Verbal shift change report given to Karal (oncoming nurse) by Stephanie (offgoing nurse). Report included the following information Nurse Handoff Report, Index, Intake/Output, MAR, and Cardiac Rhythm SR .     
Bedside and Verbal shift change report given to Rehana (oncoming nurse) by Stephanie (offgoing nurse). Report included the following information Nurse Handoff Report, Index, Intake/Output, MAR, and Cardiac Rhythm SR .     
Bedside shift change report given to Noris MURILLO RN (oncoming nurse) by Mariposa SHELLEY (offgoing nurse). Report included the following information Nurse Handoff Report.    
Bedside shift change report given to Radha Hernandez RN (oncoming nurse) by Mariposa SHELLEY (offgoing nurse). Report included the following information Nurse Handoff Report.    
Brief Note:  Disposition Update:  CM discussed with Dr. Mitchell, patient and her dter option for snf rehab along with HD onsite (at the SNF).  This appears to be best option as patient has no family/friends available to assist in her ADL needs at home along with providing transport to her dialysis center 3 times a week.  CM offered to patient/family option of SNF rehab ([facility] and HD onsite at the dialysis den).  Patient is in agreement with this plan.  CM sending message to Dr. Barney with this update.    MARIA INES Madsen, CRM  [phone]    4:30pm  Patient approved for admission to [facility].  
Came to see patient, but she was already in the OR.  
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 female with     PROBLEM LIST:  Patient Active Problem List    Diagnosis Date Noted    Leukocytosis 05/16/2024    Abdominal wall abscess 05/16/2024    Peritonitis (HCC) 05/16/2024    SBO (small bowel obstruction) (HCC) 05/06/2024    Dilated cbd, acquired 05/06/2024    Type 2 diabetes mellitus with kidney complication, without long-term current use of insulin (HCC) 05/06/2024    Incarcerated ventral hernia 05/05/2024    ESRD on peritoneal dialysis ([facility]) 12/18/2023    Peritoneal dialysis status ([facility]) 12/05/2023    Bilateral leg edema 10/17/2023    Malignant neoplasm of right female breast, unspecified estrogen receptor status, unspecified site of breast (HCC) 09/14/2023    History of colonoscopy with polypectomy 08/29/2023    Anemia of chronic renal failure 05/26/2023    Osteoporosis 03/21/2023    Fall (on)(from) sidewalk curb, sequela 12/28/2022    Hereditary spherocytosis (HCC)     History of right breast cancer     Bed bug bite 01/18/2021    Axillary adenopathy 01/04/2021    HTN (hypertension)     Hearing deficit, left     Intrahepatic bile duct dilation 09/05/2018    Venous insufficiency of both lower extremities     Renal cyst 08/01/2016    Early satiety     DJD (degenerative joint disease) of knee     BPV (benign positional vertigo)     Anemia     S/P colonoscopy 04/09/2014         Subjective:     Heather Rich reports none.    BP (!) 139/42   Pulse 83   Temp 98.6 °F (37 °C)   Resp 19   Ht 1.549 m (5' 1\")   Wt 51.9 kg (114 lb 6.7 oz)   SpO2 96%    L/min   BMI 21.62 kg/m²     Intake/Output Summary (Last 
Cardiology Progress Note                                        Admit Date: 5/5/2024    Assessment/Plan:     Bradycardia; resolved  HTN; I agree with increase of Cardizem   ESRD; on HD      Heather Rich is a 88 y.o. female with     PROBLEM LIST:  Patient Active Problem List    Diagnosis Date Noted    Iron deficiency 05/20/2024    Thrombocytosis 05/20/2024    Leukocytosis 05/16/2024    Abdominal wall abscess 05/16/2024    Peritonitis (HCC) 05/16/2024    SBO (small bowel obstruction) (HCC) 05/06/2024    Dilated cbd, acquired 05/06/2024    Type 2 diabetes mellitus with kidney complication, without long-term current use of insulin (HCC) 05/06/2024    Incarcerated ventral hernia 05/05/2024    ESRD on peritoneal dialysis ([facility]) 12/18/2023    Peritoneal dialysis status ([facility]) 12/05/2023    Bilateral leg edema 10/17/2023    Malignant neoplasm of right female breast, unspecified estrogen receptor status, unspecified site of breast ([facility]) 09/14/2023    History of colonoscopy with polypectomy 08/29/2023    Anemia of chronic renal failure 05/26/2023    Osteoporosis 03/21/2023    Fall (on)(from) sidewalk curb, sequela 12/28/2022    Hereditary spherocytosis (HCC)     History of right breast cancer     Bed bug bite 01/18/2021    Axillary adenopathy 01/04/2021    HTN (hypertension)     Hearing deficit, left     Intrahepatic bile duct dilation 09/05/2018    Venous insufficiency of both lower extremities     Renal cyst 08/01/2016    Early satiety     DJD (degenerative joint disease) of knee     BPV (benign positional vertigo)     Anemia     S/P colonoscopy 04/09/2014         Subjective:     Heather Rich reports none.    BP (!) 137/58   Pulse 76   Temp 98 °F (36.7 °C) (Oral)   Resp 22   Ht 1.549 m (5' 1\")   Wt 49.7 kg (109 lb 9.1 oz)   SpO2 99%    L/min   BMI 20.70 kg/m²     Intake/Output Summary (Last 24 hours) at 5/20/2024 1555  Last data filed at 5/20/2024 1200  Gross per 24 hour   Intake 278.08 ml   Output -- 
Cardiology Progress Note                                        Admit Date: 5/5/2024    Assessment/Plan:     PAF; converted to sinus on IV Cardizem; will hold off IV Heparin or AC due to anemia; can switch to PO if she can take po regimen  ESRD; on PD  HTN; will stop Nifedipine but escalate Cardizem for BP control as well    Heather Rich is a 88 y.o. female with     PROBLEM LIST:  Patient Active Problem List    Diagnosis Date Noted    SBO (small bowel obstruction) (HCC) 05/06/2024    Dilated cbd, acquired 05/06/2024    Type 2 diabetes mellitus with kidney complication, without long-term current use of insulin (HCC) 05/06/2024    Incarcerated ventral hernia 05/05/2024    ESRD on peritoneal dialysis ([facility]) 12/18/2023    Peritoneal dialysis status ([facility]) 12/05/2023    Bilateral leg edema 10/17/2023    Malignant neoplasm of right female breast, unspecified estrogen receptor status, unspecified site of breast ([facility]) 09/14/2023    History of colonoscopy with polypectomy 08/29/2023    Anemia of chronic renal failure 05/26/2023    Osteoporosis 03/21/2023    Fall (on)(from) sidewalk curb, sequela 12/28/2022    Hereditary spherocytosis (HCC)     History of right breast cancer     Bed bug bite 01/18/2021    Axillary adenopathy 01/04/2021    HTN (hypertension)     Hearing deficit, left     Intrahepatic bile duct dilation 09/05/2018    Venous insufficiency of both lower extremities     Renal cyst 08/01/2016    Early satiety     DJD (degenerative joint disease) of knee     BPV (benign positional vertigo)     Anemia     S/P colonoscopy 04/09/2014         Subjective:     Heather Rich reports none.    BP (!) 120/42   Pulse 90   Temp 97.7 °F (36.5 °C) (Oral)   Resp 16   Ht 1.549 m (5' 0.98\")   Wt 50.1 kg (110 lb 7.2 oz)   SpO2 98%    L/min   BMI 20.88 kg/m²     Intake/Output Summary (Last 24 hours) at 5/9/2024 1313  Last data filed at 5/9/2024 0757  Gross per 24 hour   Intake 2072.25 ml   Output 20 ml   Net 2052.25 
Cardiology Progress Note                                        Admit Date: 5/5/2024    Assessment/Plan:     PAF; post-op Afib; remains in sinus on current regimen; no need for longterm AC  HTN; better  ESRD; on PD at home    Heather Rich is a 88 y.o. female with     PROBLEM LIST:  Patient Active Problem List    Diagnosis Date Noted    SBO (small bowel obstruction) (HCC) 05/06/2024    Dilated cbd, acquired 05/06/2024    Type 2 diabetes mellitus with kidney complication, without long-term current use of insulin (HCC) 05/06/2024    Incarcerated ventral hernia 05/05/2024    ESRD on peritoneal dialysis ([facility]) 12/18/2023    Peritoneal dialysis status ([facility]) 12/05/2023    Bilateral leg edema 10/17/2023    Malignant neoplasm of right female breast, unspecified estrogen receptor status, unspecified site of breast (HCC) 09/14/2023    History of colonoscopy with polypectomy 08/29/2023    Anemia of chronic renal failure 05/26/2023    Osteoporosis 03/21/2023    Fall (on)(from) sidewalk curb, sequela 12/28/2022    Hereditary spherocytosis (HCC)     History of right breast cancer     Bed bug bite 01/18/2021    Axillary adenopathy 01/04/2021    HTN (hypertension)     Hearing deficit, left     Intrahepatic bile duct dilation 09/05/2018    Venous insufficiency of both lower extremities     Renal cyst 08/01/2016    Early satiety     DJD (degenerative joint disease) of knee     BPV (benign positional vertigo)     Anemia     S/P colonoscopy 04/09/2014         Subjective:     Heather Rich reports none.    BP (!) 142/52   Pulse 70   Temp 98.2 °F (36.8 °C)   Resp 18   Ht 1.549 m (5' 0.98\")   Wt 50.1 kg (110 lb 7.2 oz)   SpO2 99%    L/min   BMI 20.88 kg/m²   No intake or output data in the 24 hours ending 05/10/24 1012    Objective:      Physical Exam:  HEENT: Perrla, EOMI  Neck: No JVD,  No thyroidmegaly  Resp: CTA bilaterally; No wheezes or rales  CV: RRR s1s2 No murmur no s3  Abd:Soft, Nontender  Ext: No edema  Neuro: 
Clinical Pharmacy Note: IV to PO Automatic Conversion  Please note: Heather Rich’s medication(s) (pantoprazole) has/have been changed from IV to PO based on the following critiera:    Patient is tolerating oral medications  Patient is tolerating a diet more advanced than clear liquids  Patient is not requiring vasopressors    This IV to PO conversion is based on the P&T approved automatic conversion policy for eligible patients.  Please call with questions.   
Comprehensive Nutrition Assessment    Type and Reason for Visit: Initial, Positive Nutrition Screen    Nutrition Recommendations/Plan:   NPO, advance diet once medically appropriate/as pt tolerated   Continue IV dextrose to provide protein-sparing kcals   Monitor/document Bms in I/Os        Malnutrition Assessment:  Malnutrition Status:  Moderate malnutrition (05/07/24 1500)    Context:  Acute Illness     Findings of the 6 clinical characteristics of malnutrition:  Energy Intake:  75% or less of estimated energy requirements for 7 or more days  Weight Loss:  No significant weight loss     Body Fat Loss:  Mild body fat loss Triceps, Buccal region   Muscle Mass Loss:  Mild muscle mass loss Clavicles (pectoralis & deltoids), Temples (temporalis)  Fluid Accumulation:  No significant fluid accumulation     Strength:  Not Performed       Nutrition Assessment:    Past Medical History:   Diagnosis Date    Anemia     Arrhythmia     irregular per pt d/t blood disorder    Asthma     Axillary adenopathy 01/04/2021    3/1/21 md Gabe - Benign, reactive lymph nodes with follicular hyperplasia     Bed bug bite 01/18/2021    Bereavement 02/02/2016     die 85 copd,chf,pul htn pn after 53 yr marriage    BPV (benign positional vertigo)     BPV (benign positional vertigo) 08/20/2020    Breast cancer (HCC)     Breast cancer, right breast (HCC) 1994    right breast, lumpectomy and radiation    Breast lump 2017    right breast     CAP (community acquired pneumonia) 12/05/2021    New right basilar airspace disease may represent atelectasis or pneumonia    Chronic kidney disease 02/06/2019    kidney cyst - watching    Coagulation disorder (HCC)     SPHEROCYTOSIS  SICKLE CELL TRAIT    Diabetes (HCC)     controlled with diet    Early satiety     ESRD on peritoneal dialysis (HCC) 10/20/2021    dr. franklin    Fall (on)(from) sidewalk curb, sequela 12/28/2022    Hearing deficit, left     Hereditary spherocytosis (HCC)     History of 
Day #1 of Zosyn  Indication:  IAI  Current regimen:  2250 mg IV Q12H  Abx regimen: Zosyn  Recent Labs     24  0532 05/15/24  0411 24  0300   WBC 25.9* 21.4* 23.5*   CREATININE  --  7.08*  --    BUN  --  37*  --      Est CrCl: ESRD on HD   Temp (24hrs), Av.6 °F (37 °C), Min:98.4 °F (36.9 °C), Max:99.1 °F (37.3 °C)    Cultures:   5/15 PD fluid - NGTD    Plan: Change to 4500 mg IV push followed by 3375 mg IV extended infusion Q12H for HD  
Day #1 of zosyn  Indication:  intra abdominal infection  Current regimen:  3.375 gm q8h  Abx regimen: zosyn  Recent Labs     24  0657   WBC 20.5*   CREATININE 6.57*   BUN 51*     Est CrCl: 5 ml/min  Temp (24hrs), Av.3 °F (36.8 °C), Min:98 °F (36.7 °C), Max:98.6 °F (37 °C)      Plan: Change to 4.5 gm loading dose followed by 3.375 gm q12h infused over 12 hours.         
General Surgery Daily Progress Note    Admit Date: 2024  Post-Operative Day: 10 Days Post-Op from Procedure(s):  PRIMARY INCARCERATED  HERNIA REPAIR     Subjective:     Last 24 hrs: pt currently receiving HD.  No complaints. Denies any abdominal pain or nausea/vomiting.  WBCs trending down now (WBCs 21.4 today).  Currently on IV ceftriaxone and flagyl.  +flatus; pt states last BM was yesterday evening.     CT abd/pelvis done yesterday showed small dermal fluid collection which is decreased in size but demonstrates peripheral enhancement which may represent a small abscess. No acute intra-abdominal pathology.     Objective:     Blood pressure (!) 134/46, pulse 74, temperature 98.6 °F (37 °C), resp. rate 16, height 1.549 m (5' 1\"), weight 51 kg (112 lb 7 oz), SpO2 100 %, peak flow 100 L/min.  Temp (24hrs), Av.5 °F (36.9 °C), Min:97.8 °F (36.6 °C), Max:98.7 °F (37.1 °C)      _____________________  Physical Exam:     Alert and Oriented x 3, in no acute distress.  Cardiovascular: S1S2; RRR   Lungs:unlabored  Abdomen: soft, ND, NT, midline with dressing intact- dressing removed to inspect incision w/ dermabond c/d/I.  No surrounding erythema or induration. +BS. Left PD catheter capped w/dressing intact     Data Review:    Recent Labs     24  0654 24  0532 05/15/24  0411   WBC 23.0* 25.9* 21.4*   HGB 7.3* 8.3* 7.5*   HCT 21.9* 26.1* 24.1*   * 473* 407*     Recent Labs     24  0912 05/15/24  0411    134*   K 3.6 3.9    101   CO2 26 25   BUN 42* 37*   PHOS 4.2 3.5     Invalid input(s): \"AML\", \"LPSE\"        ______________________  Medications:    Current Facility-Administered Medications   Medication Dose Route Frequency    pantoprazole (PROTONIX) tablet 40 mg  40 mg Oral QAM AC    cefTRIAXone (ROCEPHIN) 1,000 mg in sterile water 10 mL IV syringe  1,000 mg IntraVENous Q24H    metroNIDAZOLE (FLAGYL) 500 mg in 0.9% NaCl 100 mL IVPB premix  500 mg IntraVENous Q8H    lidocaine 
General Surgery Daily Progress Note    Admit Date: 2024  Post-Operative Day: 4 Days Post-Op from Procedure(s):  PRIMARY INCARCERATED  HERNIA REPAIR     Subjective:     Last 24 hrs: no acute surgical issues overnight.  Pt states she has started sipping clears.  NGT remains clamped in place. Per RN residual was about 30 mls, however, there was no order to remove NGT.  Pain is well managed at this time. Denies any N/V.  Pt on commode.  States she feels like she may need to have a bowel movement.          Objective:     Blood pressure (!) 120/42, pulse 81, temperature 97.7 °F (36.5 °C), temperature source Oral, resp. rate 16, height 1.549 m (5' 0.98\"), weight 50.1 kg (110 lb 7.2 oz), SpO2 98 %, peak flow 100 L/min.  Temp (24hrs), Av.4 °F (36.9 °C), Min:97.1 °F (36.2 °C), Max:99.1 °F (37.3 °C)      _____________________  Physical Exam:     Alert and cooperative, in no acute distress.  Cardiovascular: RRR, S1S2  Lungs:unlabored  Abdomen: soft, mild distention, appropriate incisional TTP, no guarding or rebound.  Distant hypoactive BS.     Data Review:    Recent Labs     24  0131 24  0843 24  0041   WBC 21.5* 21.9* 21.0*   HGB 8.3* 9.4* 7.5*   HCT 24.6* 29.4* 24.8*    342 322     Recent Labs     24  0233 24  0131 24  0843 24  0041    138  --  136   K 3.8 3.7  --  4.1    101  --  102   CO2 21 28  --  25   BUN 27* 57*  --  40*   PHOS 4.2 7.1*  --   --    ALT 81* 60  --   --    INR  --   --  1.1  --      Invalid input(s): \"AML\", \"LPSE\"        ______________________  Medications:    Current Facility-Administered Medications   Medication Dose Route Frequency    [START ON 5/10/2024] epoetin carrie-epbx (RETACRIT) injection 10,000 Units  10,000 Units IntraVENous Once per day on     iron sucrose (VENOFER) 100 mg in sodium chloride 0.9 % 100 mL IVPB  100 mg IntraVENous Once    dilTIAZem 125 mg in sodium chloride 0.9 % 125 mL infusion  2.5-15 mg/hr 
General Surgery Progress Note    11 Days Post-Op   PRIMARY INCARCERATED  HERNIA REPAIR (Abdomen)   Date:2024       Room:[facility]  Patient Name:Heather Rich     YOB: 1935     Age:88 y.o.    Subjective     No acute surgical issues.  Pt is resting in bed.  Doing well from surgical standpoint but WBC is still elevated.  No nausea or vomiting.  Tolerating diet.  Pt was bradycardic this evening but asymptomatic.     Medications   Scheduled Meds:    piperacillin-tazobactam  3,375 mg IntraVENous Q12H    pantoprazole  40 mg Oral QAM AC    epoetin carrie-epbx  10,000 Units IntraVENous Once per day on     [Held by provider] dilTIAZem  60 mg Oral 4 times per day    [Held by provider] metoprolol succinate  100 mg Oral Nightly    fluticasone-salmeterol  1 puff Inhalation BID    guaiFENesin  600 mg Oral BID    isosorbide mononitrate  30 mg Oral Daily    lamoTRIgine  25 mg Oral BID    oxyBUTYnin  10 mg Oral Nightly    spironolactone  25 mg Oral Daily    sodium chloride flush  5-40 mL IntraVENous 2 times per day     Continuous Infusions:    dextrose      sodium chloride 10 mL/hr at 24 2142     PRN Meds: lidocaine, gentamicin, glucose, dextrose bolus **OR** dextrose bolus, glucagon (rDNA), dextrose, ipratropium 0.5 mg-albuterol 2.5 mg, albuterol sulfate HFA, phenol, ondansetron, HYDROmorphone, prochlorperazine, tiZANidine, sodium chloride flush, sodium chloride, acetaminophen, polyethylene glycol        Physical Examination      Vitals:  BP (!) 141/40   Pulse 67   Temp 97.8 °F (36.6 °C) (Oral)   Resp 18   Ht 1.549 m (5' 1\")   Wt 57.7 kg (127 lb 3.3 oz) Comment: multipule blanket covering her.  SpO2 100%    L/min   BMI 24.04 kg/m²   Temp (24hrs), Av.5 °F (36.9 °C), Min:97.8 °F (36.6 °C), Max:99.1 °F (37.3 °C)      Physical Exam:    Gen:  No apparent distress  Neuro:  Alert and oriented x4  Pulm:  Unlabored  Abd:  Soft/non-distended/appropriate tenderness to palpation without 
General Surgery Progress Note    3 Days Post-Op   PRIMARY INCARCERATED  HERNIA REPAIR (Abdomen)   Date:2024       Room:[facility]  Patient Name:Heather Rich     YOB: 1935     Age:88 y.o.    Subjective     No acute surgical issues.  Pt is doing okay.  She is passing some flatus and reported small BM.  No nausea or vomiting.  Pain is well controlled.     Medications   Scheduled Meds:    metoprolol succinate  100 mg Oral Nightly    acetylcysteine  600 mg Inhalation BID RT    arformoterol tartrate  15 mcg Nebulization BID RT    fluticasone-salmeterol  1 puff Inhalation BID    budesonide  0.25 mg Nebulization BID RT    guaiFENesin  600 mg Oral BID    piperacillin-tazobactam  3,375 mg IntraVENous Q12H    isosorbide mononitrate  30 mg Oral Daily    lamoTRIgine  25 mg Oral BID    NIFEdipine  60 mg Oral BID    oxyBUTYnin  10 mg Oral Nightly    spironolactone  25 mg Oral Daily    sodium chloride flush  5-40 mL IntraVENous 2 times per day     Continuous Infusions:    dilTIAZem 7.5 mg/hr (24 0823)    heparin (PORCINE) Infusion 12 Units/kg/hr (24 1136)    dextrose 5 % and 0.9 % NaCl 50 mL/hr at 24 1203    dextrose      sodium chloride       PRN Meds: heparin (porcine), heparin (porcine), glucose, dextrose bolus **OR** dextrose bolus, glucagon (rDNA), dextrose, ipratropium 0.5 mg-albuterol 2.5 mg, albuterol sulfate HFA, phenol, ondansetron, HYDROmorphone, prochlorperazine, tiZANidine, sodium chloride flush, sodium chloride, acetaminophen, polyethylene glycol        Physical Examination      Vitals:  BP (!) 129/55   Pulse 100   Temp 98.6 °F (37 °C) (Oral)   Resp 20   Ht 1.549 m (5' 0.98\")   Wt 49.8 kg (109 lb 12.6 oz)   SpO2 95%    L/min   BMI 20.76 kg/m²   Temp (24hrs), Av.7 °F (37.1 °C), Min:98.4 °F (36.9 °C), Max:99.2 °F (37.3 °C)      Physical Exam:    Gen:  No apparent distress  Neuro:  Alert and oriented x4  Pulm:  Unlabored  Abd:  Soft/mildly distended/appropriate 
Heme/ONC   CBC fu  Wbc decreasing  Platelets increasing  Hx of iron def  Would replace oral iron if able  Defer to renal  We will follow prn  Call if questions    
Hospital Progress Note  Kehinde Barney MD   Answering service: [phone] OR    [employer]      NAME:  Heather Rich   :   1935   MRN:  [id number]     Date/Time:  2024 7:52 AM    Plan:     Postop care/surg -fabcess noted and explains leukocytosis (greater than baseline)   anemia - no gross gib will observe  Encourage activity   Risk of Deterioration: Low  []           Moderate  [x]           High  []                 Assessment:     Principal Problem:    Incarcerated ventral hernia with SBO (small bowel obstruction) (HCC)     24 PRIMARY INCARCERATED HERNIA REPAIR     Active Problems:    Leukocytosis     Ct 24      small dermal fluid collection which is decreased in size but demonstrates        peripheral enhancement which may represent a small abscess.          AF RVR 24     Dillt     Heparin     CTA chest     Begin po dilt      HTN (hypertension)     Titrate home meds    ESRD on peritoneal dialysis ([facility])    Santos catheter placement by IR         Dilated cbd, acquired       Intrahepatic biliary dilatation and common bile duct dilatation, likely  reflecting increased capacitance related to prior cholecystectomy.           Type 2 diabetes mellitus with kidney complication, without long-term current use of insulin ([facility])     Diet control     monitor  Resolved Problems:    * No resolved hospital problems. *          Admitting notes:88 y.o. female with anemia, arrhythmia, asthma, BPPV, h/o R breast CA s/p lumpectomy/XRT, ESRD on PD, sickle cell trait, hereditary spherocytosis, diet-controlled T2DM, left hearing deficit, HTN, seizure disorder, chronic LLE PVD, and chronic ventral wall hernia who presented with abdominal pain, constipation, irreducible hernia, chills, and low grade fever 100 this morning. Pt c/o constipation x2 days despite aggressive bowel regimen. She was advised by her nephrologist to come for evaluation of constipation due to concern for developing bacterial 
Hospital Progress Note  Kehinde Barney MD   Answering service: [phone] OR    [employer]      NAME:  Heather Rich   :   1935   MRN:  [id number]     Date/Time:  2024 7:47 AM    Plan:     Metoprolol and diltiazem held last  night d/t bradycardia - card consult requested  Begin dilt 30 mg q6   anemia - no gross gib will observe  Encourage activity   Risk of Deterioration: Low  []           Moderate  [x]           High  []                 Assessment:     Principal Problem:    Incarcerated ventral hernia with SBO (small bowel obstruction) (HCC)     24 PRIMARY INCARCERATED HERNIA REPAIR     Active Problems:    Leukocytosis     Ct 24      small dermal fluid collection which is decreased in size but demonstrates        peripheral enhancement which may represent a small abscess.          AF RVR 24     Dillt     Heparin     CTA chest     Begin po dilt      HTN (hypertension)     Titrate home meds    ESRD on peritoneal dialysis ([facility])    Santos catheter placement by IR         Dilated cbd, acquired       Intrahepatic biliary dilatation and common bile duct dilatation, likely  reflecting increased capacitance related to prior cholecystectomy.           Type 2 diabetes mellitus with kidney complication, without long-term current use of insulin ([facility])     Diet control     monitor  Resolved Problems:    * No resolved hospital problems. *          Admitting notes:88 y.o. female with anemia, arrhythmia, asthma, BPPV, h/o R breast CA s/p lumpectomy/XRT, ESRD on PD, sickle cell trait, hereditary spherocytosis, diet-controlled T2DM, left hearing deficit, HTN, seizure disorder, chronic LLE PVD, and chronic ventral wall hernia who presented with abdominal pain, constipation, irreducible hernia, chills, and low grade fever 100 this morning. Pt c/o constipation x2 days despite aggressive bowel regimen. She was advised by her nephrologist to come for evaluation of constipation due to concern for 
Hospital Progress Note  Kehinde Barney MD   Answering service: [phone] OR    [employer]      NAME:  Heather Rich   :   1935   MRN:  [id number]     Date/Time:  2024 7:27 AM    Plan:     Postop care/surg -fluid leaking from the surgical site has stopped  Progressive anemia - no gross gib will observe  D/c - Add jet neb  d/c ivf  Ck am lab - leukocytosis concerning - ct pending  Risk of Deterioration: Low  []           Moderate  [x]           High  []                 Assessment:     Principal Problem:    Incarcerated ventral hernia with SBO (small bowel obstruction) (HCC)     24 PRIMARY INCARCERATED HERNIA REPAIR     Active Problems:    AF RVR 24     Dillt     Heparin     CTA chest     Begin po dilt      HTN (hypertension)     Titrate home meds    ESRD on peritoneal dialysis ([facility])    Santos catheter placement by IR         Dilated cbd, acquired       Intrahepatic biliary dilatation and common bile duct dilatation, likely  reflecting increased capacitance related to prior cholecystectomy.           Type 2 diabetes mellitus with kidney complication, without long-term current use of insulin ([facility])     Diet control     monitor  Resolved Problems:    * No resolved hospital problems. *          Admitting notes:88 y.o. female with anemia, arrhythmia, asthma, BPPV, h/o R breast CA s/p lumpectomy/XRT, ESRD on PD, sickle cell trait, hereditary spherocytosis, diet-controlled T2DM, left hearing deficit, HTN, seizure disorder, chronic LLE PVD, and chronic ventral wall hernia who presented with abdominal pain, constipation, irreducible hernia, chills, and low grade fever 100 this morning. Pt c/o constipation x2 days despite aggressive bowel regimen. She was advised by her nephrologist to come for evaluation of constipation due to concern for developing bacterial peritonitis with persistent constipation. In the ED, CTAP showed incarcerated ventral hernia, developing small bowel obstruction, free 
Hospital Progress Note  Kehinde Barney MD   Answering service: [phone] OR    [employer]      NAME:  Heather Rich   :   1935   MRN:  [id number]     Date/Time:  2024 7:15 AM    Plan:     Continue dilt  SNF soon  Encourage activity   Risk of Deterioration: Low  []           Moderate  [x]           High  []                 Assessment:     Principal Problem:    Incarcerated ventral hernia with SBO (small bowel obstruction) (HCC)     24 PRIMARY INCARCERATED HERNIA REPAIR     Active Problems:    Leukocytosis     Ct 24      small dermal fluid collection which is decreased in size but demonstrates        peripheral enhancement which may represent a small abscess.          AF RVR 24     Dillt     Heparin     CTA chest     Begin po dilt      HTN (hypertension)     Titrate home meds    ESRD on peritoneal dialysis ([facility])    Santos catheter placement by IR         Dilated cbd, acquired       Intrahepatic biliary dilatation and common bile duct dilatation, likely  reflecting increased capacitance related to prior cholecystectomy.           Type 2 diabetes mellitus with kidney complication, without long-term current use of insulin ([facility])     Diet control     monitor  Resolved Problems:    * No resolved hospital problems. *          Admitting notes:88 y.o. female with anemia, arrhythmia, asthma, BPPV, h/o R breast CA s/p lumpectomy/XRT, ESRD on PD, sickle cell trait, hereditary spherocytosis, diet-controlled T2DM, left hearing deficit, HTN, seizure disorder, chronic LLE PVD, and chronic ventral wall hernia who presented with abdominal pain, constipation, irreducible hernia, chills, and low grade fever 100 this morning. Pt c/o constipation x2 days despite aggressive bowel regimen. She was advised by her nephrologist to come for evaluation of constipation due to concern for developing bacterial peritonitis with persistent constipation. In the ED, CTAP showed incarcerated ventral hernia, 
Hospital Progress Note  Kehinde Barney MD   Answering service: [phone] OR    [employer]      NAME:  Heather Rich   :   1935   MRN:  [id number]     Date/Time:  2024 7:14 AM    Plan:     Continue dilt  SNF soon  Encourage activity   Risk of Deterioration: Low  []           Moderate  [x]           High  []                 Assessment:     Principal Problem:    Incarcerated ventral hernia with SBO (small bowel obstruction) (HCC)     24 PRIMARY INCARCERATED HERNIA REPAIR     Active Problems:    Leukocytosis     Ct 24      small dermal fluid collection which is decreased in size but demonstrates        peripheral enhancement which may represent a small abscess.          AF RVR 24     Dillt     Heparin     CTA chest     Begin po dilt      HTN (hypertension)     Titrate home meds    ESRD on peritoneal dialysis ([facility])    Santos catheter placement by IR         Dilated cbd, acquired       Intrahepatic biliary dilatation and common bile duct dilatation, likely  reflecting increased capacitance related to prior cholecystectomy.           Type 2 diabetes mellitus with kidney complication, without long-term current use of insulin ([facility])     Diet control     monitor  Resolved Problems:    * No resolved hospital problems. *          Admitting notes:88 y.o. female with anemia, arrhythmia, asthma, BPPV, h/o R breast CA s/p lumpectomy/XRT, ESRD on PD, sickle cell trait, hereditary spherocytosis, diet-controlled T2DM, left hearing deficit, HTN, seizure disorder, chronic LLE PVD, and chronic ventral wall hernia who presented with abdominal pain, constipation, irreducible hernia, chills, and low grade fever 100 this morning. Pt c/o constipation x2 days despite aggressive bowel regimen. She was advised by her nephrologist to come for evaluation of constipation due to concern for developing bacterial peritonitis with persistent constipation. In the ED, CTAP showed incarcerated ventral hernia, 
Hospital Progress Note  Kehinde Barney MD   Answering service: [phone] OR    [employer]      NAME:  Heather Rich   :   1935   MRN:  [id number]     Date/Time:  2024 7:03 AM    Plan:     Postop care/surg -patient complains of fluid leaking from the surgical site  Progressive anemia - no gross gib will observe  Add jet neb  d/c ivf  Ck am lab - leukocytosis concerning  Risk of Deterioration: Low  []           Moderate  []           High  []                 Assessment:     Principal Problem:    Incarcerated ventral hernia with SBO (small bowel obstruction) (HCC)     24 PRIMARY INCARCERATED HERNIA REPAIR     Active Problems:    AF RVR 24     Dillt     Heparin     CTA chest     Begin po dilt      HTN (hypertension)     Titrate home meds    ESRD on peritoneal dialysis ([facility])    Santos catheter placement by IR         Dilated cbd, acquired       Intrahepatic biliary dilatation and common bile duct dilatation, likely  reflecting increased capacitance related to prior cholecystectomy.           Type 2 diabetes mellitus with kidney complication, without long-term current use of insulin ([facility])     Diet control     monitor  Resolved Problems:    * No resolved hospital problems. *          Admitting notes:88 y.o. female with anemia, arrhythmia, asthma, BPPV, h/o R breast CA s/p lumpectomy/XRT, ESRD on PD, sickle cell trait, hereditary spherocytosis, diet-controlled T2DM, left hearing deficit, HTN, seizure disorder, chronic LLE PVD, and chronic ventral wall hernia who presented with abdominal pain, constipation, irreducible hernia, chills, and low grade fever 100 this morning. Pt c/o constipation x2 days despite aggressive bowel regimen. She was advised by her nephrologist to come for evaluation of constipation due to concern for developing bacterial peritonitis with persistent constipation. In the ED, CTAP showed incarcerated ventral hernia, developing small bowel obstruction, free 
Hospital Progress Note  Kehinde Barney MD   Answering service: [phone] OR    [employer]      NAME:  Heather Rich   :   1935   MRN:  [id number]     Date/Time:  2024 7:13 AM    Plan:     Postop care/surg  Add jet neb  Encourage cough  D/w d -Bob Rich at bedside [phone]  Risk of Deterioration: Low  []           Moderate  []           High  []                 Assessment:     Principal Problem:    Incarcerated ventral hernia with SBO (small bowel obstruction) ([facility])     24 PRIMARY INCARCERATED HERNIA REPAIR     Active Problems:    HTN (hypertension)     Titrate home meds    ESRD on peritoneal dialysis ([facility])    Santos catheter placement by IR         Dilated cbd, acquired       Intrahepatic biliary dilatation and common bile duct dilatation, likely  reflecting increased capacitance related to prior cholecystectomy.           Type 2 diabetes mellitus with kidney complication, without long-term current use of insulin ([facility])     Diet control     monitor  Resolved Problems:    * No resolved hospital problems. *          Admitting notes:88 y.o. female with anemia, arrhythmia, asthma, BPPV, h/o R breast CA s/p lumpectomy/XRT, ESRD on PD, sickle cell trait, hereditary spherocytosis, diet-controlled T2DM, left hearing deficit, HTN, seizure disorder, chronic LLE PVD, and chronic ventral wall hernia who presented with abdominal pain, constipation, irreducible hernia, chills, and low grade fever 100 this morning. Pt c/o constipation x2 days despite aggressive bowel regimen. She was advised by her nephrologist to come for evaluation of constipation due to concern for developing bacterial peritonitis with persistent constipation. In the ED, CTAP showed incarcerated ventral hernia, developing small bowel obstruction, free intraperitoneal fluid, intrahepatic biliary and CBD dilation. General Surgery was consulted, recommended  admission due to multiple medical comorbidities, and took her 
Hospital Progress Note  Kehinde Barney MD   Answering service: [phone] OR    [employer]      NAME:  Heather Rich   :   1935   MRN:  [id number]     Date/Time:  2024 9:24 AM    Plan:     Postop care/surg -patient complains of fluid leaking from the surgical site  Progressive anemia - no gross gib will observe  Add jet neb  Encourage cough  D/w d -Bob Layla at bedside [phone]  Risk of Deterioration: Low  []           Moderate  []           High  []                 Assessment:     Principal Problem:    Incarcerated ventral hernia with SBO (small bowel obstruction) (HCC)     24 PRIMARY INCARCERATED HERNIA REPAIR     Active Problems:    AF RVR 24     Dillt     Heparin     CTA chest     Begin po dilt      HTN (hypertension)     Titrate home meds    ESRD on peritoneal dialysis ([facility])    Santos catheter placement by IR         Dilated cbd, acquired       Intrahepatic biliary dilatation and common bile duct dilatation, likely  reflecting increased capacitance related to prior cholecystectomy.           Type 2 diabetes mellitus with kidney complication, without long-term current use of insulin ([facility])     Diet control     monitor  Resolved Problems:    * No resolved hospital problems. *          Admitting notes:88 y.o. female with anemia, arrhythmia, asthma, BPPV, h/o R breast CA s/p lumpectomy/XRT, ESRD on PD, sickle cell trait, hereditary spherocytosis, diet-controlled T2DM, left hearing deficit, HTN, seizure disorder, chronic LLE PVD, and chronic ventral wall hernia who presented with abdominal pain, constipation, irreducible hernia, chills, and low grade fever 100 this morning. Pt c/o constipation x2 days despite aggressive bowel regimen. She was advised by her nephrologist to come for evaluation of constipation due to concern for developing bacterial peritonitis with persistent constipation. In the ED, CTAP showed incarcerated ventral hernia, developing small bowel 
Infectious Diseases Follow Up    2024      Assessment & Plan:     89 y/o female admitted for incarcerated hernia now seen for PD catheter related peritonitis and leukocytosis.       Continue Zosyn while inpatient.     R permacath in place. When ready for discharge, transition to IV Ceftazidime 1g at end of HD with PO Flagyl 500mg TID until 6/3/24, inclusive.            Subjective:     Patient sitting on side of bed, eating lunch. Just returned from R permacath placement. No complaints, No abdominal pain.     Objective:     Vitals: BP (!) 150/52   Pulse 83   Temp 98.5 °F (36.9 °C)   Resp 20   Ht 1.549 m (5' 1\")   Wt 49.6 kg (109 lb 5.6 oz)   SpO2 100%    L/min   BMI 20.66 kg/m²      Tmax:  Temp (24hrs), Av.2 °F (36.8 °C), Min:97.5 °F (36.4 °C), Max:98.5 °F (36.9 °C)      Exam:   Patient is intubated:  no    Exam:    General:  Alert, cooperative, elderly, NAD   Eyes:  Sclera anicteric. Pupils equally round and reactive to light.   Mouth/Throat: Mucous membranes normal, oral pharynx clear   Neck: Supple   Lungs:   Clear to auscultation bilaterally, good effort   CV:  Regular rate and rhythm,no murmur, click, rub or gallop   Abdomen:   Soft, non-tender. bowel sounds normal. non-distended   Extremities: No cyanosis or edema   Skin: Skin color, texture, turgor normal. Surgical site, PD catheter dressing   Lymph nodes: Deferred   Musculoskeletal: No swelling or deformity   Lines/Devices:  R permacath   Psych: Alert and oriented, normal mood affect given the setting         Labs:        Invalid input(s): \"ITNL\"   No results for input(s): \"CPK\", \"CKMB\" in the last 72 hours.    Invalid input(s): \"TROIQ\"  Recent Labs     24  0417 24  0708 24  0733 24  1251   *  --   --  131*   K 3.8  --   --  3.7     --   --  98   CO2 25  --   --  25   BUN 46*  --   --  50*   PHOS 5.2*  --   --  5.8*   WBC 19.4* 16.0* 15.3*  --    HGB 8.4* 10.6* 8.8*  --    HCT 26.5* 33.4* 27.5*  --  
Occupational Therapy    Orders received, acknowledged, and patient chart reviewed up to date. Evaluation completed. Recommend discharge home with HH once medically stable. Full note to follow.    Neeta Chiu OTR/L    
Occupational Therapy    Patient chart reviewed up to date. Therapy cleared by RN. Attempted visit, however patient politely requests therapy follow-up later today. Reports recently returned to bed after sitting up in chair, would like to rest for a bit. Will re-attempt in afternoon.    Neeta Chiu, OTR/L    
Occupational Therapy  05/22/24     Patient currently on OT caseload. OT tx attempted at 9:03 am however patient is off the floor in angio at this time. Will continue to follow patient.     Thank you,  Candi Wan, OTR/L    
Occupational Therapy  5/15/2024    Chart reviewed in prep for OT weekly re-assessment, attempted OT session however patient about to begin dialysis. Will defer and continue to follow.    Thank you,  Candi Brenner, UNIQUE, OTR/L  
Patient received a discharge order. The patient was made aware of the discharge and agreed to going to [facility]. The patient's IV was removed without any bleeding or complications, but patient is discharging with her permacath for HD. The patient was given discharge instructions which included new, changed, and discontinued medication, where they can  their prescriptions, side effects of new medications, opioid safety, follow up appointments, signs and symptoms of heart attack and stroke and when to seek emergency care, and how to access Mychart. The patient's belongings were all packed up and the patient verified they had everything that belongs to them. The patient was transported to [facility] via wheelchair. Vital signs were all stable at the time of discharge. Report given to KARSTEN Ramirez by KARSTEN Christopher. Opportunities for questions and clarification provided. RN has no questions at this time, but was assured to call back to 4W nurses station to reach me if any questions arise.      
Patient without complaints.  Tolerating full liquid diet.  Currently on the commode having a bowel movement.  Denies any nausea or vomiting.  Will advance to regular diet and see how she does.  
Physical Therapy     Reviewed chart and attempted to treat pt. Pt is currently on dialysis. Will defer at this time and continue to follow.   
Physical Therapy    Chart reviewed.  Patient currently off the floor in angio at this time. Will continue to follow as appropriate.    Dian Harris, MS, PT  
Physical Therapy    Pt currently undergoing HD in her room. Will follow up as able and appropriate for continued functional mobility needs.    ANGELINA BEAUCHAMP, PT    
Physical Therapy - defer  Attempted to see pt for therapy session however she is currently on HD.  Will check back this afternoon as able.   
Physical Therapy - defer  Pt currently receiving HD. Will check back as able.    Recommend with nursing/ staff, walking daily in the liz with one assist and walker. Thank you for completing as able in order to maintain patient strength, endurance and independence.     
Physical therapy services attempted @7:58AM. Per cursory review of medical chart, most recent hemoglobin level is 6.9 in downward trend. PT HOLD per medical status. PT Team will try to provide skilled services at a later date as time permits and medically appropriate to patient tolerance.    Jessica Erickson, PT, DPT    
Progress Note  Date:5/10/2024       Room:[facility]  Patient Name:Heather Rich     YOB: 1935     Age:88 y.o.        Subjective    Subjective   Review of Systems  Patient denies any nausea vomiting.  States she overall feels well.  Still feeling little puffy.  Objective         Vitals Last 24 Hours:  TEMPERATURE:  Temp  Av.1 °F (36.7 °C)  Min: 97.5 °F (36.4 °C)  Max: 98.8 °F (37.1 °C)  RESPIRATIONS RANGE: Resp  Av.3  Min: 16  Max: 22  PULSE OXIMETRY RANGE: SpO2  Av.3 %  Min: 95 %  Max: 100 %  PULSE RANGE: Pulse  Av.9  Min: 64  Max: 90  BLOOD PRESSURE RANGE: Systolic (24hrs), Av , Min:112 , Max:146   ; Diastolic (24hrs), Av, Min:37, Max:71    I/O (24Hr):  No intake or output data in the 24 hours ending 05/10/24 1115  Objective:  Vital signs: (most recent): Blood pressure (!) 133/42, pulse 68, temperature 98.2 °F (36.8 °C), resp. rate 18, height 1.549 m (5' 0.98\"), weight 50.1 kg (110 lb 7.2 oz), SpO2 99 %, peak flow 100 L/min.    General alert no acute distress  Abdomen soft mild distention incision healing well without evidence of infection  Labs/Imaging/Diagnostics    Labs:  CBC:  Recent Labs     24  0843 24  0041 05/10/24  0258   WBC 21.9* 21.0* 18.4*   RBC 3.43* 2.79* 2.54*   HGB 9.4* 7.5* 7.0*   HCT 29.4* 24.8* 22.1*   MCV 85.7 88.9 87.0   RDW 14.4 14.6* 14.6*    322 296     CHEMISTRIES:  Recent Labs     24  0131 24  0041 05/10/24  0258    136 138   K 3.7 4.1 3.6    102 105   CO2 28 25 25   BUN 57* 40* 47*   CREATININE 6.51* 6.05* 7.84*   GLUCOSE 106* 126* 102*   PHOS 7.1*  --   --      PT/INR:  Recent Labs     24  0843   PROTIME 11.1   INR 1.1     APTT:  Recent Labs     24  0843   APTT 24.9     LIVER PROFILE:  Recent Labs     24  0131   AST 38*   ALT 60   BILITOT 0.7   ALKPHOS 94       Imaging Last 24 Hours:  CTA CHEST W WO CONTRAST    Result Date: 2024  EXAM:  CTA CHEST W WO CONTRAST INDICATION: New onset 
Pt received an order on 5/19 to remove Santos catheter after dialysis Monday 5/20. This RN called IR to remove Santos after pt receives dialysis today 5/20. IR stated that floor RN can remove Santos, this RN clarified with manager and charge RN if we are able to remove Santos on this floor. Per manager and charge RN we are not able to. IR called and notified that this RN is not able to remove Santos and IR has to remove. This RN called IR to see when available to come up and remove Santos, IR RN asked this RN to clarify with nephrologist regarding the line exchange order.     1535: This RN called nephrologist following pt. Received a callback from Dr. Coates. Explained situation regarding IR. Per MD, pt will get a line exchange Wednesday before discharge. This RN asked MD to put order in, per MD she will put order in tomorrow 5/21.       
Spiritual Care Assessment/Progress Note  [facility]    Name: Heather Rich MRN: [id number]    Age: 88 y.o.     Sex: female   Language: English     Date: 5/21/2024                           Spiritual Assessment begun in [facility] 4 TELEMETRY  Service Provided For: Patient     Encounter Overview/Reason: Attempted Encounter    Spiritual beliefs:      [x] Involved in a abdirashid tradition/spiritual practice: Confucianist     [] Supported by a abdirashid community:      [] Claims no spiritual orientation:      [] Seeking spiritual identity:           [] Adheres to an individual form of spirituality:      [] Not able to assess:                Identified resources for coping and support system:   Support System: Unknown       [] Prayer                  [] Devotional reading               [] Music                  [] Guided Imagery     [] Pet visits                                        [] Other: (COMMENT)     Specific area/focus of visit   Encounter:    Crisis:    Spiritual/Emotional needs:    Ritual, Rites and Sacraments:    Grief, Loss, and Adjustments:    Ethics/Mediation:    Behavioral Health:    Palliative Care:    Advance Care Planning:           Narrative:  initiated visit to Heather Rich in [facility] 4W TELEMETRY for spiritual assessment.  Reviewed patient's medical record prior to visit. Unable to assess due to medical provider being present. For additional spiritual care, please contact the  on-call at [phone]).    Rev. Rosa Henderson MDiv, MSW  Chaplain Resident         
Spiritual Care Assessment/Progress Note  [facility]    Name: Heather Rich MRN: [id number]    Age: 88 y.o.     Sex: female   Language: English     Date: 5/22/2024            Total Time Calculated: 15 min              Spiritual Assessment begun in [facility] 4 TELEMETRY  Service Provided For: Patient     Encounter Overview/Reason: Initial Encounter    Spiritual beliefs:      [x] Involved in a abdirashid tradition/spiritual practice: Jain     [] Supported by a abdirashid community:      [] Claims no spiritual orientation:      [] Seeking spiritual identity:           [] Adheres to an individual form of spirituality:      [] Not able to assess:                Identified resources for coping and support system:   Support System: Latter-day/abdirashid community, Family members       [] Prayer                  [] Devotional reading               [] Music                  [] Guided Imagery     [] Pet visits                                        [] Other: (COMMENT)     Specific area/focus of visit   Encounter:    Crisis:    Spiritual/Emotional needs:    Ritual, Rites and Sacraments:    Grief, Loss, and Adjustments:    Ethics/Mediation:    Behavioral Health:    Palliative Care:    Advance Care Planning:      Plan/Referrals: Developed Care Plan (see consult note)    Narrative:  initiated visit to Heather Rich due to length of stay in [facility] TELEMETRY. Reviewed the patient's medical record prior to this encounter. Medical provider was present.     Assessment: Ms. Rich was experiencing spiritual well-being as evidenced by her statements that she was doing well emotionally and spiritually. Her family and  have been a support to her.  Scriptures are a spiritual resource for her.  Provided active listening, a compassionate presence, and encouraged her to use her abdirashid as a resource.       Outcome: Patient affirmed appreciation for the spiritual care received during visit.     Plan of Care: No other spiritual care 
[facility]   Daily Progress Note    Admit Date: 2024  Post-Operative Day: 12 Days Post-Op from Procedure(s):  PRIMARY INCARCERATED  HERNIA REPAIR     Subjective:     Last 24 hrs: pt just had US-guided drainage at the bedside, fluid drained appears ss w/ some sm bits of solid matter, also on dialysis   Not in any pain, no n/v, hungry, wbc 20.4, no fevers, on zosyn      Objective:     Blood pressure 136/61, pulse 80, temperature 98.4 °F (36.9 °C), resp. rate 19, height 1.549 m (5' 1\"), weight 51.9 kg (114 lb 6.7 oz), SpO2 98 %, peak flow 100 L/min.  Temp (24hrs), Av.4 °F (36.9 °C), Min:97.8 °F (36.6 °C), Max:98.8 °F (37.1 °C)      _____________________  Physical Exam:     Alert and Oriented, x3, on dialysis, in no acute distress.  Cardiovascular: RRR, no peripheral edema  Abdomen: soft, drsg dry, PD cath capped      Assessment:   Principal Problem:    Incarcerated ventral hernia  Active Problems:    HTN (hypertension)    ESRD on peritoneal dialysis (HCC)    SBO (small bowel obstruction) (HCC)    Dilated cbd, acquired    Type 2 diabetes mellitus with kidney complication, without long-term current use of insulin (HCC)    Leukocytosis    Abdominal wall abscess    Peritonitis (HCC)  Resolved Problems:    * No resolved hospital problems. *          Plan:     Await results of US drainage  Cont abx  Monitor wbc  OOB as tolerated  Can cont diet  HD per schedule    Data Review:    Recent Labs     05/15/24  0411 05/16/24  0300 24  0332   WBC 21.4* 23.5* 20.4*   HGB 7.5* 8.5* 8.0*   HCT 24.1* 25.6* 24.3*   * 500* 522*     Recent Labs     05/15/24  0411 05/17/24  0332   * 135*   K 3.9 3.8    102   CO2 25 24   BUN 37* 37*   PHOS 3.5 2.9     Invalid input(s): \"AML\", \"LPSE\"        ______________________  Medications:    Current Facility-Administered Medications   Medication Dose Route Frequency    dilTIAZem (CARDIZEM) tablet 30 mg  30 mg Oral 4 times per day    dianeal lo-mar 2.5% 2,000 mL  
[facility]   Daily Progress Note    Admit Date: 2024  Post-Operative Day: 8 Days Post-Op from Procedure(s):  PRIMARY INCARCERATED  HERNIA REPAIR     Subjective:     Last 24 hrs: pt is on dialysis; no pain, tolerating diet though doesn't like the food; moving bowels; wbc up to 23K today, no fevers. Zosyn started yesterday      Objective:     Blood pressure 137/76, pulse 72, temperature 98.4 °F (36.9 °C), temperature source Oral, resp. rate 18, height 1.549 m (5' 0.98\"), weight 50 kg (110 lb 4.4 oz), SpO2 97 %, peak flow 100 L/min.  Temp (24hrs), Av.6 °F (37 °C), Min:98.4 °F (36.9 °C), Max:98.9 °F (37.2 °C)      _____________________  Physical Exam:     Alert and Oriented, x3, in no acute distress.  Cardiovascular: RRR, no peripheral edema  Abdomen: soft, NT, nl BS, incision c/d/I, PD catheter capped      Assessment:   Principal Problem:    Incarcerated ventral hernia  Active Problems:    HTN (hypertension)    ESRD on peritoneal dialysis (HCC)    SBO (small bowel obstruction) (HCC)    Dilated cbd, acquired    Type 2 diabetes mellitus with kidney complication, without long-term current use of insulin (HCC)  Resolved Problems:    * No resolved hospital problems. *          Plan:     Will get a CT scan today to r/o any intra abdominal process causing persistent leukocytosis  Cont zosyn  Cont HD per schedule  PPI  Monitor lab  OOB/PT    Data Review:    Recent Labs     24  0857 24  0629 24  0654   WBC 22.0* 20.9* 23.0*   HGB 8.1* 6.9* 7.3*   HCT 24.2* 21.5* 21.9*    370 417*     Recent Labs     24  0857      K 3.2*      CO2 27   BUN 22*   MG 2.0     Invalid input(s): \"AML\", \"LPSE\"        ______________________  Medications:    Current Facility-Administered Medications   Medication Dose Route Frequency    lidocaine (XYLOCAINE) 5 % ointment   Topical PRN    piperacillin-tazobactam (ZOSYN) 3,375 mg in sodium chloride 0.9 % 50 mL IVPB (mini-bag)  3,375 mg IntraVENous 
[facility]   Daily Progress Note    Admit Date: 2024  Post-Operative Day: 9 Days Post-Op from Procedure(s):  PRIMARY INCARCERATED  HERNIA REPAIR     Subjective:     Last 24 hrs: pt w/o complaints; WBC cont to climb, CT not done yesterday, tmax 99.1       Objective:     Blood pressure (!) 110/39, pulse 68, temperature 98.8 °F (37.1 °C), temperature source Oral, resp. rate 21, height 1.549 m (5' 0.98\"), weight 49.9 kg (110 lb 0.2 oz), SpO2 100 %, peak flow 100 L/min.  Temp (24hrs), Av.7 °F (37.1 °C), Min:98.2 °F (36.8 °C), Max:99.1 °F (37.3 °C)      _____________________  Physical Exam:     Alert and Oriented, x3, in no acute distress.  Cardiovascular: RRR, no peripheral edema  Abdomen: soft, NT, incision c/d/i      Assessment:   Principal Problem:    Incarcerated ventral hernia  Active Problems:    HTN (hypertension)    ESRD on peritoneal dialysis (HCC)    SBO (small bowel obstruction) ([facility])    Dilated cbd, acquired    Type 2 diabetes mellitus with kidney complication, without long-term current use of insulin ([facility])  Resolved Problems:    * No resolved hospital problems. *          Plan:     CT today, r/o intra abdominal process  Cont zosyn  OOB as tolerated  GI proph  Monitor labs    Data Review:    Recent Labs     24  0629 24  0654 24  0532   WBC 20.9* 23.0* 25.9*   HGB 6.9* 7.3* 8.3*   HCT 21.5* 21.9* 26.1*    417* 473*     Recent Labs     24  0912      K 3.6      CO2 26   BUN 42*   PHOS 4.2     Invalid input(s): \"AML\", \"LPSE\"        ______________________  Medications:    Current Facility-Administered Medications   Medication Dose Route Frequency    lidocaine (XYLOCAINE) 5 % ointment   Topical PRN    piperacillin-tazobactam (ZOSYN) 3,375 mg in sodium chloride 0.9 % 50 mL IVPB (mini-bag)  3,375 mg IntraVENous Q12H    dianeal lo-mar 1.5% 2,000 mL  2,000 mL IntraPERitoneal Weekly    gentamicin (GARAMYCIN) 0.1 % ointment   Topical PRN    epoetin carrie-epbx 
(Principal) Incarcerated ventral hernia 5/5/2024 Yes    HTN (hypertension) 5/6/2024 Yes    ESRD on peritoneal dialysis (HCC) 5/6/2024 Yes    SBO (small bowel obstruction) ([facility]) 5/6/2024 Yes    Dilated cbd, acquired 5/6/2024 Yes    Type 2 diabetes mellitus with kidney complication, without long-term current use of insulin ([facility]) 5/6/2024 Yes    Leukocytosis 5/16/2024 Yes    Abdominal wall abscess 5/16/2024 Yes    Peritonitis ([facility]) 5/16/2024 Yes    Iron deficiency 5/20/2024 Yes    Thrombocytosis 5/20/2024 Yes     Assessment & Plan  Status post umbilical hernia repair  Overall seems to be doing well from surgical standpoint.  Elevated WBC does not seem to be related to surgery.  Continue workup per medical service  May be discharged from our standpoint when stable  Electronically signed by Ruddy Buckley MD on 5/20/24 at 3:19 PM EDT    
General Surgery was consulted, recommended  admission due to multiple medical comorbidities, and took her emergently to the OR for incarcerated hernia repair.      Pt was seen in PACU and denied any pain.     Subjective:     C/o ng tube irritation,cough and post op discomfort cough non productive  11 Point Review of Systems:   Negative except no flatus and sm bm    []            Unable to obtain ROS due to:       []            mental status change []            sedated []            intubated     Social History     Tobacco Use    Smoking status: Former     Types: Cigarettes     Passive exposure: Never    Smokeless tobacco: Never    Tobacco comments:     Patient states she smoked for 1 month of her life.   Substance Use Topics    Alcohol use: No     Medications reviewed:  Current Facility-Administered Medications   Medication Dose Route Frequency    dilTIAZem 125 mg in sodium chloride 0.9 % 125 mL infusion  2.5-15 mg/hr IntraVENous Continuous    heparin (porcine) injection 3,000 Units  60 Units/kg IntraVENous PRN    heparin (porcine) injection 1,500 Units  30 Units/kg IntraVENous PRN    heparin 25,000 units in dextrose 5% 250 mL (premix) infusion  5-30 Units/kg/hr IntraVENous Continuous    metoprolol succinate (TOPROL XL) extended release tablet 100 mg  100 mg Oral Nightly    acetylcysteine (MUCOMYST) 20 % solution 600 mg  600 mg Inhalation BID RT    arformoterol tartrate (BROVANA) nebulizer solution 15 mcg  15 mcg Nebulization BID RT    fluticasone-salmeterol (ADVAIR) 250-50 MCG/ACT diskus inhaler 1 puff (Patient Supplied)  1 puff Inhalation BID    budesonide (PULMICORT) nebulizer suspension 250 mcg  0.25 mg Nebulization BID RT    dextrose 5 % and 0.9 % sodium chloride infusion   IntraVENous Continuous    glucose chewable tablet 16 g  4 tablet Oral PRN    dextrose bolus 10% 125 mL  125 mL IntraVENous PRN    Or    dextrose bolus 10% 250 mL  250 mL IntraVENous PRN    glucagon injection 1 mg  1 mg SubCUTAneous PRN    
General Surgery was consulted, recommended  admission due to multiple medical comorbidities, and took her emergently to the OR for incarcerated hernia repair.      Pt was seen in PACU and denied any pain.     Subjective:     C/o ng tube irritation,cough and post op discomfort cough non productive  11 Point Review of Systems:   Negative except no flatus and sm bm    []            Unable to obtain ROS due to:       []            mental status change []            sedated []            intubated     Social History     Tobacco Use    Smoking status: Former     Types: Cigarettes     Passive exposure: Never    Smokeless tobacco: Never    Tobacco comments:     Patient states she smoked for 1 month of her life.   Substance Use Topics    Alcohol use: No     Medications reviewed:  Current Facility-Administered Medications   Medication Dose Route Frequency    dilTIAZem 125 mg in sodium chloride 0.9 % 125 mL infusion  2.5-15 mg/hr IntraVENous Continuous    metoprolol succinate (TOPROL XL) extended release tablet 100 mg  100 mg Oral Nightly    acetylcysteine (MUCOMYST) 20 % solution 600 mg  600 mg Inhalation BID RT    arformoterol tartrate (BROVANA) nebulizer solution 15 mcg  15 mcg Nebulization BID RT    fluticasone-salmeterol (ADVAIR) 250-50 MCG/ACT diskus inhaler 1 puff (Patient Supplied)  1 puff Inhalation BID    budesonide (PULMICORT) nebulizer suspension 250 mcg  0.25 mg Nebulization BID RT    dextrose 5 % and 0.9 % sodium chloride infusion   IntraVENous Continuous    glucose chewable tablet 16 g  4 tablet Oral PRN    dextrose bolus 10% 125 mL  125 mL IntraVENous PRN    Or    dextrose bolus 10% 250 mL  250 mL IntraVENous PRN    glucagon injection 1 mg  1 mg SubCUTAneous PRN    dextrose 10 % infusion   IntraVENous Continuous PRN    ipratropium 0.5 mg-albuterol 2.5 mg (DUONEB) nebulizer solution 1 Dose  1 Dose Inhalation Q4H PRN    albuterol sulfate HFA (PROVENTIL;VENTOLIN;PROAIR) 108 (90 Base) MCG/ACT inhaler 1 puff 
PRN    piperacillin-tazobactam (ZOSYN) 3,375 mg in sodium chloride 0.9 % 50 mL IVPB (mini-bag)  3,375 mg IntraVENous Q12H    HYDROmorphone HCl PF (DILAUDID) injection 1 mg  1 mg IntraVENous Q3H PRN    prochlorperazine (COMPAZINE) injection 10 mg  10 mg IntraVENous Q6H PRN    albuterol (PROVENTIL) (2.5 MG/3ML) 0.083% nebulizer solution 2.5 mg  2.5 mg Nebulization 4x Daily PRN    aspirin EC tablet 81 mg  81 mg Oral Daily    isosorbide mononitrate (IMDUR) extended release tablet 30 mg  30 mg Oral Daily    lamoTRIgine (LAMICTAL) tablet 25 mg  25 mg Oral BID    NIFEdipine (PROCARDIA XL) extended release tablet 60 mg  60 mg Oral BID    oxyBUTYnin (DITROPAN-XL) extended release tablet 10 mg  10 mg Oral Nightly    spironolactone (ALDACTONE) tablet 25 mg  25 mg Oral Daily    tiZANidine (ZANAFLEX) tablet 2 mg  2 mg Oral BID PRN    sodium chloride flush 0.9 % injection 5-40 mL  5-40 mL IntraVENous 2 times per day    sodium chloride flush 0.9 % injection 5-40 mL  5-40 mL IntraVENous PRN    0.9 % sodium chloride infusion   IntraVENous PRN    acetaminophen (TYLENOL) tablet 650 mg  650 mg Oral Q4H PRN    polyethylene glycol (GLYCOLAX) packet 17 g  17 g Oral Daily PRN          Physical Exam:  General:    Alert, cooperative, no distress, appears stated age.     Head:   Normocephalic, without obvious abnormality, atraumatic.  Eyes:   Conjunctivae/corneas clear.    Nose:  Nares normal. No drainage or sinus tenderness. NGT in place  Throat:    Lips, mucosa, and tongue normal.  No Thrush  Neck:  Supple, symmetrical,  no adenopathy, thyroid: non tender    no carotid bruit and no JVD.  Lungs:   Clear to auscultation bilaterally.  No Wheezing or Rhonchi. No rales.  Chest wall:  No tenderness or deformity. No Accessory muscle use.  Heart:   Regular rate and rhythm,  no murmur, rub or gallop.  Abdomen:   Soft, non-tender. Not distended.    Extremities: Extremities normal, atraumatic, No cyanosis.  No edema. No clubbing  Skin:     Texture, 
bacterial peritonitis with persistent constipation. In the ED, CTAP showed incarcerated ventral hernia, developing small bowel obstruction, free intraperitoneal fluid, intrahepatic biliary and CBD dilation. General Surgery was consulted, recommended  admission due to multiple medical comorbidities, and took her emergently to the OR for incarcerated hernia repair.      Pt was seen in PACU and denied any pain.     Subjective:     Feeling better - no new c/o  11 Point Review of Systems:   Negative except no cp/sob    []            Unable to obtain ROS due to:       []            mental status change []            sedated []            intubated     Social History     Tobacco Use    Smoking status: Former     Types: Cigarettes     Passive exposure: Never    Smokeless tobacco: Never    Tobacco comments:     Patient states she smoked for 1 month of her life.   Substance Use Topics    Alcohol use: No     Medications reviewed:  Current Facility-Administered Medications   Medication Dose Route Frequency    pantoprazole (PROTONIX) tablet 40 mg  40 mg Oral QAM AC    cefTRIAXone (ROCEPHIN) 1,000 mg in sterile water 10 mL IV syringe  1,000 mg IntraVENous Q24H    metroNIDAZOLE (FLAGYL) 500 mg in 0.9% NaCl 100 mL IVPB premix  500 mg IntraVENous Q8H    lidocaine (XYLOCAINE) 5 % ointment   Topical PRN    gentamicin (GARAMYCIN) 0.1 % ointment   Topical PRN    epoetin carrie-epbx (RETACRIT) injection 10,000 Units  10,000 Units IntraVENous Once per day on Mon Wed Fri    dilTIAZem (CARDIZEM) tablet 60 mg  60 mg Oral 4 times per day    metoprolol succinate (TOPROL XL) extended release tablet 100 mg  100 mg Oral Nightly    acetylcysteine (MUCOMYST) 20 % solution 600 mg  600 mg Inhalation BID RT    arformoterol tartrate (BROVANA) nebulizer solution 15 mcg  15 mcg Nebulization BID RT    fluticasone-salmeterol (ADVAIR) 250-50 MCG/ACT diskus inhaler 1 puff (Patient Supplied)  1 puff Inhalation BID    budesonide (PULMICORT) nebulizer 
hours.    Imaging Last 24 Hours:  No results found.  Assessment//Plan           Hospital Problems             Last Modified POA    * (Principal) Incarcerated ventral hernia 5/5/2024 Yes    HTN (hypertension) 5/6/2024 Yes    ESRD on peritoneal dialysis ([facility]) 5/6/2024 Yes    SBO (small bowel obstruction) ([facility]) 5/6/2024 Yes    Dilated cbd, acquired 5/6/2024 Yes    Type 2 diabetes mellitus with kidney complication, without long-term current use of insulin ([facility]) 5/6/2024 Yes     Assessment & Plan  Overall appears to be doing well though does have elevated white blood cell count.  Will start empiric Zosyn.  Continue regular diet  Continue to monitor wound  May need to have the wound open but not at this point.  Electronically signed by Ruddy Buckley MD on 5/12/24 at 2:27 PM EDT    
intrahepatic biliary and CBD dilation. General Surgery was consulted, recommended  admission due to multiple medical comorbidities, and took her emergently to the OR for incarcerated hernia repair.      Pt was seen in PACU and denied any pain.     Subjective:     Feeling better - flatus and bm wants to eat  11 Point Review of Systems:   Negative except no flatus and sm bm    []            Unable to obtain ROS due to:       []            mental status change []            sedated []            intubated     Social History     Tobacco Use    Smoking status: Former     Types: Cigarettes     Passive exposure: Never    Smokeless tobacco: Never    Tobacco comments:     Patient states she smoked for 1 month of her life.   Substance Use Topics    Alcohol use: No     Medications reviewed:  Current Facility-Administered Medications   Medication Dose Route Frequency    epoetin carrie-epbx (RETACRIT) injection 10,000 Units  10,000 Units IntraVENous Once per day on Mon Wed Fri    pantoprazole (PROTONIX) 40 mg in sodium chloride (PF) 0.9 % 10 mL injection  40 mg IntraVENous Daily    dilTIAZem (CARDIZEM) tablet 60 mg  60 mg Oral 4 times per day    metoprolol succinate (TOPROL XL) extended release tablet 100 mg  100 mg Oral Nightly    acetylcysteine (MUCOMYST) 20 % solution 600 mg  600 mg Inhalation BID RT    arformoterol tartrate (BROVANA) nebulizer solution 15 mcg  15 mcg Nebulization BID RT    fluticasone-salmeterol (ADVAIR) 250-50 MCG/ACT diskus inhaler 1 puff (Patient Supplied)  1 puff Inhalation BID    budesonide (PULMICORT) nebulizer suspension 250 mcg  0.25 mg Nebulization BID RT    dextrose 5 % and 0.9 % sodium chloride infusion   IntraVENous Continuous    glucose chewable tablet 16 g  4 tablet Oral PRN    dextrose bolus 10% 125 mL  125 mL IntraVENous PRN    Or    dextrose bolus 10% 250 mL  250 mL IntraVENous PRN    glucagon injection 1 mg  1 mg SubCUTAneous PRN    dextrose 10 % infusion   IntraVENous Continuous PRN    
recommended  admission due to multiple medical comorbidities, and took her emergently to the OR for incarcerated hernia repair.      Pt was seen in PACU and denied any pain.     Subjective:     C/o ng tube irritation,cough and post op discomfort  11 Point Review of Systems:   Negative except no cp/sob    []            Unable to obtain ROS due to:       []            mental status change []            sedated []            intubated     Social History     Tobacco Use    Smoking status: Former     Types: Cigarettes     Passive exposure: Never    Smokeless tobacco: Never    Tobacco comments:     Patient states she smoked for 1 month of her life.   Substance Use Topics    Alcohol use: No     Medications reviewed:  Current Facility-Administered Medications   Medication Dose Route Frequency    guaiFENesin (MUCINEX) extended release tablet 600 mg  600 mg Oral BID    ondansetron (ZOFRAN) injection 4 mg  4 mg IntraVENous Q6H PRN    piperacillin-tazobactam (ZOSYN) 3,375 mg in sodium chloride 0.9 % 50 mL IVPB (mini-bag)  3,375 mg IntraVENous Q12H    HYDROmorphone HCl PF (DILAUDID) injection 1 mg  1 mg IntraVENous Q3H PRN    prochlorperazine (COMPAZINE) injection 10 mg  10 mg IntraVENous Q6H PRN    albuterol (PROVENTIL) (2.5 MG/3ML) 0.083% nebulizer solution 2.5 mg  2.5 mg Nebulization 4x Daily PRN    aspirin EC tablet 81 mg  81 mg Oral Daily    isosorbide mononitrate (IMDUR) extended release tablet 30 mg  30 mg Oral Daily    lamoTRIgine (LAMICTAL) tablet 25 mg  25 mg Oral BID    metoprolol succinate (TOPROL XL) extended release tablet 50 mg  50 mg Oral Nightly    NIFEdipine (PROCARDIA XL) extended release tablet 60 mg  60 mg Oral BID    oxyBUTYnin (DITROPAN-XL) extended release tablet 10 mg  10 mg Oral Nightly    spironolactone (ALDACTONE) tablet 25 mg  25 mg Oral Daily    tiZANidine (ZANAFLEX) tablet 2 mg  2 mg Oral BID PRN    sodium chloride flush 0.9 % injection 5-40 mL  5-40 mL IntraVENous 2 times per day    sodium 
(118 lb 6.4 oz)   12/18/23 52 kg (114 lb 11.2 oz)   11/21/23 51 kg (112 lb 6.4 oz)   10/10/23 55.5 kg (122 lb 4.8 oz)   10/06/23 56.4 kg (124 lb 4.8 oz)   10/04/23 55.4 kg (122 lb 3.2 oz)   09/14/23 55.2 kg (121 lb 12.8 oz)   08/28/23 54.6 kg (120 lb 4.8 oz)           Nutrition Diagnosis:   In context of acute illness or injury, Moderate malnutrition related to altered GI function as evidenced by poor intake prior to admission, mild muscle loss, mild loss of subcutaneous fat    Nutrition Interventions:   Food and/or Nutrient Delivery: Continue Current Diet, Start Oral Nutrition Supplement  Nutrition Education/Counseling: Education initiated (indications of functioning bowel - +gas/Bms)  Coordination of Nutrition Care: Continue to monitor while inpatient       Goals:     Goals: PO intake 75% or greater, Meet at least 75% of estimated needs, by next RD assessment       Nutrition Monitoring and Evaluation:   Behavioral-Environmental Outcomes: None Identified  Food/Nutrient Intake Outcomes: Diet Advancement/Tolerance, Food and Nutrient Intake, Supplement Intake  Physical Signs/Symptoms Outcomes: Meal Time Behavior, Weight, Biochemical Data, Nutrition Focused Physical Findings, Fluid Status or Edema, GI Status    Discharge Planning:    Too soon to determine     Jessica Leon RD  Available via [employer]      
1 Dose Inhalation Q4H PRN    albuterol sulfate HFA (PROVENTIL;VENTOLIN;PROAIR) 108 (90 Base) MCG/ACT inhaler 1 puff (Patient Supplied)  1 puff Inhalation Q6H PRN    guaiFENesin (MUCINEX) extended release tablet 600 mg  600 mg Oral BID    phenol 1.4 % mouth spray 1 spray  1 spray Mouth/Throat Q2H PRN    ondansetron (ZOFRAN) injection 4 mg  4 mg IntraVENous Q6H PRN    HYDROmorphone HCl PF (DILAUDID) injection 1 mg  1 mg IntraVENous Q3H PRN    prochlorperazine (COMPAZINE) injection 10 mg  10 mg IntraVENous Q6H PRN    isosorbide mononitrate (IMDUR) extended release tablet 30 mg  30 mg Oral Daily    lamoTRIgine (LAMICTAL) tablet 25 mg  25 mg Oral BID    oxyBUTYnin (DITROPAN-XL) extended release tablet 10 mg  10 mg Oral Nightly    spironolactone (ALDACTONE) tablet 25 mg  25 mg Oral Daily    tiZANidine (ZANAFLEX) tablet 2 mg  2 mg Oral BID PRN    sodium chloride flush 0.9 % injection 5-40 mL  5-40 mL IntraVENous 2 times per day    sodium chloride flush 0.9 % injection 5-40 mL  5-40 mL IntraVENous PRN    0.9 % sodium chloride infusion   IntraVENous PRN    acetaminophen (TYLENOL) tablet 650 mg  650 mg Oral Q4H PRN    polyethylene glycol (GLYCOLAX) packet 17 g  17 g Oral Daily PRN        Objective:   Vitals:  BP (!) 131/59   Pulse 85   Temp 98.3 °F (36.8 °C) (Oral)   Resp 23   Ht 1.549 m (5' 1\")   Wt 49.7 kg (109 lb 9.1 oz)   SpO2 99%    L/min   BMI 20.70 kg/m²   Temp (24hrs), Av.1 °F (36.7 °C), Min:97.8 °F (36.6 °C), Max:98.3 °F (36.8 °C)           Last 24hr Input/Output:    Intake/Output Summary (Last 24 hours) at 2024 0609  Last data filed at 2024 1600  Gross per 24 hour   Intake 1190.77 ml   Output 2700 ml   Net -1509.23 ml          PHYSICAL EXAM:  General:    Alert, cooperative, no distress, appears stated age.     Head:   Normocephalic, without obvious abnormality, atraumatic.  Eyes:   Conjunctivae/corneas clear.  PERRLA  Nose:  Nares normal. No drainage or sinus tenderness.  Throat:    Lips, 
Needs:  Energy Requirements Based On: Kcal/kg  Weight Used for Energy Requirements: Current  Energy (kcal/day): 1500+ (30+ kcal/kg)  Weight Used for Protein Requirements: Current  Protein (g/day): 75 (1.5 gm/kg)  Method Used for Fluid Requirements: Standard Renal  Fluid (ml/day): 500 mL + OP    Nutrition Related Findings:   Edema: None                    Last BM: 05/11/24    Wounds:   Wound Type: Surgical Incision      Current Nutrition Therapies:  Diet: regular, low Phos, low fiber  Supplements: Nepro BID  Meal Intake:   No data found.  Supplement Intake:  No data found.        Anthropometric Measures:  Height: 154.9 cm (5' 1\")  Ideal Body Weight (IBW): 105 lbs (48 kg)    Admission Body Weight: 51.3 kg (113 lb)  Current Body Weight: 49.9 kg (110 lb 0.2 oz), 104.8 % IBW. Weight Source: Bed Scale  Current BMI (kg/m2): 20.8        Weight Adjustment For: No Adjustment                 BMI Categories: Underweight (BMI less than 22) age over 65    Wt Readings from Last 10 Encounters:   05/14/24 49.9 kg (110 lb 0.2 oz)   03/27/24 53.7 kg (118 lb 6.4 oz)   03/18/24 53.7 kg (118 lb 6.4 oz)   12/18/23 52 kg (114 lb 11.2 oz)   11/21/23 51 kg (112 lb 6.4 oz)   10/10/23 55.5 kg (122 lb 4.8 oz)   10/06/23 56.4 kg (124 lb 4.8 oz)   10/04/23 55.4 kg (122 lb 3.2 oz)   09/14/23 55.2 kg (121 lb 12.8 oz)   08/28/23 54.6 kg (120 lb 4.8 oz)           Nutrition Diagnosis:   Inadequate oral intake related to altered GI function as evidenced by intake 0-25%, intake 26-50%, intake 51-75%, poor intake prior to admission, mild loss of subcutaneous fat, mild muscle loss    Nutrition Interventions:   Food and/or Nutrient Delivery: Continue Current Diet, Continue Oral Nutrition Supplement  Nutrition Education/Counseling: Counseling initiated  Coordination of Nutrition Care: Continue to monitor while inpatient       Goals:  Previous Goal Met: Progressing toward Goal(s)  Goals: Meet at least 75% of estimated needs, by next RD assessment   
acetaminophen (TYLENOL) tablet 650 mg  650 mg Oral Q4H PRN    polyethylene glycol (GLYCOLAX) packet 17 g  17 g Oral Daily PRN          Physical Exam:  General:    Alert, cooperative, no distress, appears stated age. HD Tx is in progress   Head:   Normocephalic, without obvious abnormality, atraumatic.  Eyes:   Conjunctivae/corneas clear.    Nose:  Nares normal. No drainage or sinus tenderness.   Throat:    Lips, mucosa, and tongue normal.  No Thrush  Neck:  Supple, symmetrical,  no adenopathy, thyroid: non tender    no carotid bruit and no JVD.  Lungs:   Clear to auscultation bilaterally.  No Wheezing or Rhonchi. No rales.  Chest wall:  No tenderness or deformity. No Accessory muscle use.  Heart:   Regular rate and rhythm,  no murmur, rub or gallop.  Abdomen:   Soft, non-tender. Not distended.    Extremities: Extremities normal, atraumatic, No cyanosis.  No edema. No clubbing  Skin:     Texture, turgor normal. No rashes or lesions.  Not Jaundiced  Psych:  Good insight.  Not depressed.  Not anxious or agitated.  Neurologic: Normal strength, Alert and oriented X 3.       DATA:  LABS:  Recent Labs     05/10/24  0258 05/09/24  0041 05/08/24  0131 05/07/24  0233 05/06/24  0125 05/05/24  0657    136 138 137   < > 134*   K 3.6 4.1 3.7 3.8   < > 2.7*    102 101 101   < > 99   CO2 25 25 28 21   < > 28   BUN 47* 40* 57* 27*   < > 51*   CREATININE 7.84* 6.05* 6.51* 3.66*   < > 6.57*   CALCIUM 7.9* 7.9* 7.9* 8.5   < > 9.8   PHOS  --   --  7.1* 4.2  --   --    MG  --   --   --   --   --  2.5*    < > = values in this interval not displayed.       Recent Labs     05/10/24  0258 05/09/24  0041 05/08/24  0843   WBC 18.4* 21.0* 21.9*   HGB 7.0* 7.5* 9.4*   HCT 22.1* 24.8* 29.4*    322 342       No results for input(s): \"KU\", \"CLU\", \"CREAU\" in the last 720 hours.    Invalid input(s): \"KRISTINA\", \"PROU\"    Radiology  IR NONTUNNELED VASCULAR CATHETER > 5 YEARS    Result Date: 5/6/2024  Technically successful placement of 
  12/18/23 52 kg (114 lb 11.2 oz)   11/21/23 51 kg (112 lb 6.4 oz)   10/10/23 55.5 kg (122 lb 4.8 oz)   10/06/23 56.4 kg (124 lb 4.8 oz)   10/04/23 55.4 kg (122 lb 3.2 oz)   09/14/23 55.2 kg (121 lb 12.8 oz)           Nutrition Diagnosis:   Inadequate oral intake related to  (dislikes our food, missing meals d/t procedures and HD) as evidenced by intake 0-25%, intake 26-50%, intake 51-75%, poor intake prior to admission, mild loss of subcutaneous fat, mild muscle loss    Nutrition Interventions:   Food and/or Nutrient Delivery: Continue Current Diet, Continue Oral Nutrition Supplement  Nutrition Education/Counseling: Counseling initiated  Coordination of Nutrition Care: Continue to monitor while inpatient       Goals:  Previous Goal Met: Progressing toward Goal(s)  Goals: Meet at least 75% of estimated needs, by next RD assessment       Nutrition Monitoring and Evaluation:   Behavioral-Environmental Outcomes: None Identified  Food/Nutrient Intake Outcomes: Food and Nutrient Intake, Supplement Intake  Physical Signs/Symptoms Outcomes: Meal Time Behavior, Weight, Biochemical Data, Nutrition Focused Physical Findings, Fluid Status or Edema, GI Status    Discharge Planning:    Too soon to determine     Recent Labs     05/20/24  0417 05/22/24  1251   GLUCOSE 95 158*   BUN 46* 50*   CREATININE 7.88* 7.25*   * 131*   K 3.8 3.7    98   CO2 25 25   CALCIUM 8.8 9.1   PHOS 5.2* 5.8*       Vidhi Alvarez RD  Available via [employer]      
  Conjunctivae/corneas clear.  PERRLA  Nose:  Nares normal. No drainage or sinus tenderness.  Throat:    Lips, mucosa, and tongue normal.  No Thrush  Neck:  Supple, symmetrical,  no adenopathy, thyroid: non tender    no carotid bruit and no JVD.  Back:    Symmetric,  No CVA tenderness.  Lungs:   Clear to auscultation bilaterally.  No Wheezing or Rhonchi. No rales.  Chest wall:  No tenderness or deformity. No Accessory muscle use.  Heart:   Irregular rate and rhythm,  no murmur, rub or gallop.  Abdomen:   Soft, tender. distended.  Bowel sounds +. No masses        Lab Data Reviewed:    Recent Labs     05/11/24  0857 05/12/24  0629   WBC 22.0* 20.9*   HGB 8.1* 6.9*   HCT 24.2* 21.5*    370       Recent Labs     05/11/24  0857      K 3.2*      CO2 27   BUN 22*   MG 2.0       No results found for: \"GLUCPOC\"  No results for input(s): \"PH\", \"PCO2\", \"PO2\", \"HCO3\", \"FIO2\" in the last 72 hours.  No results for input(s): \"INR\" in the last 72 hours.    ___________________________________________________  ___________________________________________________    Attending Physician: Kehinde Barney MD           
  HGB 8.3* 7.3* 6.9*   HCT 26.1* 21.9* 21.5*   * 417* 370       No results for input(s): \"KU\", \"CLU\", \"CREAU\" in the last 720 hours.    Invalid input(s): \"KRISTINA\", \"PROU\"    Radiology  IR NONTUNNELED VASCULAR CATHETER > 5 YEARS    Result Date: 5/6/2024  Technically successful placement of an ultrasound guided temporary dialysis catheter via the right internal jugular vein, as described above.     XR CHEST PORTABLE    Result Date: 5/6/2024  Appropriate placement of right IJ approach dialysis catheter.     XR ABDOMEN (KUB) (SINGLE AP VIEW)    Result Date: 5/6/2024  Mildly dilated loops of small bowel in the abdomen without significant change     CT ABDOMEN PELVIS WO CONTRAST Additional Contrast? None    Result Date: 5/5/2024  1. 4.1 cm x 3.3 cm midline supraumbilical hernia which contains fluid and appears to contain a loop of small bowel as well. Inflammatory stranding within the abdominal wall adjacent to this hernia. Multiple prominent, fluid-filled, but not overtly dilated loops of small bowel. Finding may represent an early, but developing small bowel obstruction. Repeat CT with oral and IV contrast can be performed for confirmation and further evaluation, as indicated. 2. Moderate amount of free intraperitoneal fluid. Peritoneal dialysis catheter extends to the right lower abdomen. 3. Intrahepatic biliary dilatation and common bile duct dilatation, likely reflecting increased capacitance related to prior cholecystectomy.          Total time spent with patient:  35 minutes    [] Critical Care Provided    Care Plan discussed with:   Medical Team    MAURIZIO LOZANO MD  
2.5*    < > = values in this interval not displayed.       Recent Labs     05/15/24  0411 05/14/24  0532 05/13/24  0654   WBC 21.4* 25.9* 23.0*   HGB 7.5* 8.3* 7.3*   HCT 24.1* 26.1* 21.9*   * 473* 417*       No results for input(s): \"KU\", \"CLU\", \"CREAU\" in the last 720 hours.    Invalid input(s): \"KRISTINA\", \"PROU\"    Radiology  IR NONTUNNELED VASCULAR CATHETER > 5 YEARS    Result Date: 5/6/2024  Technically successful placement of an ultrasound guided temporary dialysis catheter via the right internal jugular vein, as described above.     XR CHEST PORTABLE    Result Date: 5/6/2024  Appropriate placement of right IJ approach dialysis catheter.     XR ABDOMEN (KUB) (SINGLE AP VIEW)    Result Date: 5/6/2024  Mildly dilated loops of small bowel in the abdomen without significant change     CT ABDOMEN PELVIS WO CONTRAST Additional Contrast? None    Result Date: 5/5/2024  1. 4.1 cm x 3.3 cm midline supraumbilical hernia which contains fluid and appears to contain a loop of small bowel as well. Inflammatory stranding within the abdominal wall adjacent to this hernia. Multiple prominent, fluid-filled, but not overtly dilated loops of small bowel. Finding may represent an early, but developing small bowel obstruction. Repeat CT with oral and IV contrast can be performed for confirmation and further evaluation, as indicated. 2. Moderate amount of free intraperitoneal fluid. Peritoneal dialysis catheter extends to the right lower abdomen. 3. Intrahepatic biliary dilatation and common bile duct dilatation, likely reflecting increased capacitance related to prior cholecystectomy.          Total time spent with patient:  35 minutes    [] Critical Care Provided    Care Plan discussed with:   Medical Team    MAURIZIO LOZANO MD  
566*       Recent Labs     05/17/24  0332 05/19/24  0550   * 133*   K 3.8 4.0    100   CO2 24 25   BUN 37* 34*   PHOS 2.9  --        Recent Labs     05/19/24  0550   ALT 14   GLOB 6.0*     No results for input(s): \"INR\", \"APTT\" in the last 72 hours.    Invalid input(s): \"PTP\"   No results for input(s): \"TIBC\" in the last 72 hours.    Invalid input(s): \"FE\", \"PSAT\", \"FERR\"   No results found for: \"RBCF\"   No results for input(s): \"PH\", \"PCO2\", \"PO2\" in the last 72 hours.  No results for input(s): \"CPK\" in the last 72 hours.    Invalid input(s): \"CPKMB\", \"CKNDX\", \"TROIQ\"  Lab Results   Component Value Date/Time    CHOL 145 03/21/2023 01:09 PM     03/21/2023 01:09 PM    LDL 34.8 03/21/2023 01:09 PM     No results found for: \"GLUCPOC\"  [unfilled]    Notes reviewed from all clinical/nonclinical/nursing services involved in patient's clinical care. Care coordination discussions were held with appropriate clinical/nonclinical/ nursing providers based on care coordination needs.         Patients current active Medications were reviewed, considered, added and adjusted based on the clinical condition today.      Home Medications were reconciled to the best of my ability given all available resources at the time of admission. Route is PO if not otherwise noted.      Admission Status:[id number]:::1}      Medications Reviewed:     Current Facility-Administered Medications   Medication Dose Route Frequency    dilTIAZem (CARDIZEM) tablet 60 mg  60 mg Oral 4 times per day    piperacillin-tazobactam (ZOSYN) 3,375 mg in sodium chloride 0.9 % 50 mL IVPB (mini-bag)  3,375 mg IntraVENous Q12H    pantoprazole (PROTONIX) tablet 40 mg  40 mg Oral QAM AC    lidocaine (XYLOCAINE) 5 % ointment   Topical PRN    gentamicin (GARAMYCIN) 0.1 % ointment   Topical PRN    epoetin carrie-epbx (RETACRIT) injection 10,000 Units  10,000 Units IntraVENous Once per day on Mon Wed Fri    [Held by provider] metoprolol succinate (TOPROL XL) 
mg Oral BID PRN    sodium chloride flush 0.9 % injection 5-40 mL  5-40 mL IntraVENous 2 times per day    sodium chloride flush 0.9 % injection 5-40 mL  5-40 mL IntraVENous PRN    0.9 % sodium chloride infusion   IntraVENous PRN    acetaminophen (TYLENOL) tablet 650 mg  650 mg Oral Q4H PRN    polyethylene glycol (GLYCOLAX) packet 17 g  17 g Oral Daily PRN          Physical Exam:  General:    Alert, cooperative, no distress, appears stated age.  HD Tx is in progress.  Head:   Normocephalic, without obvious abnormality, atraumatic.  Throat:    Lips, mucosa, and tongue normal.  No Thrush  Neck:  Supple, symmetrical, no JVD.  Lungs:   Clear to auscultation bilaterally.    Heart:   Regular rate and rhythm,  no murmur, rub or gallop.  Abdomen:   Soft, non-tender. Not distended.  BS present, PD catheter with dressing in place  Extremities: No edema. No clubbing  Neurologic: Normal strength, Alert and oriented X 3.   HD access: Rt elva, PD catheter      DATA:  LABS:  Recent Labs     05/20/24  0417 05/19/24  0550 05/17/24  0332 05/15/24  0411 05/13/24  0912 05/11/24  0857 05/06/24  0125 05/05/24  0657   * 133* 135* 134*   < > 138   < > 134*   K 3.8 4.0 3.8 3.9   < > 3.2*   < > 2.7*    100 102 101   < > 105   < > 99   CO2 25 25 24 25   < > 27   < > 28   BUN 46* 34* 37* 37*   < > 22*   < > 51*   CREATININE 7.88* 6.47* 5.84* 7.08*   < > 5.08*   < > 6.57*   CALCIUM 8.8 8.9 8.6 8.7   < > 8.7   < > 9.8   PHOS 5.2*  --  2.9 3.5   < >  --    < >  --    MG  --   --   --   --   --  2.0  --  2.5*    < > = values in this interval not displayed.       Recent Labs     05/20/24  0417 05/19/24  0550 05/18/24  0430   WBC 19.4* 20.1* 21.9*   HGB 8.4* 8.7* 8.6*   HCT 26.5* 27.6* 27.1*   * 566* 520*       No results for input(s): \"KU\", \"CLU\", \"CREAU\" in the last 720 hours.    Invalid input(s): \"KRISTINA\", \"PROU\"    Radiology  IR NONTUNNELED VASCULAR CATHETER > 5 YEARS    Result Date: 5/6/2024  Technically successful placement of 
mucosa, and tongue normal.  No Thrush  Neck:  Supple, symmetrical,  no adenopathy, thyroid: non tender    no carotid bruit and no JVD.  Back:    Symmetric,  No CVA tenderness.  Lungs:   Clear to auscultation bilaterally.  No Wheezing or Rhonchi. No rales.  Chest wall:  No tenderness or deformity. No Accessory muscle use.  Heart:   Irregular rate and rhythm,  no murmur, rub or gallop.  Abdomen:   Soft, tender. distended.  Bowel sounds +. No masses dressing intact         Lab Data Reviewed:    Recent Labs     05/18/24  0430 05/19/24  0550 05/20/24  0417   WBC 21.9* 20.1* 19.4*   HGB 8.6* 8.7* 8.4*   HCT 27.1* 27.6* 26.5*   * 566* 550*       Recent Labs     05/19/24  0550 05/20/24 0417   * 134*   K 4.0 3.8    101   CO2 25 25   BUN 34* 46*   PHOS  --  5.2*   ALT 14  --        No results found for: \"GLUCPOC\"  No results for input(s): \"PH\", \"PCO2\", \"PO2\", \"HCO3\", \"FIO2\" in the last 72 hours.  No results for input(s): \"INR\" in the last 72 hours.    ___________________________________________________  ___________________________________________________    Attending Physician: Kehinde Barney MD           
dilatation and common bile duct dilatation, likely reflecting increased capacitance related to prior cholecystectomy.          Total time spent with patient:  35 minutes    [] Critical Care Provided    Care Plan discussed with:   Medical Team    MAURIZIO LOZANO MD  
significant change     CT ABDOMEN PELVIS WO CONTRAST Additional Contrast? None    Result Date: 5/5/2024  1. 4.1 cm x 3.3 cm midline supraumbilical hernia which contains fluid and appears to contain a loop of small bowel as well. Inflammatory stranding within the abdominal wall adjacent to this hernia. Multiple prominent, fluid-filled, but not overtly dilated loops of small bowel. Finding may represent an early, but developing small bowel obstruction. Repeat CT with oral and IV contrast can be performed for confirmation and further evaluation, as indicated. 2. Moderate amount of free intraperitoneal fluid. Peritoneal dialysis catheter extends to the right lower abdomen. 3. Intrahepatic biliary dilatation and common bile duct dilatation, likely reflecting increased capacitance related to prior cholecystectomy.          Total time spent with patient:  35 minutes    [] Critical Care Provided    Care Plan discussed with:   Medical Team    MAURIZIO LOZANO MD

## 2024-05-23 NOTE — DISCHARGE SUMMARY
Discharge Summary       PATIENT ID: Heather Rich  MRN: [id number]   YOB: 1935    DATE OF ADMISSION: 5/5/2024  6:45 AM    DATE OF DISCHARGE:   PRIMARY CARE PROVIDER: Kehinde Barney MD     ATTENDING PHYSICIAN: Kehinde Barney MD    DISCHARGING PROVIDER: Kehinde Barney MD      Admitting Note: 88 y.o. female with anemia, arrhythmia, asthma, BPPV, h/o R breast CA s/p lumpectomy/XRT, ESRD on PD, sickle cell trait, hereditary spherocytosis, diet-controlled T2DM, left hearing deficit, HTN, seizure disorder, chronic LLE PVD, and chronic ventral wall hernia who presented with abdominal pain, constipation, irreducible hernia, chills, and low grade fever 100 this morning. Pt c/o constipation x2 days despite aggressive bowel regimen. She was advised by her nephrologist to come for evaluation of constipation due to concern for developing bacterial peritonitis with persistent constipation. In the ED, CTAP showed incarcerated ventral hernia, developing small bowel obstruction, free intraperitoneal fluid, intrahepatic biliary and CBD dilation. General Surgery was consulted, recommended HM admission due to multiple medical comorbidities, and took her emergently to the OR for incarcerated hernia repair.   Pt was seen in PACU and denied any pain.     Vitals:    05/23/24 0824   BP: (!) 152/52   Pulse:    Resp:    Temp:    SpO2:         CONSULTATIONS: IP CONSULT TO GENERAL SURGERY  Incarcerated ventral hernia:  Hernia was not reducible in ER so she will need emergent open hernia repair with mesh, possible bowel resection.  Given her advance age and multiple medical issues, her operation is high risk including MI, CVA, respiratory failure, sepsis, wound infection, bleeding and possible death.  Pt understood risks and is willing to pursue with operation.  - Given multiple medical issues.  Recommend medical admission for treatment of comorbidity  - NPO with chips  - Pain control  - Zosyn antibiotic    IP

## 2024-05-23 NOTE — PLAN OF CARE
Problem: Occupational Therapy - Adult  Goal: By Discharge: Performs self-care activities at highest level of function for planned discharge setting.  See evaluation for individualized goals.  Description: FUNCTIONAL STATUS PRIOR TO ADMISSION:  Patient was independent and active, performing all ADLs at independent level. Patient reports having tub transfer bench which she uses when cleaning her feet; she reports moving the tub transfer bench in/out of tub as needed.    Receives Help From: Family, Ambulation Assistance: Independent, Transfer Assistance: Independent       HOME SUPPORT: Patient lived alone but reports having supportive friends/family local who assist with home management.     Occupational Therapy Goals:  Initiated 5/6/2024  Weekly re-assessment 5/21/2024 - added goal #6, all other goals remain appropriate  1.  Patient will perform standing grooming, including item retrieval, with Modified [place] within 7 day(s).  2.  Patient will perform upper body dressing and lower body dressing with Modified [place] within 7 day(s).  3.  Patient will perform bathing with Modified [place] within 7 day(s).  4.  Patient will perform all aspects of toileting, including transfer, with Modified [place] within 7 day(s).  5.  Patient will utilize energy conservation and fall prevention techniques during functional activities with minimal cues within 7 day(s).  6.  Patient will perform simple IADL task without rest breaks with modified independence within 7 day(s).  Outcome: Progressing   OCCUPATIONAL THERAPY TREATMENT  Patient: Heather Rich (89 y.o. female)  Date: 5/23/2024  Primary Diagnosis: Incarcerated ventral hernia [K43.6]  SBO (small bowel obstruction) ([facility]) [K56.609]  Incarcerated hernia [K46.0]  Procedure(s) (LRB):  PRIMARY INCARCERATED  HERNIA REPAIR (N/A) 18 Days Post-Op   Precautions: Fall Risk                Chart, occupational therapy assessment, plan of care, and goals were 
  Problem: Occupational Therapy - Adult  Goal: By Discharge: Performs self-care activities at highest level of function for planned discharge setting.  See evaluation for individualized goals.  Description: FUNCTIONAL STATUS PRIOR TO ADMISSION:  Patient was independent and active, performing all ADLs at independent level. Patient reports having tub transfer bench which she uses when cleaning her feet; she reports moving the tub transfer bench in/out of tub as needed.    Receives Help From: Family, Ambulation Assistance: Independent, Transfer Assistance: Independent       HOME SUPPORT: Patient lived alone but reports having supportive friends/family local who assist with home management.     Occupational Therapy Goals:  Initiated 5/6/2024  Weekly re-assessment 5/21/2024 - added goal #6, all other goals remain appropriate  1.  Patient will perform standing grooming, including item retrieval, with Modified [place] within 7 day(s).  2.  Patient will perform upper body dressing and lower body dressing with Modified [place] within 7 day(s).  3.  Patient will perform bathing with Modified [place] within 7 day(s).  4.  Patient will perform all aspects of toileting, including transfer, with Modified [place] within 7 day(s).  5.  Patient will utilize energy conservation and fall prevention techniques during functional activities with minimal cues within 7 day(s).  6.  Patient will perform simple IADL task without rest breaks with modified independence within 7 day(s).  Outcome: Progressing     OCCUPATIONAL THERAPY TREATMENT: WEEKLY REASSESSMENT    Patient: Heather Rich (88 y.o. female)  Date: 5/21/2024  Primary Diagnosis: Incarcerated ventral hernia [K43.6]  SBO (small bowel obstruction) ([facility]) [K56.609]  Incarcerated hernia [K46.0]  Procedure(s) (LRB):  PRIMARY INCARCERATED  HERNIA REPAIR (N/A) 16 Days Post-Op   Precautions: Fall Risk                Chart, occupational therapy assessment, plan of care, 
  Problem: Occupational Therapy - Adult  Goal: By Discharge: Performs self-care activities at highest level of function for planned discharge setting.  See evaluation for individualized goals.  Description: FUNCTIONAL STATUS PRIOR TO ADMISSION:  Patient was independent and active, performing all ADLs at independent level. Patient reports having tub transfer bench which she uses when cleaning her feet; she reports moving the tub transfer bench in/out of tub as needed.    Receives Help From: Family, Ambulation Assistance: Independent, Transfer Assistance: Independent       HOME SUPPORT: Patient lived alone but reports having supportive friends/family local who assist with home management.   Occupational Therapy Goals:  Initiated 5/6/2024  1.  Patient will perform standing grooming,  including item retrieval, with Modified [place] within 7 day(s).  2.  Patient will perform upper body dressing and lower body dressing with Modified [place] within 7 day(s).  3.  Patient will perform bathing with Modified [place] within 7 day(s).  4.  Patient will perform all aspects of toileting, including transfer, with Modified [place] within 7 day(s).  5.  Patient will utilize energy conservation and fall prevention techniques during functional activities with minimal cues within 7 day(s).  Outcome: Progressing   OCCUPATIONAL THERAPY EVALUATION    Patient: Heather Rich (88 y.o. female)  Date: 5/6/2024  Primary Diagnosis: Incarcerated ventral hernia [K43.6]  SBO (small bowel obstruction) (HCC) [K56.609]  Incarcerated hernia [K46.0]  Procedure(s) (LRB):  PRIMARY INCARCERATED  HERNIA REPAIR (N/A) 1 Day Post-Op     Precautions:                    ASSESSMENT :  The patient is limited by decreased functional mobility, independence in ADLs, high-level IADLs, ROM, strength, activity tolerance, endurance, safety awareness, attention/concentration, coordination, balance, and increased pain levels. Patient now POD 1 primary 
  Problem: Occupational Therapy - Adult  Goal: By Discharge: Performs self-care activities at highest level of function for planned discharge setting.  See evaluation for individualized goals.  Description: FUNCTIONAL STATUS PRIOR TO ADMISSION:  Patient was independent and active, performing all ADLs at independent level. Patient reports having tub transfer bench which she uses when cleaning her feet; she reports moving the tub transfer bench in/out of tub as needed.    Receives Help From: Family, Ambulation Assistance: Independent, Transfer Assistance: Independent       HOME SUPPORT: Patient lived alone but reports having supportive friends/family local who assist with home management.   Occupational Therapy Goals:  Initiated 5/6/2024  1.  Patient will perform standing grooming,  including item retrieval, with Modified [place] within 7 day(s).  2.  Patient will perform upper body dressing and lower body dressing with Modified [place] within 7 day(s).  3.  Patient will perform bathing with Modified [place] within 7 day(s).  4.  Patient will perform all aspects of toileting, including transfer, with Modified [place] within 7 day(s).  5.  Patient will utilize energy conservation and fall prevention techniques during functional activities with minimal cues within 7 day(s).  Outcome: Progressing   OCCUPATIONAL THERAPY TREATMENT  Patient: Heather Rich (88 y.o. female)  Date: 5/8/2024  Primary Diagnosis: Incarcerated ventral hernia [K43.6]  SBO (small bowel obstruction) (HCC) [K56.609]  Incarcerated hernia [K46.0]  Procedure(s) (LRB):  PRIMARY INCARCERATED  HERNIA REPAIR (N/A) 3 Days Post-Op   Precautions:                  Chart, occupational therapy assessment, plan of care, and goals were reviewed.    ASSESSMENT  Patient continues to benefit from skilled OT services and is progressing towards goals. Patient is received resting in bedside chair, agreeable to session. Patient ambulates to toilet 
  Problem: Pain  Goal: Verbalizes/displays adequate comfort level or baseline comfort level  5/15/2024 2250 by Federico Marcial RN  Outcome: Progressing  5/15/2024 1128 by Radha Oden RN  Outcome: Progressing     Problem: Safety - Adult  Goal: Free from fall injury  5/15/2024 2250 by Federico Marcial RN  Outcome: Progressing  5/15/2024 1128 by Radha Oden RN  Outcome: Progressing     Problem: Chronic Conditions and Co-morbidities  Goal: Patient's chronic conditions and co-morbidity symptoms are monitored and maintained or improved  5/15/2024 2250 by Federico Marcial RN  Outcome: Progressing  5/15/2024 1128 by Radha Oden RN  Outcome: Progressing     Problem: Discharge Planning  Goal: Discharge to home or other facility with appropriate resources  5/15/2024 2250 by Federico Marcial RN  Outcome: Progressing  Flowsheets (Taken 5/15/2024 2010)  Discharge to home or other facility with appropriate resources:   Identify barriers to discharge with patient and caregiver   Arrange for needed discharge resources and transportation as appropriate   Identify discharge learning needs (meds, wound care, etc)   Refer to discharge planning if patient needs post-hospital services based on physician order or complex needs related to functional status, cognitive ability or social support system  5/15/2024 1128 by Radha Oden RN  Outcome: Progressing     Problem: ABCDS Injury Assessment  Goal: Absence of physical injury  5/15/2024 2250 by Federico Marcial RN  Outcome: Progressing  5/15/2024 1128 by Radha Oden RN  Outcome: Progressing     Problem: Nutrition Deficit:  Goal: Optimize nutritional status  5/15/2024 2250 by Federico Marcial RN  Outcome: Progressing  5/15/2024 1128 by Radha Oden RN  Outcome: Progressing     
  Problem: Pain  Goal: Verbalizes/displays adequate comfort level or baseline comfort level  5/16/2024 1131 by Stephanie Coto RN  Outcome: Progressing  Flowsheets (Taken 5/16/2024 0815)  Verbalizes/displays adequate comfort level or baseline comfort level:   Encourage patient to monitor pain and request assistance   Assess pain using appropriate pain scale  5/15/2024 2250 by Federico Marcial RN  Outcome: Progressing     Problem: Safety - Adult  Goal: Free from fall injury  5/16/2024 1131 by Stephanie Coto RN  Outcome: Progressing  5/15/2024 2250 by Federico Marcial RN  Outcome: Progressing     Problem: Chronic Conditions and Co-morbidities  Goal: Patient's chronic conditions and co-morbidity symptoms are monitored and maintained or improved  5/16/2024 1131 by Stephanie Coto RN  Outcome: Progressing  Flowsheets (Taken 5/16/2024 0800)  Care Plan - Patient's Chronic Conditions and Co-Morbidity Symptoms are Monitored and Maintained or Improved: Monitor and assess patient's chronic conditions and comorbid symptoms for stability, deterioration, or improvement  5/15/2024 2250 by Federico Marcial RN  Outcome: Progressing     Problem: Discharge Planning  Goal: Discharge to home or other facility with appropriate resources  5/16/2024 1131 by Stephanie Coto RN  Outcome: Progressing  Flowsheets (Taken 5/16/2024 0800)  Discharge to home or other facility with appropriate resources: Identify barriers to discharge with patient and caregiver  5/15/2024 2250 by Federico Marcial RN  Outcome: Progressing  Flowsheets (Taken 5/15/2024 2010)  Discharge to home or other facility with appropriate resources:   Identify barriers to discharge with patient and caregiver   Arrange for needed discharge resources and transportation as appropriate   Identify discharge learning needs (meds, wound care, etc)   Refer to discharge planning if patient needs post-hospital services based on physician order or complex needs related to 
  Problem: Pain  Goal: Verbalizes/displays adequate comfort level or baseline comfort level  5/16/2024 2341 by Federico Marcial RN  Outcome: Progressing  Flowsheets (Taken 5/16/2024 1137 by Stephanie Coto RN)  Verbalizes/displays adequate comfort level or baseline comfort level:   Encourage patient to monitor pain and request assistance   Assess pain using appropriate pain scale  5/16/2024 1131 by Stephanie Coto RN  Outcome: Progressing  Flowsheets (Taken 5/16/2024 0815)  Verbalizes/displays adequate comfort level or baseline comfort level:   Encourage patient to monitor pain and request assistance   Assess pain using appropriate pain scale     Problem: Safety - Adult  Goal: Free from fall injury  5/16/2024 2341 by Federico Marcial RN  Outcome: Progressing  5/16/2024 1131 by Stephanie Coto RN  Outcome: Progressing     Problem: Chronic Conditions and Co-morbidities  Goal: Patient's chronic conditions and co-morbidity symptoms are monitored and maintained or improved  5/16/2024 2341 by Federico Marcial RN  Outcome: Progressing  Flowsheets (Taken 5/16/2024 2030)  Care Plan - Patient's Chronic Conditions and Co-Morbidity Symptoms are Monitored and Maintained or Improved: Monitor and assess patient's chronic conditions and comorbid symptoms for stability, deterioration, or improvement  5/16/2024 1131 by Stephanie Coto RN  Outcome: Progressing  Flowsheets (Taken 5/16/2024 0800)  Care Plan - Patient's Chronic Conditions and Co-Morbidity Symptoms are Monitored and Maintained or Improved: Monitor and assess patient's chronic conditions and comorbid symptoms for stability, deterioration, or improvement     Problem: Discharge Planning  Goal: Discharge to home or other facility with appropriate resources  5/16/2024 2341 by Federico Marcial RN  Outcome: Progressing  5/16/2024 1131 by Stephanie Coto RN  Outcome: Progressing  Flowsheets (Taken 5/16/2024 0800)  Discharge to home or other facility with appropriate 
  Problem: Pain  Goal: Verbalizes/displays adequate comfort level or baseline comfort level  5/17/2024 1028 by Stephanie Coto RN  Outcome: Progressing  5/16/2024 2341 by Federico Marcial RN  Outcome: Progressing  Flowsheets (Taken 5/16/2024 1137 by Stephanie Coto RN)  Verbalizes/displays adequate comfort level or baseline comfort level:   Encourage patient to monitor pain and request assistance   Assess pain using appropriate pain scale     Problem: Safety - Adult  Goal: Free from fall injury  5/17/2024 1028 by Stephanie Coto RN  Outcome: Progressing  5/16/2024 2341 by Federico Marcial RN  Outcome: Progressing     Problem: Chronic Conditions and Co-morbidities  Goal: Patient's chronic conditions and co-morbidity symptoms are monitored and maintained or improved  5/17/2024 1028 by Stephanie Coto RN  Outcome: Progressing  Flowsheets (Taken 5/17/2024 0800)  Care Plan - Patient's Chronic Conditions and Co-Morbidity Symptoms are Monitored and Maintained or Improved: Monitor and assess patient's chronic conditions and comorbid symptoms for stability, deterioration, or improvement  5/16/2024 2341 by Federico Marcial RN  Outcome: Progressing  Flowsheets (Taken 5/16/2024 2030)  Care Plan - Patient's Chronic Conditions and Co-Morbidity Symptoms are Monitored and Maintained or Improved: Monitor and assess patient's chronic conditions and comorbid symptoms for stability, deterioration, or improvement     Problem: Discharge Planning  Goal: Discharge to home or other facility with appropriate resources  5/17/2024 1028 by Stephanie Coto RN  Outcome: Progressing  Flowsheets (Taken 5/17/2024 0800)  Discharge to home or other facility with appropriate resources: Identify barriers to discharge with patient and caregiver  5/16/2024 2341 by Federico Marcial RN  Outcome: Progressing     Problem: ABCDS Injury Assessment  Goal: Absence of physical injury  5/17/2024 1028 by Stephanie Coto RN  Outcome: Progressing  5/16/2024 
  Problem: Pain  Goal: Verbalizes/displays adequate comfort level or baseline comfort level  5/19/2024 0647 by Mariposa Pratt RN  Outcome: Progressing  5/18/2024 1829 by Radha Erickson RN  Outcome: Progressing     Problem: Safety - Adult  Goal: Free from fall injury  5/19/2024 0647 by Mariposa Pratt RN  Outcome: Progressing  5/18/2024 1829 by Radha Erickson RN  Outcome: Progressing     Problem: Chronic Conditions and Co-morbidities  Goal: Patient's chronic conditions and co-morbidity symptoms are monitored and maintained or improved  5/19/2024 0647 by Mariposa Pratt RN  Outcome: Progressing  Flowsheets (Taken 5/18/2024 2000)  Care Plan - Patient's Chronic Conditions and Co-Morbidity Symptoms are Monitored and Maintained or Improved: Monitor and assess patient's chronic conditions and comorbid symptoms for stability, deterioration, or improvement  5/18/2024 1829 by Radha Erickson RN  Outcome: Progressing     Problem: Discharge Planning  Goal: Discharge to home or other facility with appropriate resources  5/19/2024 0647 by Mariposa Pratt RN  Outcome: Progressing  Flowsheets (Taken 5/18/2024 2000)  Discharge to home or other facility with appropriate resources: Identify barriers to discharge with patient and caregiver  5/18/2024 1829 by Radha Erickson RN  Outcome: Progressing     Problem: ABCDS Injury Assessment  Goal: Absence of physical injury  5/19/2024 0647 by Mariposa Pratt RN  Outcome: Progressing  5/18/2024 1829 by Radha Erickson RN  Outcome: Progressing     Problem: Nutrition Deficit:  Goal: Optimize nutritional status  5/19/2024 0647 by Mariposa Pratt RN  Outcome: Progressing  5/18/2024 1829 by Radha Erickson RN  Outcome: Progressing     
  Problem: Pain  Goal: Verbalizes/displays adequate comfort level or baseline comfort level  5/20/2024 1202 by Laya Galaviz RN  Outcome: Progressing  Flowsheets (Taken 5/20/2024 1200)  Verbalizes/displays adequate comfort level or baseline comfort level: Encourage patient to monitor pain and request assistance  5/20/2024 0209 by Evagnelina Peter RN  Outcome: Progressing     Problem: Safety - Adult  Goal: Free from fall injury  5/20/2024 1202 by Laya Galaviz RN  Outcome: Progressing  5/20/2024 0209 by Evangelina Peter RN  Outcome: Progressing     Problem: Chronic Conditions and Co-morbidities  Goal: Patient's chronic conditions and co-morbidity symptoms are monitored and maintained or improved  5/20/2024 1202 by Laya Galaviz RN  Outcome: Progressing  5/20/2024 0209 by Evangelina Peter RN  Outcome: Progressing  Flowsheets (Taken 5/19/2024 2000)  Care Plan - Patient's Chronic Conditions and Co-Morbidity Symptoms are Monitored and Maintained or Improved: Monitor and assess patient's chronic conditions and comorbid symptoms for stability, deterioration, or improvement     Problem: Discharge Planning  Goal: Discharge to home or other facility with appropriate resources  5/20/2024 1202 by Laya Galaviz RN  Outcome: Progressing  5/20/2024 0209 by Evangelina Peter RN  Outcome: Progressing  Flowsheets (Taken 5/19/2024 2000)  Discharge to home or other facility with appropriate resources: Identify barriers to discharge with patient and caregiver     Problem: ABCDS Injury Assessment  Goal: Absence of physical injury  5/20/2024 1202 by Laya Galaviz RN  Outcome: Progressing  5/20/2024 0209 by Evangelina Peter RN  Outcome: Progressing     Problem: Nutrition Deficit:  Goal: Optimize nutritional status  5/20/2024 1202 by Laya Galaviz RN  Outcome: Progressing  5/20/2024 0209 by Evangelina Peter RN  Outcome: Progressing     
  Problem: Pain  Goal: Verbalizes/displays adequate comfort level or baseline comfort level  5/22/2024 1028 by Candi Ballesteros RN  Outcome: Progressing  5/21/2024 2209 by Camron Rushing RN  Outcome: Progressing     Problem: Safety - Adult  Goal: Free from fall injury  5/22/2024 1028 by Candi Ballesteros RN  Outcome: Progressing  5/21/2024 2209 by Camron Rushing RN  Outcome: Progressing     Problem: Chronic Conditions and Co-morbidities  Goal: Patient's chronic conditions and co-morbidity symptoms are monitored and maintained or improved  5/22/2024 1028 by Candi Balletseros RN  Outcome: Progressing  Flowsheets (Taken 5/22/2024 0800)  Care Plan - Patient's Chronic Conditions and Co-Morbidity Symptoms are Monitored and Maintained or Improved: Monitor and assess patient's chronic conditions and comorbid symptoms for stability, deterioration, or improvement  5/21/2024 2209 by Camron Rushing RN  Outcome: Progressing  Flowsheets (Taken 5/21/2024 2000)  Care Plan - Patient's Chronic Conditions and Co-Morbidity Symptoms are Monitored and Maintained or Improved: Monitor and assess patient's chronic conditions and comorbid symptoms for stability, deterioration, or improvement     Problem: Discharge Planning  Goal: Discharge to home or other facility with appropriate resources  5/22/2024 1028 by Candi Ballesteros RN  Outcome: Progressing  Flowsheets (Taken 5/22/2024 0800)  Discharge to home or other facility with appropriate resources: Identify barriers to discharge with patient and caregiver  5/21/2024 2209 by Camron Rushing RN  Outcome: Progressing  Flowsheets (Taken 5/21/2024 2000)  Discharge to home or other facility with appropriate resources: Identify barriers to discharge with patient and caregiver     Problem: ABCDS Injury Assessment  Goal: Absence of physical injury  5/22/2024 1028 by Candi Ballesteros RN  Outcome: Progressing  5/21/2024 2209 by Camron Rushing RN  Outcome: Progressing   
  Problem: Pain  Goal: Verbalizes/displays adequate comfort level or baseline comfort level  5/23/2024 0822 by Candi Ballesteros RN  Outcome: Progressing  5/23/2024 0305 by Matthew Bird RN  Outcome: Progressing     Problem: Safety - Adult  Goal: Free from fall injury  5/23/2024 0822 by Candi Ballesteros RN  Outcome: Progressing  5/23/2024 0305 by Matthew Bird RN  Outcome: Progressing     Problem: Chronic Conditions and Co-morbidities  Goal: Patient's chronic conditions and co-morbidity symptoms are monitored and maintained or improved  5/23/2024 0822 by Candi Ballesteros RN  Outcome: Progressing  Flowsheets (Taken 5/23/2024 0800)  Care Plan - Patient's Chronic Conditions and Co-Morbidity Symptoms are Monitored and Maintained or Improved: Monitor and assess patient's chronic conditions and comorbid symptoms for stability, deterioration, or improvement  5/23/2024 0305 by Matthew Bird RN  Outcome: Progressing     Problem: Discharge Planning  Goal: Discharge to home or other facility with appropriate resources  5/23/2024 0822 by Candi Ballesteros RN  Outcome: Progressing  Flowsheets (Taken 5/23/2024 0800)  Discharge to home or other facility with appropriate resources: Identify barriers to discharge with patient and caregiver  5/23/2024 0305 by Matthew Bird RN  Outcome: Progressing     Problem: ABCDS Injury Assessment  Goal: Absence of physical injury  5/23/2024 0822 by Candi Ballesteros RN  Outcome: Progressing  5/23/2024 0305 by Matthew Bird RN  Outcome: Progressing     Problem: Nutrition Deficit:  Goal: Optimize nutritional status  5/23/2024 0822 by Candi Ballesteros RN  Outcome: Progressing  5/23/2024 0305 by Matthew Bird RN  Outcome: Progressing     Problem: Skin/Tissue Integrity  Goal: Absence of new skin breakdown  Description: 1.  Monitor for areas of redness and/or skin breakdown  2.  Assess vascular access sites hourly  3.  Every 4-6 hours minimum:  Change 
  Problem: Pain  Goal: Verbalizes/displays adequate comfort level or baseline comfort level  5/23/2024 1245 by Candi Ballesteros RN  Outcome: Completed  5/23/2024 0822 by Candi Ballesteros RN  Outcome: Progressing  5/23/2024 0305 by Matthew Bird RN  Outcome: Progressing     Problem: Safety - Adult  Goal: Free from fall injury  5/23/2024 1245 by Candi Ballesteros RN  Outcome: Completed  5/23/2024 0822 by Candi Ballesteros RN  Outcome: Progressing  5/23/2024 0305 by Matthew Bird RN  Outcome: Progressing     Problem: Chronic Conditions and Co-morbidities  Goal: Patient's chronic conditions and co-morbidity symptoms are monitored and maintained or improved  5/23/2024 1245 by Candi Ballesteros RN  Outcome: Completed  5/23/2024 0822 by Candi Ballesteros RN  Outcome: Progressing  Flowsheets (Taken 5/23/2024 0800)  Care Plan - Patient's Chronic Conditions and Co-Morbidity Symptoms are Monitored and Maintained or Improved: Monitor and assess patient's chronic conditions and comorbid symptoms for stability, deterioration, or improvement  5/23/2024 0305 by Matthew Bird RN  Outcome: Progressing     Problem: Discharge Planning  Goal: Discharge to home or other facility with appropriate resources  5/23/2024 1245 by Candi Ballesteros RN  Outcome: Completed  5/23/2024 0822 by Candi Ballesteros RN  Outcome: Progressing  Flowsheets (Taken 5/23/2024 0800)  Discharge to home or other facility with appropriate resources: Identify barriers to discharge with patient and caregiver  5/23/2024 0305 by aMtthew Bird RN  Outcome: Progressing     Problem: ABCDS Injury Assessment  Goal: Absence of physical injury  5/23/2024 1245 by Candi Ballesteros RN  Outcome: Completed  5/23/2024 0822 by Candi Ballesteros RN  Outcome: Progressing  5/23/2024 0305 by Matthew Bird, RN  Outcome: Progressing     Problem: Nutrition Deficit:  Goal: Optimize nutritional status  5/23/2024 1245 by 
  Problem: Pain  Goal: Verbalizes/displays adequate comfort level or baseline comfort level  5/9/2024 1845 by Radha Oden RN  Outcome: Progressing  5/9/2024 0803 by Matthew Bird RN  Outcome: Progressing     Problem: Safety - Adult  Goal: Free from fall injury  5/9/2024 1845 by Radha Oden RN  Outcome: Progressing  5/9/2024 0803 by Matthew Bird RN  Outcome: Progressing     Problem: Chronic Conditions and Co-morbidities  Goal: Patient's chronic conditions and co-morbidity symptoms are monitored and maintained or improved  5/9/2024 1845 by Radha Oden RN  Outcome: Progressing  5/9/2024 0803 by Matthew Bird RN  Outcome: Progressing  Flowsheets (Taken 5/8/2024 2001)  Care Plan - Patient's Chronic Conditions and Co-Morbidity Symptoms are Monitored and Maintained or Improved: Monitor and assess patient's chronic conditions and comorbid symptoms for stability, deterioration, or improvement     Problem: Discharge Planning  Goal: Discharge to home or other facility with appropriate resources  5/9/2024 1845 by Radha Oden RN  Outcome: Progressing  5/9/2024 0803 by Matthew Bird RN  Outcome: Progressing  Flowsheets (Taken 5/8/2024 2001)  Discharge to home or other facility with appropriate resources: Identify barriers to discharge with patient and caregiver     Problem: Physical Therapy - Adult  Goal: By Discharge: Performs mobility at highest level of function for planned discharge setting.  See evaluation for individualized goals.  Description: FUNCTIONAL STATUS PRIOR TO ADMISSION: Patient was independent and active without use of DME. She has a cane and RW from last admission, but had progressed off the devices    HOME SUPPORT PRIOR TO ADMISSION: The patient lived alone with family and neighbors to provide assistance.    Physical Therapy Goals  Initiated 5/6/2024  1.  Patient will move from supine to sit and sit to supine in bed with modified independence within 7 day(s).    2.  Patient will perform 
  Problem: Pain  Goal: Verbalizes/displays adequate comfort level or baseline comfort level  Outcome: Progressing     Problem: Safety - Adult  Goal: Free from fall injury  5/15/2024 1128 by Radha Oden RN  Outcome: Progressing  5/14/2024 2158 by Hadley Lamar RN  Outcome: Progressing  Flowsheets (Taken 5/14/2024 2158)  Free From Fall Injury: Instruct family/caregiver on patient safety     Problem: Chronic Conditions and Co-morbidities  Goal: Patient's chronic conditions and co-morbidity symptoms are monitored and maintained or improved  Outcome: Progressing     Problem: Discharge Planning  Goal: Discharge to home or other facility with appropriate resources  5/15/2024 1128 by Radha Oden RN  Outcome: Progressing  5/14/2024 2158 by Hadley Lamar RN  Outcome: Progressing  Flowsheets (Taken 5/14/2024 2158)  Discharge to home or other facility with appropriate resources:   Identify barriers to discharge with patient and caregiver   Identify discharge learning needs (meds, wound care, etc)   Refer to discharge planning if patient needs post-hospital services based on physician order or complex needs related to functional status, cognitive ability or social support system   Arrange for needed discharge resources and transportation as appropriate     Problem: ABCDS Injury Assessment  Goal: Absence of physical injury  5/15/2024 1128 by Radha Oden RN  Outcome: Progressing  5/14/2024 2158 by Hadley Lamar RN  Outcome: Progressing     Problem: Nutrition Deficit:  Goal: Optimize nutritional status  Outcome: Progressing     
  Problem: Pain  Goal: Verbalizes/displays adequate comfort level or baseline comfort level  Outcome: Progressing     Problem: Safety - Adult  Goal: Free from fall injury  Outcome: Progressing     Problem: Chronic Conditions and Co-morbidities  Goal: Patient's chronic conditions and co-morbidity symptoms are monitored and maintained or improved  Outcome: Progressing     Problem: Discharge Planning  Goal: Discharge to home or other facility with appropriate resources  Outcome: Progressing     Problem: ABCDS Injury Assessment  Goal: Absence of physical injury  Outcome: Progressing     Problem: Nutrition Deficit:  Goal: Optimize nutritional status  Outcome: Progressing     
  Problem: Pain  Goal: Verbalizes/displays adequate comfort level or baseline comfort level  Outcome: Progressing     Problem: Safety - Adult  Goal: Free from fall injury  Outcome: Progressing     Problem: Chronic Conditions and Co-morbidities  Goal: Patient's chronic conditions and co-morbidity symptoms are monitored and maintained or improved  Outcome: Progressing     Problem: Discharge Planning  Goal: Discharge to home or other facility with appropriate resources  Outcome: Progressing     Problem: ABCDS Injury Assessment  Goal: Absence of physical injury  Outcome: Progressing     Problem: Nutrition Deficit:  Goal: Optimize nutritional status  Outcome: Progressing     
  Problem: Pain  Goal: Verbalizes/displays adequate comfort level or baseline comfort level  Outcome: Progressing     Problem: Safety - Adult  Goal: Free from fall injury  Outcome: Progressing     Problem: Chronic Conditions and Co-morbidities  Goal: Patient's chronic conditions and co-morbidity symptoms are monitored and maintained or improved  Outcome: Progressing     Problem: Discharge Planning  Goal: Discharge to home or other facility with appropriate resources  Outcome: Progressing     Problem: Physical Therapy - Adult  Goal: By Discharge: Performs mobility at highest level of function for planned discharge setting.  See evaluation for individualized goals.  Description: FUNCTIONAL STATUS PRIOR TO ADMISSION: Patient was independent and active without use of DME. She has a cane and RW from last admission, but had progressed off the devices    HOME SUPPORT PRIOR TO ADMISSION: The patient lived alone with family and neighbors to provide assistance.    Physical Therapy Goals  Initiated 5/6/2024  1.  Patient will move from supine to sit and sit to supine in bed with modified independence within 7 day(s).    2.  Patient will perform sit to stand with modified independence within 7 day(s).  3.  Patient will transfer from bed to chair and chair to bed with modified independence using the least restrictive device within 7 day(s).  4.  Patient will ambulate with modified independence for 200 feet with the least restrictive device within 7 day(s).   5.  Patient will ascend/descend 3 stairs with 0 handrail(s) with supervision/set-up within 7 day(s).   5/7/2024 1309 by Chantal Gregg, PTA  Outcome: Progressing     Problem: ABCDS Injury Assessment  Goal: Absence of physical injury  Outcome: Progressing     Problem: Nutrition Deficit:  Goal: Optimize nutritional status  Outcome: Progressing     
  Problem: Pain  Goal: Verbalizes/displays adequate comfort level or baseline comfort level  Outcome: Progressing     Problem: Safety - Adult  Goal: Free from fall injury  Outcome: Progressing     Problem: Chronic Conditions and Co-morbidities  Goal: Patient's chronic conditions and co-morbidity symptoms are monitored and maintained or improved  Outcome: Progressing  Flowsheets (Taken 5/17/2024 2000)  Care Plan - Patient's Chronic Conditions and Co-Morbidity Symptoms are Monitored and Maintained or Improved: Monitor and assess patient's chronic conditions and comorbid symptoms for stability, deterioration, or improvement     Problem: Discharge Planning  Goal: Discharge to home or other facility with appropriate resources  Outcome: Progressing  Flowsheets (Taken 5/17/2024 2000)  Discharge to home or other facility with appropriate resources: Identify barriers to discharge with patient and caregiver     Problem: Physical Therapy - Adult  Goal: By Discharge: Performs mobility at highest level of function for planned discharge setting.  See evaluation for individualized goals.  Description: FUNCTIONAL STATUS PRIOR TO ADMISSION: Patient was independent and active without use of DME. She has a cane and RW from last admission, but had progressed off the devices    HOME SUPPORT PRIOR TO ADMISSION: The patient lived alone with family and neighbors to provide assistance.    Physical Therapy Goals  Weekly Reassessment 5/14/2024 (goals continued)  Initiated 5/6/2024  1.  Patient will move from supine to sit and sit to supine in bed with modified independence within 7 day(s).    2.  Patient will perform sit to stand with modified independence within 7 day(s).  3.  Patient will transfer from bed to chair and chair to bed with modified independence using the least restrictive device within 7 day(s).  4.  Patient will ambulate with modified independence for 200 feet with the least restrictive device within 7 day(s).   5.  Patient 
  Problem: Pain  Goal: Verbalizes/displays adequate comfort level or baseline comfort level  Outcome: Progressing     Problem: Safety - Adult  Goal: Free from fall injury  Outcome: Progressing     Problem: Chronic Conditions and Co-morbidities  Goal: Patient's chronic conditions and co-morbidity symptoms are monitored and maintained or improved  Outcome: Progressing  Flowsheets (Taken 5/19/2024 2000)  Care Plan - Patient's Chronic Conditions and Co-Morbidity Symptoms are Monitored and Maintained or Improved: Monitor and assess patient's chronic conditions and comorbid symptoms for stability, deterioration, or improvement     Problem: Discharge Planning  Goal: Discharge to home or other facility with appropriate resources  Outcome: Progressing  Flowsheets (Taken 5/19/2024 2000)  Discharge to home or other facility with appropriate resources: Identify barriers to discharge with patient and caregiver     Problem: ABCDS Injury Assessment  Goal: Absence of physical injury  Outcome: Progressing     Problem: Nutrition Deficit:  Goal: Optimize nutritional status  Outcome: Progressing     
  Problem: Pain  Goal: Verbalizes/displays adequate comfort level or baseline comfort level  Outcome: Progressing     Problem: Safety - Adult  Goal: Free from fall injury  Outcome: Progressing     Problem: Chronic Conditions and Co-morbidities  Goal: Patient's chronic conditions and co-morbidity symptoms are monitored and maintained or improved  Outcome: Progressing  Flowsheets (Taken 5/6/2024 0800)  Care Plan - Patient's Chronic Conditions and Co-Morbidity Symptoms are Monitored and Maintained or Improved: Monitor and assess patient's chronic conditions and comorbid symptoms for stability, deterioration, or improvement     Problem: Discharge Planning  Goal: Discharge to home or other facility with appropriate resources  Outcome: Progressing  Flowsheets (Taken 5/6/2024 0800)  Discharge to home or other facility with appropriate resources: Identify barriers to discharge with patient and caregiver     Problem: Physical Therapy - Adult  Goal: By Discharge: Performs mobility at highest level of function for planned discharge setting.  See evaluation for individualized goals.  Description: FUNCTIONAL STATUS PRIOR TO ADMISSION: Patient was independent and active without use of DME. She has a cane and RW from last admission, but had progressed off the devices    HOME SUPPORT PRIOR TO ADMISSION: The patient lived alone with family and neighbors to provide assistance.    Physical Therapy Goals  Initiated 5/6/2024  1.  Patient will move from supine to sit and sit to supine in bed with modified independence within 7 day(s).    2.  Patient will perform sit to stand with modified independence within 7 day(s).  3.  Patient will transfer from bed to chair and chair to bed with modified independence using the least restrictive device within 7 day(s).  4.  Patient will ambulate with modified independence for 200 feet with the least restrictive device within 7 day(s).   5.  Patient will ascend/descend 3 stairs with 0 handrail(s) with 
  Problem: Pain  Goal: Verbalizes/displays adequate comfort level or baseline comfort level  Outcome: Progressing  Flowsheets (Taken 5/12/2024 0121 by Marily Charles RN)  Verbalizes/displays adequate comfort level or baseline comfort level:   Encourage patient to monitor pain and request assistance   Assess pain using appropriate pain scale     Problem: Safety - Adult  Goal: Free from fall injury  Outcome: Progressing     Problem: Chronic Conditions and Co-morbidities  Goal: Patient's chronic conditions and co-morbidity symptoms are monitored and maintained or improved  Outcome: Progressing     Problem: Discharge Planning  Goal: Discharge to home or other facility with appropriate resources  Outcome: Progressing     Problem: ABCDS Injury Assessment  Goal: Absence of physical injury  Outcome: Progressing     Problem: Nutrition Deficit:  Goal: Optimize nutritional status  Outcome: Progressing     
  Problem: Physical Therapy - Adult  Goal: By Discharge: Performs mobility at highest level of function for planned discharge setting.  See evaluation for individualized goals.  Description: FUNCTIONAL STATUS PRIOR TO ADMISSION: Patient was independent and active without use of DME. She has a cane and RW from last admission, but had progressed off the devices    HOME SUPPORT PRIOR TO ADMISSION: The patient lived alone with family and neighbors to provide assistance.    Physical Therapy Goals  Initiated 5/6/2024  1.  Patient will move from supine to sit and sit to supine in bed with modified independence within 7 day(s).    2.  Patient will perform sit to stand with modified independence within 7 day(s).  3.  Patient will transfer from bed to chair and chair to bed with modified independence using the least restrictive device within 7 day(s).  4.  Patient will ambulate with modified independence for 200 feet with the least restrictive device within 7 day(s).   5.  Patient will ascend/descend 3 stairs with 0 handrail(s) with supervision/set-up within 7 day(s).   Outcome: Progressing       PHYSICAL THERAPY TREATMENT    Patient: Heather Rich (88 y.o. female)  Date: 5/9/2024  Diagnosis: Incarcerated ventral hernia [K43.6]  SBO (small bowel obstruction) (HCC) [K56.609]  Incarcerated hernia [K46.0] Incarcerated ventral hernia  Procedure(s) (LRB):  PRIMARY INCARCERATED  HERNIA REPAIR (N/A) 4 Days Post-Op  Precautions:                        ASSESSMENT:  Patient continues to benefit from skilled PT services and is progressing towards goals. Pt agreeable to therapy. Pt with decrease strength and activity tolerance affecting mobility. Pt is utilizing rolling walker to assist with balance and energy conservation.          PLAN:  Patient continues to benefit from skilled intervention to address the above impairments.  Continue treatment per established plan of care.        Recommendation for discharge: (in order for the 
  Problem: Physical Therapy - Adult  Goal: By Discharge: Performs mobility at highest level of function for planned discharge setting.  See evaluation for individualized goals.  Description: FUNCTIONAL STATUS PRIOR TO ADMISSION: Patient was independent and active without use of DME. She has a cane and RW from last admission, but had progressed off the devices    HOME SUPPORT PRIOR TO ADMISSION: The patient lived alone with family and neighbors to provide assistance.    Physical Therapy Goals  Initiated 5/6/2024  1.  Patient will move from supine to sit and sit to supine in bed with modified independence within 7 day(s).    2.  Patient will perform sit to stand with modified independence within 7 day(s).  3.  Patient will transfer from bed to chair and chair to bed with modified independence using the least restrictive device within 7 day(s).  4.  Patient will ambulate with modified independence for 200 feet with the least restrictive device within 7 day(s).   5.  Patient will ascend/descend 3 stairs with 0 handrail(s) with supervision/set-up within 7 day(s).   Outcome: Progressing     PHYSICAL THERAPY TREATMENT    Patient: Heather Rich (88 y.o. female)  Date: 5/7/2024  Diagnosis: Incarcerated ventral hernia [K43.6]  SBO (small bowel obstruction) (HCC) [K56.609]  Incarcerated hernia [K46.0] Incarcerated ventral hernia  Procedure(s) (LRB):  PRIMARY INCARCERATED  HERNIA REPAIR (N/A) 2 Days Post-Op  Precautions:                        ASSESSMENT:  Patient continues to benefit from skilled PT services and is progressing towards goals. Pt agreeable to therapy. Pt express SOB and unsteadiness with upright mobility. Pt required standing rest breaks. Pt had improved balance with rolling walker. Pt tolerating OOB. SpO2 95% on room air. Pt requesting inhaler. Notified RN         PLAN:  Patient continues to benefit from skilled intervention to address the above impairments.  Continue treatment per established plan of 
  Problem: Physical Therapy - Adult  Goal: By Discharge: Performs mobility at highest level of function for planned discharge setting.  See evaluation for individualized goals.  Description: FUNCTIONAL STATUS PRIOR TO ADMISSION: Patient was independent and active without use of DME. She has a cane and RW from last admission, but had progressed off the devices    HOME SUPPORT PRIOR TO ADMISSION: The patient lived alone with family and neighbors to provide assistance.    Physical Therapy Goals  Initiated 5/6/2024  1.  Patient will move from supine to sit and sit to supine in bed with modified independence within 7 day(s).    2.  Patient will perform sit to stand with modified independence within 7 day(s).  3.  Patient will transfer from bed to chair and chair to bed with modified independence using the least restrictive device within 7 day(s).  4.  Patient will ambulate with modified independence for 200 feet with the least restrictive device within 7 day(s).   5.  Patient will ascend/descend 3 stairs with 0 handrail(s) with supervision/set-up within 7 day(s).   Outcome: Progressing    PHYSICAL THERAPY EVALUATION    Patient: Heather Rich (88 y.o. female)  Date: 5/6/2024  Primary Diagnosis: Incarcerated ventral hernia [K43.6]  SBO (small bowel obstruction) (HCC) [K56.609]  Incarcerated hernia [K46.0]  Procedure(s) (LRB):  PRIMARY INCARCERATED  HERNIA REPAIR (N/A) 1 Day Post-Op   Precautions:                        ASSESSMENT :   DEFICITS/IMPAIRMENTS:   The patient is limited by decreased functional mobility, activity tolerance, balance     Based on the impairments listed above Patient is below her functional baseline POD #1 s/p hernia repair after admission for SBO. Anemic at baseline. Hemoglobin is 7.9. BPPV at baseline. Patient was seen walking with nursing staff in the hallway prior to evaluation. Recommend using a RW as needed with hallway walking. Patient agreeable to getting in the bedside chair and balance 
  Problem: Physical Therapy - Adult  Goal: By Discharge: Performs mobility at highest level of function for planned discharge setting.  See evaluation for individualized goals.  Description: FUNCTIONAL STATUS PRIOR TO ADMISSION: Patient was independent and active without use of DME. She has a cane and RW from last admission, but had progressed off the devices    HOME SUPPORT PRIOR TO ADMISSION: The patient lived alone with family and neighbors to provide assistance.    Physical Therapy Goals  Initiated 5/6/2024  1.  Patient will move from supine to sit and sit to supine in bed with modified independence within 7 day(s).    2.  Patient will perform sit to stand with modified independence within 7 day(s).  3.  Patient will transfer from bed to chair and chair to bed with modified independence using the least restrictive device within 7 day(s).  4.  Patient will ambulate with modified independence for 200 feet with the least restrictive device within 7 day(s).   5.  Patient will ascend/descend 3 stairs with 0 handrail(s) with supervision/set-up within 7 day(s).   Outcome: Progressing   PHYSICAL THERAPY TREATMENT    Patient: Heather Rich (88 y.o. female)  Date: 5/8/2024  Diagnosis: Incarcerated ventral hernia [K43.6]  SBO (small bowel obstruction) (HCC) [K56.609]  Incarcerated hernia [K46.0] Incarcerated ventral hernia  Procedure(s) (LRB):  PRIMARY INCARCERATED  HERNIA REPAIR (N/A) 3 Days Post-Op  Precautions:                        ASSESSMENT:  Patient continues to benefit from skilled PT services and is progressing towards goals. Barriers to independence with functional mobility include general weakness, decreased activity tolerance, impaired standing balance, mild gait instability, increased risk for fall.     Pt received with supportive daughter present, HR low 100s, diltiazem drip in place. Pt educated on use of log roll sequence to decreased abdominal strain/ pain with bed mob; required min A overall for 
  Problem: Physical Therapy - Adult  Goal: By Discharge: Performs mobility at highest level of function for planned discharge setting.  See evaluation for individualized goals.  Description: FUNCTIONAL STATUS PRIOR TO ADMISSION: Patient was independent and active without use of DME. She has a cane and RW from last admission, but had progressed off the devices    HOME SUPPORT PRIOR TO ADMISSION: The patient lived alone with family and neighbors to provide assistance.    Physical Therapy Goals  Weekly Reassessment 5/14/2024 (goals continued)  Initiated 5/6/2024  1.  Patient will move from supine to sit and sit to supine in bed with modified independence within 7 day(s).    2.  Patient will perform sit to stand with modified independence within 7 day(s).  3.  Patient will transfer from bed to chair and chair to bed with modified independence using the least restrictive device within 7 day(s).  4.  Patient will ambulate with modified independence for 200 feet with the least restrictive device within 7 day(s).   5.  Patient will ascend/descend 3 stairs with 0 handrail(s) with supervision/set-up within 7 day(s).   Outcome: Progressing     PHYSICAL THERAPY TREATMENT    Patient: Heather Rich (88 y.o. female)  Date: 5/16/2024  Diagnosis: Incarcerated ventral hernia [K43.6]  SBO (small bowel obstruction) (HCC) [K56.609]  Incarcerated hernia [K46.0] Incarcerated ventral hernia  Procedure(s) (LRB):  PRIMARY INCARCERATED  HERNIA REPAIR (N/A) 11 Days Post-Op  Precautions: Fall Risk                      ASSESSMENT:  Patient continues to benefit from skilled PT services and is progressing towards goals.   Received reclined in the recliner with feet elevated. She is motivated to participate in therapy activity and amenable to gait training w/ a RW.  Noted improvement in gait quality, balance, safety and activity tolerance ambulating with the RW today.  She ambulated 180 ft with CGA to SBA without stopping to rest.   Pt remained 
  Problem: Physical Therapy - Adult  Goal: By Discharge: Performs mobility at highest level of function for planned discharge setting.  See evaluation for individualized goals.  Description: FUNCTIONAL STATUS PRIOR TO ADMISSION: Patient was independent and active without use of DME. She has a cane and RW from last admission, but had progressed off the devices    HOME SUPPORT PRIOR TO ADMISSION: The patient lived alone with family and neighbors to provide assistance.    Physical Therapy Goals  Weekly Reassessment 5/14/2024 (goals continued)  Initiated 5/6/2024  1.  Patient will move from supine to sit and sit to supine in bed with modified independence within 7 day(s).    2.  Patient will perform sit to stand with modified independence within 7 day(s).  3.  Patient will transfer from bed to chair and chair to bed with modified independence using the least restrictive device within 7 day(s).  4.  Patient will ambulate with modified independence for 200 feet with the least restrictive device within 7 day(s).   5.  Patient will ascend/descend 3 stairs with 0 handrail(s) with supervision/set-up within 7 day(s).   Outcome: Progressing     PHYSICAL THERAPY TREATMENT: WEEKLY REASSESSMENT    Patient: Heather Rich (88 y.o. female)  Date: 5/14/2024  Primary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

## 2024-05-23 NOTE — CARE COORDINATION
Transition of Care Plan:  Barriers resolved.    Patient to be transported to [facility] and rehab at 3:30pm via Wheelchair Transport.  [facility] to provide wheelchair transport.     MARIA INES Madsen, CRM  [phone]

## 2024-05-23 NOTE — CARE COORDINATION
Transition of Care Plan to SNF/Rehab    Communication to Patient/Family:  Met with patient and family and they are agreeable to the transition plan. The Plan for Transition of Care is related to the following treatment goals: discharge to [facility] and Rehab today at 3:30pm.    The Patient and/or patient representative was provided with a choice of provider and agrees  with the discharge plan.      Yes [x] No []    A Freedom of choice list was provided with basic dialogue that supports the patient's individualized plan of care/goals and shares the quality data associated with the providers.       Yes [x] No []    SNF/Rehab Transition:  Patient has been accepted to [facility]/Rehab and meets criteria for admission.   Patient will transported by [facility] (wheelchair transport) and expected to leave at 3:30pm.    Communication to SNF/Rehab:  Bedside RN, Candi, has been notified to update the transition plan to the facility and call report (phone number).  Discharge information has been updated on the AVS. And communicated to facility via [employer], or CC link.     Discharge instructions can be seen in EPIC.    Nursing Please include all hard scripts for controlled substances, med rec and dc summary, and AVS in packet.     Reviewed and confirmed with facility, [facility], can manage the patient care needs for the following:     [place] with (X) only those applicable:  Medication:  [x]Medications are available at the facility  [x]IV Antibiotics    []Controlled Substance - hard copies available sent.  []Weekly Labs    Equipment:  []CPAP/BiPAP  []Wound Vacuum  []Abdi or Urinary Device  []PICC/Central Line  []Nebulizer  []Ventilator    Treatment:  []Isolation (for MRSA, VRE, etc.)  []Surgical Drain Management  []Tracheostomy Care  []Dressing Changes  [x]Dialysis in house at HD Den  []PEG Care  []Oxygen  []Daily Weights for Heart Failure    Dietary:  []Any diet

## 2024-05-24 ENCOUNTER — OFFICE VISIT (OUTPATIENT)
Age: 89
End: 2024-05-24

## 2024-05-24 DIAGNOSIS — Z99.2 ESRD ON PERITONEAL DIALYSIS (HCC): ICD-10-CM

## 2024-05-24 DIAGNOSIS — N18.6 ESRD ON PERITONEAL DIALYSIS (HCC): ICD-10-CM

## 2024-05-24 DIAGNOSIS — Z79.899 MEDICATION COURSE CHANGED: Primary | ICD-10-CM

## 2024-05-26 LAB
BACTERIA SPEC CULT: NORMAL
BACTERIA SPEC CULT: NORMAL
SERVICE CMNT-IMP: NORMAL
SERVICE CMNT-IMP: NORMAL

## 2024-05-27 NOTE — PROGRESS NOTES
2024    Heather Rich (:  1935) is a 89 y.o. female, with end-stage renal disease for which Dr. robert Rodriguez has been notified by the nursing staff from [facility] that the patient has an order for IV Rocephin unfortunately unable to provide any IV access, the prescription has been written for IV every 12 hours otherwise patient stable,      Subjective   Patient Active Problem List   Diagnosis    DJD (degenerative joint disease) of knee    Bed bug bite    Renal cyst    Intrahepatic bile duct dilation    History of right breast cancer    HTN (hypertension)    Early satiety    Venous insufficiency of both lower extremities    Hereditary spherocytosis (HCC)    BPV (benign positional vertigo)    Anemia    Hearing deficit, left    Axillary adenopathy    S/P colonoscopy    Fall (on)(from) sidewalk curb, sequela    Osteoporosis    Anemia of chronic renal failure    History of colonoscopy with polypectomy    Malignant neoplasm of right female breast, unspecified estrogen receptor status, unspecified site of breast (HCC)    Bilateral leg edema    Peritoneal dialysis status (HCC)    ESRD on peritoneal dialysis (HCC)    Incarcerated ventral hernia    SBO (small bowel obstruction) (HCC)    Dilated cbd, acquired    Type 2 diabetes mellitus with kidney complication, without long-term current use of insulin (HCC)    Leukocytosis    Abdominal wall abscess    Peritonitis (HCC)    Iron deficiency    Thrombocytosis    Acute nonintractable headache    Incarcerated hernia       Review of Systems  Review of system benign    Prior to Visit Medications    Medication Sig Taking? Authorizing Provider   dilTIAZem (CARDIZEM CD) 240 MG extended release capsule Take 1 capsule by mouth every evening  Kehinde Barney MD   guaiFENesin (MUCINEX) 600 MG extended release tablet Take 1 tablet by mouth 2 times daily  Kehinde Barney MD   gentamicin (GARAMYCIN) 0.1 % cream Apply topically 3 times daily.

## 2024-05-29 ENCOUNTER — OFFICE VISIT (OUTPATIENT)
Facility: CLINIC | Age: 89
End: 2024-05-29

## 2024-05-29 ENCOUNTER — OFFICE VISIT (OUTPATIENT)
Facility: CLINIC | Age: 89
End: 2024-05-29
Payer: MEDICARE

## 2024-05-29 DIAGNOSIS — D72.829 LEUKOCYTOSIS, UNSPECIFIED TYPE: ICD-10-CM

## 2024-05-29 DIAGNOSIS — N18.6 ESRD ON PERITONEAL DIALYSIS (HCC): ICD-10-CM

## 2024-05-29 DIAGNOSIS — Z99.2 ESRD ON PERITONEAL DIALYSIS (HCC): ICD-10-CM

## 2024-05-29 DIAGNOSIS — K65.9 PERITONITIS (HCC): Primary | ICD-10-CM

## 2024-05-29 DIAGNOSIS — Z76.89 ENCOUNTER FOR SOCIAL WORK INTERVENTION: Primary | ICD-10-CM

## 2024-05-29 DIAGNOSIS — E61.1 IRON DEFICIENCY: ICD-10-CM

## 2024-05-29 DIAGNOSIS — I10 PRIMARY HYPERTENSION: ICD-10-CM

## 2024-05-29 PROCEDURE — 99309 SBSQ NF CARE MODERATE MDM 30: CPT | Performed by: NURSE PRACTITIONER

## 2024-05-29 PROCEDURE — 1123F ACP DISCUSS/DSCN MKR DOCD: CPT | Performed by: NURSE PRACTITIONER

## 2024-05-29 NOTE — PROGRESS NOTES
No  Additional notes:  Patient is preparing her finances for her children to be able to take over while.  Ms. Rich stated that she receives pension, Social Security and widows benefits.    Transportation:  Do you have reliable transportation to medical appointments?   [x] Yes   [] No  Additional notes:   Ms. Rich stated that she drives herself to medical appointments.    Legal:  Are there any existing legal issues?                                                      [] Yes   [x] No       Additional notes:    Medications:  Can you obtain your medications from the pharmacy?                          [x] Yes   [] No  Is your medication schedule confusing?                                                [] Yes   [x] No  Does anyone help you set-up your pill box?                                          [] Yes   [x] No  Additional notes:    Safety:                                                                   Do you have any concerns about your safety?                                     [] Yes   [x] No                         Additional notes:    Current/hx of substance abuse                                                          [] Yes   [x] No  Additional notes:    Existing community resources/supports                                          [] Yes   [] No  Additional notes:   None known       Any additional pertinent information:  Ms. Rich stated that her washer and dryer is in the basement of her home.  She is working with contractors to assess how to move the appliances.  Family members are currently washing/drying her clothing.                                                     Identified Needs:   Update ACP document    Social Work Intervention Plan:  Update ACP document    Radha Adames MSW, LMSW, CSAC    [facility] Services  [facility]   [address] [place] [address]  Cell [phone]

## 2024-05-30 NOTE — ASSESSMENT & PLAN NOTE
Hemoglobin as of 5/27/2024 9.8 and hematocrit 29.3 she continues on ferrous sulfate 1 tab daily and Procrit 10,000 units once a week on Mondays

## 2024-05-30 NOTE — ASSESSMENT & PLAN NOTE
Patient remains stable on current dose of metoprolol 50 mg daily, losartan 50 mg daily, nifedipine 60 mg daily isosorbide 30 mg daily and Cardizem 240 mg once a day with parameters in place.

## 2024-05-30 NOTE — PROGRESS NOTES
PLACE OF SERVICE:  [facility] [address] [place] [address]    SKILLED VISIT      Chief Complaint: Wheezing, chest congestion      HPI : Heather Rich is a 89 y.o. female patient seen today for follow-up.  Patient is alert and oriented able to make her needs known.  Patient denies any symptoms of respiratory distress however wheezing could be heard upon assessment.  Patient continues on Albuterol Sulfate HFA Inhalation  every 4 hours as needed, Budesonide Suspension every 12 hours, Albuterol Sulfate Nebulization 4 times a day and Arformoterol Tartrate Inhalation Nebulization twice a day with effective results. Patient status post hernia repair with mesh, site clean and dry without signs symptom of infection.  No complaints of constipation she continues on MiraLAX 17 g every 24 hours as needed,Colace 1 capsule daily and senna 1 tablet twice a day.  Patient has history of hypertension she remains stable on current dose of metoprolol 50 mg daily, losartan 50 mg daily, nifedipine 60 mg daily isosorbide 30 mg daily and Cardizem 240 mg once a day with parameters in place. She continues on lamoTRIgine 25 mg twice a day for history of seizures. No reports of seizure activities or nursing staff.  For history of anemia she remains stable on ferrous sulfate 1 tab daily and Epogen 10,000 units once a week on Mondays.  Current hemoglobin as of 5/27/2024 9.8 and hematocrit 29.3. Patient was previously on peritoneal dialysis, she was switched to PD temporarily after hernia repair to allow for healing of surgical site. Patient is currently on Ceftriaxone IV piggyback and Flagyl orally for bacterial peritonitis. Patient status post vascular appointment today for status post angioplasty without any complications. Patient continues to work with OT PT notes reviewed she remains motivated and continues to participate in therapy sessions. No new concerns reported from patient at this time, she

## 2024-05-30 NOTE — ASSESSMENT & PLAN NOTE
Patient is currently on hemodialysis at this time until hernia site is healed completely and cleared to return to peritoneal dialysis by surgical team.

## 2024-05-31 ENCOUNTER — OFFICE VISIT (OUTPATIENT)
Facility: CLINIC | Age: 89
End: 2024-05-31

## 2024-05-31 DIAGNOSIS — Z99.2 ESRD ON PERITONEAL DIALYSIS (HCC): Primary | ICD-10-CM

## 2024-05-31 DIAGNOSIS — K65.9 PERITONITIS (HCC): ICD-10-CM

## 2024-05-31 DIAGNOSIS — N18.6 ESRD ON PERITONEAL DIALYSIS (HCC): Primary | ICD-10-CM

## 2024-05-31 DIAGNOSIS — D72.829 LEUKOCYTOSIS, UNSPECIFIED TYPE: ICD-10-CM

## 2024-05-31 DIAGNOSIS — I10 PRIMARY HYPERTENSION: ICD-10-CM

## 2024-05-31 DIAGNOSIS — E61.1 IRON DEFICIENCY: ICD-10-CM

## 2024-05-31 NOTE — PROGRESS NOTES
Anemia, arrhythmia, asthma, breast cancer with lumpectomy, ESRD on dialysis, sickle cell trait, type 2 diabetes, hypertension, seizure disorder, chronic L LE PVD     Social History / Family History:      Medications: Reviewed in EMR and assessment and plan    ROS:   The following system review was negative:  Constitutional; Respiratory; Cardiovascular; Genitourinary; Gastrointestinal; Psychiatric; Ear-Nose-Throat; Musculoskeletal; Neurologic; Endocrine; Hematologic; Skin; Eyes; denies any      Vitals: Blood pressure 161/62 temperature 97.8  respiration 18 heart rate of 76 and O2 sat 95% on room air         Exam:  Constitutional: No acute distress;   Eyes: Sclera clear, PERRLA;   Ears/nose/mouth/throat:mmm, OP clear, trachea midline;  Cardiovascular: RRR,nml S1 and S2, no rubs murmurs or gallops, no edema, no cyanosis;   Respiratory: Clear to auscultation, symmetric, no respiratory distress;  Gastrointestinal: Abdomen soft, NT, ND, no masses, normal bowel sounds;  Neurologic: Cranial nerves II through VII grossly intact, no speech or motor deficits A&O in time place and person  Skin: No rash, warm and dry;  Musculoskeletal: No erythema swelling or joint tenderness, extremities without cyanosis, neck supple, ROM intact spine and extremities;  Psychiatric: Not agitated.  Appropriate affect, mood, judgment and insight.  Genitourinary: No suprapubic tenderness or flank tenderness  Heme, lymph, immuno: No pallor;       Labs:    As of 5/27/2024 hemoglobin 9.8 hematocrit 29.3 WBC 14.6 BUN 41.8 creatinine 6.19 close Medicare but is my house, living will be requested.  Out of 2 different things she was outside of 1 episode on abdominal advice out of open when I got 1 known to have no known residual    Assessment/Plans:     1. ESRD on peritoneal dialysis (HCC)  Assessment & Plan:  Patient is currently on hemodialysis at this time until hernia site is healed completely and cleared to return to peritoneal dialysis by surgical

## 2024-06-01 NOTE — ASSESSMENT & PLAN NOTE
Patient remained stable on ceftriaxone IV piggyback on dialysis days and Flagyl orally for bacterial peritonitis.

## 2024-06-05 ENCOUNTER — OFFICE VISIT (OUTPATIENT)
Facility: CLINIC | Age: 89
End: 2024-06-05
Payer: MEDICARE

## 2024-06-05 ENCOUNTER — OFFICE VISIT (OUTPATIENT)
Facility: CLINIC | Age: 89
End: 2024-06-05

## 2024-06-05 DIAGNOSIS — D72.829 LEUKOCYTOSIS, UNSPECIFIED TYPE: ICD-10-CM

## 2024-06-05 DIAGNOSIS — D63.1 ANEMIA DUE TO STAGE 4 CHRONIC KIDNEY DISEASE (HCC): Primary | ICD-10-CM

## 2024-06-05 DIAGNOSIS — R06.02 SOB (SHORTNESS OF BREATH): Primary | ICD-10-CM

## 2024-06-05 DIAGNOSIS — N18.4 ANEMIA DUE TO STAGE 4 CHRONIC KIDNEY DISEASE (HCC): Primary | ICD-10-CM

## 2024-06-05 DIAGNOSIS — I10 PRIMARY HYPERTENSION: ICD-10-CM

## 2024-06-05 DIAGNOSIS — Z99.2 ESRD ON PERITONEAL DIALYSIS (HCC): ICD-10-CM

## 2024-06-05 DIAGNOSIS — Z76.89 ENCOUNTER FOR SOCIAL WORK INTERVENTION: Primary | ICD-10-CM

## 2024-06-05 DIAGNOSIS — N18.6 ESRD ON PERITONEAL DIALYSIS (HCC): ICD-10-CM

## 2024-06-05 DIAGNOSIS — R52 PAIN: ICD-10-CM

## 2024-06-05 PROCEDURE — 1123F ACP DISCUSS/DSCN MKR DOCD: CPT | Performed by: NURSE PRACTITIONER

## 2024-06-05 PROCEDURE — 99309 SBSQ NF CARE MODERATE MDM 30: CPT | Performed by: NURSE PRACTITIONER

## 2024-06-05 RX ORDER — DILTIAZEM HYDROCHLORIDE 240 MG/1
240 CAPSULE, COATED, EXTENDED RELEASE ORAL EVERY EVENING
Qty: 30 CAPSULE | Refills: 0 | Status: SHIPPED | OUTPATIENT
Start: 2024-06-05

## 2024-06-05 RX ORDER — SPIRONOLACTONE 25 MG/1
25 TABLET ORAL DAILY
Qty: 30 TABLET | Refills: 0 | Status: SHIPPED | OUTPATIENT
Start: 2024-06-05

## 2024-06-05 RX ORDER — FERROUS SULFATE 325(65) MG
325 TABLET ORAL
Qty: 30 TABLET | Refills: 0 | Status: SHIPPED | OUTPATIENT
Start: 2024-06-05

## 2024-06-05 RX ORDER — LIDOCAINE 40 MG/G
CREAM TOPICAL
Qty: 28 G | Refills: 0 | Status: SHIPPED | OUTPATIENT
Start: 2024-06-05

## 2024-06-05 RX ORDER — OXYBUTYNIN CHLORIDE 10 MG/1
10 TABLET, EXTENDED RELEASE ORAL NIGHTLY
Qty: 30 TABLET | Refills: 0 | Status: SHIPPED | OUTPATIENT
Start: 2024-06-05

## 2024-06-05 RX ORDER — GENTAMICIN SULFATE 1 MG/G
CREAM TOPICAL
Qty: 15 G | Refills: 0 | Status: SHIPPED | OUTPATIENT
Start: 2024-06-05

## 2024-06-05 RX ORDER — TIZANIDINE HYDROCHLORIDE 2 MG/1
2 CAPSULE, GELATIN COATED ORAL 2 TIMES DAILY
Qty: 60 CAPSULE | Refills: 0 | Status: SHIPPED | OUTPATIENT
Start: 2024-06-05 | End: 2024-07-05

## 2024-06-05 RX ORDER — BUDESONIDE 0.5 MG/2ML
INHALANT ORAL
Qty: 30 EACH | Refills: 0 | Status: SHIPPED | OUTPATIENT
Start: 2024-06-05

## 2024-06-05 RX ORDER — POLYETHYLENE GLYCOL 3350 17 G/17G
17 POWDER, FOR SOLUTION ORAL DAILY PRN
Qty: 527 G | Refills: 0 | Status: SHIPPED | OUTPATIENT
Start: 2024-06-05

## 2024-06-05 RX ORDER — LOSARTAN POTASSIUM 50 MG/1
50 TABLET ORAL DAILY
Qty: 30 TABLET | Refills: 0 | Status: SHIPPED | OUTPATIENT
Start: 2024-06-05 | End: 2024-07-05

## 2024-06-05 RX ORDER — GUAIFENESIN 600 MG/1
600 TABLET, EXTENDED RELEASE ORAL 2 TIMES DAILY
Qty: 20 TABLET | Refills: 0 | Status: SHIPPED | OUTPATIENT
Start: 2024-06-05

## 2024-06-05 RX ORDER — FLUTICASONE PROPIONATE 50 MCG
2 SPRAY, SUSPENSION (ML) NASAL 2 TIMES DAILY
Qty: 16 G | Refills: 0 | Status: SHIPPED | OUTPATIENT
Start: 2024-06-05

## 2024-06-05 RX ORDER — SENNOSIDES 8.6 MG
1 TABLET ORAL DAILY
Qty: 30 TABLET | Refills: 0 | Status: SHIPPED | OUTPATIENT
Start: 2024-06-05 | End: 2024-07-05

## 2024-06-05 RX ORDER — MELATONIN
1000 DAILY
Qty: 30 TABLET | Refills: 0 | Status: SHIPPED | OUTPATIENT
Start: 2024-06-05

## 2024-06-05 RX ORDER — ALBUTEROL SULFATE 90 UG/1
1 AEROSOL, METERED RESPIRATORY (INHALATION) EVERY 4 HOURS PRN
Qty: 25.5 G | Refills: 0 | Status: SHIPPED | OUTPATIENT
Start: 2024-06-05

## 2024-06-05 RX ORDER — ASPIRIN 81 MG/1
81 TABLET, CHEWABLE ORAL DAILY
Qty: 30 TABLET | Refills: 0 | Status: SHIPPED | OUTPATIENT
Start: 2024-06-05

## 2024-06-05 RX ORDER — PANTOPRAZOLE SODIUM 40 MG/1
40 TABLET, DELAYED RELEASE ORAL
Qty: 30 TABLET | Refills: 0 | Status: SHIPPED | OUTPATIENT
Start: 2024-06-05

## 2024-06-05 RX ORDER — ALBUTEROL SULFATE 2.5 MG/3ML
2.5 SOLUTION RESPIRATORY (INHALATION)
Status: SHIPPED | OUTPATIENT
Start: 2024-06-05

## 2024-06-05 RX ORDER — CLOTRIMAZOLE AND BETAMETHASONE DIPROPIONATE 10; .64 MG/G; MG/G
CREAM TOPICAL 2 TIMES DAILY
Qty: 15 G | Refills: 0 | Status: SHIPPED | OUTPATIENT
Start: 2024-06-05 | End: 2024-07-05

## 2024-06-05 RX ORDER — PRAVASTATIN SODIUM 20 MG
20 TABLET ORAL NIGHTLY
Qty: 30 TABLET | Refills: 0 | Status: SHIPPED | OUTPATIENT
Start: 2024-06-05

## 2024-06-05 RX ORDER — LAMOTRIGINE 25 MG/1
25 TABLET ORAL 2 TIMES DAILY
Qty: 60 TABLET | Refills: 0 | Status: SHIPPED | OUTPATIENT
Start: 2024-06-05 | End: 2024-07-05

## 2024-06-05 RX ORDER — ISOSORBIDE MONONITRATE 30 MG/1
30 TABLET, EXTENDED RELEASE ORAL DAILY
Qty: 30 TABLET | Refills: 0 | Status: SHIPPED | OUTPATIENT
Start: 2024-06-05

## 2024-06-05 RX ORDER — ASCORBIC ACID 500 MG
500 TABLET ORAL DAILY
Qty: 30 TABLET | Refills: 0 | Status: SHIPPED | OUTPATIENT
Start: 2024-06-05

## 2024-06-05 RX ORDER — METOPROLOL SUCCINATE 25 MG/1
25 TABLET, EXTENDED RELEASE ORAL DAILY
Qty: 30 TABLET | Refills: 0 | Status: SHIPPED | OUTPATIENT
Start: 2024-06-05

## 2024-06-05 RX ORDER — MECLIZINE HYDROCHLORIDE 25 MG/1
25 TABLET ORAL 3 TIMES DAILY PRN
Qty: 30 TABLET | Refills: 0 | Status: SHIPPED | OUTPATIENT
Start: 2024-06-05 | End: 2024-06-15

## 2024-06-05 RX ORDER — ACETAMINOPHEN 325 MG/1
650 TABLET ORAL EVERY 4 HOURS PRN
Status: SHIPPED | OUTPATIENT
Start: 2024-06-05

## 2024-06-05 RX ORDER — DOCUSATE SODIUM 100 MG/1
100 CAPSULE, LIQUID FILLED ORAL DAILY PRN
Qty: 30 CAPSULE | Refills: 0 | Status: SHIPPED | OUTPATIENT
Start: 2024-06-05

## 2024-06-05 RX ORDER — FLUTICASONE PROPIONATE AND SALMETEROL 250; 50 UG/1; UG/1
POWDER RESPIRATORY (INHALATION)
Qty: 60 EACH | Refills: 0 | Status: SHIPPED | OUTPATIENT
Start: 2024-06-05

## 2024-06-05 RX ORDER — LORATADINE 10 MG/1
10 TABLET ORAL DAILY
Qty: 30 TABLET | Refills: 0 | Status: SHIPPED | OUTPATIENT
Start: 2024-06-05 | End: 2024-07-05

## 2024-06-05 RX ORDER — NIFEDIPINE 30 MG/1
30 TABLET, EXTENDED RELEASE ORAL DAILY
Qty: 30 TABLET | Refills: 0 | Status: SHIPPED | OUTPATIENT
Start: 2024-06-05 | End: 2024-07-05

## 2024-06-05 RX ORDER — ARFORMOTEROL TARTRATE 15 UG/2ML
SOLUTION RESPIRATORY (INHALATION)
Qty: 180 ML | Refills: 0 | Status: SHIPPED | OUTPATIENT
Start: 2024-06-05

## 2024-06-05 RX ORDER — ALBUTEROL SULFATE 2.5 MG/3ML
2.5 SOLUTION RESPIRATORY (INHALATION) 4 TIMES DAILY PRN
Qty: 360 EACH | Refills: 0 | Status: SHIPPED | OUTPATIENT
Start: 2024-06-05

## 2024-06-05 NOTE — ASSESSMENT & PLAN NOTE
Continues on Epoetin Isai Injection 51760 UNIT/ML once a week Mondays.  Current hemoglobin 10.8 and hematocrit 31.3

## 2024-06-05 NOTE — ASSESSMENT & PLAN NOTE
atient remains stable on current dose of metoprolol 50 mg daily, losartan 50 mg daily, nifedipine 60 mg daily isosorbide 30 mg daily and Cardizem 240 mg once a day with parameters in place.

## 2024-06-05 NOTE — PROGRESS NOTES
hemoglobin 10.8 and hematocrit 31.3  2. Primary hypertension  Assessment & Plan:  atient remains stable on current dose of metoprolol 50 mg daily, losartan 50 mg daily, nifedipine 60 mg daily isosorbide 30 mg daily and Cardizem 240 mg once a day with parameters in place.   3. ESRD on peritoneal dialysis (HCC)  Assessment & Plan:  Patient is currently on hemodialysis at this time until hernia site is healed completely and cleared to return to peritoneal dialysis by surgical team.   4. Leukocytosis, unspecified type  Assessment & Plan:  WBC 9.7 as of 6/4/2024    Bere Lieberman, APRN - CNP

## 2024-06-05 NOTE — PROGRESS NOTES
Social Work Follow-up note     MSW met with Ms. Rich for follow-up.  Patient was verbal and able to express herself without difficulty.  She was sitting on the side of her bed when MSW arrived.    Ms. Rich had questions about home health care.  MSW answered patient questions.  Patient was unclear about personal care services vs home health care.   MSW continued Advance Care Planning conversation.  Patient stated that she wanted to think more about which agents she wanted to choose to make decisions for her if she could not.    Ms. Rich stated that her daughter (who resides in [place]) had questions about personal care services.  MSW provided patient with her business card to pass along with her daughter.    Identified Needs:   Advance Care Planning   Home Health Care    Social Work Plan:  MSW had ACP conversation with patient  MSW answered patient questions about Home Health Care    Internal/External organizations consulted/involved  N/A    Progress toward goals  Goals met    Radha PRATER, LMTORRES, [facility]    [facility]  [facility]   [address] [facility] [address]   [address] [phone]

## 2024-06-07 ENCOUNTER — OFFICE VISIT (OUTPATIENT)
Facility: CLINIC | Age: 89
End: 2024-06-07

## 2024-06-07 DIAGNOSIS — I10 PRIMARY HYPERTENSION: ICD-10-CM

## 2024-06-07 DIAGNOSIS — Z99.2 ESRD ON PERITONEAL DIALYSIS (HCC): ICD-10-CM

## 2024-06-07 DIAGNOSIS — N18.4 ANEMIA DUE TO STAGE 4 CHRONIC KIDNEY DISEASE (HCC): Primary | ICD-10-CM

## 2024-06-07 DIAGNOSIS — D63.1 ANEMIA DUE TO STAGE 4 CHRONIC KIDNEY DISEASE (HCC): Primary | ICD-10-CM

## 2024-06-07 DIAGNOSIS — K46.0 INCARCERATED HERNIA: ICD-10-CM

## 2024-06-07 DIAGNOSIS — N18.6 ESRD ON PERITONEAL DIALYSIS (HCC): ICD-10-CM

## 2024-06-08 NOTE — PROGRESS NOTES
Place of Service:  [facility] [address] [place] [address]                                                                     Discharge Summary       Admit Dx: ESRD on hemodialysis, S/P hernia repair for incarcerated ventral hernia     Disposition: Patient scheduled for discharge home today  6/7/2024.  Patient lives alone and daughter will transport patient home. Patient will attend [facility] dialysis center upon discharge. Patient has all DME's available for safe discharge home.  [facility] [place], [place].     Presentation:  female with anemia, arrhythmia, asthma, BPPV, h/o R breast CA s/p lumpectomy/XRT, ESRD on PD, sickle cell trait, hereditary spherocytosis, diet-controlled T2DM, left hearing deficit, HTN, seizure disorder, chronic LLE PVD, and chronic ventral wall hernia who presented with abdominal pain, constipation, irreducible hernia, chills, and low grade fever 100 this morning. Pt c/o constipation x2 days despite aggressive bowel regimen. She was advised by her nephrologist to come for evaluation of constipation due to concern for developing bacterial peritonitis with persistent constipation. In the ED, CTAP showed incarcerated ventral hernia, developing small bowel obstruction, free intraperitoneal fluid, intrahepatic biliary and CBD dilation. General Surgery was consulted, recommended HM admission due to multiple medical comorbidities, and took her emergently to the OR for incarcerated hernia repair.   - Given multiple medical issues.  Recommend medical admission for treatment of comorbidity  - NPO with chips  - Pain control  - Zosyn antibiotic    Discharge time : Patient left facility at 2:30 pm with daughter     Brief SNF Course:  This is an 89 y.o. female admitted from hospital to nursing facility. Patient had an uneventful stay here at facility.  Patient has history of asthma, she denies  symptoms of respiratory distress. Wheezing

## 2024-06-08 NOTE — ASSESSMENT & PLAN NOTE
Patient continues on ferrous sulfate daily, also continues on Epoetin Isai Injection 40645 UNIT/ML once a week Mondays at dialysis.  Current hemoglobin 10.8 and hematocrit 31.3

## 2024-06-08 NOTE — ASSESSMENT & PLAN NOTE
Post hernia repair with mesh, surgical site clean dry and intact without signs symptom of infection.   Detail Level: Detailed Quality 110: Preventive Care And Screening: Influenza Immunization: Influenza Immunization previously received during influenza season

## 2024-06-19 ENCOUNTER — OFFICE VISIT (OUTPATIENT)
Facility: CLINIC | Age: 89
End: 2024-06-19
Payer: MEDICARE

## 2024-06-19 VITALS
RESPIRATION RATE: 16 BRPM | BODY MASS INDEX: 21.81 KG/M2 | SYSTOLIC BLOOD PRESSURE: 107 MMHG | OXYGEN SATURATION: 94 % | TEMPERATURE: 98.6 F | DIASTOLIC BLOOD PRESSURE: 56 MMHG | HEIGHT: 61 IN | WEIGHT: 115.5 LBS | HEART RATE: 80 BPM

## 2024-06-19 DIAGNOSIS — Z85.3 HISTORY OF RIGHT BREAST CANCER: ICD-10-CM

## 2024-06-19 DIAGNOSIS — I87.2 VENOUS INSUFFICIENCY OF BOTH LOWER EXTREMITIES: ICD-10-CM

## 2024-06-19 DIAGNOSIS — I10 PRIMARY HYPERTENSION: ICD-10-CM

## 2024-06-19 DIAGNOSIS — Z99.2 TYPE 2 DIABETES MELLITUS WITH CHRONIC KIDNEY DISEASE ON CHRONIC DIALYSIS, WITHOUT LONG-TERM CURRENT USE OF INSULIN (HCC): ICD-10-CM

## 2024-06-19 DIAGNOSIS — Z00.00 MEDICARE ANNUAL WELLNESS VISIT, SUBSEQUENT: Primary | ICD-10-CM

## 2024-06-19 DIAGNOSIS — K46.0 INCARCERATED HERNIA: ICD-10-CM

## 2024-06-19 DIAGNOSIS — E11.22 TYPE 2 DIABETES MELLITUS WITH CHRONIC KIDNEY DISEASE ON CHRONIC DIALYSIS, WITHOUT LONG-TERM CURRENT USE OF INSULIN (HCC): ICD-10-CM

## 2024-06-19 DIAGNOSIS — N18.6 TYPE 2 DIABETES MELLITUS WITH CHRONIC KIDNEY DISEASE ON CHRONIC DIALYSIS, WITHOUT LONG-TERM CURRENT USE OF INSULIN (HCC): ICD-10-CM

## 2024-06-19 PROCEDURE — 1123F ACP DISCUSS/DSCN MKR DOCD: CPT | Performed by: INTERNAL MEDICINE

## 2024-06-19 PROCEDURE — 99214 OFFICE O/P EST MOD 30 MIN: CPT | Performed by: INTERNAL MEDICINE

## 2024-06-19 PROCEDURE — 1111F DSCHRG MED/CURRENT MED MERGE: CPT | Performed by: INTERNAL MEDICINE

## 2024-06-19 PROCEDURE — G8427 DOCREV CUR MEDS BY ELIG CLIN: HCPCS | Performed by: INTERNAL MEDICINE

## 2024-06-19 PROCEDURE — 1090F PRES/ABSN URINE INCON ASSESS: CPT | Performed by: INTERNAL MEDICINE

## 2024-06-19 PROCEDURE — 1036F TOBACCO NON-USER: CPT | Performed by: INTERNAL MEDICINE

## 2024-06-19 PROCEDURE — G0439 PPPS, SUBSEQ VISIT: HCPCS | Performed by: INTERNAL MEDICINE

## 2024-06-19 PROCEDURE — G8420 CALC BMI NORM PARAMETERS: HCPCS | Performed by: INTERNAL MEDICINE

## 2024-06-19 ASSESSMENT — PATIENT HEALTH QUESTIONNAIRE - PHQ9
2. FEELING DOWN, DEPRESSED OR HOPELESS: NOT AT ALL
1. LITTLE INTEREST OR PLEASURE IN DOING THINGS: NOT AT ALL
SUM OF ALL RESPONSES TO PHQ9 QUESTIONS 1 & 2: 0
SUM OF ALL RESPONSES TO PHQ QUESTIONS 1-9: 0

## 2024-06-19 ASSESSMENT — LIFESTYLE VARIABLES
HOW OFTEN DO YOU HAVE A DRINK CONTAINING ALCOHOL: NEVER
HOW MANY STANDARD DRINKS CONTAINING ALCOHOL DO YOU HAVE ON A TYPICAL DAY: PATIENT DOES NOT DRINK
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
## 2024-06-28 ENCOUNTER — HOSPITAL ENCOUNTER (OUTPATIENT)
Facility: HOSPITAL | Age: 89
End: 2024-06-28
Payer: MEDICARE

## 2024-06-28 ENCOUNTER — TRANSCRIBE ORDERS (OUTPATIENT)
Facility: HOSPITAL | Age: 89
End: 2024-06-28

## 2024-06-28 DIAGNOSIS — R10.9 ABDOMINAL PAIN, UNSPECIFIED ABDOMINAL LOCATION: Primary | ICD-10-CM

## 2024-06-28 DIAGNOSIS — R10.9 ABDOMINAL PAIN, UNSPECIFIED ABDOMINAL LOCATION: ICD-10-CM

## 2024-06-28 PROCEDURE — 74018 RADEX ABDOMEN 1 VIEW: CPT

## 2024-07-04 ENCOUNTER — APPOINTMENT (OUTPATIENT)
Facility: HOSPITAL | Age: 89
DRG: 640 | End: 2024-07-04
Payer: MEDICARE

## 2024-07-04 ENCOUNTER — HOSPITAL ENCOUNTER (INPATIENT)
Facility: HOSPITAL | Age: 89
LOS: 2 days | Discharge: HOME | DRG: 640 | End: 2024-07-06
Attending: EMERGENCY MEDICINE | Admitting: INTERNAL MEDICINE
Payer: MEDICARE

## 2024-07-04 DIAGNOSIS — R55 SYNCOPE, UNSPECIFIED SYNCOPE TYPE: ICD-10-CM

## 2024-07-04 DIAGNOSIS — N18.6 ESRD ON HEMODIALYSIS (HCC): ICD-10-CM

## 2024-07-04 DIAGNOSIS — E87.5 HYPERKALEMIA: ICD-10-CM

## 2024-07-04 DIAGNOSIS — R06.02 SHORTNESS OF BREATH: ICD-10-CM

## 2024-07-04 DIAGNOSIS — Z99.2 ESRD ON HEMODIALYSIS (HCC): ICD-10-CM

## 2024-07-04 DIAGNOSIS — R09.02 HYPOXEMIA: ICD-10-CM

## 2024-07-04 DIAGNOSIS — T17.908A ASPIRATION INTO AIRWAY, INITIAL ENCOUNTER: Primary | ICD-10-CM

## 2024-07-04 LAB
ALBUMIN SERPL-MCNC: 3.1 G/DL (ref 3.5–5)
ALBUMIN/GLOB SERPL: 0.6 (ref 1.1–2.2)
ALP SERPL-CCNC: 108 U/L (ref 45–117)
ALT SERPL-CCNC: 34 U/L (ref 12–78)
ANION GAP SERPL CALC-SCNC: 10 MMOL/L (ref 5–15)
AST SERPL-CCNC: 32 U/L (ref 15–37)
BASOPHILS # BLD: 0.1 K/UL (ref 0–0.1)
BASOPHILS NFR BLD: 1 % (ref 0–1)
BILIRUB SERPL-MCNC: 1 MG/DL (ref 0.2–1)
BUN SERPL-MCNC: 48 MG/DL (ref 6–20)
BUN/CREAT SERPL: 7 (ref 12–20)
CALCIUM SERPL-MCNC: 8.6 MG/DL (ref 8.5–10.1)
CHLORIDE SERPL-SCNC: 97 MMOL/L (ref 97–108)
CO2 SERPL-SCNC: 24 MMOL/L (ref 21–32)
COMMENT:: NORMAL
CREAT SERPL-MCNC: 6.63 MG/DL (ref 0.55–1.02)
DIFFERENTIAL METHOD BLD: ABNORMAL
EOSINOPHIL # BLD: 0.8 K/UL (ref 0–0.4)
EOSINOPHIL NFR BLD: 8 % (ref 0–7)
ERYTHROCYTE [DISTWIDTH] IN BLOOD BY AUTOMATED COUNT: 13.3 % (ref 11.5–14.5)
GLOBULIN SER CALC-MCNC: 5.4 G/DL (ref 2–4)
GLUCOSE SERPL-MCNC: 167 MG/DL (ref 65–100)
HBV SURFACE AB SER QL: NONREACTIVE
HBV SURFACE AB SER-ACNC: <3.1 MIU/ML
HBV SURFACE AG SER QL: <0.1 INDEX
HBV SURFACE AG SER QL: NEGATIVE
HCT VFR BLD AUTO: 28 % (ref 35–47)
HGB BLD-MCNC: 8.9 G/DL (ref 11.5–16)
IMM GRANULOCYTES # BLD AUTO: 0.1 K/UL (ref 0–0.04)
IMM GRANULOCYTES NFR BLD AUTO: 1 % (ref 0–0.5)
LYMPHOCYTES # BLD: 1.3 K/UL (ref 0.8–3.5)
LYMPHOCYTES NFR BLD: 13 % (ref 12–49)
MAGNESIUM SERPL-MCNC: 2.4 MG/DL (ref 1.6–2.4)
MCH RBC QN AUTO: 28.3 PG (ref 26–34)
MCHC RBC AUTO-ENTMCNC: 31.8 G/DL (ref 30–36.5)
MCV RBC AUTO: 89.2 FL (ref 80–99)
MONOCYTES # BLD: 0.6 K/UL (ref 0–1)
MONOCYTES NFR BLD: 6 % (ref 5–13)
NEUTS SEG # BLD: 7.5 K/UL (ref 1.8–8)
NEUTS SEG NFR BLD: 71 % (ref 32–75)
NRBC # BLD: 0 K/UL (ref 0–0.01)
NRBC BLD-RTO: 0 PER 100 WBC
NT PRO BNP: 2037 PG/ML
PHOSPHATE SERPL-MCNC: 4.9 MG/DL (ref 2.6–4.7)
PLATELET # BLD AUTO: 361 K/UL (ref 150–400)
PMV BLD AUTO: 9 FL (ref 8.9–12.9)
POTASSIUM SERPL-SCNC: 5.8 MMOL/L (ref 3.5–5.1)
PROT SERPL-MCNC: 8.5 G/DL (ref 6.4–8.2)
RBC # BLD AUTO: 3.14 M/UL (ref 3.8–5.2)
SODIUM SERPL-SCNC: 131 MMOL/L (ref 136–145)
SPECIMEN HOLD: NORMAL
TROPONIN I SERPL HS-MCNC: 74 NG/L (ref 0–51)
WBC # BLD AUTO: 10.4 K/UL (ref 3.6–11)

## 2024-07-04 PROCEDURE — 2580000003 HC RX 258: Performed by: INTERNAL MEDICINE

## 2024-07-04 PROCEDURE — 6360000002 HC RX W HCPCS: Performed by: INTERNAL MEDICINE

## 2024-07-04 PROCEDURE — 2060000000 HC ICU INTERMEDIATE R&B

## 2024-07-04 PROCEDURE — 2580000003 HC RX 258: Performed by: EMERGENCY MEDICINE

## 2024-07-04 PROCEDURE — 93005 ELECTROCARDIOGRAM TRACING: CPT | Performed by: EMERGENCY MEDICINE

## 2024-07-04 PROCEDURE — 6370000000 HC RX 637 (ALT 250 FOR IP): Performed by: INTERNAL MEDICINE

## 2024-07-04 PROCEDURE — 6360000002 HC RX W HCPCS

## 2024-07-04 PROCEDURE — 83735 ASSAY OF MAGNESIUM: CPT

## 2024-07-04 PROCEDURE — 83880 ASSAY OF NATRIURETIC PEPTIDE: CPT

## 2024-07-04 PROCEDURE — 84484 ASSAY OF TROPONIN QUANT: CPT

## 2024-07-04 PROCEDURE — 96360 HYDRATION IV INFUSION INIT: CPT

## 2024-07-04 PROCEDURE — 6370000000 HC RX 637 (ALT 250 FOR IP): Performed by: EMERGENCY MEDICINE

## 2024-07-04 PROCEDURE — 90935 HEMODIALYSIS ONE EVALUATION: CPT

## 2024-07-04 PROCEDURE — 94640 AIRWAY INHALATION TREATMENT: CPT

## 2024-07-04 PROCEDURE — 86706 HEP B SURFACE ANTIBODY: CPT

## 2024-07-04 PROCEDURE — 36415 COLL VENOUS BLD VENIPUNCTURE: CPT

## 2024-07-04 PROCEDURE — 85025 COMPLETE CBC W/AUTO DIFF WBC: CPT

## 2024-07-04 PROCEDURE — 87340 HEPATITIS B SURFACE AG IA: CPT

## 2024-07-04 PROCEDURE — 71045 X-RAY EXAM CHEST 1 VIEW: CPT

## 2024-07-04 PROCEDURE — 84100 ASSAY OF PHOSPHORUS: CPT

## 2024-07-04 PROCEDURE — 80053 COMPREHEN METABOLIC PANEL: CPT

## 2024-07-04 PROCEDURE — 99285 EMERGENCY DEPT VISIT HI MDM: CPT

## 2024-07-04 PROCEDURE — 5A1D70Z PERFORMANCE OF URINARY FILTRATION, INTERMITTENT, LESS THAN 6 HOURS PER DAY: ICD-10-PCS | Performed by: ORTHOPAEDIC SURGERY

## 2024-07-04 RX ORDER — ISOSORBIDE MONONITRATE 30 MG/1
30 TABLET, EXTENDED RELEASE ORAL DAILY
Status: DISCONTINUED | OUTPATIENT
Start: 2024-07-04 | End: 2024-07-06 | Stop reason: HOSPADM

## 2024-07-04 RX ORDER — SODIUM CHLORIDE 0.9 % (FLUSH) 0.9 %
5-40 SYRINGE (ML) INJECTION PRN
Status: DISCONTINUED | OUTPATIENT
Start: 2024-07-04 | End: 2024-07-06 | Stop reason: HOSPADM

## 2024-07-04 RX ORDER — ACETAMINOPHEN 650 MG/1
650 SUPPOSITORY RECTAL EVERY 6 HOURS PRN
Status: DISCONTINUED | OUTPATIENT
Start: 2024-07-04 | End: 2024-07-06 | Stop reason: HOSPADM

## 2024-07-04 RX ORDER — ASPIRIN 81 MG/1
81 TABLET, CHEWABLE ORAL DAILY
Status: DISCONTINUED | OUTPATIENT
Start: 2024-07-04 | End: 2024-07-06 | Stop reason: HOSPADM

## 2024-07-04 RX ORDER — PRAVASTATIN SODIUM 10 MG
20 TABLET ORAL NIGHTLY
Status: DISCONTINUED | OUTPATIENT
Start: 2024-07-04 | End: 2024-07-06 | Stop reason: HOSPADM

## 2024-07-04 RX ORDER — LAMOTRIGINE 25 MG/1
25 TABLET ORAL 2 TIMES DAILY
Status: DISCONTINUED | OUTPATIENT
Start: 2024-07-04 | End: 2024-07-06 | Stop reason: HOSPADM

## 2024-07-04 RX ORDER — ACETAMINOPHEN 325 MG/1
650 TABLET ORAL EVERY 6 HOURS PRN
Status: DISCONTINUED | OUTPATIENT
Start: 2024-07-04 | End: 2024-07-06 | Stop reason: HOSPADM

## 2024-07-04 RX ORDER — HEPARIN SODIUM 5000 [USP'U]/ML
5000 INJECTION, SOLUTION INTRAVENOUS; SUBCUTANEOUS EVERY 8 HOURS SCHEDULED
Status: DISCONTINUED | OUTPATIENT
Start: 2024-07-04 | End: 2024-07-06 | Stop reason: HOSPADM

## 2024-07-04 RX ORDER — FLUTICASONE PROPIONATE 50 MCG
2 SPRAY, SUSPENSION (ML) NASAL 2 TIMES DAILY
Status: DISCONTINUED | OUTPATIENT
Start: 2024-07-04 | End: 2024-07-06 | Stop reason: HOSPADM

## 2024-07-04 RX ORDER — POLYETHYLENE GLYCOL 3350 17 G/17G
17 POWDER, FOR SOLUTION ORAL DAILY PRN
Status: DISCONTINUED | OUTPATIENT
Start: 2024-07-04 | End: 2024-07-06 | Stop reason: HOSPADM

## 2024-07-04 RX ORDER — MIDODRINE HYDROCHLORIDE 5 MG/1
5 TABLET ORAL
Status: DISCONTINUED | OUTPATIENT
Start: 2024-07-04 | End: 2024-07-04

## 2024-07-04 RX ORDER — IPRATROPIUM BROMIDE AND ALBUTEROL SULFATE 2.5; .5 MG/3ML; MG/3ML
1 SOLUTION RESPIRATORY (INHALATION) EVERY 4 HOURS PRN
Status: DISCONTINUED | OUTPATIENT
Start: 2024-07-04 | End: 2024-07-06 | Stop reason: HOSPADM

## 2024-07-04 RX ORDER — BUDESONIDE 0.5 MG/2ML
0.5 INHALANT ORAL
Status: DISCONTINUED | OUTPATIENT
Start: 2024-07-04 | End: 2024-07-06 | Stop reason: HOSPADM

## 2024-07-04 RX ORDER — OXYBUTYNIN CHLORIDE 5 MG/1
10 TABLET, EXTENDED RELEASE ORAL NIGHTLY
Status: DISCONTINUED | OUTPATIENT
Start: 2024-07-04 | End: 2024-07-06 | Stop reason: HOSPADM

## 2024-07-04 RX ORDER — ONDANSETRON 4 MG/1
4 TABLET, ORALLY DISINTEGRATING ORAL EVERY 8 HOURS PRN
Status: DISCONTINUED | OUTPATIENT
Start: 2024-07-04 | End: 2024-07-06 | Stop reason: HOSPADM

## 2024-07-04 RX ORDER — DOCUSATE SODIUM 100 MG/1
100 CAPSULE, LIQUID FILLED ORAL DAILY PRN
Status: DISCONTINUED | OUTPATIENT
Start: 2024-07-04 | End: 2024-07-06 | Stop reason: HOSPADM

## 2024-07-04 RX ORDER — MAGNESIUM HYDROXIDE/ALUMINUM HYDROXICE/SIMETHICONE 120; 1200; 1200 MG/30ML; MG/30ML; MG/30ML
30 SUSPENSION ORAL EVERY 6 HOURS PRN
Status: DISCONTINUED | OUTPATIENT
Start: 2024-07-04 | End: 2024-07-06 | Stop reason: HOSPADM

## 2024-07-04 RX ORDER — ONDANSETRON 2 MG/ML
4 INJECTION INTRAMUSCULAR; INTRAVENOUS EVERY 6 HOURS PRN
Status: DISCONTINUED | OUTPATIENT
Start: 2024-07-04 | End: 2024-07-06 | Stop reason: HOSPADM

## 2024-07-04 RX ORDER — MIDODRINE HYDROCHLORIDE 5 MG/1
5 TABLET ORAL
Status: DISCONTINUED | OUTPATIENT
Start: 2024-07-04 | End: 2024-07-06 | Stop reason: HOSPADM

## 2024-07-04 RX ORDER — ALBUMIN (HUMAN) 12.5 G/50ML
SOLUTION INTRAVENOUS
Status: DISPENSED
Start: 2024-07-04 | End: 2024-07-04

## 2024-07-04 RX ORDER — PANTOPRAZOLE SODIUM 40 MG/1
40 TABLET, DELAYED RELEASE ORAL
Status: DISCONTINUED | OUTPATIENT
Start: 2024-07-05 | End: 2024-07-06 | Stop reason: HOSPADM

## 2024-07-04 RX ORDER — SODIUM CHLORIDE 9 MG/ML
INJECTION, SOLUTION INTRAVENOUS PRN
Status: DISCONTINUED | OUTPATIENT
Start: 2024-07-04 | End: 2024-07-06 | Stop reason: HOSPADM

## 2024-07-04 RX ORDER — ACETAMINOPHEN 325 MG/1
650 TABLET ORAL EVERY 4 HOURS PRN
Status: DISCONTINUED | OUTPATIENT
Start: 2024-07-04 | End: 2024-07-06 | Stop reason: HOSPADM

## 2024-07-04 RX ORDER — SENNOSIDES A AND B 8.6 MG/1
1 TABLET, FILM COATED ORAL DAILY
Status: DISCONTINUED | OUTPATIENT
Start: 2024-07-04 | End: 2024-07-06 | Stop reason: HOSPADM

## 2024-07-04 RX ORDER — ARFORMOTEROL TARTRATE 15 UG/2ML
15 SOLUTION RESPIRATORY (INHALATION)
Status: DISCONTINUED | OUTPATIENT
Start: 2024-07-04 | End: 2024-07-06 | Stop reason: HOSPADM

## 2024-07-04 RX ORDER — ALBUMIN (HUMAN) 12.5 G/50ML
25 SOLUTION INTRAVENOUS PRN
Status: DISCONTINUED | OUTPATIENT
Start: 2024-07-04 | End: 2024-07-06 | Stop reason: HOSPADM

## 2024-07-04 RX ORDER — SODIUM CHLORIDE 0.9 % (FLUSH) 0.9 %
5-40 SYRINGE (ML) INJECTION EVERY 12 HOURS SCHEDULED
Status: DISCONTINUED | OUTPATIENT
Start: 2024-07-04 | End: 2024-07-06 | Stop reason: HOSPADM

## 2024-07-04 RX ORDER — 0.9 % SODIUM CHLORIDE 0.9 %
500 INTRAVENOUS SOLUTION INTRAVENOUS ONCE
Status: COMPLETED | OUTPATIENT
Start: 2024-07-04 | End: 2024-07-04

## 2024-07-04 RX ORDER — HEPARIN SODIUM 1000 [USP'U]/ML
INJECTION, SOLUTION INTRAVENOUS; SUBCUTANEOUS
Status: COMPLETED
Start: 2024-07-04 | End: 2024-07-04

## 2024-07-04 RX ADMIN — HEPARIN SODIUM 5000 UNITS: 5000 INJECTION INTRAVENOUS; SUBCUTANEOUS at 22:15

## 2024-07-04 RX ADMIN — IPRATROPIUM BROMIDE AND ALBUTEROL SULFATE 1 DOSE: .5; 3 SOLUTION RESPIRATORY (INHALATION) at 17:45

## 2024-07-04 RX ADMIN — BUDESONIDE 500 MCG: 0.5 INHALANT RESPIRATORY (INHALATION) at 10:35

## 2024-07-04 RX ADMIN — FLUTICASONE PROPIONATE 2 SPRAY: 50 SPRAY, METERED NASAL at 23:12

## 2024-07-04 RX ADMIN — SODIUM CHLORIDE, PRESERVATIVE FREE 10 ML: 5 INJECTION INTRAVENOUS at 10:12

## 2024-07-04 RX ADMIN — ARFORMOTEROL TARTRATE 15 MCG: 15 SOLUTION RESPIRATORY (INHALATION) at 10:35

## 2024-07-04 RX ADMIN — ISOSORBIDE MONONITRATE 30 MG: 30 TABLET, EXTENDED RELEASE ORAL at 22:15

## 2024-07-04 RX ADMIN — LAMOTRIGINE 25 MG: 25 TABLET ORAL at 10:32

## 2024-07-04 RX ADMIN — SODIUM CHLORIDE 500 ML: 900 INJECTION, SOLUTION INTRAVENOUS at 08:36

## 2024-07-04 RX ADMIN — OXYBUTYNIN CHLORIDE 10 MG: 5 TABLET, EXTENDED RELEASE ORAL at 22:15

## 2024-07-04 RX ADMIN — HEPARIN SODIUM 1800 UNITS: 1000 INJECTION INTRAVENOUS; SUBCUTANEOUS at 16:02

## 2024-07-04 RX ADMIN — LAMOTRIGINE 25 MG: 25 TABLET ORAL at 22:15

## 2024-07-04 RX ADMIN — BUDESONIDE 500 MCG: 0.5 INHALANT RESPIRATORY (INHALATION) at 22:58

## 2024-07-04 RX ADMIN — FLUTICASONE PROPIONATE 2 SPRAY: 50 SPRAY, METERED NASAL at 10:32

## 2024-07-04 RX ADMIN — ARFORMOTEROL TARTRATE 15 MCG: 15 SOLUTION RESPIRATORY (INHALATION) at 22:58

## 2024-07-04 RX ADMIN — MIDODRINE HYDROCHLORIDE 5 MG: 5 TABLET ORAL at 09:39

## 2024-07-04 RX ADMIN — PRAVASTATIN SODIUM 20 MG: 10 TABLET ORAL at 22:15

## 2024-07-04 RX ADMIN — SENNOSIDES 8.6 MG: 8.6 TABLET, FILM COATED ORAL at 10:32

## 2024-07-04 RX ADMIN — IPRATROPIUM BROMIDE AND ALBUTEROL SULFATE 1 DOSE: .5; 3 SOLUTION RESPIRATORY (INHALATION) at 23:01

## 2024-07-04 RX ADMIN — SODIUM CHLORIDE, PRESERVATIVE FREE 10 ML: 5 INJECTION INTRAVENOUS at 22:18

## 2024-07-04 RX ADMIN — HEPARIN SODIUM 5000 UNITS: 5000 INJECTION INTRAVENOUS; SUBCUTANEOUS at 16:18

## 2024-07-04 ASSESSMENT — PAIN - FUNCTIONAL ASSESSMENT: PAIN_FUNCTIONAL_ASSESSMENT: 0-10

## 2024-07-04 ASSESSMENT — PAIN SCALES - GENERAL
PAINLEVEL_OUTOF10: 0

## 2024-07-04 NOTE — CARE COORDINATION
Care Management Initial Assessment       RUR: 26  Readmission? No  1st IM letter given? Yes - 07/3/24  1st  letter given: No    Plan Home with BEN for HH through agency called [facility]?  BEN with [facility] [address] at 11:15 AM  Transportation to/from HD provided by [employer] [facility].   Pt I W ADLs. Needs family assistance for IADLs.   Family/private transportation at [place]   Hx of going to [facility] for SNF about a month ago.   Pharmacy: [facility] in [place].     Advance Care Planning     General Advance Care Planning (ACP) Conversation    Date of Conversation: 7/4/2024  Conducted with: Patient with Decision Making Capacity  Other persons present: Daughter Bob    Healthcare Decision Maker:   Primary Decision Maker: Bob Rich - Child - [phone]  Click here to complete Healthcare Decision Makers including selection of the Healthcare Decision Maker Relationship (ie \"Primary\").       Content/Action Overview:  Has ACP document(s) on file - reflects the patient's care preferences  Reviewed DNR/DNI and patient elects Full Code (Attempt Resuscitation)        Length of Voluntary ACP Conversation in minutes:  <16 minutes (Non-Billable)    Bridgette Sauceda CM  [id number]     07/04/24 7787   Service Assessment   Patient Orientation Alert and Oriented   Cognition Alert   History Provided By Patient   Primary Caregiver Self   Support Systems Children;Family Members;[employer];Friends/Neighbors;Meals on Wheels   Patient's Healthcare Decision Maker is: Named in Scanned ACP Document   PCP Verified by CM Yes   Last Visit to PCP Within last 3 months  (6/19/24)   Prior Functional Level Assistance with the following:;Cooking;Housework;Shopping   Current Functional Level Assistance with the following:;Cooking;Housework;Shopping   Can patient return to prior living arrangement Yes   Ability to make needs known: Good   Family able to assist with home care needs: Yes

## 2024-07-04 NOTE — ED PROVIDER NOTES
Take 1 tablet by mouth daily    SPIRONOLACTONE (ALDACTONE) 25 MG TABLET    Take 1 tablet by mouth daily    TIZANIDINE (ZANAFLEX) 2 MG CAPSULE    Take 1 capsule by mouth 2 times daily    VITAMIN C (ASCORBIC ACID) 500 MG TABLET    Take 1 tablet by mouth daily    VITAMIN D3 (CHOLECALCIFEROL) 25 MCG (1000 UT) TABS TABLET    Take 1 tablet by mouth daily       SCREENINGS               No data recorded         PHYSICAL EXAM      ED Triage Vitals   Enc Vitals Group      BP       Pulse       Resp       Temp       Temp src       SpO2       Weight       Height       Head Circumference       Peak Flow       Pain Score       Pain Loc       Pain Edu?       Excl. in GC?         Physical Exam  Vitals and nursing note reviewed.   Constitutional:       General: She is not in acute distress.     Appearance: Normal appearance. She is not ill-appearing.   HENT:      Head: Normocephalic and atraumatic.      Mouth/Throat:      Mouth: Mucous membranes are moist.   Eyes:      General: No scleral icterus.     Extraocular Movements: Extraocular movements intact.      Pupils: Pupils are equal, round, and reactive to light.   Cardiovascular:      Rate and Rhythm: Regular rhythm. Bradycardia present.      Comments: Dialysis cath left upper chest    Pulmonary:      Effort: Pulmonary effort is normal. Tachypnea present. No respiratory distress.      Breath sounds: Normal breath sounds. No stridor. No wheezing, rhonchi or rales.   Chest:      Chest wall: No tenderness.   Abdominal:      General: There is no distension.      Palpations: Abdomen is soft.      Tenderness: There is no abdominal tenderness.      Comments: Peritoneal cath left abdomen     Musculoskeletal:      Cervical back: Normal range of motion and neck supple.      Right lower leg: No edema.      Left lower leg: No edema.   Skin:     General: Skin is warm and dry.      Capillary Refill: Capillary refill takes less than 2 seconds.   Neurological:      Mental Status: She is alert and  hospitalist. The results of their tests and reason(s) for their admission have been discussed with pt and/or available family. They convey agreement and understanding for the need to be admitted and for the admission diagnosis.           I am the Primary Clinician of Record.   Bay Rose DO (electronically signed)    (Please note that parts of this dictation were completed with voice recognition software. Quite often unanticipated grammatical, syntax, homophones, and other interpretive errors are inadvertently transcribed by the computer software. Please disregards these errors. Please excuse any errors that have escaped final proofreading.)          Bay Rose DO  07/05/24 0601

## 2024-07-04 NOTE — PROGRESS NOTES
Attempted to see pt for OT services and pt was having dialysis.  Will defer but continue to follow.

## 2024-07-04 NOTE — PROGRESS NOTES
End of Shift Note    Bedside shift change report given to Carlyle (oncoming nurse) by Merlin Lang, RN (offgoing nurse).  Report included the following information SBAR, ED Summary, Procedure Summary, Intake/Output, MAR, Recent Results, and Cardiac Rhythm Sinus Tach    Shift worked:  7a-7p     Shift summary and any significant changes:     Cardiology consulted.    Patient in Sinus Tach with some short periods of afib   Concerns for physician to address:       Zone phone for oncoming shift:          Activity:     Number times ambulated in hallways past shift: 0  Number of times OOB to chair past shift: 0    Cardiac:   Cardiac Monitoring: Yes           Access:  Current line(s): PIV and HD access     Genitourinary:   Urinary status: anuric and due to void    Respiratory:      Chronic home O2 use?: NO  Incentive spirometer at bedside: YES       GI:     Current diet:  ADULT DIET; Regular; Low Fat/Low Chol/High Fiber/2 gm Na; Low Potassium (Less than 3000 mg/day); Low Phosphorus (Less than 1000 mg)  Passing flatus: YES  Tolerating current diet: YES       Pain Management:   Patient states pain is manageable on current regimen: YES    Skin:     Interventions: float heels    Patient Safety:  Fall Score:    Interventions: gripper socks and pt to call before getting OOB       Length of Stay:  Expected LOS: 3  Actual LOS: 0      Merlin Lang, RN

## 2024-07-04 NOTE — PROGRESS NOTES
Nephrology Progress Note  [facility] [place] [facility]  [address], [address]  [place] [address]  Phone - [phone]  Fax - [phone]                 Patient: Heather Rich                     YOB: 1935        Date- 7/4/2024                                     Admit Date: 7/4/2024   CC: Follow up for ESRD          IMPRESSION & PLAN:   ESRD, HD, MWF, right chest permacath, Dr. Coates(recent conversion from PD to HD)  Hyponatremia  Hyperkalemia  Hypotension  Bradycardia  Anemia      PLAN-  Plan for hemodialysis today with 2K bath  We will plan for HD again tomorrow  [facility] has been notified  Will use albumin with HD with minimal ultrafiltration  Discussed with ED physician  Thank you for the consult     Subjective:  Interval History:   -89-year-old female with past medical history of ESRD currently gets dialyzed Monday Wednesday Friday schedule with right chest permacath at [facility] [address] was brought to the ED today after evaluation for syncopal episode and shortness of breath.  She was found to have ongoing hyperkalemia with hypotension and bradycardia.  Renal consult requested for evaluation for hemodialysis.    Objective:   Vitals:    07/04/24 1003 07/04/24 1030 07/04/24 1031 07/04/24 1038   BP: (!) 101/50 97/60     Pulse: (!) 40 (!) 40 (!) 40 (!) 40   Resp: 19 28 19 24   Temp:       TempSrc:       SpO2: 95%  96% 97%   Weight:       Height:          No intake/output data recorded.  No intake/output data recorded.      Physical exam:    GEN: NAD  NECK- no mass  RESP: No wheezing, decreased BS b/l  CVS: S1,S2  RRR  NEURO: Normal speech, Non focal  EXT: No Edema   HD access: Right chest PermCath    Chart reviewed.         Pertinent Notes reviewed.     Data Review :  Lab Results   Component Value Date/Time     07/04/2024 08:11 AM    K 5.8 07/04/2024 08:11 AM    CL 97 07/04/2024 08:11 AM    CO2 24 07/04/2024 08:11 AM    BUN  1 tablet by mouth daily    isosorbide mononitrate (IMDUR) 30 MG extended release tablet Take 1 tablet by mouth daily    arformoterol tartrate (BROVANA) 15 MCG/2ML NEBU USE 1 VIAL VIA NEBULIZER EVERY 12 HOURS    senna (SENOKOT) 8.6 MG TABS tablet Take 1 tablet by mouth daily    albuterol sulfate HFA (PROVENTIL;VENTOLIN;PROAIR) 108 (90 Base) MCG/ACT inhaler Inhale 1 puff into the lungs every 4 hours as needed for Wheezing        Javier Coyne MD  7/4/2024

## 2024-07-04 NOTE — ED NOTES
SBAR telephone report given to Merlin ., RN at this time. Opportunity for questions and concerns have been provided. Receiving RN aware pt has orders for dialysis

## 2024-07-04 NOTE — FLOWSHEET NOTE
07/04/24 1600   Vital Signs   BP (!) 140/67   Temp 97.7 °F (36.5 °C)   Pulse 90   Respirations 28   Pain Assessment   Pain Assessment None - Denies Pain   Post-Hemodialysis Assessment   Post-Treatment Procedures Blood returned;Catheter capped, clamped and heparinized x 2 ports   Machine Disinfection Process Acid/Vinegar Clean;Heat Disinfect;Exterior Machine Disinfection   Rinseback Volume (ml) 300 ml   Blood Volume Processed (Liters) 65 L   Dialyzer Clearance Lightly streaked   Duration of Treatment (minutes) 180 minutes   Hemodialysis Intake (ml) 500 ml   Hemodialysis Output (ml) 500 ml   NET Removed (ml) 0   Tolerated Treatment Good   Bilateral Breath Sounds Diminished   Edema None   Time Off 1600   Patient Disposition Other (Comment)  (remained in room)   Observations & Evaluations   Level of Consciousness 0   Oriented X 4   Heart Rhythm Regular   Respiratory Quality/Effort Dyspnea with exertion   O2 Device None (Room air)   Skin Color Hyperpigmentation   Skin Condition/Temp Dry;Warm   Appetite Fair   Abdomen Inspection Soft   Bowel Sounds (All Quadrants) Active     Primary RN SBAR: Merlin Lang, RN  Comments: Pt tolerated treatment well without any complaints or complications. VSS upon completion of treatment. No fluid removed. CVC dressing changed.

## 2024-07-04 NOTE — H&P
Hospitalist Admission Note    NAME:   Heather Rich   : 1935   MRN: [id number]     Date/Time: 2024 9:50 AM    Patient PCP: Kehinde Barney MD    ______________________________________________________________________  Given the patient's current clinical presentation, I have a high level of concern for decompensation if discharged from the emergency department.  Complex decision making was performed, which includes reviewing the patient's available past medical records, laboratory results, and x-ray films.       My assessment of this patient's clinical condition and my plan of care is as follows.    Assessment / Plan:  Hyperkalemia  Hyponatremia  ESRD on HD MWF  The patient is started on HD today.  Appreciate input from nephrology Dr. Coyne.  Follow-up with serial BMP.    Bradycardia  Hypotension  Presyncope  EKG nonischemic, troponin is elevated at 74.  Patient denies any chest pain, palpitation.  Will get echocardiogram and consult cardiology.  Continue with midodrine for low blood pressure.  Continue with aspirin, Imdur, pravastatin.    Seizure disorder  Continue with Lamictal.    GERD  Continue with Protonix.        Medical Decision Making:   I personally reviewed labs: CBC, BMP, LFT, magnesium, troponin  I personally reviewed imaging:Chest x-ray, echocardiogram  I personally reviewed EKG:  Toxic drug monitoring: H&H while patient is on SQ heparin  Discussed case with: ED provider. After discussion I am in agreement that acuity of patient's medical condition necessitates hospital stay.      Code Status: Full code  DVT Prophylaxis: SQ heparin  Baseline: Independent from home, dandre visits her frequently.    Subjective:   CHIEF COMPLAINT: Weakness, unsteady gait    HISTORY OF PRESENT ILLNESS:     Heather Rich is a 89 y.o.  female with PMHx significant for ESRD on HD MWF, right breast cancer status postlumpectomy/radiation, sickle cell trait, seizure disorder, HTN, diet-controlled diabetes  (SENOKOT) 8.6 MG TABS tablet Take 1 tablet by mouth daily 24  Bere Lieberman, APRN - CNP   albuterol sulfate HFA (PROVENTIL;VENTOLIN;PROAIR) 108 (90 Base) MCG/ACT inhaler Inhale 1 puff into the lungs every 4 hours as needed for Wheezing 24   Bere Lieberman APRN - CNP         Objective:   VITALS:    Patient Vitals for the past 24 hrs:   BP Temp Temp src Pulse Resp SpO2 Height Weight   24 0930 -- -- -- -- 18 98 % -- --   24 0915 (!) 105/43 -- -- (!) 42 15 98 % -- --   24 0900 (!) 98/43 -- -- (!) 41 17 98 % -- --   24 0845 (!) 92/46 -- -- (!) 41 10 97 % -- --   24 0830 (!) 97/47 -- -- (!) 44 25 97 % -- --   24 0817 (!) 100/42 -- -- (!) 46 17 93 % -- --   24 0802 -- -- -- -- -- 93 % -- --   24 0801 (!) 93/40 97.4 °F (36.3 °C) Oral 51 21 (!) 88 % 1.549 m (5' 1\") 55.8 kg (123 lb 0.3 oz)       Temp (24hrs), Av.4 °F (36.3 °C), Min:97.4 °F (36.3 °C), Max:97.4 °F (36.3 °C)      O2 Flow Rate (L/min): 2 L/min O2 Device: None (Room air)    Wt Readings from Last 12 Encounters:   24 55.8 kg (123 lb 0.3 oz)   24 52.4 kg (115 lb 8 oz)   24 46.3 kg (102 lb)   24 48.2 kg (106 lb 4.2 oz)   24 53.7 kg (118 lb 6.4 oz)   24 53.7 kg (118 lb 6.4 oz)   23 52 kg (114 lb 11.2 oz)   23 51 kg (112 lb 6.4 oz)   10/10/23 55.5 kg (122 lb 4.8 oz)   10/06/23 56.4 kg (124 lb 4.8 oz)   10/04/23 55.4 kg (122 lb 3.2 oz)   23 55.2 kg (121 lb 12.8 oz)         PHYSICAL EXAM:  General:    Alert, cooperative, ill looking, appears stated age.     Lungs:   CTA b/l.  No wheezing or Rhonchi. No rales.  Chest wall:  HD catheter on right anterior chest wall, no tenderness.  No accessory muscle use.  Heart:   Regular  rhythm,  No  Murmur.   No edema  Abdomen:   Soft, NT. ND  BS+  Extremities: No cyanosis.  No clubbing,      Skin turgor normal, Radial dial pulse 2+. Capillary refill normal  Neurologic: No facial asymmetry. No aphasia or
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
## 2024-07-04 NOTE — PROGRESS NOTES
4 Eyes Skin Assessment     NAME:  Heather Rich  YOB: 1935  MEDICAL RECORD NUMBER:  [id number]    The patient is being assessed for  Transfer to New Unit    I agree that at least one RN has performed a thorough Head to Toe Skin Assessment on the patient. ALL assessment sites listed below have been assessed.      Areas assessed by both nurses:    Head, Face, Ears, Shoulders, Back, Chest, Arms, Elbows, Hands, Sacrum. Buttock, Coccyx, Ischium, Legs. Feet and Heels, and Under Medical Devices         Does the Patient have a Wound? No noted wound(s)       Yaniv Prevention initiated by RN: Yes  Wound Care Orders initiated by RN: Yes    Pressure Injury (Stage 3,4, Unstageable, DTI, NWPT, and Complex wounds) if present, place Wound referral order by RN under : No    New Ostomies, if present place, Ostomy referral order under : No     Nurse 1 eSignature: Electronically signed by Merlin Lang, RN on 7/4/24 at 1:07 PM EDT    **SHARE this note so that the co-signing nurse can place an eSignature**    Nurse 2 eSignature: Electronically signed by Felicita Arguelels RN on 7/4/24 at 1:10 PM EDT

## 2024-07-04 NOTE — PROGRESS NOTES
Physical therapy services attempted @ 2:45PM. Reporting DPT arrived to unit. Per communication with RN Team, Pt still currently receiving dialysis and unavailable. PT Team will follow for functional mobility assessment and evaluation.     RN Team aware.    Jessica Erickson, PT, DPT

## 2024-07-04 NOTE — FLOWSHEET NOTE
Primary RN SBAR: Merlin Lang, RN  Patient Education provided: HD Treatment  Incapacitated Nurse [facility]. provided: to patient  Preferred Education method and Primary language: Verbal, English  Hospital associated wait time; reason: 30 min wait for pt to arrive to room from ED    Hep B Surface AG Negative 6/13/24 CWOW  Hep B Surface AB Susceptible 6/13/24 CWOW       07/04/24 1257   Treatment   Time On 1257   Treatment Goal 0ml   Observations & Evaluations   Level of Consciousness 0   Oriented X 4   Heart Rhythm Regular   Respiratory Quality/Effort Dyspnea with exertion   O2 Device None (Room air)   Bilateral Breath Sounds Diminished   Skin Color Hyperpigmentation   Skin Condition/Temp Dry;Warm   Appetite Fair   Abdomen Inspection Soft   Bowel Sounds (All Quadrants) Active   Edema None   Vital Signs   BP (!) 119/43   Temp 97.3 °F (36.3 °C)   Pulse (!) 47   Respirations 28   Pain Assessment   Pain Assessment None - Denies Pain   Technical Checks   Dialysis Machine No. 04   RO Machine Number [id number]   Dialyzer Lot No. [id number]   Tubing Lot Number [id number]   All Connections Secure Yes   NS Bag Yes   Saline Line Double Clamped Yes   Dialyzer Revaclear 300   Prime Volume (mL) 200 mL   ICEBOAT I;C;E;B;O;A;T   RO Machine Log Sheet Completed Yes   Machine Alarm Self Test Completed;Passed   Air Foam Detector Tested;Proper Function   Extracorporeal Circuit Tested for Integrity Yes   Machine Conductivity 13.7   Manual Ph 7.2   Bleach Test (Neg) Yes   Bath Temperature 98.6 °F (37 °C)   Treatment Initiation   Dialyze Hours 3   Treatment  Initiation Universal Precautions maintained;Prime given;Lines secured to patient;Connections secured;Venous Parameters set;Arterial Parameters set;Air foam detector engaged;Saline line double clamped;Revaclear Dialyzer   Hemodialysis Central Access Right Subclavian   No placement date or time found.   Orientation: Right  Access Location: Subclavian   Continued need for line? Yes   Site Assessment Clean,  dry & intact   Venous Lumen Status Brisk blood return;Flushed   Arterial Lumen Status Brisk blood return;Flushed   Line Care Connections checked and tightened;Ports disinfected   Dressing Type Bacteriocidal;Transparent   Dressing Status New dressing applied;Clean, dry & intact   Dressing Intervention New;Dressing changed   Dressing Change Due 07/09/24   Dialysis Bath   K+ (Potassium) 2   Ca+ (Calcium) 2.5   Na+ (Sodium) 138   HCO3 (Bicarb) 40

## 2024-07-05 LAB
ALBUMIN SERPL-MCNC: 2.8 G/DL (ref 3.5–5)
ALBUMIN/GLOB SERPL: 0.6 (ref 1.1–2.2)
ALP SERPL-CCNC: 119 U/L (ref 45–117)
ALT SERPL-CCNC: 43 U/L (ref 12–78)
ANION GAP SERPL CALC-SCNC: 4 MMOL/L (ref 5–15)
AST SERPL-CCNC: 60 U/L (ref 15–37)
BILIRUB SERPL-MCNC: 0.6 MG/DL (ref 0.2–1)
BUN SERPL-MCNC: 23 MG/DL (ref 6–20)
BUN/CREAT SERPL: 6 (ref 12–20)
CALCIUM SERPL-MCNC: 8.2 MG/DL (ref 8.5–10.1)
CHLORIDE SERPL-SCNC: 100 MMOL/L (ref 97–108)
CO2 SERPL-SCNC: 32 MMOL/L (ref 21–32)
CREAT SERPL-MCNC: 3.67 MG/DL (ref 0.55–1.02)
ERYTHROCYTE [DISTWIDTH] IN BLOOD BY AUTOMATED COUNT: 13.4 % (ref 11.5–14.5)
GLOBULIN SER CALC-MCNC: 4.8 G/DL (ref 2–4)
GLUCOSE SERPL-MCNC: 94 MG/DL (ref 65–100)
HCT VFR BLD AUTO: 24.9 % (ref 35–47)
HGB BLD-MCNC: 8 G/DL (ref 11.5–16)
MAGNESIUM SERPL-MCNC: 2.2 MG/DL (ref 1.6–2.4)
MCH RBC QN AUTO: 28 PG (ref 26–34)
MCHC RBC AUTO-ENTMCNC: 32.1 G/DL (ref 30–36.5)
MCV RBC AUTO: 87.1 FL (ref 80–99)
NRBC # BLD: 0 K/UL (ref 0–0.01)
NRBC BLD-RTO: 0 PER 100 WBC
PHOSPHATE SERPL-MCNC: 3.3 MG/DL (ref 2.6–4.7)
PLATELET # BLD AUTO: 355 K/UL (ref 150–400)
PMV BLD AUTO: 9.1 FL (ref 8.9–12.9)
POTASSIUM SERPL-SCNC: 3.5 MMOL/L (ref 3.5–5.1)
PROT SERPL-MCNC: 7.6 G/DL (ref 6.4–8.2)
RBC # BLD AUTO: 2.86 M/UL (ref 3.8–5.2)
SODIUM SERPL-SCNC: 136 MMOL/L (ref 136–145)
WBC # BLD AUTO: 12.6 K/UL (ref 3.6–11)

## 2024-07-05 PROCEDURE — 2580000003 HC RX 258: Performed by: INTERNAL MEDICINE

## 2024-07-05 PROCEDURE — 6360000002 HC RX W HCPCS: Performed by: INTERNAL MEDICINE

## 2024-07-05 PROCEDURE — 85027 COMPLETE CBC AUTOMATED: CPT

## 2024-07-05 PROCEDURE — 6370000000 HC RX 637 (ALT 250 FOR IP): Performed by: INTERNAL MEDICINE

## 2024-07-05 PROCEDURE — 2060000000 HC ICU INTERMEDIATE R&B

## 2024-07-05 PROCEDURE — 94640 AIRWAY INHALATION TREATMENT: CPT

## 2024-07-05 PROCEDURE — 80053 COMPREHEN METABOLIC PANEL: CPT

## 2024-07-05 PROCEDURE — 2500000003 HC RX 250 WO HCPCS: Performed by: NURSE PRACTITIONER

## 2024-07-05 PROCEDURE — 97166 OT EVAL MOD COMPLEX 45 MIN: CPT

## 2024-07-05 PROCEDURE — 97162 PT EVAL MOD COMPLEX 30 MIN: CPT

## 2024-07-05 PROCEDURE — 84100 ASSAY OF PHOSPHORUS: CPT

## 2024-07-05 PROCEDURE — 97535 SELF CARE MNGMENT TRAINING: CPT

## 2024-07-05 PROCEDURE — 83735 ASSAY OF MAGNESIUM: CPT

## 2024-07-05 PROCEDURE — 36415 COLL VENOUS BLD VENIPUNCTURE: CPT

## 2024-07-05 PROCEDURE — 97116 GAIT TRAINING THERAPY: CPT

## 2024-07-05 PROCEDURE — 97161 PT EVAL LOW COMPLEX 20 MIN: CPT

## 2024-07-05 RX ORDER — GUAIFENESIN 200 MG/10ML
200 LIQUID ORAL EVERY 4 HOURS PRN
Status: DISCONTINUED | OUTPATIENT
Start: 2024-07-05 | End: 2024-07-06 | Stop reason: HOSPADM

## 2024-07-05 RX ADMIN — OXYBUTYNIN CHLORIDE 10 MG: 5 TABLET, EXTENDED RELEASE ORAL at 20:32

## 2024-07-05 RX ADMIN — BUDESONIDE 500 MCG: 0.5 INHALANT RESPIRATORY (INHALATION) at 21:10

## 2024-07-05 RX ADMIN — HEPARIN SODIUM 1800 UNITS: 1000 INJECTION INTRAVENOUS; SUBCUTANEOUS at 12:42

## 2024-07-05 RX ADMIN — ISOSORBIDE MONONITRATE 30 MG: 30 TABLET, EXTENDED RELEASE ORAL at 20:26

## 2024-07-05 RX ADMIN — GUAIFENESIN 200 MG: 200 SOLUTION ORAL at 06:48

## 2024-07-05 RX ADMIN — ARFORMOTEROL TARTRATE 15 MCG: 15 SOLUTION RESPIRATORY (INHALATION) at 07:30

## 2024-07-05 RX ADMIN — LAMOTRIGINE 25 MG: 25 TABLET ORAL at 20:32

## 2024-07-05 RX ADMIN — ARFORMOTEROL TARTRATE 15 MCG: 15 SOLUTION RESPIRATORY (INHALATION) at 21:10

## 2024-07-05 RX ADMIN — FLUTICASONE PROPIONATE 2 SPRAY: 50 SPRAY, METERED NASAL at 13:20

## 2024-07-05 RX ADMIN — LAMOTRIGINE 25 MG: 25 TABLET ORAL at 13:20

## 2024-07-05 RX ADMIN — HEPARIN SODIUM 5000 UNITS: 5000 INJECTION INTRAVENOUS; SUBCUTANEOUS at 05:38

## 2024-07-05 RX ADMIN — HEPARIN SODIUM 5000 UNITS: 5000 INJECTION INTRAVENOUS; SUBCUTANEOUS at 13:19

## 2024-07-05 RX ADMIN — IPRATROPIUM BROMIDE AND ALBUTEROL SULFATE 1 DOSE: .5; 3 SOLUTION RESPIRATORY (INHALATION) at 21:15

## 2024-07-05 RX ADMIN — PANTOPRAZOLE SODIUM 40 MG: 40 TABLET, DELAYED RELEASE ORAL at 05:38

## 2024-07-05 RX ADMIN — HEPARIN SODIUM 5000 UNITS: 5000 INJECTION INTRAVENOUS; SUBCUTANEOUS at 21:43

## 2024-07-05 RX ADMIN — EPOETIN ALFA-EPBX 10000 UNITS: 10000 INJECTION, SOLUTION INTRAVENOUS; SUBCUTANEOUS at 20:29

## 2024-07-05 RX ADMIN — DOCUSATE SODIUM 100 MG: 100 CAPSULE, LIQUID FILLED ORAL at 13:20

## 2024-07-05 RX ADMIN — FLUTICASONE PROPIONATE 2 SPRAY: 50 SPRAY, METERED NASAL at 20:27

## 2024-07-05 RX ADMIN — SODIUM CHLORIDE, PRESERVATIVE FREE 10 ML: 5 INJECTION INTRAVENOUS at 20:26

## 2024-07-05 RX ADMIN — ASPIRIN 81 MG: 81 TABLET, CHEWABLE ORAL at 13:20

## 2024-07-05 RX ADMIN — PRAVASTATIN SODIUM 20 MG: 10 TABLET ORAL at 20:26

## 2024-07-05 RX ADMIN — BUDESONIDE 500 MCG: 0.5 INHALANT RESPIRATORY (INHALATION) at 07:30

## 2024-07-05 ASSESSMENT — PAIN SCALES - GENERAL
PAINLEVEL_OUTOF10: 0

## 2024-07-05 NOTE — PROGRESS NOTES
Occupational Therapy note:    Chart reviewed and patient currently EL for HD. Will defer and continue to follow.    Jessica Hanks, OTR/L, OTD

## 2024-07-05 NOTE — PLAN OF CARE
Problem: Physical Therapy - Adult  Goal: By Discharge: Performs mobility at highest level of function for planned discharge setting.  See evaluation for individualized goals.  Description: FUNCTIONAL STATUS PRIOR TO ADMISSION: Patient was overall modified independent- using furniture/environmental support as needed within home and cane for community mobility. Had worked some with HHPT but they stopped coming out a couple weeks ago without reason.    HOME SUPPORT PRIOR TO ADMISSION: The patient lived alone with family, neighbors, and aide to provide assistance as needed.    Physical Therapy Goals  Initiated 7/5/2024  1.  Patient will move from supine to sit and sit to supine, scoot up and down, and roll side to side in bed with independence within 7 day(s).    2.  Patient will perform sit to stand with modified independence within 7 day(s).  3.  Patient will transfer from bed to chair and chair to bed with modified independence using the least restrictive device within 7 day(s).  4.  Patient will ambulate with modified independence for 150 feet with the least restrictive device within 7 day(s).   5.  Patient will ascend/descend 2 stairs without handrail(s) with cane as needed with supervision/set-up within 7 day(s).  Outcome: Progressing     PHYSICAL THERAPY EVALUATION    Patient: Heather Rich (89 y.o. female)  Date: 7/5/2024  Primary Diagnosis: Syncope and collapse [R55]  Shortness of breath [R06.02]  Hypoxemia [R09.02]  Hyperkalemia [E87.5]  ESRD on hemodialysis (HCC) [N18.6, Z99.2]  Aspiration into airway, initial encounter [T17.908A]  Syncope, unspecified syncope type [R55]       Precautions: Restrictions/Precautions: Fall Risk                    ASSESSMENT :   DEFICITS/IMPAIRMENTS:   The patient is limited by decreased functional mobility, independence in ADLs, high-level IADLs, strength, activity tolerance, endurance, balance. Patient is functioning slightly below functional baseline, reporting increased

## 2024-07-05 NOTE — PROGRESS NOTES
Nursing contacted Nocturnist/cross cover provider via non-urgent messaging system [employer] and notified patient c/o congestion and is asking for mucinex. No other concerns reported. No acute distress reported. No other information provided by nurse. VSS, cxr done yesterday neg acute infection. Ordered mucinex liquid po prn per pt request. Will defer further evaluation/management to the day shift primary attending care team. Patient denies any further complaints or concerns. Nursing to notify Hospitalist for further/continued concerns. Will remain available overnight for further concerns if nursing/patient needs. Please note, there are RRT systems in this hospital in place that if nursing has acute or critical patient condition change or concern, this is to help facilitate and notify that patient needs immediate bedside evaluation by a provider.     Non-billable note.

## 2024-07-05 NOTE — PROGRESS NOTES
Nephrology Progress Note  [facility] [employer] [facility]  [address], [address]  [place] [address]  Phone - [phone]  Fax - [phone]                 Patient: Heather Rich                     YOB: 1935        Date- 7/5/2024                                     Admit Date: 7/4/2024   CC: Follow up for ESRD          IMPRESSION & PLAN:   ESRD, HD, MWF, right chest permacath, Dr. Coates(recent conversion from PD to HD)  Hyponatremia  Hyperkalemia  Hypotension  Bradycardia  Anemia      PLAN-  Plan for HD again today to bring her back on Monday Wednesday Friday schedule.  Ongoing cardiology evaluation for possible pacemaker placement  Will add Epogen for anemia  Spoke to HD RN     Subjective:  Interval History:   - patient was seen and examined today at bedside on dialysis  -Denies any active complaints    Objective:   Vitals:    07/05/24 1030 07/05/24 1045 07/05/24 1100 07/05/24 1115   BP: (!) 157/72 (!) 173/76 (!) 175/61 (!) 186/80   Pulse: 83 76 60 62   Resp:       Temp:       TempSrc:       SpO2:       Weight:       Height:          I/O last 3 completed shifts:  In: 1500 [P.O.:500; IV Piggyback:500]  Out: 900 [Urine:400]  No intake/output data recorded.      Physical exam:    GEN: NAD  NECK- no mass  RESP: No wheezing, decreased BS b/l  CVS: S1,S2  RRR  NEURO: Normal speech, Non focal  EXT: No Edema   HD access: Right chest PermCath    Chart reviewed.         Pertinent Notes reviewed.     Data Review :  Lab Results   Component Value Date/Time     07/05/2024 02:52 AM    K 3.5 07/05/2024 02:52 AM     07/05/2024 02:52 AM    CO2 32 07/05/2024 02:52 AM    BUN 23 07/05/2024 02:52 AM    CREATININE 3.67 07/05/2024 02:52 AM    GLUCOSE 94 07/05/2024 02:52 AM    CALCIUM 8.2 07/05/2024 02:52 AM       Lab Results   Component Value Date    WBC 12.6 (H) 07/05/2024    HGB 8.0 (L) 07/05/2024    HCT 24.9 (L) 07/05/2024    MCV 87.1

## 2024-07-05 NOTE — FLOWSHEET NOTE
Primary RN SBAR: Glao Bethea RN  Patient Education provided: HD treatment  Incapacitated Nurse [facility]. provided: 2nd RN in HD suite  Preferred Education method and Primary language: Verbal, English  Hospital associated wait time; reason: 70 minute wait for transport then first patient refused. 35 min wait for this patient.   Hepatitis B Surface Ag   Date/Time Value Ref Range Status   07/04/2024 01:04 PM <0.10 Index Final     Hep B S Ag Interp   Date/Time Value Ref Range Status   07/04/2024 01:04 PM Negative NEG   Final     Hep B S Ab   Date/Time Value Ref Range Status   07/04/2024 01:04 PM <3.10 mIU/mL Final     Hep B S Ab Interp   Date/Time Value Ref Range Status   07/04/2024 01:04 PM NONREACTIVE NR   Final     Comment:     (NOTE)  The [employer] Centaur Anti-HBs2 assay is traceable to the World Health   Organization (WHO) Hepatitis B Immunoglobulin 1st International   Reference Preparation (1977). Samples with a calculated value of 10   mIU/mL or greater are considered reactive (protective) in accordance   with the CDC guidelines. The accepted criteria for immunity to HBV is   anti-HBs activity greater than or equal to 10 mIU/mL, as defined by   the WHO International Reference Preparation.  Assay performance has not been established in pregnant women,   patients who are immunosuppressed or immunocompromised, nor have   performance characteristics been established in conjunction with   other 's assays for specific HBV serologic markers. This   assay does not differentiate between vaccine induced immune response   and a response due to infection with HBV. Passively acquired anti-HBs   may be identified following patient transfusion, receipt of   immunoglobulin products, etc.          07/05/24 0905   Vital Signs   BP (!) 154/71   Temp 97.7 °F (36.5 °C)   Pulse 90   Respirations 18   Pain Assessment   Pain Assessment None - Denies Pain   Treatment   Time On 0905   Treatment Goal 1000 ml   Observations &

## 2024-07-05 NOTE — PROGRESS NOTES
07/05/24 1045 (!) 173/76 -- -- 76 -- --   07/05/24 1030 (!) 157/72 -- -- 83 -- --   07/05/24 1000 (!) 157/73 -- -- 80 -- --   07/05/24 0945 (!) 154/70 -- -- 86 -- --   07/05/24 0930 (!) 162/73 -- -- 87 -- --   07/05/24 0915 (!) 154/68 -- -- 85 -- --   07/05/24 0905 (!) 154/71 97.7 °F (36.5 °C) -- 90 18 --   07/05/24 0710 (!) 140/63 97.6 °F (36.4 °C) Oral 94 18 96 %   07/05/24 0245 (!) 141/66 99.3 °F (37.4 °C) Oral (!) 105 17 96 %   07/04/24 2315 134/65 99.1 °F (37.3 °C) Oral (!) 106 18 95 %   07/04/24 2301 -- -- -- 92 19 98 %   07/04/24 2258 -- -- -- (!) 107 18 98 %   07/04/24 2215 (!) 135/58 -- -- -- -- --   07/04/24 1915 (!) 129/56 98.4 °F (36.9 °C) Oral (!) 101 24 96 %   07/04/24 1748 -- -- -- -- -- 96 %         Intake/Output Summary (Last 24 hours) at 7/5/2024 1740  Last data filed at 7/5/2024 1238  Gross per 24 hour   Intake 1000 ml   Output 1900 ml   Net -900 ml        I had a face to face encounter and independently examined this patient on 7/5/2024, as outlined below:  PHYSICAL EXAM:  General: Alert, cooperative  EENT:  EOMI. Anicteric sclerae.  Resp:  CTA bilaterally, no wheezing or rales.  No accessory muscle use  CV:  Regular  rhythm,  No edema  GI:  Soft, Non distended, Non tender.  +Bowel sounds  Neurologic:  Alert and oriented X 3, normal speech,   Psych:   Good insight. Not anxious nor agitated  Skin:  No rashes.  No jaundice    Reviewed most current lab test results and cultures  YES  Reviewed most current radiology test results   YES  Review and summation of old records today    NO  Reviewed patient's current orders and MAR    YES  PMH/SH reviewed - no change compared to H&P    Procedures: see electronic medical records for all procedures/Xrays and details which were not copied into this note but were reviewed prior to creation of Plan.      LABS:  I reviewed today's most current labs and imaging studies.  Pertinent labs include:  Recent Labs     07/04/24  0811 07/05/24  0252   WBC 10.4 12.6*

## 2024-07-05 NOTE — PLAN OF CARE
Problem: Discharge Planning  Goal: Discharge to home or other facility with appropriate resources  7/4/2024 2248 by Carlyle Basilio RN  Outcome: Progressing  7/4/2024 1649 by Lang, Merlin, RN  Outcome: Progressing     Problem: Pain  Goal: Verbalizes/displays adequate comfort level or baseline comfort level  7/4/2024 2248 by Carlyle Basilio RN  Outcome: Progressing  7/4/2024 1649 by Lang, Merlin, RN  Outcome: Progressing     Problem: Safety - Adult  Goal: Free from fall injury  Outcome: Progressing     Problem: ABCDS Injury Assessment  Goal: Absence of physical injury  Outcome: Progressing     Problem: Respiratory - Adult  Goal: Achieves optimal ventilation and oxygenation  7/4/2024 2248 by Carlyle Basilio RN  Outcome: Progressing  7/4/2024 1749 by Jesse Moreno, RT  Outcome: Progressing

## 2024-07-05 NOTE — PROGRESS NOTES
EP/ ARRHYTHMIA Progress Note    Patient ID:  Patient: Heather Rich  MRN: [id number]  Age: 89 y.o.  : 1935    Date of  Admission: 2024  7:54 AM   PCP:  Kehinde Barney MD    Assessment:   Bradycardia, sinus.  May reflect sick sinus syndrome.  She was symptomatic PTA with presyncope.  Paroxysmal atrial fibrillation, non on AC due to advanced age, hypotension, falls risk.  Hypotension.  END STAGE RENAL DISEASE with R chest Permacath MWF.  Had done peritoneal dialysis prior.  K was slightly high at 5.8 on admission.  Anemia of chronic kidney disease, but also diagnosis of hereditary spherocytosis.  R breast CA with lumpectomy, radiation.  Seizure disorder.  Full code.    Plan:     Will research her case further.  She had bradycardia last admission but also presented with an incarcerated ventral hernia that was emergently repaired.  Reconstituted her HR well, in SINUS RHYTHM 70's during sleep when I'm seeing her.  Metoprolol is being held.  Looks like the dose was decreased on last admission.  Want to make sure there's nothing I'm missing.  Ultimately, she may need a pacemaker implant.  If so, I'd lean toward a leadless pacemaker for intermittent pacing support if her bradycardia proves truly transient given the reduced infection risk and avoiding indwelling leads in the vasculature.    Will follow.     update:  Tele looks good.  Unclear why she had bradycardia and hypotension PTA, may have been due to transiently increased vagal tone rather than intrinsic sinus node disease.  The bradycardia has resolved.  If in the future she presents with recurrent symptomatic bradycardia, regardless of mechanism, I'd consider a pacemaker implant.  For what I see now, NO pacemaker recommended.  Cardiology will see ON REQUEST this weekend.     [x]       High complexity decision making was performed in this patient at high risk for decompensation with multiple organ  involvement.    Heather Rich is a 89 y.o. female with a history of bradycardia on admission, but back in sinus rhythm in the 70's while sleeping when I came to see her.  I was asked to comment on her bradycardia, evaluate and treat.          Allergies   Allergen Reactions    Bee Pollen     Dust Mite Extract     Codeine Nausea And Vomiting and Other (See Comments)     Upset stomach. Weak.          Current Facility-Administered Medications   Medication Dose Route Frequency    guaiFENesin (ROBITUSSIN) 100 MG/5ML liquid 200 mg  200 mg Oral Q4H PRN    epoetin carrie-epbx (RETACRIT) injection 10,000 Units  10,000 Units SubCUTAneous Once per day on Mon Wed Fri    midodrine (PROAMATINE) tablet 5 mg  5 mg Oral TID WC    acetaminophen (TYLENOL) tablet 650 mg  650 mg Oral Q4H PRN    arformoterol tartrate (BROVANA) nebulizer solution 15 mcg  15 mcg Nebulization BID RT    aspirin chewable tablet 81 mg  81 mg Oral Daily    budesonide (PULMICORT) nebulizer suspension 500 mcg  0.5 mg Nebulization BID RT    docusate sodium (COLACE) capsule 100 mg  100 mg Oral Daily PRN    fluticasone (FLONASE) 50 MCG/ACT nasal spray 2 spray  2 spray Nasal BID    isosorbide mononitrate (IMDUR) extended release tablet 30 mg  30 mg Oral Daily    lamoTRIgine (LAMICTAL) tablet 25 mg  25 mg Oral BID    oxyBUTYnin (DITROPAN-XL) extended release tablet 10 mg  10 mg Oral Nightly    pantoprazole (PROTONIX) tablet 40 mg  40 mg Oral QAM AC    pravastatin (PRAVACHOL) tablet 20 mg  20 mg Oral Nightly    senna (SENOKOT) tablet 8.6 mg  1 tablet Oral Daily    albumin human 25% IV solution 25 g  25 g IntraVENous PRN    sodium chloride flush 0.9 % injection 5-40 mL  5-40 mL IntraVENous 2 times per day    sodium chloride flush 0.9 % injection 5-40 mL  5-40 mL IntraVENous PRN    0.9 % sodium chloride infusion   IntraVENous PRN    heparin (porcine) injection 5,000 Units  5,000 Units SubCUTAneous 3 times per day    ondansetron (ZOFRAN-ODT) disintegrating tablet 4 mg  4

## 2024-07-05 NOTE — PROGRESS NOTES
End of Shift Note    Bedside shift change report given to Jelly SHELLEY (oncoming nurse) by Carlyle Basilio RN (offgoing nurse).  Report included the following information SBAR, MAR, and Cardiac Rhythm Sinus tachy    Shift worked:  7p-7.30a     Shift summary and any significant changes:     Uneventful   Guaifenesin X 1 PRN   Concerns for physician to address:       Zone phone for oncoming shift:          Activity:     Number times ambulated in hallways past shift: 0  Number of times OOB to chair past shift: 0    Cardiac:   Cardiac Monitoring: Yes           Access:  Current line(s): PIV and HD access     Genitourinary:   Urinary status: oliguric    Respiratory:      Chronic home O2 use?: NO  Incentive spirometer at bedside: NO       GI:     Current diet:  ADULT DIET; Regular; Low Fat/Low Chol/High Fiber/2 gm Na; Low Potassium (Less than 3000 mg/day); Low Phosphorus (Less than 1000 mg)  ADULT ORAL NUTRITION SUPPLEMENT; Breakfast, Lunch, Dinner; Renal Oral Supplement  Passing flatus: YES  Tolerating current diet: YES       Pain Management:   Patient states pain is manageable on current regimen: YES    Skin:     Interventions: turn team, float heels, increase time out of bed, PT/OT consult, limit briefs, and nutritional support    Patient Safety:  Fall Score:    Interventions: bed/chair alarm, assistive device (walker, cane. etc), gripper socks, and pt to call before getting OOB       Length of Stay:  Expected LOS: 3  Actual LOS: 1      Carlyle Basilio RN

## 2024-07-05 NOTE — PLAN OF CARE
Problem: Pain  Goal: Verbalizes/displays adequate comfort level or baseline comfort level  7/5/2024 1214 by Galo Bethea RN  Outcome: Progressing     Problem: Discharge Planning  Goal: Discharge to home or other facility with appropriate resources  7/5/2024 1214 by Galo Bethea RN  Outcome: Progressing  7/4/2024 2248 by Carlyle Basilio RN  Outcome: Progressing     Problem: Safety - Adult  Goal: Free from fall injury  7/5/2024 1214 by Galo Bethea RN  Outcome: Progressing  Flowsheets (Taken 7/5/2024 1214)  Free From Fall Injury:   Instruct family/caregiver on patient safety   Based on caregiver fall risk screen, instruct family/caregiver to ask for assistance with transferring infant if caregiver noted to have fall risk factors     Problem: ABCDS Injury Assessment  Goal: Absence of physical injury  7/5/2024 1214 by Galo Bethea RN  Outcome: Progressing     Problem: Skin/Tissue Integrity  Goal: Absence of new skin breakdown  Description: 1.  Monitor for areas of redness and/or skin breakdown  2.  Assess vascular access sites hourly  3.  Every 4-6 hours minimum:  Change oxygen saturation probe site  4.  Every 4-6 hours:  If on nasal continuous positive airway pressure, respiratory therapy assess nares and determine need for appliance change or resting period.  Outcome: Progressing

## 2024-07-05 NOTE — PROGRESS NOTES
End of Shift Note    Bedside shift change report given to Carlyle SHELLEY (oncoming nurse) by Galo Bethea RN (offgoing nurse).  Report included the following information SBAR, ED Summary, OR Summary, Procedure Summary, Intake/Output, MAR, Recent Results, Med Rec Status, Cardiac Rhythm ST, and Alarm Parameters     Shift worked:  7a-7p     Shift summary and any significant changes:     Dialysis today, no midodrine BP elevated no other events      Concerns for physician to address:       Zone phone for oncoming shift:          Activity:     Number times ambulated in hallways past shift: 1  Number of times OOB to chair past shift: 2    Cardiac:   Cardiac Monitoring: Yes           Access:  Current line(s): PIV, HD     Genitourinary:   Urinary status: olea    Respiratory:      Chronic home O2 use?: NO  Incentive spirometer at bedside: NO       GI:     Current diet:  ADULT DIET; Regular; Low Fat/Low Chol/High Fiber/2 gm Na; Low Potassium (Less than 3000 mg/day); Low Phosphorus (Less than 1000 mg)  ADULT ORAL NUTRITION SUPPLEMENT; Breakfast, Lunch, Dinner; Renal Oral Supplement  Passing flatus: YES  Tolerating current diet: YES       Pain Management:   Patient states pain is manageable on current regimen: N/A    Skin:     Interventions: specialty bed, float heels, increase time out of bed, foam dressing, PT/OT consult, limit briefs, internal/external urinary devices, and nutritional support    Patient Safety:  Fall Score:    Interventions: bed/chair alarm, assistive device (walker, cane. etc), gripper socks, pt to call before getting OOB, and stay with me (per policy)       Length of Stay:  Expected LOS: 4  Actual LOS: 1      Galo Bethea RN

## 2024-07-05 NOTE — FLOWSHEET NOTE
07/05/24 1238   Vital Signs   BP (!) 183/73   Temp 98.5 °F (36.9 °C)   Pulse 54   Respirations 18   Pain Assessment   Pain Assessment None - Denies Pain   Post-Hemodialysis Assessment   Post-Treatment Procedures Blood returned;Catheter capped, clamped and heparinized x 2 ports   Machine Disinfection Process Exterior Machine Disinfection   Rinseback Volume (ml) 300 ml   Blood Volume Processed (Liters) 77 L   Dialyzer Clearance Lightly streaked   Duration of Treatment (minutes) 210 minutes   Hemodialysis Intake (ml) 500 ml   Hemodialysis Output (ml) 1500 ml   NET Removed (ml) 1000   Tolerated Treatment Good   Bilateral Breath Sounds Diminished   Edema None   Time Off 1238   Patient Disposition Return to room   Observations & Evaluations   Level of Consciousness 0   Oriented X 4   Heart Rhythm Regular   Respiratory Quality/Effort Dyspnea with exertion   O2 Device None (Room air)   Skin Color Hyperpigmentation   Skin Condition/Temp Dry;Warm   Appetite Good   Abdomen Inspection Soft   Bowel Sounds (All Quadrants) Active     Primary RN SBAR: Galo Bethea RN  Comments: Pt tolerated treatment well without any complaints or complications. 1000 ml removed. CVC dressing changed.
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
## 2024-07-05 NOTE — PROGRESS NOTES
Physical Therapy    Chart reviewed and patient currently EL for HD. Will defer and continue to follow.     Bobbi Latham, PT, DPT

## 2024-07-05 NOTE — CARE COORDINATION
Transition of Care Plan:    RUR: 26% (High RUR)  Prior Level of Functioning: Needs assistance   Disposition: Home with home health   If SNF or IPR: Date FOC offered: na  Date FOC received:   Accepting facility:   Date authorization started with reference number:   Date authorization received and expires:   Follow up appointments: pcp/specialist   DME needed: Has a cane and a walker; no other needs  Transportation at discharge: Stretcher transport need is anticipated  IM/IMM Medicare/ letter given: 2nd IM upon  discharge  Is patient a [place] and connected with VA? na   If yes, was [place] transfer form completed and VA notified? na  Caregiver Contact: ,Pipo Rich(Child) [phone]    Discharge Caregiver contacted prior to discharge? contacted  Care Conference needed? no  Barriers to discharge: medical clearance      The patient has a history of home health with  [facility]. Appointments with [facility] on [address] are scheduled for Tuesdays, Thursdays, and Saturdays at 11:15 AM. Transportation to and from hemodialysis (HD) treatments is provided by [facility]. She is independent with Activities of Daily Living (ADLs) but requires family assistance for Instrumental Activities of Daily Living (IADLs). Family or private transportation is available upon discharge. approximately a month ago, she had a history of staying at [facility] for skilled nursing facility (SNF) care. Her pharmacy is [facility] [place].      Damian Long RN  Case Management  [phone]

## 2024-07-06 VITALS
HEART RATE: 62 BPM | DIASTOLIC BLOOD PRESSURE: 91 MMHG | RESPIRATION RATE: 21 BRPM | HEIGHT: 61 IN | OXYGEN SATURATION: 98 % | SYSTOLIC BLOOD PRESSURE: 164 MMHG | WEIGHT: 123.02 LBS | TEMPERATURE: 98.4 F | BODY MASS INDEX: 23.23 KG/M2

## 2024-07-06 PROCEDURE — 6360000002 HC RX W HCPCS: Performed by: INTERNAL MEDICINE

## 2024-07-06 PROCEDURE — 94640 AIRWAY INHALATION TREATMENT: CPT

## 2024-07-06 PROCEDURE — 6370000000 HC RX 637 (ALT 250 FOR IP): Performed by: INTERNAL MEDICINE

## 2024-07-06 PROCEDURE — 2580000003 HC RX 258: Performed by: INTERNAL MEDICINE

## 2024-07-06 RX ORDER — ONDANSETRON 4 MG/1
4 TABLET, ORALLY DISINTEGRATING ORAL EVERY 8 HOURS PRN
Qty: 20 TABLET | Refills: 0 | Status: SHIPPED | OUTPATIENT
Start: 2024-07-06

## 2024-07-06 RX ORDER — SENNOSIDES 8.6 MG
1 TABLET ORAL DAILY
Qty: 30 TABLET | Refills: 0 | Status: SHIPPED | OUTPATIENT
Start: 2024-07-06 | End: 2024-08-05

## 2024-07-06 RX ADMIN — ONDANSETRON 4 MG: 2 INJECTION INTRAMUSCULAR; INTRAVENOUS at 16:43

## 2024-07-06 RX ADMIN — HEPARIN SODIUM 5000 UNITS: 5000 INJECTION INTRAVENOUS; SUBCUTANEOUS at 05:48

## 2024-07-06 RX ADMIN — ARFORMOTEROL TARTRATE 15 MCG: 15 SOLUTION RESPIRATORY (INHALATION) at 07:50

## 2024-07-06 RX ADMIN — LAMOTRIGINE 25 MG: 25 TABLET ORAL at 09:02

## 2024-07-06 RX ADMIN — PANTOPRAZOLE SODIUM 40 MG: 40 TABLET, DELAYED RELEASE ORAL at 05:48

## 2024-07-06 RX ADMIN — BUDESONIDE 500 MCG: 0.5 INHALANT RESPIRATORY (INHALATION) at 07:50

## 2024-07-06 RX ADMIN — SODIUM CHLORIDE, PRESERVATIVE FREE 10 ML: 5 INJECTION INTRAVENOUS at 09:02

## 2024-07-06 RX ADMIN — ASPIRIN 81 MG: 81 TABLET, CHEWABLE ORAL at 09:02

## 2024-07-06 RX ADMIN — SENNOSIDES 8.6 MG: 8.6 TABLET, FILM COATED ORAL at 09:02

## 2024-07-06 RX ADMIN — FLUTICASONE PROPIONATE 2 SPRAY: 50 SPRAY, METERED NASAL at 09:02

## 2024-07-06 ASSESSMENT — PAIN SCALES - GENERAL
PAINLEVEL_OUTOF10: 0

## 2024-07-06 NOTE — PLAN OF CARE
Problem: Discharge Planning  Goal: Discharge to home or other facility with appropriate resources  Outcome: Progressing     Problem: Pain  Goal: Verbalizes/displays adequate comfort level or baseline comfort level  Outcome: Progressing     Problem: Safety - Adult  Goal: Free from fall injury  Outcome: Progressing     Problem: ABCDS Injury Assessment  Goal: Absence of physical injury  Outcome: Progressing     Problem: Respiratory - Adult  Goal: Achieves optimal ventilation and oxygenation  7/6/2024 1217 by Galo Bethea RN  Outcome: Progressing     Problem: Skin/Tissue Integrity  Goal: Absence of new skin breakdown  Description: 1.  Monitor for areas of redness and/or skin breakdown  2.  Assess vascular access sites hourly  3.  Every 4-6 hours minimum:  Change oxygen saturation probe site  4.  Every 4-6 hours:  If on nasal continuous positive airway pressure, respiratory therapy assess nares and determine need for appliance change or resting period.  Outcome: Progressing

## 2024-07-06 NOTE — CARE COORDINATION
Transition of Care Plan:     RUR: 26% (High RUR)  Prior Level of Functioning: Needs assistance   Disposition: Home with home health   If SNF or IPR: Date FOC offered: na  Date FOC received:   Accepting facility:   Date authorization started with reference number:   Date authorization received and expires:   Follow up appointments: pcp/specialist   DME needed: Has a cane and a walker; no other needs  Transportation at discharge: Stretcher transport need is anticipated  IM/IMM Medicare/ letter given: 2nd IM upon  discharge  Is patient a  and connected with VA? na              If yes, was  transfer form completed and VA notified? na  Caregiver Contact: ,Pipo Keyes Layla(Child) [phone]    Discharge Caregiver contacted prior to discharge? contacted  Care Conference needed? no  Barriers to discharge: medical clearance

## 2024-07-06 NOTE — DISCHARGE INSTRUCTIONS
All of your medications from before your hospitalization are the same EXCEPT:  Your new prescription for you to start is senna for constipation.  Please take all of your medications as prescribed. If prescribed any medications, please read all pharmacy instructions and inserts. Inform your doctor and pharmacist about all other medications and alternative therapies.  Please follow up with your PCP in 1-2 weeks to be reassessed after your hospital stay.  Please also follow up with nephrology.  If you start feeling any symptoms similar to what brought you into the hospital, please come back to the ED to be re-evaluated.

## 2024-07-06 NOTE — PROGRESS NOTES
End of Shift Note    Bedside shift change report given to Jelly SHELLEY  (oncoming nurse) by Carlyle Basilio RN (offgoing nurse).  Report included the following information SBAR, MAR, and Cardiac Rhythm sinus rhythm to sinus tachy.    Shift worked:  7p-7.30a     Shift summary and any significant changes:     Uneventful       Concerns for physician to address:       Zone phone for oncoming shift:          Activity:     Number times ambulated in hallways past shift: 0  Number of times OOB to chair past shift: 0    Cardiac:   Cardiac Monitoring: Yes           Access:  Current line(s): PIV and HD access     Genitourinary:   Urinary status: oliguric and anuric    Respiratory:      Chronic home O2 use?: NO  Incentive spirometer at bedside: NO       GI:     Current diet:  ADULT DIET; Regular; Low Fat/Low Chol/High Fiber/2 gm Na; Low Potassium (Less than 3000 mg/day); Low Phosphorus (Less than 1000 mg)  ADULT ORAL NUTRITION SUPPLEMENT; Breakfast, Lunch, Dinner; Renal Oral Supplement  Passing flatus: YES  Tolerating current diet: YES       Pain Management:   Patient states pain is manageable on current regimen: YES    Skin:     Interventions: turn team, float heels, increase time out of bed, PT/OT consult, limit briefs, and nutritional support    Patient Safety:  Fall Score:    Interventions: bed/chair alarm, assistive device (walker, cane. etc), gripper socks, and pt to call before getting OOB       Length of Stay:  Expected LOS: 4  Actual LOS: 2      Carlyle Basilio RN

## 2024-07-06 NOTE — PLAN OF CARE
Problem: Discharge Planning  Goal: Discharge to home or other facility with appropriate resources  7/5/2024 2045 by Carlyle Basilio RN  Outcome: Progressing  7/5/2024 1214 by Galo Bethea RN  Outcome: Progressing     Problem: Pain  Goal: Verbalizes/displays adequate comfort level or baseline comfort level  7/5/2024 2045 by Carlyle Basilio RN  Outcome: Progressing  7/5/2024 1214 by Galo Bethea RN  Outcome: Progressing     Problem: Safety - Adult  Goal: Free from fall injury  7/5/2024 2045 by Carlyle Basilio RN  Outcome: Progressing  7/5/2024 1214 by Galo Bethea RN  Outcome: Progressing  Flowsheets (Taken 7/5/2024 1214)  Free From Fall Injury:   Instruct family/caregiver on patient safety   Based on caregiver fall risk screen, instruct family/caregiver to ask for assistance with transferring infant if caregiver noted to have fall risk factors     Problem: ABCDS Injury Assessment  Goal: Absence of physical injury  7/5/2024 2045 by Carlyle Basilio RN  Outcome: Progressing  7/5/2024 1214 by Galo Bethea RN  Outcome: Progressing     Problem: Respiratory - Adult  Goal: Achieves optimal ventilation and oxygenation  7/5/2024 2045 by Carlyle Basilio RN  Outcome: Progressing  7/5/2024 1400 by Yelena Chavez, RT  Outcome: Progressing     Problem: Skin/Tissue Integrity  Goal: Absence of new skin breakdown  Description: 1.  Monitor for areas of redness and/or skin breakdown  2.  Assess vascular access sites hourly  3.  Every 4-6 hours minimum:  Change oxygen saturation probe site  4.  Every 4-6 hours:  If on nasal continuous positive airway pressure, respiratory therapy assess nares and determine need for appliance change or resting period.  7/5/2024 2045 by Carlyle Basilio RN  Outcome: Progressing  7/5/2024 1214 by Galo Bethea RN  Outcome: Progressing     P

## 2024-07-06 NOTE — DISCHARGE SUMMARY
as: MUCINEX  Take 1 tablet by mouth 2 times daily     isosorbide mononitrate 30 MG extended release tablet  Commonly known as: IMDUR  Take 1 tablet by mouth daily     lamoTRIgine 25 MG tablet  Commonly known as: LaMICtal  Take 1 tablet by mouth 2 times daily     lidocaine 4 % cream  Commonly known as: LMX  Apply topically as needed.     losartan 50 MG tablet  Commonly known as: Cozaar  Take 1 tablet by mouth daily     metoprolol succinate 25 MG extended release tablet  Commonly known as: TOPROL XL  Take 1 tablet by mouth daily     NIFEdipine 30 MG extended release tablet  Commonly known as: PROCARDIA XL  Take 1 tablet by mouth daily     oxyBUTYnin 10 MG extended release tablet  Commonly known as: DITROPAN-XL  Take 1 tablet by mouth nightly     pantoprazole 40 MG tablet  Commonly known as: PROTONIX  Take 1 tablet by mouth every morning (before breakfast)     polyethylene glycol 17 g packet  Commonly known as: GLYCOLAX  Take 1 packet by mouth daily as needed for Constipation     pravastatin 20 MG tablet  Commonly known as: PRAVACHOL  Take 1 tablet by mouth nightly     senna 8.6 MG Tabs tablet  Commonly known as: SENOKOT  Take 1 tablet by mouth daily     vitamin C 500 MG tablet  Commonly known as: ASCORBIC ACID  Take 1 tablet by mouth daily     vitamin D3 25 MCG (1000 UT) Tabs tablet  Commonly known as: CHOLECALCIFEROL  Take 1 tablet by mouth daily           * This list has 2 medication(s) that are the same as other medications prescribed for you. Read the directions carefully, and ask your doctor or other care provider to review them with you.                STOP taking these medications      clotrimazole-betamethasone 1-0.05 % cream  Commonly known as: LOTRISONE     epoetin carrie-epbx 25768 UNIT/ML Soln injection  Commonly known as: RETACRIT     loratadine 10 MG tablet  Commonly known as: CLARITIN     tiZANidine 2 MG capsule  Commonly known as: ZANAFLEX            ASK your doctor about these medications

## 2024-07-06 NOTE — CARE COORDINATION
Pt is cleared for d/c from a CM standpoint.       07/06/24 4219   Services At/After Discharge   Transition of Care Consult (CM Consult) Discharge Planning   Services At/After Discharge Home Health  ([facility])   Mode of Transport at Discharge Other (see comment)  (daughter)   Condition of Participation: Discharge Planning   The Patient and/or Patient Representative was provided with a Choice of Provider? Patient   The Patient and/Or Patient Representative agree with the Discharge Plan? Yes   Freedom of Choice list was provided with basic dialogue that supports the patient's individualized plan of care/goals, treatment preferences, and shares the quality data associated with the providers?  Yes     CM acknowledged d/c order.  CM met with pt at bedside to discuss.  2IMM notice and  notice explained and copy placed on bedside chart.  Pt's daughter at bedside and ready to transport pt home.  CM attempted to call [facility] at [phone]  but received voice mail.  BEN order sent.     Iona Gill LMSW  Supervisee in Social Work  Care Management, [facility]  [phone]

## 2024-07-06 NOTE — PROGRESS NOTES
Patient discharged home. Discharge instructions reviewed with patient and daughter. All questions answered. PIV removed intact. Dialysis cath remained in place.  All belongings with patient.  Patient transported to front entrance by writer for transport home.

## 2024-07-07 LAB
EKG ATRIAL RATE: 44 BPM
EKG DIAGNOSIS: NORMAL
EKG Q-T INTERVAL: 482 MS
EKG QRS DURATION: 92 MS
EKG QTC CALCULATION (BAZETT): 444 MS
EKG R AXIS: -16 DEGREES
EKG T AXIS: -41 DEGREES
EKG VENTRICULAR RATE: 51 BPM

## 2024-07-17 ENCOUNTER — OFFICE VISIT (OUTPATIENT)
Facility: CLINIC | Age: 89
End: 2024-07-17
Payer: MEDICARE

## 2024-07-17 VITALS
DIASTOLIC BLOOD PRESSURE: 72 MMHG | RESPIRATION RATE: 16 BRPM | SYSTOLIC BLOOD PRESSURE: 183 MMHG | HEART RATE: 81 BPM | HEIGHT: 61 IN | BODY MASS INDEX: 21.26 KG/M2 | TEMPERATURE: 98.6 F | WEIGHT: 112.6 LBS | OXYGEN SATURATION: 95 %

## 2024-07-17 DIAGNOSIS — E11.22 TYPE 2 DIABETES MELLITUS WITH CHRONIC KIDNEY DISEASE ON CHRONIC DIALYSIS, WITHOUT LONG-TERM CURRENT USE OF INSULIN (HCC): Primary | ICD-10-CM

## 2024-07-17 DIAGNOSIS — Z09 HOSPITAL DISCHARGE FOLLOW-UP: ICD-10-CM

## 2024-07-17 DIAGNOSIS — D75.839 THROMBOCYTOSIS: ICD-10-CM

## 2024-07-17 DIAGNOSIS — Z99.2 ESRD ON PERITONEAL DIALYSIS (HCC): ICD-10-CM

## 2024-07-17 DIAGNOSIS — Z99.2 TYPE 2 DIABETES MELLITUS WITH CHRONIC KIDNEY DISEASE ON CHRONIC DIALYSIS, WITHOUT LONG-TERM CURRENT USE OF INSULIN (HCC): Primary | ICD-10-CM

## 2024-07-17 DIAGNOSIS — I10 PRIMARY HYPERTENSION: ICD-10-CM

## 2024-07-17 DIAGNOSIS — N18.6 TYPE 2 DIABETES MELLITUS WITH CHRONIC KIDNEY DISEASE ON CHRONIC DIALYSIS, WITHOUT LONG-TERM CURRENT USE OF INSULIN (HCC): Primary | ICD-10-CM

## 2024-07-17 DIAGNOSIS — N18.6 ESRD ON PERITONEAL DIALYSIS (HCC): ICD-10-CM

## 2024-07-17 PROCEDURE — 99214 OFFICE O/P EST MOD 30 MIN: CPT | Performed by: INTERNAL MEDICINE

## 2024-07-17 PROCEDURE — G8427 DOCREV CUR MEDS BY ELIG CLIN: HCPCS | Performed by: INTERNAL MEDICINE

## 2024-07-17 PROCEDURE — G8420 CALC BMI NORM PARAMETERS: HCPCS | Performed by: INTERNAL MEDICINE

## 2024-07-17 PROCEDURE — 1123F ACP DISCUSS/DSCN MKR DOCD: CPT | Performed by: INTERNAL MEDICINE

## 2024-07-17 PROCEDURE — 1111F DSCHRG MED/CURRENT MED MERGE: CPT | Performed by: INTERNAL MEDICINE

## 2024-07-17 PROCEDURE — 1036F TOBACCO NON-USER: CPT | Performed by: INTERNAL MEDICINE

## 2024-07-17 PROCEDURE — 1090F PRES/ABSN URINE INCON ASSESS: CPT | Performed by: INTERNAL MEDICINE

## 2024-07-17 RX ORDER — LACTULOSE 10 G/15ML
10 SOLUTION ORAL 2 TIMES DAILY
COMMUNITY
Start: 2024-07-02

## 2024-07-17 SDOH — ECONOMIC STABILITY: FOOD INSECURITY: WITHIN THE PAST 12 MONTHS, YOU WORRIED THAT YOUR FOOD WOULD RUN OUT BEFORE YOU GOT MONEY TO BUY MORE.: NEVER TRUE

## 2024-07-17 SDOH — ECONOMIC STABILITY: INCOME INSECURITY: HOW HARD IS IT FOR YOU TO PAY FOR THE VERY BASICS LIKE FOOD, HOUSING, MEDICAL CARE, AND HEATING?: NOT HARD AT ALL

## 2024-07-17 SDOH — ECONOMIC STABILITY: FOOD INSECURITY: WITHIN THE PAST 12 MONTHS, THE FOOD YOU BOUGHT JUST DIDN'T LAST AND YOU DIDN'T HAVE MONEY TO GET MORE.: NEVER TRUE

## 2024-07-17 ASSESSMENT — ANXIETY QUESTIONNAIRES
4. TROUBLE RELAXING: NOT AT ALL
1. FEELING NERVOUS, ANXIOUS, OR ON EDGE: NOT AT ALL
6. BECOMING EASILY ANNOYED OR IRRITABLE: NOT AT ALL
5. BEING SO RESTLESS THAT IT IS HARD TO SIT STILL: NOT AT ALL
7. FEELING AFRAID AS IF SOMETHING AWFUL MIGHT HAPPEN: NOT AT ALL
2. NOT BEING ABLE TO STOP OR CONTROL WORRYING: NOT AT ALL
3. WORRYING TOO MUCH ABOUT DIFFERENT THINGS: NOT AT ALL
GAD7 TOTAL SCORE: 0
IF YOU CHECKED OFF ANY PROBLEMS ON THIS QUESTIONNAIRE, HOW DIFFICULT HAVE THESE PROBLEMS MADE IT FOR YOU TO DO YOUR WORK, TAKE CARE OF THINGS AT HOME, OR GET ALONG WITH OTHER PEOPLE: NOT DIFFICULT AT ALL

## 2024-07-17 ASSESSMENT — PATIENT HEALTH QUESTIONNAIRE - PHQ9
SUM OF ALL RESPONSES TO PHQ9 QUESTIONS 1 & 2: 0
SUM OF ALL RESPONSES TO PHQ QUESTIONS 1-9: 0
1. LITTLE INTEREST OR PLEASURE IN DOING THINGS: NOT AT ALL
2. FEELING DOWN, DEPRESSED OR HOPELESS: NOT AT ALL
SUM OF ALL RESPONSES TO PHQ QUESTIONS 1-9: 0

## 2024-07-17 NOTE — PROGRESS NOTES
Chief Complaint   Patient presents with    Follow-Up from Hospital     \"Have you been to the ER, urgent care clinic since your last visit?  Hospitalized since your last visit?\"    YES - When: approximately 2  weeks ago.  Where and Why: [facility] aspiration.    “Have you seen or consulted any other health care providers outside of [facility] since your last visit?”    NO            Click Here for Release of Records Request  
splenectomy    UPPER GASTROINTESTINAL ENDOSCOPY N/A 8/29/2023    EGD ESOPHAGOGASTRODUODENOSCOPY, EGD BIOPSY performed by Darius Lund MD at [facility] MAIN OR    VENTRAL HERNIA REPAIR N/A 5/5/2024    PRIMARY INCARCERATED  HERNIA REPAIR performed by Bronson Mitchell MD at [facility] MAIN OR     Allergies   Allergen Reactions    Bee Pollen     Dust Mite Extract     Codeine Nausea And Vomiting and Other (See Comments)     Upset stomach. Weak.         REVIEW OF SYSTEMS:  General: negative for - chills or fever  ENT: negative for - headaches, nasal congestion or tinnitus  Respiratory: negative for - cough, hemoptysis, shortness of breath or wheezing  Cardiovascular : negative for - chest pain, edema, palpitations or shortness of breath  Gastrointestinal: negative for - abdominal pain, blood in stools, heartburn or nausea/vomiting  Genito-Urinary: no dysuria, trouble voiding, or hematuria  Musculoskeletal: negative for - gait disturbance, joint pain, joint stiffness or joint swelling  Neurological: no TIA or stroke symptoms  Hematologic: no bruises, no bleeding, no swollen glands  Integument: no lumps, mole changes, nail changes or rash  Endocrine: no malaise/lethargy or unexpected weight changes      Social History     Socioeconomic History    Marital status:      Spouse name: None    Number of children: 2    Years of education: None    Highest education level: Master's degree (e.g., MA, MS, Yasmine, MEd, MSW, MARIANN)   Tobacco Use    Smoking status: Former     Types: Cigarettes     Passive exposure: Never    Smokeless tobacco: Never    Tobacco comments:     Patient states she smoked for 1 month of her life.   Vaping Use    Vaping Use: Never used   Substance and Sexual Activity    Alcohol use: No    Drug use: No    Sexual activity: Not Currently     Partners: Male   Social History Narrative    08internal hemorrhoids 05/08diverticulosis lumpectomy radiation therapy 2004colonoscopy diverticulosis Darius Lund M.D. April 9, 2014

## 2024-07-22 ENCOUNTER — HOSPITAL ENCOUNTER (EMERGENCY)
Facility: HOSPITAL | Age: 89
Discharge: HOME | End: 2024-07-22
Attending: EMERGENCY MEDICINE
Payer: MEDICARE

## 2024-07-22 ENCOUNTER — TELEPHONE (OUTPATIENT)
Facility: CLINIC | Age: 89
End: 2024-07-22

## 2024-07-22 VITALS
HEART RATE: 60 BPM | HEIGHT: 63 IN | DIASTOLIC BLOOD PRESSURE: 67 MMHG | OXYGEN SATURATION: 94 % | SYSTOLIC BLOOD PRESSURE: 126 MMHG | WEIGHT: 130.51 LBS | TEMPERATURE: 97.5 F | RESPIRATION RATE: 18 BRPM | BODY MASS INDEX: 23.12 KG/M2

## 2024-07-22 DIAGNOSIS — T50.905A ADVERSE EFFECT OF DRUG, INITIAL ENCOUNTER: Primary | ICD-10-CM

## 2024-07-22 LAB
ALBUMIN SERPL-MCNC: 3.1 G/DL (ref 3.5–5)
ALBUMIN/GLOB SERPL: 0.6 (ref 1.1–2.2)
ALP SERPL-CCNC: 83 U/L (ref 45–117)
ALT SERPL-CCNC: 15 U/L (ref 12–78)
ANION GAP SERPL CALC-SCNC: 8 MMOL/L (ref 5–15)
APPEARANCE UR: CLEAR
AST SERPL-CCNC: 12 U/L (ref 15–37)
BACTERIA URNS QL MICRO: NEGATIVE /HPF
BASOPHILS # BLD: 0.1 K/UL (ref 0–0.1)
BASOPHILS NFR BLD: 1 % (ref 0–1)
BILIRUB SERPL-MCNC: 0.4 MG/DL (ref 0.2–1)
BILIRUB UR QL: NEGATIVE
BUN SERPL-MCNC: 58 MG/DL (ref 6–20)
BUN/CREAT SERPL: 9 (ref 12–20)
CALCIUM SERPL-MCNC: 8.6 MG/DL (ref 8.5–10.1)
CHLORIDE SERPL-SCNC: 98 MMOL/L (ref 97–108)
CO2 SERPL-SCNC: 25 MMOL/L (ref 21–32)
COLOR UR: ABNORMAL
COMMENT:: NORMAL
CREAT SERPL-MCNC: 6.17 MG/DL (ref 0.55–1.02)
DIFFERENTIAL METHOD BLD: ABNORMAL
EKG ATRIAL RATE: 55 BPM
EKG DIAGNOSIS: NORMAL
EKG P AXIS: 7 DEGREES
EKG P-R INTERVAL: 260 MS
EKG Q-T INTERVAL: 506 MS
EKG QRS DURATION: 90 MS
EKG QTC CALCULATION (BAZETT): 484 MS
EKG R AXIS: -21 DEGREES
EKG T AXIS: -64 DEGREES
EKG VENTRICULAR RATE: 55 BPM
EOSINOPHIL # BLD: 1 K/UL (ref 0–0.4)
EOSINOPHIL NFR BLD: 7 % (ref 0–7)
EPITH CASTS URNS QL MICRO: ABNORMAL /LPF
ERYTHROCYTE [DISTWIDTH] IN BLOOD BY AUTOMATED COUNT: 13.5 % (ref 11.5–14.5)
GLOBULIN SER CALC-MCNC: 5.1 G/DL (ref 2–4)
GLUCOSE SERPL-MCNC: 143 MG/DL (ref 65–100)
GLUCOSE UR STRIP.AUTO-MCNC: NEGATIVE MG/DL
HCT VFR BLD AUTO: 24.3 % (ref 35–47)
HGB BLD-MCNC: 8.1 G/DL (ref 11.5–16)
HGB UR QL STRIP: NEGATIVE
HYALINE CASTS URNS QL MICRO: ABNORMAL /LPF (ref 0–5)
IMM GRANULOCYTES # BLD AUTO: 0.1 K/UL (ref 0–0.04)
IMM GRANULOCYTES NFR BLD AUTO: 1 % (ref 0–0.5)
KETONES UR QL STRIP.AUTO: NEGATIVE MG/DL
LEUKOCYTE ESTERASE UR QL STRIP.AUTO: ABNORMAL
LYMPHOCYTES # BLD: 1.3 K/UL (ref 0.8–3.5)
LYMPHOCYTES NFR BLD: 10 % (ref 12–49)
MCH RBC QN AUTO: 28.1 PG (ref 26–34)
MCHC RBC AUTO-ENTMCNC: 33.3 G/DL (ref 30–36.5)
MCV RBC AUTO: 84.4 FL (ref 80–99)
MONOCYTES # BLD: 1 K/UL (ref 0–1)
MONOCYTES NFR BLD: 8 % (ref 5–13)
NEUTS SEG # BLD: 9.8 K/UL (ref 1.8–8)
NEUTS SEG NFR BLD: 73 % (ref 32–75)
NITRITE UR QL STRIP.AUTO: NEGATIVE
NRBC # BLD: 0 K/UL (ref 0–0.01)
NRBC BLD-RTO: 0 PER 100 WBC
PH UR STRIP: 8.5 (ref 5–8)
PLATELET # BLD AUTO: 317 K/UL (ref 150–400)
PMV BLD AUTO: 9.2 FL (ref 8.9–12.9)
POTASSIUM SERPL-SCNC: 4.2 MMOL/L (ref 3.5–5.1)
PROT SERPL-MCNC: 8.2 G/DL (ref 6.4–8.2)
PROT UR STRIP-MCNC: 100 MG/DL
RBC # BLD AUTO: 2.88 M/UL (ref 3.8–5.2)
RBC #/AREA URNS HPF: ABNORMAL /HPF (ref 0–5)
SODIUM SERPL-SCNC: 131 MMOL/L (ref 136–145)
SP GR UR REFRACTOMETRY: <1.005 (ref 1–1.03)
SPECIMEN HOLD: NORMAL
URINE CULTURE IF INDICATED: ABNORMAL
UROBILINOGEN UR QL STRIP.AUTO: 0.2 EU/DL (ref 0.2–1)
WBC # BLD AUTO: 13.2 K/UL (ref 3.6–11)
WBC URNS QL MICRO: ABNORMAL /HPF (ref 0–4)

## 2024-07-22 PROCEDURE — 80053 COMPREHEN METABOLIC PANEL: CPT

## 2024-07-22 PROCEDURE — 36415 COLL VENOUS BLD VENIPUNCTURE: CPT

## 2024-07-22 PROCEDURE — 93010 ELECTROCARDIOGRAM REPORT: CPT | Performed by: SPECIALIST

## 2024-07-22 PROCEDURE — 99284 EMERGENCY DEPT VISIT MOD MDM: CPT

## 2024-07-22 PROCEDURE — 93005 ELECTROCARDIOGRAM TRACING: CPT | Performed by: EMERGENCY MEDICINE

## 2024-07-22 PROCEDURE — 81001 URINALYSIS AUTO W/SCOPE: CPT

## 2024-07-22 PROCEDURE — 85025 COMPLETE CBC W/AUTO DIFF WBC: CPT

## 2024-07-22 ASSESSMENT — ENCOUNTER SYMPTOMS
DIARRHEA: 0
VOMITING: 0
COLOR CHANGE: 0
BACK PAIN: 0
NAUSEA: 0
ABDOMINAL PAIN: 0
SHORTNESS OF BREATH: 0
CONSTIPATION: 0

## 2024-07-22 NOTE — ED PROVIDER NOTES
follow-up provider specified.    DISCHARGE MEDICATIONS:  New Prescriptions    No medications on file     Controlled Substances Monitoring:          No data to display                (Please note that portions of this note were completed with a voice recognition program.  Efforts were made to edit the dictations but occasionally words are mis-transcribed.)    Chilo Leong MD (electronically signed)  Attending Emergency Physician           Chilo Leong MD  07/22/24 2021

## 2024-07-22 NOTE — ED TRIAGE NOTES
Patient arrives from home with a bout of dizziness and weakness.  She lives alone, grandson comes in and checks on her.    Patient got up to get a drink and felt dizzy and weak.  She is alert and oriented.    BP noted to be in the 80's systolic for EMS.    She is a Saturday, Tue. Thursday dialysis patient . Left upper chest catheter.

## 2024-07-23 RX ORDER — LOSARTAN POTASSIUM 50 MG/1
50 TABLET ORAL DAILY
Qty: 90 TABLET | Refills: 3 | Status: SHIPPED | OUTPATIENT
Start: 2024-07-23

## 2024-07-23 RX ORDER — METOPROLOL SUCCINATE 25 MG/1
25 TABLET, EXTENDED RELEASE ORAL DAILY
Qty: 90 TABLET | Refills: 3 | Status: SHIPPED | OUTPATIENT
Start: 2024-07-23

## 2024-07-30 NOTE — PERIOP NOTE
CALLED PATIENT THIS MORNING TO DO PAT PHONECALL, SHE WAS GETTING READY TO WALK OUT THE DOOR FOR HER DIALYSIS, WANTS A CALL BACK LATER TODAY SOMETIME AFTER 3:30 PM, PATIENT STATES HER SURGERY IS 8/19/24, HOWEVER, PATIENT IS ON SURGERY SCHEDULE FOR 8/1/24, CALLED DR. BAEZ OFFICE , SURGICAL SCHEDULER IS NOT AVAILABLE, LEFT MSG WITH OFFICE STAFF AND THEY WILL SEE THAT THE SURGICAL SCHEDULER REACHES OUT TO THE PATIENT.

## 2024-07-31 ENCOUNTER — ANESTHESIA EVENT (OUTPATIENT)
Facility: HOSPITAL | Age: 89
End: 2024-07-31
Payer: MEDICARE

## 2024-08-01 ENCOUNTER — HOSPITAL ENCOUNTER (OUTPATIENT)
Facility: HOSPITAL | Age: 89
Setting detail: OUTPATIENT SURGERY
Discharge: HOME | End: 2024-08-01
Payer: MEDICARE

## 2024-08-01 ENCOUNTER — ANESTHESIA (OUTPATIENT)
Facility: HOSPITAL | Age: 89
End: 2024-08-01
Payer: MEDICARE

## 2024-08-01 VITALS
DIASTOLIC BLOOD PRESSURE: 57 MMHG | HEIGHT: 63 IN | OXYGEN SATURATION: 95 % | HEART RATE: 81 BPM | SYSTOLIC BLOOD PRESSURE: 114 MMHG | BODY MASS INDEX: 19.84 KG/M2 | TEMPERATURE: 98.2 F | RESPIRATION RATE: 19 BRPM | WEIGHT: 112 LBS

## 2024-08-01 DIAGNOSIS — Z99.2 ESRD ON PERITONEAL DIALYSIS (HCC): Primary | ICD-10-CM

## 2024-08-01 DIAGNOSIS — N18.6 ESRD ON PERITONEAL DIALYSIS (HCC): Primary | ICD-10-CM

## 2024-08-01 LAB
ANION GAP BLD CALC-SCNC: 5 MMOL/L (ref 10–20)
CA-I BLD-MCNC: 1.09 MMOL/L (ref 1.12–1.32)
CHLORIDE BLD-SCNC: 97 MMOL/L (ref 98–107)
CO2 BLD-SCNC: 31 MMOL/L (ref 21–32)
CREAT BLD-MCNC: 6.85 MG/DL (ref 0.6–1.3)
GLUCOSE BLD STRIP.AUTO-MCNC: 136 MG/DL (ref 65–117)
GLUCOSE BLD-MCNC: 96 MG/DL (ref 70–110)
POTASSIUM BLD-SCNC: 4.3 MMOL/L (ref 3.5–5.1)
SERVICE CMNT-IMP: ABNORMAL
SERVICE CMNT-IMP: ABNORMAL
SODIUM BLD-SCNC: 133 MMOL/L (ref 136–145)

## 2024-08-01 PROCEDURE — 3700000000 HC ANESTHESIA ATTENDED CARE

## 2024-08-01 PROCEDURE — 2500000003 HC RX 250 WO HCPCS

## 2024-08-01 PROCEDURE — 7100000011 HC PHASE II RECOVERY - ADDTL 15 MIN

## 2024-08-01 PROCEDURE — 3700000001 HC ADD 15 MINUTES (ANESTHESIA)

## 2024-08-01 PROCEDURE — 7100000001 HC PACU RECOVERY - ADDTL 15 MIN

## 2024-08-01 PROCEDURE — 7100000000 HC PACU RECOVERY - FIRST 15 MIN

## 2024-08-01 PROCEDURE — 80047 BASIC METABLC PNL IONIZED CA: CPT

## 2024-08-01 PROCEDURE — 3600000003 HC SURGERY LEVEL 3 BASE

## 2024-08-01 PROCEDURE — 3600000013 HC SURGERY LEVEL 3 ADDTL 15MIN

## 2024-08-01 PROCEDURE — 6360000002 HC RX W HCPCS

## 2024-08-01 PROCEDURE — 2580000003 HC RX 258

## 2024-08-01 PROCEDURE — 7100000010 HC PHASE II RECOVERY - FIRST 15 MIN

## 2024-08-01 PROCEDURE — 2709999900 HC NON-CHARGEABLE SUPPLY

## 2024-08-01 PROCEDURE — 82962 GLUCOSE BLOOD TEST: CPT

## 2024-08-01 RX ORDER — ROCURONIUM BROMIDE 10 MG/ML
INJECTION, SOLUTION INTRAVENOUS PRN
Status: DISCONTINUED | OUTPATIENT
Start: 2024-08-01 | End: 2024-08-01 | Stop reason: SDUPTHER

## 2024-08-01 RX ORDER — CEFAZOLIN SODIUM 1 G/3ML
INJECTION, POWDER, FOR SOLUTION INTRAMUSCULAR; INTRAVENOUS PRN
Status: DISCONTINUED | OUTPATIENT
Start: 2024-08-01 | End: 2024-08-01 | Stop reason: SDUPTHER

## 2024-08-01 RX ORDER — OXYCODONE HYDROCHLORIDE AND ACETAMINOPHEN 5; 325 MG/1; MG/1
1 TABLET ORAL EVERY 6 HOURS PRN
Qty: 12 TABLET | Refills: 0 | Status: SHIPPED | OUTPATIENT
Start: 2024-08-01 | End: 2024-08-04

## 2024-08-01 RX ORDER — SODIUM CHLORIDE 9 MG/ML
INJECTION, SOLUTION INTRAVENOUS CONTINUOUS PRN
Status: DISCONTINUED | OUTPATIENT
Start: 2024-08-01 | End: 2024-08-01 | Stop reason: SDUPTHER

## 2024-08-01 RX ORDER — PROCHLORPERAZINE EDISYLATE 5 MG/ML
5 INJECTION INTRAMUSCULAR; INTRAVENOUS
Status: DISCONTINUED | OUTPATIENT
Start: 2024-08-01 | End: 2024-08-01 | Stop reason: HOSPADM

## 2024-08-01 RX ORDER — HYDROMORPHONE HYDROCHLORIDE 1 MG/ML
0.5 INJECTION, SOLUTION INTRAMUSCULAR; INTRAVENOUS; SUBCUTANEOUS EVERY 5 MIN PRN
Status: DISCONTINUED | OUTPATIENT
Start: 2024-08-01 | End: 2024-08-01 | Stop reason: HOSPADM

## 2024-08-01 RX ORDER — HYDRALAZINE HYDROCHLORIDE 20 MG/ML
10 INJECTION INTRAMUSCULAR; INTRAVENOUS ONCE
Status: DISCONTINUED | OUTPATIENT
Start: 2024-08-01 | End: 2024-08-01 | Stop reason: HOSPADM

## 2024-08-01 RX ORDER — NALOXONE HYDROCHLORIDE 0.4 MG/ML
INJECTION, SOLUTION INTRAMUSCULAR; INTRAVENOUS; SUBCUTANEOUS
Status: DISCONTINUED
Start: 2024-08-01 | End: 2024-08-01 | Stop reason: HOSPADM

## 2024-08-01 RX ORDER — OXYCODONE HYDROCHLORIDE 5 MG/1
5 TABLET ORAL
Status: DISCONTINUED | OUTPATIENT
Start: 2024-08-01 | End: 2024-08-01 | Stop reason: HOSPADM

## 2024-08-01 RX ORDER — SODIUM CHLORIDE 0.9 % (FLUSH) 0.9 %
5-40 SYRINGE (ML) INJECTION EVERY 12 HOURS SCHEDULED
Status: DISCONTINUED | OUTPATIENT
Start: 2024-08-01 | End: 2024-08-01 | Stop reason: HOSPADM

## 2024-08-01 RX ORDER — NALOXONE HYDROCHLORIDE 0.4 MG/ML
INJECTION, SOLUTION INTRAMUSCULAR; INTRAVENOUS; SUBCUTANEOUS PRN
Status: DISCONTINUED | OUTPATIENT
Start: 2024-08-01 | End: 2024-08-01 | Stop reason: HOSPADM

## 2024-08-01 RX ORDER — DEXAMETHASONE SODIUM PHOSPHATE 4 MG/ML
INJECTION, SOLUTION INTRA-ARTICULAR; INTRALESIONAL; INTRAMUSCULAR; INTRAVENOUS; SOFT TISSUE PRN
Status: DISCONTINUED | OUTPATIENT
Start: 2024-08-01 | End: 2024-08-01 | Stop reason: SDUPTHER

## 2024-08-01 RX ORDER — LIDOCAINE HYDROCHLORIDE 20 MG/ML
INJECTION, SOLUTION EPIDURAL; INFILTRATION; INTRACAUDAL; PERINEURAL PRN
Status: DISCONTINUED | OUTPATIENT
Start: 2024-08-01 | End: 2024-08-01 | Stop reason: SDUPTHER

## 2024-08-01 RX ORDER — FENTANYL CITRATE 50 UG/ML
INJECTION, SOLUTION INTRAMUSCULAR; INTRAVENOUS PRN
Status: DISCONTINUED | OUTPATIENT
Start: 2024-08-01 | End: 2024-08-01 | Stop reason: SDUPTHER

## 2024-08-01 RX ORDER — LIDOCAINE HYDROCHLORIDE 10 MG/ML
1 INJECTION, SOLUTION EPIDURAL; INFILTRATION; INTRACAUDAL; PERINEURAL
Status: DISCONTINUED | OUTPATIENT
Start: 2024-08-01 | End: 2024-08-01 | Stop reason: HOSPADM

## 2024-08-01 RX ORDER — SODIUM CHLORIDE 9 MG/ML
INJECTION, SOLUTION INTRAVENOUS PRN
Status: DISCONTINUED | OUTPATIENT
Start: 2024-08-01 | End: 2024-08-01 | Stop reason: HOSPADM

## 2024-08-01 RX ORDER — SODIUM CHLORIDE, SODIUM LACTATE, POTASSIUM CHLORIDE, CALCIUM CHLORIDE 600; 310; 30; 20 MG/100ML; MG/100ML; MG/100ML; MG/100ML
INJECTION, SOLUTION INTRAVENOUS CONTINUOUS
Status: DISCONTINUED | OUTPATIENT
Start: 2024-08-01 | End: 2024-08-01 | Stop reason: HOSPADM

## 2024-08-01 RX ORDER — FENTANYL CITRATE 50 UG/ML
25 INJECTION, SOLUTION INTRAMUSCULAR; INTRAVENOUS EVERY 5 MIN PRN
Status: DISCONTINUED | OUTPATIENT
Start: 2024-08-01 | End: 2024-08-01 | Stop reason: HOSPADM

## 2024-08-01 RX ORDER — SODIUM CHLORIDE 0.9 % (FLUSH) 0.9 %
5-40 SYRINGE (ML) INJECTION PRN
Status: DISCONTINUED | OUTPATIENT
Start: 2024-08-01 | End: 2024-08-01 | Stop reason: HOSPADM

## 2024-08-01 RX ORDER — MIDAZOLAM HYDROCHLORIDE 2 MG/2ML
2 INJECTION, SOLUTION INTRAMUSCULAR; INTRAVENOUS
Status: DISCONTINUED | OUTPATIENT
Start: 2024-08-01 | End: 2024-08-01 | Stop reason: HOSPADM

## 2024-08-01 RX ORDER — FENTANYL CITRATE 50 UG/ML
100 INJECTION, SOLUTION INTRAMUSCULAR; INTRAVENOUS
Status: DISCONTINUED | OUTPATIENT
Start: 2024-08-01 | End: 2024-08-01 | Stop reason: HOSPADM

## 2024-08-01 RX ORDER — ACETAMINOPHEN 500 MG
1000 TABLET ORAL ONCE
Status: DISCONTINUED | OUTPATIENT
Start: 2024-08-01 | End: 2024-08-01 | Stop reason: HOSPADM

## 2024-08-01 RX ORDER — BUPIVACAINE HYDROCHLORIDE AND EPINEPHRINE 5; 5 MG/ML; UG/ML
INJECTION, SOLUTION EPIDURAL; INTRACAUDAL; PERINEURAL PRN
Status: DISCONTINUED | OUTPATIENT
Start: 2024-08-01 | End: 2024-08-01 | Stop reason: HOSPADM

## 2024-08-01 RX ORDER — ONDANSETRON 2 MG/ML
4 INJECTION INTRAMUSCULAR; INTRAVENOUS
Status: DISCONTINUED | OUTPATIENT
Start: 2024-08-01 | End: 2024-08-01 | Stop reason: HOSPADM

## 2024-08-01 RX ORDER — ONDANSETRON 2 MG/ML
INJECTION INTRAMUSCULAR; INTRAVENOUS PRN
Status: DISCONTINUED | OUTPATIENT
Start: 2024-08-01 | End: 2024-08-01 | Stop reason: SDUPTHER

## 2024-08-01 RX ADMIN — PHENYLEPHRINE HYDROCHLORIDE 20 MCG/MIN: 10 INJECTION INTRAVENOUS at 09:29

## 2024-08-01 RX ADMIN — SUGAMMADEX 200 MG: 100 INJECTION, SOLUTION INTRAVENOUS at 11:20

## 2024-08-01 RX ADMIN — LIDOCAINE HYDROCHLORIDE 50 MG: 20 INJECTION, SOLUTION EPIDURAL; INFILTRATION; INTRACAUDAL; PERINEURAL at 09:28

## 2024-08-01 RX ADMIN — CEFAZOLIN 2 G: 330 INJECTION, POWDER, FOR SOLUTION INTRAMUSCULAR; INTRAVENOUS at 10:13

## 2024-08-01 RX ADMIN — ONDANSETRON 4 MG: 2 INJECTION INTRAMUSCULAR; INTRAVENOUS at 09:40

## 2024-08-01 RX ADMIN — FENTANYL CITRATE 25 MCG: 50 INJECTION, SOLUTION INTRAMUSCULAR; INTRAVENOUS at 09:29

## 2024-08-01 RX ADMIN — SODIUM CHLORIDE: 9 INJECTION, SOLUTION INTRAVENOUS at 09:28

## 2024-08-01 RX ADMIN — ROCURONIUM BROMIDE 30 MG: 10 INJECTION, SOLUTION INTRAVENOUS at 10:18

## 2024-08-01 RX ADMIN — FENTANYL CITRATE 25 MCG: 50 INJECTION, SOLUTION INTRAMUSCULAR; INTRAVENOUS at 10:30

## 2024-08-01 RX ADMIN — PROPOFOL 100 MG: 10 INJECTION, EMULSION INTRAVENOUS at 09:29

## 2024-08-01 RX ADMIN — ROCURONIUM BROMIDE 20 MG: 10 INJECTION, SOLUTION INTRAVENOUS at 09:29

## 2024-08-01 RX ADMIN — SUGAMMADEX 200 MG: 100 INJECTION, SOLUTION INTRAVENOUS at 10:40

## 2024-08-01 RX ADMIN — DEXAMETHASONE SODIUM PHOSPHATE 4 MG: 4 INJECTION, SOLUTION INTRAMUSCULAR; INTRAVENOUS at 09:40

## 2024-08-01 ASSESSMENT — PAIN - FUNCTIONAL ASSESSMENT: PAIN_FUNCTIONAL_ASSESSMENT: 0-10

## 2024-08-01 NOTE — DISCHARGE INSTRUCTIONS
Resume peritoneal dialysis when OK with PD nurse.    Follow up with Dr Hicks as needed - call [phone] for appt      ______________________________________________________________________    Anesthesia Discharge Instructions    After general anesthesia or intervenous sedation, for 24 hours or while taking prescription Narcotics:  Limit your activities  Do not drive or operate hazardous machinery  If you have not urinated within 8 hours after discharge, please contact your surgeon on call.  Do not make important personal or business decisions  Do not drink alcoholic beverages    Report the following to your surgeon:  Excessive pain, swelling, redness or odor of or around the surgical area  Temperature over 100.5 degrees  Nausea and vomiting lasting longer than 4 hours or if unable to take medication  Any signs of decreased circulation or nerve impairment to extremity:  Change in color, persistent numbness, tingling, coldness or increased pain.  Any questions

## 2024-08-01 NOTE — PERIOP NOTE
1110 Patient desatting down to 85%. Moving minimal amount of air. Placed on non-rebreather. Continued to desat down to 55%. +2 palpable pulses. Anesthesia called to bedside. BMV used to breathe for patient. Saturation improved to 100%.  1115 Patient continued to move a minimal amount of air. Naloxone administered by anesthesia.  1120 Sugammadex administered by anesthesia. Blood glucose 136.   1125 Patient breathing better RR 16 with non-rebreather on. O2 sats 100%.  1130 Patient opening eyes spontaneously. Vital signs stable.

## 2024-08-01 NOTE — BRIEF OP NOTE
Brief Postoperative Note      Patient: Heather Rich  YOB: 1935  MRN: [id number]    Date of Procedure: 8/1/2024    Pre-Op Diagnosis Codes:     * End stage renal disease (HCC) [N18.6]    Post-Op Diagnosis: Same       Procedure(s):  LAPAROSCOPIC REPOSITIONING OF PERITONEAL DIALYSIS CATHETER    Surgeon(s):  Lucas Hicks MD    Assistant:  Surgical Assistant: Maria G Burton    Anesthesia: General    Estimated Blood Loss (mL): Minimal    Complications: None    Specimens:   * No specimens in log *    Implants:  * No implants in log *      Drains:   [REMOVED] NG/OG/NJ/NE Tube Nasogastric Left nostril (Removed)       [REMOVED] Urinary Catheter 05/05/24 2 Way (Removed)       Findings:  Infection Present At Time Of Surgery (PATOS) (choose all levels that have infection present):  No infection present  Other Findings: pelvis free of adhesions    Electronically signed by Lucas Hicks MD on 8/1/2024 at 10:40 AM

## 2024-08-01 NOTE — ANESTHESIA PRE PROCEDURE
Department of Anesthesiology  Preprocedure Note       Name:  Heather Rich   Age:  89 y.o.  :  1935                                          MRN:  [id number]         Date:  2024      Surgeon: Surgeon(s):  Lucas Hicks MD    Procedure: Procedure(s):  LAPAROSCOPIC REPLACEMENT OF PERITONEAL DIALYSIS CATHETER    Medications prior to admission:   Prior to Admission medications    Medication Sig Start Date End Date Taking? Authorizing Provider   losartan (COZAAR) 50 MG tablet Take 1 tablet by mouth daily 24   Kehinde Barney MD   metoprolol succinate (TOPROL XL) 25 MG extended release tablet Take 1 tablet by mouth daily 24   Kehinde Barney MD   lactulose (CHRONULAC) 10 GM/15ML solution Take 15 mLs by mouth 2 times daily 24   Provider, MD Tegan   senna (SENOKOT) 8.6 MG TABS tablet Take 1 tablet by mouth daily 24  Rosio Echevarria MD   ondansetron (ZOFRAN-ODT) 4 MG disintegrating tablet Take 1 tablet by mouth every 8 hours as needed for Nausea or Vomiting 24   Rosio Echevarria MD   dilTIAZem (CARDIZEM CD) 240 MG extended release capsule Take 1 capsule by mouth every evening 24   Bere Lieberman APRN - CNP   oxyBUTYnin (DITROPAN-XL) 10 MG extended release tablet Take 1 tablet by mouth nightly 24   Bere Lieberman APRN - CNP   budesonide (PULMICORT) 0.5 MG/2ML nebulizer suspension USE 1 VIAL VIA NEBULIZER TWICE A DAY 24   Bere Lieberman APRN - CNP   pantoprazole (PROTONIX) 40 MG tablet Take 1 tablet by mouth every morning (before breakfast) 24   Bere Lieberman APRN - CNP   guaiFENesin (MUCINEX) 600 MG extended release tablet Take 1 tablet by mouth 2 times daily 24   Bere Lieberman APRN - CNP   fluticasone (FLONASE) 50 MCG/ACT nasal spray 2 sprays by Nasal route 2 times daily 24   Lieberman, Bere A, APRN - CNP   pravastatin (PRAVACHOL) 20 MG tablet Take 1 tablet by mouth nightly 24   Mio

## 2024-08-01 NOTE — FLOWSHEET NOTE
08/01/24 1024   Family Communication    Relationship to Patient Other (Comment)    Phone Number Brant [phone]   Family/Significant Other Update Called   Delivery Origin Nurse   Message Disposition Family present - message delivered   Update Given Yes   Family Communication   Family Update Message Procedure started

## 2024-08-01 NOTE — ANESTHESIA POSTPROCEDURE EVALUATION
Department of Anesthesiology  Postprocedure Note    Patient: Heather Rich  MRN: [id number]  YOB: 1935  Date of evaluation: 8/1/2024    Procedure Summary       Date: 08/01/24 Room / Location: [facility] MAIN OR [address] [place] [address] MAIN OR    Anesthesia Start: 0928 Anesthesia Stop: 1052    Procedure: LAPAROSCOPIC REPOSITIONING OF PERITONEAL DIALYSIS CATHETER (Abdomen) Diagnosis:       End stage renal disease (HCC)      (End stage renal disease (HCC) [N18.6])    Providers: Lucas Hicks MD Responsible Provider: Nissa Lozano DO    Anesthesia Type: General ASA Status: 3            Anesthesia Type: General    Angélica Phase I: Angélica Score: 8    Angélica Phase II: Angélica Score: 10    Anesthesia Post Evaluation    Patient location during evaluation: PACU  Patient participation: complete - patient participated  Level of consciousness: awake and alert  Pain score: 0  Airway patency: patent  Nausea & Vomiting: no nausea and no vomiting  Cardiovascular status: blood pressure returned to baseline and hemodynamically stable  Respiratory status: acceptable and room air  Hydration status: euvolemic  Multimodal analgesia pain management approach  Pain management: adequate and satisfactory to patient    No notable events documented.

## 2024-08-01 NOTE — PROGRESS NOTES
Discharge instructions reviewed with patient and family using teachback. All questions have been answered. Prescriptions sent to [facility]. Vital signs stable, pain appropriately managed. Patient wheeled off the unit with PACU staff.

## 2024-08-01 NOTE — OP NOTE
[address]  [address]                            OPERATIVE REPORT      PATIENT NAME: JOANNE NUÑEZ              : 1935  MED REC NO: [id number]                       ROOM: OR  ACCOUNT NO: [id number]                       ADMIT DATE: 2024  PROVIDER: Lucas Hicks MD    DATE OF SERVICE:  2024    PREOPERATIVE DIAGNOSES:  Poorly functioning peritoneal dialysis catheter.    POSTOPERATIVE DIAGNOSES:  Poorly functioning peritoneal dialysis catheter.    PROCEDURES PERFORMED:  Laparoscopic repositioning peritoneal dialysis catheter.    SURGEON:  Lucas Hicks MD    ASSISTANT:  Maria G.    ANESTHESIA:  General.    ESTIMATED BLOOD LOSS:  Minimal.    SPECIMENS REMOVED:  None.    INTRAOPERATIVE FINDINGS:  see below     COMPLICATIONS:  None.    IMPLANTS:  None.    INDICATIONS:  The patient is an 89-year-old female with end-stage renal disease, currently on hemodialysis.  She had previously been on peritoneal dialysis and had a PD catheter placed in .  She developed an incarcerated supraumbilical hernia in May of this year and underwent emergent repair.  Her peritoneal dialysis was discontinued at that time, though the catheter was left in place.  The catheter is now nonfunctioning when attempts were made to flush it.  She will undergo laparoscopic evaluation.    DESCRIPTION OF PROCEDURE:  The patient's abdomen and catheter were prepped and draped.  A small puncture incision was made in the left lower abdomen.  The patient had bilateral subcostal incisions previously.  An insufflation needle was inserted and a pneumoperitoneum created.  A 5 mm trocar and laparoscopic camera were inserted under direct vision into the peritoneal cavity.  The pelvis was found to be free of adhesions.  There were omental adhesions to the anterior abdominal wall in the upper abdomen.  The catheter was seen within loops of bowel in the left mid abdomen.

## 2024-08-05 RX ORDER — DILTIAZEM HYDROCHLORIDE 240 MG/1
240 CAPSULE, COATED, EXTENDED RELEASE ORAL DAILY
Qty: 90 CAPSULE | Refills: 3 | Status: SHIPPED | OUTPATIENT
Start: 2024-08-05

## 2024-08-29 RX ORDER — ISOSORBIDE MONONITRATE 30 MG/1
30 TABLET, EXTENDED RELEASE ORAL DAILY
Qty: 90 TABLET | Refills: 3 | Status: SHIPPED | OUTPATIENT
Start: 2024-08-29

## 2024-08-29 RX ORDER — ISOSORBIDE MONONITRATE 30 MG/1
30 TABLET, EXTENDED RELEASE ORAL DAILY
Qty: 10 TABLET | Refills: 0 | Status: SHIPPED | OUTPATIENT
Start: 2024-08-29

## 2024-08-29 RX ORDER — PANTOPRAZOLE SODIUM 40 MG/1
40 TABLET, DELAYED RELEASE ORAL
Qty: 10 TABLET | Refills: 0 | Status: SHIPPED | OUTPATIENT
Start: 2024-08-29

## 2024-09-03 ENCOUNTER — OFFICE VISIT (OUTPATIENT)
Facility: CLINIC | Age: 89
End: 2024-09-03
Payer: MEDICARE

## 2024-09-03 VITALS
RESPIRATION RATE: 18 BRPM | SYSTOLIC BLOOD PRESSURE: 127 MMHG | OXYGEN SATURATION: 97 % | DIASTOLIC BLOOD PRESSURE: 70 MMHG | TEMPERATURE: 97.3 F | BODY MASS INDEX: 20.59 KG/M2 | HEART RATE: 80 BPM | WEIGHT: 116.2 LBS | HEIGHT: 63 IN

## 2024-09-03 DIAGNOSIS — I10 PRIMARY HYPERTENSION: ICD-10-CM

## 2024-09-03 DIAGNOSIS — D58.0 HEREDITARY SPHEROCYTOSIS (HCC): ICD-10-CM

## 2024-09-03 DIAGNOSIS — C50.911 MALIGNANT NEOPLASM OF RIGHT FEMALE BREAST, UNSPECIFIED ESTROGEN RECEPTOR STATUS, UNSPECIFIED SITE OF BREAST (HCC): ICD-10-CM

## 2024-09-03 DIAGNOSIS — E11.22 TYPE 2 DIABETES MELLITUS WITH CHRONIC KIDNEY DISEASE ON CHRONIC DIALYSIS, WITHOUT LONG-TERM CURRENT USE OF INSULIN (HCC): Primary | ICD-10-CM

## 2024-09-03 DIAGNOSIS — D75.839 THROMBOCYTOSIS: ICD-10-CM

## 2024-09-03 DIAGNOSIS — N18.6 TYPE 2 DIABETES MELLITUS WITH CHRONIC KIDNEY DISEASE ON CHRONIC DIALYSIS, WITHOUT LONG-TERM CURRENT USE OF INSULIN (HCC): Primary | ICD-10-CM

## 2024-09-03 DIAGNOSIS — E44.1 MILD PROTEIN-CALORIE MALNUTRITION (HCC): ICD-10-CM

## 2024-09-03 DIAGNOSIS — Z99.2 TYPE 2 DIABETES MELLITUS WITH CHRONIC KIDNEY DISEASE ON CHRONIC DIALYSIS, WITHOUT LONG-TERM CURRENT USE OF INSULIN (HCC): Primary | ICD-10-CM

## 2024-09-03 PROBLEM — E46 PROTEIN CALORIE MALNUTRITION (HCC): Status: ACTIVE | Noted: 2024-09-03

## 2024-09-03 PROCEDURE — 1123F ACP DISCUSS/DSCN MKR DOCD: CPT | Performed by: INTERNAL MEDICINE

## 2024-09-03 PROCEDURE — G8427 DOCREV CUR MEDS BY ELIG CLIN: HCPCS | Performed by: INTERNAL MEDICINE

## 2024-09-03 PROCEDURE — G8420 CALC BMI NORM PARAMETERS: HCPCS | Performed by: INTERNAL MEDICINE

## 2024-09-03 PROCEDURE — 1090F PRES/ABSN URINE INCON ASSESS: CPT | Performed by: INTERNAL MEDICINE

## 2024-09-03 PROCEDURE — 1036F TOBACCO NON-USER: CPT | Performed by: INTERNAL MEDICINE

## 2024-09-03 PROCEDURE — 99214 OFFICE O/P EST MOD 30 MIN: CPT | Performed by: INTERNAL MEDICINE

## 2024-09-03 NOTE — PROGRESS NOTES
Chief Complaint   Patient presents with    Follow-up    Diabetes    Hypertension     Patient here for follow up diabetes and high blood pressure.      \"Have you been to the ER, urgent care clinic since your last visit?  Hospitalized since your last visit?\"    YES - When: approximately 2 months ago.  Where and Why: [facility].    “Have you seen or consulted any other health care providers outside of [facility] since your last visit?”    NO            Click Here for Release of Records Request    
Session: 30 min   Stress: No Stress Concern Present (10/10/2023)    [employer] - Occupational Stress Questionnaire     Feeling of Stress : Not at all   Social Connections: Moderately Isolated (10/10/2023)    Social Connection and Isolation Panel [NHANES]     Frequency of Communication with Friends and Family: More than three times a week     Frequency of Social Gatherings with Friends and Family: Twice a week     Attends [employer] Services: More than 4 times per year     Active Member of Clubs or Organizations: No     Attends Club or Organization Meetings: Never     Marital Status:    Intimate Partner Violence: Not At Risk (10/10/2023)    Humiliation, Afraid, Rape, and Kick questionnaire     Fear of Current or Ex-Partner: No     Emotionally Abused: No     Physically Abused: No     Sexually Abused: No   Housing Stability: Low Risk  (7/17/2024)    Housing Stability Vital Sign     Unable to Pay for Housing in the Last Year: No     Number of Places Lived in the Last Year: 1     Unstable Housing in the Last Year: No     Family History   Problem Relation Age of Onset    Breast Cancer Sister 69    Sleep Apnea Sister     Other Sister         spleen removed    Alzheimer's Disease Sister     Diabetes Mother     Other Other         spleen removed    Gall Bladder Disease Other     Cancer Sister         breast    Other Father         'sphero'-blood disorder    Colon Cancer Sister        OBJECTIVE:    BP (!) 167/70 (Site: Left Upper Arm, Position: Sitting, Cuff Size: Small Adult)   Pulse 80   Temp 97.3 °F (36.3 °C) (Oral)   Resp 18   Ht 1.6 m (5' 3\")   Wt 52.7 kg (116 lb 3.2 oz)   SpO2 97%   BMI 20.58 kg/m²   CONSTITUTIONAL: well , well nourished, appears age appropriate  EYES: perrla, eom intact  ENMT:moist mucous membranes, pharynx clear  NECK: supple. Thyroid normal  RESPIRATORY: Chest: clear bilaterally   CARDIOVASCULAR: Heart: regular rate and rhythm  GASTROINTESTINAL: Abdomen:

## 2024-10-16 RX ORDER — ALBUTEROL SULFATE 90 UG/1
2 INHALANT RESPIRATORY (INHALATION) EVERY 4 HOURS PRN
Qty: 3 EACH | Refills: 3 | Status: SHIPPED | OUTPATIENT
Start: 2024-10-16

## 2024-10-16 RX ORDER — NIFEDIPINE 30 MG/1
30 TABLET, EXTENDED RELEASE ORAL DAILY
Qty: 90 TABLET | Refills: 3 | Status: SHIPPED | OUTPATIENT
Start: 2024-10-16

## 2025-01-07 RX ORDER — LAMOTRIGINE 25 MG/1
25 TABLET ORAL 2 TIMES DAILY
Qty: 60 TABLET | Refills: 11 | Status: SHIPPED | OUTPATIENT
Start: 2025-01-07

## 2025-01-15 ENCOUNTER — HOSPITAL ENCOUNTER (INPATIENT)
Facility: HOSPITAL | Age: 89
LOS: 9 days | Discharge: NURSING, REHAB OR LONG-TERM CARE | End: 2025-01-24
Attending: EMERGENCY MEDICINE | Admitting: FAMILY MEDICINE
Payer: MEDICARE

## 2025-01-15 ENCOUNTER — APPOINTMENT (OUTPATIENT)
Facility: HOSPITAL | Age: 89
End: 2025-01-15
Payer: MEDICARE

## 2025-01-15 DIAGNOSIS — K56.609 SBO (SMALL BOWEL OBSTRUCTION) (HCC): ICD-10-CM

## 2025-01-15 DIAGNOSIS — E87.6 HYPOKALEMIA: ICD-10-CM

## 2025-01-15 DIAGNOSIS — K43.9 VENTRAL HERNIA WITHOUT OBSTRUCTION OR GANGRENE: ICD-10-CM

## 2025-01-15 DIAGNOSIS — R79.89 ELEVATED TROPONIN: ICD-10-CM

## 2025-01-15 DIAGNOSIS — Z99.2 ESRD NEEDING DIALYSIS (HCC): Primary | ICD-10-CM

## 2025-01-15 DIAGNOSIS — N18.6 ESRD NEEDING DIALYSIS (HCC): Primary | ICD-10-CM

## 2025-01-15 LAB
ALBUMIN SERPL-MCNC: 2.8 G/DL (ref 3.5–5)
ALBUMIN/GLOB SERPL: 0.5 (ref 1.1–2.2)
ALP SERPL-CCNC: 90 U/L (ref 45–117)
ALT SERPL-CCNC: 23 U/L (ref 12–78)
ANION GAP SERPL CALC-SCNC: 10 MMOL/L (ref 2–12)
APPEARANCE UR: CLEAR
AST SERPL-CCNC: 26 U/L (ref 15–37)
BACTERIA URNS QL MICRO: ABNORMAL /HPF
BASOPHILS # BLD: 0.07 K/UL (ref 0–0.1)
BASOPHILS NFR BLD: 0.4 % (ref 0–1)
BILIRUB SERPL-MCNC: 0.4 MG/DL (ref 0.2–1)
BILIRUB UR QL: NEGATIVE
BUN SERPL-MCNC: 39 MG/DL (ref 6–20)
BUN/CREAT SERPL: 7 (ref 12–20)
CALCIUM SERPL-MCNC: 10.1 MG/DL (ref 8.5–10.1)
CHLORIDE SERPL-SCNC: 101 MMOL/L (ref 97–108)
CO2 SERPL-SCNC: 29 MMOL/L (ref 21–32)
COLOR UR: ABNORMAL
COMMENT:: NORMAL
COMMENT:: NORMAL
CREAT SERPL-MCNC: 5.61 MG/DL (ref 0.55–1.02)
DIFFERENTIAL METHOD BLD: ABNORMAL
EKG ATRIAL RATE: 79 BPM
EKG DIAGNOSIS: NORMAL
EKG P AXIS: 78 DEGREES
EKG P-R INTERVAL: 188 MS
EKG Q-T INTERVAL: 430 MS
EKG QRS DURATION: 90 MS
EKG QTC CALCULATION (BAZETT): 493 MS
EKG R AXIS: -18 DEGREES
EKG T AXIS: 61 DEGREES
EKG VENTRICULAR RATE: 79 BPM
EOSINOPHIL # BLD: 0.02 K/UL (ref 0–0.4)
EOSINOPHIL NFR BLD: 0.1 % (ref 0–7)
EPITH CASTS URNS QL MICRO: ABNORMAL /LPF
ERYTHROCYTE [DISTWIDTH] IN BLOOD BY AUTOMATED COUNT: 13.8 % (ref 11.5–14.5)
GLOBULIN SER CALC-MCNC: 5.5 G/DL (ref 2–4)
GLUCOSE SERPL-MCNC: 127 MG/DL (ref 65–100)
GLUCOSE UR STRIP.AUTO-MCNC: NEGATIVE MG/DL
HCT VFR BLD AUTO: 32.3 % (ref 35–47)
HGB BLD-MCNC: 11.1 G/DL (ref 11.5–16)
HGB UR QL STRIP: NEGATIVE
IMM GRANULOCYTES # BLD AUTO: 0.11 K/UL (ref 0–0.04)
IMM GRANULOCYTES NFR BLD AUTO: 0.6 % (ref 0–0.5)
KETONES UR QL STRIP.AUTO: NEGATIVE MG/DL
LEUKOCYTE ESTERASE UR QL STRIP.AUTO: NEGATIVE
LIPASE SERPL-CCNC: 75 U/L (ref 13–75)
LYMPHOCYTES # BLD: 0.77 K/UL (ref 0.8–3.5)
LYMPHOCYTES NFR BLD: 4.3 % (ref 12–49)
MAGNESIUM SERPL-MCNC: 2.6 MG/DL (ref 1.6–2.4)
MCH RBC QN AUTO: 27 PG (ref 26–34)
MCHC RBC AUTO-ENTMCNC: 34.4 G/DL (ref 30–36.5)
MCV RBC AUTO: 78.6 FL (ref 80–99)
MONOCYTES # BLD: 0.67 K/UL (ref 0–1)
MONOCYTES NFR BLD: 3.7 % (ref 5–13)
NEUTS SEG # BLD: 16.36 K/UL (ref 1.8–8)
NEUTS SEG NFR BLD: 90.9 % (ref 32–75)
NITRITE UR QL STRIP.AUTO: NEGATIVE
NRBC # BLD: 0 K/UL (ref 0–0.01)
NRBC BLD-RTO: 0 PER 100 WBC
PH UR STRIP: 8.5 (ref 5–8)
PLATELET # BLD AUTO: 420 K/UL (ref 150–400)
PMV BLD AUTO: 10.2 FL (ref 8.9–12.9)
POTASSIUM SERPL-SCNC: 3 MMOL/L (ref 3.5–5.1)
PROT SERPL-MCNC: 8.3 G/DL (ref 6.4–8.2)
PROT UR STRIP-MCNC: 100 MG/DL
RBC # BLD AUTO: 4.11 M/UL (ref 3.8–5.2)
RBC #/AREA URNS HPF: ABNORMAL /HPF (ref 0–5)
RBC MORPH BLD: ABNORMAL
SODIUM SERPL-SCNC: 140 MMOL/L (ref 136–145)
SP GR UR REFRACTOMETRY: 1.01 (ref 1–1.03)
SPECIMEN HOLD: NORMAL
SPECIMEN HOLD: NORMAL
TROPONIN I SERPL HS-MCNC: 233 NG/L (ref 0–51)
TROPONIN I SERPL HS-MCNC: 246 NG/L (ref 0–51)
UROBILINOGEN UR QL STRIP.AUTO: 0.2 EU/DL (ref 0.2–1)
WBC # BLD AUTO: 18 K/UL (ref 3.6–11)
WBC MORPH BLD: ABNORMAL
WBC URNS QL MICRO: ABNORMAL /HPF (ref 0–4)

## 2025-01-15 PROCEDURE — 99221 1ST HOSP IP/OBS SF/LOW 40: CPT | Performed by: SURGERY

## 2025-01-15 PROCEDURE — 81001 URINALYSIS AUTO W/SCOPE: CPT

## 2025-01-15 PROCEDURE — 96374 THER/PROPH/DIAG INJ IV PUSH: CPT

## 2025-01-15 PROCEDURE — 85025 COMPLETE CBC W/AUTO DIFF WBC: CPT

## 2025-01-15 PROCEDURE — 6370000000 HC RX 637 (ALT 250 FOR IP): Performed by: EMERGENCY MEDICINE

## 2025-01-15 PROCEDURE — 2060000000 HC ICU INTERMEDIATE R&B

## 2025-01-15 PROCEDURE — 83690 ASSAY OF LIPASE: CPT

## 2025-01-15 PROCEDURE — 83735 ASSAY OF MAGNESIUM: CPT

## 2025-01-15 PROCEDURE — 36415 COLL VENOUS BLD VENIPUNCTURE: CPT

## 2025-01-15 PROCEDURE — 84484 ASSAY OF TROPONIN QUANT: CPT

## 2025-01-15 PROCEDURE — 6360000004 HC RX CONTRAST MEDICATION: Performed by: RADIOLOGY

## 2025-01-15 PROCEDURE — 6360000002 HC RX W HCPCS: Performed by: EMERGENCY MEDICINE

## 2025-01-15 PROCEDURE — 74177 CT ABD & PELVIS W/CONTRAST: CPT

## 2025-01-15 PROCEDURE — 99285 EMERGENCY DEPT VISIT HI MDM: CPT

## 2025-01-15 PROCEDURE — 96375 TX/PRO/DX INJ NEW DRUG ADDON: CPT

## 2025-01-15 PROCEDURE — 80053 COMPREHEN METABOLIC PANEL: CPT

## 2025-01-15 PROCEDURE — 93005 ELECTROCARDIOGRAM TRACING: CPT | Performed by: EMERGENCY MEDICINE

## 2025-01-15 RX ORDER — POTASSIUM CHLORIDE 7.45 MG/ML
10 INJECTION INTRAVENOUS ONCE
Status: COMPLETED | OUTPATIENT
Start: 2025-01-15 | End: 2025-01-15

## 2025-01-15 RX ORDER — SODIUM CHLORIDE 9 MG/ML
INJECTION, SOLUTION INTRAVENOUS PRN
Status: DISCONTINUED | OUTPATIENT
Start: 2025-01-15 | End: 2025-01-24 | Stop reason: HOSPADM

## 2025-01-15 RX ORDER — ACETAMINOPHEN 650 MG/1
650 SUPPOSITORY RECTAL EVERY 6 HOURS PRN
Status: DISCONTINUED | OUTPATIENT
Start: 2025-01-15 | End: 2025-01-16

## 2025-01-15 RX ORDER — ASPIRIN 325 MG
325 TABLET ORAL
Status: COMPLETED | OUTPATIENT
Start: 2025-01-15 | End: 2025-01-15

## 2025-01-15 RX ORDER — ACETAMINOPHEN 325 MG/1
650 TABLET ORAL EVERY 6 HOURS PRN
Status: DISCONTINUED | OUTPATIENT
Start: 2025-01-15 | End: 2025-01-16

## 2025-01-15 RX ORDER — SODIUM CHLORIDE 0.9 % (FLUSH) 0.9 %
5-40 SYRINGE (ML) INJECTION PRN
Status: DISCONTINUED | OUTPATIENT
Start: 2025-01-15 | End: 2025-01-24 | Stop reason: HOSPADM

## 2025-01-15 RX ORDER — IOPAMIDOL 755 MG/ML
100 INJECTION, SOLUTION INTRAVASCULAR
Status: COMPLETED | OUTPATIENT
Start: 2025-01-15 | End: 2025-01-15

## 2025-01-15 RX ORDER — ONDANSETRON 2 MG/ML
4 INJECTION INTRAMUSCULAR; INTRAVENOUS EVERY 6 HOURS PRN
Status: DISCONTINUED | OUTPATIENT
Start: 2025-01-15 | End: 2025-01-16

## 2025-01-15 RX ORDER — ONDANSETRON 4 MG/1
4 TABLET, ORALLY DISINTEGRATING ORAL EVERY 8 HOURS PRN
Status: DISCONTINUED | OUTPATIENT
Start: 2025-01-15 | End: 2025-01-16

## 2025-01-15 RX ORDER — MORPHINE SULFATE 2 MG/ML
2 INJECTION, SOLUTION INTRAMUSCULAR; INTRAVENOUS ONCE
Status: COMPLETED | OUTPATIENT
Start: 2025-01-15 | End: 2025-01-15

## 2025-01-15 RX ORDER — SODIUM CHLORIDE 0.9 % (FLUSH) 0.9 %
5-40 SYRINGE (ML) INJECTION EVERY 12 HOURS SCHEDULED
Status: DISCONTINUED | OUTPATIENT
Start: 2025-01-15 | End: 2025-01-24 | Stop reason: HOSPADM

## 2025-01-15 RX ORDER — POLYETHYLENE GLYCOL 3350 17 G/17G
17 POWDER, FOR SOLUTION ORAL DAILY PRN
Status: DISCONTINUED | OUTPATIENT
Start: 2025-01-15 | End: 2025-01-24 | Stop reason: HOSPADM

## 2025-01-15 RX ORDER — ONDANSETRON 2 MG/ML
4 INJECTION INTRAMUSCULAR; INTRAVENOUS ONCE
Status: COMPLETED | OUTPATIENT
Start: 2025-01-15 | End: 2025-01-15

## 2025-01-15 RX ADMIN — POTASSIUM CHLORIDE 10 MEQ: 7.45 INJECTION INTRAVENOUS at 20:31

## 2025-01-15 RX ADMIN — MORPHINE SULFATE 2 MG: 2 INJECTION, SOLUTION INTRAMUSCULAR; INTRAVENOUS at 17:27

## 2025-01-15 RX ADMIN — ASPIRIN 325 MG: 325 TABLET ORAL at 20:31

## 2025-01-15 RX ADMIN — ONDANSETRON 4 MG: 2 INJECTION INTRAMUSCULAR; INTRAVENOUS at 17:27

## 2025-01-15 RX ADMIN — IOPAMIDOL 100 ML: 755 INJECTION, SOLUTION INTRAVENOUS at 17:58

## 2025-01-15 ASSESSMENT — PAIN DESCRIPTION - ORIENTATION: ORIENTATION: MID

## 2025-01-15 ASSESSMENT — PAIN SCALES - GENERAL: PAINLEVEL_OUTOF10: 3

## 2025-01-15 ASSESSMENT — PAIN - FUNCTIONAL ASSESSMENT
PAIN_FUNCTIONAL_ASSESSMENT: 0-10
PAIN_FUNCTIONAL_ASSESSMENT: PREVENTS OR INTERFERES SOME ACTIVE ACTIVITIES AND ADLS

## 2025-01-15 ASSESSMENT — PAIN DESCRIPTION - LOCATION: LOCATION: ABDOMEN

## 2025-01-15 ASSESSMENT — PAIN DESCRIPTION - DESCRIPTORS: DESCRIPTORS: OTHER (COMMENT)

## 2025-01-15 NOTE — ED TRIAGE NOTES
Pt c/o mid abd pain x 2 days, fever of 99, +n/v, +constipation, last bm yesterday, denies urinary symptoms , hx of abd surgery - thinks maybe she has another hernia , pt appears uncomfortable

## 2025-01-15 NOTE — ED PROVIDER NOTES
[facility] EMERGENCY DEPARTMENT  EMERGENCY DEPARTMENT ENCOUNTER      Pt Name: Heather Rich  MRN: [id number]  Birthdate 1935  Date of evaluation: 1/15/2025  Provider: Sage Tang MD    CHIEF COMPLAINT       Chief Complaint   Patient presents with    Abdominal Pain         HISTORY OF PRESENT ILLNESS   (Location/Symptom, Timing/Onset, Context/Setting, Quality, Duration, Modifying Factors, Severity)  Note limiting factors.   89F w/ hx HTN, ESRD on PD, DM, anemia p/w 2d abd pain. Pt reports 1-2d mid and lower abd pain. Reports subjective F/C w/ N/V. Last BM yesterday. No urinary symptoms. No CP/SOB. Multiple previous abd surgeries including for hernia and cholecystectomy. Also prior SBO.            Review of External Medical Records:     Nursing Notes were reviewed.    REVIEW OF SYSTEMS    (2-9 systems for level 4, 10 or more for level 5)     Review of Systems   Constitutional:  Negative for diaphoresis and fever.   HENT:  Negative for nosebleeds.    Eyes:  Negative for visual disturbance.   Respiratory:  Negative for cough and shortness of breath.    Cardiovascular:  Negative for chest pain, palpitations and leg swelling.   Gastrointestinal:  Positive for abdominal pain, nausea and vomiting. Negative for abdominal distention, anal bleeding, blood in stool and diarrhea.   Endocrine: Negative for polyuria.   Genitourinary:  Negative for difficulty urinating, dysuria, frequency and hematuria.   Musculoskeletal:  Negative for joint swelling.   Skin:  Negative for wound.   Allergic/Immunologic: Negative for immunocompromised state.   Neurological:  Negative for seizures and syncope.   Hematological:  Does not bruise/bleed easily.   Psychiatric/Behavioral:  Negative for confusion.        Except as noted above the remainder of the review of systems was reviewed and negative.       PAST MEDICAL HISTORY     Past Medical History:   Diagnosis Date    Anemia     Arrhythmia     irregular per pt d/t blood disorder     Level of Care: telemetry  Readmission: No    Other:              Sage Tang MD  01/15/25 2013

## 2025-01-15 NOTE — ED NOTES
1:56 PM    Nausea since yesterday  I have evaluated the patient as the Provider in Rapid Medical Evaluation (RME). I have reviewed her vital signs and the triage nurse assessment. I have talked with the patient and any available family and advised that I am the provider in triage and have ordered the appropriate study to initiate their work up based on the clinical presentation during my assessment. I have advised that the patient will be accommodated in the Main ED as soon as possible. I have also requested to contact the triage nurse or myself immediately if the patient experiences any changes in their condition during this brief waiting period.  DEVANG Cruz Heidi J, PA-C  01/18/25 0603

## 2025-01-16 ENCOUNTER — PREP FOR PROCEDURE (OUTPATIENT)
Age: 89
End: 2025-01-16

## 2025-01-16 ENCOUNTER — APPOINTMENT (OUTPATIENT)
Facility: HOSPITAL | Age: 89
End: 2025-01-16
Attending: INTERNAL MEDICINE
Payer: MEDICARE

## 2025-01-16 PROBLEM — N18.6 ESRD NEEDING DIALYSIS (HCC): Status: ACTIVE | Noted: 2025-01-16

## 2025-01-16 PROBLEM — K21.9 GASTROESOPHAGEAL REFLUX DISEASE WITHOUT ESOPHAGITIS: Status: ACTIVE | Noted: 2025-01-16

## 2025-01-16 PROBLEM — K43.9 VENTRAL HERNIA: Status: ACTIVE | Noted: 2025-01-16

## 2025-01-16 PROBLEM — R65.10 SIRS (SYSTEMIC INFLAMMATORY RESPONSE SYNDROME) (HCC): Status: ACTIVE | Noted: 2025-01-16

## 2025-01-16 PROBLEM — Z99.2 ESRD NEEDING DIALYSIS (HCC): Status: ACTIVE | Noted: 2025-01-16

## 2025-01-16 PROBLEM — R79.89 ELEVATED TROPONIN: Status: ACTIVE | Noted: 2025-01-16

## 2025-01-16 PROBLEM — R56.9 SEIZURE (HCC): Status: ACTIVE | Noted: 2025-01-16

## 2025-01-16 LAB
ALBUMIN SERPL-MCNC: 2.1 G/DL (ref 3.5–5)
ANION GAP SERPL CALC-SCNC: 6 MMOL/L (ref 2–12)
BASOPHILS # BLD: 0.08 K/UL (ref 0–0.1)
BASOPHILS NFR BLD: 0.5 % (ref 0–1)
BUN SERPL-MCNC: 44 MG/DL (ref 6–20)
BUN/CREAT SERPL: 8 (ref 12–20)
CALCIUM SERPL-MCNC: 8.4 MG/DL (ref 8.5–10.1)
CHLORIDE SERPL-SCNC: 106 MMOL/L (ref 97–108)
CO2 SERPL-SCNC: 27 MMOL/L (ref 21–32)
CREAT SERPL-MCNC: 5.43 MG/DL (ref 0.55–1.02)
DIFFERENTIAL METHOD BLD: ABNORMAL
EOSINOPHIL # BLD: 0.39 K/UL (ref 0–0.4)
EOSINOPHIL NFR BLD: 2.6 % (ref 0–7)
ERYTHROCYTE [DISTWIDTH] IN BLOOD BY AUTOMATED COUNT: 14.1 % (ref 11.5–14.5)
GLUCOSE SERPL-MCNC: 87 MG/DL (ref 65–100)
HCT VFR BLD AUTO: 27.2 % (ref 35–47)
HGB BLD-MCNC: 9.1 G/DL (ref 11.5–16)
IMM GRANULOCYTES # BLD AUTO: 0.08 K/UL (ref 0–0.04)
IMM GRANULOCYTES NFR BLD AUTO: 0.5 % (ref 0–0.5)
LACTATE SERPL-SCNC: 0.8 MMOL/L (ref 0.4–2)
LYMPHOCYTES # BLD: 0.9 K/UL (ref 0.8–3.5)
LYMPHOCYTES NFR BLD: 6 % (ref 12–49)
MCH RBC QN AUTO: 26.9 PG (ref 26–34)
MCHC RBC AUTO-ENTMCNC: 33.5 G/DL (ref 30–36.5)
MCV RBC AUTO: 80.5 FL (ref 80–99)
MONOCYTES # BLD: 1.48 K/UL (ref 0–1)
MONOCYTES NFR BLD: 9.9 % (ref 5–13)
NEUTS SEG # BLD: 12.04 K/UL (ref 1.8–8)
NEUTS SEG NFR BLD: 80.5 % (ref 32–75)
NRBC # BLD: 0 K/UL (ref 0–0.01)
NRBC BLD-RTO: 0 PER 100 WBC
PHOSPHATE SERPL-MCNC: 4.6 MG/DL (ref 2.6–4.7)
PLATELET # BLD AUTO: 371 K/UL (ref 150–400)
PMV BLD AUTO: 9.4 FL (ref 8.9–12.9)
POTASSIUM SERPL-SCNC: 5 MMOL/L (ref 3.5–5.1)
PROCALCITONIN SERPL-MCNC: 0.06 NG/ML
RBC # BLD AUTO: 3.38 M/UL (ref 3.8–5.2)
SODIUM SERPL-SCNC: 139 MMOL/L (ref 136–145)
TROPONIN I SERPL HS-MCNC: 282 NG/L (ref 0–51)
WBC # BLD AUTO: 15 K/UL (ref 3.6–11)

## 2025-01-16 PROCEDURE — 2709999900 IR NONTUNNELED VASCULAR CATHETER > 5 YEARS

## 2025-01-16 PROCEDURE — 5A1D70Z PERFORMANCE OF URINARY FILTRATION, INTERMITTENT, LESS THAN 6 HOURS PER DAY: ICD-10-PCS | Performed by: INTERNAL MEDICINE

## 2025-01-16 PROCEDURE — 99231 SBSQ HOSP IP/OBS SF/LOW 25: CPT | Performed by: SURGERY

## 2025-01-16 PROCEDURE — 2500000003 HC RX 250 WO HCPCS: Performed by: FAMILY MEDICINE

## 2025-01-16 PROCEDURE — 6370000000 HC RX 637 (ALT 250 FOR IP): Performed by: SURGERY

## 2025-01-16 PROCEDURE — 6370000000 HC RX 637 (ALT 250 FOR IP): Performed by: FAMILY MEDICINE

## 2025-01-16 PROCEDURE — 84484 ASSAY OF TROPONIN QUANT: CPT

## 2025-01-16 PROCEDURE — 83605 ASSAY OF LACTIC ACID: CPT

## 2025-01-16 PROCEDURE — 80069 RENAL FUNCTION PANEL: CPT

## 2025-01-16 PROCEDURE — 6360000002 HC RX W HCPCS: Performed by: FAMILY MEDICINE

## 2025-01-16 PROCEDURE — 6360000002 HC RX W HCPCS: Performed by: PHYSICIAN ASSISTANT

## 2025-01-16 PROCEDURE — 36415 COLL VENOUS BLD VENIPUNCTURE: CPT

## 2025-01-16 PROCEDURE — 1100000000 HC RM PRIVATE

## 2025-01-16 PROCEDURE — 02H633Z INSERTION OF INFUSION DEVICE INTO RIGHT ATRIUM, PERCUTANEOUS APPROACH: ICD-10-PCS | Performed by: INTERNAL MEDICINE

## 2025-01-16 PROCEDURE — 99232 SBSQ HOSP IP/OBS MODERATE 35: CPT | Performed by: INTERNAL MEDICINE

## 2025-01-16 PROCEDURE — 76937 US GUIDE VASCULAR ACCESS: CPT

## 2025-01-16 PROCEDURE — 87040 BLOOD CULTURE FOR BACTERIA: CPT

## 2025-01-16 PROCEDURE — 6370000000 HC RX 637 (ALT 250 FOR IP): Performed by: INTERNAL MEDICINE

## 2025-01-16 PROCEDURE — 84145 PROCALCITONIN (PCT): CPT

## 2025-01-16 PROCEDURE — 3E1M39Z IRRIGATION OF PERITONEAL CAVITY USING DIALYSATE, PERCUTANEOUS APPROACH: ICD-10-PCS | Performed by: INTERNAL MEDICINE

## 2025-01-16 PROCEDURE — 85025 COMPLETE CBC W/AUTO DIFF WBC: CPT

## 2025-01-16 RX ORDER — LIDOCAINE HYDROCHLORIDE 10 MG/ML
INJECTION, SOLUTION EPIDURAL; INFILTRATION; INTRACAUDAL; PERINEURAL PRN
Status: COMPLETED | OUTPATIENT
Start: 2025-01-16 | End: 2025-01-16

## 2025-01-16 RX ORDER — HEPARIN SODIUM 1000 [USP'U]/ML
INJECTION, SOLUTION INTRAVENOUS; SUBCUTANEOUS PRN
Status: COMPLETED | OUTPATIENT
Start: 2025-01-16 | End: 2025-01-16

## 2025-01-16 RX ORDER — FERROUS SULFATE 325(65) MG
325 TABLET ORAL
Status: DISCONTINUED | OUTPATIENT
Start: 2025-01-16 | End: 2025-01-24 | Stop reason: HOSPADM

## 2025-01-16 RX ORDER — METOPROLOL SUCCINATE 50 MG/1
25 TABLET, EXTENDED RELEASE ORAL DAILY
Status: DISCONTINUED | OUTPATIENT
Start: 2025-01-16 | End: 2025-01-24 | Stop reason: HOSPADM

## 2025-01-16 RX ORDER — HYDROCODONE BITARTRATE AND ACETAMINOPHEN 5; 325 MG/1; MG/1
1 TABLET ORAL EVERY 4 HOURS PRN
Status: DISCONTINUED | OUTPATIENT
Start: 2025-01-16 | End: 2025-01-20

## 2025-01-16 RX ORDER — ACETAMINOPHEN 500 MG
1000 TABLET ORAL EVERY 6 HOURS PRN
Status: DISCONTINUED | OUTPATIENT
Start: 2025-01-16 | End: 2025-01-24 | Stop reason: HOSPADM

## 2025-01-16 RX ORDER — ISOSORBIDE MONONITRATE 60 MG/1
30 TABLET, EXTENDED RELEASE ORAL DAILY
Status: DISCONTINUED | OUTPATIENT
Start: 2025-01-16 | End: 2025-01-16

## 2025-01-16 RX ORDER — ISOSORBIDE MONONITRATE 30 MG/1
30 TABLET, EXTENDED RELEASE ORAL DAILY
Status: DISCONTINUED | OUTPATIENT
Start: 2025-01-16 | End: 2025-01-24 | Stop reason: HOSPADM

## 2025-01-16 RX ORDER — PRAVASTATIN SODIUM 20 MG
20 TABLET ORAL NIGHTLY
Status: DISCONTINUED | OUTPATIENT
Start: 2025-01-16 | End: 2025-01-24 | Stop reason: HOSPADM

## 2025-01-16 RX ORDER — LOSARTAN POTASSIUM 50 MG/1
50 TABLET ORAL DAILY
Status: DISCONTINUED | OUTPATIENT
Start: 2025-01-16 | End: 2025-01-21

## 2025-01-16 RX ORDER — METOPROLOL SUCCINATE 25 MG/1
25 TABLET, EXTENDED RELEASE ORAL DAILY
Status: DISCONTINUED | OUTPATIENT
Start: 2025-01-16 | End: 2025-01-16

## 2025-01-16 RX ORDER — LOSARTAN POTASSIUM 50 MG/1
50 TABLET ORAL DAILY
Status: DISCONTINUED | OUTPATIENT
Start: 2025-01-16 | End: 2025-01-16

## 2025-01-16 RX ORDER — NIFEDIPINE 30 MG/1
30 TABLET, EXTENDED RELEASE ORAL DAILY
Status: DISCONTINUED | OUTPATIENT
Start: 2025-01-16 | End: 2025-01-24 | Stop reason: HOSPADM

## 2025-01-16 RX ORDER — METRONIDAZOLE 500 MG/100ML
500 INJECTION, SOLUTION INTRAVENOUS EVERY 12 HOURS
Status: DISCONTINUED | OUTPATIENT
Start: 2025-01-16 | End: 2025-01-20

## 2025-01-16 RX ORDER — ONDANSETRON 4 MG/1
4 TABLET, ORALLY DISINTEGRATING ORAL EVERY 6 HOURS PRN
Status: DISCONTINUED | OUTPATIENT
Start: 2025-01-16 | End: 2025-01-24 | Stop reason: HOSPADM

## 2025-01-16 RX ORDER — PANTOPRAZOLE SODIUM 40 MG/1
40 TABLET, DELAYED RELEASE ORAL
Status: DISCONTINUED | OUTPATIENT
Start: 2025-01-16 | End: 2025-01-24 | Stop reason: HOSPADM

## 2025-01-16 RX ORDER — LAMOTRIGINE 25 MG/1
25 TABLET ORAL 2 TIMES DAILY
Status: DISCONTINUED | OUTPATIENT
Start: 2025-01-16 | End: 2025-01-24 | Stop reason: HOSPADM

## 2025-01-16 RX ADMIN — NIFEDIPINE 30 MG: 30 TABLET, FILM COATED, EXTENDED RELEASE ORAL at 10:56

## 2025-01-16 RX ADMIN — SODIUM CHLORIDE, PRESERVATIVE FREE 10 ML: 5 INJECTION INTRAVENOUS at 21:04

## 2025-01-16 RX ADMIN — SODIUM CHLORIDE, PRESERVATIVE FREE 10 ML: 5 INJECTION INTRAVENOUS at 10:55

## 2025-01-16 RX ADMIN — PRAVASTATIN SODIUM 20 MG: 40 TABLET ORAL at 20:46

## 2025-01-16 RX ADMIN — METRONIDAZOLE 500 MG: 500 INJECTION, SOLUTION INTRAVENOUS at 19:35

## 2025-01-16 RX ADMIN — FERROUS SULFATE TAB 325 MG (65 MG ELEMENTAL FE) 325 MG: 325 (65 FE) TAB at 10:56

## 2025-01-16 RX ADMIN — LAMOTRIGINE 25 MG: 25 TABLET ORAL at 20:47

## 2025-01-16 RX ADMIN — METOPROLOL SUCCINATE 25 MG: 25 TABLET, EXTENDED RELEASE ORAL at 21:03

## 2025-01-16 RX ADMIN — LOSARTAN POTASSIUM 50 MG: 50 TABLET, FILM COATED ORAL at 14:12

## 2025-01-16 RX ADMIN — HEPARIN SODIUM 2600 UNITS: 1000 INJECTION INTRAVENOUS; SUBCUTANEOUS at 13:38

## 2025-01-16 RX ADMIN — LIDOCAINE HYDROCHLORIDE 4 ML: 10 INJECTION, SOLUTION EPIDURAL; INFILTRATION; INTRACAUDAL; PERINEURAL at 13:38

## 2025-01-16 RX ADMIN — LAMOTRIGINE 25 MG: 25 TABLET ORAL at 10:56

## 2025-01-16 RX ADMIN — ACETAMINOPHEN 1000 MG: 500 TABLET ORAL at 17:22

## 2025-01-16 RX ADMIN — HYDROCODONE BITARTRATE AND ACETAMINOPHEN 1 TABLET: 5; 325 TABLET ORAL at 18:30

## 2025-01-16 RX ADMIN — METRONIDAZOLE 500 MG: 500 INJECTION, SOLUTION INTRAVENOUS at 10:55

## 2025-01-16 RX ADMIN — PANTOPRAZOLE SODIUM 40 MG: 40 TABLET, DELAYED RELEASE ORAL at 10:56

## 2025-01-16 RX ADMIN — ISOSORBIDE MONONITRATE 30 MG: 60 TABLET, EXTENDED RELEASE ORAL at 14:11

## 2025-01-16 ASSESSMENT — PAIN SCALES - GENERAL
PAINLEVEL_OUTOF10: 6
PAINLEVEL_OUTOF10: 7
PAINLEVEL_OUTOF10: 7

## 2025-01-16 ASSESSMENT — PAIN DESCRIPTION - DESCRIPTORS
DESCRIPTORS: ACHING

## 2025-01-16 ASSESSMENT — PAIN DESCRIPTION - LOCATION
LOCATION: SHOULDER

## 2025-01-16 ASSESSMENT — PAIN DESCRIPTION - ORIENTATION
ORIENTATION: RIGHT

## 2025-01-16 NOTE — PROGRESS NOTES
Ms. Rich feels better today.   Temp 98.9 HR: 80 BP: 139/45 Resp Rate: 15 93% sat on room air.     Intake/Output Summary (Last 24 hours) at 1/16/2025 0738  Last data filed at 1/16/2025 0125  Gross per 24 hour   Intake 100.92 ml   Output --   Net 100.92 ml   Exam: Cor: RRR.              Lungs: Bilateral breath sounds.              Abd: Soft. Non tender.             No guarding or rebound.             Non distended.             Ventral hernia reduced.  Labs:   Recent Results (from the past 12 hour(s))   Renal Function Panel    Collection Time: 01/16/25  5:17 AM   Result Value Ref Range    Sodium 139 136 - 145 mmol/L    Potassium 5.0 3.5 - 5.1 mmol/L    Chloride 106 97 - 108 mmol/L    CO2 27 21 - 32 mmol/L    Anion Gap 6 2 - 12 mmol/L    Glucose 87 65 - 100 mg/dL    BUN 44 (H) 6 - 20 MG/DL    Creatinine 5.43 (H) 0.55 - 1.02 MG/DL    BUN/Creatinine Ratio 8 (L) 12 - 20      Est, Glom Filt Rate 7 (L) >60 ml/min/1.73m2    Calcium 8.4 (L) 8.5 - 10.1 MG/DL    Phosphorus 4.6 2.6 - 4.7 MG/DL    Albumin 2.1 (L) 3.5 - 5.0 g/dL   CBC with Auto Differential    Collection Time: 01/16/25  5:17 AM   Result Value Ref Range    WBC 15.0 (H) 3.6 - 11.0 K/uL    RBC 3.38 (L) 3.80 - 5.20 M/uL    Hemoglobin 9.1 (L) 11.5 - 16.0 g/dL    Hematocrit 27.2 (L) 35.0 - 47.0 %    MCV 80.5 80.0 - 99.0 FL    MCH 26.9 26.0 - 34.0 PG    MCHC 33.5 30.0 - 36.5 g/dL    RDW 14.1 11.5 - 14.5 %    Platelets 371 150 - 400 K/uL    MPV 9.4 8.9 - 12.9 FL    Nucleated RBCs 0.0 0  WBC    nRBC 0.00 0.00 - 0.01 K/uL    Neutrophils % 80.5 (H) 32.0 - 75.0 %    Lymphocytes % 6.0 (L) 12.0 - 49.0 %    Monocytes % 9.9 5.0 - 13.0 %    Eosinophils % 2.6 0.0 - 7.0 %    Basophils % 0.5 0.0 - 1.0 %    Immature Granulocytes % 0.5 0.0 - 0.5 %    Neutrophils Absolute 12.04 (H) 1.80 - 8.00 K/UL    Lymphocytes Absolute 0.90 0.80 - 3.50 K/UL    Monocytes Absolute 1.48 (H) 0.00 - 1.00 K/UL    Eosinophils Absolute 0.39 0.00 - 0.40 K/UL    Basophils Absolute 0.08 0.00 - 0.10

## 2025-01-16 NOTE — CONSULTS
Patient will need to be transitioned to iHD for the time being  Do not give more KCL  Needs a perm catheter for HD. She may not be able to resume PD soon  The IR was consulted  Ileana is notified  Full consult- dictated

## 2025-01-16 NOTE — ED NOTES
Patient c/o pain at hemodialysis insertion site. Ice pack applied. IR provider notified. RN to call back if pain becomes severe.

## 2025-01-16 NOTE — CONSULTS
Patient seen at request of Dr. Tang.     Information obtained from patient and review of chart.     Heather Rich is an 89 y.o. female who presents with abdominal pain. Ms. Rich tells me that she began experiencing abdominal pain yesterday. The pain has become progressively worse. No clear h/o spiking fevers or shaking chills. Ms. Rich also reports nausea and vomiting but gives no h/o hematemesis or coffee ground emesis. Last bowel movement was yesterday and was normal. Ms. Rich is on peritoneal dialysis and her catheter was not working well last evening. On review of the chart, the PD catheter was repositioned in August, 2024.  She has otherwise been in her usual state of health.      CT scan abdomen/pelvis with IV contrast - Proximal to mid small bowel distention with decompressed loops distally is concerning for obstruction and this is likely related to the small ventral  hernia. Stable intra and extrahepatic biliary dilatation. Colonic diverticulosis.    Allergies - Bee pollen, Dust mite extract, and Codeine    Meds - Reviewed.    PMH -   Past Medical History:   Diagnosis Date    Anemia     Arrhythmia     irregular per pt d/t blood disorder    Asthma     Axillary adenopathy 01/04/2021    3/1/21 md Gabe - Benign, reactive lymph nodes with follicular hyperplasia     Bed bug bite 01/18/2021    Bereavement 02/02/2016     die 85 copd,chf,pul htn pn after 53 yr marriage    BPV (benign positional vertigo)     BPV (benign positional vertigo) 08/20/2020    Breast cancer (HCC)     Breast cancer, right breast (HCC) 1994    right breast, lumpectomy and radiation    Breast lump 2017    right breast     CAP (community acquired pneumonia) 12/05/2021    New right basilar airspace disease may represent atelectasis or pneumonia    Chronic kidney disease 02/06/2019    kidney cyst - watching    Coagulation disorder (HCC)     SPHEROCYTOSIS  SICKLE CELL TRAIT    Diabetes (HCC)     controlled with diet    Early  Range    Specimen HOld [id number]     Comment:        Add-on orders for these samples will be processed based on acceptable specimen integrity and analyte stability, which may vary by analyte.   Troponin    Collection Time: 01/15/25  4:59 PM   Result Value Ref Range    Troponin, High Sensitivity 246 (HH) 0 - 51 ng/L     CT Scan - Reviewed.    Imp: Ms. Rich is an 89 y.o. female with abdominal pain and a small bowel obstruction due to an incarcerated ventral hernia.    Plan: 1. Would ask Hospitalists to admit.   2. Will leave NG out as long as no nausea or vomiting.    3. NPO except ice chips and meds for now.    4. Nephrology to see.    5. Pain medication and anti-emetics as needed.   6. Ms. Rich may ultimately benefit from repair of the small ventral hernia.

## 2025-01-16 NOTE — PROGRESS NOTES
Clinical Pharmacy Note: Metronidazole Dosing    Please note that the metronidazole dose for Heather Rich has been changed to 500 mg IV q12h per [facility]-approved protocol.  Please contact the pharmacy with any questions.    Emre FRANZ PharmD  Clinical Pharmacist  [facility]

## 2025-01-16 NOTE — H&P
History and Physical    Date of Service:  1/15/2025  Primary Care Provider: Kehinde Barney MD  Source of information: {HISTORY SOURCE:[id number]}    Chief Complaint: Abdominal Pain      History of Presenting Illness:   Heather Rich is a 89 y.o. female with a pmhx asthma, BPV, past right breast cancer s/p lumpectomy and radiation, ESRD on PD, DM II, HTN , and seizures who presents with abdominal pain, and is being admitted for SBO.    In the ED, RR was elevated to 32, and she was hypertensive to .  Other VSS.  Labs showed WBC 18, microcytic anemia with hgb 11.1, troponin 233>246,,  k+ 3, glucose 127, BuN 39, creatinine 5.61, and albumin 2.8. CT abdomen/pelvis showed proximal small bowel distension and decompressed loops distally concerning for obstruction, and that is likely related to small ventral hernia.      In the ED, general surgery was consulted and recommends        REVIEW OF SYSTEMS:  {Ros - complete:[id number]}     Past Medical History:   Diagnosis Date    Anemia     Arrhythmia     irregular per pt d/t blood disorder    Asthma     Axillary adenopathy 01/04/2021    3/1/21 md Gabe - Benign, reactive lymph nodes with follicular hyperplasia     Bed bug bite 01/18/2021    Bereavement 02/02/2016     die 85 copd,chf,pul htn pn after 53 yr marriage    BPV (benign positional vertigo)     BPV (benign positional vertigo) 08/20/2020    Breast cancer (HCC)     Breast cancer, right breast (HCC) 1994    right breast, lumpectomy and radiation    Breast lump 2017    right breast     CAP (community acquired pneumonia) 12/05/2021    New right basilar airspace disease may represent atelectasis or pneumonia    Chronic kidney disease 02/06/2019    kidney cyst - watching    Coagulation disorder (HCC)     SPHEROCYTOSIS  SICKLE CELL TRAIT    Diabetes (HCC)     controlled with diet    Early satiety     ESRD on peritoneal dialysis (HCC) 10/20/2021    dr. rigoberto Grijalva (on)(from) [facility],

## 2025-01-16 NOTE — CONSULTS
Negative NEG mg/dL    Ketones, Urine Negative NEG mg/dL    Bilirubin, Urine Negative NEG      Blood, Urine Negative NEG      Urobilinogen, Urine 0.2 0.2 - 1.0 EU/dL    Nitrite, Urine Negative NEG      Leukocyte Esterase, Urine Negative NEG      WBC, UA 0-4 0 - 4 /hpf    RBC, UA 0-5 0 - 5 /hpf    Epithelial Cells, UA FEW FEW /lpf    BACTERIA, URINE 1+ (A) NEG /hpf   Extra Tubes Hold    Collection Time: 01/15/25  4:59 PM   Result Value Ref Range    Specimen HOld [id number]     Comment:        Add-on orders for these samples will be processed based on acceptable specimen integrity and analyte stability, which may vary by analyte.   Troponin    Collection Time: 01/15/25  4:59 PM   Result Value Ref Range    Troponin, High Sensitivity 246 (HH) 0 - 51 ng/L   Magnesium    Collection Time: 01/15/25  4:59 PM   Result Value Ref Range    Magnesium 2.6 (H) 1.6 - 2.4 mg/dL   Renal Function Panel    Collection Time: 01/16/25  5:17 AM   Result Value Ref Range    Sodium 139 136 - 145 mmol/L    Potassium 5.0 3.5 - 5.1 mmol/L    Chloride 106 97 - 108 mmol/L    CO2 27 21 - 32 mmol/L    Anion Gap 6 2 - 12 mmol/L    Glucose 87 65 - 100 mg/dL    BUN 44 (H) 6 - 20 MG/DL    Creatinine 5.43 (H) 0.55 - 1.02 MG/DL    BUN/Creatinine Ratio 8 (L) 12 - 20      Est, Glom Filt Rate 7 (L) >60 ml/min/1.73m2    Calcium 8.4 (L) 8.5 - 10.1 MG/DL    Phosphorus 4.6 2.6 - 4.7 MG/DL    Albumin 2.1 (L) 3.5 - 5.0 g/dL   CBC with Auto Differential    Collection Time: 01/16/25  5:17 AM   Result Value Ref Range    WBC 15.0 (H) 3.6 - 11.0 K/uL    RBC 3.38 (L) 3.80 - 5.20 M/uL    Hemoglobin 9.1 (L) 11.5 - 16.0 g/dL    Hematocrit 27.2 (L) 35.0 - 47.0 %    MCV 80.5 80.0 - 99.0 FL    MCH 26.9 26.0 - 34.0 PG    MCHC 33.5 30.0 - 36.5 g/dL    RDW 14.1 11.5 - 14.5 %    Platelets 371 150 - 400 K/uL    MPV 9.4 8.9 - 12.9 FL    Nucleated RBCs 0.0 0  WBC    nRBC 0.00 0.00 - 0.01 K/uL    Neutrophils % 80.5 (H) 32.0 - 75.0 %    Lymphocytes % 6.0 (L) 12.0 - 49.0 %     prior infarct.   Echo 01/23/2020 EF: 60-65%   Echo 04/16/2013 1. the left atrium is mildy enlarged.  2. normal left ventricular size and systolic function. left ventricular ejection fraction is estimated at 60%. no focal wall motion abnormalites noted.  3. normal right ventricular size and systolic function. normal right atrial dimensions.   [facility] 01/25/2015 1. Negative stress test. No evidence of demonstrable ischemia with stress. there is some minor wall motion abnormality involving the mid to apical septum at rest, which persists inot the exercise portion.  2. no significant ST-T changes on electrocardiogram  3. hypertensive response to exercise.       Other imaging:   CT abd/pelvis:  IMPRESSION:  Proximal to mid small bowel distention with decompressed loops distally is concerning for obstruction and this is likely related to the small ventral hernia. Stable intra and extrahepatic biliary dilatation. Colonic diverticulosis.    Waqar Lucia MD  Structural Interventional Cardiology  [facility]  01/16/25

## 2025-01-16 NOTE — PROGRESS NOTES
Ms. Rich is an 89-year-old lady with history of end-stage kidney disease, diabetes mellitus, anemia chronic kidney disease and hypertension.  She is on CCPD and typically spends 9 hours on the cycler at night.    She presented to the emergency room with a history of abdominal pain.  CT imaging suggested small bowel obstruction.    Blood pressure was initially 212/78 with a follow-up blood pressure of 180/68.      Sodium 140, potassium 3.0, chloride 101, bicarbonate 29, BUN 39, creatinine 5.61, calcium 10.1, glucose 127.    Hemoglobin 11.1, WBC 18.0, platelets 420      CT of the abdomen:    Reported proximal to mid small bowel obstruction with decompressed loops distally concerning for obstruction.    Chest x-ray:    Report -lungs are clear, no acute cardiopulmonary abnormalities.      There is no suggestion of fluid overload with her presentation.    Hypokalemia was noted.  KCl supplementation is reasonable.    There is no urgent indication for resumption of peritoneal dialysis tonight.    Management of hypertension per the Hospitalist team.    [facility] will follow.

## 2025-01-16 NOTE — PROGRESS NOTES
This RN informed Dr. Coates about 15 WBC and tunneled line placement. Per MD, will change order to temporary dialysis placement (elva).    1300  Pt arrives via stretcher to angio department accompanied by transport for elva procedure. All assessments completed and consent was reviewed.  Education given was regarding procedure, local anesthetic, post-procedure care and  management/follow-up. Opportunity for questions was provided and all questions and concerns were addressed.

## 2025-01-16 NOTE — ED NOTES
ED TO INPATIENT SBAR HANDOFF    Patient Name: Heather Rich   :  1935  89 y.o.   MRN:  [id number]  ED Room #:  ER10/10     Situation  Code Status: Full Code   Allergies: Bee pollen, Dust mite extract, and Codeine  Weight: Patient Vitals for the past 96 hrs (Last 3 readings):   Weight   25 0515 57.7 kg (127 lb 1.6 oz)   01/15/25 1355 54 kg (119 lb 0.8 oz)       Arrived from: home    Chief Complaint:   Chief Complaint   Patient presents with    Abdominal Pain       Hospital Problem/Diagnosis:  Principal Problem:    SBO (small bowel obstruction) ([facility])  Active Problems:    HTN (hypertension)    Anemia of chronic renal failure    ESRD on peritoneal dialysis (HCC)    Incarcerated ventral hernia    Type 2 diabetes mellitus with kidney complication, without long-term current use of insulin (HCC)    SIRS (systemic inflammatory response syndrome) ([facility])    Elevated troponin    Seizure (HCC)    Gastroesophageal reflux disease without esophagitis    ESRD needing dialysis (HCC)  Resolved Problems:    * No resolved hospital problems. *      Mobility: limited transfer mobility   ED Fall Risk: Presents to emergency department  because of falls (Syncope, seizure, or loss of consciousness): No, Age > 70: Yes, Altered Mental Status, Intoxication with alcohol or substance confusion (Disorientation, impaired judgment, poor safety awaremess, or inability to follow instructions): No, Impaired Mobility: Ambulates or transfers with assistive devices or assistance; Unable to ambulate or transer.: Yes, Nursing Judgement: Yes   Fell in ED or prior to admission: no   Restraints: no     Sitter: no   Family/Caregiver Present: no    Neet to know social/safety information: none    Background  History:   Past Medical History:   Diagnosis Date    Anemia     Arrhythmia     irregular per pt d/t blood disorder    Asthma     Axillary adenopathy 2021    3/1/21 md Gabe - Benign, reactive lymph nodes with follicular hyperplasia     Bed

## 2025-01-16 NOTE — PROGRESS NOTES
Nephrology    Consult received - ESRD on PD  -Chart reviewed, per the last nephrology progress note, patient of Dr Coates with [facility]  -Seen by our group at [facility] as [facility] does not round there  -Discussed with ED staff, requested consult be redirected to [facility] for continuity of care    --[facility]

## 2025-01-16 NOTE — H&P
History and Physical    Date of Service:  1/16/2025  Primary Care Provider: Kehinde Barney MD  Source of information: electronic medical record    Chief Complaint: Abdominal Pain      History of Presenting Illness:   Heather Rich is a 89 y.o. female with a pmhx asthma, BPV, past right breast cancer s/p lumpectomy and radiation, ESRD on PD, DM II, HTN , and seizures who presents with abdominal pain, and is being admitted for SBO.    In the ED, RR was elevated to 32, and she was hypertensive to .  Other VSS.  Labs showed WBC 18, microcytic anemia with hgb 11.1, troponin 233>246,,  k+ 3, glucose 127, BuN 39, creatinine 5.61, and albumin 2.8. CT abdomen/pelvis showed proximal small bowel distension and decompressed loops distally concerning for obstruction, and that is likely related to small ventral hernia.      In the ED, general surgery was consulted and reduced the hernia at bedside. I have consulted nephrology for pt. PD to be set up IP.  She is currently sleeping comfortably in NAD       REVIEW OF SYSTEMS:  Review of systems not obtained due to patient factors.     Past Medical History:   Diagnosis Date    Anemia     Arrhythmia     irregular per pt d/t blood disorder    Asthma     Axillary adenopathy 01/04/2021    3/1/21 md Gabe - Benign, reactive lymph nodes with follicular hyperplasia     Bed bug bite 01/18/2021    Bereavement 02/02/2016     die 85 copd,chf,pul htn pn after 53 yr marriage    BPV (benign positional vertigo)     BPV (benign positional vertigo) 08/20/2020    Breast cancer (HCC)     Breast cancer, right breast (HCC) 1994    right breast, lumpectomy and radiation    Breast lump 2017    right breast     CAP (community acquired pneumonia) 12/05/2021    New right basilar airspace disease may represent atelectasis or pneumonia    Chronic kidney disease 02/06/2019    kidney cyst - watching    Coagulation disorder (HCC)     SPHEROCYTOSIS  SICKLE CELL TRAIT    Diabetes

## 2025-01-16 NOTE — PROGRESS NOTES
Hospital Progress Note  Kehinde Barney MD   Answering service: [phone] OR    [employer]      NAME:  Heather Rich   :   1935   MRN:  [id number]     Date/Time:  2025 8:03 AM    Plan:     Plans for surgical repair during this admission  Transition to hemodialysis temporarily/nephrology  Advance diet  Encourage activity  Risk of Deterioration: Low  []           Moderate  []           High  []                 Assessment:     Principal Problem:    SBO (small bowel obstruction) (HCC)     Symptoms have resolved     Seen by surgery this morning with plans discussed with him       Active Problems:    Incarcerated ventral hernia     Reduced by Dr. Matt     Patient agrees with lab plans for ventral hernia repair      HTN (hypertension)     Will titrate antihypertensives     Patient concern related to elevated blood pressures in the morning      Anemia of chronic renal failure      ESRD on peritoneal dialysis (HCC)     Agrees to transition to hemodialysis temporarily        Type 2 diabetes mellitus with kidney complication, without long-term current use of insulin (HCC)      Diet controlled      Continue to monitor      SIRS (systemic inflammatory response syndrome) (HCC)     Leukocytosis with WBCs greater than 15 on admission now improving     Check blood cultures      Elevated troponin     Cardiology opinion     No chest pain      Seizure (HCC)     Continue Lamictal     No recent seizures      Gastroesophageal reflux disease without esophagitis     PPI as needed  Resolved Problems:    * No resolved hospital problems. *       Admitting notes:89 y.o. female with a pmhx asthma, BPV, past right breast cancer s/p lumpectomy and radiation, ESRD on PD, DM II, HTN , and seizures who presents with abdominal pain, and is being admitted for SBO.     In the ED, RR was elevated to 32, and she was hypertensive to .  Other VSS.  Labs showed WBC 18, microcytic anemia with hgb 11.1, troponin 233>246,,  k+

## 2025-01-16 NOTE — CONSULTS
[place] [id number]                              CONSULTATION      PATIENT NAME: JOANNE NUÑEZ              : 1935  MED REC NO: [id number]                       ROOM: ER10  ACCOUNT NO: [id number]                       ADMIT DATE: 01/15/2025  PROVIDER: Richardson Coates MD    DATE OF SERVICE:  2025    ATTENDING PHYSICIAN:  Kehinde Barney MD    REASON FOR CONSULTATION:  End-stage renal disease.    HISTORY OF PRESENT ILLNESS:  The patient is an 89-year-old black woman, who is a peritoneal dialysis patient at [facility].  The patient has a history of hernia repair.  She presented yesterday to her PD nurse complaining of abdominal pain.  After being assessed by the nurse practitioner there, she was sent for admission.  The patient is believed to have a small bowel obstruction and she needs another surgery for repair of the hernia and placement of a mesh.  She was already seen by Surgery.  When I saw the patient, she felt slightly better, but she has not been eating anything.    PAST MEDICAL HISTORY:  End-stage renal disease, asthma, breast cancer, pneumonia, umbilical hernia, hypertension.    PAST SURGICAL HISTORY:  Breast biopsy, breast lumpectomy, cataract removal, placement of different dialysis accesses.    ALLERGIES:  ALLERGIC TO BEE, POLLEN, DUST MITES, AND CODEINE.    MEDICATION LIST:  At home was reviewed.    SOCIAL HISTORY:  The patient lives alone.  She is independent.  She denies alcohol, tobacco, or illicit drug abuse.    FAMILY HISTORY:  Negative for renal disease.    PHYSICAL EXAMINATION:  GENERAL:  Elderly black woman, awake, alert, oriented.  VITAL SIGNS:  Blood pressure is 139/45, heart rate is 79, respirations 12.  HEENT:  Head is normocephalic.  Eyes with anicteric sclerae.  NECK:  Supple.  LUNGS:  Clear.  HEART:  S1 and S2 with regular rate and rhythm.  ABDOMEN:  Soft.  PD catheter is

## 2025-01-17 LAB
ALBUMIN SERPL-MCNC: 2.3 G/DL (ref 3.5–5)
ANION GAP SERPL CALC-SCNC: 9 MMOL/L (ref 2–12)
BASOPHILS # BLD: 0.11 K/UL (ref 0–0.1)
BASOPHILS NFR BLD: 0.7 % (ref 0–1)
BUN SERPL-MCNC: 55 MG/DL (ref 6–20)
BUN/CREAT SERPL: 9 (ref 12–20)
CALCIUM SERPL-MCNC: 8.2 MG/DL (ref 8.5–10.1)
CHLORIDE SERPL-SCNC: 104 MMOL/L (ref 97–108)
CO2 SERPL-SCNC: 27 MMOL/L (ref 21–32)
CREAT SERPL-MCNC: 6.18 MG/DL (ref 0.55–1.02)
DIFFERENTIAL METHOD BLD: ABNORMAL
EOSINOPHIL # BLD: 1.01 K/UL (ref 0–0.4)
EOSINOPHIL NFR BLD: 6.3 % (ref 0–7)
ERYTHROCYTE [DISTWIDTH] IN BLOOD BY AUTOMATED COUNT: 14.3 % (ref 11.5–14.5)
GLUCOSE BLD STRIP.AUTO-MCNC: 138 MG/DL (ref 65–117)
GLUCOSE SERPL-MCNC: 86 MG/DL (ref 65–100)
HCT VFR BLD AUTO: 27.5 % (ref 35–47)
HGB BLD-MCNC: 9.1 G/DL (ref 11.5–16)
IMM GRANULOCYTES # BLD AUTO: 0.08 K/UL (ref 0–0.04)
IMM GRANULOCYTES NFR BLD AUTO: 0.5 % (ref 0–0.5)
LYMPHOCYTES # BLD: 1.13 K/UL (ref 0.8–3.5)
LYMPHOCYTES NFR BLD: 7 % (ref 12–49)
MCH RBC QN AUTO: 27.1 PG (ref 26–34)
MCHC RBC AUTO-ENTMCNC: 33.1 G/DL (ref 30–36.5)
MCV RBC AUTO: 81.8 FL (ref 80–99)
MONOCYTES # BLD: 1.51 K/UL (ref 0–1)
MONOCYTES NFR BLD: 9.4 % (ref 5–13)
NEUTS SEG # BLD: 12.26 K/UL (ref 1.8–8)
NEUTS SEG NFR BLD: 76.1 % (ref 32–75)
NRBC # BLD: 0 K/UL (ref 0–0.01)
NRBC BLD-RTO: 0 PER 100 WBC
PHOSPHATE SERPL-MCNC: 4.3 MG/DL (ref 2.6–4.7)
PLATELET # BLD AUTO: 370 K/UL (ref 150–400)
PMV BLD AUTO: 10.3 FL (ref 8.9–12.9)
POTASSIUM SERPL-SCNC: 3.3 MMOL/L (ref 3.5–5.1)
RBC # BLD AUTO: 3.36 M/UL (ref 3.8–5.2)
RBC MORPH BLD: ABNORMAL
SERVICE CMNT-IMP: ABNORMAL
SODIUM SERPL-SCNC: 140 MMOL/L (ref 136–145)
WBC # BLD AUTO: 16.1 K/UL (ref 3.6–11)

## 2025-01-17 PROCEDURE — 99232 SBSQ HOSP IP/OBS MODERATE 35: CPT | Performed by: INTERNAL MEDICINE

## 2025-01-17 PROCEDURE — 85025 COMPLETE CBC W/AUTO DIFF WBC: CPT

## 2025-01-17 PROCEDURE — 2500000003 HC RX 250 WO HCPCS: Performed by: FAMILY MEDICINE

## 2025-01-17 PROCEDURE — 99231 SBSQ HOSP IP/OBS SF/LOW 25: CPT | Performed by: SURGERY

## 2025-01-17 PROCEDURE — 82962 GLUCOSE BLOOD TEST: CPT

## 2025-01-17 PROCEDURE — 6370000000 HC RX 637 (ALT 250 FOR IP): Performed by: INTERNAL MEDICINE

## 2025-01-17 PROCEDURE — 1200000000 HC SEMI PRIVATE

## 2025-01-17 PROCEDURE — 36415 COLL VENOUS BLD VENIPUNCTURE: CPT

## 2025-01-17 PROCEDURE — 6370000000 HC RX 637 (ALT 250 FOR IP): Performed by: FAMILY MEDICINE

## 2025-01-17 PROCEDURE — 6360000002 HC RX W HCPCS: Performed by: FAMILY MEDICINE

## 2025-01-17 PROCEDURE — 94640 AIRWAY INHALATION TREATMENT: CPT

## 2025-01-17 PROCEDURE — 80069 RENAL FUNCTION PANEL: CPT

## 2025-01-17 PROCEDURE — 90935 HEMODIALYSIS ONE EVALUATION: CPT

## 2025-01-17 RX ORDER — BUPIVACAINE HYDROCHLORIDE 2.5 MG/ML
30 INJECTION, SOLUTION EPIDURAL; INFILTRATION; INTRACAUDAL ONCE
Status: CANCELLED | OUTPATIENT
Start: 2025-01-17 | End: 2025-01-17

## 2025-01-17 RX ORDER — IPRATROPIUM BROMIDE AND ALBUTEROL SULFATE 2.5; .5 MG/3ML; MG/3ML
1 SOLUTION RESPIRATORY (INHALATION) EVERY 4 HOURS PRN
Status: DISCONTINUED | OUTPATIENT
Start: 2025-01-17 | End: 2025-01-24 | Stop reason: HOSPADM

## 2025-01-17 RX ADMIN — LOSARTAN POTASSIUM 50 MG: 50 TABLET, FILM COATED ORAL at 09:41

## 2025-01-17 RX ADMIN — PRAVASTATIN SODIUM 20 MG: 40 TABLET ORAL at 21:56

## 2025-01-17 RX ADMIN — WATER 2000 MG: 1 INJECTION INTRAMUSCULAR; INTRAVENOUS; SUBCUTANEOUS at 06:44

## 2025-01-17 RX ADMIN — FERROUS SULFATE TAB 325 MG (65 MG ELEMENTAL FE) 325 MG: 325 (65 FE) TAB at 09:41

## 2025-01-17 RX ADMIN — IPRATROPIUM BROMIDE AND ALBUTEROL SULFATE 1 DOSE: 2.5; .5 SOLUTION RESPIRATORY (INHALATION) at 15:17

## 2025-01-17 RX ADMIN — SODIUM CHLORIDE, PRESERVATIVE FREE 10 ML: 5 INJECTION INTRAVENOUS at 21:01

## 2025-01-17 RX ADMIN — LAMOTRIGINE 25 MG: 25 TABLET ORAL at 21:56

## 2025-01-17 RX ADMIN — NIFEDIPINE 30 MG: 30 TABLET, FILM COATED, EXTENDED RELEASE ORAL at 09:41

## 2025-01-17 RX ADMIN — IPRATROPIUM BROMIDE AND ALBUTEROL SULFATE 1 DOSE: 2.5; .5 SOLUTION RESPIRATORY (INHALATION) at 06:08

## 2025-01-17 RX ADMIN — PANTOPRAZOLE SODIUM 40 MG: 40 TABLET, DELAYED RELEASE ORAL at 06:44

## 2025-01-17 RX ADMIN — ISOSORBIDE MONONITRATE 30 MG: 60 TABLET, EXTENDED RELEASE ORAL at 21:56

## 2025-01-17 RX ADMIN — LAMOTRIGINE 25 MG: 25 TABLET ORAL at 09:41

## 2025-01-17 RX ADMIN — METOPROLOL SUCCINATE 25 MG: 25 TABLET, EXTENDED RELEASE ORAL at 09:41

## 2025-01-17 RX ADMIN — METRONIDAZOLE 500 MG: 500 INJECTION, SOLUTION INTRAVENOUS at 20:04

## 2025-01-17 NOTE — PROGRESS NOTES
Ms. Rich has no c/o today.  Tm 98.4 Tc 98.2 HR: 76 BP: 169/55 Resp Rate: 16 95% sat on room air.     Intake/Output Summary (Last 24 hours) at 1/17/2025 0933  Last data filed at 1/16/2025 2030  Gross per 24 hour   Intake 240 ml   Output --   Net 240 ml      Exam: Cor: RRR.               Lungs: Bilateral breath sounds.               Abd: Soft. Non distended.             Non tender.             No guarding or rebound.             The hernia is reducible.  Labs:   Recent Results (from the past 12 hour(s))   Renal Function Panel    Collection Time: 01/17/25  5:16 AM   Result Value Ref Range    Sodium 140 136 - 145 mmol/L    Potassium 3.3 (L) 3.5 - 5.1 mmol/L    Chloride 104 97 - 108 mmol/L    CO2 27 21 - 32 mmol/L    Anion Gap 9 2 - 12 mmol/L    Glucose 86 65 - 100 mg/dL    BUN 55 (H) 6 - 20 MG/DL    Creatinine 6.18 (H) 0.55 - 1.02 MG/DL    BUN/Creatinine Ratio 9 (L) 12 - 20      Est, Glom Filt Rate 6 (L) >60 ml/min/1.73m2    Calcium 8.2 (L) 8.5 - 10.1 MG/DL    Phosphorus 4.3 2.6 - 4.7 MG/DL    Albumin 2.3 (L) 3.5 - 5.0 g/dL   CBC with Auto Differential    Collection Time: 01/17/25  5:16 AM   Result Value Ref Range    WBC 16.1 (H) 3.6 - 11.0 K/uL    RBC 3.36 (L) 3.80 - 5.20 M/uL    Hemoglobin 9.1 (L) 11.5 - 16.0 g/dL    Hematocrit 27.5 (L) 35.0 - 47.0 %    MCV 81.8 80.0 - 99.0 FL    MCH 27.1 26.0 - 34.0 PG    MCHC 33.1 30.0 - 36.5 g/dL    RDW 14.3 11.5 - 14.5 %    Platelets 370 150 - 400 K/uL    MPV 10.3 8.9 - 12.9 FL    Nucleated RBCs 0.0 0  WBC    nRBC 0.00 0.00 - 0.01 K/uL    Neutrophils % 76.1 (H) 32.0 - 75.0 %    Lymphocytes % 7.0 (L) 12.0 - 49.0 %    Monocytes % 9.4 5.0 - 13.0 %    Eosinophils % 6.3 0.0 - 7.0 %    Basophils % 0.7 0.0 - 1.0 %    Immature Granulocytes % 0.5 0.0 - 0.5 %    Neutrophils Absolute 12.26 (H) 1.80 - 8.00 K/UL    Lymphocytes Absolute 1.13 0.80 - 3.50 K/UL    Monocytes Absolute 1.51 (H) 0.00 - 1.00 K/UL    Eosinophils Absolute 1.01 (H) 0.00 - 0.40 K/UL    Basophils Absolute 0.11 (H)  0.00 - 0.10 K/UL    Immature Granulocytes Absolute 0.08 (H) 0.00 - 0.04 K/UL    Differential Type SMEAR SCANNED      RBC Comment NORMOCYTIC, NORMOCHROMIC     Ms. Rich is doing well.   To OR on January 20, 2025 for ventral hernia repair possibly with mesh.   Discussed procedure again with her including risks of infection, bleeding, hernia recurrence.   She understands and wishes to proceed.   Needs consent.   Diet as tolerated.   NPO after midnight January 20, 2025.   HD per Nephrology.   Plans per Dr. Barney.

## 2025-01-17 NOTE — FLOWSHEET NOTE
01/17/25 0505   Vital Signs   BP (!) 164/70   Temp 98.2 °F (36.8 °C)   Pulse 76   Respirations 16   SpO2 95 %   Pain Assessment   Pain Assessment None - Denies Pain   Treatment   Treatment Goal 3.5 hours, 1 liter UF   Observations & Evaluations   Level of Consciousness 0   Oriented X 4   Heart Rhythm Regular   Respiratory Quality/Effort Unlabored   O2 Device None (Room air)   Skin Color Pink   Skin Condition/Temp Warm;Dry   Abdomen Inspection Soft   Technical Checks   Dialysis Machine No. [id number]   RO Machine Number [id number]   Dialyzer Lot No. [id number]   Tubing Lot Number [id number]   All Connections Secure Yes   NS Bag Yes   Saline Line Double Clamped Yes   Dialyzer Nipro ELISIO   Prime Volume (mL) 200 mL   ICEBOAT I;C;E;B;O;A;T   RO Machine Log Sheet Completed Yes   Machine Alarm Self Test Completed;Passed   Air Foam Detector Tested;Proper Function   Extracorporeal Circuit Tested for Integrity Yes   Machine Conductivity 13.8   Manual Ph 7.4   Bleach Test (Neg) Yes   Bath Temperature 96.8 °F (36 °C)   Dialysis Bath   K+ (Potassium) 3   Ca+ (Calcium) 2.5   Na+ (Sodium) 138   HCO3 (Bicarb) 35   Acid Concentrate Lot No. [id number]   Hemodialysis Central Access - Temporary Right Neck   Placement Date/Time: 01/16/25 1340   Present on Admission/Arrival: No  Inserted by: Demetra Mcdaniels PA-C  Insertion Practices: Chlorohexadine skin antisepsis;Sterile ultrasound technique;Optimal catheter site selection;Hand hygiene;Maximal barrier preca...   Continued need for line? Yes   Site Assessment Clean, dry & intact   Blue Lumen Status Brisk blood return;Flushed   Red Lumen Status Brisk blood return;Flushed   Line Care Ports disinfected;Chlorhexidine wipes;Connections checked and tightened   Dressing Type Transparent;Bacteriocidal   Date of Last Dressing Change 01/16/25   Dressing Status Clean, dry & intact        01/17/25 0510   Treatment   Time On 0510   Vital Signs   BP (!) 164/78   Pulse 75   Treatment Initiation   Dialyze Hours 3.5

## 2025-01-17 NOTE — CARE COORDINATION
Care Management Initial Assessment       RUR: 22%  Readmission? No  1st IM letter given? Yes - 1/15  1st  letter given: Yes - 1/15    CM at the bedside to confirm demographics and insurance information. Pt lives alone in a one level home that has a basement that she does not go into. She states she feels unsteady when she walks down steps so she does not go to the basement and she uses a cane at baseline. She denies any other use of DME. Pt states she still drives and her son or grandson provide transportation as well.     Pt states she receives meals on wheels once a day but also still does ADLs on her own. Pt hx of HH - [facility], SNF - [facility], and no IPR hx. Pt is agreeable to BS if HH is recommended for her.    Prior to admission, she did PD at home and was associated with [employer] on [address]     Pt's son or grandson will transport pt back home once she is discharged.    CM will continue to follow.    Kaitlin Brown RN BSN CM    [facility] [employer]     01/17/25 5216   Service Assessment   Patient Orientation Alert and Oriented;Person;Place;Situation;Self   Cognition Alert   History Provided By Patient   Primary Caregiver Self   Support Systems Children;Family Members   Patient's Healthcare Decision Maker is: Named in Scanned ACP Document   PCP Verified by CM Yes   Last Visit to PCP Within last 3 months   Prior Functional Level Independent in ADLs/IADLs   Current Functional Level Independent in ADLs/IADLs   Can patient return to prior living arrangement Yes   Ability to make needs known: Good   Would you like for me to discuss the discharge plan with any other family members/significant others, and if so, who? Yes   Financial Resources Medicare   CM/SW Referral ADLs/IADLs   Social/Functional History   Lives With Alone   Type of Home House   Home Layout One level   Home Equipment Cane   Prior Level of Assist for ADLs Independent   Prior Level of Assist for Homemaking Independent   Ambulation

## 2025-01-17 NOTE — PLAN OF CARE
Problem: Safety - Adult  Goal: Free from fall injury  Outcome: Progressing     Problem: Chronic Conditions and Co-morbidities  Goal: Patient's chronic conditions and co-morbidity symptoms are monitored and maintained or improved  Outcome: Progressing  Flowsheets  Taken 1/16/2025 2030 by Guerline Nugent RN  Care Plan - Patient's Chronic Conditions and Co-Morbidity Symptoms are Monitored and Maintained or Improved: Monitor and assess patient's chronic conditions and comorbid symptoms for stability, deterioration, or improvement  Taken 1/16/2025 1600 by Shavon Castelan RN  Care Plan - Patient's Chronic Conditions and Co-Morbidity Symptoms are Monitored and Maintained or Improved:   Monitor and assess patient's chronic conditions and comorbid symptoms for stability, deterioration, or improvement   Collaborate with multidisciplinary team to address chronic and comorbid conditions and prevent exacerbation or deterioration     Problem: Discharge Planning  Goal: Discharge to home or other facility with appropriate resources  Outcome: Progressing  Flowsheets  Taken 1/16/2025 2030 by Guerline Nugent RN  Discharge to home or other facility with appropriate resources: Identify barriers to discharge with patient and caregiver  Taken 1/16/2025 1600 by Shavon Castelan RN  Discharge to home or other facility with appropriate resources:   Identify barriers to discharge with patient and caregiver   Arrange for needed discharge resources and transportation as appropriate     Problem: Pain  Goal: Verbalizes/displays adequate comfort level or baseline comfort level  Outcome: Progressing

## 2025-01-17 NOTE — PROGRESS NOTES
Name: Heather Rich MRN: [id number]   : 1935 Hospital: [facility]   Date: 2025        IMPRESSION:   ESRD transitioned from PD to iHD in anticipation of abdominal surgery for hernia repair  Anemia of ESRD  Ventral hernia      PLAN:   Patient was dialyzed last night  Next HD- Saturday  Renal diet     Subjective/Interval History:   I have reviewed the flowsheet and previous day’s notes.    ROS:Pertinent items are noted in HPI.    Objective:   Vital Signs:    BP (!) 168/75   Pulse 56   Temp 98.4 °F (36.9 °C) (Oral)   Resp 19   Wt 57.7 kg (127 lb 1.6 oz)   SpO2 99%   BMI 22.51 kg/m²             Temp (24hrs), Av.2 °F (36.8 °C), Min:97.9 °F (36.6 °C), Max:98.4 °F (36.9 °C)       Intake/Output:   Last shift:       07 -  1900  In: 500   Out: 1500   Last 3 shifts: 01/15 1901 -  0700  In: 340.9 [P.O.:240]  Out: -     Intake/Output Summary (Last 24 hours) at 2025 1204  Last data filed at 2025 0850  Gross per 24 hour   Intake 740 ml   Output 1500 ml   Net -760 ml        Current Facility-Administered Medications   Medication Dose Route Frequency    ipratropium 0.5 mg-albuterol 2.5 mg (DUONEB) nebulizer solution 1 Dose  1 Dose Inhalation Q4H PRN    cefTRIAXone (ROCEPHIN) 2,000 mg in sterile water 20 mL IV syringe  2,000 mg IntraVENous Q24H    pravastatin (PRAVACHOL) tablet 20 mg  20 mg Oral Nightly    pantoprazole (PROTONIX) tablet 40 mg  40 mg Oral QAM AC    NIFEdipine (PROCARDIA XL) extended release tablet 30 mg  30 mg Oral Daily    lamoTRIgine (LAMICTAL) tablet 25 mg  25 mg Oral BID    ferrous sulfate (IRON 325) tablet 325 mg  325 mg Oral Daily with breakfast    metroNIDAZOLE (FLAGYL) 500 mg in 0.9% NaCl 100 mL IVPB premix  500 mg IntraVENous Q12H    metoprolol succinate (TOPROL XL) extended release tablet 25 mg  25 mg Oral Daily    isosorbide mononitrate (IMDUR) extended release tablet 30 mg  30 mg Oral Daily    losartan (COZAAR) tablet 50 mg  50 mg Oral Daily     \"PROU\"    Radiology  IR NONTUNNELED VASCULAR CATHETER > 5 YEARS    Result Date: 1/16/2025  Technically successful placement of a temporary dialysis catheter via the right external jugular vein, as described above. Electronically signed by Kelton Morley    CT ABDOMEN PELVIS W IV CONTRAST Additional Contrast? None    Result Date: 1/15/2025  Proximal to mid small bowel distention with decompressed loops distally is concerning for obstruction and this is likely related to the small ventral hernia. Stable intra and extrahepatic biliary dilatation. Colonic diverticulosis. Electronically signed by VEENA NESS         Total time spent with patient:  35 minutes    [] Critical Care Provided    Care Plan discussed with:   Medical Team    Richardson Coates MD

## 2025-01-17 NOTE — CARDIO/PULMONARY
Cardiac rehab: Following for elevated cardiac enzymes. Per Dr. Lucia 1/16/2025: Troponin elevation  Probable non-MI troponin elevation in setting of chronic anemia, CKD, hypertensive emergency  No anginal symptoms  No further ischemic evaluation recommended at this time

## 2025-01-17 NOTE — PROGRESS NOTES
Hospital Progress Note  Kehinde Barney MD   Answering service: [phone] OR    [employer]      NAME:  Heather Rich   :   1935   MRN:  [id number]     Date/Time:  2025 8:00 AM    Plan:     Plans for surgical repair Monday  Currently receiving Hemovac  Advance diet  Encourage activity  Risk of Deterioration: Low  []           Moderate  []           High  []                 Assessment:     Principal Problem:    SBO (small bowel obstruction) (HCC)     Symptoms have resolved     Seen by surgery this morning with plans discussed with him       Active Problems:    Incarcerated ventral hernia     Reduced by Dr. Matt     Patient agrees with  plans for ventral hernia repair      HTN (hypertension)     Will titrate antihypertensives     Patient concern related to elevated blood pressures in the morning      Anemia of chronic renal failure      ESRD on peritoneal dialysis (HCC)     Agrees to transition to hemodialysis temporarily        Type 2 diabetes mellitus with kidney complication, without long-term current use of insulin (HCC)      Diet controlled      Continue to monitor      SIRS (systemic inflammatory response syndrome) (HCC)     Leukocytosis with WBCs greater than 15 on admission now improving     Check blood cultures      Elevated troponin     Cardiology opinion     No chest pain      Seizure (HCC)     Continue Lamictal     No recent seizures      Gastroesophageal reflux disease without esophagitis     PPI as needed  Resolved Problems:    * No resolved hospital problems. *       Admitting notes:89 y.o. female with a pmhx asthma, BPV, past right breast cancer s/p lumpectomy and radiation, ESRD on PD, DM II, HTN , and seizures who presents with abdominal pain, and is being admitted for SBO.     In the ED, RR was elevated to 32, and she was hypertensive to .  Other VSS.  Labs showed WBC 18, microcytic anemia with hgb 11.1, troponin 233>246,,  k+ 3, glucose 127, BuN 39, creatinine 5.61,  Reviewed:    Recent Labs     01/15/25  1415 01/16/25  0517 01/17/25  0516   WBC 18.0* 15.0* 16.1*   HGB 11.1* 9.1* 9.1*   HCT 32.3* 27.2* 27.5*   * 371 370     Recent Labs     01/15/25  1415 01/15/25  1659 01/16/25  0517 01/17/25  0516     --  139 140   K 3.0*  --  5.0 3.3*     --  106 104   CO2 29  --  27 27   BUN 39*  --  44* 55*   MG  --  2.6*  --   --    PHOS  --   --  4.6 4.3   ALT 23  --   --   --      No results found for: \"GLUCPOC\"  No results for input(s): \"PH\", \"PCO2\", \"PO2\", \"HCO3\", \"FIO2\" in the last 72 hours.  No results for input(s): \"INR\" in the last 72 hours.  ___________________________________________________  ___________________________________________________    Attending Physician: Kehinde Barney MD

## 2025-01-17 NOTE — PROGRESS NOTES
Pt. PIVs lost access this morning during antibiotic administration. Antibiotic administration was cancelled, RN discussed the antibiotic schedule with pharmacy and pharmacist instructed RN to contact when pt. PIV access is re-established to adjust antibiotic schedule. RN attempted to place PIV, was unsuccessful, the rapid response team was contact to attempt patient PIV placement. The above was discussed with oncoming KARSTEN Shipley. Also discussed patient CHG bath to be performed at the end of dialysis administration.

## 2025-01-17 NOTE — FLOWSHEET NOTE
Hepatitis B Surface Ag   Date/Time Value Ref Range Status   07/04/2024 01:04 PM <0.10 Index Final     Hep B S Ag Interp   Date/Time Value Ref Range Status   07/04/2024 01:04 PM Negative NEG   Final     Hep B S Ab   Date/Time Value Ref Range Status   07/04/2024 01:04 PM <3.10 mIU/mL Final     Hep B S Ab Interp   Date/Time Value Ref Range Status   07/04/2024 01:04 PM NONREACTIVE NR   Final     Comment:     (NOTE)  The [employer] Anti-HBs2 assay is traceable to the World Health   Organization (WHO) Hepatitis B Immunoglobulin 1st International   Reference Preparation (1977). Samples with a calculated value of 10   mIU/mL or greater are considered reactive (protective) in accordance   with the CDC guidelines. The accepted criteria for immunity to HBV is   anti-HBs activity greater than or equal to 10 mIU/mL, as defined by   the WHO International Reference Preparation.  Assay performance has not been established in pregnant women,   patients who are immunosuppressed or immunocompromised, nor have   performance characteristics been established in conjunction with   other 's assays for specific HBV serologic markers. This   assay does not differentiate between vaccine induced immune response   and a response due to infection with HBV. Passively acquired anti-HBs   may be identified following patient transfusion, receipt of   immunoglobulin products, etc.       Ileana WYNNE results: HepBSAg Negative 1/8/2025, HepBSAb Susceptible 9/5/2024       0630- Came in to the patient room to relieve HD Nurse Marilyn Ni RN. Patient was awake and alert. Patient has 2 hours more left on her HD Treatment this time. Vital signs are stable. BP was high, continuous monitoring for the next 2 hours.     01/17/25 0630   During Hemodialysis Assessment   BP (!) 165/76   Pulse 73   Blood Flow Rate (ml/min) 350 ml/min   Arterial Pressure (mmHg) -150 mmHg   Venous Pressure (mmHg) 140   TMP 60      Access Visible Yes  Odomzo Counseling- I discussed with the patient the risks of Odomzo including but not limited to nausea, vomiting, diarrhea, constipation, weight loss, changes in the sense of taste, decreased appetite, muscle spasms, and hair loss.  The patient verbalized understanding of the proper use and possible adverse effects of Odomzo.  All of the patient's questions and concerns were addressed.

## 2025-01-17 NOTE — PLAN OF CARE
Problem: Chronic Conditions and Co-morbidities  Goal: Patient's chronic conditions and co-morbidity symptoms are monitored and maintained or improved  1/17/2025 1127 by Violetta Kim RN  Outcome: Progressing  Flowsheets (Taken 1/17/2025 0800)  Care Plan - Patient's Chronic Conditions and Co-Morbidity Symptoms are Monitored and Maintained or Improved: Monitor and assess patient's chronic conditions and comorbid symptoms for stability, deterioration, or improvement  1/17/2025 0041 by Guerline Nugent RN  Outcome: Progressing  Flowsheets  Taken 1/16/2025 2030 by Guerlnie Nugent RN  Care Plan - Patient's Chronic Conditions and Co-Morbidity Symptoms are Monitored and Maintained or Improved: Monitor and assess patient's chronic conditions and comorbid symptoms for stability, deterioration, or improvement  Taken 1/16/2025 1600 by Shavon Castelan RN  Care Plan - Patient's Chronic Conditions and Co-Morbidity Symptoms are Monitored and Maintained or Improved:   Monitor and assess patient's chronic conditions and comorbid symptoms for stability, deterioration, or improvement   Collaborate with multidisciplinary team to address chronic and comorbid conditions and prevent exacerbation or deterioration     Problem: Safety - Adult  Goal: Free from fall injury  Recent Flowsheet Documentation  Taken 1/17/2025 1126 by Violetta Kim, RN  Free From Fall Injury: Instruct family/caregiver on patient safety  1/17/2025 0041 by Guerline Nugent RN  Outcome: Progressing

## 2025-01-18 LAB
ALBUMIN SERPL-MCNC: 2.3 G/DL (ref 3.5–5)
ANION GAP SERPL CALC-SCNC: 7 MMOL/L (ref 2–12)
BASOPHILS # BLD: 0.07 K/UL (ref 0–0.1)
BASOPHILS NFR BLD: 0.5 % (ref 0–1)
BUN SERPL-MCNC: 20 MG/DL (ref 6–20)
BUN/CREAT SERPL: 5 (ref 12–20)
CALCIUM SERPL-MCNC: 7.9 MG/DL (ref 8.5–10.1)
CHLORIDE SERPL-SCNC: 106 MMOL/L (ref 97–108)
CO2 SERPL-SCNC: 27 MMOL/L (ref 21–32)
CREAT SERPL-MCNC: 3.74 MG/DL (ref 0.55–1.02)
DIFFERENTIAL METHOD BLD: ABNORMAL
EOSINOPHIL # BLD: 0.89 K/UL (ref 0–0.4)
EOSINOPHIL NFR BLD: 6.2 % (ref 0–7)
ERYTHROCYTE [DISTWIDTH] IN BLOOD BY AUTOMATED COUNT: 14.2 % (ref 11.5–14.5)
GLUCOSE BLD STRIP.AUTO-MCNC: 110 MG/DL (ref 65–117)
GLUCOSE SERPL-MCNC: 88 MG/DL (ref 65–100)
HCT VFR BLD AUTO: 27.1 % (ref 35–47)
HGB BLD-MCNC: 9.2 G/DL (ref 11.5–16)
IMM GRANULOCYTES # BLD AUTO: 0.08 K/UL (ref 0–0.04)
IMM GRANULOCYTES NFR BLD AUTO: 0.6 % (ref 0–0.5)
LYMPHOCYTES # BLD: 1.3 K/UL (ref 0.8–3.5)
LYMPHOCYTES NFR BLD: 9.1 % (ref 12–49)
MCH RBC QN AUTO: 27.6 PG (ref 26–34)
MCHC RBC AUTO-ENTMCNC: 33.9 G/DL (ref 30–36.5)
MCV RBC AUTO: 81.4 FL (ref 80–99)
MONOCYTES # BLD: 1.46 K/UL (ref 0–1)
MONOCYTES NFR BLD: 10.2 % (ref 5–13)
NEUTS SEG # BLD: 10.5 K/UL (ref 1.8–8)
NEUTS SEG NFR BLD: 73.4 % (ref 32–75)
NRBC # BLD: 0 K/UL (ref 0–0.01)
NRBC BLD-RTO: 0 PER 100 WBC
PHOSPHATE SERPL-MCNC: 2.5 MG/DL (ref 2.6–4.7)
PLATELET # BLD AUTO: 315 K/UL (ref 150–400)
PMV BLD AUTO: 9.5 FL (ref 8.9–12.9)
POTASSIUM SERPL-SCNC: 3.5 MMOL/L (ref 3.5–5.1)
RBC # BLD AUTO: 3.33 M/UL (ref 3.8–5.2)
SERVICE CMNT-IMP: NORMAL
SODIUM SERPL-SCNC: 140 MMOL/L (ref 136–145)
WBC # BLD AUTO: 14.3 K/UL (ref 3.6–11)

## 2025-01-18 PROCEDURE — 2500000003 HC RX 250 WO HCPCS: Performed by: FAMILY MEDICINE

## 2025-01-18 PROCEDURE — 80069 RENAL FUNCTION PANEL: CPT

## 2025-01-18 PROCEDURE — 6370000000 HC RX 637 (ALT 250 FOR IP): Performed by: INTERNAL MEDICINE

## 2025-01-18 PROCEDURE — 1100000000 HC RM PRIVATE

## 2025-01-18 PROCEDURE — 6370000000 HC RX 637 (ALT 250 FOR IP): Performed by: FAMILY MEDICINE

## 2025-01-18 PROCEDURE — 99231 SBSQ HOSP IP/OBS SF/LOW 25: CPT | Performed by: SURGERY

## 2025-01-18 PROCEDURE — 94640 AIRWAY INHALATION TREATMENT: CPT

## 2025-01-18 PROCEDURE — 85025 COMPLETE CBC W/AUTO DIFF WBC: CPT

## 2025-01-18 PROCEDURE — 6360000002 HC RX W HCPCS: Performed by: FAMILY MEDICINE

## 2025-01-18 PROCEDURE — 82962 GLUCOSE BLOOD TEST: CPT

## 2025-01-18 PROCEDURE — 94760 N-INVAS EAR/PLS OXIMETRY 1: CPT

## 2025-01-18 RX ORDER — HYDRALAZINE HYDROCHLORIDE 20 MG/ML
5 INJECTION INTRAMUSCULAR; INTRAVENOUS EVERY 8 HOURS PRN
Status: DISCONTINUED | OUTPATIENT
Start: 2025-01-18 | End: 2025-01-24 | Stop reason: HOSPADM

## 2025-01-18 RX ADMIN — SODIUM CHLORIDE, PRESERVATIVE FREE 10 ML: 5 INJECTION INTRAVENOUS at 09:04

## 2025-01-18 RX ADMIN — ISOSORBIDE MONONITRATE 30 MG: 60 TABLET, EXTENDED RELEASE ORAL at 23:25

## 2025-01-18 RX ADMIN — METOPROLOL SUCCINATE 25 MG: 25 TABLET, EXTENDED RELEASE ORAL at 09:04

## 2025-01-18 RX ADMIN — SODIUM CHLORIDE, PRESERVATIVE FREE 10 ML: 5 INJECTION INTRAVENOUS at 23:42

## 2025-01-18 RX ADMIN — METRONIDAZOLE 500 MG: 500 INJECTION, SOLUTION INTRAVENOUS at 23:35

## 2025-01-18 RX ADMIN — LAMOTRIGINE 25 MG: 25 TABLET ORAL at 09:03

## 2025-01-18 RX ADMIN — FERROUS SULFATE TAB 325 MG (65 MG ELEMENTAL FE) 325 MG: 325 (65 FE) TAB at 09:04

## 2025-01-18 RX ADMIN — IPRATROPIUM BROMIDE AND ALBUTEROL SULFATE 1 DOSE: 2.5; .5 SOLUTION RESPIRATORY (INHALATION) at 14:11

## 2025-01-18 RX ADMIN — NIFEDIPINE 30 MG: 30 TABLET, FILM COATED, EXTENDED RELEASE ORAL at 09:03

## 2025-01-18 RX ADMIN — LAMOTRIGINE 25 MG: 25 TABLET ORAL at 23:25

## 2025-01-18 RX ADMIN — PANTOPRAZOLE SODIUM 40 MG: 40 TABLET, DELAYED RELEASE ORAL at 06:46

## 2025-01-18 RX ADMIN — LOSARTAN POTASSIUM 50 MG: 50 TABLET, FILM COATED ORAL at 09:04

## 2025-01-18 RX ADMIN — PRAVASTATIN SODIUM 20 MG: 40 TABLET ORAL at 23:25

## 2025-01-18 RX ADMIN — WATER 2000 MG: 1 INJECTION INTRAMUSCULAR; INTRAVENOUS; SUBCUTANEOUS at 06:44

## 2025-01-18 RX ADMIN — METRONIDAZOLE 500 MG: 500 INJECTION, SOLUTION INTRAVENOUS at 06:44

## 2025-01-18 ASSESSMENT — PAIN SCALES - GENERAL
PAINLEVEL_OUTOF10: 0

## 2025-01-18 NOTE — PROGRESS NOTES
Hospitalist Progress Note  Bandar Molina MD  Answering service: [phone]        Date of Service:  2025  NAME:  Heather Rich  :  1935  MRN:  [id number]      Admission Summary:   89 y.o. female with a pmhx asthma, BPV, past right breast cancer s/p lumpectomy and radiation, ESRD on PD, DM II, HTN , and seizures who presents with abdominal pain, and is being admitted for SBO.     In the ED, RR was elevated to 32, and she was hypertensive to .  Other VSS.  Labs showed WBC 18, microcytic anemia with hgb 11.1, troponin 233>246,,  k+ 3, glucose 127, BuN 39, creatinine 5.61, and albumin 2.8. CT abdomen/pelvis showed proximal small bowel distension and decompressed loops distally concerning for obstruction, and that is likely related to small ventral hernia.       In the ED, general surgery was consulted and reduced the hernia at bedside. I have consulted nephrology for pt. PD to be set up IP.  She is currently sleeping comfortably in NAD       Interval history / Subjective:      Follow up on SBO Ventral hernia  Tolerating diet   Had BM today   Feels improved   Plan for Surgery /Hernia repair on Monday       Assessment & Plan:      #SBO (small bowel obstruction) (HCC)/resolved    #Incarcerated ventral hernia      Symptoms have resolved     GS following      Tolerating po diet & having BM      Incarcerated ventral hernia/ Reduced by Dr. Matt     Plan for ventral hernia repair on        # HTN (hypertension)     Continue home antihypertensives     Start IV hydralazine prn for uncontrolled BP     HD to start today         #ESRD on peritoneal dialysis (HCC)     Santos cath placed      Plan to start HD today     Nephrology following         Type 2 diabetes mellitus with kidney complication, without long-term current use of insulin (HCC)      Diet controlled      Continue to monitor       SIRS (systemic    WBC 16.1* 14.3*   HGB 9.1* 9.2*   HCT 27.5* 27.1*    315     Recent Labs     01/16/25  0517 01/17/25  0516 01/18/25  0449    140 140   K 5.0 3.3* 3.5    104 106   CO2 27 27 27   BUN 44* 55* 20   PHOS 4.6 4.3 2.5*     No results for input(s): \"ALT\", \"TP\", \"GLOB\", \"GGT\" in the last 72 hours.    Invalid input(s): \"SGOT\", \"GPT\", \"AP\", \"TBIL\", \"TBILI\", \"ALB\", \"AML\", \"AMYP\", \"LPSE\", \"HLPSE\"  No results for input(s): \"INR\", \"APTT\" in the last 72 hours.    Invalid input(s): \"PTP\"   No results for input(s): \"TIBC\" in the last 72 hours.    Invalid input(s): \"FE\", \"PSAT\", \"FERR\"   No results found for: \"RBCF\"   No results for input(s): \"PH\", \"PCO2\", \"PO2\" in the last 72 hours.  No results for input(s): \"CPK\" in the last 72 hours.    Invalid input(s): \"CPKMB\", \"CKNDX\", \"TROIQ\"  Lab Results   Component Value Date/Time    CHOL 145 03/21/2023 01:09 PM     03/21/2023 01:09 PM    LDL 34.8 03/21/2023 01:09 PM     No results found for: \"GLUCPOC\"  [unfilled]      Medications Reviewed:     Current Facility-Administered Medications   Medication Dose Route Frequency    hydrALAZINE (APRESOLINE) injection 5 mg  5 mg IntraVENous Q8H PRN    ipratropium 0.5 mg-albuterol 2.5 mg (DUONEB) nebulizer solution 1 Dose  1 Dose Inhalation Q4H PRN    cefTRIAXone (ROCEPHIN) 2,000 mg in sterile water 20 mL IV syringe  2,000 mg IntraVENous Q24H    pravastatin (PRAVACHOL) tablet 20 mg  20 mg Oral Nightly    pantoprazole (PROTONIX) tablet 40 mg  40 mg Oral QAM AC    NIFEdipine (PROCARDIA XL) extended release tablet 30 mg  30 mg Oral Daily    lamoTRIgine (LAMICTAL) tablet 25 mg  25 mg Oral BID    ferrous sulfate (IRON 325) tablet 325 mg  325 mg Oral Daily with breakfast    metroNIDAZOLE (FLAGYL) 500 mg in 0.9% NaCl 100 mL IVPB premix  500 mg IntraVENous Q12H    metoprolol succinate (TOPROL XL) extended release tablet 25 mg  25 mg Oral Daily    isosorbide mononitrate (IMDUR) extended release tablet 30 mg  30 mg Oral Daily    losartan

## 2025-01-18 NOTE — PLAN OF CARE
Dialysis ETA between 7624-1797 today    Thank you    0900: HD procedure will be done by another teammate.

## 2025-01-18 NOTE — PROGRESS NOTES
Name: Heather Rich MRN: [id number]   : 1935 Hospital: [facility]   Date: 2025        IMPRESSION:   ESRD transitioned from PD to iHD in anticipation of abdominal surgery for hernia repair  Anemia of ESRD  Ventral hernia      PLAN:   HD today 4k+ and then TTHS  Hernia surgery repair on Monday  Most likely will need to switch to HD permanently.  Santos catheter needs to be changed to PermCath prior to discharge  Case management consult for outpatient hemodialysis chair at [address]  Renal diet     Subjective/Interval History:   I have reviewed the flowsheet and previous day’s notes.   -feels okay denies any complaints.  Hemodialysis later today    Objective:   Vital Signs:    BP (!) 158/63   Pulse 72   Temp 98.8 °F (37.1 °C) (Oral)   Resp 18   Wt 57.7 kg (127 lb 1.6 oz)   SpO2 96%   BMI 22.51 kg/m²             Temp (24hrs), Av.9 °F (37.2 °C), Min:98.8 °F (37.1 °C), Max:99.1 °F (37.3 °C)       Intake/Output:   Last shift:      No intake/output data recorded.  Last 3 shifts:  1901 -  0700  In: 740 [P.O.:240]  Out: 1500   No intake or output data in the 24 hours ending 25 1059       Current Facility-Administered Medications   Medication Dose Route Frequency    ipratropium 0.5 mg-albuterol 2.5 mg (DUONEB) nebulizer solution 1 Dose  1 Dose Inhalation Q4H PRN    cefTRIAXone (ROCEPHIN) 2,000 mg in sterile water 20 mL IV syringe  2,000 mg IntraVENous Q24H    pravastatin (PRAVACHOL) tablet 20 mg  20 mg Oral Nightly    pantoprazole (PROTONIX) tablet 40 mg  40 mg Oral QAM AC    NIFEdipine (PROCARDIA XL) extended release tablet 30 mg  30 mg Oral Daily    lamoTRIgine (LAMICTAL) tablet 25 mg  25 mg Oral BID    ferrous sulfate (IRON 325) tablet 325 mg  325 mg Oral Daily with breakfast    metroNIDAZOLE (FLAGYL) 500 mg in 0.9% NaCl 100 mL IVPB premix  500 mg IntraVENous Q12H    metoprolol succinate (TOPROL XL) extended release tablet 25 mg  25 mg Oral Daily    isosorbide

## 2025-01-18 NOTE — PROGRESS NOTES
Progress Note  Date:2025       Room:[facility]  Patient Name:Heather Rich     YOB: 1935     Age:89 y.o.        Subjective    Subjective   Review of Systems  Patient without complaints tolerating diet  Objective         Vitals Last 24 Hours:  TEMPERATURE:  Temp  Av.7 °F (37.1 °C)  Min: 98.1 °F (36.7 °C)  Max: 99.1 °F (37.3 °C)  RESPIRATIONS RANGE: Resp  Av  Min: 16  Max: 22  PULSE OXIMETRY RANGE: SpO2  Av %  Min: 93 %  Max: 97 %  PULSE RANGE: Pulse  Av.4  Min: 72  Max: 88  BLOOD PRESSURE RANGE: Systolic (24hrs), Av , Min:148 , Max:195   ; Diastolic (24hrs), Av, Min:56, Max:73    I/O (24Hr):  No intake or output data in the 24 hours ending 25  Objective:  Vital signs: (most recent): Blood pressure (!) 172/68, pulse 88, temperature 98.1 °F (36.7 °C), temperature source Oral, resp. rate 16, weight 57.7 kg (127 lb 1.6 oz), SpO2 97%.    General alert no acute distress  Abdomen soft nontender nondistended  Labs/Imaging/Diagnostics    Labs:  CBC:  Recent Labs     25  0525  0516 25  0449   WBC 15.0* 16.1* 14.3*   RBC 3.38* 3.36* 3.33*   HGB 9.1* 9.1* 9.2*   HCT 27.2* 27.5* 27.1*   MCV 80.5 81.8 81.4   RDW 14.1 14.3 14.2    370 315     CHEMISTRIES:  Recent Labs     25  0525  0516 25  0449    140 140   K 5.0 3.3* 3.5    104 106   CO2 27 27 27   BUN 44* 55* 20   CREATININE 5.43* 6.18* 3.74*   GLUCOSE 87 86 88   PHOS 4.6 4.3 2.5*     PT/INR:No results for input(s): \"PROTIME\", \"INR\" in the last 72 hours.  APTT:No results for input(s): \"APTT\" in the last 72 hours.  LIVER PROFILE:No results for input(s): \"AST\", \"ALT\", \"BILIDIR\", \"BILITOT\", \"ALKPHOS\" in the last 72 hours.    Imaging Last 24 Hours:  No results found.  Assessment//Plan           Hospital Problems             Last Modified POA    * (Principal) SBO (small bowel obstruction) (HCC) 1/15/2025 Yes    HTN (hypertension) 2025 Yes    Anemia of chronic

## 2025-01-18 NOTE — PLAN OF CARE
Problem: Safety - Adult  Goal: Free from fall injury  Outcome: Progressing  Flowsheets (Taken 1/18/2025 7652)  Free From Fall Injury: Instruct family/caregiver on patient safety

## 2025-01-19 LAB
ALBUMIN SERPL-MCNC: 2.2 G/DL (ref 3.5–5)
ANION GAP SERPL CALC-SCNC: 5 MMOL/L (ref 2–12)
ANION GAP SERPL CALC-SCNC: 5 MMOL/L (ref 2–12)
BASOPHILS # BLD: 0 K/UL (ref 0–0.1)
BASOPHILS NFR BLD: 0 % (ref 0–1)
BUN SERPL-MCNC: 5 MG/DL (ref 6–20)
BUN SERPL-MCNC: 5 MG/DL (ref 6–20)
BUN/CREAT SERPL: 2 (ref 12–20)
BUN/CREAT SERPL: 3 (ref 12–20)
CALCIUM SERPL-MCNC: 7.8 MG/DL (ref 8.5–10.1)
CALCIUM SERPL-MCNC: 8.1 MG/DL (ref 8.5–10.1)
CHLORIDE SERPL-SCNC: 103 MMOL/L (ref 97–108)
CHLORIDE SERPL-SCNC: 103 MMOL/L (ref 97–108)
CO2 SERPL-SCNC: 27 MMOL/L (ref 21–32)
CO2 SERPL-SCNC: 28 MMOL/L (ref 21–32)
CREAT SERPL-MCNC: 1.92 MG/DL (ref 0.55–1.02)
CREAT SERPL-MCNC: 2.02 MG/DL (ref 0.55–1.02)
DIFFERENTIAL METHOD BLD: ABNORMAL
EOSINOPHIL # BLD: 1.29 K/UL (ref 0–0.4)
EOSINOPHIL NFR BLD: 9 % (ref 0–7)
ERYTHROCYTE [DISTWIDTH] IN BLOOD BY AUTOMATED COUNT: 14 % (ref 11.5–14.5)
GLUCOSE SERPL-MCNC: 83 MG/DL (ref 65–100)
GLUCOSE SERPL-MCNC: 87 MG/DL (ref 65–100)
HCT VFR BLD AUTO: 28.5 % (ref 35–47)
HGB BLD-MCNC: 9.6 G/DL (ref 11.5–16)
IMM GRANULOCYTES # BLD AUTO: 0 K/UL
IMM GRANULOCYTES NFR BLD AUTO: 0 %
LYMPHOCYTES # BLD: 1.14 K/UL (ref 0.8–3.5)
LYMPHOCYTES NFR BLD: 8 % (ref 12–49)
MCH RBC QN AUTO: 27 PG (ref 26–34)
MCHC RBC AUTO-ENTMCNC: 33.7 G/DL (ref 30–36.5)
MCV RBC AUTO: 80.3 FL (ref 80–99)
MONOCYTES # BLD: 1.14 K/UL (ref 0–1)
MONOCYTES NFR BLD: 8 % (ref 5–13)
NEUTS SEG # BLD: 10.73 K/UL (ref 1.8–8)
NEUTS SEG NFR BLD: 75 % (ref 32–75)
NRBC # BLD: 0 K/UL (ref 0–0.01)
NRBC BLD-RTO: 0 PER 100 WBC
PHOSPHATE SERPL-MCNC: 1.6 MG/DL (ref 2.6–4.7)
PLATELET # BLD AUTO: 267 K/UL (ref 150–400)
PMV BLD AUTO: 9.4 FL (ref 8.9–12.9)
POTASSIUM SERPL-SCNC: 3.2 MMOL/L (ref 3.5–5.1)
POTASSIUM SERPL-SCNC: 3.3 MMOL/L (ref 3.5–5.1)
RBC # BLD AUTO: 3.55 M/UL (ref 3.8–5.2)
RBC MORPH BLD: ABNORMAL
SODIUM SERPL-SCNC: 135 MMOL/L (ref 136–145)
SODIUM SERPL-SCNC: 136 MMOL/L (ref 136–145)
WBC # BLD AUTO: 14.3 K/UL (ref 3.6–11)

## 2025-01-19 PROCEDURE — 6360000002 HC RX W HCPCS: Performed by: FAMILY MEDICINE

## 2025-01-19 PROCEDURE — 1100000000 HC RM PRIVATE

## 2025-01-19 PROCEDURE — 80048 BASIC METABOLIC PNL TOTAL CA: CPT

## 2025-01-19 PROCEDURE — 36415 COLL VENOUS BLD VENIPUNCTURE: CPT

## 2025-01-19 PROCEDURE — 2500000003 HC RX 250 WO HCPCS: Performed by: FAMILY MEDICINE

## 2025-01-19 PROCEDURE — 6370000000 HC RX 637 (ALT 250 FOR IP): Performed by: INTERNAL MEDICINE

## 2025-01-19 PROCEDURE — 94640 AIRWAY INHALATION TREATMENT: CPT

## 2025-01-19 PROCEDURE — 85025 COMPLETE CBC W/AUTO DIFF WBC: CPT

## 2025-01-19 PROCEDURE — 80069 RENAL FUNCTION PANEL: CPT

## 2025-01-19 PROCEDURE — 6370000000 HC RX 637 (ALT 250 FOR IP): Performed by: FAMILY MEDICINE

## 2025-01-19 RX ADMIN — LAMOTRIGINE 25 MG: 25 TABLET ORAL at 21:28

## 2025-01-19 RX ADMIN — WATER 2000 MG: 1 INJECTION INTRAMUSCULAR; INTRAVENOUS; SUBCUTANEOUS at 07:13

## 2025-01-19 RX ADMIN — METRONIDAZOLE 500 MG: 500 INJECTION, SOLUTION INTRAVENOUS at 23:45

## 2025-01-19 RX ADMIN — FERROUS SULFATE TAB 325 MG (65 MG ELEMENTAL FE) 325 MG: 325 (65 FE) TAB at 08:21

## 2025-01-19 RX ADMIN — LOSARTAN POTASSIUM 50 MG: 50 TABLET, FILM COATED ORAL at 08:22

## 2025-01-19 RX ADMIN — IPRATROPIUM BROMIDE AND ALBUTEROL SULFATE 1 DOSE: 2.5; .5 SOLUTION RESPIRATORY (INHALATION) at 11:41

## 2025-01-19 RX ADMIN — PRAVASTATIN SODIUM 20 MG: 40 TABLET ORAL at 21:28

## 2025-01-19 RX ADMIN — LAMOTRIGINE 25 MG: 25 TABLET ORAL at 08:23

## 2025-01-19 RX ADMIN — SODIUM CHLORIDE, PRESERVATIVE FREE 10 ML: 5 INJECTION INTRAVENOUS at 21:55

## 2025-01-19 RX ADMIN — POTASSIUM BICARBONATE 20 MEQ: 782 TABLET, EFFERVESCENT ORAL at 10:43

## 2025-01-19 RX ADMIN — NIFEDIPINE 30 MG: 30 TABLET, FILM COATED, EXTENDED RELEASE ORAL at 08:23

## 2025-01-19 RX ADMIN — ISOSORBIDE MONONITRATE 30 MG: 60 TABLET, EXTENDED RELEASE ORAL at 21:29

## 2025-01-19 RX ADMIN — METRONIDAZOLE 500 MG: 500 INJECTION, SOLUTION INTRAVENOUS at 11:49

## 2025-01-19 RX ADMIN — SODIUM CHLORIDE, PRESERVATIVE FREE 10 ML: 5 INJECTION INTRAVENOUS at 10:45

## 2025-01-19 RX ADMIN — METOPROLOL SUCCINATE 25 MG: 25 TABLET, EXTENDED RELEASE ORAL at 08:22

## 2025-01-19 RX ADMIN — PANTOPRAZOLE SODIUM 40 MG: 40 TABLET, DELAYED RELEASE ORAL at 07:11

## 2025-01-19 NOTE — PROGRESS NOTES
Name: Heather Rich MRN: [id number]   : 1935 Hospital: [facility]   Date: 2025        IMPRESSION:   ESRD transitioned from PD to iHD in anticipation of abdominal surgery for hernia repair  Anemia of ESRD  Ventral hernia  Hypokalemia predialysis      PLAN:   HD 4k+ bath, TTHS  KCl p.o. 20 mill equivalent x 1  Hernia surgery repair on Monday  Most likely will need to switch to HD permanently.  Albumin is only 2.2  Santos catheter needs to be changed to PermCath prior to discharge-reconsult IR  Case management consult for outpatient hemodialysis chair at [address].  Changed to regular diet due to hypokalemia  Add Nepro  PD catheter care only by dialysis nurses     Subjective/Interval History:   I have reviewed the flowsheet and previous day’s notes.   -feels okay denies any complaints.  Hemodialysis later today  -patient had dialysis last night.  Tolerated well.  Denies any complaints.  Her PD catheter dressing getting changed by nurse    Objective:   Vital Signs:    BP (!) 149/74   Pulse 90   Temp 99.7 °F (37.6 °C)   Resp 18   Wt 57.7 kg (127 lb 1.6 oz)   SpO2 95%   BMI 22.51 kg/m²             Temp (24hrs), Av.5 °F (36.9 °C), Min:98.1 °F (36.7 °C), Max:99.7 °F (37.6 °C)       Intake/Output:   Last shift:       07 - 1900  In: 120 [P.O.:120]  Out: -   Last 3 shifts:  190 -  0700  In: 620 [P.O.:120]  Out: 1500     Intake/Output Summary (Last 24 hours) at 2025 1056  Last data filed at 2025 0801  Gross per 24 hour   Intake 740 ml   Output 1500 ml   Net -760 ml          Current Facility-Administered Medications   Medication Dose Route Frequency    hydrALAZINE (APRESOLINE) injection 5 mg  5 mg IntraVENous Q8H PRN    ipratropium 0.5 mg-albuterol 2.5 mg (DUONEB) nebulizer solution 1 Dose  1 Dose Inhalation Q4H PRN    cefTRIAXone (ROCEPHIN) 2,000 mg in sterile water 20 mL IV syringe  2,000 mg IntraVENous Q24H    pravastatin (PRAVACHOL) tablet

## 2025-01-19 NOTE — FLOWSHEET NOTE
Primary RN SBAR: Violetta Kim RN  Incapacitated Nurse [facility]. provided: Yes  Patient Education provided: Ordered Dialysis Treatment  Preferred Education method and Primary language: Verbal; English  Hospital General Consent Verified: Yes  Hospital associated wait time; reason: N/A    CWOW: HEP B S AG 1/8/25 Negative/ Susceptible 9/5/24 01/18/25 1900   Observations & Evaluations   Level of Consciousness 0   Oriented X x4   Heart Rhythm Regular   Respiratory Quality/Effort Unlabored   O2 Device None (Room air)   Bilateral Breath Sounds Clear   Skin Color Hyperpigmentation   Skin Condition/Temp Dry;Warm   Appetite Fair   Abdomen Inspection Soft   Bowel Sounds (All Quadrants) Active   Last BM (including prior to admit) 01/18/25   Edema None   Vital Signs   BP (!) 145/69   Temp 98.1 °F (36.7 °C)   Pulse 86   Respirations 16   SpO2 97 %   Pain Assessment   Pain Assessment 0-10   Pain Level 0   Patient's Stated Pain Goal 0 - No pain   Technical Checks   Dialysis Machine No. 06   RO Machine Number 06   Dialyzer Lot No. [id number]   Tubing Lot Number [id number]   All Connections Secure Yes   NS Bag Yes   Saline Line Double Clamped Yes   Dialyzer Nipro ELISIO   Prime Volume (mL) 200 mL   ICEBOAT I;C;E;B;O;A;T   RO Machine Log Sheet Completed Yes   Machine Alarm Self Test Completed;Passed   Air Foam Detector Tested;Proper Function   Extracorporeal Circuit Tested for Integrity Yes   Machine Conductivity 13.8   Bleach Test (Neg) Yes   Bath Temperature 98.6 °F (37 °C)   Treatment Initiation   Dialyze Hours 3.5   Treatment  Initiation Universal Precautions maintained;Lines secured to patient;Connections secured;Prime given;Venous Parameters set;Arterial Parameters set;Air foam detector engaged;Dialysate;Saline line double clamped   Hemodialysis Central Access - Temporary Right Neck   Placement Date/Time: 01/16/25 1340   Present on Admission/Arrival: No  Inserted by: Demetra Mcdaniels PA-C  Insertion Practices: Chlorohexadine skin

## 2025-01-20 ENCOUNTER — ANESTHESIA (OUTPATIENT)
Facility: HOSPITAL | Age: 89
End: 2025-01-20
Payer: MEDICARE

## 2025-01-20 ENCOUNTER — ANESTHESIA EVENT (OUTPATIENT)
Facility: HOSPITAL | Age: 89
End: 2025-01-20
Payer: MEDICARE

## 2025-01-20 LAB
ANION GAP SERPL CALC-SCNC: 6 MMOL/L (ref 2–12)
BASOPHILS # BLD: 0.08 K/UL (ref 0–0.1)
BASOPHILS NFR BLD: 0.5 % (ref 0–1)
BUN SERPL-MCNC: 23 MG/DL (ref 6–20)
BUN/CREAT SERPL: 5 (ref 12–20)
CALCIUM SERPL-MCNC: 8 MG/DL (ref 8.5–10.1)
CHLORIDE SERPL-SCNC: 106 MMOL/L (ref 97–108)
CO2 SERPL-SCNC: 26 MMOL/L (ref 21–32)
CREAT SERPL-MCNC: 4.52 MG/DL (ref 0.55–1.02)
DIFFERENTIAL METHOD BLD: ABNORMAL
EOSINOPHIL # BLD: 1 K/UL (ref 0–0.4)
EOSINOPHIL NFR BLD: 6.4 % (ref 0–7)
ERYTHROCYTE [DISTWIDTH] IN BLOOD BY AUTOMATED COUNT: 14.3 % (ref 11.5–14.5)
GLUCOSE SERPL-MCNC: 105 MG/DL (ref 65–100)
HCT VFR BLD AUTO: 29.2 % (ref 35–47)
HGB BLD-MCNC: 9.6 G/DL (ref 11.5–16)
IMM GRANULOCYTES # BLD AUTO: 0.08 K/UL (ref 0–0.04)
IMM GRANULOCYTES NFR BLD AUTO: 0.5 % (ref 0–0.5)
LYMPHOCYTES # BLD: 1.21 K/UL (ref 0.8–3.5)
LYMPHOCYTES NFR BLD: 7.7 % (ref 12–49)
MCH RBC QN AUTO: 26.7 PG (ref 26–34)
MCHC RBC AUTO-ENTMCNC: 32.9 G/DL (ref 30–36.5)
MCV RBC AUTO: 81.3 FL (ref 80–99)
MONOCYTES # BLD: 1.74 K/UL (ref 0–1)
MONOCYTES NFR BLD: 11.1 % (ref 5–13)
NEUTS SEG # BLD: 11.61 K/UL (ref 1.8–8)
NEUTS SEG NFR BLD: 73.8 % (ref 32–75)
NRBC # BLD: 0 K/UL (ref 0–0.01)
NRBC BLD-RTO: 0 PER 100 WBC
PLATELET # BLD AUTO: 260 K/UL (ref 150–400)
PMV BLD AUTO: 9.6 FL (ref 8.9–12.9)
POTASSIUM SERPL-SCNC: 3.6 MMOL/L (ref 3.5–5.1)
RBC # BLD AUTO: 3.59 M/UL (ref 3.8–5.2)
SODIUM SERPL-SCNC: 138 MMOL/L (ref 136–145)
WBC # BLD AUTO: 15.7 K/UL (ref 3.6–11)

## 2025-01-20 PROCEDURE — 80048 BASIC METABOLIC PNL TOTAL CA: CPT

## 2025-01-20 PROCEDURE — 2709999900 HC NON-CHARGEABLE SUPPLY: Performed by: SURGERY

## 2025-01-20 PROCEDURE — 2500000003 HC RX 250 WO HCPCS: Performed by: SURGERY

## 2025-01-20 PROCEDURE — 3600000002 HC SURGERY LEVEL 2 BASE: Performed by: SURGERY

## 2025-01-20 PROCEDURE — 3700000000 HC ANESTHESIA ATTENDED CARE: Performed by: SURGERY

## 2025-01-20 PROCEDURE — 6370000000 HC RX 637 (ALT 250 FOR IP): Performed by: SURGERY

## 2025-01-20 PROCEDURE — 85025 COMPLETE CBC W/AUTO DIFF WBC: CPT

## 2025-01-20 PROCEDURE — 0WQF0ZZ REPAIR ABDOMINAL WALL, OPEN APPROACH: ICD-10-PCS | Performed by: SURGERY

## 2025-01-20 PROCEDURE — 6360000002 HC RX W HCPCS: Performed by: FAMILY MEDICINE

## 2025-01-20 PROCEDURE — 2580000003 HC RX 258: Performed by: ANESTHESIOLOGY

## 2025-01-20 PROCEDURE — 6360000002 HC RX W HCPCS

## 2025-01-20 PROCEDURE — 1100000000 HC RM PRIVATE

## 2025-01-20 PROCEDURE — 49591 RPR AA HRN 1ST < 3 CM RDC: CPT | Performed by: SURGERY

## 2025-01-20 PROCEDURE — 3700000001 HC ADD 15 MINUTES (ANESTHESIA): Performed by: SURGERY

## 2025-01-20 PROCEDURE — 2500000003 HC RX 250 WO HCPCS: Performed by: FAMILY MEDICINE

## 2025-01-20 PROCEDURE — 2720000010 HC SURG SUPPLY STERILE: Performed by: SURGERY

## 2025-01-20 PROCEDURE — 6370000000 HC RX 637 (ALT 250 FOR IP): Performed by: FAMILY MEDICINE

## 2025-01-20 PROCEDURE — 6370000000 HC RX 637 (ALT 250 FOR IP): Performed by: INTERNAL MEDICINE

## 2025-01-20 PROCEDURE — 7100000000 HC PACU RECOVERY - FIRST 15 MIN: Performed by: SURGERY

## 2025-01-20 PROCEDURE — 94640 AIRWAY INHALATION TREATMENT: CPT

## 2025-01-20 PROCEDURE — 99232 SBSQ HOSP IP/OBS MODERATE 35: CPT | Performed by: INTERNAL MEDICINE

## 2025-01-20 PROCEDURE — 7100000001 HC PACU RECOVERY - ADDTL 15 MIN: Performed by: SURGERY

## 2025-01-20 PROCEDURE — 2500000003 HC RX 250 WO HCPCS

## 2025-01-20 PROCEDURE — 6360000002 HC RX W HCPCS: Performed by: SURGERY

## 2025-01-20 PROCEDURE — 6360000002 HC RX W HCPCS: Performed by: ANESTHESIOLOGY

## 2025-01-20 PROCEDURE — 3600000012 HC SURGERY LEVEL 2 ADDTL 15MIN: Performed by: SURGERY

## 2025-01-20 RX ORDER — LIDOCAINE HYDROCHLORIDE 10 MG/ML
1 INJECTION, SOLUTION EPIDURAL; INFILTRATION; INTRACAUDAL; PERINEURAL
Status: DISCONTINUED | OUTPATIENT
Start: 2025-01-20 | End: 2025-01-20 | Stop reason: HOSPADM

## 2025-01-20 RX ORDER — OXYCODONE HYDROCHLORIDE 5 MG/1
5 TABLET ORAL EVERY 4 HOURS PRN
Status: DISCONTINUED | OUTPATIENT
Start: 2025-01-20 | End: 2025-01-24 | Stop reason: HOSPADM

## 2025-01-20 RX ORDER — SODIUM CHLORIDE, SODIUM LACTATE, POTASSIUM CHLORIDE, CALCIUM CHLORIDE 600; 310; 30; 20 MG/100ML; MG/100ML; MG/100ML; MG/100ML
INJECTION, SOLUTION INTRAVENOUS CONTINUOUS
Status: DISCONTINUED | OUTPATIENT
Start: 2025-01-20 | End: 2025-01-20 | Stop reason: HOSPADM

## 2025-01-20 RX ORDER — FENTANYL CITRATE 50 UG/ML
INJECTION, SOLUTION INTRAMUSCULAR; INTRAVENOUS
Status: COMPLETED
Start: 2025-01-20 | End: 2025-01-20

## 2025-01-20 RX ORDER — FENTANYL CITRATE 50 UG/ML
25 INJECTION, SOLUTION INTRAMUSCULAR; INTRAVENOUS EVERY 5 MIN PRN
Status: DISCONTINUED | OUTPATIENT
Start: 2025-01-20 | End: 2025-01-20 | Stop reason: HOSPADM

## 2025-01-20 RX ORDER — MIDAZOLAM HYDROCHLORIDE 2 MG/2ML
2 INJECTION, SOLUTION INTRAMUSCULAR; INTRAVENOUS PRN
Status: DISCONTINUED | OUTPATIENT
Start: 2025-01-20 | End: 2025-01-20 | Stop reason: HOSPADM

## 2025-01-20 RX ORDER — BUPIVACAINE HYDROCHLORIDE 2.5 MG/ML
INJECTION, SOLUTION EPIDURAL; INFILTRATION; INTRACAUDAL PRN
Status: DISCONTINUED | OUTPATIENT
Start: 2025-01-20 | End: 2025-01-20 | Stop reason: HOSPADM

## 2025-01-20 RX ORDER — OXYCODONE HYDROCHLORIDE 5 MG/1
10 TABLET ORAL EVERY 4 HOURS PRN
Status: DISCONTINUED | OUTPATIENT
Start: 2025-01-20 | End: 2025-01-24 | Stop reason: HOSPADM

## 2025-01-20 RX ORDER — ONDANSETRON 2 MG/ML
INJECTION INTRAMUSCULAR; INTRAVENOUS
Status: DISCONTINUED | OUTPATIENT
Start: 2025-01-20 | End: 2025-01-20 | Stop reason: SDUPTHER

## 2025-01-20 RX ORDER — ROCURONIUM BROMIDE 10 MG/ML
INJECTION, SOLUTION INTRAVENOUS
Status: DISCONTINUED | OUTPATIENT
Start: 2025-01-20 | End: 2025-01-20 | Stop reason: SDUPTHER

## 2025-01-20 RX ORDER — NALOXONE HYDROCHLORIDE 0.4 MG/ML
INJECTION, SOLUTION INTRAMUSCULAR; INTRAVENOUS; SUBCUTANEOUS PRN
Status: DISCONTINUED | OUTPATIENT
Start: 2025-01-20 | End: 2025-01-20 | Stop reason: HOSPADM

## 2025-01-20 RX ORDER — SODIUM CHLORIDE 0.9 % (FLUSH) 0.9 %
5-40 SYRINGE (ML) INJECTION PRN
Status: DISCONTINUED | OUTPATIENT
Start: 2025-01-20 | End: 2025-01-20 | Stop reason: HOSPADM

## 2025-01-20 RX ORDER — PHENYLEPHRINE HCL IN 0.9% NACL 0.4MG/10ML
SYRINGE (ML) INTRAVENOUS
Status: DISCONTINUED | OUTPATIENT
Start: 2025-01-20 | End: 2025-01-20 | Stop reason: SDUPTHER

## 2025-01-20 RX ORDER — HYDROMORPHONE HYDROCHLORIDE 1 MG/ML
0.5 INJECTION, SOLUTION INTRAMUSCULAR; INTRAVENOUS; SUBCUTANEOUS EVERY 5 MIN PRN
Status: DISCONTINUED | OUTPATIENT
Start: 2025-01-20 | End: 2025-01-20 | Stop reason: HOSPADM

## 2025-01-20 RX ORDER — SODIUM CHLORIDE 0.9 % (FLUSH) 0.9 %
5-40 SYRINGE (ML) INJECTION EVERY 12 HOURS SCHEDULED
Status: DISCONTINUED | OUTPATIENT
Start: 2025-01-20 | End: 2025-01-20 | Stop reason: HOSPADM

## 2025-01-20 RX ORDER — PROCHLORPERAZINE EDISYLATE 5 MG/ML
5 INJECTION INTRAMUSCULAR; INTRAVENOUS
Status: DISCONTINUED | OUTPATIENT
Start: 2025-01-20 | End: 2025-01-20 | Stop reason: HOSPADM

## 2025-01-20 RX ORDER — OXYCODONE HYDROCHLORIDE 5 MG/1
5 TABLET ORAL
Status: DISCONTINUED | OUTPATIENT
Start: 2025-01-20 | End: 2025-01-20 | Stop reason: HOSPADM

## 2025-01-20 RX ORDER — GENTAMICIN SULFATE 1 MG/G
CREAM TOPICAL PRN
Status: DISCONTINUED | OUTPATIENT
Start: 2025-01-20 | End: 2025-01-24 | Stop reason: HOSPADM

## 2025-01-20 RX ORDER — PROPOFOL 10 MG/ML
INJECTION, EMULSION INTRAVENOUS
Status: DISCONTINUED | OUTPATIENT
Start: 2025-01-20 | End: 2025-01-20 | Stop reason: SDUPTHER

## 2025-01-20 RX ORDER — FENTANYL CITRATE 50 UG/ML
INJECTION, SOLUTION INTRAMUSCULAR; INTRAVENOUS
Status: DISCONTINUED | OUTPATIENT
Start: 2025-01-20 | End: 2025-01-20 | Stop reason: SDUPTHER

## 2025-01-20 RX ORDER — LIDOCAINE HYDROCHLORIDE 20 MG/ML
INJECTION, SOLUTION EPIDURAL; INFILTRATION; INTRACAUDAL; PERINEURAL
Status: DISCONTINUED | OUTPATIENT
Start: 2025-01-20 | End: 2025-01-20 | Stop reason: SDUPTHER

## 2025-01-20 RX ORDER — SODIUM CHLORIDE 9 MG/ML
INJECTION, SOLUTION INTRAVENOUS PRN
Status: DISCONTINUED | OUTPATIENT
Start: 2025-01-20 | End: 2025-01-20 | Stop reason: HOSPADM

## 2025-01-20 RX ORDER — ACETAMINOPHEN 500 MG
1000 TABLET ORAL ONCE
Status: DISCONTINUED | OUTPATIENT
Start: 2025-01-20 | End: 2025-01-20 | Stop reason: SDUPTHER

## 2025-01-20 RX ORDER — FENTANYL CITRATE 50 UG/ML
100 INJECTION, SOLUTION INTRAMUSCULAR; INTRAVENOUS
Status: DISCONTINUED | OUTPATIENT
Start: 2025-01-20 | End: 2025-01-20 | Stop reason: HOSPADM

## 2025-01-20 RX ORDER — SUCCINYLCHOLINE/SOD CL,ISO/PF 200MG/10ML
SYRINGE (ML) INTRAVENOUS
Status: DISCONTINUED | OUTPATIENT
Start: 2025-01-20 | End: 2025-01-20 | Stop reason: SDUPTHER

## 2025-01-20 RX ORDER — HYDRALAZINE HYDROCHLORIDE 20 MG/ML
10 INJECTION INTRAMUSCULAR; INTRAVENOUS
Status: DISCONTINUED | OUTPATIENT
Start: 2025-01-20 | End: 2025-01-20 | Stop reason: HOSPADM

## 2025-01-20 RX ORDER — ACETAMINOPHEN 500 MG
1000 TABLET ORAL ONCE
Status: DISCONTINUED | OUTPATIENT
Start: 2025-01-20 | End: 2025-01-20 | Stop reason: HOSPADM

## 2025-01-20 RX ORDER — ONDANSETRON 2 MG/ML
4 INJECTION INTRAMUSCULAR; INTRAVENOUS
Status: DISCONTINUED | OUTPATIENT
Start: 2025-01-20 | End: 2025-01-20 | Stop reason: HOSPADM

## 2025-01-20 RX ORDER — EPHEDRINE SULFATE 50 MG/ML
INJECTION INTRAVENOUS
Status: DISCONTINUED | OUTPATIENT
Start: 2025-01-20 | End: 2025-01-20 | Stop reason: SDUPTHER

## 2025-01-20 RX ADMIN — FERROUS SULFATE TAB 325 MG (65 MG ELEMENTAL FE) 325 MG: 325 (65 FE) TAB at 09:00

## 2025-01-20 RX ADMIN — SODIUM CHLORIDE: 9 INJECTION, SOLUTION INTRAVENOUS at 13:12

## 2025-01-20 RX ADMIN — Medication 120 MCG: at 13:41

## 2025-01-20 RX ADMIN — Medication 80 MCG: at 13:39

## 2025-01-20 RX ADMIN — PROPOFOL 70 MG: 10 INJECTION, EMULSION INTRAVENOUS at 13:20

## 2025-01-20 RX ADMIN — ISOSORBIDE MONONITRATE 30 MG: 60 TABLET, EXTENDED RELEASE ORAL at 20:30

## 2025-01-20 RX ADMIN — WATER 2000 MG: 1 INJECTION INTRAMUSCULAR; INTRAVENOUS; SUBCUTANEOUS at 06:54

## 2025-01-20 RX ADMIN — METOPROLOL SUCCINATE 25 MG: 25 TABLET, EXTENDED RELEASE ORAL at 09:00

## 2025-01-20 RX ADMIN — NIFEDIPINE 30 MG: 30 TABLET, FILM COATED, EXTENDED RELEASE ORAL at 09:00

## 2025-01-20 RX ADMIN — OXYCODONE HYDROCHLORIDE 10 MG: 5 TABLET ORAL at 17:31

## 2025-01-20 RX ADMIN — SUGAMMADEX 120 MG: 100 INJECTION, SOLUTION INTRAVENOUS at 15:24

## 2025-01-20 RX ADMIN — ACETAMINOPHEN 1000 MG: 500 TABLET ORAL at 12:51

## 2025-01-20 RX ADMIN — PRAVASTATIN SODIUM 20 MG: 40 TABLET ORAL at 20:30

## 2025-01-20 RX ADMIN — FENTANYL CITRATE 25 MCG: 50 INJECTION INTRAMUSCULAR; INTRAVENOUS at 16:15

## 2025-01-20 RX ADMIN — EPHEDRINE SULFATE 10 MG: 50 INJECTION INTRAVENOUS at 13:57

## 2025-01-20 RX ADMIN — LOSARTAN POTASSIUM 50 MG: 50 TABLET, FILM COATED ORAL at 09:00

## 2025-01-20 RX ADMIN — PANTOPRAZOLE SODIUM 40 MG: 40 TABLET, DELAYED RELEASE ORAL at 06:53

## 2025-01-20 RX ADMIN — FENTANYL CITRATE 25 MCG: 50 INJECTION INTRAMUSCULAR; INTRAVENOUS at 13:18

## 2025-01-20 RX ADMIN — Medication 80 MCG: at 13:52

## 2025-01-20 RX ADMIN — FENTANYL CITRATE 75 MCG: 50 INJECTION INTRAMUSCULAR; INTRAVENOUS at 13:44

## 2025-01-20 RX ADMIN — FENTANYL CITRATE 25 MCG: 50 INJECTION INTRAMUSCULAR; INTRAVENOUS at 16:25

## 2025-01-20 RX ADMIN — IPRATROPIUM BROMIDE AND ALBUTEROL SULFATE 1 DOSE: 2.5; .5 SOLUTION RESPIRATORY (INHALATION) at 10:42

## 2025-01-20 RX ADMIN — ROCURONIUM BROMIDE 5 MG: 10 SOLUTION INTRAVENOUS at 13:20

## 2025-01-20 RX ADMIN — Medication 120 MCG: at 13:49

## 2025-01-20 RX ADMIN — ROCURONIUM BROMIDE 25 MG: 10 SOLUTION INTRAVENOUS at 13:41

## 2025-01-20 RX ADMIN — LAMOTRIGINE 25 MG: 25 TABLET ORAL at 09:00

## 2025-01-20 RX ADMIN — LIDOCAINE HYDROCHLORIDE 40 MG: 20 INJECTION, SOLUTION EPIDURAL; INFILTRATION; INTRACAUDAL; PERINEURAL at 13:20

## 2025-01-20 RX ADMIN — ONDANSETRON 4 MG: 2 INJECTION INTRAMUSCULAR; INTRAVENOUS at 15:23

## 2025-01-20 RX ADMIN — METRONIDAZOLE 500 MG: 500 INJECTION, SOLUTION INTRAVENOUS at 12:54

## 2025-01-20 RX ADMIN — WATER 2000 MG: 1 INJECTION INTRAMUSCULAR; INTRAVENOUS; SUBCUTANEOUS at 13:41

## 2025-01-20 RX ADMIN — LAMOTRIGINE 25 MG: 25 TABLET ORAL at 20:30

## 2025-01-20 RX ADMIN — HYDROCODONE BITARTRATE AND ACETAMINOPHEN 1 TABLET: 5; 325 TABLET ORAL at 06:52

## 2025-01-20 RX ADMIN — Medication 100 MG: at 13:20

## 2025-01-20 ASSESSMENT — PAIN DESCRIPTION - DESCRIPTORS
DESCRIPTORS: ACHING
DESCRIPTORS: ACHING;DISCOMFORT
DESCRIPTORS: ACHING
DESCRIPTORS: ACHING

## 2025-01-20 ASSESSMENT — PAIN DESCRIPTION - ORIENTATION
ORIENTATION: RIGHT
ORIENTATION: RIGHT

## 2025-01-20 ASSESSMENT — PAIN DESCRIPTION - LOCATION
LOCATION: ABDOMEN
LOCATION: SHOULDER
LOCATION: SHOULDER

## 2025-01-20 ASSESSMENT — PAIN SCALES - GENERAL
PAINLEVEL_OUTOF10: 4
PAINLEVEL_OUTOF10: 0
PAINLEVEL_OUTOF10: 6
PAINLEVEL_OUTOF10: 4
PAINLEVEL_OUTOF10: 8
PAINLEVEL_OUTOF10: 8

## 2025-01-20 ASSESSMENT — PAIN DESCRIPTION - PAIN TYPE
TYPE: SURGICAL PAIN
TYPE: SURGICAL PAIN

## 2025-01-20 ASSESSMENT — PAIN - FUNCTIONAL ASSESSMENT: PAIN_FUNCTIONAL_ASSESSMENT: 0-10

## 2025-01-20 NOTE — ANESTHESIA PRE PROCEDURE
Department of Anesthesiology  Preprocedure Note       Name:  Heather Rich   Age:  89 y.o.  :  1935                                          MRN:  [id number]         Date:  2025      Surgeon: Surgeon(s):  Narayan Matt MD    Procedure: Procedure(s):  REPAIR VENTRAL HERNIA, POSSIBLE MESH    Medications prior to admission:   Prior to Admission medications    Medication Sig Start Date End Date Taking? Authorizing Provider   lamoTRIgine (LAMICTAL) 25 MG tablet TAKE 1 TABLET TWICE A DAY 25   Kehinde Barney MD   NIFEdipine (PROCARDIA XL) 30 MG extended release tablet Take 1 tablet by mouth daily 10/16/24   Kehinde Barney MD   albuterol sulfate HFA (PROVENTIL;VENTOLIN;PROAIR) 108 (90 Base) MCG/ACT inhaler Inhale 2 puffs into the lungs every 4 hours as needed for Wheezing 10/16/24   Kehinde Barney MD   isosorbide mononitrate (IMDUR) 30 MG extended release tablet Take 1 tablet by mouth daily 24   Prashant Alejo MD   pantoprazole (PROTONIX) 40 MG tablet Take 1 tablet by mouth every morning (before breakfast) 24   Prashant Alejo MD   isosorbide mononitrate (IMDUR) 30 MG extended release tablet Take 1 tablet by mouth daily  Patient not taking: Reported on 9/3/2024 8/29/24   Kehinde Barney MD   dilTIAZem (CARDIZEM CD) 240 MG extended release capsule Take 1 capsule by mouth daily 24   Kehinde Barney MD   losartan (COZAAR) 50 MG tablet Take 1 tablet by mouth daily 24   Kehinde Barney MD   metoprolol succinate (TOPROL XL) 25 MG extended release tablet Take 1 tablet by mouth daily 24   Kehinde Barney MD   lactulose (CHRONULAC) 10 GM/15ML solution Take 15 mLs by mouth 2 times daily 24   Provider, MD Tegan   ondansetron (ZOFRAN-ODT) 4 MG disintegrating tablet Take 1 tablet by mouth every 8 hours as needed for Nausea or Vomiting  Patient not taking: Reported on 9/3/2024 7/6/24   Rosio Echevarria MD   oxyBUTYnin (DITROPAN-XL)

## 2025-01-20 NOTE — OP NOTE
[address]  [address]                            OPERATIVE REPORT      PATIENT NAME: JOANNE RICH              : 1935  MED REC NO: [id number]                       ROOM: OR  ACCOUNT NO: [id number]                       ADMIT DATE: 01/15/2025  PROVIDER: Narayan Matt MD    DATE OF SERVICE:  2025    PREOPERATIVE DIAGNOSES:  Ventral hernia.    POSTOPERATIVE DIAGNOSES:  Ventral hernia.    PROCEDURES PERFORMED:  Repair of ventral hernia    SURGEON:  Narayan Matt MD    ASSISTANT:  He Hayes SA.    ANESTHESIA:  General endotracheal.    ESTIMATED BLOOD LOSS:  Approximately 20 mL.    SPECIMENS REMOVED:  None.    INTRAOPERATIVE FINDINGS:  Ventral hernia - not incarcerated, less than 3 cm in length.     COMPLICATIONS:  None.    IMPLANTS:  None.    INDICATIONS:  The patient is an 89-year-old female who recently presented with a small-bowel obstruction due to an incarcerated ventral hernia.  The hernia contents were reduced and the small bowel obstruction resolved.  Ms. Rich is brought to the operating at this time for ventral hernia repair possibly with mesh.  The risks of the procedure, including but not limited to, infection, bleeding, and hernia recurrence were discussed in detail with the patient. Ms. Rich understood and wished to proceed.    DESCRIPTION OF PROCEDURE:  After consent was obtained, the patient was brought to the operating room where she was placed in the supine position on the operating room table.  Following the induction of an adequate level of general anesthesia via the endotracheal tube, compression devices were placed on both lower extremities.  The abdomen was prepped with ChloraPrep and draped as a sterile field.  Local anesthetic was infiltrated and a midline incision above the umbilicus over the hernia was opened sharply.  Subcutaneous bleeders were carefully cauterized.  The incision was carried

## 2025-01-20 NOTE — H&P
Date of Surgery Update:  Heather Rich was seen and examined.  History and physical has been reviewed. The patient has been examined. There have been no significant clinical changes since the completion of the originally dated History and Physical.  Patient identified by surgeon; surgical site was confirmed by patient and surgeon.    Signed By: Narayan Matt MD     January 20, 2025 1:11 PM         Please note from the office and include the additional information below:    Past Medical History  Past Medical History:   Diagnosis Date    Anemia     Arrhythmia     irregular per pt d/t blood disorder    Asthma     Axillary adenopathy 01/04/2021    3/1/21 md Gabe - Benign, reactive lymph nodes with follicular hyperplasia     Bed bug bite 01/18/2021    Bereavement 02/02/2016     die 85 copd,chf,pul htn pn after 53 yr marriage    BPV (benign positional vertigo)     BPV (benign positional vertigo) 08/20/2020    Breast cancer (HCC)     Breast cancer, right breast (HCC) 1994    right breast, lumpectomy and radiation    Breast lump 2017    right breast     CAP (community acquired pneumonia) 12/05/2021    New right basilar airspace disease may represent atelectasis or pneumonia    Chronic kidney disease 02/06/2019    kidney cyst - watching    Coagulation disorder (HCC)     SPHEROCYTOSIS  SICKLE CELL TRAIT    Diabetes (HCC)     controlled with diet    Early satiety     ESRD on peritoneal dialysis (HCC) 10/20/2021    dr. franklin    Fall (on)(from) sidewalk curb, sequela 12/28/2022    Hearing deficit, left     Hereditary spherocytosis (HCC)     History of colonoscopy with polypectomy 08/29/2023    md lucita 5 yrs    History of nuclear stress test 01/22/2021    Normal myocardial perfusion scan without evidence of ischemia or prior infarct ejection fraction 68%    History of therapeutic radiation     1994 on rt    HTN (hypertension)     Ill-defined condition     sickle cell trait    Intrahepatic bile duct dilation

## 2025-01-20 NOTE — PROGRESS NOTES
Hospitalist Progress Note  Bandar Molina MD  Answering service: [phone]        Date of Service:  2025  NAME:  Heather Rich  :  1935  MRN:  [id number]      Admission Summary:   89 y.o. female with a pmhx asthma, BPV, past right breast cancer s/p lumpectomy and radiation, ESRD on PD, DM II, HTN , and seizures who presents with abdominal pain, and is being admitted for SBO.     In the ED, RR was elevated to 32, and she was hypertensive to .  Other VSS.  Labs showed WBC 18, microcytic anemia with hgb 11.1, troponin 233>246,,  k+ 3, glucose 127, BuN 39, creatinine 5.61, and albumin 2.8. CT abdomen/pelvis showed proximal small bowel distension and decompressed loops distally concerning for obstruction, and that is likely related to small ventral hernia.       In the ED, general surgery was consulted and reduced the hernia at bedside. I have consulted nephrology for pt. PD to be set up IP.  She is currently sleeping comfortably in NAD       Interval history / Subjective:      Follow up on SBO Ventral hernia ESRD on PD   No acute issues   Started on HD in hospital   Plan for Surgery /ventral Hernia repair tomorrow       Assessment & Plan:      #SBO (small bowel obstruction) (HCC)/resolved    #Incarcerated ventral hernia      Symptoms have resolved     GS following      Tolerating po diet & having BM      Incarcerated ventral hernia/ Reduced by Dr. Matt     Continue rocephin & flagyl      Plan for ventral hernia repair on        # HTN (hypertension)     Continue home antihypertensives     Start IV hydralazine prn for uncontrolled BP     Started on HD TTHS        #ESRD on peritoneal dialysis (HCC)     Santos cath placed      Started on  HD      Nephrology following /plan to change to PermCath prior to discharge-consult IR        Type 2 diabetes mellitus with kidney complication,  reviewed and interpreted patient's lab and all other diagnostic data    Notes reviewed from all clinical/nonclinical/nursing services involved in patient's clinical care. Care coordination discussions were held with appropriate clinical/nonclinical/ nursing providers based on care coordination needs.     Labs:     Recent Labs     01/18/25  0449 01/19/25  0207   WBC 14.3* 14.3*   HGB 9.2* 9.6*   HCT 27.1* 28.5*    267     Recent Labs     01/17/25  0516 01/18/25  0449 01/19/25  0207 01/19/25  0208    140 136 135*   K 3.3* 3.5 3.2* 3.3*    106 103 103   CO2 27 27 28 27   BUN 55* 20 5* 5*   PHOS 4.3 2.5*  --  1.6*     No results for input(s): \"ALT\", \"TP\", \"GLOB\", \"GGT\" in the last 72 hours.    Invalid input(s): \"SGOT\", \"GPT\", \"AP\", \"TBIL\", \"TBILI\", \"ALB\", \"AML\", \"AMYP\", \"LPSE\", \"HLPSE\"  No results for input(s): \"INR\", \"APTT\" in the last 72 hours.    Invalid input(s): \"PTP\"   No results for input(s): \"TIBC\" in the last 72 hours.    Invalid input(s): \"FE\", \"PSAT\", \"FERR\"   No results found for: \"RBCF\"   No results for input(s): \"PH\", \"PCO2\", \"PO2\" in the last 72 hours.  No results for input(s): \"CPK\" in the last 72 hours.    Invalid input(s): \"CPKMB\", \"CKNDX\", \"TROIQ\"  Lab Results   Component Value Date/Time    CHOL 145 03/21/2023 01:09 PM     03/21/2023 01:09 PM    LDL 34.8 03/21/2023 01:09 PM     No results found for: \"GLUCPOC\"  [unfilled]      Medications Reviewed:     Current Facility-Administered Medications   Medication Dose Route Frequency    hydrALAZINE (APRESOLINE) injection 5 mg  5 mg IntraVENous Q8H PRN    ipratropium 0.5 mg-albuterol 2.5 mg (DUONEB) nebulizer solution 1 Dose  1 Dose Inhalation Q4H PRN    cefTRIAXone (ROCEPHIN) 2,000 mg in sterile water 20 mL IV syringe  2,000 mg IntraVENous Q24H    pravastatin (PRAVACHOL) tablet 20 mg  20 mg Oral Nightly    pantoprazole (PROTONIX) tablet 40 mg  40 mg Oral QAM AC    NIFEdipine (PROCARDIA XL) extended release tablet 30 mg  30 mg Oral Daily

## 2025-01-20 NOTE — PERIOP NOTE
TRANSFER - OUT REPORT:    Verbal report given to Ana(name) on Heather Rich  being transferred to [facility]  (unit) for routine post-op       Report consisted of patient’s Situation, Background, Assessment and   Recommendations(SBAR).     Time Pre op antibiotic given:1254 & 1341  Anesthesia Stop time: 1547    Information from the following report(s) SBAR, OR Summary, Intake/Output, MAR, and Accordion was reviewed with the receiving nurse.    Opportunity for questions and clarification was provided.     Is the patient on 02? Yes       L/Min 2       Other     Is the patient on a monitor? No    Is the nurse transporting with the patient? No    Surgical Waiting Area notified of patient's transfer from PACU? Yes

## 2025-01-20 NOTE — PROGRESS NOTES
Hospital Progress Note  Kehinde Barney MD   Answering service: [phone] OR    [employer]      NAME:  Heather Rich   :   1935   MRN:  [id number]     Date/Time:  2025 9:01 AM    Plan:     Plans for surgical repair today  NPO for surg  Encourage activity  Risk of Deterioration: Low  []           Moderate  []           High  []                 Assessment:     Principal Problem:    SBO (small bowel obstruction) ([facility])     Symptoms have resolved            Active Problems:    Incarcerated ventral hernia     Reduced by Dr. Matt     Patient agrees with  plans for ventral hernia repair      HTN (hypertension)     Will titrate antihypertensives     Patient concern related to elevated blood pressures in the morning      Anemia of chronic renal failure      ESRD on peritoneal dialysis (HCC)     Agrees to transition to hemodialysis temporarily        Type 2 diabetes mellitus with kidney complication, without long-term current use of insulin ([facility])      Diet controlled      Continue to monitor      SIRS (systemic inflammatory response syndrome) ([facility])     Leukocytosis with WBCs greater than 15 on admission now improving     Check blood cultures      Elevated troponin     Cardiology opinion     No chest pain      Seizure ([facility])     Continue Lamictal     No recent seizures      Gastroesophageal reflux disease without esophagitis     PPI as needed  Resolved Problems:    * No resolved hospital problems. *       Admitting notes:89 y.o. female with a pmhx asthma, BPV, past right breast cancer s/p lumpectomy and radiation, ESRD on PD, DM II, HTN , and seizures who presents with abdominal pain, and is being admitted for SBO.     In the ED, RR was elevated to 32, and she was hypertensive to .  Other VSS.  Labs showed WBC 18, microcytic anemia with hgb 11.1, troponin 233>246,,  k+ 3, glucose 127, BuN 39, creatinine 5.61, and albumin 2.8. CT abdomen/pelvis showed proximal small bowel distension and

## 2025-01-20 NOTE — PROGRESS NOTES
Name: Heather Rich MRN: [id number]   : 1935 Hospital: [facility]   Date: 2025        IMPRESSION:   ESRD transitioned from PD to iHD in anticipation of abdominal surgery for hernia repair  Anemia of ESRD  Ventral hernia  Hypokalemia predialysis      PLAN:   HD 4k+ bath, TTHS  Hernia surgery repair on Monday  Most likely will need to switch to HD permanently.  Albumin is only 2.2  Santos catheter needs to be changed to PermCath prior to discharge-reconsult IR  Case management consult for outpatient hemodialysis chair at [address].  Changed to regular diet due to hypokalemia  Add Nepro  PD catheter care only by dialysis nurses     Subjective/Interval History:   I have reviewed the flowsheet and previous day’s notes.   -feels okay denies any complaints.  Hemodialysis later today  -patient had dialysis last night.  Tolerated well.  Denies any complaints.  Her PD catheter dressing getting changed by nurse  -feels okay, no new complaints.  Plan for hernia repair today.  Dialysis tomorrow    Objective:   Vital Signs:    BP (!) 141/66   Pulse 86   Temp 98.6 °F (37 °C)   Resp 18   Wt 57.7 kg (127 lb 1.6 oz)   SpO2 95%   BMI 22.51 kg/m²             Temp (24hrs), Av.9 °F (37.2 °C), Min:98.6 °F (37 °C), Max:99.1 °F (37.3 °C)       Intake/Output:   Last shift:       07 - 1900  In: 50 [P.O.:50]  Out: -   Last 3 shifts: 1901 -  0700  In: 1340 [P.O.:840]  Out: 1500     Intake/Output Summary (Last 24 hours) at 2025 1059  Last data filed at 2025 0722  Gross per 24 hour   Intake 530 ml   Output --   Net 530 ml          Current Facility-Administered Medications   Medication Dose Route Frequency    hydrALAZINE (APRESOLINE) injection 5 mg  5 mg IntraVENous Q8H PRN    ipratropium 0.5 mg-albuterol 2.5 mg (DUONEB) nebulizer solution 1 Dose  1 Dose Inhalation Q4H PRN    cefTRIAXone (ROCEPHIN) 2,000 mg in sterile water 20 mL IV syringe  2,000 mg

## 2025-01-20 NOTE — BRIEF OP NOTE
Brief Postoperative Note      Patient: Heather Rich  YOB: 1935  MRN: [id number]    Date of Procedure: 1/20/2025    Pre-Op Diagnosis: Ventral Hernia.     Post-Op Diagnosis:  Same.       Procedure:   Repair Ventral Hernia.     Surgeon(s):  Narayan Matt MD    Assistant: He Hayes SA.    Anesthesia: General    Estimated Blood Loss (mL): Approximately 20 ml.    Complications: None    Specimens:   * No specimens in log *    Implants:  * No implants in log *      Drains:   [REMOVED] NG/OG/NJ/NE Tube Nasogastric Left nostril (Removed)       [REMOVED] Urinary Catheter 05/05/24 2 Way (Removed)       Findings:  Infection Present At Time Of Surgery (PATOS) (choose all levels that have infection present):  No infection present  Other Findings: Ventral hernia - not incarcerated, less than 3 cm in length.     Electronically signed by Narayan Matt MD on 1/20/2025 at 3:34 PM

## 2025-01-20 NOTE — DIALYSIS
Reviewed with Dr. Barros patient with PD catheter currently running HD post abdominal surgery. Will flush catheter and change dressing weekly flush to start next Monday one week post operative.

## 2025-01-20 NOTE — ANESTHESIA POSTPROCEDURE EVALUATION
Post-Anesthesia Evaluation and Assessment    Patient: Heather Rich MRN: [id number]  SSN: [id number]    YOB: 1935  Age: 89 y.o.  Sex: female      I have evaluated the patient and they are stable and ready for discharge from the PACU.     Cardiovascular Function/Vital Signs  Visit Vitals  BP (!) 149/64   Pulse 86   Temp 99.8 °F (37.7 °C) (Oral)   Resp 20   Wt 57.7 kg (127 lb 1.6 oz)   SpO2 96%   BMI 22.51 kg/m²       Patient is status post General anesthesia for Procedure(s):  REPAIR VENTRAL HERNIA.    Nausea/Vomiting: None    Postoperative hydration reviewed and adequate.    Pain:  Managed    Neurological Status:   At baseline    Mental Status, Level of Consciousness: Alert and  oriented to person, place, and time    Pulmonary Status:   Adequate oxygenation and airway patent    Complications related to anesthesia: None    Post-anesthesia assessment completed. No concerns    Signed By: Danny Cortez MD     January 20, 2025

## 2025-01-21 ENCOUNTER — APPOINTMENT (OUTPATIENT)
Facility: HOSPITAL | Age: 89
End: 2025-01-21
Payer: MEDICARE

## 2025-01-21 LAB
ALBUMIN SERPL-MCNC: 2.4 G/DL (ref 3.5–5)
ANION GAP SERPL CALC-SCNC: 9 MMOL/L (ref 2–12)
BACTERIA SPEC CULT: NORMAL
BACTERIA SPEC CULT: NORMAL
BASOPHILS # BLD: 0.11 K/UL (ref 0–0.1)
BASOPHILS NFR BLD: 0.6 % (ref 0–1)
BUN SERPL-MCNC: 30 MG/DL (ref 6–20)
BUN/CREAT SERPL: 5 (ref 12–20)
CALCIUM SERPL-MCNC: 8.5 MG/DL (ref 8.5–10.1)
CHLORIDE SERPL-SCNC: 110 MMOL/L (ref 97–108)
CO2 SERPL-SCNC: 23 MMOL/L (ref 21–32)
CREAT SERPL-MCNC: 6.14 MG/DL (ref 0.55–1.02)
DIFFERENTIAL METHOD BLD: ABNORMAL
EOSINOPHIL # BLD: 0.14 K/UL (ref 0–0.4)
EOSINOPHIL NFR BLD: 0.7 % (ref 0–7)
ERYTHROCYTE [DISTWIDTH] IN BLOOD BY AUTOMATED COUNT: 14.4 % (ref 11.5–14.5)
GLUCOSE SERPL-MCNC: 111 MG/DL (ref 65–100)
HBV SURFACE AB SER QL: NONREACTIVE
HBV SURFACE AB SER-ACNC: 3.53 MIU/ML
HBV SURFACE AG SER QL: <0.1 INDEX
HBV SURFACE AG SER QL: NEGATIVE
HCT VFR BLD AUTO: 30.3 % (ref 35–47)
HGB BLD-MCNC: 9.7 G/DL (ref 11.5–16)
IMM GRANULOCYTES # BLD AUTO: 0.1 K/UL (ref 0–0.04)
IMM GRANULOCYTES NFR BLD AUTO: 0.5 % (ref 0–0.5)
LYMPHOCYTES # BLD: 0.72 K/UL (ref 0.8–3.5)
LYMPHOCYTES NFR BLD: 3.7 % (ref 12–49)
MCH RBC QN AUTO: 27.1 PG (ref 26–34)
MCHC RBC AUTO-ENTMCNC: 32 G/DL (ref 30–36.5)
MCV RBC AUTO: 84.6 FL (ref 80–99)
MONOCYTES # BLD: 1.35 K/UL (ref 0–1)
MONOCYTES NFR BLD: 6.9 % (ref 5–13)
NEUTS SEG # BLD: 17.09 K/UL (ref 1.8–8)
NEUTS SEG NFR BLD: 87.6 % (ref 32–75)
NRBC # BLD: 0 K/UL (ref 0–0.01)
NRBC BLD-RTO: 0 PER 100 WBC
PHOSPHATE SERPL-MCNC: 5.8 MG/DL (ref 2.6–4.7)
PLATELET # BLD AUTO: 260 K/UL (ref 150–400)
PMV BLD AUTO: 10.6 FL (ref 8.9–12.9)
POTASSIUM SERPL-SCNC: 3.8 MMOL/L (ref 3.5–5.1)
RBC # BLD AUTO: 3.58 M/UL (ref 3.8–5.2)
SERVICE CMNT-IMP: NORMAL
SERVICE CMNT-IMP: NORMAL
SODIUM SERPL-SCNC: 142 MMOL/L (ref 136–145)
WBC # BLD AUTO: 19.5 K/UL (ref 3.6–11)

## 2025-01-21 PROCEDURE — 86706 HEP B SURFACE ANTIBODY: CPT

## 2025-01-21 PROCEDURE — 1100000000 HC RM PRIVATE

## 2025-01-21 PROCEDURE — 2500000003 HC RX 250 WO HCPCS: Performed by: INTERNAL MEDICINE

## 2025-01-21 PROCEDURE — 90935 HEMODIALYSIS ONE EVALUATION: CPT

## 2025-01-21 PROCEDURE — 6370000000 HC RX 637 (ALT 250 FOR IP): Performed by: SURGERY

## 2025-01-21 PROCEDURE — 80069 RENAL FUNCTION PANEL: CPT

## 2025-01-21 PROCEDURE — 97530 THERAPEUTIC ACTIVITIES: CPT

## 2025-01-21 PROCEDURE — 6360000002 HC RX W HCPCS: Performed by: INTERNAL MEDICINE

## 2025-01-21 PROCEDURE — 86704 HEP B CORE ANTIBODY TOTAL: CPT

## 2025-01-21 PROCEDURE — 71045 X-RAY EXAM CHEST 1 VIEW: CPT

## 2025-01-21 PROCEDURE — 87340 HEPATITIS B SURFACE AG IA: CPT

## 2025-01-21 PROCEDURE — 85025 COMPLETE CBC W/AUTO DIFF WBC: CPT

## 2025-01-21 PROCEDURE — 99231 SBSQ HOSP IP/OBS SF/LOW 25: CPT | Performed by: SURGERY

## 2025-01-21 PROCEDURE — 97535 SELF CARE MNGMENT TRAINING: CPT

## 2025-01-21 PROCEDURE — 36415 COLL VENOUS BLD VENIPUNCTURE: CPT

## 2025-01-21 PROCEDURE — 97165 OT EVAL LOW COMPLEX 30 MIN: CPT

## 2025-01-21 PROCEDURE — 6370000000 HC RX 637 (ALT 250 FOR IP): Performed by: INTERNAL MEDICINE

## 2025-01-21 PROCEDURE — 87040 BLOOD CULTURE FOR BACTERIA: CPT

## 2025-01-21 PROCEDURE — 99232 SBSQ HOSP IP/OBS MODERATE 35: CPT | Performed by: INTERNAL MEDICINE

## 2025-01-21 PROCEDURE — 2500000003 HC RX 250 WO HCPCS: Performed by: SURGERY

## 2025-01-21 RX ORDER — LOSARTAN POTASSIUM 50 MG/1
100 TABLET ORAL DAILY
Status: DISCONTINUED | OUTPATIENT
Start: 2025-01-21 | End: 2025-01-24 | Stop reason: HOSPADM

## 2025-01-21 RX ADMIN — OXYCODONE HYDROCHLORIDE 10 MG: 5 TABLET ORAL at 11:35

## 2025-01-21 RX ADMIN — WATER 2000 MG: 1 INJECTION INTRAMUSCULAR; INTRAVENOUS; SUBCUTANEOUS at 18:35

## 2025-01-21 RX ADMIN — LAMOTRIGINE 25 MG: 25 TABLET ORAL at 11:36

## 2025-01-21 RX ADMIN — FERROUS SULFATE TAB 325 MG (65 MG ELEMENTAL FE) 325 MG: 325 (65 FE) TAB at 11:36

## 2025-01-21 RX ADMIN — NIFEDIPINE 30 MG: 30 TABLET, FILM COATED, EXTENDED RELEASE ORAL at 11:37

## 2025-01-21 RX ADMIN — LAMOTRIGINE 25 MG: 25 TABLET ORAL at 22:05

## 2025-01-21 RX ADMIN — PRAVASTATIN SODIUM 20 MG: 40 TABLET ORAL at 22:04

## 2025-01-21 RX ADMIN — SODIUM CHLORIDE, PRESERVATIVE FREE 10 ML: 5 INJECTION INTRAVENOUS at 22:05

## 2025-01-21 RX ADMIN — GENTAMICIN SULFATE: 1 CREAM TOPICAL at 18:30

## 2025-01-21 RX ADMIN — ISOSORBIDE MONONITRATE 30 MG: 60 TABLET, EXTENDED RELEASE ORAL at 22:04

## 2025-01-21 RX ADMIN — OXYCODONE HYDROCHLORIDE 5 MG: 5 TABLET ORAL at 16:23

## 2025-01-21 ASSESSMENT — PAIN DESCRIPTION - DESCRIPTORS: DESCRIPTORS: DISCOMFORT

## 2025-01-21 ASSESSMENT — PAIN DESCRIPTION - ORIENTATION: ORIENTATION: RIGHT

## 2025-01-21 ASSESSMENT — PAIN DESCRIPTION - LOCATION
LOCATION: ABDOMEN
LOCATION: SHOULDER

## 2025-01-21 ASSESSMENT — PAIN SCALES - GENERAL
PAINLEVEL_OUTOF10: 9
PAINLEVEL_OUTOF10: 10

## 2025-01-21 NOTE — FLOWSHEET NOTE
01/21/25 0634   Treatment   Treatment Goal 3.5H/500-1000ml   Observations & Evaluations   Level of Consciousness 0   Oriented X x4   Heart Rhythm Regular   Respiratory Quality/Effort Unlabored   O2 Device None (Room air)   Bilateral Breath Sounds Diminished   Skin Color   (appropriate to race)   Skin Condition/Temp Dry;Warm   Appetite Fair   Abdomen Inspection   (slightly distended)   Last BM (including prior to admit) 01/20/25  (before surgery)   Edema None   Vital Signs   BP (!) 160/72   Temp 98.1 °F (36.7 °C)   Pulse 89   Respirations 18   SpO2 95 %   Pain Assessment   Pain Assessment None - Denies Pain   Technical Checks   Dialysis Machine No. [id number]   RO Machine Number [id number]   Dialyzer Lot No. [id number]   Tubing Lot Number [id number]   All Connections Secure Yes   NS Bag Yes   Saline Line Double Clamped Yes   Dialyzer Nipro ELISIO   Prime Volume (mL) 250 mL   ICEBOAT I;C;E;B;O;A;T   RO Machine Log Sheet Completed Yes   Machine Alarm Self Test Completed;Passed   Air Foam Detector Tested;Proper Function;pH Reading   Extracorporeal Circuit Tested for Integrity Yes   Machine Conductivity 15.3   Manual Ph 7.4   Bleach Test (Neg) Yes   Bath Temperature 96.8 °F (36 °C)   Treatment Initiation   Dialyze Hours 3.5   Treatment  Initiation Universal Precautions maintained;Lines secured to patient;Connections secured;Prime given;Venous Parameters set;Arterial Parameters set;Air foam detector engaged;[place] Device;Dialysate;Saline line double clamped;Hemo-Safe Applied;Dialyzer   Hemodialysis Central Access - Temporary Right Neck   Placement Date/Time: 01/16/25 1340   Present on Admission/Arrival: No  Inserted by: Demetra Mcdaniels PA-C  Insertion Practices: Chlorohexadine skin antisepsis;Sterile ultrasound technique;Optimal catheter site selection;Hand hygiene;Maximal barrier preca...   Continued need for line? Yes   Site Assessment Clean, dry & intact   CVC Lumen Status Alcohol cap present;Flushed   Blue Lumen Status

## 2025-01-21 NOTE — PROGRESS NOTES
Name: Heather Rich MRN: [id number]   : 1935 Hospital: [facility]   Date: 2025        IMPRESSION:   ESRD transitioned from PD to iHD in anticipation of abdominal surgery for hernia repair  Anemia of ESRD  Ventral hernia  Hypokalemia predialysis      PLAN:   HD 4k+ bath, TTHS  S/p Hernia surgery   Most likely will need to switch to HD permanently.  Albumin is only 2.2  Santos catheter needs to be changed to PermCath , IR consulted  Case management consult for outpatient hemodialysis chair at [address]-pending  Changed to regular diet due to hypokalemia  Added Nepro  PD catheter care only by dialysis nurses       Subjective/Interval History:   I have reviewed the flowsheet and previous day’s notes.   -feels okay denies any complaints.  Hemodialysis later today  -patient had dialysis last night.  Tolerated well.  Denies any complaints.  Her PD catheter dressing getting changed by nurse  -feels okay, no new complaints.  Plan for hernia repair today.  Dialysis tomorrow  -had HD this am, tolerated well, had about 1Lit UF. S/p hernia repair, has some abdominal discomfort    Objective:   Vital Signs:    BP (!) 145/67   Pulse 87   Temp 97.9 °F (36.6 °C)   Resp 18   Wt 57.7 kg (127 lb 1.6 oz)   SpO2 97%   BMI 22.51 kg/m²             Temp (24hrs), Av.7 °F (37.1 °C), Min:97.9 °F (36.6 °C), Max:99.8 °F (37.7 °C)       Intake/Output:   Last shift:       07 - 1900  In: 500   Out: 1474   Last 3 shifts: 1901 -  0700  In: 1090 [P.O.:290; I.V.:300]  Out: 20     Intake/Output Summary (Last 24 hours) at 2025 1143  Last data filed at 2025 1018  Gross per 24 hour   Intake 1300 ml   Output 1494 ml   Net -194 ml          Current Facility-Administered Medications   Medication Dose Route Frequency    losartan (COZAAR) tablet 100 mg  100 mg Oral Daily    oxyCODONE (ROXICODONE) immediate release tablet 5 mg  5 mg Oral Q4H PRN    oxyCODONE (ROXICODONE)

## 2025-01-21 NOTE — FLOWSHEET NOTE
PD CATHETER EXIT SITE CARE    01/21/25 1831   Observations & Evaluations   Level of Consciousness 0   Oriented X 4   Heart Rhythm Regular   Respiratory Quality/Effort Unlabored   O2 Device None (Room air)   Skin Condition/Temp Dry   Abdomen Inspection Soft   Edema None   Peritoneal Dialysis Catheter Mid lower abdomen   No placement date or time found.   Present on Admission/Arrival: Yes  Catheter Location: Mid lower abdomen   Site Condition Clean, dry, intact   Dressing Status New dressing applied   Dressing Gauze  (with gentamicin cream)   Date of Last Dressing Change 01/21/25   Exit Site Condition good   Catheter Care Given Yes       Timeout:  1815  SBAR:  ENEIDA Taylor RN   Comments:  Exit site care given while patient doing back up HD post surgical abdominal hernia repair.

## 2025-01-21 NOTE — PROGRESS NOTES
Hospital Progress Note  Kehinde Barney MD   Answering service: [phone] OR    [employer]      NAME:  Heather Rich   :   1935   MRN:  [id number]     Date/Time:  2025 7:15 AM    Plan:     Dialysis in process  CM consult  Encourage activity  Risk of Deterioration: Low  []           Moderate  []           High  []                 Assessment:     Principal Problem:    SBO (small bowel obstruction) ([facility])     Symptoms have resolved            Active Problems:    Incarcerated ventral hernia     Repaired       HTN (hypertension)     Will titrate antihypertensives     Patient concern related to elevated blood pressures in the morning      Anemia of chronic renal failure      ESRD on peritoneal dialysis (HCC)     Agrees to transition to hemodialysis temporarily        Type 2 diabetes mellitus with kidney complication, without long-term current use of insulin ([facility])      Diet controlled      Continue to monitor      SIRS (systemic inflammatory response syndrome) ([facility])     Leukocytosis with WBCs greater than 15 on admission now improving     Check blood cultures      Elevated troponin     Cardiology opinion     No chest pain      Seizure ([facility])     Continue Lamictal     No recent seizures      Gastroesophageal reflux disease without esophagitis     PPI as needed  Resolved Problems:    * No resolved hospital problems. *       Admitting notes:89 y.o. female with a pmhx asthma, BPV, past right breast cancer s/p lumpectomy and radiation, ESRD on PD, DM II, HTN , and seizures who presents with abdominal pain, and is being admitted for SBO.     In the ED, RR was elevated to 32, and she was hypertensive to .  Other VSS.  Labs showed WBC 18, microcytic anemia with hgb 11.1, troponin 233>246,,  k+ 3, glucose 127, BuN 39, creatinine 5.61, and albumin 2.8. CT abdomen/pelvis showed proximal small bowel distension and decompressed loops distally concerning for obstruction, and that is likely related to small

## 2025-01-21 NOTE — PROGRESS NOTES
Physical Therapy    Received consult and order acknowledged, came to see pt however RN and staff in room working with pt.  Will follow up tomorrow.   PARK ASHTON, PT

## 2025-01-21 NOTE — PLAN OF CARE
Problem: Occupational Therapy - Adult  Goal: By Discharge: Performs self-care activities at highest level of function for planned discharge setting.  See evaluation for individualized goals.  Description: FUNCTIONAL STATUS PRIOR TO ADMISSION:  Patient was ambulatory using SPC.      , Prior Level of Assist for ADLs: Independent,  Prior Level of Assist for Homemaking: Independent, Ambulation Assistance: Independent, Prior Level of Assist for Transfers: Independent, Active : Yes     HOME SUPPORT: Patient lived alone with friends and family to provide assistance PRN.     Occupational Therapy Goals:  Initiated 1/21/2025  1.  Patient will perform grooming at sink with Modified [place] within 7 day(s).  2.  Patient will perform bathing with Modified [place] within 7 day(s).  3.  Patient will perform lower body dressing with Modified [place] within 7 day(s).  4.  Patient will perform toilet transfers with Modified [place]  within 7 day(s).  5.  Patient will perform all aspects of toileting with Modified [place] within 7 day(s).  6.  Patient will utilize energy conservation techniques during functional activities with verbal cues within 7 day(s).    Outcome: Progressing   OCCUPATIONAL THERAPY EVALUATION    Patient: Heather Rich (89 y.o. female)  Date: 1/21/2025  Primary Diagnosis: Ventral hernia without obstruction or gangrene [K43.9]  Hypokalemia [E87.6]  SBO (small bowel obstruction) ([facility]) [K56.609]  Elevated troponin [R79.89]  ESRD needing dialysis ([facility]) [N18.6, Z99.2]  Procedure(s) (LRB):  REPAIR VENTRAL HERNIA (N/A) 1 Day Post-Op     Precautions:                    ASSESSMENT :  The patient is limited by decreased functional mobility, independence in ADLs, strength, activity tolerance, balance following ventral hernia repair post-op day 1. Pt cleared for therapy by nursing and received supine in bed with daughter in room, agreeable to therapy. Pt overall Min A for bed mobility with    The patient's plan of care was discussed with: registered nurse    Patient Education  Education Given To: Patient;Family  Education Provided: Role of Therapy;Plan of Care;Transfer Training;Mobility Training;Fall Prevention Strategies  Education Method: Demonstration;Verbal  Barriers to Learning: None  Education Outcome: Verbalized understanding;Demonstrated understanding;Continued education needed    Thank you for this referral.  Lauren Shepherd OT  Minutes: 30    Occupational Therapy Evaluation Charge Determination   History Examination Decision-Making   LOW Complexity : Brief history review  LOW Complexity: 1-3 Performance deficits relating to physical, cognitive, or psychosocial skills that result in activity limitations and/or participation restrictions LOW Complexity: No comorbidities that affect functional and  no verbal  or physical assist needed to complete eval tasks   Based on the above components, the patient evaluation is determined to be of the following complexity level: Low

## 2025-01-21 NOTE — PROGRESS NOTES
HD in progress.  Ms. Rich reports abdominal pain today.    Tm 99.8 Tc 98.1 HR: 86 BP: 160/81 Resp Rate: 18 95% sat on room air.     Intake/Output Summary (Last 24 hours) at 1/21/2025 0840  Last data filed at 1/20/2025 1547  Gross per 24 hour   Intake 800 ml   Output 20 ml   Net 780 ml   Exam: Cor: RRR.              Lungs: Bilateral breath sounds.               Abd: Soft. Incisional tenderness.             No guarding or rebound.             Slightly distended.             Dressing dry.   Labs:   Recent Results (from the past 12 hour(s))   Renal Function Panel    Collection Time: 01/21/25  2:05 AM   Result Value Ref Range    Sodium 142 136 - 145 mmol/L    Potassium 3.8 3.5 - 5.1 mmol/L    Chloride 110 (H) 97 - 108 mmol/L    CO2 23 21 - 32 mmol/L    Anion Gap 9 2 - 12 mmol/L    Glucose 111 (H) 65 - 100 mg/dL    BUN 30 (H) 6 - 20 MG/DL    Creatinine 6.14 (H) 0.55 - 1.02 MG/DL    BUN/Creatinine Ratio 5 (L) 12 - 20      Est, Glom Filt Rate 6 (L) >60 ml/min/1.73m2    Calcium 8.5 8.5 - 10.1 MG/DL    Phosphorus 5.8 (H) 2.6 - 4.7 MG/DL    Albumin 2.4 (L) 3.5 - 5.0 g/dL   CBC with Auto Differential    Collection Time: 01/21/25  2:05 AM   Result Value Ref Range    WBC 19.5 (H) 3.6 - 11.0 K/uL    RBC 3.58 (L) 3.80 - 5.20 M/uL    Hemoglobin 9.7 (L) 11.5 - 16.0 g/dL    Hematocrit 30.3 (L) 35.0 - 47.0 %    MCV 84.6 80.0 - 99.0 FL    MCH 27.1 26.0 - 34.0 PG    MCHC 32.0 30.0 - 36.5 g/dL    RDW 14.4 11.5 - 14.5 %    Platelets 260 150 - 400 K/uL    MPV 10.6 8.9 - 12.9 FL    Nucleated RBCs 0.0 0  WBC    nRBC 0.00 0.00 - 0.01 K/uL    Neutrophils % 87.6 (H) 32.0 - 75.0 %    Lymphocytes % 3.7 (L) 12.0 - 49.0 %    Monocytes % 6.9 5.0 - 13.0 %    Eosinophils % 0.7 0.0 - 7.0 %    Basophils % 0.6 0.0 - 1.0 %    Immature Granulocytes % 0.5 0.0 - 0.5 %    Neutrophils Absolute 17.09 (H) 1.80 - 8.00 K/UL    Lymphocytes Absolute 0.72 (L) 0.80 - 3.50 K/UL    Monocytes Absolute 1.35 (H) 0.00 - 1.00 K/UL    Eosinophils Absolute 0.14 0.00 -

## 2025-01-21 NOTE — CARE COORDINATION
Transition of Care Plan:    RUR: 21% High   Prior Level of Functioning: Modified independent  Disposition: Home w/ OP HD w/ [facility] ([facility] location; [address] [place] [address] / P: [phone])  AVERY: Wed. 1/22  Follow up appointments: PCP, specialist   DME needed: Has needed DME   Transportation at discharge: Likely family   IM/IMM Medicare/ letter given: 1st IM & 1st : 1/16  Is patient a  and connected with [facility]? NA  Caregiver Contact: Son, Bob Rich, [phone]  Discharge Caregiver contacted prior to discharge? Upon patient request  Care Conference needed? No  Barriers to discharge: Medical     CM reviewed chart. Per review and ID rounds, patient is POD#1 for repair of ventral hernia. PT/OT consulted; awaiting recommendations. Per patient, she has been to [facility] SNF previously and completed HD in house; patient reports that if SNF is recommended she prefers to DC to [facility]. If HH recommended, prefers to use [facility] HH.    CM received consult for OP HD arrangements with [facility] ([facility] location; [address] [place] [address] / P: [phone] / F: [phone]). Referral sent via [facility] to previously listed [facility] Center. Awaiting hep B lab results - will send to [facility] as soon as received. Will need perma cath prior to DC; IR consulted 1/21. Per [facility], patient is approved with a TTS 11:30AM chair time. AVS updated. CM will continue to follow as needed.    Dian Hedrick, MOI   [phone]

## 2025-01-22 LAB
ANION GAP SERPL CALC-SCNC: 7 MMOL/L (ref 2–12)
BASOPHILS # BLD: 0.19 K/UL (ref 0–0.1)
BASOPHILS NFR BLD: 1 % (ref 0–1)
BUN SERPL-MCNC: 24 MG/DL (ref 6–20)
BUN/CREAT SERPL: 5 (ref 12–20)
CALCIUM SERPL-MCNC: 9.1 MG/DL (ref 8.5–10.1)
CHLORIDE SERPL-SCNC: 103 MMOL/L (ref 97–108)
CO2 SERPL-SCNC: 27 MMOL/L (ref 21–32)
CREAT SERPL-MCNC: 4.75 MG/DL (ref 0.55–1.02)
DIFFERENTIAL METHOD BLD: ABNORMAL
EOSINOPHIL # BLD: 1.68 K/UL (ref 0–0.4)
EOSINOPHIL NFR BLD: 9 % (ref 0–7)
ERYTHROCYTE [DISTWIDTH] IN BLOOD BY AUTOMATED COUNT: 14.5 % (ref 11.5–14.5)
GLUCOSE SERPL-MCNC: 124 MG/DL (ref 65–100)
HBV CORE AB SERPL QL IA: NEGATIVE
HCT VFR BLD AUTO: 28 % (ref 35–47)
HGB BLD-MCNC: 9.1 G/DL (ref 11.5–16)
IMM GRANULOCYTES # BLD AUTO: 0 K/UL
IMM GRANULOCYTES NFR BLD AUTO: 0 %
LYMPHOCYTES # BLD: 0.37 K/UL (ref 0.8–3.5)
LYMPHOCYTES NFR BLD: 2 % (ref 12–49)
MCH RBC QN AUTO: 27.4 PG (ref 26–34)
MCHC RBC AUTO-ENTMCNC: 32.5 G/DL (ref 30–36.5)
MCV RBC AUTO: 84.3 FL (ref 80–99)
MONOCYTES # BLD: 2.43 K/UL (ref 0–1)
MONOCYTES NFR BLD: 13 % (ref 5–13)
NEUTS SEG # BLD: 14.03 K/UL (ref 1.8–8)
NEUTS SEG NFR BLD: 75 % (ref 32–75)
NRBC # BLD: 0 K/UL (ref 0–0.01)
NRBC BLD-RTO: 0 PER 100 WBC
PLATELET # BLD AUTO: 253 K/UL (ref 150–400)
PMV BLD AUTO: 9.8 FL (ref 8.9–12.9)
POTASSIUM SERPL-SCNC: 4 MMOL/L (ref 3.5–5.1)
RBC # BLD AUTO: 3.32 M/UL (ref 3.8–5.2)
RBC MORPH BLD: ABNORMAL
SODIUM SERPL-SCNC: 137 MMOL/L (ref 136–145)
WBC # BLD AUTO: 18.7 K/UL (ref 3.6–11)

## 2025-01-22 PROCEDURE — 6360000002 HC RX W HCPCS: Performed by: NURSE PRACTITIONER

## 2025-01-22 PROCEDURE — 6370000000 HC RX 637 (ALT 250 FOR IP): Performed by: INTERNAL MEDICINE

## 2025-01-22 PROCEDURE — 97116 GAIT TRAINING THERAPY: CPT

## 2025-01-22 PROCEDURE — 97161 PT EVAL LOW COMPLEX 20 MIN: CPT

## 2025-01-22 PROCEDURE — 99231 SBSQ HOSP IP/OBS SF/LOW 25: CPT | Performed by: SURGERY

## 2025-01-22 PROCEDURE — 85025 COMPLETE CBC W/AUTO DIFF WBC: CPT

## 2025-01-22 PROCEDURE — 97535 SELF CARE MNGMENT TRAINING: CPT

## 2025-01-22 PROCEDURE — 6370000000 HC RX 637 (ALT 250 FOR IP): Performed by: SURGERY

## 2025-01-22 PROCEDURE — 1100000000 HC RM PRIVATE

## 2025-01-22 PROCEDURE — 2580000003 HC RX 258: Performed by: INTERNAL MEDICINE

## 2025-01-22 PROCEDURE — 80048 BASIC METABOLIC PNL TOTAL CA: CPT

## 2025-01-22 PROCEDURE — 2500000003 HC RX 250 WO HCPCS: Performed by: SURGERY

## 2025-01-22 PROCEDURE — 99232 SBSQ HOSP IP/OBS MODERATE 35: CPT | Performed by: INTERNAL MEDICINE

## 2025-01-22 PROCEDURE — 6360000002 HC RX W HCPCS: Performed by: INTERNAL MEDICINE

## 2025-01-22 PROCEDURE — 99222 1ST HOSP IP/OBS MODERATE 55: CPT | Performed by: NURSE PRACTITIONER

## 2025-01-22 RX ORDER — METRONIDAZOLE 500 MG/100ML
500 INJECTION, SOLUTION INTRAVENOUS EVERY 8 HOURS
Status: DISCONTINUED | OUTPATIENT
Start: 2025-01-22 | End: 2025-01-24 | Stop reason: HOSPADM

## 2025-01-22 RX ADMIN — LAMOTRIGINE 25 MG: 25 TABLET ORAL at 21:10

## 2025-01-22 RX ADMIN — METRONIDAZOLE 500 MG: 500 INJECTION, SOLUTION INTRAVENOUS at 12:38

## 2025-01-22 RX ADMIN — NIFEDIPINE 30 MG: 30 TABLET, FILM COATED, EXTENDED RELEASE ORAL at 08:57

## 2025-01-22 RX ADMIN — PANTOPRAZOLE SODIUM 40 MG: 40 TABLET, DELAYED RELEASE ORAL at 07:29

## 2025-01-22 RX ADMIN — FERROUS SULFATE TAB 325 MG (65 MG ELEMENTAL FE) 325 MG: 325 (65 FE) TAB at 08:56

## 2025-01-22 RX ADMIN — LAMOTRIGINE 25 MG: 25 TABLET ORAL at 08:56

## 2025-01-22 RX ADMIN — SODIUM CHLORIDE, PRESERVATIVE FREE 10 ML: 5 INJECTION INTRAVENOUS at 12:38

## 2025-01-22 RX ADMIN — METOPROLOL SUCCINATE 25 MG: 25 TABLET, EXTENDED RELEASE ORAL at 08:57

## 2025-01-22 RX ADMIN — PRAVASTATIN SODIUM 20 MG: 40 TABLET ORAL at 21:10

## 2025-01-22 RX ADMIN — METRONIDAZOLE 500 MG: 500 INJECTION, SOLUTION INTRAVENOUS at 21:09

## 2025-01-22 RX ADMIN — LOSARTAN POTASSIUM 100 MG: 50 TABLET, FILM COATED ORAL at 08:58

## 2025-01-22 RX ADMIN — CEFEPIME 1000 MG: 1 INJECTION, POWDER, FOR SOLUTION INTRAMUSCULAR; INTRAVENOUS at 16:36

## 2025-01-22 RX ADMIN — ISOSORBIDE MONONITRATE 30 MG: 60 TABLET, EXTENDED RELEASE ORAL at 21:10

## 2025-01-22 NOTE — PLAN OF CARE
Problem: Physical Therapy - Adult  Goal: By Discharge: Performs mobility at highest level of function for planned discharge setting.  See evaluation for individualized goals.  Description: FUNCTIONAL STATUS PRIOR TO ADMISSION: Patient was independent and active without use of DME in home, however endorses furniture walking, and cane in the community    HOME SUPPORT PRIOR TO ADMISSION: The patient lived alone with no local support.    Physical Therapy Goals  Initiated 1/22/2025  1.  Patient will move from supine to sit and sit to supine, scoot up and down, and roll side to side in bed with supervision/set-up within 7 day(s).    2.  Patient will perform sit to stand with supervision/set-up within 7 day(s).  3.  Patient will transfer from bed to chair and chair to bed with supervision/set-up using the least restrictive device within 7 day(s).  4.  Patient will ambulate with supervision/set-up for 300 feet with the least restrictive device within 7 day(s).   5.  Patient will ascend/descend 4 stairs with 1 handrail(s) with supervision/set-up within 7 day(s).    Outcome: Progressing     PHYSICAL THERAPY EVALUATION    Patient: Heather Rich (89 y.o. female)  Date: 1/22/2025  Primary Diagnosis: Ventral hernia without obstruction or gangrene [K43.9]  Hypokalemia [E87.6]  SBO (small bowel obstruction) (HCC) [K56.609]  Elevated troponin [R79.89]  ESRD needing dialysis ([facility]) [N18.6, Z99.2]  Procedure(s) (LRB):  REPAIR VENTRAL HERNIA (N/A) 2 Days Post-Op   Precautions:                        ASSESSMENT :   DEFICITS/IMPAIRMENTS:   The patient is limited by decreased functional mobility, independence in ADLs, high-level IADLs, strength, body mechanics, activity tolerance, endurance, safety awareness, orthostatic hypotension     Based on the impairments listed above patient admitted with SBO and hernia, with patient s/p ventral hernia repair POD #2. Pt lives alone in one level home with 5 KAREN. Pt demoing independent but  chair (including a wheelchair)? []  1 []  2 [x]  3  []  4   4. Standing up from a chair using your arms (e.g. wheelchair or bedside chair)? []  1 []  2 [x]  3  []  4   5.  Walking in hospital room? []  1 []  2 [x]  3  []  4   6.  Climbing 3-5 steps with a railing? [x]  1 []  2 []  3  []  4     Raw Score: 18/24                            Cutoff score <=171,2,3 had higher odds of discharging home with home health or need of SNF/IPR.    1. Sabina Hsu, Annmarie Martino, Salvador Velazquez, Dee Dee Mason, Carlos A Lim, Zan Hsu.  Validity of the AM-PAC “6-Clicks” Inpatient Daily Activity and Basic Mobility Short Forms. Physical Therapy Mar 2014, 94 3) 379-391; DOI: 10.2522/ptj.79229056  2. Levon FITCH, Kitty VALENTINE, Abdiel J, Ravi J. Association of AM-PAC \"6-Clicks\" Basic Mobility and Daily Activity Scores With Discharge Destination. Phys Ther. 2021 Apr 4;101(4):porq584. doi: 10.1093/ptj/habj627. PMID: 89859804.  3. Chalino VALENTINE, Cassius D, Myah S, Ana K, Lucille S. Activity Measure for Post-Acute Care \"6-Clicks\" Basic Mobility Scores Predict Discharge Destination After Acute Care Hospitalization in Select Patient Groups: A Retrospective, Observational Study. Arch Rehabil Res Clin Transl. 2022 Jul 16;4(3):331341. doi: 10.1016/j.arrct.2022.523183. PMID: 04148047; PMCID: [id number].  4. Aracelis FRANZ, Dana S, Gareth W, Payton P. AM-PAC Short Forms Manual 4.0. Revised 2/2020.                                                                                                                                                                                                                              Pain Rating:   patient medicated for pain prior to session    Activity Tolerance:   Good and Fair     After treatment:   Patient left in no apparent distress sitting up in chair and Call bell within reach    COMMUNICATION/EDUCATION:   The patient's plan of care was discussed with: registered nurse and

## 2025-01-22 NOTE — PROGRESS NOTES
Ms. Rich has no specific c/o today. Tolerating diet. Pain well controlled.  Tm 99.5 Tc 99 HR: 107 BP: 119/58 Resp Rate: 18 92% sat on room air.   No intake or output data in the 24 hours ending 01/22/25 1026   Exam: Cor: RRR.              Lungs: Bilateral breath sounds.               Abd: Soft. Non distended.             Incisional tenderness.             Dressing dry.  Labs:   Recent Results (from the past 12 hour(s))   CBC with Auto Differential    Collection Time: 01/22/25  4:50 AM   Result Value Ref Range    WBC 18.7 (H) 3.6 - 11.0 K/uL    RBC 3.32 (L) 3.80 - 5.20 M/uL    Hemoglobin 9.1 (L) 11.5 - 16.0 g/dL    Hematocrit 28.0 (L) 35.0 - 47.0 %    MCV 84.3 80.0 - 99.0 FL    MCH 27.4 26.0 - 34.0 PG    MCHC 32.5 30.0 - 36.5 g/dL    RDW 14.5 11.5 - 14.5 %    Platelets 253 150 - 400 K/uL    MPV 9.8 8.9 - 12.9 FL    Nucleated RBCs 0.0 0  WBC    nRBC 0.00 0.00 - 0.01 K/uL    Neutrophils % 75 32 - 75 %    Lymphocytes % 2 (L) 12 - 49 %    Monocytes % 13 5 - 13 %    Eosinophils % 9 (H) 0 - 7 %    Basophils % 1 0 - 1 %    Immature Granulocytes % 0 %    Neutrophils Absolute 14.03 (H) 1.8 - 8.0 K/UL    Lymphocytes Absolute 0.37 (L) 0.8 - 3.5 K/UL    Monocytes Absolute 2.43 (H) 0.0 - 1.0 K/UL    Eosinophils Absolute 1.68 (H) 0.0 - 0.4 K/UL    Basophils Absolute 0.19 (H) 0.0 - 0.1 K/UL    Immature Granulocytes Absolute 0.00 K/UL    Differential Type AUTOMATED      RBC Comment NORMOCYTIC, NORMOCHROMIC     Basic Metabolic Panel    Collection Time: 01/22/25  4:50 AM   Result Value Ref Range    Sodium 137 136 - 145 mmol/L    Potassium 4.0 3.5 - 5.1 mmol/L    Chloride 103 97 - 108 mmol/L    CO2 27 21 - 32 mmol/L    Anion Gap 7 2 - 12 mmol/L    Glucose 124 (H) 65 - 100 mg/dL    BUN 24 (H) 6 - 20 MG/DL    Creatinine 4.75 (H) 0.55 - 1.02 MG/DL    BUN/Creatinine Ratio 5 (L) 12 - 20      Est, Glom Filt Rate 8 (L) >60 ml/min/1.73m2    Calcium 9.1 8.5 - 10.1 MG/DL   Ms. Rich is doing well post-operatively.   Diet as

## 2025-01-22 NOTE — ACP (ADVANCE CARE PLANNING)
Transition of Care Plan:     RUR: 22% High   Prior Level of Functioning: Modified independent  Disposition: SNF - [facility] w/ HD Den   * OP HD arrangements w/ [facility] in place also ([facility] location; [address] [place] [address] / P: [phone], TTTS, 11:30AM chairtime)  AVERY: Fri. 1/24  Follow up appointments: PCP, specialist   DME needed: Has needed DME   Transportation at discharge: TBD  IM/IMM Medicare/ letter given: 1st IM & 1st : 1/16  Is patient a [place] and connected with VA? NA  Caregiver Contact: Son, Bob Rich, [phone]  Discharge Caregiver contacted prior to discharge? Upon patient request  Care Conference needed? No  Barriers to discharge: Medical      CM reviewed chart. Per review and ID rounds, patient is POD#2 for repair of ventral hernia. Blood cultures pending. PT/OT recommending SNF. [facility]. CM sent referral to [facility] and accepted. Will need perma cath prior to DC once WBC stabilizes. OP HD arrangements in place with [facility] ([facility] location; [address] [place] [address] / P: [phone] / F: [phone]) for TTS 11:30AM chair time. CM will continue to follow as needed.    MOI Grace   [phone]

## 2025-01-22 NOTE — PROGRESS NOTES
Spiritual Health History and Assessment/Progress Note  [facility]    Initial Encounter,  , Adjustment to illness, Life Adjustments,      Name: Heather Rich MRN: [id number]    Age: 89 y.o.     Sex: female   Language: English   Scientology: Tenriism   SBO (small bowel obstruction) ([facility])     Date: 1/22/2025            Total Time Calculated: 22 min              Spiritual Assessment began in [facility] 5E2 SURGICAL UNIT        Referral/Consult From: Rounding   Encounter Overview/Reason: Initial Encounter  Service Provided For: Patient    Serina, Belief, Meaning:   Patient identifies as spiritual, is connected with a serina tradition or spiritual practice, and has beliefs or practices that help with coping during difficult times  Family/Friends No family/friends present      Importance and Influence:  Patient has no beliefs influential to healthcare decision-making identified during this visit  Family/Friends No family/friends present    Community:  Patient feels well-supported. Support system includes: Children, Serina Community, and Extended family  Family/Friends No family/friends present    Assessment and Plan of Care:     Patient Interventions include: Facilitated expression of thoughts and feelings and Affirmed coping skills/support systems  Family/Friends Interventions include: No family/friends present    Patient Plan of Care: Spiritual Care available upon further referral  Family/Friends Plan of Care: No family/friends present    I visited Heather Rich for an initial spiritual health assessment.     She lines alone. Enjoys good support from her family. Her daughter lives in [place]. A son is local he and his wife are supportive as well as a grandson who checks on her regularly. She also has good support from her Evangelical.     She spoke of her illness and hopes of being able to return to her home. She is a person of Synagogue serina and derives a sense of hope and support from her serina.     Electronically signed by

## 2025-01-22 NOTE — CONSULTS
Infectious Disease Consult    Today's Date: 1/22/2025   Admit Date: 1/15/2025    Date of Consultation:  January 22, 2025  Reason for consult: leukocytosis and spherocytosis  Referring Physician: MD Savita    HPI:  Patient is a 89 y.o. female with medical history of hypertension, ESRD on PD, type II DM, and right breast cancer, s/p lumpectomy and radiation presented to ER on 1/15 with abdominal pain.     Pt was evaluated by general surgery, diagnosed with SBO due to incarcerated ventral hernia. Pt underwent for repair of ventral hernia on 1/20 by Dr. Matt. No intra-op cx.     Blood cx from admission with no growth, repeat BC from 1/21 with no growth so far  U/A (-) on admission. At the time of visit, pt was afebrile, wbc 18.7, CXR revealed  Right IJ catheter overlies right atrium. Heart size is normal. Lungs demonstrate no pneumonia or pulm edema. There is a single band of discoid atelectasis in the right lower lung zone.    During visit, pt was sitting up in chair, c/o feeling tired, weak, and mild abdominal discomfort. No fever, chills, nausea, vomiting, diarrhea, sob, del rio, chest pain, or diarrhea.    ID team was consulted for evaluation and treatment recommendations regarding leukocytosis and spherocytosis.     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 Added IV flagyl    ID team will follow    Post op care per general surgery team    Plan of care d/w pt, pt's nurse, and Dr. Guido             Anti-inflectives:   Ancef 1/20  Cefepime 1/21-  Rocephin 1/17-1/20    Past Medical History:   Diagnosis Date    Anemia     Arrhythmia     irregular per pt d/t blood disorder    Asthma      coordination (not separately reported) as noted above  Documenting clinical information in the electronic health records (e.g. problem list, visit note) on the day of the encounter

## 2025-01-22 NOTE — PLAN OF CARE
Problem: Occupational Therapy - Adult  Goal: By Discharge: Performs self-care activities at highest level of function for planned discharge setting.  See evaluation for individualized goals.  Description: FUNCTIONAL STATUS PRIOR TO ADMISSION:  Patient was ambulatory using SPC.      , Prior Level of Assist for ADLs: Independent,  Prior Level of Assist for Homemaking: Independent, Ambulation Assistance: Independent, Prior Level of Assist for Transfers: Independent, Active : Yes     HOME SUPPORT: Patient lived alone with friends and family to provide assistance PRN.     Occupational Therapy Goals:  Initiated 1/21/2025  1.  Patient will perform grooming at sink with Modified [place] within 7 day(s).  2.  Patient will perform bathing with Modified [place] within 7 day(s).  3.  Patient will perform lower body dressing with Modified [place] within 7 day(s).  4.  Patient will perform toilet transfers with Modified [place]  within 7 day(s).  5.  Patient will perform all aspects of toileting with Modified [place] within 7 day(s).  6.  Patient will utilize energy conservation techniques during functional activities with verbal cues within 7 day(s).    Outcome: Progressing  OCCUPATIONAL THERAPY TREATMENT  Patient: Heather Rich (89 y.o. female)  Date: 1/22/2025  Primary Diagnosis: Ventral hernia without obstruction or gangrene [K43.9]  Hypokalemia [E87.6]  SBO (small bowel obstruction) ([facility]) [K56.609]  Elevated troponin [R79.89]  ESRD needing dialysis ([facility]) [N18.6, Z99.2]  Procedure(s) (LRB):  REPAIR VENTRAL HERNIA (N/A) 2 Days Post-Op   Precautions:                  Chart, occupational therapy assessment, plan of care, and goals were reviewed.    ASSESSMENT  Patient continues to benefit from skilled OT services and is progressing towards goals. Pt received sitting EOB eating breakfast, daughter present in room; agreeable to OT session. Pt agreeable to transfer bed>chair to complete ADL and

## 2025-01-22 NOTE — PROGRESS NOTES
Hospital Progress Note  Kehinde Barney MD   Answering service: [phone] OR    [employer]      NAME:  Heather Rich   :   1935   MRN:  [id number]     Date/Time:  2025 8:13 AM    Plan:     Dialysis in process  ID consult regarding leukocytosis and the patient with spherocytosis.  Encourage activity  Risk of Deterioration: Low  []           Moderate  []           High  []                 Assessment:     Principal Problem:    SBO (small bowel obstruction) (HCC)     Symptoms have resolved            Active Problems:    Incarcerated ventral hernia     Repaired          Leukocytosis      Patient was cultured yesterday      Continue cefepime pending culture results      ID opinion      HTN (hypertension)     Will titrate antihypertensives     Patient concern related to elevated blood pressures in the morning      Anemia of chronic renal failure      ESRD on peritoneal dialysis (HCC)     Agrees to transition to hemodialysis temporarily        Type 2 diabetes mellitus with kidney complication, without long-term current use of insulin ([facility])      Diet controlled      Continue to monitor      SIRS (systemic inflammatory response syndrome) ([facility])     Leukocytosis with WBCs greater than 15 on admission now improving     Check blood cultures      Elevated troponin     Cardiology opinion reviewed     No chest pain      Seizure ([facility])     Continue Lamictal     No recent seizures      Gastroesophageal reflux disease without esophagitis     PPI as needed  Resolved Problems:    * No resolved hospital problems. *       Admitting notes:89 y.o. female with a pmhx asthma, BPV, past right breast cancer s/p lumpectomy and radiation, ESRD on PD, DM II, HTN , and seizures who presents with abdominal pain, and is being admitted for SBO.     In the ED, RR was elevated to 32, and she was hypertensive to .  Other VSS.  Labs showed WBC 18, microcytic anemia with hgb 11.1, troponin 233>246,,  k+ 3, glucose 127, BuN 39,

## 2025-01-22 NOTE — PROGRESS NOTES
Name: Heather Rich MRN: [id number]   : 1935 Hospital: [facility]   Date: 2025        IMPRESSION:   ESRD transitioned from PD to iHD in anticipation of abdominal surgery for hernia repair  Anemia of ESRD  Ventral hernia  Hypokalemia predialysis      PLAN:   HD 4k+ bath, TTHS  S/p Hernia surgery   Most likely will need to switch to HD permanently.  Albumin is only 2.2  Santos catheter needs to be changed to PermCath , IR consulted  Case management consult for outpatient hemodialysis chair at [address].- has a chair for TTS  Changed to regular diet due to hypokalemia  Added Nepro  PD catheter care only by dialysis nurses       Subjective/Interval History:   I have reviewed the flowsheet and previous day’s notes.   -feels okay denies any complaints.  Hemodialysis later today  -patient had dialysis last night.  Tolerated well.  Denies any complaints.  Her PD catheter dressing getting changed by nurse  -feels okay, no new complaints.  Plan for hernia repair today.  Dialysis tomorrow  -had HD this am, tolerated well, had about 1Lit UF. S/p hernia repair, has some abdominal discomfort  25 Patient feels well, sitting up in chair.    Objective:   Vital Signs:    BP (!) 119/58 Comment: see graph  Pulse (!) 107 Comment: see graph  Temp 99 °F (37.2 °C) (Oral)   Resp 18   Wt 49.2 kg (108 lb 8 oz)   SpO2 92%   BMI 19.22 kg/m²             Temp (24hrs), Av.1 °F (37.3 °C), Min:98.4 °F (36.9 °C), Max:99.5 °F (37.5 °C)       Intake/Output:   Last shift:      No intake/output data recorded.  Last 3 shifts:  1901 -  0700  In: 500   Out: 1474   No intake or output data in the 24 hours ending 25 1157         Current Facility-Administered Medications   Medication Dose Route Frequency    metroNIDAZOLE (FLAGYL) 500 mg in 0.9% NaCl 100 mL IVPB premix  500 mg IntraVENous Q8H    losartan (COZAAR) tablet 100 mg  100 mg Oral Daily    cefepime (MAXIPIME) 1,000 mg in

## 2025-01-23 LAB
BASOPHILS # BLD: 0 K/UL (ref 0–0.1)
BASOPHILS NFR BLD: 0 % (ref 0–1)
COMMENT:: NORMAL
COMMENT:: NORMAL
DIFFERENTIAL METHOD BLD: ABNORMAL
EOSINOPHIL # BLD: 0.73 K/UL (ref 0–0.4)
EOSINOPHIL NFR BLD: 4 % (ref 0–7)
ERYTHROCYTE [DISTWIDTH] IN BLOOD BY AUTOMATED COUNT: 14.4 % (ref 11.5–14.5)
HCT VFR BLD AUTO: 28.3 % (ref 35–47)
HGB BLD-MCNC: 9 G/DL (ref 11.5–16)
IMM GRANULOCYTES # BLD AUTO: 0 K/UL
IMM GRANULOCYTES NFR BLD AUTO: 0 %
LYMPHOCYTES # BLD: 1.09 K/UL (ref 0.8–3.5)
LYMPHOCYTES NFR BLD: 6 % (ref 12–49)
MCH RBC QN AUTO: 26.8 PG (ref 26–34)
MCHC RBC AUTO-ENTMCNC: 31.8 G/DL (ref 30–36.5)
MCV RBC AUTO: 84.2 FL (ref 80–99)
METAMYELOCYTES NFR BLD MANUAL: 1 %
MONOCYTES # BLD: 0.55 K/UL (ref 0–1)
MONOCYTES NFR BLD: 3 % (ref 5–13)
NEUTS BAND NFR BLD MANUAL: 1 % (ref 0–6)
NEUTS SEG # BLD: 15.65 K/UL (ref 1.8–8)
NEUTS SEG NFR BLD: 85 % (ref 32–75)
NRBC # BLD: 0 K/UL (ref 0–0.01)
NRBC BLD-RTO: 0 PER 100 WBC
PLATELET # BLD AUTO: 295 K/UL (ref 150–400)
PMV BLD AUTO: 10.4 FL (ref 8.9–12.9)
RBC # BLD AUTO: 3.36 M/UL (ref 3.8–5.2)
RBC MORPH BLD: ABNORMAL
SPECIMEN HOLD: NORMAL
SPECIMEN HOLD: NORMAL
WBC # BLD AUTO: 18.2 K/UL (ref 3.6–11)

## 2025-01-23 PROCEDURE — 87086 URINE CULTURE/COLONY COUNT: CPT

## 2025-01-23 PROCEDURE — 36415 COLL VENOUS BLD VENIPUNCTURE: CPT

## 2025-01-23 PROCEDURE — 99232 SBSQ HOSP IP/OBS MODERATE 35: CPT | Performed by: INTERNAL MEDICINE

## 2025-01-23 PROCEDURE — 6370000000 HC RX 637 (ALT 250 FOR IP): Performed by: INTERNAL MEDICINE

## 2025-01-23 PROCEDURE — 6370000000 HC RX 637 (ALT 250 FOR IP): Performed by: SURGERY

## 2025-01-23 PROCEDURE — 2580000003 HC RX 258: Performed by: INTERNAL MEDICINE

## 2025-01-23 PROCEDURE — B5131ZA FLUOROSCOPY OF RIGHT JUGULAR VEINS USING LOW OSMOLAR CONTRAST, GUIDANCE: ICD-10-PCS | Performed by: INTERNAL MEDICINE

## 2025-01-23 PROCEDURE — 6360000002 HC RX W HCPCS: Performed by: INTERNAL MEDICINE

## 2025-01-23 PROCEDURE — 97116 GAIT TRAINING THERAPY: CPT

## 2025-01-23 PROCEDURE — 99232 SBSQ HOSP IP/OBS MODERATE 35: CPT | Performed by: NURSE PRACTITIONER

## 2025-01-23 PROCEDURE — 05HM33Z INSERTION OF INFUSION DEVICE INTO RIGHT INTERNAL JUGULAR VEIN, PERCUTANEOUS APPROACH: ICD-10-PCS | Performed by: INTERNAL MEDICINE

## 2025-01-23 PROCEDURE — 85025 COMPLETE CBC W/AUTO DIFF WBC: CPT

## 2025-01-23 PROCEDURE — 1100000000 HC RM PRIVATE

## 2025-01-23 PROCEDURE — 0JH63XZ INSERTION OF TUNNELED VASCULAR ACCESS DEVICE INTO CHEST SUBCUTANEOUS TISSUE AND FASCIA, PERCUTANEOUS APPROACH: ICD-10-PCS | Performed by: INTERNAL MEDICINE

## 2025-01-23 PROCEDURE — 97535 SELF CARE MNGMENT TRAINING: CPT

## 2025-01-23 PROCEDURE — 90935 HEMODIALYSIS ONE EVALUATION: CPT

## 2025-01-23 PROCEDURE — 2500000003 HC RX 250 WO HCPCS: Performed by: SURGERY

## 2025-01-23 PROCEDURE — 6360000002 HC RX W HCPCS: Performed by: NURSE PRACTITIONER

## 2025-01-23 PROCEDURE — 99231 SBSQ HOSP IP/OBS SF/LOW 25: CPT | Performed by: SURGERY

## 2025-01-23 RX ADMIN — METRONIDAZOLE 500 MG: 500 INJECTION, SOLUTION INTRAVENOUS at 18:52

## 2025-01-23 RX ADMIN — METRONIDAZOLE 500 MG: 500 INJECTION, SOLUTION INTRAVENOUS at 04:11

## 2025-01-23 RX ADMIN — PANTOPRAZOLE SODIUM 40 MG: 40 TABLET, DELAYED RELEASE ORAL at 07:24

## 2025-01-23 RX ADMIN — FERROUS SULFATE TAB 325 MG (65 MG ELEMENTAL FE) 325 MG: 325 (65 FE) TAB at 08:18

## 2025-01-23 RX ADMIN — LAMOTRIGINE 25 MG: 25 TABLET ORAL at 22:04

## 2025-01-23 RX ADMIN — ISOSORBIDE MONONITRATE 30 MG: 60 TABLET, EXTENDED RELEASE ORAL at 22:04

## 2025-01-23 RX ADMIN — METOPROLOL SUCCINATE 25 MG: 25 TABLET, EXTENDED RELEASE ORAL at 08:18

## 2025-01-23 RX ADMIN — PRAVASTATIN SODIUM 20 MG: 40 TABLET ORAL at 22:04

## 2025-01-23 RX ADMIN — METRONIDAZOLE 500 MG: 500 INJECTION, SOLUTION INTRAVENOUS at 11:53

## 2025-01-23 RX ADMIN — NIFEDIPINE 30 MG: 30 TABLET, FILM COATED, EXTENDED RELEASE ORAL at 08:18

## 2025-01-23 RX ADMIN — SODIUM CHLORIDE, PRESERVATIVE FREE 10 ML: 5 INJECTION INTRAVENOUS at 22:05

## 2025-01-23 RX ADMIN — CEFEPIME 1000 MG: 1 INJECTION, POWDER, FOR SOLUTION INTRAMUSCULAR; INTRAVENOUS at 19:28

## 2025-01-23 RX ADMIN — LOSARTAN POTASSIUM 100 MG: 50 TABLET, FILM COATED ORAL at 08:18

## 2025-01-23 RX ADMIN — LAMOTRIGINE 25 MG: 25 TABLET ORAL at 08:18

## 2025-01-23 ASSESSMENT — PAIN SCALES - GENERAL
PAINLEVEL_OUTOF10: 0
PAINLEVEL_OUTOF10: 0

## 2025-01-23 NOTE — PROGRESS NOTES
HD in progress.   Ms. Rich feels a little congested today.   Tm 99 Tc 98.7 HR: 100 BP: 141/71 Resp Rate: 18 96% sat on room air.     Intake/Output Summary (Last 24 hours) at 1/23/2025 1827  Last data filed at 1/23/2025 1311  Gross per 24 hour   Intake --   Output 100 ml   Net -100 ml   Exam: Cor: RRR.              Lungs: Bilateral breath sounds.               Abd: Soft. Non distended.             Non tender.             No guarding or rebound.             The surgical incision is clean and well healed.   Labs:   Recent Results (from the past 12 hour(s))   CBC with Auto Differential    Collection Time: 01/23/25  8:27 AM   Result Value Ref Range    WBC 18.2 (H) 3.6 - 11.0 K/uL    RBC 3.36 (L) 3.80 - 5.20 M/uL    Hemoglobin 9.0 (L) 11.5 - 16.0 g/dL    Hematocrit 28.3 (L) 35.0 - 47.0 %    MCV 84.2 80.0 - 99.0 FL    MCH 26.8 26.0 - 34.0 PG    MCHC 31.8 30.0 - 36.5 g/dL    RDW 14.4 11.5 - 14.5 %    Platelets 295 150 - 400 K/uL    MPV 10.4 8.9 - 12.9 FL    Nucleated RBCs 0.0 0  WBC    nRBC 0.00 0.00 - 0.01 K/uL    Neutrophils % 85 (H) 32 - 75 %    Band Neutrophils 1 0 - 6 %    Lymphocytes % 6 (L) 12 - 49 %    Monocytes % 3 (L) 5 - 13 %    Eosinophils % 4 0 - 7 %    Basophils % 0 0 - 1 %    Metamyelocytes 1 (H) 0 %    Immature Granulocytes % 0 %    Neutrophils Absolute 15.65 (H) 1.8 - 8.0 K/UL    Lymphocytes Absolute 1.09 0.8 - 3.5 K/UL    Monocytes Absolute 0.55 0.0 - 1.0 K/UL    Eosinophils Absolute 0.73 (H) 0.0 - 0.4 K/UL    Basophils Absolute 0.00 0.0 - 0.1 K/UL    Immature Granulocytes Absolute 0.00 K/UL    Differential Type MANUAL      RBC Comment KEANU CELLS  PRESENT       Extra Tubes Hold    Collection Time: 01/23/25  8:27 AM   Result Value Ref Range    Specimen HOld 1PST     Comment:        Add-on orders for these samples will be processed based on acceptable specimen integrity and analyte stability, which may vary by analyte.   Extra Tubes Hold    Collection Time: 01/23/25  1:11 PM   Result Value Ref  Range    Specimen HOld 1UA     Comment:        Add-on orders for these samples will be processed based on acceptable specimen integrity and analyte stability, which may vary by analyte.   Ms. Rich is doing well post-operatively.   Diet as tolerated.   HD per Nephrology.   IV antibiotics per ID - Flagyl and Cefepime.   PT and OT following.   Plans per Dr. Barney.

## 2025-01-23 NOTE — CARE COORDINATION
Transition of Care Plan:     RUR: 22% High   Prior Level of Functioning: Modified independent  Disposition: SNF - [facility] w/ HD Den   * OP HD arrangements w/ [facility] in place once DC;d from SNF  ([facility] location; [address] [place] [address] / P: [phone], TTS, 11:30AM chairtime)  AVERY: Fri. 1/24   Follow up appointments: PCP, specialist   DME needed: Has needed DME   Transportation at discharge: TBD  IM/IMM Medicare/ letter given: 1st IM & 1st : 1/16/25  & 2nd IM & : 1/23/25  Is patient a  and connected with VA? NA  Caregiver Contact: Son, Bob Rich, [phone]  Discharge Caregiver contacted prior to discharge? Upon patient request  Care Conference needed? No  Barriers to discharge: Medical    CM reviewed chart. Per review and ID rounds, awaiting final ID recommendations for abx treatment post DC. Per ID, once WBC trending down, final recommendations will be noted. CM called and spoke with [facility] (p: [phone]). Per Feliciano, patient able to admit tomorrow, Friday, 1/24. Bed is available tomorrow; however, if patient not able to admit Friday, no available bed until next week. CM discussed with attending via phone; attending plans to DC after PC placement tomorrow pending medical stability. Following CM will need to follow up with [facility]ia[facility] (p: [phone]) to obtain room # and report #. Patient and nephew made aware at bedside. CM will continue to follow as needed.     MOI Grace   [phone]'

## 2025-01-23 NOTE — PROGRESS NOTES
Name: Heather Rich MRN: [id number]   : 1935 Hospital: [facility]   Date: 2025        IMPRESSION:   ESRD transitioned from PD to iHD . Awaiting for placement of a perm cath. That will be done on Friday  Anemia of ESRD  Ventral hernia, repaired  Hypokalemia predialysis      PLAN:   HD 4k+ bath, TTHS. HD today  Most likely will need to switch to HD permanently.  Albumin is only 2.2  Santos catheter needs to be changed to PermCath , IR consulted. Procedure scheduled on Friday  Case management consult for outpatient hemodialysis chair at [address].- has a chair for TTS  Changed to regular diet due to hypokalemia  Added Nepro  PD catheter care only by dialysis nurses       Subjective/Interval History:   I have reviewed the flowsheet and previous day’s notes.   -feels okay denies any complaints.  Hemodialysis later today  -patient had dialysis last night.  Tolerated well.  Denies any complaints.  Her PD catheter dressing getting changed by nurse  -feels okay, no new complaints.  Plan for hernia repair today.  Dialysis tomorrow  -had HD this am, tolerated well, had about 1Lit UF. S/p hernia repair, has some abdominal discomfort  25 Patient feels well, sitting up in chair.  25 No complaints. In good spirits.    Objective:   Vital Signs:    /64   Pulse 99   Temp 99 °F (37.2 °C) (Oral)   Resp 18   Wt 52.2 kg (115 lb 1.3 oz)   SpO2 96%   BMI 20.39 kg/m²             Temp (24hrs), Av.7 °F (37.1 °C), Min:98.2 °F (36.8 °C), Max:99 °F (37.2 °C)       Intake/Output:   Last shift:      No intake/output data recorded.  Last 3 shifts:  1901 -  0700  In: 240 [P.O.:240]  Out: 0     Intake/Output Summary (Last 24 hours) at 2025 1131  Last data filed at 2025 1400  Gross per 24 hour   Intake 240 ml   Output 0 ml   Net 240 ml            Current Facility-Administered Medications   Medication Dose Route Frequency    metroNIDAZOLE (FLAGYL) 500 mg in

## 2025-01-23 NOTE — FLOWSHEET NOTE
PRE DIALYSIS   01/23/25 1525   Observations & Evaluations   Level of Consciousness 0   Heart Rhythm Regular   Respiratory Quality/Effort Unlabored   O2 Device None (Room air)   Bilateral Breath Sounds Diminished   Skin Condition/Temp Dry;Flaky;Warm   Abdomen Inspection Surgical scar   Edema Left lower extremity;Right lower extremity   Vital Signs   BP (!) 145/72   Temp 98.6 °F (37 °C)   Pulse 99   Respirations 16   SpO2 96 %   Pain Assessment   Pain Assessment None - Denies Pain   Pain Level 0   Hemodialysis Central Access - Temporary Right Neck   Placement Date/Time: 01/16/25 1340   Present on Admission/Arrival: No  Inserted by: Demetra Mcdaniels PA-C  Insertion Practices: Chlorohexadine skin antisepsis;Sterile ultrasound technique;Optimal catheter site selection;Hand hygiene;Maximal barrier preca...   Continued need for line? Yes   Site Assessment Clean, dry & intact   CVC Lumen Status Flushed;Sluggish blood return   Blue Lumen Status Sluggish blood return;Flushed   Red Lumen Status Sluggish blood return;Flushed   Line Care Connections checked and tightened;Chlorhexidine wipes;Ports disinfected;Line pulled back   Dressing Type Antimicrobial   Date of Last Dressing Change 01/21/25   Dressing Status Clean, dry & intact        01/23/25 1530   Technical Checks   Dialysis Machine No. [id number]  (08)   RO Machine Number [id number]  (R08)   Dialyzer Lot No. [id number]   Tubing Lot Number [id number]   All Connections Secure Yes   NS Bag Yes   Saline Line Double Clamped Yes   Dialyzer Nipro ELISIO   Prime Volume (mL) 200 mL   ICEBOAT I;C;E;B;O;A;T   RO Machine Log Sheet Completed Yes   Machine Alarm Self Test Completed;Passed   Air Foam Detector Tested;Proper Function   Extracorporeal Circuit Tested for Integrity Yes   Machine Conductivity 13.9   Manual Ph 7.4   Bleach Test (Neg) Yes   Bath Temperature 96.8 °F (36 °C)   Dialysis Bath   K+ (Potassium) 4   Ca+ (Calcium) 2.5   Na+ (Sodium) 138   HCO3 (Bicarb) 35   Bicarbonate
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
## 2025-01-23 NOTE — PLAN OF CARE
Problem: Occupational Therapy - Adult  Goal: By Discharge: Performs self-care activities at highest level of function for planned discharge setting.  See evaluation for individualized goals.  Description: FUNCTIONAL STATUS PRIOR TO ADMISSION:  Patient was ambulatory using SPC.      , Prior Level of Assist for ADLs: Independent,  Prior Level of Assist for Homemaking: Independent, Ambulation Assistance: Independent, Prior Level of Assist for Transfers: Independent, Active : Yes     HOME SUPPORT: Patient lived alone with friends and family to provide assistance PRN.     Occupational Therapy Goals:  Initiated 1/21/2025  1.  Patient will perform grooming at sink with Modified [place] within 7 day(s).  2.  Patient will perform bathing with Modified [place] within 7 day(s).  3.  Patient will perform lower body dressing with Modified [place] within 7 day(s).  4.  Patient will perform toilet transfers with Modified [place]  within 7 day(s).  5.  Patient will perform all aspects of toileting with Modified [place] within 7 day(s).  6.  Patient will utilize energy conservation techniques during functional activities with verbal cues within 7 day(s).    Outcome: Progressing   OCCUPATIONAL THERAPY TREATMENT  Patient: Heather Rich (89 y.o. female)  Date: 1/23/2025  Primary Diagnosis: Ventral hernia without obstruction or gangrene [K43.9]  Hypokalemia [E87.6]  SBO (small bowel obstruction) ([facility]) [K56.609]  Elevated troponin [R79.89]  ESRD needing dialysis ([facility]) [N18.6, Z99.2]  Procedure(s) (LRB):  REPAIR VENTRAL HERNIA (N/A) 3 Days Post-Op   Precautions:                  Chart, occupational therapy assessment, plan of care, and goals were reviewed.    ASSESSMENT  Patient continues to benefit from skilled OT services and is progressing towards goals. Pt received supine in bed; agreeable to session. Pt engaged in grooming (oral hygiene, face washing) and related functional mobility Pt noted to have

## 2025-01-23 NOTE — PROGRESS NOTES
1200- Called dialysis for ETA, no answer. Will call back.    1206- Dialysis called back stating that they would be by around [id number].     1311- Pt voided 100 mL, urine cultures sent.    1918- Pt had one loose BM.

## 2025-01-23 NOTE — PLAN OF CARE
Problem: Physical Therapy - Adult  Goal: By Discharge: Performs mobility at highest level of function for planned discharge setting.  See evaluation for individualized goals.  Description: FUNCTIONAL STATUS PRIOR TO ADMISSION: Patient was independent and active without use of DME in home, however endorses furniture walking, and cane in the community    HOME SUPPORT PRIOR TO ADMISSION: The patient lived alone with no local support.    Physical Therapy Goals  Initiated 1/22/2025  1.  Patient will move from supine to sit and sit to supine, scoot up and down, and roll side to side in bed with supervision/set-up within 7 day(s).    2.  Patient will perform sit to stand with supervision/set-up within 7 day(s).  3.  Patient will transfer from bed to chair and chair to bed with supervision/set-up using the least restrictive device within 7 day(s).  4.  Patient will ambulate with supervision/set-up for 300 feet with the least restrictive device within 7 day(s).   5.  Patient will ascend/descend 4 stairs with 1 handrail(s) with supervision/set-up within 7 day(s).    Outcome: Progressing     PHYSICAL THERAPY TREATMENT    Patient: Heather Rich (89 y.o. female)  Date: 1/23/2025  Diagnosis: Ventral hernia without obstruction or gangrene [K43.9]  Hypokalemia [E87.6]  SBO (small bowel obstruction) ([facility]) [K56.609]  Elevated troponin [R79.89]  ESRD needing dialysis ([facility]) [N18.6, Z99.2] SBO (small bowel obstruction) ([facility])  Procedure(s) (LRB):  REPAIR VENTRAL HERNIA (N/A) 3 Days Post-Op  Precautions:                        ASSESSMENT:  Received pt sitting in bedside chair amenable to therapy activity and the plan to walk in the liz.   Planned to gait train w/ a RW today based on pt's performance ambulating w/ her cane yesterday however pt was not in agreement with this plan, prefers to continue with her cane despite education provided.  Noted improvement in pt's gait and balance today though with continued fall risk.  She

## 2025-01-23 NOTE — PROGRESS NOTES
showed proximal small bowel distension and decompressed loops distally concerning for obstruction, and that is likely related to small ventral hernia.       In the ED, general surgery was consulted and reduced the hernia at bedside. I have consulted nephrology for pt. PD to be set up IP.  She is currently sleeping comfortably in NAD       Subjective:     Post op abd discomfort doing better no flatus no vomiting  11 Point Review of Systems:   Negative except no chest pain shortness of breath    []            Unable to obtain ROS due to:       []            mental status change []            sedated []            intubated     Social History     Tobacco Use    Smoking status: Former     Types: Cigarettes     Passive exposure: Never    Smokeless tobacco: Never    Tobacco comments:     Patient states she smoked for 1 month of her life.   Substance Use Topics    Alcohol use: No     Medications reviewed:  Current Facility-Administered Medications   Medication Dose Route Frequency    metroNIDAZOLE (FLAGYL) 500 mg in 0.9% NaCl 100 mL IVPB premix  500 mg IntraVENous Q8H    losartan (COZAAR) tablet 100 mg  100 mg Oral Daily    cefepime (MAXIPIME) 1,000 mg in sodium chloride 0.9 % 50 mL IVPB (mini-bag)  1,000 mg IntraVENous Q24H    oxyCODONE (ROXICODONE) immediate release tablet 5 mg  5 mg Oral Q4H PRN    oxyCODONE (ROXICODONE) immediate release tablet 10 mg  10 mg Oral Q4H PRN    gentamicin (GARAMYCIN) 0.1 % cream   Topical PRN    hydrALAZINE (APRESOLINE) injection 5 mg  5 mg IntraVENous Q8H PRN    ipratropium 0.5 mg-albuterol 2.5 mg (DUONEB) nebulizer solution 1 Dose  1 Dose Inhalation Q4H PRN    pravastatin (PRAVACHOL) tablet 20 mg  20 mg Oral Nightly    pantoprazole (PROTONIX) tablet 40 mg  40 mg Oral QAM AC    NIFEdipine (PROCARDIA XL) extended release tablet 30 mg  30 mg Oral Daily    lamoTRIgine (LAMICTAL) tablet 25 mg  25 mg Oral BID    ferrous sulfate (IRON 325) tablet 325 mg  325 mg Oral Daily with breakfast     metoprolol succinate (TOPROL XL) extended release tablet 25 mg  25 mg Oral Daily    isosorbide mononitrate (IMDUR) extended release tablet 30 mg  30 mg Oral Daily    acetaminophen (TYLENOL) tablet 1,000 mg  1,000 mg Oral Q6H PRN    ondansetron (ZOFRAN-ODT) disintegrating tablet 4 mg  4 mg Oral Q6H PRN    sodium chloride flush 0.9 % injection 5-40 mL  5-40 mL IntraVENous 2 times per day    sodium chloride flush 0.9 % injection 5-40 mL  5-40 mL IntraVENous PRN    0.9 % sodium chloride infusion   IntraVENous PRN    polyethylene glycol (GLYCOLAX) packet 17 g  17 g Oral Daily PRN        Objective:   Vitals:  /60   Pulse 100   Temp 99 °F (37.2 °C) (Oral)   Resp 18   Wt 52.2 kg (115 lb 1.3 oz)   SpO2 96%   BMI 20.39 kg/m²   Temp (24hrs), Av.7 °F (37.1 °C), Min:98.2 °F (36.8 °C), Max:99 °F (37.2 °C)           Last 24hr Input/Output:    Intake/Output Summary (Last 24 hours) at 2025 0738  Last data filed at 2025 1400  Gross per 24 hour   Intake 240 ml   Output 0 ml   Net 240 ml        PHYSICAL EXAM:  General:    Alert, cooperative, no distress, appears stated age.     Head:   Normocephalic, without obvious abnormality, atraumatic.  Eyes:   Conjunctivae/corneas clear.  PERRLA  Nose:  Nares normal. No drainage or sinus tenderness.  Throat:    Lips, mucosa, and tongue normal.  No Thrush  Neck:  Supple, symmetrical,  no adenopathy, thyroid: non tender    no carotid bruit and no JVD.  Back:    Symmetric,  No CVA tenderness.  Lungs:   Clear to auscultation bilaterally.  No Wheezing or Rhonchi. No rales.  Chest wall:  No tenderness or deformity. No Accessory muscle use.  Heart:   Regular rate and rhythm,  no murmur, rub or gallop.  Abdomen:   Soft, non-tender. Not distended.  Bowel sounds normal. No masses peritoneal catheter intact  Extremities: Extremities normal, atraumatic, No cyanosis.  No edema. No clubbing  Skin:     Texture, turgor normal. No rashes or lesions.  Not Jaundiced  Lymph nodes: Cervical,

## 2025-01-24 ENCOUNTER — APPOINTMENT (OUTPATIENT)
Facility: HOSPITAL | Age: 89
End: 2025-01-24
Attending: INTERNAL MEDICINE
Payer: MEDICARE

## 2025-01-24 ENCOUNTER — APPOINTMENT (OUTPATIENT)
Facility: HOSPITAL | Age: 89
End: 2025-01-24
Payer: MEDICARE

## 2025-01-24 VITALS
WEIGHT: 109.7 LBS | OXYGEN SATURATION: 98 % | DIASTOLIC BLOOD PRESSURE: 68 MMHG | BODY MASS INDEX: 19.43 KG/M2 | TEMPERATURE: 98.2 F | HEART RATE: 90 BPM | SYSTOLIC BLOOD PRESSURE: 159 MMHG | RESPIRATION RATE: 16 BRPM

## 2025-01-24 LAB
ANION GAP SERPL CALC-SCNC: 5 MMOL/L (ref 2–12)
BACTERIA SPEC CULT: NORMAL
BASOPHILS # BLD: 0.05 K/UL (ref 0–0.1)
BASOPHILS NFR BLD: 0.4 % (ref 0–1)
BUN SERPL-MCNC: 15 MG/DL (ref 6–20)
BUN/CREAT SERPL: 4 (ref 12–20)
CALCIUM SERPL-MCNC: 9 MG/DL (ref 8.5–10.1)
CHLORIDE SERPL-SCNC: 104 MMOL/L (ref 97–108)
CO2 SERPL-SCNC: 28 MMOL/L (ref 21–32)
CREAT SERPL-MCNC: 3.35 MG/DL (ref 0.55–1.02)
DIFFERENTIAL METHOD BLD: ABNORMAL
EOSINOPHIL # BLD: 0.82 K/UL (ref 0–0.4)
EOSINOPHIL NFR BLD: 6.2 % (ref 0–7)
ERYTHROCYTE [DISTWIDTH] IN BLOOD BY AUTOMATED COUNT: 14.5 % (ref 11.5–14.5)
GLUCOSE SERPL-MCNC: 107 MG/DL (ref 65–100)
HCT VFR BLD AUTO: 26.6 % (ref 35–47)
HGB BLD-MCNC: 8.3 G/DL (ref 11.5–16)
IMM GRANULOCYTES # BLD AUTO: 0.09 K/UL (ref 0–0.04)
IMM GRANULOCYTES NFR BLD AUTO: 0.7 % (ref 0–0.5)
LYMPHOCYTES # BLD: 1.2 K/UL (ref 0.8–3.5)
LYMPHOCYTES NFR BLD: 9 % (ref 12–49)
MCH RBC QN AUTO: 26.3 PG (ref 26–34)
MCHC RBC AUTO-ENTMCNC: 31.2 G/DL (ref 30–36.5)
MCV RBC AUTO: 84.4 FL (ref 80–99)
MONOCYTES # BLD: 1.68 K/UL (ref 0–1)
MONOCYTES NFR BLD: 12.6 % (ref 5–13)
NEUTS SEG # BLD: 9.45 K/UL (ref 1.8–8)
NEUTS SEG NFR BLD: 71.1 % (ref 32–75)
NRBC # BLD: 0 K/UL (ref 0–0.01)
NRBC BLD-RTO: 0 PER 100 WBC
PLATELET # BLD AUTO: 265 K/UL (ref 150–400)
PMV BLD AUTO: 9.8 FL (ref 8.9–12.9)
POTASSIUM SERPL-SCNC: 4 MMOL/L (ref 3.5–5.1)
RBC # BLD AUTO: 3.15 M/UL (ref 3.8–5.2)
SERVICE CMNT-IMP: NORMAL
SODIUM SERPL-SCNC: 137 MMOL/L (ref 136–145)
WBC # BLD AUTO: 13.3 K/UL (ref 3.6–11)

## 2025-01-24 PROCEDURE — 6370000000 HC RX 637 (ALT 250 FOR IP): Performed by: SURGERY

## 2025-01-24 PROCEDURE — 6370000000 HC RX 637 (ALT 250 FOR IP): Performed by: INTERNAL MEDICINE

## 2025-01-24 PROCEDURE — 85025 COMPLETE CBC W/AUTO DIFF WBC: CPT

## 2025-01-24 PROCEDURE — 80048 BASIC METABOLIC PNL TOTAL CA: CPT

## 2025-01-24 PROCEDURE — 77001 FLUOROGUIDE FOR VEIN DEVICE: CPT

## 2025-01-24 PROCEDURE — 99238 HOSP IP/OBS DSCHRG MGMT 30/<: CPT | Performed by: INTERNAL MEDICINE

## 2025-01-24 PROCEDURE — 6360000004 HC RX CONTRAST MEDICATION: Performed by: INTERNAL MEDICINE

## 2025-01-24 PROCEDURE — 99231 SBSQ HOSP IP/OBS SF/LOW 25: CPT | Performed by: NURSE PRACTITIONER

## 2025-01-24 PROCEDURE — 6360000002 HC RX W HCPCS: Performed by: NURSE PRACTITIONER

## 2025-01-24 PROCEDURE — 99231 SBSQ HOSP IP/OBS SF/LOW 25: CPT | Performed by: SURGERY

## 2025-01-24 PROCEDURE — 71275 CT ANGIOGRAPHY CHEST: CPT

## 2025-01-24 RX ORDER — LOSARTAN POTASSIUM 100 MG/1
100 TABLET ORAL DAILY
Qty: 30 TABLET | Refills: 3 | Status: SHIPPED | OUTPATIENT
Start: 2025-01-25

## 2025-01-24 RX ORDER — MIDAZOLAM HYDROCHLORIDE 2 MG/2ML
INJECTION, SOLUTION INTRAMUSCULAR; INTRAVENOUS PRN
Status: COMPLETED | OUTPATIENT
Start: 2025-01-24 | End: 2025-01-24

## 2025-01-24 RX ORDER — FENTANYL CITRATE 50 UG/ML
INJECTION, SOLUTION INTRAMUSCULAR; INTRAVENOUS PRN
Status: COMPLETED | OUTPATIENT
Start: 2025-01-24 | End: 2025-01-24

## 2025-01-24 RX ORDER — LIDOCAINE HYDROCHLORIDE 10 MG/ML
INJECTION, SOLUTION EPIDURAL; INFILTRATION; INTRACAUDAL; PERINEURAL PRN
Status: COMPLETED | OUTPATIENT
Start: 2025-01-24 | End: 2025-01-24

## 2025-01-24 RX ORDER — GENTAMICIN SULFATE 1 MG/G
CREAM TOPICAL
Qty: 15 G | Refills: 0 | Status: SHIPPED | OUTPATIENT
Start: 2025-01-24

## 2025-01-24 RX ORDER — HEPARIN SODIUM 1000 [USP'U]/ML
INJECTION, SOLUTION INTRAVENOUS; SUBCUTANEOUS PRN
Status: COMPLETED | OUTPATIENT
Start: 2025-01-24 | End: 2025-01-24

## 2025-01-24 RX ORDER — AMOXICILLIN AND CLAVULANATE POTASSIUM 500; 125 MG/1; MG/1
1 TABLET, FILM COATED ORAL DAILY
Qty: 3 TABLET | Refills: 0 | Status: SHIPPED | OUTPATIENT
Start: 2025-01-24 | End: 2025-01-27

## 2025-01-24 RX ORDER — IOPAMIDOL 755 MG/ML
100 INJECTION, SOLUTION INTRAVASCULAR
Status: COMPLETED | OUTPATIENT
Start: 2025-01-24 | End: 2025-01-24

## 2025-01-24 RX ADMIN — METOPROLOL SUCCINATE 25 MG: 25 TABLET, EXTENDED RELEASE ORAL at 11:57

## 2025-01-24 RX ADMIN — MIDAZOLAM HYDROCHLORIDE 0.5 MG: 1 INJECTION, SOLUTION INTRAMUSCULAR; INTRAVENOUS at 10:15

## 2025-01-24 RX ADMIN — IOPAMIDOL 60 ML: 755 INJECTION, SOLUTION INTRAVENOUS at 08:19

## 2025-01-24 RX ADMIN — LOSARTAN POTASSIUM 100 MG: 50 TABLET, FILM COATED ORAL at 11:57

## 2025-01-24 RX ADMIN — LAMOTRIGINE 25 MG: 25 TABLET ORAL at 12:08

## 2025-01-24 RX ADMIN — PANTOPRAZOLE SODIUM 40 MG: 40 TABLET, DELAYED RELEASE ORAL at 05:56

## 2025-01-24 RX ADMIN — FENTANYL CITRATE 25 MCG: 50 INJECTION INTRAMUSCULAR; INTRAVENOUS at 10:15

## 2025-01-24 RX ADMIN — NIFEDIPINE 30 MG: 30 TABLET, FILM COATED, EXTENDED RELEASE ORAL at 11:57

## 2025-01-24 RX ADMIN — FENTANYL CITRATE 50 MCG: 50 INJECTION INTRAMUSCULAR; INTRAVENOUS at 10:11

## 2025-01-24 RX ADMIN — METRONIDAZOLE 500 MG: 500 INJECTION, SOLUTION INTRAVENOUS at 12:04

## 2025-01-24 RX ADMIN — MIDAZOLAM HYDROCHLORIDE 0.5 MG: 1 INJECTION, SOLUTION INTRAMUSCULAR; INTRAVENOUS at 10:20

## 2025-01-24 RX ADMIN — MIDAZOLAM HYDROCHLORIDE 0.5 MG: 1 INJECTION, SOLUTION INTRAMUSCULAR; INTRAVENOUS at 10:10

## 2025-01-24 RX ADMIN — LIDOCAINE HYDROCHLORIDE 7 ML: 10 INJECTION, SOLUTION EPIDURAL; INFILTRATION; INTRACAUDAL; PERINEURAL at 10:15

## 2025-01-24 RX ADMIN — METRONIDAZOLE 500 MG: 500 INJECTION, SOLUTION INTRAVENOUS at 04:23

## 2025-01-24 RX ADMIN — FENTANYL CITRATE 25 MCG: 50 INJECTION INTRAMUSCULAR; INTRAVENOUS at 10:18

## 2025-01-24 RX ADMIN — HEPARIN SODIUM 3400 UNITS: 1000 INJECTION INTRAVENOUS; SUBCUTANEOUS at 10:25

## 2025-01-24 ASSESSMENT — PAIN - FUNCTIONAL ASSESSMENT: PAIN_FUNCTIONAL_ASSESSMENT: NONE - DENIES PAIN

## 2025-01-24 NOTE — PROGRESS NOTES
1700- Transport arrived to  patient. Discharge paperwork given. PIV's removed. HD access remained.     1737- Report given to Myah at [facility].

## 2025-01-24 NOTE — PROGRESS NOTES
Name: Heather Rich MRN: [id number]   : 1935 Hospital: [facility]   Date: 2025        IMPRESSION:   ESRD transitioned from PD to iHD . Awaiting for placement of a perm cath. That will be done on Friday  Anemia of ESRD  Ventral hernia, repaired  Hypokalemia predialysis - resolved      PLAN:   HD with a 2 k bath tomorrow ()  Most likely will need to switch to HD permanently.  Albumin is only 2.2  Santos catheter was already changed to a Perm Cath  Case management consult for outpatient hemodialysis chair at [address]- has a chair for TTS  Changed to regular diet due to hypokalemia  Added Nepro  PD catheter care only by dialysis nurses  Ok for discharge to [facility] Santos from a renal standpoint.       Subjective/Interval History:   I have reviewed the flowsheet and previous day’s notes.   -feels okay denies any complaints.  Hemodialysis later today  -patient had dialysis last night.  Tolerated well.  Denies any complaints.  Her PD catheter dressing getting changed by nurse  -feels okay, no new complaints.  Plan for hernia repair today.  Dialysis tomorrow  -had HD this am, tolerated well, had about 1Lit UF. S/p hernia repair, has some abdominal discomfort  25 Patient feels well, sitting up in chair.  25 No complaints. In good spirits.      2025    Ms. Rich inquired about her discharge  She had dialysis yesterday.  The tunneled HD catheter/PermCath is now in place.    Objective:   Vital Signs:    BP (!) 144/60   Pulse 92   Temp 98.2 °F (36.8 °C) (Oral)   Resp 18   Wt 49.8 kg (109 lb 11.2 oz)   SpO2 96%   BMI 19.43 kg/m²             Temp (24hrs), Av.3 °F (36.8 °C), Min:97.2 °F (36.2 °C), Max:98.8 °F (37.1 °C)       Intake/Output:   Last shift:      No intake/output data recorded.  Last 3 shifts:  1901 -  0700  In: 510 [I.V.:10]  Out: 1100 [Urine:100]    Intake/Output Summary (Last 24 hours) at 2025 1522  Last data filed at

## 2025-01-24 NOTE — DISCHARGE SUMMARY
Discharge Summary       PATIENT ID: Heather Rich  MRN: [id number]   YOB: 1935    DATE OF ADMISSION: 1/15/2025  4:09 PM    DATE OF DISCHARGE: 1/24/2025   PRIMARY CARE PROVIDER: Kehinde Barney MD     ATTENDING PHYSICIAN: Kehinde Barney MD    DISCHARGING PROVIDER: Kehinde Barney MD      Admitting Note:  89 y.o. female with a pmhx asthma, BPV, past right breast cancer s/p lumpectomy and radiation, ESRD on PD, DM II, HTN , and seizures who presents with abdominal pain, and is being admitted for SBO.     In the ED, RR was elevated to 32, and she was hypertensive to .  Other VSS.  Labs showed WBC 18, microcytic anemia with hgb 11.1, troponin 233>246,,  k+ 3, glucose 127, BuN 39, creatinine 5.61, and albumin 2.8. CT abdomen/pelvis showed proximal small bowel distension and decompressed loops distally concerning for obstruction, and that is likely related to small ventral hernia.       In the ED, general surgery was consulted and reduced the hernia at bedside. I have consulted nephrology for pt. PD to be set up IP.  She is currently sleeping comfortably in NAD    Vitals:    01/24/25 1231   BP: (!) 144/60   Pulse: 92   Resp: 18   Temp: 98.2 °F (36.8 °C)   SpO2: 96%        CONSULTATIONS: IP CONSULT TO GENERAL SURGERY Narayan Matt md  Imp: Ms. Rich is an 89 y.o. female with abdominal pain and a small bowel obstruction due to an incarcerated ventral hernia.     Plan:1. Would ask Hospitalists to admit.              2. Will leave NG out as long as no nausea or vomiting.               3. NPO except ice chips and meds for now.               4. Nephrology to see.               5. Pain medication and anti-emetics as needed.              6. Ms. Rich may ultimately benefit from repair of the small ventral hernia.  IP CONSULT TO NEPHROLOGY  IMPRESSION:    1. End-stage renal disease, on hemodialysis.  The patient is currently on peritoneal dialysis.  Electrolytes are with corrected

## 2025-01-24 NOTE — PROGRESS NOTES
Pt arrives via stretcher to angio department accompanied by transport for permcath procedure. All assessments completed and consent was reviewed.  Education given was regarding procedure, moderate sedation, post-procedure care and  management/follow-up. Opportunity for questions was provided and all questions and concerns were addressed.

## 2025-01-24 NOTE — PROGRESS NOTES
Hospital Progress Note  Kehinde Barney MD   Answering service: [phone] OR    [employer]      NAME:  Heather Rich   :   1935   MRN:  [id number]     Date/Time:  2025 7:14 AM    Plan:     CTA chest this am -SOB -r/o PE vs ?  ID consult appreciated  Encourage activity  Possible d/c today if eval neg  Risk of Deterioration: Low  []           Moderate  []           High  []                 Assessment:     Principal Problem:    SBO (small bowel obstruction) ([facility])     Symptoms have resolved            Active Problems:    Incarcerated ventral hernia     Repaired          Leukocytosis      Patient was cultured yesterday      Continue cefepime pending culture results      Culture neg - wbc improved      HTN (hypertension)     Will titrate antihypertensives     Patient concern related to elevated blood pressures in the morning      Anemia of chronic renal failure      ESRD on peritoneal dialysis ([facility])     Agrees to transition to hemodialysis temporarily        Type 2 diabetes mellitus with kidney complication, without long-term current use of insulin ([facility])      Diet controlled      Continue to monitor      SIRS (systemic inflammatory response syndrome) ([facility])     Leukocytosis with WBCs greater than 15 on admission now improving     Check blood cultures      Elevated troponin     Cardiology opinion reviewed     No chest pain      Seizure ([facility])     Continue Lamictal     No recent seizures      Gastroesophageal reflux disease without esophagitis     PPI as needed  Resolved Problems:    * No resolved hospital problems. *       Admitting notes:89 y.o. female with a pmhx asthma, BPV, past right breast cancer s/p lumpectomy and radiation, ESRD on PD, DM II, HTN , and seizures who presents with abdominal pain, and is being admitted for SBO.     In the ED, RR was elevated to 32, and she was hypertensive to .  Other VSS.  Labs showed WBC 18, microcytic anemia with hgb 11.1, troponin 233>246,,  k+ 3,
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
## 2025-01-24 NOTE — PROGRESS NOTES
Occupational Therapy  1/24/2025    Attempted to see patient for OT session, currently EL in angio for permacath placement. Will defer at this time and continue to follow.     Ashley Castro, UNIQUE, OTR/L

## 2025-01-24 NOTE — PROGRESS NOTES
TRANSFER - OUT REPORT:    Verbal report given to Janine SHELLEY on Heather Rich  being transferred to  for routine progression of patient care       Report consisted of patient's Situation, Background, Assessment and   Recommendations(SBAR).     Information from the following report(s) Nurse Handoff Report, Surgery Report, and Event Log was reviewed with the receiving nurse.           Lines:   Peripheral IV 01/21/25 Right;Anterior Wrist (Active)   Site Assessment Clean, dry & intact 01/23/25 2200   Line Status Normal saline locked 01/23/25 2200   Line Care Connections checked and tightened 01/23/25 2200   Phlebitis Assessment Erythema at access site with or without pain 01/23/25 2200   Infiltration Assessment 0 01/23/25 2200   Alcohol Cap Used Yes 01/23/25 2200   Dressing Status New dressing applied 01/23/25 2200   Dressing Type Transparent 01/23/25 2200       Peripheral IV 01/24/25 Left Antecubital (Active)       Hemodialysis Central Access - Permanent/Tunneled Right Subclavian (Active)   $Tunneled Hemodialysis Catheter $Yes 01/24/25 1024   Continued need for line? Yes 01/24/25 1024   Site Assessment Clean, dry & intact 01/24/25 1045   Blue Lumen Status Brisk blood return;Heparin locked;Alcohol cap applied 01/24/25 1024   Red Lumen Status Brisk blood return;Heparin locked;Alcohol cap applied 01/24/25 1024   Dressing Type Transparent;Sterile dressing, transparent 01/24/25 1024   Dressing Status Clean, dry & intact 01/24/25 1045        Opportunity for questions and clarification was provided.      Patient transported with:  Tech

## 2025-01-24 NOTE — PLAN OF CARE
Problem: Safety - Adult  Goal: Free from fall injury  1/23/2025 3029 by Cesia Henderson, RN  Outcome: Progressing  1/23/2025 1604 by Ivis Hanks, RN  Outcome: Progressing

## 2025-01-24 NOTE — PROGRESS NOTES
Ms. Rich feels short of breath today.   Tm 99 Tc 98.1 HR: 92 BP: 146/77 Resp Rate: 16 96% sat on two liters per minute.    Intake/Output Summary (Last 24 hours) at 1/24/2025 0905  Last data filed at 1/23/2025 2205  Gross per 24 hour   Intake 510 ml   Output 1100 ml   Net -590 ml   Exam: Cor: RRR.              Lungs: Bilateral breath sounds.               Abd: Soft. No distension.             Non tender.             No associated rebound or guarding.             The midline incision is clean and well healed.  Labs:   Recent Results (from the past 12 hour(s))   CBC with Auto Differential    Collection Time: 01/24/25  2:13 AM   Result Value Ref Range    WBC 13.3 (H) 3.6 - 11.0 K/uL    RBC 3.15 (L) 3.80 - 5.20 M/uL    Hemoglobin 8.3 (L) 11.5 - 16.0 g/dL    Hematocrit 26.6 (L) 35.0 - 47.0 %    MCV 84.4 80.0 - 99.0 FL    MCH 26.3 26.0 - 34.0 PG    MCHC 31.2 30.0 - 36.5 g/dL    RDW 14.5 11.5 - 14.5 %    Platelets 265 150 - 400 K/uL    MPV 9.8 8.9 - 12.9 FL    Nucleated RBCs 0.0 0  WBC    nRBC 0.00 0.00 - 0.01 K/uL    Neutrophils % 71.1 32.0 - 75.0 %    Lymphocytes % 9.0 (L) 12.0 - 49.0 %    Monocytes % 12.6 5.0 - 13.0 %    Eosinophils % 6.2 0.0 - 7.0 %    Basophils % 0.4 0.0 - 1.0 %    Immature Granulocytes % 0.7 (H) 0.0 - 0.5 %    Neutrophils Absolute 9.45 (H) 1.80 - 8.00 K/UL    Lymphocytes Absolute 1.20 0.80 - 3.50 K/UL    Monocytes Absolute 1.68 (H) 0.00 - 1.00 K/UL    Eosinophils Absolute 0.82 (H) 0.00 - 0.40 K/UL    Basophils Absolute 0.05 0.00 - 0.10 K/UL    Immature Granulocytes Absolute 0.09 (H) 0.00 - 0.04 K/UL    Differential Type AUTOMATED     Basic Metabolic Panel    Collection Time: 01/24/25  2:13 AM   Result Value Ref Range    Sodium 137 136 - 145 mmol/L    Potassium 4.0 3.5 - 5.1 mmol/L    Chloride 104 97 - 108 mmol/L    CO2 28 21 - 32 mmol/L    Anion Gap 5 2 - 12 mmol/L    Glucose 107 (H) 65 - 100 mg/dL    BUN 15 6 - 20 MG/DL    Creatinine 3.35 (H) 0.55 - 1.02 MG/DL    BUN/Creatinine Ratio 4 (L)

## 2025-01-24 NOTE — CARE COORDINATION
Transition of Care Plan:     RUR: 22% High   Prior Level of Functioning: Modified independent  Disposition: SNF - [facility] w/ HD Den   * OP HD arrangements w/ [facility] in place once DC;d from SNF  ([facility] location; [address] [place] [id number] / P: [phone], TTS, 11:30AM chairtime)  AVERY: Fri. 1/24   Follow up appointments: PCP, specialist   DME needed: Has needed DME   Transportation at discharge: [facility] scheduled for 5pm  IM/IMM Medicare/ letter given: 1st IM & 1st : 1/16/25  & 2nd IM & : 1/23/25  Is patient a [place] and connected with [facility]? NA  Caregiver Contact: Son, Bob Rich, [phone]  Discharge Caregiver contacted prior to discharge? Upon patient request  Care Conference needed? No  Barriers to discharge: Medical    Room #136b  Call report [phone]      ROMY Khan   Care Manager  Available via [employer]

## 2025-01-24 NOTE — PROGRESS NOTES
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     Recommend to complete 7 day of abx therapy, end date 1/27/2025.     May complete remaining abx therapy with po augmentin      Post op care per general surgery team    ID team signing off. Please contact us with any questions.     Plan of care d/w pt, Dr. Barney, and Dr. Sue               History of Present Illness   1/22/2025  Patient is a 89 y.o. female with medical history of hypertension, ESRD on PD, type II DM, and right breast cancer, s/p lumpectomy and radiation presented to ER on 1/15 with abdominal pain.      Pt was evaluated by general surgery, diagnosed with SBO due to incarcerated ventral hernia. Pt underwent for repair of ventral hernia on 1/20 by Dr. Matt. No intra-op cx.      Blood cx from admission with no growth, repeat BC from 1/21 with no growth so far  U/A (-) on admission. At the time of visit, pt was afebrile, wbc 18.7, CXR revealed  Right IJ catheter overlies right atrium. Heart size is normal. Lungs demonstrate no pneumonia or pulm edema. There is a single band of discoid atelectasis in the right lower lung zone.     During visit, pt was sitting up in chair, c/o feeling tired, weak, and mild abdominal discomfort. No fever, chills, nausea, vomiting, diarrhea, sob, del rio, chest pain, or diarrhea.     ID team was consulted for evaluation and treatment recommendations regarding leukocytosis and spherocytosis.     Subjective:      Denies any discomfort   Review of Systems:            Symptom Y/N Comments   Symptom Y/N Comments   Fever/Chills n      Chest  of a standardized protocol tailored for this examination and automatic exposure control for dose modulation. FINDINGS: LOWER THORAX: No significant abnormality in the incidentally imaged lower chest. LIVER: Intrahepatic biliary dilatation is noted. BILIARY TREE: Status post cholecystectomy. Common bile duct distention, maximum dimension is 1.8 cm. SPLEEN: Splenectomy. PANCREAS: No mass or ductal dilatation. ADRENALS: Unremarkable. KIDNEYS: No mass, calculus, or hydronephrosis. Numerous bilateral renal cysts again demonstrated. STOMACH: Unremarkable. SMALL BOWEL: Proximal to mid small bowel distention is noted with decompressed loops distally. This is concerning for small bowel obstruction. COLON: Colonic diverticulosis. APPENDIX: Unremarkable. PERITONEUM: Mild free fluid. Peritoneal dialysis catheter is seen in the deep pelvis. RETROPERITONEUM: No lymphadenopathy or aortic aneurysm. REPRODUCTIVE ORGANS: Unremarkable. URINARY BLADDER: No mass or calculus. BONES: Degenerative changes are seen in the lumbar spine. ABDOMINAL WALL: Small periumbilical hernia contains a small bowel loop. ADDITIONAL COMMENTS: N/A     Proximal to mid small bowel distention with decompressed loops distally is concerning for obstruction and this is likely related to the small ventral hernia. Stable intra and extrahepatic biliary dilatation. Colonic diverticulosis. Electronically signed by VEENA NESS      Thank you for the opportunity to participate in the care of this patient.   Please contact with questions or concerns.      The above plan of care that has been discussed and agreed upon by Dr. Seu.  A total time of 35 minutes was spent on today's encounter.  Greater than 50% of the time was spent on the following:  Preparing for visit and chart review.  Obtaining and/or reviewing separately obtained history  Performing a medically appropriate exam and/or evaluation  Counseling and educating a patient/family/caregiver as noted

## 2025-01-24 NOTE — PROGRESS NOTES
Physical Therapy    Attempted to see patient for PT session, currently EL in angio for permacath placement. Will defer at this time and continue to follow.     ANGELINA BEAUCHAMP, PT

## 2025-01-25 ENCOUNTER — OFFICE VISIT (OUTPATIENT)
Facility: CLINIC | Age: 89
End: 2025-01-25

## 2025-01-25 VITALS
RESPIRATION RATE: 17 BRPM | OXYGEN SATURATION: 98 % | BODY MASS INDEX: 19.77 KG/M2 | DIASTOLIC BLOOD PRESSURE: 65 MMHG | HEART RATE: 93 BPM | SYSTOLIC BLOOD PRESSURE: 155 MMHG | TEMPERATURE: 98.7 F | WEIGHT: 111.6 LBS

## 2025-01-25 DIAGNOSIS — H90.3 SENSORINEURAL HEARING LOSS (SNHL) OF BOTH EARS: Chronic | ICD-10-CM

## 2025-01-25 DIAGNOSIS — N18.6 END STAGE RENAL DISEASE ON DIALYSIS (HCC): Chronic | ICD-10-CM

## 2025-01-25 DIAGNOSIS — Z85.3 HISTORY OF RIGHT BREAST CANCER: Chronic | ICD-10-CM

## 2025-01-25 DIAGNOSIS — I10 ESSENTIAL HYPERTENSION: Chronic | ICD-10-CM

## 2025-01-25 DIAGNOSIS — I34.0 NONRHEUMATIC MITRAL VALVE REGURGITATION: Chronic | ICD-10-CM

## 2025-01-25 DIAGNOSIS — Z87.19 HISTORY OF SMALL BOWEL OBSTRUCTION: Chronic | ICD-10-CM

## 2025-01-25 DIAGNOSIS — K43.6 VENTRAL HERNIA WITH OBSTRUCTION AND WITHOUT GANGRENE: Primary | Chronic | ICD-10-CM

## 2025-01-25 DIAGNOSIS — D64.89 ANEMIA DUE TO MULTIPLE MECHANISMS: Chronic | ICD-10-CM

## 2025-01-25 DIAGNOSIS — D58.0 HEREDITARY SPHEROCYTOSIS (HCC): Chronic | ICD-10-CM

## 2025-01-25 DIAGNOSIS — Z99.2 END STAGE RENAL DISEASE ON DIALYSIS (HCC): Chronic | ICD-10-CM
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
## 2025-01-25 PROBLEM — M81.0 OSTEOPOROSIS: Chronic | Status: ACTIVE | Noted: 2023-03-21
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
## 2025-01-25 NOTE — PROGRESS NOTES
who presents with abdominal pain, and is being admitted for SBO.     In the ED, RR was elevated to 32, and she was hypertensive to .  Other VSS.  Labs showed WBC 18, microcytic anemia with hgb 11.1, troponin 233>246,,  k+ 3, glucose 127, BuN 39, creatinine 5.61, and albumin 2.8. CT abdomen/pelvis showed proximal small bowel distension and decompressed loops distally concerning for obstruction, and that is likely related to small ventral hernia.       In the ED, general surgery was consulted and reduced the hernia at bedside. I have consulted nephrology for pt. PD to be set up IP.  She is currently sleeping comfortably in NAD.\"    The history is obtained from the patient and previous records and is felt to be satisfactory to establish an acceptable plan of care. Health status indicators were reviewed and there are no changes except as noted above. The past medical history, family history, social history and ethnic considerations have been updated as needed. The patient denies any constitutional, ENT, cardiopulmonary, gastrointestinal, or genitourinary issues otherwise as review in the ROS.      Review of Symptoms:    General: Denies weight loss, fever, chills, malaise, weakness or appetite change  Skin:  Denies rashes or suspicious skin lesions  HEENT: Denies visual disturbance, earache, sore throat, post-nasal drainage, hoarseness dysphagia  Cardiac: Denies chest pain, palpitations, orthopnea, PND, edema  Pulmonary: Denies cough, congestion or dyspnea  GI:  Denies nausea, vomiting, diarrhea, hematemesis, melena, abdominal pain  :  Denies dysuria, frequency, urgency, incontinence  MS:  Denies joint pain, swelling, stiffness  Neurologic: Denies paresthesias, headache, syncope or seizures  Psychiatric: She is anxious      Past Medical History:  Past Medical History:   Diagnosis Date    Anemia     Arrhythmia     irregular per pt d/t blood disorder    Asthma     Axillary adenopathy 01/04/2021    3/1/21 md Gabe

## 2025-01-26 LAB
BACTERIA SPEC CULT: NORMAL
SERVICE CMNT-IMP: NORMAL

## 2025-01-27 ENCOUNTER — OFFICE VISIT (OUTPATIENT)
Facility: CLINIC | Age: 89
End: 2025-01-27
Payer: MEDICARE

## 2025-01-27 DIAGNOSIS — Z99.2 ESRD ON PERITONEAL DIALYSIS (HCC): Chronic | ICD-10-CM

## 2025-01-27 DIAGNOSIS — I34.0 NONRHEUMATIC MITRAL VALVE REGURGITATION: Chronic | ICD-10-CM

## 2025-01-27 DIAGNOSIS — Z85.3 HISTORY OF RIGHT BREAST CANCER: Chronic | ICD-10-CM

## 2025-01-27 DIAGNOSIS — I10 ESSENTIAL HYPERTENSION: Primary | Chronic | ICD-10-CM

## 2025-01-27 DIAGNOSIS — N18.6 ESRD ON PERITONEAL DIALYSIS (HCC): Chronic | ICD-10-CM

## 2025-01-27 PROCEDURE — 1123F ACP DISCUSS/DSCN MKR DOCD: CPT | Performed by: FAMILY MEDICINE

## 2025-01-27 PROCEDURE — 99308 SBSQ NF CARE LOW MDM 20: CPT | Performed by: FAMILY MEDICINE

## 2025-01-29 ENCOUNTER — OFFICE VISIT (OUTPATIENT)
Facility: CLINIC | Age: 89
End: 2025-01-29

## 2025-01-29 DIAGNOSIS — E61.1 IRON DEFICIENCY: ICD-10-CM

## 2025-01-29 DIAGNOSIS — R56.9 SEIZURE (HCC): ICD-10-CM

## 2025-01-29 DIAGNOSIS — I10 HYPERTENSION, UNSPECIFIED TYPE: ICD-10-CM

## 2025-01-29 DIAGNOSIS — N18.6 END STAGE RENAL DISEASE ON DIALYSIS (HCC): Primary | ICD-10-CM

## 2025-01-29 DIAGNOSIS — Z99.2 END STAGE RENAL DISEASE ON DIALYSIS (HCC): Primary | ICD-10-CM

## 2025-01-30 NOTE — PROGRESS NOTES
PLACE OF SERVICE:  [facility] [address] [place] [address]    SKILLED VISIT      Chief Complaint: Wheezing, chest congestion      HPI : Heather Rich is a 89 y.o. female patient seen today for follow-up.  Patient admitted from hospital to facility secondary to abdominal pain and shortness of breath. Hospital notes reviewed patient respiratory rate 32 and systolic blood pressure was 226. CT of abdomen pelvis showed proximal small bowel distention and compression loops distally concerning for obstruction that is likely related to small ventral hernia. Currently on peritoneal dialysis however she has agreed to transition to temporary hemodialysis.  Notes noted that patient lives alone and sometimes have difficulty completing her peritoneal dialysis treatment. Nephrologist will discuss at later date regarding patient staying on to hemodialysis permanently.  Patient is alert and oriented able to make her needs known.  Patient denies any symptoms of respiratory distress, lungs diminished at base. Patient continues on Albuterol Sulfate HFA Inhalation  ever y 4 hours as needed, albuterol nebulizer every 6 hours as needed for shortness of breath budesonide Suspension every 12 hours and Arformoterol Tartrate Inhalation Nebulization twice a day. Patient continues on Mucinex 600 mg twice a day x 5 more days and Flonase nasal spray for allergies.  No complaints of constipation she continues on MiraLAX 17 g every 24 hours and Colace 1 capsule daily as needed for constipation. Patient has history of hypertension she remains stable on current dose of metoprolol 25 mg daily, losartan 100 mg daily, nifedipine 30 mg and isosorbide 30 mg daily with hold parameters in place. She continues on lamoTRIgine 25 mg twice a day for history of seizures. No reports of seizure activities from patient or nursing staff.  Will for history of anemia she remains stable on ferrous sulfate 1 tab daily. For

## 2025-01-31 NOTE — ASSESSMENT & PLAN NOTE
Patient remains stable on current dose of metoprolol 25 mg daily, losartan 100 mg daily, nifedipine 30 mg daily isosorbide 30 mg daily with parameters in place.

## 2025-01-31 NOTE — ASSESSMENT & PLAN NOTE
Patient continues on LaMICtal 25 mg twice a day.  No reports of seizure activity from patient or nursing staff.

## 2025-02-03 ENCOUNTER — OFFICE VISIT (OUTPATIENT)
Facility: CLINIC | Age: 89
End: 2025-02-03
Payer: MEDICARE

## 2025-02-03 DIAGNOSIS — N18.6 END STAGE RENAL DISEASE ON DIALYSIS (HCC): Primary | ICD-10-CM

## 2025-02-03 DIAGNOSIS — R56.9 SEIZURE (HCC): ICD-10-CM

## 2025-02-03 DIAGNOSIS — I10 PRIMARY HYPERTENSION: ICD-10-CM

## 2025-02-03 DIAGNOSIS — Z99.2 END STAGE RENAL DISEASE ON DIALYSIS (HCC): Primary | ICD-10-CM

## 2025-02-03 PROCEDURE — 1123F ACP DISCUSS/DSCN MKR DOCD: CPT | Performed by: NURSE PRACTITIONER

## 2025-02-03 PROCEDURE — 99309 SBSQ NF CARE MODERATE MDM 30: CPT | Performed by: NURSE PRACTITIONER

## 2025-02-04 NOTE — PROGRESS NOTES
PLACE OF SERVICE:  [facility] [address] [place] [address]    SKILLED VISIT      Chief Complaint: Wheezing, chest congestion      HPI : Heather Rich is a 89 y.o. female patient seen today for follow-up.  Patient admitted from hospital to facility secondary to abdominal pain and shortness of breath. Hospital notes reviewed patient respiratory rate 32 and systolic blood pressure was 226. CT of abdomen pelvis showed proximal small bowel distention and compression loops distally concerning for obstruction that is likely related to small ventral hernia. Currently on peritoneal dialysis however she has agreed to transition to temporary hemodialysis.  Notes noted that patient lives alone and sometimes have difficulty completing her peritoneal dialysis treatment. Nephrologist will discuss at later date regarding patient staying on to hemodialysis permanently.  Patient is alert and oriented able to make her needs known.  Patient denies any symptoms of respiratory distress, lungs diminished at base. Patient continues on Albuterol Sulfate HFA Inhalation  ever y 4 hours as needed, albuterol nebulizer every 6 hours as needed for shortness of breath budesonide Suspension every 12 hours and Arformoterol Tartrate Inhalation Nebulization twice a day. Patient continues on Mucinex 600 mg twice a day x 5 more days and Flonase nasal spray for allergies.  No complaints of constipation she continues on MiraLAX 17 g every 24 hours and Colace 1 capsule daily as needed for constipation. Patient has history of hypertension she remains stable on current dose of metoprolol 25 mg daily, losartan 100 mg daily, nifedipine 30 mg and isosorbide 30 mg daily with hold parameters in place. She continues on lamoTRIgine 25 mg twice a day for history of seizures. No reports of seizure activities from patient or nursing staff.  Will for history of anemia she remains stable on ferrous sulfate 1 tab daily. For

## 2025-02-05 ENCOUNTER — OFFICE VISIT (OUTPATIENT)
Facility: CLINIC | Age: 89
End: 2025-02-05
Payer: MEDICARE

## 2025-02-05 DIAGNOSIS — I10 PRIMARY HYPERTENSION: Primary | ICD-10-CM

## 2025-02-05 DIAGNOSIS — R56.9 SEIZURE (HCC): ICD-10-CM

## 2025-02-05 DIAGNOSIS — E61.1 IRON DEFICIENCY: ICD-10-CM

## 2025-02-05 DIAGNOSIS — N18.6 END STAGE RENAL DISEASE ON DIALYSIS (HCC): ICD-10-CM

## 2025-02-05 DIAGNOSIS — Z99.2 END STAGE RENAL DISEASE ON DIALYSIS (HCC): ICD-10-CM

## 2025-02-05 PROCEDURE — 1123F ACP DISCUSS/DSCN MKR DOCD: CPT | Performed by: NURSE PRACTITIONER

## 2025-02-05 PROCEDURE — 99308 SBSQ NF CARE LOW MDM 20: CPT | Performed by: NURSE PRACTITIONER

## 2025-02-07 NOTE — PROGRESS NOTES
PLACE OF SERVICE:  [facility] [address] [place] [address]    SKILLED VISIT      Chief Complaint: Wheezing, chest congestion      HPI : Heather Rich is a 89 y.o. female patient seen today for follow-up.  Patient admitted from hospital to facility secondary to abdominal pain and shortness of breath. Hospital notes reviewed patient respiratory rate 32 and systolic blood pressure was 226. CT of abdomen pelvis showed proximal small bowel distention and compression loops distally concerning for obstruction that is likely related to small ventral hernia. Currently on peritoneal dialysis however she has agreed to transition to temporary hemodialysis.  Notes noted that patient lives alone and sometimes have difficulty completing her peritoneal dialysis treatment. Nephrologist will discuss at later date regarding patient staying on to hemodialysis permanently.  Patient is alert and oriented able to make her needs known.  Patient denies any symptoms of respiratory distress, lungs diminished at base. Patient continues on Albuterol Sulfate HFA Inhalation  ever y 4 hours as needed, albuterol nebulizer every 6 hours as needed for shortness of breath budesonide Suspension every 12 hours and Arformoterol Tartrate Inhalation Nebulization twice a day. Patient continues on Mucinex 600 mg twice a day x 5 more days and Flonase nasal spray for allergies.  No complaints of constipation she continues on MiraLAX 17 g every 24 hours and Colace 1 capsule daily as needed for constipation. Patient has history of hypertension she remains stable on current dose of metoprolol 25 mg daily, losartan 100 mg daily, nifedipine 30 mg and isosorbide 30 mg daily with hold parameters in place. She continues on lamoTRIgine 25 mg twice a day for history of seizures. No reports of seizure activities from patient or nursing staff.  Will for history of anemia she remains stable on ferrous sulfate 1 tab daily. For

## 2025-02-10 ENCOUNTER — OFFICE VISIT (OUTPATIENT)
Facility: CLINIC | Age: 89
End: 2025-02-10
Payer: MEDICARE

## 2025-02-10 DIAGNOSIS — E61.1 IRON DEFICIENCY: ICD-10-CM

## 2025-02-10 DIAGNOSIS — Z99.2 END STAGE RENAL DISEASE ON DIALYSIS (HCC): Primary | Chronic | ICD-10-CM

## 2025-02-10 DIAGNOSIS — I10 ESSENTIAL HYPERTENSION: Chronic | ICD-10-CM

## 2025-02-10 DIAGNOSIS — N18.6 END STAGE RENAL DISEASE ON DIALYSIS (HCC): Primary | Chronic | ICD-10-CM

## 2025-02-10 DIAGNOSIS — R56.9 SEIZURE (HCC): ICD-10-CM

## 2025-02-10 PROCEDURE — 1123F ACP DISCUSS/DSCN MKR DOCD: CPT | Performed by: NURSE PRACTITIONER

## 2025-02-10 PROCEDURE — 99308 SBSQ NF CARE LOW MDM 20: CPT | Performed by: NURSE PRACTITIONER

## 2025-02-11 PROBLEM — N18.6 ESRD ON PERITONEAL DIALYSIS (HCC): Chronic | Status: ACTIVE | Noted: 2023-12-18

## 2025-02-11 PROBLEM — N18.6 END STAGE RENAL DISEASE ON DIALYSIS (HCC): Chronic | Status: ACTIVE | Noted: 2025-01-16

## 2025-02-11 PROBLEM — Z99.2 ESRD ON PERITONEAL DIALYSIS (HCC): Chronic | Status: ACTIVE | Noted: 2023-12-18

## 2025-02-11 PROBLEM — Z99.2 END STAGE RENAL DISEASE ON DIALYSIS (HCC): Chronic | Status: ACTIVE | Noted: 2025-01-16

## 2025-02-11 NOTE — PROGRESS NOTES
OMENTUM  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    UPPER GASTROINTESTINAL ENDOSCOPY N/A 8/29/2023    EGD ESOPHAGOGASTRODUODENOSCOPY, EGD BIOPSY performed by Darius Lund MD at [facility] MAIN OR    VENTRAL HERNIA REPAIR N/A 5/5/2024    PRIMARY INCARCERATED  HERNIA REPAIR performed by Bronson Mitchell MD at [facility] MAIN OR    VENTRAL HERNIA REPAIR N/A 1/20/2025    REPAIR VENTRAL HERNIA performed by Narayan Matt MD at [facility] MAIN OR       Family History:  Family History   Problem Relation Age of Onset    Breast Cancer Sister 69    Sleep Apnea Sister     Other Sister         spleen removed    Alzheimer's Disease Sister     Diabetes Mother     Other Other         spleen removed    Gall Bladder Disease Other     Cancer Sister         breast    Other Father         'sphero'-blood disorder    Colon Cancer Sister        Allergies:  Allergies   Allergen Reactions    Bee Pollen     Dust Mite Extract     Codeine Nausea And Vomiting and Other (See Comments)     Upset stomach. Weak.       Social History:  Social History     Tobacco Use    Smoking status: Former     Types: Cigarettes     Passive exposure: Never    Smokeless tobacco: Never    Tobacco comments:     Patient states she smoked for 1 month of her life.   Vaping Use    Vaping status: Never Used   Substance Use Topics    Alcohol use: No    Drug use: No       Current Medications:  Current Outpatient Medications on File Prior to Visit   Medication Sig Dispense Refill    losartan (COZAAR) 100 MG tablet Take 1 tablet by mouth daily 30 tablet 3    gentamicin (GARAMYCIN) 0.1 % cream Apply topically 3 times daily. 15 g 0    lamoTRIgine (LAMICTAL) 25 MG tablet TAKE 1 TABLET TWICE A DAY 60 tablet 11    NIFEdipine (PROCARDIA XL) 30 MG extended release tablet Take 1 tablet by mouth daily 90 tablet 3    albuterol sulfate HFA (PROVENTIL;VENTOLIN;PROAIR) 108 (90 Base) MCG/ACT inhaler Inhale 2 puffs into the lungs every 4 hours as needed for Wheezing 3 each 3    isosorbide mononitrate (IMDUR) 30 MG extended release

## 2025-02-11 NOTE — PROGRESS NOTES
PLACE OF SERVICE:  [facility] [address] [place] [address]    SKILLED VISIT      Chief Complaint: Wheezing, chest congestion      HPI : Heather Rich is a 89 y.o. female patient seen today for follow-up.  Patient admitted from hospital to facility secondary to abdominal pain and shortness of breath. Hospital notes reviewed patient respiratory rate 32 and systolic blood pressure was 226. CT of abdomen pelvis showed proximal small bowel distention and compression loops distally concerning for obstruction that is likely related to small ventral hernia. Currently on peritoneal dialysis however she has agreed to transition to temporary hemodialysis.  Notes noted that patient lives alone and sometimes have difficulty completing her peritoneal dialysis treatment. Nephrologist will discuss at later date regarding patient staying on to hemodialysis permanently.  Patient is alert and oriented able to make her needs known.  Patient denies any symptoms of respiratory distress, lungs diminished at base. Patient continues on Albuterol Sulfate HFA Inhalation  ever y 4 hours as needed, albuterol nebulizer every 6 hours as needed for shortness of breath budesonide Suspension every 12 hours and Arformoterol Tartrate Inhalation Nebulization twice a day. Patient continues on Mucinex 600 mg twice a day x 5 more days and Flonase nasal spray for allergies.  No complaints of constipation she continues on MiraLAX 17 g every 24 hours and Colace 1 capsule daily as needed for constipation. Patient has history of hypertension she remains stable on current dose of metoprolol 25 mg daily, losartan 100 mg daily, nifedipine 30 mg and isosorbide 30 mg daily with hold parameters in place. She continues on lamoTRIgine 25 mg twice a day for history of seizures. No reports of seizure activities from patient or nursing staff.  Will for history of anemia she remains stable on ferrous sulfate 1 tab daily. For

## 2025-02-12 NOTE — ASSESSMENT & PLAN NOTE
Hemoglobin 7.1 as of 2/11/2035.  Patient continues with ferrous sulfate 325 mg daily will continue to monitor H&H periodically

## 2025-02-15 PROBLEM — R79.89 ELEVATED TROPONIN: Status: RESOLVED | Noted: 2025-01-16 | Resolved: 2025-02-15

## 2025-02-17 ENCOUNTER — OFFICE VISIT (OUTPATIENT)
Age: 89
End: 2025-02-17
Payer: MEDICARE

## 2025-02-17 ENCOUNTER — OFFICE VISIT (OUTPATIENT)
Facility: CLINIC | Age: 89
End: 2025-02-17
Payer: MEDICARE

## 2025-02-17 VITALS
BODY MASS INDEX: 20.55 KG/M2 | HEIGHT: 63 IN | RESPIRATION RATE: 14 BRPM | OXYGEN SATURATION: 93 % | HEART RATE: 86 BPM | DIASTOLIC BLOOD PRESSURE: 60 MMHG | WEIGHT: 116 LBS | SYSTOLIC BLOOD PRESSURE: 142 MMHG | TEMPERATURE: 98 F

## 2025-02-17 DIAGNOSIS — R22.2 SUBCUTANEOUS MASS OF ABDOMINAL WALL: ICD-10-CM

## 2025-02-17 DIAGNOSIS — R19.00 ABDOMINAL SWELLING: Primary | ICD-10-CM

## 2025-02-17 DIAGNOSIS — N18.6 END STAGE RENAL DISEASE ON DIALYSIS (HCC): Primary | Chronic | ICD-10-CM

## 2025-02-17 DIAGNOSIS — I10 ESSENTIAL HYPERTENSION: Chronic | ICD-10-CM

## 2025-02-17 DIAGNOSIS — Z99.2 END STAGE RENAL DISEASE ON DIALYSIS (HCC): Primary | Chronic | ICD-10-CM

## 2025-02-17 DIAGNOSIS — Z98.890 H/O VENTRAL HERNIA REPAIR: ICD-10-CM

## 2025-02-17 DIAGNOSIS — E61.1 IRON DEFICIENCY: ICD-10-CM

## 2025-02-17 DIAGNOSIS — Z87.19 H/O VENTRAL HERNIA REPAIR: ICD-10-CM

## 2025-02-17 PROBLEM — K56.609 SBO (SMALL BOWEL OBSTRUCTION) (HCC): Status: RESOLVED | Noted: 2024-05-06 | Resolved: 2025-02-17

## 2025-02-17 PROBLEM — K46.0 INCARCERATED HERNIA: Status: RESOLVED | Noted: 2024-05-22 | Resolved: 2025-02-17

## 2025-02-17 PROBLEM — K43.6 INCARCERATED VENTRAL HERNIA: Status: RESOLVED | Noted: 2024-05-05 | Resolved: 2025-02-17

## 2025-02-17 PROBLEM — K43.9 VENTRAL HERNIA: Status: RESOLVED | Noted: 2025-01-16 | Resolved: 2025-02-17

## 2025-02-17 PROCEDURE — 99213 OFFICE O/P EST LOW 20 MIN: CPT | Performed by: SURGERY

## 2025-02-17 PROCEDURE — 1159F MED LIST DOCD IN RCRD: CPT | Performed by: SURGERY

## 2025-02-17 PROCEDURE — 1123F ACP DISCUSS/DSCN MKR DOCD: CPT | Performed by: SURGERY

## 2025-02-17 PROCEDURE — 1090F PRES/ABSN URINE INCON ASSESS: CPT | Performed by: SURGERY

## 2025-02-17 PROCEDURE — 1036F TOBACCO NON-USER: CPT | Performed by: SURGERY

## 2025-02-17 PROCEDURE — 1160F RVW MEDS BY RX/DR IN RCRD: CPT | Performed by: SURGERY

## 2025-02-17 PROCEDURE — 99308 SBSQ NF CARE LOW MDM 20: CPT | Performed by: NURSE PRACTITIONER

## 2025-02-17 PROCEDURE — 1126F AMNT PAIN NOTED NONE PRSNT: CPT | Performed by: SURGERY

## 2025-02-17 PROCEDURE — 1111F DSCHRG MED/CURRENT MED MERGE: CPT | Performed by: SURGERY

## 2025-02-17 PROCEDURE — 1123F ACP DISCUSS/DSCN MKR DOCD: CPT | Performed by: NURSE PRACTITIONER

## 2025-02-17 PROCEDURE — G8420 CALC BMI NORM PARAMETERS: HCPCS | Performed by: SURGERY

## 2025-02-17 PROCEDURE — G8427 DOCREV CUR MEDS BY ELIG CLIN: HCPCS | Performed by: SURGERY

## 2025-02-17 ASSESSMENT — ENCOUNTER SYMPTOMS
ABDOMINAL PAIN: 0
VOMITING: 0
NAUSEA: 0

## 2025-02-17 ASSESSMENT — PATIENT HEALTH QUESTIONNAIRE - PHQ9
SUM OF ALL RESPONSES TO PHQ QUESTIONS 1-9: 0
SUM OF ALL RESPONSES TO PHQ9 QUESTIONS 1 & 2: 0
SUM OF ALL RESPONSES TO PHQ QUESTIONS 1-9: 0
2. FEELING DOWN, DEPRESSED OR HOPELESS: NOT AT ALL
1. LITTLE INTEREST OR PLEASURE IN DOING THINGS: NOT AT ALL

## 2025-02-17 NOTE — PROGRESS NOTES
Identified patient with two patient identifiers (name and ). Reviewed chart in preparation for visit and have obtained necessary documentation.    Heather Rich is a 89 y.o. female  Chief Complaint   Patient presents with    New Patient    Hernia     BP (!) 142/60   Pulse 86   Temp 98 °F (36.7 °C) (Oral)   Resp 14   Ht 1.6 m (5' 3\")   Wt 52.6 kg (116 lb)   SpO2 93%   BMI 20.55 kg/m²     1. Have you been to the ER, urgent care clinic since your last visit?  Hospitalized since your last visit?no    2. Have you seen or consulted any other health care providers outside of the [facility] since your last visit?  Include any pap smears or colon screening. no    
  Stress: No Stress Concern Present (10/10/2023)    [employer] - Occupational Stress Questionnaire     Feeling of Stress : Not at all   Social Connections: Moderately Isolated (10/10/2023)    Social Connection and Isolation Panel [NHANES]     Frequency of Communication with Friends and Family: More than three times a week     Frequency of Social Gatherings with Friends and Family: Twice a week     Attends [employer] Services: More than 4 times per year     Active Member of Clubs or Organizations: No     Attends Club or Organization Meetings: Never     Marital Status:    Intimate Partner Violence: Not At Risk (10/10/2023)    Humiliation, Afraid, Rape, and Kick questionnaire     Fear of Current or Ex-Partner: No     Emotionally Abused: No     Physically Abused: No     Sexually Abused: No   Housing Stability: Low Risk  (7/17/2024)    Housing Stability Vital Sign     Unable to Pay for Housing in the Last Year: No     Number of Places Lived in the Last Year: 1     Unstable Housing in the Last Year: No     Review of systems negative except as noted.    Review of Systems   Constitutional:  Negative for chills and fever.   Gastrointestinal:  Negative for abdominal pain, nausea and vomiting.     Physical Exam  Vitals reviewed.   Constitutional:       Appearance: Normal appearance. She is normal weight.   HENT:      Head: Normocephalic and atraumatic.   Cardiovascular:      Rate and Rhythm: Normal rate and regular rhythm.   Pulmonary:      Effort: Pulmonary effort is normal.      Breath sounds: Normal breath sounds.   Abdominal:      General: There is no distension.      Palpations: Abdomen is soft. There is mass (At the surgical site, there is a subcutaneous mass which is c/w a recurrent hernia versus a post-operative hematoma).      Tenderness: There is no abdominal tenderness. There is no guarding or rebound.      Comments: Well healed surgical scar.    Musculoskeletal:         General:

## 2025-02-18 PROBLEM — I48.91 ATRIAL FIBRILLATION, UNSPECIFIED TYPE (HCC): Status: ACTIVE | Noted: 2025-02-18

## 2025-02-19 ENCOUNTER — OFFICE VISIT (OUTPATIENT)
Facility: CLINIC | Age: 89
End: 2025-02-19

## 2025-02-19 DIAGNOSIS — N18.6 END STAGE RENAL DISEASE ON DIALYSIS (HCC): Primary | Chronic | ICD-10-CM

## 2025-02-19 DIAGNOSIS — Z99.2 END STAGE RENAL DISEASE ON DIALYSIS (HCC): Primary | Chronic | ICD-10-CM

## 2025-02-19 DIAGNOSIS — I10 ESSENTIAL HYPERTENSION: Chronic | ICD-10-CM

## 2025-02-19 DIAGNOSIS — E61.1 IRON DEFICIENCY: ICD-10-CM

## 2025-02-19 DIAGNOSIS — R56.9 SEIZURE (HCC): ICD-10-CM

## 2025-02-19 NOTE — PROGRESS NOTES
any      Vitals:   Blood pressure 174/73 temperature 97.9 heart rate 84 respiration 18 O2 sat 96% on room air    Exam:  Constitutional: No acute distress;   Eyes: Sclera clear, PERRLA;   Ears/nose/mouth/throat:mmm, OP clear, trachea midline;  Cardiovascular: RRR,nml S1 and S2, no rubs murmurs or gallops, no edema, no cyanosis;   Respiratory: Clear to auscultation, symmetric, no respiratory distress;  Gastrointestinal: Abdomen soft, NT, ND, no masses, normal bowel sounds;  Neurologic: Cranial nerves II through VII grossly intact, no speech or motor deficits A&O in time place and person  Skin: Abdominal surgical site healing well clean dry and intact. warm and dry;  Musculoskeletal: No erythema swelling or joint tenderness, extremities without cyanosis, neck supple, ROM intact spine and extremities;  Psychiatric: Not agitated.  Appropriate affect, mood, judgment and insight.  Genitourinary: No suprapubic tenderness or flank tenderness  Heme, lymph, immuno: No pallor;       Labs:    As of 2/11/2025 BUN 33.2 creatinine 7.12 sodium 135 potassium 3.8 hemoglobin 7.1 hematocrit 20.6    Assessment/Plans:   1. End stage renal disease on dialysis (HCC)  Assessment & Plan:  Patient continues on dialysis without any complications 3 days a week   2. Essential hypertension  Assessment & Plan:  Patient remains stable on current dose of metoprolol 25 mg daily, losartan 100 mg daily, nifedipine 30 mg daily isosorbide 30 mg daily with parameters in place.   3. Iron deficiency  Assessment & Plan:   Hemoglobin 7.1 as of 2/11/2035.  Patient continues with ferrous sulfate 325 mg daily will continue to monitor H&H periodicall    Bere Lieberman, APRN - CNP

## 2025-02-19 NOTE — ASSESSMENT & PLAN NOTE
Hemoglobin 7.1 as of 2/11/2035.  Patient continues with ferrous sulfate 325 mg daily will continue to monitor H&H periodicall

## 2025-02-21 ENCOUNTER — TELEPHONE (OUTPATIENT)
Age: 89
End: 2025-02-21

## 2025-02-21 NOTE — TELEPHONE ENCOUNTER
Called scheduling dept informed order flagged an ABN that was bypassed.  ICD-10 code needs to be updated to code that is covered or order changed completely.    Code updated no ABN flag.  Called back to scheduling dept confirmed with Janneth order is now going threw with no flags on their end also.  She would like to move forward with scheduling, informed her I will need to called back to [facility] to update her so she can call back and schedule date that is convenient for them.      Returned call to [facility] informed order updated. Informed she can return call to scheduling now to schedule test.  Confirmed number [phone].  She will return call if needed.

## 2025-02-21 NOTE — TELEPHONE ENCOUNTER
Central scheduling said they are unable to use to the CPT code for the CT order, due to it not being covered by the [employer] insurance company. They where wondering if we could change the CPT code. And then call Geovanni to let them know when its been completed.     Barak macdonald/ [phone]

## 2025-02-21 NOTE — PROGRESS NOTES
PLACE OF SERVICE:  [facility] [address] [place] [address]    SKILLED VISIT      Chief Complaint: Wheezing, chest congestion      HPI : Heather Rich is a 89 y.o. female patient seen today for follow-up.  Patient admitted from hospital to facility secondary to abdominal pain and shortness of breath. Hospital notes reviewed patient respiratory rate 32 and systolic blood pressure was 226. CT of abdomen pelvis showed proximal small bowel distention and compression loops distally concerning for obstruction that is likely related to small ventral hernia. Currently on peritoneal dialysis however she has agreed to transition to temporary hemodialysis.  Notes noted that patient lives alone and sometimes have difficulty completing her peritoneal dialysis treatment. Nephrologist will discuss at later date regarding patient staying on to hemodialysis permanently.  Patient is alert and oriented able to make her needs known.  Patient denies any symptoms of respiratory distress, lungs diminished at base. Patient continues on Albuterol Sulfate HFA Inhalation  ever y 4 hours as needed, albuterol nebulizer every 6 hours as needed for shortness of breath budesonide Suspension every 12 hours and Arformoterol Tartrate Inhalation Nebulization twice a day. Patient continues on Mucinex 600 mg twice a day x 5 more days and Flonase nasal spray for allergies.  No complaints of constipation she continues on MiraLAX 17 g every 24 hours and Colace 1 capsule daily as needed for constipation. Patient has history of hypertension she remains stable on current dose of metoprolol 25 mg daily, losartan 100 mg daily, nifedipine 30 mg and isosorbide 30 mg daily with hold parameters in place. She continues on lamoTRIgine 25 mg twice a day for history of seizures. No reports of seizure activities from patient or nursing staff.  Will for history of anemia she remains stable on ferrous sulfate 1 tab daily. For

## 2025-02-24 ENCOUNTER — OFFICE VISIT (OUTPATIENT)
Facility: CLINIC | Age: 89
End: 2025-02-24
Payer: MEDICARE

## 2025-02-24 DIAGNOSIS — I10 ESSENTIAL HYPERTENSION: Chronic | ICD-10-CM

## 2025-02-24 DIAGNOSIS — Z99.2 END STAGE RENAL DISEASE ON DIALYSIS (HCC): Primary | Chronic | ICD-10-CM

## 2025-02-24 DIAGNOSIS — Z98.890 H/O VENTRAL HERNIA REPAIR: ICD-10-CM

## 2025-02-24 DIAGNOSIS — Z87.19 H/O VENTRAL HERNIA REPAIR: ICD-10-CM

## 2025-02-24 DIAGNOSIS — N18.6 END STAGE RENAL DISEASE ON DIALYSIS (HCC): Primary | Chronic | ICD-10-CM

## 2025-02-24 DIAGNOSIS — R56.9 SEIZURE (HCC): ICD-10-CM

## 2025-02-24 PROCEDURE — 99308 SBSQ NF CARE LOW MDM 20: CPT | Performed by: NURSE PRACTITIONER

## 2025-02-24 PROCEDURE — 1123F ACP DISCUSS/DSCN MKR DOCD: CPT | Performed by: NURSE PRACTITIONER

## 2025-02-25 NOTE — PROGRESS NOTES
concerns reported from patient at this time, she remains hemodynamically stable.  Will continue to monitor her closely.     2/24/2025  Patient seen out of bed to wheelchair she is alert and oriented able to make her needs nose.  No signs no respiratory distress noted, lungs clear but diminished at base.  No reports of pain or discomfort at time of visit.  Patient scheduled for CT scan of abdomen on 3/7/2025 at 2:30 PM at [facility] patient notified as per patient daughter will transport patient to appointment.  Patient continues on dialysis 3 days a week without any complications patient stated that she will transition back to peritoneal dialysis once discharged to facility.  Patient noted that she needs to be retrained in the outpatient dialysis center prior to starting back peritoneal dialysis patient agrees and is on board with plan.  Patient continues to participate in therapy session, can be seen ambulating with physical therapy staff.  No new concerns reported on patient or nursing at this time she remains hemodynamically stable.  Will continue to monitor closely.       PMH:   Anemia, arrhythmia, asthma, breast cancer with lumpectomy, ESRD on dialysis, sickle cell trait, type 2 diabetes, hypertension, seizure disorder, chronic L LE PVD    Social History / Family History:      Medications: Reviewed in EMR and assessment and plan    ROS:   The following system review was negative:  Constitutional; Respiratory; Cardiovascular; Genitourinary; Gastrointestinal; Psychiatric; Ear-Nose-Throat; Musculoskeletal; Neurologic; Endocrine; Hematologic; Skin; Eyes; denies any      Vitals:   Blood pressure 130/54 temperature 97.8 heart rate 81 respiration 18 O2 sat 95% on room air       Exam:  Constitutional: No acute distress;   Eyes: Sclera clear, PERRLA;   Ears/nose/mouth/throat:mmm, OP clear, trachea midline;  Cardiovascular: RRR,nml S1 and S2, no rubs murmurs or gallops, no edema, no cyanosis;   Respiratory:

## 2025-02-26 ENCOUNTER — OFFICE VISIT (OUTPATIENT)
Facility: CLINIC | Age: 89
End: 2025-02-26

## 2025-02-26 DIAGNOSIS — R56.9 SEIZURE (HCC): ICD-10-CM

## 2025-02-26 DIAGNOSIS — I10 ESSENTIAL HYPERTENSION: Chronic | ICD-10-CM

## 2025-02-26 DIAGNOSIS — Z99.2 END STAGE RENAL DISEASE ON DIALYSIS (HCC): Primary | Chronic | ICD-10-CM

## 2025-02-26 DIAGNOSIS — N18.6 END STAGE RENAL DISEASE ON DIALYSIS (HCC): Primary | Chronic | ICD-10-CM

## 2025-02-26 NOTE — ASSESSMENT & PLAN NOTE
Patient status post hernia repair site is healing well without signs of significant infection.  Distended abdomen next surgical site status post surgical follow-up.  Patient scheduled for CT scan on 3/7/2025 at 2:30 PM at [facility]

## 2025-02-27 NOTE — PROGRESS NOTES
PLACE OF SERVICE:  [facility] [address] [place] [address]    SKILLED VISIT      Chief Complaint: Skilled Visit     HPI : Heather Rich is a 89 y.o. female patient seen today for follow-up.  Patient admitted from hospital to facility secondary to abdominal pain and shortness of breath. Hospital notes reviewed patient respiratory rate 32 and systolic blood pressure was 226. CT of abdomen pelvis showed proximal small bowel distention and compression loops distally concerning for obstruction that is likely related to small ventral hernia. Currently on peritoneal dialysis however she has agreed to transition to temporary hemodialysis.  Notes noted that patient lives alone and sometimes have difficulty completing her peritoneal dialysis treatment. Nephrologist will discuss at later date regarding patient staying on to hemodialysis permanently.  Patient is alert and oriented able to make her needs known.  Patient denies any symptoms of respiratory distress, lungs diminished at base. Patient continues on Albuterol Sulfate HFA Inhalation  ever y 4 hours as needed, albuterol nebulizer every 6 hours as needed for shortness of breath budesonide Suspension every 12 hours and Arformoterol Tartrate Inhalation Nebulization twice a day. Patient continues on Mucinex 600 mg twice a day x 5 more days and Flonase nasal spray for allergies.  No complaints of constipation she continues on MiraLAX 17 g every 24 hours and Colace 1 capsule daily as needed for constipation. Patient has history of hypertension she remains stable on current dose of metoprolol 25 mg daily, losartan 100 mg daily, nifedipine 30 mg and isosorbide 30 mg daily with hold parameters in place. She continues on lamoTRIgine 25 mg twice a day for history of seizures. No reports of seizure activities from patient or nursing staff.  Will for history of anemia she remains stable on ferrous sulfate 1 tab daily. For history of

## 2025-02-27 NOTE — ASSESSMENT & PLAN NOTE
Patient remains stable on current dose of losartan 100 mg daily, nifedipine 30 mg daily isosorbide 30 mg daily with parameters in place.   Metoprolol increased to 25 mg twice a day

## 2025-03-03 ENCOUNTER — OFFICE VISIT (OUTPATIENT)
Facility: CLINIC | Age: 89
End: 2025-03-03
Payer: MEDICARE

## 2025-03-03 DIAGNOSIS — R56.9 SEIZURE (HCC): ICD-10-CM

## 2025-03-03 DIAGNOSIS — I10 ESSENTIAL HYPERTENSION: Chronic | ICD-10-CM

## 2025-03-03 DIAGNOSIS — Z99.2 END STAGE RENAL DISEASE ON DIALYSIS (HCC): Primary | Chronic | ICD-10-CM

## 2025-03-03 DIAGNOSIS — Z98.890 H/O VENTRAL HERNIA REPAIR: ICD-10-CM

## 2025-03-03 DIAGNOSIS — N18.6 END STAGE RENAL DISEASE ON DIALYSIS (HCC): Primary | Chronic | ICD-10-CM

## 2025-03-03 DIAGNOSIS — E61.1 IRON DEFICIENCY: ICD-10-CM

## 2025-03-03 DIAGNOSIS — Z87.19 H/O VENTRAL HERNIA REPAIR: ICD-10-CM

## 2025-03-03 PROCEDURE — 99316 NF DSCHRG MGMT 30 MIN+: CPT | Performed by: NURSE PRACTITIONER

## 2025-03-03 RX ORDER — FERROUS SULFATE 325(65) MG
325 TABLET ORAL
Qty: 30 TABLET | Refills: 0 | Status: SHIPPED | OUTPATIENT
Start: 2025-03-03 | End: 2025-03-04 | Stop reason: SDUPTHER

## 2025-03-03 RX ORDER — PANTOPRAZOLE SODIUM 40 MG/1
40 TABLET, DELAYED RELEASE ORAL
Qty: 30 TABLET | Refills: 0 | Status: SHIPPED | OUTPATIENT
Start: 2025-03-03 | End: 2025-03-04 | Stop reason: SDUPTHER

## 2025-03-03 RX ORDER — ARFORMOTEROL TARTRATE 15 UG/2ML
SOLUTION RESPIRATORY (INHALATION)
Qty: 180 ML | Refills: 0 | Status: SHIPPED | OUTPATIENT
Start: 2025-03-03 | End: 2025-03-04 | Stop reason: SDUPTHER

## 2025-03-03 RX ORDER — CHOLECALCIFEROL (VITAMIN D3) 25 MCG
1000 TABLET ORAL DAILY
Qty: 30 TABLET | Refills: 0 | Status: SHIPPED | OUTPATIENT
Start: 2025-03-03 | End: 2025-03-04 | Stop reason: SDUPTHER

## 2025-03-03 RX ORDER — LAMOTRIGINE 100 MG/1
100 TABLET ORAL 2 TIMES DAILY
Qty: 60 TABLET | Refills: 0 | Status: SHIPPED | OUTPATIENT
Start: 2025-03-03 | End: 2025-03-04 | Stop reason: SDUPTHER

## 2025-03-03 RX ORDER — PRAVASTATIN SODIUM 20 MG
20 TABLET ORAL NIGHTLY
Qty: 30 TABLET | Refills: 0 | Status: SHIPPED | OUTPATIENT
Start: 2025-03-03 | End: 2025-03-04 | Stop reason: SDUPTHER

## 2025-03-03 RX ORDER — LIDOCAINE 4 G/G
1 PATCH TOPICAL DAILY
Qty: 30 PATCH | Refills: 0 | Status: SHIPPED | OUTPATIENT
Start: 2025-03-03 | End: 2025-03-04 | Stop reason: SDUPTHER

## 2025-03-03 RX ORDER — METOPROLOL SUCCINATE 25 MG/1
25 TABLET, EXTENDED RELEASE ORAL 2 TIMES DAILY
Qty: 60 TABLET | Refills: 0 | Status: SHIPPED | OUTPATIENT
Start: 2025-03-03 | End: 2025-03-04 | Stop reason: SDUPTHER

## 2025-03-03 RX ORDER — BUDESONIDE 0.5 MG/2ML
INHALANT ORAL
Qty: 30 EACH | Refills: 0 | Status: SHIPPED | OUTPATIENT
Start: 2025-03-03 | End: 2025-03-04 | Stop reason: SDUPTHER

## 2025-03-03 RX ORDER — ASCORBIC ACID 500 MG
500 TABLET ORAL DAILY
Qty: 30 TABLET | Refills: 0 | Status: SHIPPED | OUTPATIENT
Start: 2025-03-03 | End: 2025-03-04 | Stop reason: SDUPTHER

## 2025-03-03 RX ORDER — NIFEDIPINE 30 MG/1
30 TABLET, EXTENDED RELEASE ORAL DAILY
Qty: 30 TABLET | Refills: 0 | Status: SHIPPED | OUTPATIENT
Start: 2025-03-03 | End: 2025-03-04 | Stop reason: SDUPTHER

## 2025-03-03 RX ORDER — ISOSORBIDE MONONITRATE 30 MG/1
30 TABLET, EXTENDED RELEASE ORAL DAILY
Qty: 30 TABLET | Refills: 0 | Status: SHIPPED | OUTPATIENT
Start: 2025-03-03 | End: 2025-03-04 | Stop reason: SDUPTHER

## 2025-03-03 RX ORDER — FLUTICASONE PROPIONATE 50 MCG
2 SPRAY, SUSPENSION (ML) NASAL 2 TIMES DAILY
Qty: 1 EACH | Refills: 0 | Status: SHIPPED | OUTPATIENT
Start: 2025-03-03 | End: 2025-03-04 | Stop reason: SDUPTHER

## 2025-03-03 RX ORDER — LOSARTAN POTASSIUM 100 MG/1
100 TABLET ORAL DAILY
Qty: 30 TABLET | Refills: 0 | Status: SHIPPED | OUTPATIENT
Start: 2025-03-03 | End: 2025-03-04 | Stop reason: SDUPTHER

## 2025-03-04 RX ORDER — LAMOTRIGINE 100 MG/1
100 TABLET ORAL 2 TIMES DAILY
Qty: 60 TABLET | Refills: 0 | Status: SHIPPED | OUTPATIENT
Start: 2025-03-04 | End: 2025-04-03

## 2025-03-04 RX ORDER — NIFEDIPINE 30 MG/1
30 TABLET, EXTENDED RELEASE ORAL DAILY
Qty: 30 TABLET | Refills: 0 | Status: SHIPPED | OUTPATIENT
Start: 2025-03-04 | End: 2025-04-03

## 2025-03-04 RX ORDER — PRAVASTATIN SODIUM 20 MG
20 TABLET ORAL NIGHTLY
Qty: 30 TABLET | Refills: 0 | Status: SHIPPED | OUTPATIENT
Start: 2025-03-04 | End: 2025-04-03

## 2025-03-04 RX ORDER — LAMOTRIGINE 25 MG/1
25 TABLET ORAL 2 TIMES DAILY
Qty: 60 TABLET | Refills: 0 | Status: SHIPPED | OUTPATIENT
Start: 2025-03-04

## 2025-03-04 RX ORDER — ASCORBIC ACID 500 MG
500 TABLET ORAL DAILY
Qty: 30 TABLET | Refills: 0 | Status: SHIPPED | OUTPATIENT
Start: 2025-03-04 | End: 2025-04-03

## 2025-03-04 RX ORDER — FERROUS SULFATE 325(65) MG
325 TABLET ORAL
Qty: 30 TABLET | Refills: 0 | Status: SHIPPED | OUTPATIENT
Start: 2025-03-04 | End: 2025-04-03

## 2025-03-04 RX ORDER — CHOLECALCIFEROL (VITAMIN D3) 25 MCG
1000 TABLET ORAL DAILY
Qty: 30 TABLET | Refills: 0 | Status: SHIPPED | OUTPATIENT
Start: 2025-03-04 | End: 2025-04-03

## 2025-03-04 RX ORDER — FLUTICASONE PROPIONATE 50 MCG
2 SPRAY, SUSPENSION (ML) NASAL 2 TIMES DAILY
Qty: 1 EACH | Refills: 0 | Status: SHIPPED | OUTPATIENT
Start: 2025-03-04 | End: 2025-04-03

## 2025-03-04 RX ORDER — LIDOCAINE 4 G/G
1 PATCH TOPICAL DAILY
Qty: 30 PATCH | Refills: 0 | Status: SHIPPED | OUTPATIENT
Start: 2025-03-04 | End: 2025-04-03

## 2025-03-04 RX ORDER — ARFORMOTEROL TARTRATE 15 UG/2ML
SOLUTION RESPIRATORY (INHALATION)
Qty: 180 ML | Refills: 0 | Status: SHIPPED | OUTPATIENT
Start: 2025-03-04

## 2025-03-04 RX ORDER — LOSARTAN POTASSIUM 100 MG/1
100 TABLET ORAL DAILY
Qty: 30 TABLET | Refills: 0 | Status: SHIPPED | OUTPATIENT
Start: 2025-03-04 | End: 2025-04-03

## 2025-03-04 RX ORDER — DOCUSATE SODIUM 100 MG/1
100 CAPSULE, LIQUID FILLED ORAL DAILY PRN
Qty: 30 CAPSULE | Refills: 0 | Status: SHIPPED | OUTPATIENT
Start: 2025-03-04 | End: 2025-04-03

## 2025-03-04 RX ORDER — METOPROLOL SUCCINATE 25 MG/1
25 TABLET, EXTENDED RELEASE ORAL 2 TIMES DAILY
Qty: 60 TABLET | Refills: 0 | Status: SHIPPED | OUTPATIENT
Start: 2025-03-04 | End: 2025-04-03

## 2025-03-04 RX ORDER — ALBUTEROL SULFATE 0.83 MG/ML
2.5 SOLUTION RESPIRATORY (INHALATION) 4 TIMES DAILY PRN
Qty: 360 EACH | Refills: 0 | Status: SHIPPED | OUTPATIENT
Start: 2025-03-04 | End: 2025-04-03

## 2025-03-04 RX ORDER — POLYETHYLENE GLYCOL 3350 17 G/17G
17 POWDER, FOR SOLUTION ORAL DAILY PRN
Qty: 527 G | Refills: 0 | Status: SHIPPED | OUTPATIENT
Start: 2025-03-04 | End: 2025-04-03

## 2025-03-04 RX ORDER — BUDESONIDE 0.5 MG/2ML
INHALANT ORAL
Qty: 30 EACH | Refills: 0 | Status: SHIPPED | OUTPATIENT
Start: 2025-03-04

## 2025-03-04 RX ORDER — PANTOPRAZOLE SODIUM 40 MG/1
40 TABLET, DELAYED RELEASE ORAL
Qty: 30 TABLET | Refills: 0 | Status: SHIPPED | OUTPATIENT
Start: 2025-03-04 | End: 2025-04-03

## 2025-03-04 RX ORDER — ISOSORBIDE MONONITRATE 30 MG/1
30 TABLET, EXTENDED RELEASE ORAL DAILY
Qty: 30 TABLET | Refills: 0 | Status: SHIPPED | OUTPATIENT
Start: 2025-03-04 | End: 2025-04-03

## 2025-03-04 NOTE — ASSESSMENT & PLAN NOTE
Patient continues on dialysis without any complications 3 days a week. Will continue Dialysis at [facility] Tuesdays Thursdays and Saturday beginning 3/4/2025 at 11:30 AM.

## 2025-03-04 NOTE — ASSESSMENT & PLAN NOTE
Patient continues on LaMICtal 25 mg twice a day. No reports of seizure activity from patient or nursing staff.    WHEEZES

## 2025-03-04 NOTE — ASSESSMENT & PLAN NOTE
Hemoglobin 7.1 as of 2/11/2035.  Patient continues with ferrous sulfate 325 mg daily. PCP to follow up

## 2025-03-04 NOTE — PROGRESS NOTES
PLACE OF SERVICE:  [facility] [address] [place] [address]    SKILLED VISIT      Chief Complaint: Skilled Visit     HPI : Heather Rich is a 89 y.o. female patient seen today for follow-up.  Patient admitted from hospital to facility secondary to abdominal pain and shortness of breath. Hospital notes reviewed patient respiratory rate 32 and systolic blood pressure was 226. CT of abdomen pelvis showed proximal small bowel distention and compression loops distally concerning for obstruction that is likely related to small ventral hernia. Currently on peritoneal dialysis however she has agreed to transition to temporary hemodialysis.  Notes noted that patient lives alone and sometimes have difficulty completing her peritoneal dialysis treatment. Nephrologist will discuss at later date regarding patient staying on to hemodialysis permanently.  Patient is alert and oriented able to make her needs known.  Patient denies any symptoms of respiratory distress, lungs diminished at base. Patient continues on Albuterol Sulfate HFA Inhalation  ever y 4 hours as needed, albuterol nebulizer every 6 hours as needed for shortness of breath budesonide Suspension every 12 hours and Arformoterol Tartrate Inhalation Nebulization twice a day. Patient continues on Mucinex 600 mg twice a day x 5 more days and Flonase nasal spray for allergies.  No complaints of constipation she continues on MiraLAX 17 g every 24 hours and Colace 1 capsule daily as needed for constipation. Patient has history of hypertension she remains stable on current dose of metoprolol 25 mg daily, losartan 100 mg daily, nifedipine 30 mg and isosorbide 30 mg daily with hold parameters in place. She continues on lamoTRIgine 25 mg twice a day for history of seizures. No reports of seizure activities from patient or nursing staff.  Will for history of anemia she remains stable on ferrous sulfate 1 tab daily. For history of 
spine and extremities;  Psychiatric: Not agitated.  Appropriate affect, mood, judgment and insight.  Genitourinary: No suprapubic tenderness or flank tenderness  Heme, lymph, immuno: No pallor;        Discharge Diagnosis Anemia, arrhythmia, asthma, breast cancer with lumpectomy, ESRD on dialysis, sickle cell trait, type 2 diabetes, hypertension, seizure disorder, chronic L LE PVD :    Follow Up: PCP appointment scheduled with Dr. Kehinde Barney on 3/12/2025  Patient is also scheduled for CT scan of abdomen 3/7/2025    Diet / Activity : Regular diet, Regular texture, Thin Liquids consistency    1. End stage renal disease on dialysis (HCC)  Assessment & Plan:  Patient continues on dialysis without any complications 3 days a week. Will continue Dialysis at [facility] Tuesdays Thursdays and Saturday beginning 3/4/2025 at 11:30 AM.     2. Iron deficiency  Assessment & Plan:  Hemoglobin 7.1 as of 2/11/2035.  Patient continues with ferrous sulfate 325 mg daily. PCP to follow up   3. Seizure ([facility])  Assessment & Plan:    Patient continues on LaMICtal 25 mg twice a day. No reports of seizure activity from patient or nursing staff.   4. Essential hypertension  Assessment & Plan:  Patient remains stable on current dose of losartan 100 mg daily, nifedipine 30 mg daily,  isosorbide 30 mg daily and metoprolol 25 mg twice a day.  PCP to monitor  5. H/O ventral hernia repair  Assessment & Plan:   Patient status post hernia repair site is healing well without signs of significant infection.  Distended abdomen next surgical site status post surgical follow-up.  Patient scheduled for CT scan on 3/7/2025 at 2:30 PM at [facility].       Electronically signed by: MALINDA Hurst - CNP

## 2025-03-04 NOTE — ASSESSMENT & PLAN NOTE
Patient remains stable on current dose of losartan 100 mg daily, nifedipine 30 mg daily,  isosorbide 30 mg daily and metoprolol 25 mg twice a day.  PCP to monitor

## 2025-03-07 ENCOUNTER — HOSPITAL ENCOUNTER (OUTPATIENT)
Facility: HOSPITAL | Age: 89
Discharge: HOME | End: 2025-03-10
Attending: SURGERY
Payer: MEDICARE

## 2025-03-07 DIAGNOSIS — Z87.19 H/O VENTRAL HERNIA REPAIR: ICD-10-CM

## 2025-03-07 DIAGNOSIS — R19.00 ABDOMINAL SWELLING: ICD-10-CM

## 2025-03-07 DIAGNOSIS — R22.2 SUBCUTANEOUS MASS OF ABDOMINAL WALL: ICD-10-CM

## 2025-03-07 DIAGNOSIS — Z98.890 H/O VENTRAL HERNIA REPAIR: ICD-10-CM

## 2025-03-07 PROCEDURE — 6360000004 HC RX CONTRAST MEDICATION: Performed by: RADIOLOGY

## 2025-03-07 PROCEDURE — 74177 CT ABD & PELVIS W/CONTRAST: CPT

## 2025-03-07 RX ORDER — IOPAMIDOL 755 MG/ML
100 INJECTION, SOLUTION INTRAVASCULAR
Status: COMPLETED | OUTPATIENT
Start: 2025-03-07 | End: 2025-03-07

## 2025-03-07 RX ADMIN — IOPAMIDOL 100 ML: 755 INJECTION, SOLUTION INTRAVENOUS at 16:47

## 2025-03-16 DIAGNOSIS — E11.22 TYPE 2 DIABETES MELLITUS WITH CHRONIC KIDNEY DISEASE ON CHRONIC DIALYSIS, WITHOUT LONG-TERM CURRENT USE OF INSULIN (HCC): Primary | ICD-10-CM

## 2025-03-16 DIAGNOSIS — Z99.2 TYPE 2 DIABETES MELLITUS WITH CHRONIC KIDNEY DISEASE ON CHRONIC DIALYSIS, WITHOUT LONG-TERM CURRENT USE OF INSULIN (HCC): Primary | ICD-10-CM

## 2025-03-16 DIAGNOSIS — N18.6 TYPE 2 DIABETES MELLITUS WITH CHRONIC KIDNEY DISEASE ON CHRONIC DIALYSIS, WITHOUT LONG-TERM CURRENT USE OF INSULIN (HCC): Primary | ICD-10-CM

## 2025-03-16 DIAGNOSIS — H90.3 SENSORINEURAL HEARING LOSS (SNHL) OF BOTH EARS: Chronic | ICD-10-CM

## 2025-03-16 DIAGNOSIS — M17.0 PRIMARY OSTEOARTHRITIS OF BOTH KNEES: ICD-10-CM

## 2025-03-16 DIAGNOSIS — E44.1 MILD PROTEIN-CALORIE MALNUTRITION: ICD-10-CM

## 2025-03-16 DIAGNOSIS — N18.6 END STAGE RENAL DISEASE ON DIALYSIS (HCC): Chronic | ICD-10-CM

## 2025-03-16 DIAGNOSIS — Z99.2 END STAGE RENAL DISEASE ON DIALYSIS (HCC): Chronic | ICD-10-CM

## 2025-03-16 DIAGNOSIS — D64.89 ANEMIA DUE TO MULTIPLE MECHANISMS: Chronic | ICD-10-CM

## 2025-03-28 ENCOUNTER — OFFICE VISIT (OUTPATIENT)
Facility: CLINIC | Age: 89
End: 2025-03-28

## 2025-03-28 VITALS
WEIGHT: 110.6 LBS | SYSTOLIC BLOOD PRESSURE: 147 MMHG | BODY MASS INDEX: 19.6 KG/M2 | OXYGEN SATURATION: 98 % | HEART RATE: 76 BPM | HEIGHT: 63 IN | TEMPERATURE: 98.3 F | RESPIRATION RATE: 16 BRPM | DIASTOLIC BLOOD PRESSURE: 75 MMHG

## 2025-03-28 DIAGNOSIS — N18.6 END STAGE RENAL DISEASE ON DIALYSIS (HCC): Chronic | ICD-10-CM

## 2025-03-28 DIAGNOSIS — Z99.2 ESRD ON PERITONEAL DIALYSIS (HCC): Chronic | ICD-10-CM

## 2025-03-28 DIAGNOSIS — I48.91 ATRIAL FIBRILLATION, UNSPECIFIED TYPE (HCC): ICD-10-CM

## 2025-03-28 DIAGNOSIS — Z99.2 END STAGE RENAL DISEASE ON DIALYSIS (HCC): Chronic | ICD-10-CM

## 2025-03-28 DIAGNOSIS — R56.9 SEIZURE (HCC): ICD-10-CM

## 2025-03-28 DIAGNOSIS — N18.6 ESRD ON PERITONEAL DIALYSIS (HCC): Chronic | ICD-10-CM

## 2025-03-28 DIAGNOSIS — C50.911 MALIGNANT NEOPLASM OF RIGHT FEMALE BREAST, UNSPECIFIED ESTROGEN RECEPTOR STATUS, UNSPECIFIED SITE OF BREAST: ICD-10-CM

## 2025-03-28 DIAGNOSIS — Z99.2 TYPE 2 DIABETES MELLITUS WITH CHRONIC KIDNEY DISEASE ON CHRONIC DIALYSIS, WITHOUT LONG-TERM CURRENT USE OF INSULIN (HCC): Primary | ICD-10-CM

## 2025-03-28 DIAGNOSIS — N18.6 TYPE 2 DIABETES MELLITUS WITH CHRONIC KIDNEY DISEASE ON CHRONIC DIALYSIS, WITHOUT LONG-TERM CURRENT USE OF INSULIN (HCC): Primary | ICD-10-CM

## 2025-03-28 DIAGNOSIS — E11.22 TYPE 2 DIABETES MELLITUS WITH CHRONIC KIDNEY DISEASE ON CHRONIC DIALYSIS, WITHOUT LONG-TERM CURRENT USE OF INSULIN (HCC): Primary | ICD-10-CM

## 2025-03-28 PROBLEM — K65.9 PERITONITIS: Status: RESOLVED | Noted: 2024-05-16 | Resolved: 2025-03-28

## 2025-03-28 PROBLEM — R65.10 SIRS (SYSTEMIC INFLAMMATORY RESPONSE SYNDROME) (HCC): Status: RESOLVED | Noted: 2025-01-16 | Resolved: 2025-03-28

## 2025-03-28 SDOH — ECONOMIC STABILITY: FOOD INSECURITY: WITHIN THE PAST 12 MONTHS, YOU WORRIED THAT YOUR FOOD WOULD RUN OUT BEFORE YOU GOT MONEY TO BUY MORE.: NEVER TRUE

## 2025-03-28 SDOH — ECONOMIC STABILITY: FOOD INSECURITY: WITHIN THE PAST 12 MONTHS, THE FOOD YOU BOUGHT JUST DIDN'T LAST AND YOU DIDN'T HAVE MONEY TO GET MORE.: NEVER TRUE

## 2025-03-28 ASSESSMENT — ANXIETY QUESTIONNAIRES
4. TROUBLE RELAXING: NOT AT ALL
3. WORRYING TOO MUCH ABOUT DIFFERENT THINGS: NOT AT ALL
5. BEING SO RESTLESS THAT IT IS HARD TO SIT STILL: NOT AT ALL
GAD7 TOTAL SCORE: 0
IF YOU CHECKED OFF ANY PROBLEMS ON THIS QUESTIONNAIRE, HOW DIFFICULT HAVE THESE PROBLEMS MADE IT FOR YOU TO DO YOUR WORK, TAKE CARE OF THINGS AT HOME, OR GET ALONG WITH OTHER PEOPLE: NOT DIFFICULT AT ALL
1. FEELING NERVOUS, ANXIOUS, OR ON EDGE: NOT AT ALL
2. NOT BEING ABLE TO STOP OR CONTROL WORRYING: NOT AT ALL
6. BECOMING EASILY ANNOYED OR IRRITABLE: NOT AT ALL
7. FEELING AFRAID AS IF SOMETHING AWFUL MIGHT HAPPEN: NOT AT ALL

## 2025-03-28 ASSESSMENT — PATIENT HEALTH QUESTIONNAIRE - PHQ9
SUM OF ALL RESPONSES TO PHQ QUESTIONS 1-9: 0
2. FEELING DOWN, DEPRESSED OR HOPELESS: NOT AT ALL
SUM OF ALL RESPONSES TO PHQ QUESTIONS 1-9: 0
SUM OF ALL RESPONSES TO PHQ QUESTIONS 1-9: 0
1. LITTLE INTEREST OR PLEASURE IN DOING THINGS: NOT AT ALL
SUM OF ALL RESPONSES TO PHQ QUESTIONS 1-9: 0

## 2025-03-28 NOTE — PROGRESS NOTES
Chief Complaint   Patient presents with    Follow-Up from Hospital     \"Have you been to the ER, urgent care clinic since your last visit?  Hospitalized since your last visit?\"    YES - When: approximately 18 days ago.  Where and Why: [facility] for acute hypoxic respiratory failure (CMS/HCC)  .    “Have you seen or consulted any other health care providers outside our system since your last visit?”    NO

## 2025-03-28 NOTE — PROGRESS NOTES
SPORTS MEDICINE AND PRIMARY CARE  Kehinde Barney MD, FACP, CMD  [address] [facility] [address]  Phone:  [phone]  Fax: [phone]       Chief Complaint   Patient presents with    Follow-Up from Hospital   .      SUBJECTIVE:  History of Present Illness         Heather Rich is a 89 y.o. female  patient presents for evaluation of atrial fibrillation and breast cancer.    She reports a general sense of well-being, with the exception of discomfort in her shoulder when reaching back to adjust her pillow, describing it as \"bone on bone because I lost much weight.\" She continues to use a cane for mobility support.    Blood pressure readings at home have been fluctuating between 121 and 145 systolic. Upon arrival at the clinic today, her blood pressure was recorded at 136.    Dialysis was performed this morning, with the usual schedule being Monday, Wednesday, and Friday. This is currently the second week of this schedule. Dissatisfaction is expressed with the prospect of a fistula being placed in her arm for permanent dialysis access.    She was hospitalized from 03/10/2025 to 03/15/2025 with acute hypoxemic respiratory failure, shortness of breath, mild intermittent asthma with exacerbation of pneumonia in the right lower lobe due to an infectious organism, and elevated troponin. Acute onset cough with productive mucoid sputum was noted. Two hours into dialysis on the morning of admission, she became acutely short of breath with reported saturation of 48%, necessitating BiPAP/CPAP with transition to high flow and ultimately nasal cannula. Treatment included DuoNebs, IV magnesium, and dexamethasone. No infectious symptoms were present, chest x-ray was unremarkable, and CT/PE showed no pulmonary embolism. Atelectasis was noted in the superior and posterior segments of the right lower lobe, unclear if infectious. CBC showed leukocytosis, procalcitonin level was within normal limits, BNP was normal, MRSA

## 2025-04-09 RX ORDER — LAMOTRIGINE 100 MG/1
100 TABLET ORAL 2 TIMES DAILY
Qty: 180 TABLET | Refills: 3 | Status: SHIPPED | OUTPATIENT
Start: 2025-04-09 | End: 2025-05-09

## 2025-04-09 RX ORDER — BUDESONIDE AND FORMOTEROL FUMARATE DIHYDRATE 160; 4.5 UG/1; UG/1
2 AEROSOL RESPIRATORY (INHALATION) 2 TIMES DAILY
Qty: 3 EACH | Refills: 3 | Status: SHIPPED | OUTPATIENT
Start: 2025-04-09

## 2025-04-09 RX ORDER — LOSARTAN POTASSIUM 100 MG/1
100 TABLET ORAL DAILY
Qty: 90 TABLET | Refills: 3 | Status: SHIPPED | OUTPATIENT
Start: 2025-04-09

## 2025-04-14 RX ORDER — LAMOTRIGINE 100 MG/1
100 TABLET ORAL 2 TIMES DAILY
Qty: 60 TABLET | Refills: 0 | OUTPATIENT
Start: 2025-04-14 | End: 2025-05-14

## 2025-05-14 ENCOUNTER — TELEPHONE (OUTPATIENT)
Facility: CLINIC | Age: 89
End: 2025-05-14

## 2025-05-14 NOTE — TELEPHONE ENCOUNTER
Patient call morning bp:179/84 and evening bp 172/87. Patient states she is tired from dialysis today but is not experiencing any other symptoms.    Per ,patient advised she is okay to start taking nifedipine 30 mg daily.

## 2025-05-15 ENCOUNTER — HOSPITAL ENCOUNTER (OUTPATIENT)
Facility: HOSPITAL | Age: 89
Discharge: HOME | End: 2025-05-18
Payer: MEDICARE

## 2025-05-15 VITALS
SYSTOLIC BLOOD PRESSURE: 133 MMHG | WEIGHT: 114 LBS | DIASTOLIC BLOOD PRESSURE: 70 MMHG | TEMPERATURE: 98.4 F | OXYGEN SATURATION: 96 % | BODY MASS INDEX: 20.2 KG/M2 | RESPIRATION RATE: 16 BRPM | HEART RATE: 94 BPM | HEIGHT: 63 IN

## 2025-05-15 LAB
ABO + RH BLD: NORMAL
ALBUMIN SERPL-MCNC: 3.4 G/DL (ref 3.5–5)
ALBUMIN/GLOB SERPL: 0.7 (ref 1.1–2.2)
ALP SERPL-CCNC: 94 U/L (ref 45–117)
ALT SERPL-CCNC: 15 U/L (ref 12–78)
ANION GAP SERPL CALC-SCNC: 6 MMOL/L (ref 2–12)
APTT PPP: 25.6 SEC (ref 22.1–31)
AST SERPL-CCNC: 20 U/L (ref 15–37)
BASOPHILS # BLD: 0.14 K/UL (ref 0–0.1)
BASOPHILS NFR BLD: 1.9 % (ref 0–1)
BILIRUB SERPL-MCNC: 0.4 MG/DL (ref 0.2–1)
BLOOD GROUP ANTIBODIES SERPL: NORMAL
BUN SERPL-MCNC: 40 MG/DL (ref 6–20)
BUN/CREAT SERPL: 7 (ref 12–20)
CALCIUM SERPL-MCNC: 9.6 MG/DL (ref 8.5–10.1)
CHLORIDE SERPL-SCNC: 99 MMOL/L (ref 97–108)
CO2 SERPL-SCNC: 31 MMOL/L (ref 21–32)
CREAT SERPL-MCNC: 5.41 MG/DL (ref 0.55–1.02)
DIFFERENTIAL METHOD BLD: ABNORMAL
EOSINOPHIL # BLD: 0.4 K/UL (ref 0–0.4)
EOSINOPHIL NFR BLD: 5.4 % (ref 0–7)
ERYTHROCYTE [DISTWIDTH] IN BLOOD BY AUTOMATED COUNT: 13.1 % (ref 11.5–14.5)
EST. AVERAGE GLUCOSE BLD GHB EST-MCNC: 91 MG/DL
GLOBULIN SER CALC-MCNC: 4.9 G/DL (ref 2–4)
GLUCOSE SERPL-MCNC: 113 MG/DL (ref 65–100)
HBA1C MFR BLD: 4.8 % (ref 4–5.6)
HCT VFR BLD AUTO: 26 % (ref 35–47)
HGB BLD-MCNC: 8.3 G/DL (ref 11.5–16)
IMM GRANULOCYTES # BLD AUTO: 0.03 K/UL (ref 0–0.04)
IMM GRANULOCYTES NFR BLD AUTO: 0.4 % (ref 0–0.5)
INR PPP: 1 (ref 0.9–1.1)
LYMPHOCYTES # BLD: 0.87 K/UL (ref 0.8–3.5)
LYMPHOCYTES NFR BLD: 11.7 % (ref 12–49)
MCH RBC QN AUTO: 27.2 PG (ref 26–34)
MCHC RBC AUTO-ENTMCNC: 31.9 G/DL (ref 30–36.5)
MCV RBC AUTO: 85.2 FL (ref 80–99)
MONOCYTES # BLD: 0.98 K/UL (ref 0–1)
MONOCYTES NFR BLD: 13.2 % (ref 5–13)
NEUTS SEG # BLD: 5.01 K/UL (ref 1.8–8)
NEUTS SEG NFR BLD: 67.4 % (ref 32–75)
NRBC # BLD: 0 K/UL (ref 0–0.01)
NRBC BLD-RTO: 0 PER 100 WBC
PLATELET # BLD AUTO: 472 K/UL (ref 150–400)
PMV BLD AUTO: 9.4 FL (ref 8.9–12.9)
POTASSIUM SERPL-SCNC: 4.6 MMOL/L (ref 3.5–5.1)
PROT SERPL-MCNC: 8.3 G/DL (ref 6.4–8.2)
PROTHROMBIN TIME: 10.9 SEC (ref 9.2–11.2)
RBC # BLD AUTO: 3.05 M/UL (ref 3.8–5.2)
SODIUM SERPL-SCNC: 136 MMOL/L (ref 136–145)
SPECIMEN EXP DATE BLD: NORMAL
THERAPEUTIC RANGE: NORMAL SECS (ref 58–77)
WBC # BLD AUTO: 7.4 K/UL (ref 3.6–11)

## 2025-05-15 PROCEDURE — 85610 PROTHROMBIN TIME: CPT

## 2025-05-15 PROCEDURE — 85025 COMPLETE CBC W/AUTO DIFF WBC: CPT

## 2025-05-15 PROCEDURE — 83036 HEMOGLOBIN GLYCOSYLATED A1C: CPT

## 2025-05-15 PROCEDURE — 86850 RBC ANTIBODY SCREEN: CPT

## 2025-05-15 PROCEDURE — 85730 THROMBOPLASTIN TIME PARTIAL: CPT

## 2025-05-15 PROCEDURE — 86901 BLOOD TYPING SEROLOGIC RH(D): CPT

## 2025-05-15 PROCEDURE — 93005 ELECTROCARDIOGRAM TRACING: CPT | Performed by: SURGERY

## 2025-05-15 PROCEDURE — 86900 BLOOD TYPING SEROLOGIC ABO: CPT

## 2025-05-15 PROCEDURE — 80053 COMPREHEN METABOLIC PANEL: CPT

## 2025-05-15 RX ORDER — LACTULOSE 10 G/15ML
20 SOLUTION ORAL 2 TIMES DAILY PRN
COMMUNITY

## 2025-05-15 NOTE — PERIOP NOTE
Pre op and medications instructions printed and reviewed with patient. Two bottles of chg soap given.surgical site infection sheet given. All questions were answered.    Consent form signed and on chart for scheduled surgery on May 20, 2025 @ [facility] with Dr. Dunbar

## 2025-05-15 NOTE — PERIOP NOTE
doctor), and fish oil for 7 days    Other:Bring Albuterol inhaler to hospital on day of surgery    (Pain medications not listed above, including Tylenol may be taken up until 4 hours prior to arrival time)   Blood  Thinners If you take Aspirin, Eliquis, Plavix, Coumadin, or any blood-thinning or anti-blood clot medicine, talk to the doctor who prescribed the medications for pre-operative instructions.    If you take aspirin or aspirin containing products for pain, stop 7 days prior to surgery   Bathing Clothing  Jewelry  Valuables     When you shower the morning of surgery, please do not apply anything to your skin, such as lotions, powders or makeup, (especially mascara).   No deodorant if you are having breast surgery.  Remove fingernail polish except for clear.  Do not shave or trim anywhere 24 hours before surgery  Wear your hair loose or down; no pony-tails, buns, or metal hair clips  Wear loose, comfortable, clean clothes  Wear glasses instead of contacts. Bring a case to keep your glasses safe.  Leave money, valuables, and jewelry, including body piercings, at home  If you use inhalers or CPAP machine, bring it with you the day of surgery.     Going Home - or Spending the Night OUTPATIENT SURGERY: You must have a responsible adult drive you home and stay with you 24 hours after surgery. You may not drive for 24 hours after surgery.  ADMITS: If your doctor is keeping you in the hospital after surgery, leave personal belongings/luggage in your car until you have a hospital room number.    Hospital discharge time is 12 noon  Drivers must be here before 12 noon unless you are told differently   Special Instructions Free  parking available from 6 AM until 4:30 PM.         Preventing Infections Before and After - Your Surgery    IMPORTANT INSTRUCTIONS      You play an important role in your health and preparation for surgery. To reduce the germs on your skin you will need to shower with CHG soap (Chorhexidine  gluconate 4%) two times before surgery.    CHG soap (Hibiclens, Hex-A-Clens or store brand)    CHG soap will be provided at your Preadmission Testing (PAT) appointment.  If you do not have a PAT appointment before surgery, you may arrange to  CHG soap from our office or purchase CHG soap at a pharmacy, grocery or department store.  You need to purchase TWO 4 ounce bottles to use for your 2 showers.    Shower with CHG soap 2 times before your surgery    The evening before your surgery  The morning of your surgery    Steps to follow:  Wash your hair with your normal shampoo and your body with regular soap and rinse well to remove shampoo and soap from your skin.  Wet a clean washcloth and turn off the shower.  Put CHG soap on washcloth and apply to your entire body from the neck down. Do not use on your head, face or private parts(genitals). Do not use CHG soap on open sores, wounds or areas of skin irritation.  Wash your body gently for 5 minutes. Do not wash your skin too hard. This soap does not create lather. Pay special attention to your underarms and from your belly button to your feet.  Turn the shower back on and rinse well to get CHG soap off your body.  Pat your skin dry with a clean, dry towel. Do not apply lotions or moisturizer.  Put on clean clothes and sleep on fresh bed sheets and do not allow pets to sleep with you.    Tips to help prevent infections after your surgery:  Protect your surgical wound from germs:  Hand washing is the most important thing you and your caregivers can do to prevent infections.  Keep your bandage clean and dry!  Do not touch your surgical wound.  Use clean, freshly washed towels and washcloths every time you shower; do not share bath linens with others.  Until your surgical wound is healed, wear clothing and sleep on bed linens that are clean.  Do not allow pets to sleep in your bed with you or touch your surgical wound.  Do not smoke - smoking delays wound healing.

## 2025-05-16 LAB
EKG ATRIAL RATE: 84 BPM
EKG DIAGNOSIS: NORMAL
EKG P AXIS: 66 DEGREES
EKG P-R INTERVAL: 174 MS
EKG Q-T INTERVAL: 402 MS
EKG QRS DURATION: 84 MS
EKG QTC CALCULATION (BAZETT): 475 MS
EKG R AXIS: -29 DEGREES
EKG T AXIS: 115 DEGREES
EKG VENTRICULAR RATE: 84 BPM

## 2025-05-16 PROCEDURE — 93010 ELECTROCARDIOGRAM REPORT: CPT | Performed by: SPECIALIST

## 2025-05-16 NOTE — PERIOP NOTE
SENT ABNORMAL LABS TO PCP OFFICE VIA [facility]     CALLED ANESTHESIA SPOKE WITH DR. JACOB- UPDATED ON ABNORMAL LABS AND SURGERY PT HAVING, HE SUGGESTED PT SHOULD HAVE A DIALYSIS TREATMENT THE DAY BEFORE SURGERY     CALLED PT TO SEE WHAT DAYS SHE GETS DIALYSIS STATES M,W,F SO SHE WILL BE GETTING DIALYSIS THE DAY PRIOR TO SURGERY     LABS AND CHEST XRAY FAXED TO OFFICE, EKG PENDING AT THIS TIME     CALLED SURGEONS OFFICE TO UPDATE ON ABNORMAL LABS     BUN- 40  CREATININE 5.41  HGB- 8.3  HCT-26.0    SPOKE WITH JERRELL, SHE SENT A MESSAGE TO THE SURGEON AND THEY WILL CONTACT US ONLY F ANYTHING ELSE NEEDS TO BE DONE,

## 2025-05-17 RX ORDER — PRAVASTATIN SODIUM 20 MG
20 TABLET ORAL NIGHTLY
Qty: 90 TABLET | Refills: 3 | Status: SHIPPED | OUTPATIENT
Start: 2025-05-17

## 2025-05-19 ENCOUNTER — ANESTHESIA EVENT (OUTPATIENT)
Facility: HOSPITAL | Age: 89
End: 2025-05-19
Payer: MEDICARE

## 2025-05-20 ENCOUNTER — HOSPITAL ENCOUNTER (OUTPATIENT)
Facility: HOSPITAL | Age: 89
Setting detail: OUTPATIENT SURGERY
Discharge: HOME | End: 2025-05-20
Attending: SURGERY | Admitting: SURGERY
Payer: MEDICARE

## 2025-05-20 ENCOUNTER — ANESTHESIA (OUTPATIENT)
Facility: HOSPITAL | Age: 89
End: 2025-05-20
Payer: MEDICARE

## 2025-05-20 VITALS
OXYGEN SATURATION: 92 % | HEIGHT: 63 IN | BODY MASS INDEX: 19.84 KG/M2 | DIASTOLIC BLOOD PRESSURE: 86 MMHG | RESPIRATION RATE: 22 BRPM | TEMPERATURE: 98.3 F | WEIGHT: 112 LBS | SYSTOLIC BLOOD PRESSURE: 164 MMHG | HEART RATE: 82 BPM

## 2025-05-20 DIAGNOSIS — Z99.2 ESRD ON PERITONEAL DIALYSIS (HCC): Primary | Chronic | ICD-10-CM

## 2025-05-20 DIAGNOSIS — N18.6 ESRD ON PERITONEAL DIALYSIS (HCC): Primary | Chronic | ICD-10-CM

## 2025-05-20 LAB
ANION GAP BLD CALC-SCNC: 9.8 MMOL/L (ref 10–20)
CA-I BLD-MCNC: 1.13 MMOL/L (ref 1.15–1.33)
CHLORIDE BLD-SCNC: 103 MMOL/L (ref 98–107)
CO2 BLD-SCNC: 24.2 MMOL/L (ref 21–32)
CREAT BLD-MCNC: 5.04 MG/DL (ref 0.6–1.3)
GLUCOSE BLD-MCNC: 89 MG/DL (ref 74–99)
POTASSIUM BLD-SCNC: 4.7 MMOL/L (ref 3.5–5.1)
SERVICE CMNT-IMP: ABNORMAL
SODIUM BLD-SCNC: 137 MMOL/L (ref 136–145)

## 2025-05-20 PROCEDURE — 3600000012 HC SURGERY LEVEL 2 ADDTL 15MIN: Performed by: SURGERY

## 2025-05-20 PROCEDURE — 7100000011 HC PHASE II RECOVERY - ADDTL 15 MIN: Performed by: SURGERY

## 2025-05-20 PROCEDURE — 2500000003 HC RX 250 WO HCPCS

## 2025-05-20 PROCEDURE — 80047 BASIC METABLC PNL IONIZED CA: CPT

## 2025-05-20 PROCEDURE — 7100000010 HC PHASE II RECOVERY - FIRST 15 MIN: Performed by: SURGERY

## 2025-05-20 PROCEDURE — 2500000003 HC RX 250 WO HCPCS: Performed by: SURGERY

## 2025-05-20 PROCEDURE — 6360000002 HC RX W HCPCS: Performed by: SURGERY

## 2025-05-20 PROCEDURE — 7100000001 HC PACU RECOVERY - ADDTL 15 MIN: Performed by: SURGERY

## 2025-05-20 PROCEDURE — 3700000001 HC ADD 15 MINUTES (ANESTHESIA): Performed by: SURGERY

## 2025-05-20 PROCEDURE — 2709999900 HC NON-CHARGEABLE SUPPLY: Performed by: SURGERY

## 2025-05-20 PROCEDURE — 7100000000 HC PACU RECOVERY - FIRST 15 MIN: Performed by: SURGERY

## 2025-05-20 PROCEDURE — 6370000000 HC RX 637 (ALT 250 FOR IP): Performed by: ANESTHESIOLOGY

## 2025-05-20 PROCEDURE — 2580000003 HC RX 258: Performed by: ANESTHESIOLOGY

## 2025-05-20 PROCEDURE — 3600000002 HC SURGERY LEVEL 2 BASE: Performed by: SURGERY

## 2025-05-20 PROCEDURE — 6360000002 HC RX W HCPCS

## 2025-05-20 PROCEDURE — 3700000000 HC ANESTHESIA ATTENDED CARE: Performed by: SURGERY

## 2025-05-20 RX ORDER — PROCHLORPERAZINE EDISYLATE 5 MG/ML
5 INJECTION INTRAMUSCULAR; INTRAVENOUS
Status: DISCONTINUED | OUTPATIENT
Start: 2025-05-20 | End: 2025-05-20 | Stop reason: HOSPADM

## 2025-05-20 RX ORDER — SODIUM CHLORIDE, SODIUM LACTATE, POTASSIUM CHLORIDE, CALCIUM CHLORIDE 600; 310; 30; 20 MG/100ML; MG/100ML; MG/100ML; MG/100ML
INJECTION, SOLUTION INTRAVENOUS CONTINUOUS
Status: DISCONTINUED | OUTPATIENT
Start: 2025-05-20 | End: 2025-05-20 | Stop reason: HOSPADM

## 2025-05-20 RX ORDER — LIDOCAINE HYDROCHLORIDE 10 MG/ML
1 INJECTION, SOLUTION EPIDURAL; INFILTRATION; INTRACAUDAL; PERINEURAL
Status: DISCONTINUED | OUTPATIENT
Start: 2025-05-20 | End: 2025-05-20 | Stop reason: HOSPADM

## 2025-05-20 RX ORDER — FENTANYL CITRATE 50 UG/ML
100 INJECTION, SOLUTION INTRAMUSCULAR; INTRAVENOUS
Status: DISCONTINUED | OUTPATIENT
Start: 2025-05-20 | End: 2025-05-20 | Stop reason: HOSPADM

## 2025-05-20 RX ORDER — SUCCINYLCHOLINE/SOD CL,ISO/PF 200MG/10ML
SYRINGE (ML) INTRAVENOUS
Status: DISCONTINUED | OUTPATIENT
Start: 2025-05-20 | End: 2025-05-20 | Stop reason: SDUPTHER

## 2025-05-20 RX ORDER — HYDROMORPHONE HYDROCHLORIDE 1 MG/ML
0.5 INJECTION, SOLUTION INTRAMUSCULAR; INTRAVENOUS; SUBCUTANEOUS EVERY 5 MIN PRN
Status: DISCONTINUED | OUTPATIENT
Start: 2025-05-20 | End: 2025-05-20 | Stop reason: HOSPADM

## 2025-05-20 RX ORDER — FENTANYL CITRATE 50 UG/ML
25 INJECTION, SOLUTION INTRAMUSCULAR; INTRAVENOUS EVERY 5 MIN PRN
Status: DISCONTINUED | OUTPATIENT
Start: 2025-05-20 | End: 2025-05-20 | Stop reason: HOSPADM

## 2025-05-20 RX ORDER — SODIUM CHLORIDE 0.9 % (FLUSH) 0.9 %
5-40 SYRINGE (ML) INJECTION EVERY 12 HOURS SCHEDULED
Status: DISCONTINUED | OUTPATIENT
Start: 2025-05-20 | End: 2025-05-20 | Stop reason: HOSPADM

## 2025-05-20 RX ORDER — FENTANYL CITRATE 50 UG/ML
INJECTION, SOLUTION INTRAMUSCULAR; INTRAVENOUS
Status: DISCONTINUED | OUTPATIENT
Start: 2025-05-20 | End: 2025-05-20 | Stop reason: SDUPTHER

## 2025-05-20 RX ORDER — ROCURONIUM BROMIDE 10 MG/ML
INJECTION, SOLUTION INTRAVENOUS
Status: DISCONTINUED | OUTPATIENT
Start: 2025-05-20 | End: 2025-05-20 | Stop reason: SDUPTHER

## 2025-05-20 RX ORDER — OXYCODONE HYDROCHLORIDE 5 MG/1
5 TABLET ORAL
Status: DISCONTINUED | OUTPATIENT
Start: 2025-05-20 | End: 2025-05-20 | Stop reason: HOSPADM

## 2025-05-20 RX ORDER — HYDRALAZINE HYDROCHLORIDE 20 MG/ML
10 INJECTION INTRAMUSCULAR; INTRAVENOUS ONCE
Status: DISCONTINUED | OUTPATIENT
Start: 2025-05-20 | End: 2025-05-20 | Stop reason: HOSPADM

## 2025-05-20 RX ORDER — LIDOCAINE HYDROCHLORIDE 20 MG/ML
INJECTION, SOLUTION EPIDURAL; INFILTRATION; INTRACAUDAL; PERINEURAL
Status: DISCONTINUED | OUTPATIENT
Start: 2025-05-20 | End: 2025-05-20 | Stop reason: SDUPTHER

## 2025-05-20 RX ORDER — SODIUM CHLORIDE 0.9 % (FLUSH) 0.9 %
5-40 SYRINGE (ML) INJECTION PRN
Status: DISCONTINUED | OUTPATIENT
Start: 2025-05-20 | End: 2025-05-20 | Stop reason: HOSPADM

## 2025-05-20 RX ORDER — MIDAZOLAM HYDROCHLORIDE 2 MG/2ML
2 INJECTION, SOLUTION INTRAMUSCULAR; INTRAVENOUS PRN
Status: DISCONTINUED | OUTPATIENT
Start: 2025-05-20 | End: 2025-05-20 | Stop reason: HOSPADM

## 2025-05-20 RX ORDER — SODIUM CHLORIDE 9 MG/ML
INJECTION, SOLUTION INTRAVENOUS PRN
Status: DISCONTINUED | OUTPATIENT
Start: 2025-05-20 | End: 2025-05-20 | Stop reason: HOSPADM

## 2025-05-20 RX ORDER — ONDANSETRON 2 MG/ML
4 INJECTION INTRAMUSCULAR; INTRAVENOUS
Status: DISCONTINUED | OUTPATIENT
Start: 2025-05-20 | End: 2025-05-20 | Stop reason: HOSPADM

## 2025-05-20 RX ORDER — NALOXONE HYDROCHLORIDE 0.4 MG/ML
INJECTION, SOLUTION INTRAMUSCULAR; INTRAVENOUS; SUBCUTANEOUS PRN
Status: DISCONTINUED | OUTPATIENT
Start: 2025-05-20 | End: 2025-05-20 | Stop reason: HOSPADM

## 2025-05-20 RX ORDER — ACETAMINOPHEN 500 MG
1000 TABLET ORAL ONCE
Status: COMPLETED | OUTPATIENT
Start: 2025-05-20 | End: 2025-05-20

## 2025-05-20 RX ORDER — TRAMADOL HYDROCHLORIDE 50 MG/1
50 TABLET ORAL EVERY 4 HOURS PRN
Qty: 30 TABLET | Refills: 0 | Status: SHIPPED | OUTPATIENT
Start: 2025-05-20 | End: 2025-05-25

## 2025-05-20 RX ORDER — ONDANSETRON 2 MG/ML
INJECTION INTRAMUSCULAR; INTRAVENOUS
Status: DISCONTINUED | OUTPATIENT
Start: 2025-05-20 | End: 2025-05-20 | Stop reason: SDUPTHER

## 2025-05-20 RX ADMIN — ROCURONIUM BROMIDE 10 MG: 10 SOLUTION INTRAVENOUS at 16:19

## 2025-05-20 RX ADMIN — WATER 2000 MG: 1 INJECTION INTRAMUSCULAR; INTRAVENOUS; SUBCUTANEOUS at 16:31

## 2025-05-20 RX ADMIN — PROPOFOL 80 MG: 10 INJECTION, EMULSION INTRAVENOUS at 16:19

## 2025-05-20 RX ADMIN — FENTANYL CITRATE 25 MCG: 50 INJECTION INTRAMUSCULAR; INTRAVENOUS at 16:18

## 2025-05-20 RX ADMIN — LIDOCAINE HYDROCHLORIDE 60 MG: 20 INJECTION, SOLUTION EPIDURAL; INFILTRATION; INTRACAUDAL; PERINEURAL at 16:18

## 2025-05-20 RX ADMIN — FENTANYL CITRATE 25 MCG: 50 INJECTION INTRAMUSCULAR; INTRAVENOUS at 16:23

## 2025-05-20 RX ADMIN — SUGAMMADEX 100 MG: 100 INJECTION, SOLUTION INTRAVENOUS at 16:47

## 2025-05-20 RX ADMIN — Medication 80 MG: at 16:19

## 2025-05-20 RX ADMIN — ONDANSETRON 4 MG: 2 INJECTION, SOLUTION INTRAMUSCULAR; INTRAVENOUS at 16:31

## 2025-05-20 RX ADMIN — SODIUM CHLORIDE: 9 INJECTION, SOLUTION INTRAVENOUS at 16:11

## 2025-05-20 RX ADMIN — ACETAMINOPHEN 1000 MG: 500 TABLET ORAL at 13:43

## 2025-05-20 ASSESSMENT — PAIN - FUNCTIONAL ASSESSMENT: PAIN_FUNCTIONAL_ASSESSMENT: NONE - DENIES PAIN

## 2025-05-20 NOTE — ANESTHESIA POSTPROCEDURE EVALUATION
Post-Anesthesia Evaluation and Assessment    Patient: Heather Rich MRN: [id number]  SSN: [id number]    YOB: 1935  Age: 89 y.o.  Sex: female      I have evaluated the patient and they are stable and ready for discharge from the PACU.     Cardiovascular Function/Vital Signs  Visit Vitals  BP (!) 157/73   Pulse 80   Temp 98.3 °F (36.8 °C) (Oral)   Resp 20   Ht 1.6 m (5' 3\")   Wt 50.8 kg (112 lb)   SpO2 93%   BMI 19.84 kg/m²       Patient is status post General anesthesia for Procedure(s):  PERITONEAL CATHETER REMOVAL.    Nausea/Vomiting: None    Postoperative hydration reviewed and adequate.    Pain:  Managed    Neurological Status:   At baseline    Mental Status, Level of Consciousness: Alert and  oriented to person, place, and time    Pulmonary Status:   Adequate oxygenation and airway patent    Complications related to anesthesia: None    Post-anesthesia assessment completed. No concerns    Signed By: Jose Wooten MD     May 20, 2025

## 2025-05-20 NOTE — PROGRESS NOTES
1730 VS stable. Awake and alert. Resting comfortably. Patient denies nausea. No signs of excessive bleeding. Tolerating soda and crackers without difficulty. Ready to transfer to Phase 2.

## 2025-05-20 NOTE — DISCHARGE INSTRUCTIONS
Please skin glue attached for 2 weeks.  Please remove gauze dressing in 4 hours and replace with a band-aid as needed.      Anesthesia Discharge Instructions        ALL CLOTHING/VALUABLES RETURNED TO PATIENT BEFORE D/C HOME    PATIENT INSTRUCTIONS:    The first meal should be something that is light; not greasy or spicy.  If this is tolerated, then advance to regular diet as tolerated.      After general anesthesia or intravenous sedation, for 24 hours or while taking prescription Narcotics:  Limit your activities  Do not drive and operate hazardous machinery  Do not make important personal or business decisions  Do  not drink alcoholic beverages  If you have not urinated within 8 hours after discharge, please contact your surgeon on call.    Pain  Take pain medication as directed by your doctor   Call your doctor if pain is NOT relieved by medication  DO NOT take aspirin or blood thinners until directed by your doctor    Report the following to your surgeon:  Excessive pain, swelling, redness or odor of or around the surgical area  Temperature over 100.5  Nausea and vomiting lasting longer than 4 hours or if unable to take medications  Any signs of decreased circulation or nerve impairment to extremity: change in color, persistent  numbness, tingling, coldness or increase pain  Any questions    ALSO:  FOLLOW ANY INSTRUCTIONS SPECIFIED BY YOUR SURGEON       Follow-Up Phone Calls  Are usually made nursing staff, the day after your surgery  If you have any problems, call your doctor as needed      The discharge information has been reviewed.    *  Please give a list of your current medications to your Primary Care Provider.    *  Please update this list whenever your medications are discontinued, doses are      changed, or new medications (including over-the-counter products) are added.    *  Please carry medication information at all times in case of emergency situations.

## 2025-05-20 NOTE — FLOWSHEET NOTE
05/20/25 1636   Family Communication    Relationship to Patient Other (Comment)    Phone Number NEYMAR QUIROGA [phone]   Family/Significant Other Update Called   Delivery Origin Nurse   Message Disposition Family present - message delivered   Update Given Yes   Family Communication   Family Update Message Procedure started;Surgeon working;Patient stable

## 2025-05-20 NOTE — OP NOTE
entirely and removed from the body and completely removed after freeing up the 2 cuffs.  The fascial defect was closed with a figure-of-eight 0 Vicryl suture.  The wound was cleansed and dried and closed in layers of 2-0 Vicryl suture followed by 3-0 Vicryl suture followed by Monocryl closure of the skin.  Skin glue was then used for dressing.  The patient was then awoken and transferred to the recovery room in stable condition.    DRAINS:  None.        REMINGTON SENA MD AM/FRANCISCO  D:  05/20/2025 16:57:48  T:  05/20/2025 17:28:10  JOB #:  [id number]

## 2025-05-20 NOTE — BRIEF OP NOTE
Brief Postoperative Note      Patient: Heather Rich  YOB: 1935  MRN: [id number]    Date of Procedure: 5/20/2025    Pre-Op Diagnosis Codes:      * End stage renal disease (HCC) [N18.6]    Post-Op Diagnosis: Same       Procedure(s):  PERITONEAL CATHETER REMOVAL    Surgeon(s):  Cristi Dunbar MD    Assistant:  Surgical Assistant: Lory Shields    Anesthesia: General    Estimated Blood Loss (mL): Minimal    Complications: None    Specimens:   * No specimens in log *    Implants:  * No implants in log *      Drains:   [REMOVED] NG/OG/NJ/NE Tube Nasogastric Left nostril (Removed)       [REMOVED] Urinary Catheter 05/05/24 2 Way (Removed)       Findings:  Infection Present At Time Of Surgery (PATOS) (choose all levels that have infection present):  No infection present  Other Findings: PD catheter removed    Electronically signed by Cristi Dunbar MD on 5/20/2025 at 4:53 PM

## 2025-05-20 NOTE — PROGRESS NOTES
Discharge instructions reviewed with patient and family using teachback. All questions have been answered. Prescriptions sent to [facility] pharmacy. Vital signs stable, pain appropriately managed. Patient wheeled off the unit with PACU staff.

## 2025-05-20 NOTE — ANESTHESIA PRE PROCEDURE
05/04/2023 10:54 AM    GLUCOSE 113 05/15/2025 11:44 AM    CALCIUM 9.6 05/15/2025 11:44 AM    BILITOT 0.4 05/15/2025 11:44 AM    ALKPHOS 94 05/15/2025 11:44 AM    ALKPHOS 94 12/04/2021 08:48 PM    AST 20 05/15/2025 11:44 AM    ALT 15 05/15/2025 11:44 AM       POC Tests:   No results for input(s): \"POCGLU\", \"POCNA\", \"POCK\", \"POCCL\", \"POCBUN\", \"POCHEMO\", \"POCHCT\" in the last 72 hours.      Coags:   Lab Results   Component Value Date/Time    PROTIME 10.9 05/15/2025 11:44 AM    INR 1.0 05/15/2025 11:44 AM    APTT 25.6 05/15/2025 11:44 AM       HCG (If Applicable): No results found for: \"PREGTESTUR\", \"PREGSERUM\", \"HCG\", \"HCGQUANT\"     ABGs: No results found for: \"PHART\", \"PO2ART\", \"AMC1FHQ\", \"NAS7ULL\", \"BEART\", \"M2YJZUUG\"     Type & Screen (If Applicable):  No results found for: \"LABABO\"    Drug/Infectious Status (If Applicable):  No results found for: \"HIV\", \"HEPCAB\"    COVID-19 Screening (If Applicable):   Lab Results   Component Value Date/Time    COVID19 Not detected 05/05/2024 11:21 AM           Anesthesia Evaluation  Patient summary reviewed and Nursing notes reviewed   no history of anesthetic complications:   Airway: Mallampati: II  TM distance: >3 FB   Neck ROM: full  Mouth opening: > = 3 FB   Dental: normal exam         Pulmonary:normal exam  breath sounds clear to auscultation  (+)           asthma:                            Cardiovascular:  Exercise tolerance: good (>4 METS)  (+) hypertension:, hyperlipidemia        Rhythm: regular  Rate: normal  Echocardiogram reviewed         Beta Blocker:  Dose within 24 Hrs      ROS comment: Echo 10/2023  ·  Left Ventricle: Normal left ventricular systolic function with a visually estimated EF of 55 - 60%. Left ventricle size is normal. Increased wall thickness. Normal wall motion.  ·  Mitral Valve: Mild regurgitation.  ·  Tricuspid Valve: The estimated RVSP is 47 mmHg.  ·  Left Atrium: Left atrium is mildly dilated.       Neuro/Psych:   (+) headaches:dementia

## 2025-06-16 RX ORDER — SPIRONOLACTONE 25 MG/1
25 TABLET ORAL DAILY
Qty: 90 TABLET | Refills: 0 | Status: SHIPPED | OUTPATIENT
Start: 2025-06-16

## 2025-07-17 ENCOUNTER — HOSPITAL ENCOUNTER (OUTPATIENT)
Facility: HOSPITAL | Age: 89
Discharge: HOME | End: 2025-07-20
Payer: MEDICARE

## 2025-07-17 VITALS
HEART RATE: 69 BPM | DIASTOLIC BLOOD PRESSURE: 71 MMHG | HEIGHT: 63 IN | TEMPERATURE: 98 F | WEIGHT: 115.8 LBS | RESPIRATION RATE: 20 BRPM | SYSTOLIC BLOOD PRESSURE: 162 MMHG | BODY MASS INDEX: 20.52 KG/M2

## 2025-07-17 LAB
ABO + RH BLD: NORMAL
ALBUMIN SERPL-MCNC: 3.7 G/DL (ref 3.5–5)
ALBUMIN/GLOB SERPL: 0.9 (ref 1.1–2.2)
ALP SERPL-CCNC: 83 U/L (ref 45–117)
ALT SERPL-CCNC: 20 U/L (ref 12–78)
ANION GAP SERPL CALC-SCNC: 9 MMOL/L (ref 2–12)
APTT PPP: 25 SEC (ref 22.1–31)
AST SERPL-CCNC: 18 U/L (ref 15–37)
BASOPHILS # BLD: 0.12 K/UL (ref 0–0.1)
BASOPHILS NFR BLD: 1.3 % (ref 0–1)
BILIRUB SERPL-MCNC: 0.3 MG/DL (ref 0.2–1)
BLOOD GROUP ANTIBODIES SERPL: NORMAL
BUN SERPL-MCNC: 35 MG/DL (ref 6–20)
BUN/CREAT SERPL: 7 (ref 12–20)
CALCIUM SERPL-MCNC: 9.5 MG/DL (ref 8.5–10.1)
CHLORIDE SERPL-SCNC: 97 MMOL/L (ref 97–108)
CO2 SERPL-SCNC: 27 MMOL/L (ref 21–32)
CREAT SERPL-MCNC: 5.15 MG/DL (ref 0.55–1.02)
DIFFERENTIAL METHOD BLD: ABNORMAL
EOSINOPHIL # BLD: 0.4 K/UL (ref 0–0.4)
EOSINOPHIL NFR BLD: 4.4 % (ref 0–7)
ERYTHROCYTE [DISTWIDTH] IN BLOOD BY AUTOMATED COUNT: 14.2 % (ref 11.5–14.5)
EST. AVERAGE GLUCOSE BLD GHB EST-MCNC: 82 MG/DL
GLOBULIN SER CALC-MCNC: 4.2 G/DL (ref 2–4)
GLUCOSE SERPL-MCNC: 107 MG/DL (ref 65–100)
HBA1C MFR BLD: 4.5 % (ref 4–5.6)
HCT VFR BLD AUTO: 30.6 % (ref 35–47)
HGB BLD-MCNC: 9.8 G/DL (ref 11.5–16)
IMM GRANULOCYTES # BLD AUTO: 0.04 K/UL (ref 0–0.04)
IMM GRANULOCYTES NFR BLD AUTO: 0.4 % (ref 0–0.5)
INR PPP: 1 (ref 0.9–1.1)
LYMPHOCYTES # BLD: 1.35 K/UL (ref 0.8–3.5)
LYMPHOCYTES NFR BLD: 14.8 % (ref 12–49)
MCH RBC QN AUTO: 27.4 PG (ref 26–34)
MCHC RBC AUTO-ENTMCNC: 32 G/DL (ref 30–36.5)
MCV RBC AUTO: 85.5 FL (ref 80–99)
MONOCYTES # BLD: 1.13 K/UL (ref 0–1)
MONOCYTES NFR BLD: 12.4 % (ref 5–13)
NEUTS SEG # BLD: 6.1 K/UL (ref 1.8–8)
NEUTS SEG NFR BLD: 66.7 % (ref 32–75)
NRBC # BLD: 0 K/UL (ref 0–0.01)
NRBC BLD-RTO: 0 PER 100 WBC
PLATELET # BLD AUTO: 366 K/UL (ref 150–400)
PMV BLD AUTO: 10 FL (ref 8.9–12.9)
POTASSIUM SERPL-SCNC: 4.8 MMOL/L (ref 3.5–5.1)
PROT SERPL-MCNC: 7.9 G/DL (ref 6.4–8.2)
PROTHROMBIN TIME: 10.5 SEC (ref 9.2–11.2)
RBC # BLD AUTO: 3.58 M/UL (ref 3.8–5.2)
SODIUM SERPL-SCNC: 133 MMOL/L (ref 136–145)
SPECIMEN EXP DATE BLD: NORMAL
THERAPEUTIC RANGE: NORMAL SECS (ref 58–77)
WBC # BLD AUTO: 9.1 K/UL (ref 3.6–11)

## 2025-07-17 PROCEDURE — 80053 COMPREHEN METABOLIC PANEL: CPT

## 2025-07-17 PROCEDURE — 85610 PROTHROMBIN TIME: CPT

## 2025-07-17 PROCEDURE — 83036 HEMOGLOBIN GLYCOSYLATED A1C: CPT

## 2025-07-17 PROCEDURE — 86850 RBC ANTIBODY SCREEN: CPT

## 2025-07-17 PROCEDURE — 85730 THROMBOPLASTIN TIME PARTIAL: CPT

## 2025-07-17 PROCEDURE — 86900 BLOOD TYPING SEROLOGIC ABO: CPT

## 2025-07-17 PROCEDURE — 85025 COMPLETE CBC W/AUTO DIFF WBC: CPT

## 2025-07-17 PROCEDURE — 86901 BLOOD TYPING SEROLOGIC RH(D): CPT

## 2025-07-17 NOTE — PERIOP NOTE
PATIENT STATES SHE IS UNABLE TO SHOWER. SHE WAS GIVE CHG WIPES WITH WRITTEN INSTRUCTIONS INSTEAD OF SOAP.

## 2025-07-17 NOTE — PERIOP NOTE
[address] [address]      MAIN PRE OP            [phone]                                                                                AMBULATORY PRE OP          [phone]    PRE-ADMISSION TESTING    [phone]     Surgery Date:  7-28-25        [facility] staff will call you between 4 and 7pm the day before your surgery with your arrival time.   (If your surgery is on a Monday, we will call you the Friday before.)    Call [phone] after 7pm Monday-Friday if you did not receive this call.    INSTRUCTIONS BEFORE YOUR SURGERY   When You  Arrive Arrive at [facility] Patient Access on 1st floor the day of your surgery.    Have your insurance card, photo ID,living will/advanced directive/POA (if applicable),  and any copayment (if needed)   Food   and   Drink NO solid food after midnight the night before surgery. You can drink clear liquids from midnight until ONE hour prior to your arrival at the hospital on the day of your surgery.     Clear liquids include:  Water  Apple juice (no sediment)  Carbonated beverages  Black coffee(no cream/milk)  Tea(no cream/milk)  Gatorade    No alcohol (beer, wine, liquor) or marijuana (smoking) 24 hours prior to surgery.   No marijuana edibles for 3 days prior to surgery.    Stop smoking cigarettes 14 days before surgery (helps w/healing and breathing).   Medications to   TAKE   Morning of Surgery MEDICATIONS TO TAKE THE MORNING OF SURGERY: ISOSORBIDE, LAMICTAL, METOPROLOL, NEFEDIPINE, SYMBICORT INHALER. BRING INHALERS WITH YOU TO THE HOSPITAL.    You may take these medications, IF NEEDED, the morning of surgery: ALBUTEROL NEBULIZER, BROVANA NEBULIZER, PULMICORT NEBULIZER, ALBUTEROL INHALER    Ask your surgeon/prescribing doctor for instructions on taking or stopping these medications prior to surgery: ELIQUIS   Medications to STOP  before surgery Non-Steroidal anti-inflammatory Drugs (NSAID's): for  example, Diclofenac (Voltaren), Ibuprofen (Advil, Motrin), Naproxen (Aleve) 3 days    STOP all herbal supplements and vitamins(unless prescribed by your doctor), and fish oil for 7 days    Other:HOLD DICLOFENAC GEL 3 DAYS PRIOR TO SURGERY    (Pain medications not listed above, including Tylenol may be taken up until 4 hours prior to arrival time)     Blood  Thinners If you take Aspirin, Eliquis, Plavix, Coumadin, or any blood-thinning or anti-blood clot medicine, talk to the doctor who prescribed the medications for pre-operative instructions.    If you take aspirin or aspirin containing products for pain, stop 7 days prior to surgery     Bathing Clothing  Jewelry  Valuables     When you shower the morning of surgery, please do not apply anything to your skin, such as lotions, powders or makeup, (especially mascara).     No deodorant if you are having breast surgery.    Remove fingernail polish except for clear.    Do not shave or trim anywhere 24 hours before surgery    Wear your hair loose or down; no pony-tails, buns, or metal hair clips    Wear loose, comfortable, clean clothes    Wear glasses instead of contacts. Bring a case to keep your glasses safe.    Leave money, valuables, and jewelry, including body piercings, at home    If you use inhalers or CPAP machine, bring it with you the day of surgery.     Going Home - or Spending the Night OUTPATIENT SURGERY: You must have a responsible adult drive you home and stay with you 24 hours after surgery. You may not drive for 24 hours after surgery.    ADMITS: If your doctor is keeping you in the hospital after surgery, leave personal belongings/luggage in your car until you have a hospital room number.    Hospital discharge time is 12 noon  Drivers must be here before 12 noon unless you are told differently   Special Instructions Free  parking available from 6 AM until 4:30 PM.       Preventing Infections Before and After - Your Surgery    IMPORTANT

## 2025-07-23 PROBLEM — E78.2 MIXED HYPERLIPIDEMIA: Status: ACTIVE | Noted: 2025-07-23

## 2025-07-23 RX ORDER — METOPROLOL SUCCINATE 25 MG/1
25 TABLET, EXTENDED RELEASE ORAL DAILY
Qty: 90 TABLET | Refills: 3 | Status: SHIPPED | OUTPATIENT
Start: 2025-07-23

## 2025-07-24 ENCOUNTER — OFFICE VISIT (OUTPATIENT)
Age: 89
End: 2025-07-24
Payer: MEDICARE

## 2025-07-24 VITALS
BODY MASS INDEX: 20.59 KG/M2 | HEART RATE: 85 BPM | DIASTOLIC BLOOD PRESSURE: 78 MMHG | HEIGHT: 63 IN | WEIGHT: 116.2 LBS | SYSTOLIC BLOOD PRESSURE: 136 MMHG | OXYGEN SATURATION: 96 %

## 2025-07-24 DIAGNOSIS — E78.2 MIXED HYPERLIPIDEMIA: ICD-10-CM

## 2025-07-24 DIAGNOSIS — I48.0 PAROXYSMAL ATRIAL FIBRILLATION (HCC): Primary | ICD-10-CM

## 2025-07-24 DIAGNOSIS — Z99.2 END STAGE RENAL DISEASE ON DIALYSIS (HCC): Chronic | ICD-10-CM

## 2025-07-24 DIAGNOSIS — N18.6 END STAGE RENAL DISEASE ON DIALYSIS (HCC): Chronic | ICD-10-CM

## 2025-07-24 DIAGNOSIS — I10 ESSENTIAL HYPERTENSION: Chronic | ICD-10-CM

## 2025-07-24 PROCEDURE — 1123F ACP DISCUSS/DSCN MKR DOCD: CPT | Performed by: SPECIALIST

## 2025-07-24 PROCEDURE — 99204 OFFICE O/P NEW MOD 45 MIN: CPT | Performed by: SPECIALIST

## 2025-07-24 PROCEDURE — 1036F TOBACCO NON-USER: CPT | Performed by: SPECIALIST

## 2025-07-24 PROCEDURE — 1090F PRES/ABSN URINE INCON ASSESS: CPT | Performed by: SPECIALIST

## 2025-07-24 PROCEDURE — G8427 DOCREV CUR MEDS BY ELIG CLIN: HCPCS | Performed by: SPECIALIST

## 2025-07-24 PROCEDURE — 1159F MED LIST DOCD IN RCRD: CPT | Performed by: SPECIALIST

## 2025-07-24 PROCEDURE — 1160F RVW MEDS BY RX/DR IN RCRD: CPT | Performed by: SPECIALIST

## 2025-07-24 PROCEDURE — G8420 CALC BMI NORM PARAMETERS: HCPCS | Performed by: SPECIALIST

## 2025-07-24 RX ORDER — CLONIDINE HYDROCHLORIDE 0.1 MG/1
0.1 TABLET ORAL 2 TIMES DAILY
COMMUNITY

## 2025-07-24 RX ORDER — TRIAMCINOLONE ACETONIDE 1 MG/G
OINTMENT TOPICAL 2 TIMES DAILY PRN
COMMUNITY

## 2025-07-24 RX ORDER — METHOXY POLYETHYLENE GLYCOL-EPOETIN BETA 30 UG/.3ML
30 INJECTION, SOLUTION INTRAVENOUS
COMMUNITY
End: 2025-07-24

## 2025-07-24 ASSESSMENT — PATIENT HEALTH QUESTIONNAIRE - PHQ9
SUM OF ALL RESPONSES TO PHQ QUESTIONS 1-9: 0
1. LITTLE INTEREST OR PLEASURE IN DOING THINGS: NOT AT ALL
SUM OF ALL RESPONSES TO PHQ QUESTIONS 1-9: 0
2. FEELING DOWN, DEPRESSED OR HOPELESS: NOT AT ALL
SUM OF ALL RESPONSES TO PHQ QUESTIONS 1-9: 0
SUM OF ALL RESPONSES TO PHQ QUESTIONS 1-9: 0
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
## 2025-07-28 ENCOUNTER — HOSPITAL ENCOUNTER (OUTPATIENT)
Facility: HOSPITAL | Age: 89
Setting detail: OUTPATIENT SURGERY
Discharge: HOME | End: 2025-07-28
Attending: SURGERY | Admitting: SURGERY
Payer: MEDICARE

## 2025-07-28 ENCOUNTER — ANESTHESIA EVENT (OUTPATIENT)
Facility: HOSPITAL | Age: 89
End: 2025-07-28
Payer: MEDICARE

## 2025-07-28 ENCOUNTER — ANESTHESIA (OUTPATIENT)
Facility: HOSPITAL | Age: 89
End: 2025-07-28
Payer: MEDICARE

## 2025-07-28 VITALS
SYSTOLIC BLOOD PRESSURE: 171 MMHG | OXYGEN SATURATION: 95 % | TEMPERATURE: 97 F | RESPIRATION RATE: 18 BRPM | DIASTOLIC BLOOD PRESSURE: 60 MMHG | HEART RATE: 64 BPM

## 2025-07-28 DIAGNOSIS — Z99.2 ESRD ON PERITONEAL DIALYSIS (HCC): Primary | Chronic | ICD-10-CM

## 2025-07-28 DIAGNOSIS — N18.6 ESRD ON PERITONEAL DIALYSIS (HCC): Primary | Chronic | ICD-10-CM

## 2025-07-28 LAB
ANION GAP BLD CALC-SCNC: 11.4 MMOL/L (ref 10–20)
CA-I BLD-MCNC: 1.06 MMOL/L (ref 1.15–1.33)
CHLORIDE BLD-SCNC: 102 MMOL/L (ref 98–107)
CO2 BLD-SCNC: 19.6 MMOL/L (ref 21–32)
CREAT BLD-MCNC: 8.17 MG/DL (ref 0.6–1.3)
GLUCOSE BLD STRIP.AUTO-MCNC: 84 MG/DL (ref 65–117)
GLUCOSE BLD-MCNC: 90 MG/DL (ref 74–99)
POTASSIUM BLD-SCNC: 5.1 MMOL/L (ref 3.5–5.1)
SERVICE CMNT-IMP: ABNORMAL
SERVICE CMNT-IMP: NORMAL
SODIUM BLD-SCNC: 133 MMOL/L (ref 136–145)

## 2025-07-28 PROCEDURE — 6360000002 HC RX W HCPCS: Performed by: SURGERY

## 2025-07-28 PROCEDURE — 2500000003 HC RX 250 WO HCPCS

## 2025-07-28 PROCEDURE — 7100000010 HC PHASE II RECOVERY - FIRST 15 MIN: Performed by: SURGERY

## 2025-07-28 PROCEDURE — 80047 BASIC METABLC PNL IONIZED CA: CPT

## 2025-07-28 PROCEDURE — 82962 GLUCOSE BLOOD TEST: CPT

## 2025-07-28 PROCEDURE — 3600000002 HC SURGERY LEVEL 2 BASE: Performed by: SURGERY

## 2025-07-28 PROCEDURE — 3700000001 HC ADD 15 MINUTES (ANESTHESIA): Performed by: SURGERY

## 2025-07-28 PROCEDURE — 2580000003 HC RX 258: Performed by: SURGERY

## 2025-07-28 PROCEDURE — 2709999900 HC NON-CHARGEABLE SUPPLY: Performed by: SURGERY

## 2025-07-28 PROCEDURE — 7100000011 HC PHASE II RECOVERY - ADDTL 15 MIN: Performed by: SURGERY

## 2025-07-28 PROCEDURE — 2580000003 HC RX 258

## 2025-07-28 PROCEDURE — C1768 GRAFT, VASCULAR: HCPCS | Performed by: SURGERY

## 2025-07-28 PROCEDURE — 6360000002 HC RX W HCPCS: Performed by: ANESTHESIOLOGY

## 2025-07-28 PROCEDURE — 2500000003 HC RX 250 WO HCPCS: Performed by: SURGERY

## 2025-07-28 PROCEDURE — 64415 NJX AA&/STRD BRCH PLXS IMG: CPT | Performed by: ANESTHESIOLOGY

## 2025-07-28 PROCEDURE — 3700000000 HC ANESTHESIA ATTENDED CARE: Performed by: SURGERY

## 2025-07-28 PROCEDURE — 7100000000 HC PACU RECOVERY - FIRST 15 MIN: Performed by: SURGERY

## 2025-07-28 PROCEDURE — 3600000012 HC SURGERY LEVEL 2 ADDTL 15MIN: Performed by: SURGERY

## 2025-07-28 PROCEDURE — 7100000001 HC PACU RECOVERY - ADDTL 15 MIN: Performed by: SURGERY

## 2025-07-28 PROCEDURE — 6360000002 HC RX W HCPCS

## 2025-07-28 DEVICE — PROPATEN VASCULAR GRAFT SW 4-7MMX45CM TAPERED HEPARIN
Type: IMPLANTABLE DEVICE | Site: ARM | Status: FUNCTIONAL
Brand: GORE PROPATEN VASCULAR GRAFT

## 2025-07-28 RX ORDER — SODIUM CHLORIDE 9 MG/ML
INJECTION, SOLUTION INTRAVENOUS
Status: DISCONTINUED | OUTPATIENT
Start: 2025-07-28 | End: 2025-07-28 | Stop reason: SDUPTHER

## 2025-07-28 RX ORDER — SODIUM CHLORIDE 9 MG/ML
INJECTION, SOLUTION INTRAVENOUS CONTINUOUS PRN
Status: DISCONTINUED | OUTPATIENT
Start: 2025-07-28 | End: 2025-07-28 | Stop reason: HOSPADM

## 2025-07-28 RX ORDER — FENTANYL CITRATE 50 UG/ML
100 INJECTION, SOLUTION INTRAMUSCULAR; INTRAVENOUS ONCE
Refills: 0 | Status: COMPLETED | OUTPATIENT
Start: 2025-07-28 | End: 2025-07-28

## 2025-07-28 RX ORDER — DEXMEDETOMIDINE HYDROCHLORIDE 100 UG/ML
INJECTION, SOLUTION INTRAVENOUS
Status: DISCONTINUED | OUTPATIENT
Start: 2025-07-28 | End: 2025-07-28 | Stop reason: SDUPTHER

## 2025-07-28 RX ORDER — LIDOCAINE HYDROCHLORIDE 10 MG/ML
INJECTION, SOLUTION INFILTRATION; PERINEURAL PRN
Status: DISCONTINUED | OUTPATIENT
Start: 2025-07-28 | End: 2025-07-28 | Stop reason: HOSPADM

## 2025-07-28 RX ORDER — PROPOFOL 10 MG/ML
INJECTION, EMULSION INTRAVENOUS
Status: DISCONTINUED | OUTPATIENT
Start: 2025-07-28 | End: 2025-07-28 | Stop reason: SDUPTHER

## 2025-07-28 RX ORDER — MIDAZOLAM HYDROCHLORIDE 2 MG/2ML
2 INJECTION, SOLUTION INTRAMUSCULAR; INTRAVENOUS ONCE
Status: COMPLETED | OUTPATIENT
Start: 2025-07-28 | End: 2025-07-28

## 2025-07-28 RX ORDER — HEPARIN SODIUM 1000 [USP'U]/ML
INJECTION, SOLUTION INTRAVENOUS; SUBCUTANEOUS PRN
Status: DISCONTINUED | OUTPATIENT
Start: 2025-07-28 | End: 2025-07-28 | Stop reason: HOSPADM

## 2025-07-28 RX ORDER — HEPARIN SODIUM 1000 [USP'U]/ML
INJECTION, SOLUTION INTRAVENOUS; SUBCUTANEOUS
Status: DISCONTINUED | OUTPATIENT
Start: 2025-07-28 | End: 2025-07-28 | Stop reason: SDUPTHER

## 2025-07-28 RX ORDER — TRAMADOL HYDROCHLORIDE 50 MG/1
50 TABLET ORAL EVERY 4 HOURS PRN
Qty: 30 TABLET | Refills: 0 | Status: SHIPPED | OUTPATIENT
Start: 2025-07-28 | End: 2025-08-02

## 2025-07-28 RX ADMIN — MEPIVACAINE HYDROCHLORIDE 20 ML: 15 INJECTION, SOLUTION EPIDURAL; INFILTRATION at 13:22

## 2025-07-28 RX ADMIN — FENTANYL CITRATE 25 MCG: 50 INJECTION INTRAMUSCULAR; INTRAVENOUS at 13:15

## 2025-07-28 RX ADMIN — PROPOFOL 50 MCG/KG/MIN: 10 INJECTION, EMULSION INTRAVENOUS at 13:39

## 2025-07-28 RX ADMIN — PROPOFOL 25 MG: 10 INJECTION, EMULSION INTRAVENOUS at 13:37

## 2025-07-28 RX ADMIN — SODIUM CHLORIDE: 9 INJECTION, SOLUTION INTRAVENOUS at 13:29

## 2025-07-28 RX ADMIN — PROPOFOL 20 MG: 10 INJECTION, EMULSION INTRAVENOUS at 14:02

## 2025-07-28 RX ADMIN — WATER 2000 MG: 1 INJECTION INTRAMUSCULAR; INTRAVENOUS; SUBCUTANEOUS at 13:48

## 2025-07-28 RX ADMIN — MIDAZOLAM HYDROCHLORIDE 1 MG: 1 INJECTION, SOLUTION INTRAMUSCULAR; INTRAVENOUS at 13:16

## 2025-07-28 RX ADMIN — DEXMEDETOMIDINE 8 MCG: 100 INJECTION, SOLUTION, CONCENTRATE INTRAVENOUS at 13:48

## 2025-07-28 RX ADMIN — HEPARIN SODIUM 5000 UNITS: 1000 INJECTION INTRAVENOUS; SUBCUTANEOUS at 14:14

## 2025-07-28 ASSESSMENT — PAIN - FUNCTIONAL ASSESSMENT: PAIN_FUNCTIONAL_ASSESSMENT: 0-10

## 2025-07-28 NOTE — DISCHARGE INSTRUCTIONS
Can remove/change dressings and bathe arm as routine in 48 hours with medium band-aids.    Please call [phone] for follow up appointment in 2 weeks at [facility].    ______________________________________________________________________    Anesthesia Discharge Instructions    After general anesthesia or intervenous sedation, for 24 hours or while taking prescription Narcotics:  Limit your activities  Do not drive or operate hazardous machinery  If you have not urinated within 8 hours after discharge, please contact your surgeon on call.  Do not make important personal or business decisions  Do not drink alcoholic beverages    Report the following to your surgeon:  Excessive pain, swelling, redness or odor of or around the surgical area  Temperature over 100.5 degrees  Nausea and vomiting lasting longer than 4 hours or if unable to take medication  Any signs of decreased circulation or nerve impairment to extremity:  Change in color, persistent numbness, tingling, coldness or increased pain.  Any questions

## 2025-07-28 NOTE — OP NOTE
[address]  [address]                            OPERATIVE REPORT      PATIENT NAME: JOANNE NUÑEZ              : 1935  MED REC NO: [id number]                       ROOM: OR  ACCOUNT NO: [id number]                       ADMIT DATE: 2025  PROVIDER: Cristi Dunbar MD    DATE OF SERVICE:  2025    PREOPERATIVE DIAGNOSES:  End-stage renal disease.    POSTOPERATIVE DIAGNOSES:  End-stage renal disease.    PROCEDURES PERFORMED:  Left upper arm arteriovenous graft placement.    SURGEON:  Cristi Dunbar MD    ASSISTANT:  Josephine.    ANESTHESIA:  Regional with sedation and local.    ESTIMATED BLOOD LOSS:  Minimal.    SPECIMENS REMOVED:  None.    INTRAOPERATIVE FINDINGS:  See below.     COMPLICATIONS:  None.    IMPLANTS:  [place] 4 x 7 tapered PTFE graft.    INDICATIONS:  The patient is an 89-year-old female, who has been referred for permanent dialysis access placement.  Preoperative evaluation suggests no usable superficial veins.  She presents for upper arm graft placement.    DESCRIPTION OF PROCEDURE:  The patient was placed supine on the operating room table and regional analgesia had been established by the anesthesia department.  The block was tested and did require some local anesthesia during the procedure.  The left arm was prepped and draped in standard surgical fashion.  An incision was made longitudinally in the left upper arm, taken down through the skin, subcutaneous tissue, and the fascia.  A relatively small diseased brachial artery was found along with a good-sized brachial vein.  Three counter incisions were made and a 4 x 7 mm tapered graft was tunneled through these incisions and oriented such that the arterial side was lateral.  The vein was clamped proximally and distally, and an end-to-side anastomosis was created using running 5-0 Prolene.  I should add that the patient was heparinized prior to this.  The

## 2025-07-28 NOTE — BRIEF OP NOTE
Brief Postoperative Note      Patient: Heather Rich  YOB: 1935  MRN: [id number]    Date of Procedure: 7/28/2025    Pre-Op Diagnosis Codes:      * End stage renal disease (HCC) [N18.6]    Post-Op Diagnosis: Same       Procedure(s):  LEFT ARM GRAFT (MAC/REGIONAL)    Surgeon(s):  Cristi Dunbar MD    Assistant:  Surgical Assistant: Maria G Burton    Anesthesia: Regional    Estimated Blood Loss (mL): Minimal    Complications: None    Specimens:   * No specimens in log *    Implants:  Implant Name Type Inv. Item Serial No.  Lot No. LRB No. Used Action   GRAFT VASC L45CM DIA4-7MM PTFE CBAS HEP SURF STD WALLED - [id number] Vascular grafts GRAFT VASC L45CM DIA4-7MM PTFE CBAS HEP SURF STD WALLED [id number] [employer] NA Left 1 Implanted         Drains:   [REMOVED] NG/OG/NJ/NE Tube Nasogastric Left nostril (Removed)       [REMOVED] Urinary Catheter 05/05/24 2 Way (Removed)       Findings:  Present At Time Of Surgery (PATOS) (choose all levels that have infection present):  No infection present  Other Findings: brittle artery    Electronically signed by Cristi Dunbar MD on 7/28/2025 at 3:31 PM

## 2025-07-28 NOTE — ANESTHESIA POSTPROCEDURE EVALUATION
Department of Anesthesiology  Postprocedure Note    Patient: Heather Rich  MRN: [id number]  YOB: 1935  Date of evaluation: 7/28/2025    Procedure Summary       Date: 07/28/25 Room / Location: [facility] MAIN OR [address] [place] [address] MAIN OR    Anesthesia Start: 1329 Anesthesia Stop: 1525    Procedure: LEFT ARM GRAFT (MAC/REGIONAL) (Left: Arm Upper) Diagnosis:       End stage renal disease (HCC)      (End stage renal disease (HCC) [N18.6])    Providers: Cristi Dunbar MD Responsible Provider: Willis Pompa MD    Anesthesia Type: Regional, MAC ASA Status: Not recorded            Anesthesia Type: Regional, MAC    Angélica Phase I: Angélica Score: 10    Angélica Phase II: Angélica Score: 10    Anesthesia Post Evaluation    Patient location during evaluation: PACU  Patient participation: complete - patient participated  Level of consciousness: awake and alert  Airway patency: patent  Nausea & Vomiting: no nausea  Cardiovascular status: hemodynamically stable  Respiratory status: acceptable  Hydration status: stable  Pain management: adequate    No notable events documented.

## 2025-07-28 NOTE — ANESTHESIA PRE PROCEDURE
Department of Anesthesiology  Preprocedure Note       Name:  Heather Rich   Age:  89 y.o.  :  1935                                          MRN:  [id number]         Date:  2025      Surgeon: Surgeon(s):  Cristi Dunbar MD    Procedure: Procedure(s):  LEFT ARM GRAFT (MAC/REGIONAL)    Medications prior to admission:   Prior to Admission medications    Medication Sig Start Date End Date Taking? Authorizing Provider   traMADol (ULTRAM) 50 MG tablet Take 1 tablet by mouth every 4 hours as needed for Pain for up to 5 days. Intended supply: 5 days. Take lowest dose possible to manage pain Max Daily Amount: 300 mg 25 Yes Cristi Dunbar MD   triamcinolone (KENALOG) 0.1 % ointment Apply topically 2 times daily as needed   Yes ProviderTegan MD   iron sucrose (VENOFER) 20 MG/ML injection Infuse 2.5 mLs intravenously once a week   Yes ProviderTegan MD   cloNIDine (CATAPRES) 0.1 MG tablet Take 1 tablet by mouth 2 times daily   Yes ProviderTegan MD   metoprolol succinate (TOPROL XL) 25 MG extended release tablet TAKE 1 TABLET DAILY 25  Yes Kehinde Barney MD   pravastatin (PRAVACHOL) 20 MG tablet TAKE 1 TABLET EVERY NIGHT 25  Yes Kehinde Barney MD   losartan (COZAAR) 100 MG tablet Take 1 tablet by mouth daily 25  Yes Kehinde Barney MD   budesonide-formoterol (SYMBICORT) 160-4.5 MCG/ACT AERO Inhale 2 puffs into the lungs 2 times daily 25  Yes Kehinde Barney MD   apixaban (ELIQUIS) 5 MG TABS tablet Take 0.5 tablets by mouth 2 times daily 3/28/25  Yes Kehinde Barney MD   lamoTRIgine (LAMICTAL) 25 MG tablet Take 1 tablet by mouth 2 times daily 3/4/25  Yes Bere Lieberman APRN - CNP   NIFEdipine (PROCARDIA XL) 30 MG extended release tablet Take 1 tablet by mouth daily 3/4/25 7/28/25 Yes Lieberman, Bere A, APRN - CNP   budesonide (PULMICORT) 0.5 MG/2ML nebulizer suspension USE 1 VIAL VIA NEBULIZER TWICE A DAY 3/4/25
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
## 2025-07-28 NOTE — PERIOP NOTE
Sign out received from Dr. Pompa    Discharge instructions reviewed with pt and grandson. Prescription obtained from [facility] and pt given bandaids for dressing change in 48 hours. Pt to discharge area via wheelchair with PACU staff.

## 2025-07-28 NOTE — ANESTHESIA PROCEDURE NOTES
Peripheral Block    Patient location during procedure: pre-op  Reason for block: post-op pain management and at surgeon's request  Start time: 7/28/2025 1:22 PM  Staffing  Performed: anesthesiologist   Anesthesiologist: Willis Pompa MD  Performed by: Willis Pompa MD  Authorized by: Willis Pompa MD    Preanesthetic Checklist  Completed: patient identified, IV checked, site marked, risks and benefits discussed, surgical/procedural consents, equipment checked, pre-op evaluation, timeout performed, anesthesia consent given, oxygen available, monitors applied/VS acknowledged and fire risk safety assessment completed and verbalized  Peripheral Block   Patient position: supine  Prep: ChloraPrep  Provider prep: mask and sterile gloves  Patient monitoring: cardiac monitor, continuous pulse ox, frequent blood pressure checks, IV access and oxygen  Block type: Brachial plexus  Supraclavicular  Laterality: left  Injection technique: single-shot  Guidance: ultrasound guided  Local infiltration: ropivacaine  Infiltration strength: 0.5 %  Local infiltration: ropivacaine    Needle   Needle type: insulated echogenic nerve stimulator needle   Needle gauge: 21 G  Needle localization: anatomical landmarks and ultrasound guidance  Needle length: 10 cm  Assessment   Injection assessment: negative aspiration for heme, no paresthesia on injection, local visualized surrounding nerve on ultrasound and no intravascular symptoms  Paresthesia pain: none  Slow fractionated injection: yes  Hemodynamics: stable  Outcomes: uncomplicated    Medications Administered  mepivacaine (CARBOCAINE) injection 1.5% - Perineural   20 mL - 7/28/2025 1:22:00 PM

## 2025-08-08 ENCOUNTER — TELEPHONE (OUTPATIENT)
Age: 89
End: 2025-08-08

## 2025-08-12 ENCOUNTER — OFFICE VISIT (OUTPATIENT)
Facility: CLINIC | Age: 89
End: 2025-08-12
Payer: MEDICARE

## 2025-08-12 VITALS
DIASTOLIC BLOOD PRESSURE: 80 MMHG | HEIGHT: 63 IN | TEMPERATURE: 98.2 F | HEART RATE: 76 BPM | SYSTOLIC BLOOD PRESSURE: 122 MMHG | OXYGEN SATURATION: 95 % | WEIGHT: 115.4 LBS | BODY MASS INDEX: 20.45 KG/M2 | RESPIRATION RATE: 16 BRPM

## 2025-08-12 DIAGNOSIS — N18.6 TYPE 2 DIABETES MELLITUS WITH CHRONIC KIDNEY DISEASE ON CHRONIC DIALYSIS, WITHOUT LONG-TERM CURRENT USE OF INSULIN (HCC): ICD-10-CM

## 2025-08-12 DIAGNOSIS — N18.6 END STAGE RENAL DISEASE ON DIALYSIS (HCC): Chronic | ICD-10-CM

## 2025-08-12 DIAGNOSIS — R56.9 SEIZURE (HCC): ICD-10-CM

## 2025-08-12 DIAGNOSIS — I10 ESSENTIAL HYPERTENSION: Chronic | ICD-10-CM

## 2025-08-12 DIAGNOSIS — C50.911 MALIGNANT NEOPLASM OF RIGHT FEMALE BREAST, UNSPECIFIED ESTROGEN RECEPTOR STATUS, UNSPECIFIED SITE OF BREAST (HCC): ICD-10-CM

## 2025-08-12 DIAGNOSIS — Z99.2 END STAGE RENAL DISEASE ON DIALYSIS (HCC): Chronic | ICD-10-CM

## 2025-08-12 DIAGNOSIS — E11.22 TYPE 2 DIABETES MELLITUS WITH CHRONIC KIDNEY DISEASE ON CHRONIC DIALYSIS, WITHOUT LONG-TERM CURRENT USE OF INSULIN (HCC): ICD-10-CM

## 2025-08-12 DIAGNOSIS — Z12.31 ENCOUNTER FOR SCREENING MAMMOGRAM FOR MALIGNANT NEOPLASM OF BREAST: ICD-10-CM

## 2025-08-12 DIAGNOSIS — Z00.00 MEDICARE ANNUAL WELLNESS VISIT, SUBSEQUENT: Primary | ICD-10-CM

## 2025-08-12 DIAGNOSIS — T82.9XXS: ICD-10-CM

## 2025-08-12 DIAGNOSIS — Z99.2 TYPE 2 DIABETES MELLITUS WITH CHRONIC KIDNEY DISEASE ON CHRONIC DIALYSIS, WITHOUT LONG-TERM CURRENT USE OF INSULIN (HCC): ICD-10-CM

## 2025-08-12 PROBLEM — T82.9XXA RENAL DIALYSIS DEVICE, IMPLANT, OR GRAFT COMPLICATION: Status: ACTIVE | Noted: 2025-07-24

## 2025-08-12 PROCEDURE — 1036F TOBACCO NON-USER: CPT | Performed by: INTERNAL MEDICINE

## 2025-08-12 PROCEDURE — 99214 OFFICE O/P EST MOD 30 MIN: CPT | Performed by: INTERNAL MEDICINE

## 2025-08-12 PROCEDURE — G8427 DOCREV CUR MEDS BY ELIG CLIN: HCPCS | Performed by: INTERNAL MEDICINE

## 2025-08-12 PROCEDURE — G8420 CALC BMI NORM PARAMETERS: HCPCS | Performed by: INTERNAL MEDICINE

## 2025-08-12 PROCEDURE — 1159F MED LIST DOCD IN RCRD: CPT | Performed by: INTERNAL MEDICINE

## 2025-08-12 PROCEDURE — 3044F HG A1C LEVEL LT 7.0%: CPT | Performed by: INTERNAL MEDICINE

## 2025-08-12 PROCEDURE — 1090F PRES/ABSN URINE INCON ASSESS: CPT | Performed by: INTERNAL MEDICINE

## 2025-08-12 PROCEDURE — 1123F ACP DISCUSS/DSCN MKR DOCD: CPT | Performed by: INTERNAL MEDICINE

## 2025-08-12 PROCEDURE — G0439 PPPS, SUBSEQ VISIT: HCPCS | Performed by: INTERNAL MEDICINE

## 2025-08-12 RX ORDER — CLONIDINE HYDROCHLORIDE 0.2 MG/1
0.2 TABLET ORAL 2 TIMES DAILY
Qty: 180 TABLET | Refills: 3 | Status: SHIPPED | OUTPATIENT
Start: 2025-08-12

## 2025-08-12 ASSESSMENT — PATIENT HEALTH QUESTIONNAIRE - PHQ9
SUM OF ALL RESPONSES TO PHQ QUESTIONS 1-9: 0
SUM OF ALL RESPONSES TO PHQ QUESTIONS 1-9: 0
1. LITTLE INTEREST OR PLEASURE IN DOING THINGS: NOT AT ALL
SUM OF ALL RESPONSES TO PHQ QUESTIONS 1-9: 0
2. FEELING DOWN, DEPRESSED OR HOPELESS: NOT AT ALL
SUM OF ALL RESPONSES TO PHQ QUESTIONS 1-9: 0

## 2025-08-26 ENCOUNTER — HOSPITAL ENCOUNTER (OUTPATIENT)
Facility: HOSPITAL | Age: 89
Discharge: HOME | End: 2025-08-29
Attending: INTERNAL MEDICINE
Payer: MEDICARE

## 2025-08-26 VITALS — BODY MASS INDEX: 20.38 KG/M2 | HEIGHT: 63 IN | WEIGHT: 115 LBS

## 2025-08-26 DIAGNOSIS — C50.911 MALIGNANT NEOPLASM OF RIGHT FEMALE BREAST, UNSPECIFIED ESTROGEN RECEPTOR STATUS, UNSPECIFIED SITE OF BREAST (HCC): ICD-10-CM

## 2025-08-26 DIAGNOSIS — Z12.31 ENCOUNTER FOR SCREENING MAMMOGRAM FOR MALIGNANT NEOPLASM OF BREAST: ICD-10-CM

## 2025-08-26 PROCEDURE — 77063 BREAST TOMOSYNTHESIS BI: CPT

## 2025-08-27 RX ORDER — SPIRONOLACTONE 25 MG/1
25 TABLET ORAL DAILY
Qty: 90 TABLET | Refills: 3 | Status: SHIPPED | OUTPATIENT
Start: 2025-08-27

## (undated) DEVICE — SUTURE VICRYL SZ 2-0 L36IN ABSRB UD L36MM CT-1 1/2 CIR J945H

## (undated) DEVICE — FORCEPS BX L240CM JAW DIA2.8MM L CAP W/ NDL MIC MESH TOOTH

## (undated) DEVICE — X-RAY DETECTABLE SPONGES,16 PLY: Brand: VISTEC

## (undated) DEVICE — SOLUTION IRRIG 1000ML 0.9% SOD CHL USP POUR PLAS BTL

## (undated) DEVICE — DERMABOND SKIN ADH 0.7ML -- DERMABOND ADVANCED 12/BX

## (undated) DEVICE — PREP SKN CHLRAPRP APL 26ML STR --

## (undated) DEVICE — GARMENT,MEDLINE,DVT,INT,CALF,MED, GEN2: Brand: MEDLINE

## (undated) DEVICE — SOLUTION IRRIG 1000ML 09% SOD CHL USP PIC PLAS CONTAINER

## (undated) DEVICE — SYRINGE MED 10ML LUERLOCK TIP W/O SFTY DISP

## (undated) DEVICE — INSUFFLATION NEEDLE TO ESTABLISH PNEUMOPERITONEUM.: Brand: INSUFFLATION NEEDLE

## (undated) DEVICE — PENCIL SMK EVAC 10 FT BLADE ELECTRD ROCKER FOR TELSCP

## (undated) DEVICE — HYPODERMIC SAFETY NEEDLE: Brand: MONOJECT

## (undated) DEVICE — INFECTION CONTROL KIT SYS

## (undated) DEVICE — YANKAUER,POOLE TIP,STERILE,50/CS: Brand: MEDLINE

## (undated) DEVICE — INTENT OT USE PROVIDES A STERILE INTERFACE BETWEEN THE OPERATING ROOM SURGICAL LAMPS (NON-STERILE) AND THE SURGEON OR STAFF WORKING IN THE STERILE FIELD.: Brand: ASPEN® ALC PLUS LIGHT HANDLE COVER

## (undated) DEVICE — Z DISCONTINUED NO SUB IDED SET EXTN W/ 4 W STPCOCK M SPIN LOK 36IN

## (undated) DEVICE — SURGICAL PROCEDURE PACK BASIN MAJ SET CUST NO CAUT

## (undated) DEVICE — SUTURE PROL SZ 2-0 L30IN NONABSORBABLE BLU L26MM CT-2 1/2 8411H

## (undated) DEVICE — SHEAR HARMONIC FOCUS OEM 9CM --

## (undated) DEVICE — STRAP,POSITIONING,KNEE/BODY,FOAM,4X60": Brand: MEDLINE

## (undated) DEVICE — PENCIL ES CRD L10FT HND SWCHING ROCK SWCH W/ EDGE COAT BLDE

## (undated) DEVICE — SUTURE VICRYL + SZ 3-0 L27IN ABSRB UD L26MM SH 1/2 CIR VCP416H

## (undated) DEVICE — GLOVE ORANGE PI 7   MSG9070

## (undated) DEVICE — BASIN ST MAJOR-NO CAUTERY: Brand: MEDLINE INDUSTRIES, INC.

## (undated) DEVICE — ENDO CARRY-ON PROCEDURE KIT INCLUDES ENZYMATIC SPONGE, GAUZE, BIOHAZARD LABEL, TRAY, LUBRICANT, DIRTY SCOPE LABEL, WATER LABEL, TRAY, DRAWSTRING PAD, AND DEFENDO 4-PIECE KIT.: Brand: ENDO CARRY-ON PROCEDURE KIT

## (undated) DEVICE — SUTURE PDS II SZ 1 L27IN ABSRB VLT CT-1 L36MM 1/2 CIR Z341H

## (undated) DEVICE — PAD,ABDOMINAL,5"X9",ST,LF,25/BX: Brand: MEDLINE INDUSTRIES, INC.

## (undated) DEVICE — SPONGE GZ W4XL4IN COT 12 PLY TYP VII WVN C FLD DSGN STERILE

## (undated) DEVICE — PACK,LAPAROTOMY,2 REINFORCED GOWNS: Brand: MEDLINE

## (undated) DEVICE — DRAPE,T,LAPARO,TRANS,STERILE: Brand: MEDLINE

## (undated) DEVICE — SUTURE VCRL SZ 2-0 L27IN ABSRB UD L26MM SH 1/2 CIR J417H

## (undated) DEVICE — SUTURE VICRYL SZ 2-0 L27IN ABSRB UD L26MM SH 1/2 CIR J417H

## (undated) DEVICE — CONTAINER SPEC 15 ML CAP NEUT BUFF FORMALIN 10 % POLYPR

## (undated) DEVICE — SUTURE VCRL SZ 3-0 L27IN ABSRB UD L26MM SH 1/2 CIR J416H

## (undated) DEVICE — GENERAL LAPAROSCOPY - SMH: Brand: MEDLINE INDUSTRIES, INC.

## (undated) DEVICE — BLOCK BITE ENDO --

## (undated) DEVICE — BW-412T DISP COMBO CLEANING BRUSH: Brand: SINGLE USE COMBINATION CLEANING BRUSH

## (undated) DEVICE — SUTURE MONOCRYL SZ 4-0 L27IN ABSRB UD L24MM PS-1 3/8 CIR PRIM Y935H

## (undated) DEVICE — REM POLYHESIVE ADULT PATIENT RETURN ELECTRODE: Brand: VALLEYLAB

## (undated) DEVICE — ELECTRODE PT RET AD L9FT HI MOIST COND ADH HYDRGEL CORDED

## (undated) DEVICE — LIQUIBAND RAPID ADHESIVE 36/CS 0.8ML: Brand: MEDLINE

## (undated) DEVICE — TROCAR: Brand: KII® SLEEVE

## (undated) DEVICE — CANN NASAL O2 CAPNOGRAPHY AD -- FILTERLINE

## (undated) DEVICE — SET ADMIN 16ML TBNG L100IN 2 Y INJ SITE IV PIGGY BK DISP

## (undated) DEVICE — LAPAROSCOPIC TROCAR SLEEVE/SINGLE USE: Brand: KII® OPTICAL ACCESS SYSTEM

## (undated) DEVICE — SUTURE VICRYL SZ 3-0 L27IN ABSRB VLT L26MM SH 1/2 CIR J316H

## (undated) DEVICE — SUTURE VICRYL + SZ 0 L27IN ABSRB WHT CT-2 1/2 CIR TAPERPOINT VCP270H

## (undated) DEVICE — GLOVE SURG SZ 7 L12IN FNGR THK79MIL GRN LTX FREE

## (undated) DEVICE — 450 ML BOTTLE OF 0.05% CHLORHEXIDINE GLUCONATE IN 99.95% STERILE WATER FOR IRRIGATION, USP AND APPLICATOR.: Brand: IRRISEPT ANTIMICROBIAL WOUND LAVAGE

## (undated) DEVICE — 1200 GUARD II KIT W/5MM TUBE W/O VAC TUBE: Brand: GUARDIAN

## (undated) DEVICE — APPLICATOR MEDICATED 26 CC SOLUTION HI LT ORNG CHLORAPREP

## (undated) DEVICE — SNARE ENDOSCP POLYP MED STD AD 2.4X27X240 CM 2.8 MM OVL SENS

## (undated) DEVICE — SYRINGE MED 20ML STD CLR PLAS LUERLOCK TIP N CTRL DISP

## (undated) DEVICE — CELLERATERX® SURGICAL ACTIVATED COLLAGEN® POWDER IS TYPE I BOVINE HYDROLYZED COLLAGEN AND CONTAINS NO ADDITIVES.: Brand: CELLERATERX SURGICAL

## (undated) DEVICE — SOLUTION IRRIG 1000ML STRL H2O USP PLAS POUR BTL

## (undated) DEVICE — SUTURE PERMAHAND SZ 3-0 L30IN NONABSORBABLE BLK SILK BRAID A304H

## (undated) DEVICE — NEEDLE HYPO 25GA L1.5IN BVL ORIENTED ECLIPSE

## (undated) DEVICE — Device

## (undated) DEVICE — BAG BELONG PT PERS CLEAR HANDL

## (undated) DEVICE — TOWEL,OR,DSP,ST,BLUE,STD,4/PK,20PK/CS: Brand: MEDLINE

## (undated) DEVICE — TOWEL SURG W17XL27IN STD BLU COT NONFENESTRATED PREWASHED

## (undated) DEVICE — GLOVE SURG SZ 8 L12IN FNGR THK79MIL GRN LTX FREE

## (undated) DEVICE — SUTURE MCRYL SZ 4-0 L27IN ABSRB UD L19MM PS-2 1/2 CIR PRIM Y426H

## (undated) DEVICE — SUTURE MONOCRYL SZ 4-0 L27IN ABSRB UD L19MM PS-2 1/2 CIR PRIM Y426H

## (undated) DEVICE — SYR 10ML LUER LOK 1/5ML GRAD --

## (undated) DEVICE — CANNULA CUSH AD W/ 14FT TBG

## (undated) DEVICE — NEONATAL-ADULT SPO2 SENSOR: Brand: NELLCOR

## (undated) DEVICE — SYR 50ML SLIP TIP NSAF LF STRL --

## (undated) DEVICE — KIT COMPLIANCE W ENDOGLDE + 11 NO BRSH ENDOKT

## (undated) DEVICE — BITE BLK ENDOSCP AD 54FR GRN POLYETH ENDOSCP W STRP SLD

## (undated) DEVICE — GLOVE ORANGE PI 7 1/2   MSG9075

## (undated) DEVICE — GLOVE SURG SZ 8 L12IN FNGR THK94MIL STD WHT LTX FREE

## (undated) DEVICE — CATH IV AUTOGRD BC BLU 22GA 25 -- INSYTE

## (undated) DEVICE — NEEDLE HYPO 18GA L1.5IN PNK S STL HUB POLYPR SHLD REG BVL

## (undated) DEVICE — SOLIDIFIER FLD 2OZ 1500CC N DISINF IN BTL DISP SAFESORB

## (undated) DEVICE — SOLIDIFIER FLUID 3000 CC ABSORB

## (undated) DEVICE — PACK SURG CUST AV FISTULA LF SMH

## (undated) DEVICE — DRAPE,UTILTY,TAPE,15X26, 4EA/PK: Brand: MEDLINE

## (undated) DEVICE — SUTURE VICRYL + SZ 0 L27IN ABSRB VLT L26MM UR-6 5/8 CIR VCP603H

## (undated) DEVICE — 3M™ IOBAN™ 2 ANTIMICROBIAL INCISE DRAPE 6640EZ: Brand: IOBAN™ 2

## (undated) DEVICE — SUTURE PROL SZ 5-0 L30IN NONABSORBABLE BLU L13MM RB-2 1/2 8710H

## (undated) DEVICE — Device: Brand: SINGLE USE SOFT BRUSH

## (undated) DEVICE — SOLUTION IV 500 ML 0.9 NACL INJ EXCEL CONTAINER USP LF

## (undated) DEVICE — STERILE POLYISOPRENE POWDER-FREE SURGICAL GLOVES WITH EMOLLIENT COATING: Brand: PROTEXIS

## (undated) DEVICE — TROCARS: Brand: KII® OPTICAL ACCESS SYSTEM

## (undated) DEVICE — ELECTRODE,RADIOTRANSLUCENT,FOAM,5PK: Brand: MEDLINE

## (undated) DEVICE — SYRINGE 20ML LL S/C 50

## (undated) DEVICE — KENDALL RADIOLUCENT FOAM MONITORING ELECTRODE -RECTANGULAR SHAPE: Brand: KENDALL

## (undated) DEVICE — SOL IRR SOD CL 0.9% 1000ML BTL --

## (undated) DEVICE — SUTURE NONABSORBABLE MONOFILAMENT 2-0 FS 18 IN ETHILON 664H

## (undated) DEVICE — WRISTBAND ID AD W1XL11.5IN RED POLY ALRG PREPRINTED PERM

## (undated) DEVICE — SUTURE VICRYL + SZ 3-0 L27IN ABSRB UD FS2 3/8 CIR REV CUT

## (undated) DEVICE — KIT IV STRT W CHLORAPREP PD 1ML

## (undated) DEVICE — BW-400L DISP SNGL-END CLEANINGBRUSH: Brand: OLYMPUS

## (undated) DEVICE — SUTURE VICRYL SZ 2-0 L27IN ABSRB VLT L26MM SH 1/2 CIR J317H

## (undated) DEVICE — SUTURE PROL 5-0 L18IN NONABSORBABLE BLU RB-2 L13MM 1/2 CIR 8713H